# Patient Record
Sex: MALE | Race: WHITE | NOT HISPANIC OR LATINO | Employment: OTHER | URBAN - METROPOLITAN AREA
[De-identification: names, ages, dates, MRNs, and addresses within clinical notes are randomized per-mention and may not be internally consistent; named-entity substitution may affect disease eponyms.]

---

## 2017-10-19 ENCOUNTER — GENERIC CONVERSION - ENCOUNTER (OUTPATIENT)
Dept: OTHER | Facility: OTHER | Age: 62
End: 2017-10-19

## 2017-10-19 ENCOUNTER — APPOINTMENT (OUTPATIENT)
Dept: RADIOLOGY | Facility: CLINIC | Age: 62
End: 2017-10-19
Payer: COMMERCIAL

## 2017-10-19 DIAGNOSIS — M54.9 DORSALGIA: ICD-10-CM

## 2017-10-19 DIAGNOSIS — M89.9 DISORDER OF BONE: ICD-10-CM

## 2017-10-19 DIAGNOSIS — M16.12 PRIMARY OSTEOARTHRITIS OF LEFT HIP: ICD-10-CM

## 2017-10-19 DIAGNOSIS — G62.9 POLYNEUROPATHY: ICD-10-CM

## 2017-10-19 DIAGNOSIS — Z13.220 ENCOUNTER FOR SCREENING FOR LIPOID DISORDERS: ICD-10-CM

## 2017-10-19 DIAGNOSIS — M25.552 PAIN IN LEFT HIP: ICD-10-CM

## 2017-10-19 PROCEDURE — 73502 X-RAY EXAM HIP UNI 2-3 VIEWS: CPT

## 2017-10-20 ENCOUNTER — TRANSCRIBE ORDERS (OUTPATIENT)
Dept: ADMINISTRATIVE | Facility: HOSPITAL | Age: 62
End: 2017-10-20

## 2017-10-20 DIAGNOSIS — M25.552 LEFT HIP PAIN: ICD-10-CM

## 2017-10-20 DIAGNOSIS — M89.9 BONE DISEASE: Primary | ICD-10-CM

## 2017-11-01 ENCOUNTER — HOSPITAL ENCOUNTER (OUTPATIENT)
Dept: RADIOLOGY | Facility: HOSPITAL | Age: 62
Discharge: HOME | End: 2017-11-01
Attending: FAMILY MEDICINE
Payer: COMMERCIAL

## 2017-11-01 ENCOUNTER — HOSPITAL ENCOUNTER (OUTPATIENT)
Dept: RADIOLOGY | Facility: HOSPITAL | Age: 62
Discharge: HOME | End: 2017-11-01

## 2017-11-01 ENCOUNTER — HOSPITAL ENCOUNTER (OUTPATIENT)
Dept: MRI IMAGING | Facility: HOSPITAL | Age: 62
Discharge: HOME | End: 2017-11-01
Attending: FAMILY MEDICINE
Payer: COMMERCIAL

## 2017-11-01 DIAGNOSIS — M89.9 BONE DISEASE: ICD-10-CM

## 2017-11-01 DIAGNOSIS — M25.552 LEFT HIP PAIN: ICD-10-CM

## 2017-11-01 DIAGNOSIS — T15.90XA FOREIGN BODY, EYE, UNSPECIFIED LATERALITY, INITIAL ENCOUNTER: ICD-10-CM

## 2017-11-01 PROCEDURE — A9585 GADOBUTROL INJECTION: HCPCS | Performed by: FAMILY MEDICINE

## 2017-11-01 PROCEDURE — 73722 MRI JOINT OF LWR EXTR W/DYE: CPT

## 2017-11-01 PROCEDURE — 77002 NEEDLE LOCALIZATION BY XRAY: CPT

## 2017-11-01 PROCEDURE — 27095 INJECTION FOR HIP X-RAY: CPT

## 2017-11-01 RX ADMIN — GADOBUTROL 0.2 ML: 604.72 INJECTION INTRAVENOUS at 13:49

## 2017-11-01 RX ADMIN — IOHEXOL 50 ML: 300 INJECTION, SOLUTION INTRAVENOUS at 13:49

## 2017-11-03 ENCOUNTER — ALLSCRIPTS OFFICE VISIT (OUTPATIENT)
Dept: OTHER | Facility: OTHER | Age: 62
End: 2017-11-03

## 2017-11-04 NOTE — PROGRESS NOTES
Assessment  1  Osteoarthritis of left hip (715 95) (M16 12)   2  Screening for hyperlipidemia (V77 91) (Z13 220)   3  Neuropathy (355 9) (G62 9)    Plan  Depression screen    · *VB-Depression Screening; Status:Complete;   Done: [id number] 10:15AM  Neuropathy, Osteoarthritis of left hip, Screening for hyperlipidemia    · (1) CBC/PLT/DIFF; Status:Active; Requested XCZ:[id number];    · (1) COMPREHENSIVE METABOLIC PANEL; Status:Active; Requested RWR:[id number];    · (1) LIPID PANEL, FASTING; Status:Active; Requested for:03Nov2017;    · (1) TSH WITH FT4 REFLEX; Status:Active; Requested XAY:[id number];    · (1) VITAMIN B12; Status:Active; Requested NUA:[id number];   PMH: Encounter for screening for malignant neoplasm of colon    · COLONOSCOPY; Status:Active; Requested CQQ:[id number];     Discussion/Summary  Discussion Summary:   Pt is here to establish care  He was last seen by a PCP in 1970s  labs from work screenings - see scans  f/u with rheumatology for OA - will work on obtaining records  f/u with sports medicine for lumbar pain (workman's comp)  update labs - return for f/u in 1 month, sooner if needed  also states that he has concerns with a genetic disease - [id number]  States that he is in the process of getting test results back  Apparently his wife tested negative and his daughter is positive for [id number] and as a result she has fibromyalgia and MS  Pt is concerned with some s/sx he is now having may be related to HTJBE-733: Spasm to R forearm, R thigh and twitching of L hand and L foot  to f/u in 1 month  Counseling Documentation With Imm: The patient was counseled regarding instructions for management,-- risk factor reductions,-- prognosis,-- patient and family education,-- impressions  Chief Complaint  Chief Complaint Free Text Note Form: New PT here to establish care  is seeing Dr Nataly Benz Rheumatology, due to Osteo arthritis            History of Present Illness  HPI: Pt is here to establish care  Last time he saw PCP was in the 1970s  has a history of osteoarthritis that he follows Dr Arslan Mendoza (Rheumatology)  F/u appt is in December  Pt had labs completed this year for rheum (unsure of exact time)  Pt takes calcium and vitamin D    is also followed by sports medicine for lumbar spine (workman's comp) and L hip pain  Review of Systems  PHQ-9 Depression Scale: Over the past 2 weeks, how often have you been bothered by the following problems? 1 ) Little interest or pleasure in doing things? Not at all    2 ) Feeling down, depressed or hopeless? Nearly every day  3 ) Trouble falling asleep or sleeping too much? Not at all    4 ) Feeling tired or having little energy? Several days  5 ) Poor appetite or overeating? Not at all    6 ) Feeling bad about yourself, or that you are a failure, or have let yourself or your family down? Not at all    7 ) Trouble concentrating on things, such as reading a newspaper or watching television? Not at all    8 ) Moving or speaking so slowly that other people could have noticed, or the opposite, moving or speaking faster than usual? Nearly every day  severity of depression is mild   How difficult have these problems made it for you to do your work, take care of things at home, or get along with people? Extremely difficult  Score 7       Complete-Male:   Constitutional: no fever,-- not feeling poorly,-- no chills-- and-- not feeling tired  ENT: no earache,-- no sore throat-- and-- no nasal discharge  Cardiovascular: the heart rate was not slow,-- no chest pain,-- the heart rate was not fast-- and-- no palpitations  Respiratory: no shortness of breath,-- no cough,-- no wheezing-- and-- no shortness of breath during exertion  Gastrointestinal: no abdominal pain,-- no nausea,-- no vomiting-- and-- no blood in stools  Genitourinary: no dysuria  Musculoskeletal: States R forearm and R thigh spasm   L rasheed and L foot - twitching , but-- no myalgias--    The patient presents with complaints of arthralgias (back pain and back pain - followed by sports medicine)  Integumentary: no rashes  Neurological: no headache,-- no numbness,-- no tingling,-- no dizziness-- and-- no fainting  Psychiatric: no anxiety-- and-- no depression  Active Problems  1  Back pain (724 5) (M54 9)   2  Bone lesion (733 90) (M89 9)   3  Hip pain, left (719 45) (M25 552)   4  Osteoarthritis of left hip (715 95) (M16 12)    Past Medical History  1  History of depression (V11 8) (Z86 59)   2  History of Osteoarthritis (715 90) (M19 90)  Active Problems And Past Medical History Reviewed: The active problems and past medical history were reviewed and updated today  Surgical History  1  History of Epidural Needles  Surgical History Reviewed: The surgical history was reviewed and updated today  Family History  Mother    1  Family history of Rheumatism  Family History Reviewed: The family history was reviewed and updated today  Social History   · Current every day smoker (305 1) (S32 072)   · No illicit drug use   · Occasional alcohol consumption (V49 89) (Z78 9)  Social History Reviewed: The social history was reviewed and updated today  The social history was reviewed and is unchanged  Current Meds   1  Cyclobenzaprine HCl - 10 MG Oral Tablet; TAKE 1 TABLET 3 times daily PRN; Therapy: ([phone]) to Recorded   2  Hydrocodone-Acetaminophen  MG Oral Tablet; take 1 tablet every 4 to 6 hours if   needed; Therapy: ([phone]) to Recorded   3  Hydrocodone-Acetaminophen 5-325 MG Oral Tablet; take 1 tablet every 4 to 6 hours if   needed; Therapy: ([phone]) to Recorded   4  Naproxen 500 MG Oral Tablet; TAKE 1 TABLET EVERY 12 HOURS AS NEEDED; Therapy: [id number] to (Complete:08Nov2017)  Requested for: 19Oct2017; Last   Rx:19Oct2017 Ordered    Allergies  1  morphine   2  Tramadol  3   No Known Environmental Allergies 4  No Known Food Allergies    Vitals  Signs   Recorded: 59CHI0174 10:24AM   Temperature: 98 9 F, Tympanic  Heart Rate: 94  Systolic: 193, LUE, Sitting  Diastolic: 68, LUE, Sitting  Height: 6 ft 3 in  Weight: 183 lb 4 oz  BMI Calculated: 22 9  BSA Calculated: 2 11  O2 Saturation: 98, RA    Physical Exam    Constitutional   General appearance: No acute distress, well appearing and well nourished  Eyes   Conjunctiva and lids: No swelling, erythema, or discharge  Pupils and irises: Equal, round and reactive to light  Ears, Nose, Mouth, and Throat   External inspection of ears and nose: Normal     Otoscopic examination: Tympanic membrance translucent with normal light reflex  Canals patent without erythema  Nasal mucosa, septum, and turbinates: Normal without edema or erythema  Oropharynx: Normal with no erythema, edema, exudate or lesions  -- MMM  Pulmonary   Respiratory effort: No increased work of breathing or signs of respiratory distress  Auscultation of lungs: Clear to auscultation, equal breath sounds bilaterally, no wheezes, no rales, no rhonci  -- no rhonchi or wheezing  Cardiovascular   Auscultation of heart: Normal rate and rhythm, normal S1 and S2, without murmurs  -- no pedal edema  Abdomen   Abdomen: Non-tender, no masses  -- soft, ND, NT, + BS  Lymphatic   Palpation of lymph nodes in neck: No lymphadenopathy  Musculoskeletal   Gait and station: Normal     Inspection/palpation of joints, bones, and muscles: Normal     Skin   Skin and subcutaneous tissue: Normal without rashes or lesions  -- KELLEY = strong  5/5 UE and LE  no tremor or spasms noted  no twitching noted  Neurologic no focal deficits  Sensation: No sensory loss      Psychiatric   Orientation to person, place and time: Normal     Mood and affect: Normal          Results/Data  *VB-Depression Screening [id number] 10:15AM Karly Hurtado     Test Name Result Flag Reference   Depression Scale Result      Depression Screen - Positive Findings       Future Appointments    Date/Time Provider Specialty Site   12/27/2017 04:00 PM Josh Ferris MD Internal Medicine [address]   11/09/2017 01:30 PM Hafsa Hubbard Internal Medicine [address]   11/29/2017 10:40 AM Alejandra Essex, DO Sports Medicine [facility] [address]     Signatures   Electronically signed by :  Hafsa Yan; Nov  3 2017  3:31PM EST                       (Author)    Electronically signed by : Coco Garrido DO; Nov  3 2017  5:24PM EST

## 2017-11-09 ENCOUNTER — GENERIC CONVERSION - ENCOUNTER (OUTPATIENT)
Dept: OTHER | Facility: OTHER | Age: 62
End: 2017-11-09

## 2017-11-29 ENCOUNTER — GENERIC CONVERSION - ENCOUNTER (OUTPATIENT)
Dept: OTHER | Facility: OTHER | Age: 62
End: 2017-11-29

## 2017-12-22 ENCOUNTER — ALLSCRIPTS OFFICE VISIT (OUTPATIENT)
Dept: OTHER | Facility: OTHER | Age: 62
End: 2017-12-22

## 2017-12-23 NOTE — CONSULTS
Assessment   Assessed    1  Labral tear of left hip joint (843 8) (S73 192A)   2  Osteoarthritis of left hip (715 95) (M16 12)   3  Hip pain, left (719 45) (M25 552)    Plan   Hip pain, left    · Injection major joint or bursa (shoulder, knee, SI)-POC; Status:Active; Requested    for:73Ntg0785;    Perform: In Office; Order Comments:Left hip injection; Z:12GRS3772; Ordered;For:Hip pain, left; Ordered By:Milton Reyes; Discussion/Summary      1  We will schedule patient for a left hip joint injection  risks and benefits including bleeding, infection, tissue reaction, allergic reaction were discussed  Verbal consent obtained  Depending on his response to the injection we will determine further treatment options which may include medial branch nerve blocks of his lower lumbar facet joints  He is not currently experiencing any significant radiating leg pain  Chief Complaint   Chief Complaints    1  Back Pain    History of Present Illness   Mr Clay Montes is a 58-year-old gentleman sent from [place] [id number]  for consideration of left hip joint injection as well as management of his chronic back pain  He did have an injury in August of 2015 at work and had been following with a physician at [facility]  He underwent 2 separate right L4-5 and L5-S1 transforaminal epidural steroid injections  He states that this did resolve his radiating leg pain  He does have left hip pain which he states occurred after visiting with physical therapy  This was further evaluated and he was found to have a labral tear  is describing mild to moderate type pain rated as an 8 to 9/10  He is currently prior ties in the left hip pain as the worst  This is nearly constant with intermittent exacerbation  He denies any typical pattern  He characterizes the pain as sharp, dull, aching, shooting, and numbness  factors include standing, bending, walking, exercise, coughing, sneezing, and bowel movements   Alleviating factors include lying down and sitting  has tried local heat or ice application without relief  He states injections have helped with the radiating leg pain but he does continue to have some weakness of the right leg especially with activity throughout the day  He states he does developed footdrop later on in the afternoon  He has not experienced any significant improvement with physical therapy or exercise    have personally reviewed and/or updated the patient's past medical history, past surgical history, family history, social history, current medications, allergies, and vital signs today  Referring physician is  Dr Jonas Landry      Primary Care physician is  Dr Cameron Hdz presents with complaints of gradual onset of constant episodes of severe bilateral lower back pain, described as sharp, dull and aching, radiating to the left buttock and left thigh  On a scale of 1 to 10, the patient rates the pain as 9  Review of Systems        Constitutional: no fever,-- no recent weight gain-- and-- no recent weight loss  Eyes: no double vision-- and-- no blurry vision  Cardiovascular: no chest pain,-- no palpitations-- and-- no lower extremity edema  Respiratory: no complaints of shortness of breath-- and-- no wheezing  Musculoskeletal: no difficulty walking,-- no muscle weakness,-- no joint stiffness,-- no joint swelling,-- no limb swelling,-- no pain in extremity-- and-- no decreased range of motion  Neurological: no dizziness,-- no difficulty swallowing,-- no memory loss,-- no loss of consciousness-- and-- no seizures  Gastrointestinal: no nausea,-- no vomiting,-- no constipation-- and-- no diarrhea  Genitourinary: no difficulty initiating urine stream,-- no genital pain-- and-- no frequent urination  Integumentary: no complaints of skin rash  Psychiatric: no depression        Endocrine: no excessive thirst,-- no adrenal disease,-- no hypothyroidism-- and-- no hyperthyroidism  Hematologic/Lymphatic: no tendency for easy bruising-- and-- no tendency for easy bleeding  Active Problems   Problems    1  Back pain (724 5) (M54 9)   2  Bone lesion (733 90) (M89 9)   3  Depression screen (V79 0) (Z13 89)   4  Hip pain, left (719 45) (M25 552)   5  Labral tear of left hip joint (843 8) (S73 192A)   6  Neuropathy (355 9) (G62 9)   7  Osteoarthritis of left hip (715 95) (M16 12)   8  Screening for hyperlipidemia (V77 91) (Z13 220)    Past Medical History   Problems    1  History of depression (V11 8) (Z86 59)   2  History of Osteoarthritis (715 90) (M19 90)    Surgical History   Problems    1  History of Epidural Needles    Family History   Mother    1  Family history of Rheumatism    Social History   Problems    · Current every day smoker (305 1) (I33 529)   · No illicit drug use   · Occasional alcohol consumption (V49 89) (Z78 9)    Allergies   Medication    1  morphine   2  Tramadol  Non-Medication    3  No Known Environmental Allergies   4  No Known Food Allergies    Vitals   Vital Signs    Recorded: 56Egb4127 08:03AM   Heart Rate 80   Respiration 16   Systolic 458   Diastolic 64   Height 6 ft 3 in   Weight 188 lb    BMI Calculated 23 5   BSA Calculated 2 14   Pain Scale 8-9     Physical Exam              Lumbar/Sacral Spine examination demonstrates  LUMBAR Well-developed, well-nourished individual in no acute distress  Appropriate mood and affect  Grossly oriented with coherent speech and thought processing  score: Waddellâs score is 0/5    nerves: Cranial nerve function is grossly intact bilaterally  Bilateral upper and lower extremity strength is normal and symmetric EXCEPT FOR THE RIGHT FOOT DORSIFLEXORS, EVERSION AND INVERSION WHICH ARE GRADED AS 4/5  No atrophy or tone abnormalities noted  Bilateral lower extremity muscle stretch reflexes are physiologic and symmetric  No ankle clonus is noted  No loss of sensation is noted  Compression Maneuvers: Straight leg raising in the sitting and supine positions is negative for radicular pain  motor: Gait is normal  Station is normal  Toe walking, heel walking, squatting are normal    Inspection and palpation of the spine and extremities are unremarkable  ROM: Peripheral joint range of motion is full and pain free without obvious instability or laxity in all four extremities  No gross bony deformity noted  maneuvers: Provocative maneuvers are negative EXCEPT FOR INTERNAL AND EXTERNAL ROTATION OF THE LEFT HIP WHICH REPRODUCES THE PATIENT'S PAIN COMPLAINT  Normal pain-free range of motion  No gross axial skeletal deformities  Skin inspection grossly negative for erythema, breakdown, or concerning lesions in affected area  No lymphadenopathy is appreciated in the involved extremity  No lower extremity edema  Breathing is comfortable and regular  No dyspnea noted during examination  Visual field grossly intact to confrontation  No redness appreciated  No craniofacial deformities or asymmetry  No neck masses appreciated  Results/Data   Procedure Flowsheet [id number] 08:11AM       Test Name Result Flag Reference   Oswestry Score 44           Results    Outside records from Dr Tim Avelar obtained and reviewed  FL INJECTION LEFT HIP (ARTHROGRAM) [id number] 12:54PM Tristan Matthews      Test Name Result Flag Reference   FL INJECTION LEFT HIP (ARTHROGRAM) (Report)     LEFT HIP ARTHROGRAM            INDICATION:  Left hip pain  COMPARISON: None  FLUOROSCOPY TIME:  0 6 min           IMAGES: 1           FINDINGS:            After the risks and benefits of the procedure were thoroughly explained, informed consent was obtained  The patient verbalized expressed understanding of the above risks and wished to proceed with the procedure  Final standard time-out procedure performed  3 mL of 1% lidocaine solution was utilized for local anesthesia  Intermittent fluoroscopy was utilized for placement of a 20 gauge 3 5 inch spinal needle within the left hip joint  After positioning was confirmed with 3 mL of Omnipaque 300, 15 mL of       0 2 ml Gadavist/50ml Normal Saline was injected into the joint  The patient tolerated the procedure well  There were no complications  I asked the patient to call us with any questions, concerns, or acute problems  The patient expressed understanding of the above  The patient will report for MR imaging of the left hip  IMPRESSION:           Successful hip arthrogram with gadolinium injection into the left hip joint  MRI of the hip is currently pending  Workstation performed: [id number]           Signed by: Angel Jean MD      11/1/17      * MRI ARTHROGRAM LEFT HIP [id number] 12:54PM Papi Saliva      Test Name Result Flag Reference   MRI ARTHROGRAM LEFT HIP (Report)     MRI ARTHROGRAM LEFT HIP           INDICATION: M89 9: Disorder of bone, unspecified      M25 552: Pain in left hip  History taken directly from the electronic ordering system  Left hip pain  X-ray lesion  COMPARISON: Plain film dated 10/19/2017           TECHNIQUE: MR sequences were obtained of the left hip and pelvis including: Localizer, coronal pelvis T1, coronal pelvis T2 fat sat  Smaller field of view sequences of the affected hip were obtained with axial oblique T1 fat sat, axial oblique T2 fat       sat, coronal T1 fat sat, sagittal T2 fat sat, sagittal T1 fat sat  Images were acquired on a 1 5 Joyce unit  Images were acquired after intra-articular injection of gadolinium utilizing direct MR arthrography technique  FINDINGS:           LEFT HIP:           -JOINT EFFUSION: There is good distention of the left hip joint with injected contrast            -BONE MARROW SIGNAL AND ALIGNMENT: Mild left hip degenerative changes noted   No fracture dislocation or evidence of osseous destructive lesion such as AVN  Pistol- deformity appreciated on coronal series suggesting cam-type femoral acetabular impingement  There is a small lesion within the intertrochanteric region exhibiting serpiginous margins and increased central T2 signal likely reflecting a bone infarction and corresponding to recent plain film findings  This lesion measures up to 1 1 x 1 0 x 2 1 cm  -ACETABULAR LABRUM: Focal contrast imbibition noted at the anterior margin of the acetabular labrum best seen on series 8 image 19 concerning for a focal tear  Remainder of the labrum appears intact  -TROCHANTERIC BURSA: Normal            RIGHT HIP: (please note dedicated small field of view images were not made of the right hip joint limiting its evaluation )            -JOINT EFFUSION: None  -BONE MARROW SIGNAL AND ALIGNMENT: Normal marrow signal demonstrated without hip fracture or AVN  Pistol- deformity also noted involving the right hip on coronal series  -ACETABULAR LABRUM: No gross abnormalities although evaluation is very limited  -TROCHANTERIC BURSA: Normal            REST OF PELVIS:           -BONE MARROW SIGNAL: Normal            -SI JOINTS AND SYMPHYSIS PUBIS: Intact  -VISUALIZED LUMBAR SPINE: unremarkable  -MUSCULATURE: Intact with intact hamstring origins bilaterally  -PELVIC SOFT TISSUES: Normal            SUBCUTANEOUS TISSUES: Normal                IMPRESSION:      1  Focal contrast imbibition anterior acetabular labrum consistent with focal tear  The remainder of the labrum appears intact  2  Mild left hip degenerative change and pistol- deformity suggestive of cam-type femoral acetabular impingement  Similar findings are noted involving the contralateral right        3  Left intertrochanteric lesion 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 and corresponding to recent plain film abnormality  Workstation performed: [id number]           Signed by:      Lona Wills MD      11/2/17      * XR HIP/PELV 2-3 VWS LEFT W PELVIS IF PERFORMED 19Oct2017 10:29AM Ovidio Cooler Order Number: [id number]      Performing Comments: room 6      Test Name Result Flag Reference   * XR HIP/PELV 2-3 VWS LEFT (Report)     This is a summary report  The complete report is available in the patient's medical record  If you cannot access the medical record, please contact the sending organization for a detailed fax or copy  LEFT HIP           INDICATION: Left hip pain that radiates across lower back           COMPARISON: None           VIEWS: AP pelvis and 2 coned down views           IMAGES: 3           FINDINGS:           There is no fracture or dislocation  Minimal degenerative changes in the lumbar spine  2 1 x 1 4 cm sclerotic lesion in the left femoral neck with a central lucency and relatively narrow transition zone  This favors a benign lesion  However, a follow-up plain film exam in 2 months can be considered to confirm stability  Soft tissues are unremarkable  IMPRESSION:                1  Sclerotic lesion in the left femoral neck favoring a benign etiology  2  Degenerative changes as described             ##fuslh2##fuslh2                 Workstation performed: [id number]           Signed by:      King Tejeda MD      10/20/17      Future Appointments      Date/Time Provider Specialty Site   01/08/2018 02:30 PM Som Julio MD Internal Medicine [facility]   01/17/2018 08:45 AM Elenita Ramírez DO Pain Management [facility] OUTPATIENT     Signatures    Electronically signed by : Homer Sánchez DO; Dec 22 2017 12:23PM EST                       (Author)

## 2018-01-08 ENCOUNTER — GENERIC CONVERSION - ENCOUNTER (OUTPATIENT)
Dept: INTERNAL MEDICINE CLINIC | Facility: CLINIC | Age: 63
End: 2018-01-08

## 2018-01-08 ENCOUNTER — GENERIC CONVERSION - ENCOUNTER (OUTPATIENT)
Dept: OTHER | Facility: OTHER | Age: 63
End: 2018-01-08

## 2018-01-12 VITALS
BODY MASS INDEX: 22.78 KG/M2 | TEMPERATURE: 98.9 F | OXYGEN SATURATION: 98 % | WEIGHT: 183.25 LBS | SYSTOLIC BLOOD PRESSURE: 104 MMHG | HEIGHT: 75 IN | DIASTOLIC BLOOD PRESSURE: 68 MMHG | HEART RATE: 94 BPM

## 2018-01-17 ENCOUNTER — GENERIC CONVERSION - ENCOUNTER (OUTPATIENT)
Dept: OTHER | Facility: OTHER | Age: 63
End: 2018-01-17

## 2018-01-17 ENCOUNTER — HOSPITAL ENCOUNTER (OUTPATIENT)
Dept: RADIOLOGY | Facility: MEDICAL CENTER | Age: 63
Discharge: HOME | End: 2018-01-17

## 2018-01-22 VITALS
HEART RATE: 87 BPM | DIASTOLIC BLOOD PRESSURE: 73 MMHG | SYSTOLIC BLOOD PRESSURE: 103 MMHG | BODY MASS INDEX: 23.5 KG/M2 | HEIGHT: 75 IN | WEIGHT: 189 LBS

## 2018-01-22 VITALS
HEART RATE: 100 BPM | TEMPERATURE: 98 F | HEIGHT: 75 IN | OXYGEN SATURATION: 97 % | WEIGHT: 189 LBS | BODY MASS INDEX: 23.5 KG/M2 | SYSTOLIC BLOOD PRESSURE: 90 MMHG | DIASTOLIC BLOOD PRESSURE: 62 MMHG

## 2018-01-22 VITALS
HEIGHT: 75 IN | SYSTOLIC BLOOD PRESSURE: 110 MMHG | DIASTOLIC BLOOD PRESSURE: 75 MMHG | WEIGHT: 182 LBS | BODY MASS INDEX: 22.63 KG/M2 | HEART RATE: 101 BPM

## 2018-01-22 VITALS
HEIGHT: 75 IN | BODY MASS INDEX: 23.38 KG/M2 | HEART RATE: 80 BPM | SYSTOLIC BLOOD PRESSURE: 108 MMHG | WEIGHT: 188 LBS | RESPIRATION RATE: 16 BRPM | DIASTOLIC BLOOD PRESSURE: 64 MMHG

## 2018-01-23 NOTE — RESULT NOTES
Message   Recorded as Task   Date: 01/16/2018 01:26 PM, Created By: Brooklyn Sullivan   Task Name: Care Coordination   Assigned To: Deonna Villafana   Regarding Patient: Disha Rodríguez, Status: Active   Comment:    Deonna Villafana - 16 Jan 2018 1:26 PM     TASK CREATED  Previously left 2 messages with WC to call me back regarding his claim:   - 1/10/18, left message for Jess Carbajalr, per intake, this is his   - 1/12/18, called and spoke with Yennifer Ackerman, she is not his  and she forwarded my original voicemail to his appropriate ; I then left another message for this  (Don Gudino, [phone])  - attempted to call Don Gudino again and message to voicemail, hit option to speak with anyone in the claims dept  Per Yennifer Ackerman, claim is open for lower back only; hip is NOT included in the [address] claim  Left message for patient to call me back to inform him of this  I did call Abel and [facility]io [facility] on 1/10/18, this is NOT an active policy  Does patient has active medical coverage? Otherwise he cannot have hip injection  WC claim is only open for lower back  Await his call back  Deonna Villafana - 16 Jan 2018 4:38 PM     TASK EDITED  Had spoken with patient explained WC and no [employer] coverage  He was going to call Abel Mora'd message from patient  He called his HR and they got Hydro on the phone  He has an active policy  Asked me to call [employer] ([phone], last 4 SSN, [id number]) to speak with HR  I was unable to get through to HR as a password was needed  I called [address], [phone]  Spoke with Dank Amin and explained that patient was told that he did have active coverage  She checked 2 systems  One system said no active policy and the second system, she was not able to pull up the at all  I called patient and explained this to him  He states that he paid his COBRA coverage and the [employer] verified this    HR at [address] states that he has coverage and Camilla Crisostomo at [employer] said the system was updated  I said to the patient that what may have happened was that although he may have active coverage, when it's re-instated it may take a few days before the systems themselves update  We don't want to have him get a procedure when he does not have an active policy and one that I cannot get verification whether an Ali Rathke is or is not needed  He did become angry and frustrated at this but did understand our position and did admit that Jamal Dang did mention a third-party involved with the systems  He inquired about our next available appt which currently is 2/2; he is unhappy about this  He is going to call Abel and see about the system being updated faster and I asked him to then inquire about prior auth (code [id number], Sofie Bauer, outpt hospital setting) explaining that even if I get a call from [address] tomorrow morning at [phone] (appt at [address] ), I would still need to call Abel about a prior auth  He understood and said that he would inquire about this also  Deonna Villafana - 17 Jan 2018 9:18 AM     TASK EDITED  Rec'd voicemail (@1045) from Eleazar Woo  at [facility] [place]  He states that patient will show in their system as an active policy tomorrow  I called Jamal Dang back ([phone], ext [phone])  He states that patient told him that he was rescheduled now for 2/3 (this is a Saturday)  I explained to Jamal Dang that I was under the impression from the patient yesterday that he was still coming; I was going to have him sign a financial waiver as his policy was not showing active in the [address] system and that he would take responsibility for any fees due as he stated to me yesterday that he had a  involved with his WC case  I told Jamal Dang that I told the patient yesterday that I would need to know exactly what was going on for today and that I would be in the office by 0800  The patient was now a "no show" for his appt time    Jamal Dang fully understood that since policy would not show active until tomorrow that we would not be able to verify if any auth was needed also  Sharita Cervantes was going to call the patient and explain this further to him  Also to tell the patient, that 2/3 is a Saturday and that he would not have an appointment in our office on that day  Will await call from either Sharita Cervantes or patient himself  At present time, patient is a no show  Deonna Villafana - 17 Jan 2018 11:14 AM     TASK EDITED  Rec'd call from patient  Very understanding on our part for need of active policy verification and auth in place to be able to proceed  Scheduled left hip injection for 1/29

## 2018-01-24 VITALS
HEART RATE: 78 BPM | OXYGEN SATURATION: 99 % | HEIGHT: 75 IN | DIASTOLIC BLOOD PRESSURE: 80 MMHG | WEIGHT: 196.25 LBS | SYSTOLIC BLOOD PRESSURE: 118 MMHG | BODY MASS INDEX: 24.4 KG/M2 | TEMPERATURE: 98.1 F

## 2018-01-29 ENCOUNTER — HOSPITAL ENCOUNTER (OUTPATIENT)
Dept: RADIOLOGY | Facility: MEDICAL CENTER | Age: 63
Discharge: HOME | End: 2018-01-29
Attending: PHYSICAL MEDICINE & REHABILITATION
Payer: COMMERCIAL

## 2018-01-29 VITALS
SYSTOLIC BLOOD PRESSURE: 119 MMHG | DIASTOLIC BLOOD PRESSURE: 74 MMHG | TEMPERATURE: 97.6 F | OXYGEN SATURATION: 97 % | RESPIRATION RATE: 20 BRPM | HEART RATE: 71 BPM

## 2018-01-29 DIAGNOSIS — M25.552 LEFT HIP PAIN: ICD-10-CM

## 2018-01-29 PROCEDURE — 20610 DRAIN/INJ JOINT/BURSA W/O US: CPT | Performed by: PHYSICAL MEDICINE & REHABILITATION

## 2018-01-29 PROCEDURE — 77002 NEEDLE LOCALIZATION BY XRAY: CPT

## 2018-01-29 RX ORDER — LIDOCAINE HYDROCHLORIDE 5 MG/ML
10 INJECTION, SOLUTION INFILTRATION; PERINEURAL ONCE
Status: COMPLETED | OUTPATIENT
Start: 2018-01-29 | End: 2018-01-29

## 2018-01-29 RX ORDER — METHYLPREDNISOLONE ACETATE 40 MG/ML
40 INJECTION, SUSPENSION INTRA-ARTICULAR; INTRALESIONAL; INTRAMUSCULAR; SOFT TISSUE ONCE
Status: COMPLETED | OUTPATIENT
Start: 2018-01-29 | End: 2018-01-29

## 2018-01-29 RX ORDER — BUPIVACAINE HYDROCHLORIDE 2.5 MG/ML
10 INJECTION, SOLUTION EPIDURAL; INFILTRATION; INTRACAUDAL ONCE
Status: COMPLETED | OUTPATIENT
Start: 2018-01-29 | End: 2018-01-29

## 2018-01-29 RX ADMIN — LIDOCAINE HYDROCHLORIDE 3 ML: 5 INJECTION, SOLUTION INFILTRATION; PERINEURAL at 10:28

## 2018-01-29 RX ADMIN — METHYLPREDNISOLONE ACETATE 40 MG: 40 INJECTION, SUSPENSION INTRA-ARTICULAR; INTRALESIONAL; INTRAMUSCULAR; SOFT TISSUE at 10:28

## 2018-01-29 RX ADMIN — BUPIVACAINE HYDROCHLORIDE 3 ML: 2.5 INJECTION, SOLUTION EPIDURAL; INFILTRATION; INTRACAUDAL at 10:28

## 2018-01-29 RX ADMIN — IOHEXOL 2 ML: 300 INJECTION, SOLUTION INTRAVENOUS at 10:29

## 2018-01-29 NOTE — H&P
History of Present Illness: The patient is a 60 y o  male who presents with complaints of Left hip and groin pain    Assessed    1  Labral tear of left hip joint (843 8) (S73 192A)   2  Osteoarthritis of left hip (715 95) (M16 12)   3  Hip pain, left (719 45) (M25 552)     Plan   Hip pain, left    · Injection major joint or bursa (shoulder, knee, SI)-POC; Status:Active; Requested    for:[id number];    Perform: In Office; Order Comments:Left hip injection; VYC:[id number]; Ordered;For:Hip pain, left; Ordered By:Dionicio Reyes Ser;     Discussion/Summary      1  We will schedule patient for a left hip joint injection  risks and benefits including bleeding, infection, tissue reaction, allergic reaction were discussed  Verbal consent obtained         Depending on his response to the injection we will determine further treatment options which may include medial branch nerve blocks of his lower lumbar facet joints  He is not currently experiencing any significant radiating leg pain  Chief Complaint   Chief Complaints    1  Back Pain     History of Present Illness   Mr Keshia Lo is a 59-year-old gentleman sent from [place] [address]  for consideration of left hip joint injection as well as management of his chronic back pain  He did have an injury in August of 2015 at work and had been following with a physician at [facility]  He underwent 2 separate right L4-5 and L5-S1 transforaminal epidural steroid injections  He states that this did resolve his radiating leg pain  He does have left hip pain which he states occurred after visiting with physical therapy  This was further evaluated and he was found to have a labral tear  is describing mild to moderate type pain rated as an 8 to 9/10  He is currently prior ties in the left hip pain as the worst  This is nearly constant with intermittent exacerbation  He denies any typical pattern  He characterizes the pain as sharp, dull, aching, shooting, and numbness  factors include standing, bending, walking, exercise, coughing, sneezing, and bowel movements  Alleviating factors include lying down and sitting  has tried local heat or ice application without relief  He states injections have helped with the radiating leg pain but he does continue to have some weakness of the right leg especially with activity throughout the day  He states he does developed footdrop later on in the afternoon  He has not experienced any significant improvement with physical therapy or exercise    have personally reviewed and/or updated the patient's past medical history, past surgical history, family history, social history, current medications, allergies, and vital signs today  Referring physician is  Dr Emily Chaves      Primary Care physician is  Dr Eloina Stone presents with complaints of gradual onset of constant episodes of severe bilateral lower back pain, described as sharp, dull and aching, radiating to the left buttock and left thigh  On a scale of 1 to 10, the patient rates the pain as 9  Review of Systems        Constitutional: no fever,-- no recent weight gain-- and-- no recent weight loss  Eyes: no double vision-- and-- no blurry vision  Cardiovascular: no chest pain,-- no palpitations-- and-- no lower extremity edema  Respiratory: no complaints of shortness of breath-- and-- no wheezing  Musculoskeletal: no difficulty walking,-- no muscle weakness,-- no joint stiffness,-- no joint swelling,-- no limb swelling,-- no pain in extremity-- and-- no decreased range of motion  Neurological: no dizziness,-- no difficulty swallowing,-- no memory loss,-- no loss of consciousness-- and-- no seizures  Gastrointestinal: no nausea,-- no vomiting,-- no constipation-- and-- no diarrhea  Genitourinary: no difficulty initiating urine stream,-- no genital pain-- and-- no frequent urination  Integumentary: no complaints of skin rash  Psychiatric: no depression  Endocrine: no excessive thirst,-- no adrenal disease,-- no hypothyroidism-- and-- no hyperthyroidism  Hematologic/Lymphatic: no tendency for easy bruising-- and-- no tendency for easy bleeding  Active Problems   Problems    1  Back pain (724 5) (M54 9)   2  Bone lesion (733 90) (M89 9)   3  Depression screen (V79 0) (Z13 89)   4  Hip pain, left (719 45) (M25 552)   5  Labral tear of left hip joint (843 8) (S73 192A)   6  Neuropathy (355 9) (G62 9)   7  Osteoarthritis of left hip (715 95) (M16 12)   8  Screening for hyperlipidemia (V77 91) (Z13 220)     Past Medical History   Problems    1  History of depression (V11 8) (Z86 59)   2  History of Osteoarthritis (715 90) (M19 90)     Surgical History   Problems    1  History of Epidural Needles     Family History   Mother    1  Family history of Rheumatism     Social History   Problems    · Current every day smoker (305 1) (R58 302)   · No illicit drug use   · Occasional alcohol consumption (V49 89) (Z78 9)     Allergies   Medication    1  morphine   2  Tramadol  Non-Medication    3  No Known Environmental Allergies   4  No Known Food Allergies     Vitals   Vital Signs     Recorded: 59Igz3491 08:03AM   Heart Rate 80   Respiration 16   Systolic 733   Diastolic 64   Height 6 ft 3 in   Weight 188 lb    BMI Calculated 23 5   BSA Calculated 2 14   Pain Scale 8-9      Physical Exam              Lumbar/Sacral Spine examination demonstrates  LUMBAR : Well-developed, well-nourished individual in no acute distress  : Appropriate mood and affect  Grossly oriented with coherent speech and thought processing  âs score: Waddellâs score is 0/5    : nerves: Cranial nerve function is grossly intact bilaterally  : Bilateral upper and lower extremity strength is normal and symmetric EXCEPT FOR THE RIGHT FOOT DORSIFLEXORS, EVERSION AND INVERSION WHICH ARE GRADED AS 4/5   No atrophy or tone abnormalities noted  : Bilateral lower extremity muscle stretch reflexes are physiologic and symmetric  No ankle clonus is noted   : No loss of sensation is noted  /Foraminal Compression Maneuvers: Straight leg raising in the sitting and supine positions is negative for radicular pain    : /gross motor: Gait is normal  Station is normal  Toe walking, heel walking, squatting are normal    : : Inspection and palpation of the spine and extremities are unremarkable  ROM: Peripheral joint range of motion is full and pain free without obvious instability or laxity in all four extremities  No gross bony deformity noted  maneuvers: Provocative maneuvers are negative EXCEPT FOR INTERNAL AND EXTERNAL ROTATION OF THE LEFT HIP WHICH REPRODUCES THE PATIENT'S PAIN COMPLAINT    : Normal pain-free range of motion  No gross axial skeletal deformities    : Skin inspection grossly negative for erythema, breakdown, or concerning lesions in affected area    : No lymphadenopathy is appreciated in the involved extremity    : No lower extremity edema    : Breathing is comfortable and regular  No dyspnea noted during examination    : Visual field grossly intact to confrontation  No redness appreciated    : No craniofacial deformities or asymmetry  No neck masses appreciated  Allergies   Allergen Reactions    Tramadol Confusion    Morphine Other (See Comments)     constipation       Physical Exam:   Vitals:    01/29/18 1013   BP: 119/74   Pulse: 72   Resp: 20   Temp: 97 6 °F (36 4 °C)   SpO2: 96%     General: Awake, Alert, Oriented x 3, Mood and affect appropriate  Respiratory: Respirations even and unlabored  Cardiovascular: Peripheral pulses intact; no edema  Musculoskeletal Exam:  Left-sided hip pain with active range of motion    ASA Score:  2  Assessment:   1   Left hip pain        Plan: LT HIP INJ

## 2018-01-29 NOTE — DISCHARGE INSTRUCTIONS
Steroid Joint Injection   WHAT YOU NEED TO KNOW:   A steroid joint injection is a procedure to inject steroid medicine into a joint  Steroid medicine decreases pain and inflammation  The injection may also contain an anesthetic (numbing medicine) to decrease pain  It may be done to treat conditions such as arthritis, gout, or carpal tunnel syndrome  The injections may be given in your knee, ankle, shoulder, elbow, wrist, ankle or sacroiliac joint  1  Do not apply heat to any area that is numb  If you have discomfort or soreness at the injection site, you may apply ice today, 20 minutes on and 20 minutes off  Tomorrow you may use ice or warm, moist heat  Do not apply ice or heat directly to the skin  2  You may have an increase or change in the discomfort for 36-48 hours after your treatment  Apply ice and continue with any pain medicine you have been prescribed  3  Do not do anything strenuous today  You may shower, but no tub baths or hot tubs today  You may resume your normal activities tomorrow, but do not overdo it  Resume normal activities slowly when you are feeling better  4  If you experience redness, drainage or swelling at the injection site, or if you develop a fever above 100 degrees, please call [facility] at [phone] or go to the Emergency Room  5  Continue to take all routine medicines prescribed by your primary care physician unless otherwise instructed by our staff  Most blood thinners should be started again according to your regularly scheduled dosing  If you have any questions, please give our office a call  If you have a problem specifically related to your procedure, please call our office at [phone]  Problems not related to your procedure should be directed to your primary care physician

## 2018-02-01 ENCOUNTER — OFFICE VISIT (OUTPATIENT)
Dept: PAIN MEDICINE | Facility: MEDICAL CENTER | Age: 63
End: 2018-02-01
Payer: OTHER MISCELLANEOUS

## 2018-02-01 VITALS
WEIGHT: 199 LBS | HEART RATE: 76 BPM | HEIGHT: 75 IN | SYSTOLIC BLOOD PRESSURE: 100 MMHG | DIASTOLIC BLOOD PRESSURE: 66 MMHG | BODY MASS INDEX: 24.74 KG/M2 | RESPIRATION RATE: 16 BRPM

## 2018-02-01 DIAGNOSIS — M25.552 LEFT HIP PAIN: ICD-10-CM

## 2018-02-01 DIAGNOSIS — M54.16 LUMBAR RADICULOPATHY: Primary | ICD-10-CM

## 2018-02-01 DIAGNOSIS — S73.192D TEAR OF LEFT ACETABULAR LABRUM, SUBSEQUENT ENCOUNTER: ICD-10-CM

## 2018-02-01 PROBLEM — S73.192A LABRAL TEAR OF LEFT HIP JOINT: Status: ACTIVE | Noted: 2018-02-01

## 2018-02-01 PROCEDURE — 99214 OFFICE O/P EST MOD 30 MIN: CPT | Performed by: PHYSICAL MEDICINE & REHABILITATION

## 2018-02-01 RX ORDER — HYDROCODONE BITARTRATE AND ACETAMINOPHEN 5; 325 MG/1; MG/1
1 TABLET ORAL
COMMUNITY
Start: 2018-01-08 | End: 2018-02-20

## 2018-02-01 RX ORDER — PREGABALIN 75 MG/1
75 CAPSULE ORAL 2 TIMES DAILY
Qty: 60 CAPSULE | Refills: 0 | Status: SHIPPED | OUTPATIENT
Start: 2018-02-01 | End: 2018-02-27 | Stop reason: SDUPTHER

## 2018-02-01 RX ORDER — HYDROCODONE BITARTRATE AND ACETAMINOPHEN 10; 325 MG/1; MG/1
1 TABLET ORAL
COMMUNITY
Start: 2018-01-08 | End: 2018-03-13

## 2018-02-01 NOTE — PROGRESS NOTES
Assessment:  1  Lumbar radiculopathy - Right    2  Tear of left acetabular labrum, subsequent encounter    3  Left hip pain        Plan:  1  Patient will obtain new CD of his lumbar spine MRI as the current 1 is unable to be viewed  2  Initiate Lyrica 75 milligrams 1 tablet twice daily  3  Patient is requesting disability evaluation, we will send him to Dr Shirlene Watson  4  Once I have the MRI to review we will determine the next treatment step which may include repeating epidural steroid injections, surgical consultation, or consideration for spinal cord stimulator trial     My impressions and treatment recommendations were discussed in detail with the patient who verbalized understanding and had no further questions  Discharge instructions were provided  I personally saw and examined the patient and I agree with the above discussed plan of care  Orders Placed This Encounter   Procedures    Ambulatory referral to Physical Medicine Rehab     Standing Status:   Future     Standing Expiration Date:   8/1/2018     Referral Priority:   Routine     Referral Type:   Rehabilitation     Referral Reason:   Specialty Services Required     Referred to Provider:   Nick De Los Santos DO     Requested Specialty:   Physical Medicine and Rehabilitation     Number of Visits Requested:   1     Expiration Date:   2/1/2019     New Medications Ordered This Visit   Medications    HYDROcodone-acetaminophen (NORCO)  mg per tablet     Sig: Take 1 tablet by mouth    HYDROcodone-acetaminophen (NORCO) 5-325 mg per tablet     Sig: Take 1 tablet by mouth    pregabalin (LYRICA) 75 mg capsule     Sig: Take 1 capsule (75 mg total) by mouth 2 (two) times a day for 30 days     Dispense:  60 capsule     Refill:  0       History of Present Illness:    Bala Cunningham is a 60 y o  male   Returns in followup related to back and radiating leg pain  We did complete a left hip joint injection for him earlier this week    He continues to complain of significant back and radiating right leg pain rated as a 5/10 which is sharp, throbbing, shooting, numbness in nature  Not currently receiving any relief from medications  I have personally reviewed and/or updated the patient's past medical history, past surgical history, family history, social history, current medications, allergies, and vital signs today  Review of Systems:    Review of Systems   Musculoskeletal: Positive for gait problem  All other systems reviewed and are negative  Patient Active Problem List   Diagnosis    Lumbar radiculopathy - Right    Left hip pain    Labral tear of left hip joint       Past Medical History:   Diagnosis Date    Depression     Osteoarthritis        History reviewed  No pertinent surgical history  Family History   Problem Relation Age of Onset    Osteoarthritis Family        Social History     Occupational History    Not on file  Social History Main Topics    Smoking status: Former Smoker    Smokeless tobacco: Never Used    Alcohol use Yes      Comment: occasional    Drug use: No    Sexual activity: Not on file       No current outpatient prescriptions on file prior to visit  No current facility-administered medications on file prior to visit  Allergies   Allergen Reactions    Tramadol Confusion    Morphine Other (See Comments)     constipation       Physical Exam:    /66   Pulse 76   Resp 16   Ht 6' 3" (1 905 m)   Wt 90 3 kg (199 lb)   BMI 24 87 kg/m²     Constitutional: normal, well developed, well nourished, alert, in no distress and non-toxic and no overt pain behavior    Eyes: anicteric  HEENT: grossly intact  Neck: supple, symmetric, trachea midline and no masses   Pulmonary:even and unlabored  Cardiovascular:No edema or pitting edema present  Skin:Normal without rashes or lesions and well hydrated  Psychiatric:Mood and affect appropriate  Neurologic:Cranial Nerves II-XII grossly intact  Musculoskeletal:antalgic gait on right, walking without assistive device today    Imaging

## 2018-02-20 ENCOUNTER — OFFICE VISIT (OUTPATIENT)
Dept: PAIN MEDICINE | Facility: CLINIC | Age: 63
End: 2018-02-20
Payer: OTHER MISCELLANEOUS

## 2018-02-20 VITALS
HEIGHT: 75 IN | DIASTOLIC BLOOD PRESSURE: 88 MMHG | SYSTOLIC BLOOD PRESSURE: 120 MMHG | WEIGHT: 196 LBS | HEART RATE: 76 BPM | BODY MASS INDEX: 24.37 KG/M2

## 2018-02-20 DIAGNOSIS — Z02.71 DISABILITY EXAMINATION: Primary | ICD-10-CM

## 2018-02-20 DIAGNOSIS — M54.16 LUMBAR RADICULOPATHY: ICD-10-CM

## 2018-02-20 DIAGNOSIS — M25.552 LEFT HIP PAIN: ICD-10-CM

## 2018-02-20 DIAGNOSIS — S73.192S TEAR OF LEFT ACETABULAR LABRUM, SEQUELA: ICD-10-CM

## 2018-02-20 PROCEDURE — 99204 OFFICE O/P NEW MOD 45 MIN: CPT | Performed by: PHYSICAL MEDICINE & REHABILITATION

## 2018-02-20 NOTE — LETTER
February 20, 2018     Patient: Yoly Perdomo   YOB: 1955   Date of Visit: 2/20/2018       To Whom it May Concern:    Jeff Ford is under my professional care  He was seen in my office on 2/20/2018  He  Is unable to work in any capacity to further notice to work-related injury  If you have any questions or concerns, please don't hesitate to call           Sincerely,          Hollis Safer, DO        CC: Yoly Moybrenda

## 2018-02-20 NOTE — PATIENT INSTRUCTIONS
1  Continue treatment with Pain Medicine, Dr Adam Mejias  2  Will review MRI images when they are eventually loaded In our system

## 2018-02-20 NOTE — PROGRESS NOTES
Assessment/Plan:            Subjective:     Patient ID: Jorge Blunt is a 60 y o  male  HPI 61 yo male referred by PM for functional evaluation  He has had 1 left intra-articular injection without benefit for a known labral tear  Does report left groin pain, so she of low back pain axial in nature with occasional with sitting pains in the right foot with tingling and numbness in both feet  Reports right foot drop since August 20 of 2015  He had been treating with Dr Glenny Erickson at [facility] since his initial work injury dated August 18, 2015 into last fall of 2017  The patient reports injuring it disc well opening boxes at [employer] while being bent over  He had worked there for 9 years  Initial treatment was through [facility] at 1 point he was return to work in February of 2016 wearing a molded ankle-foot orthosis for right foot drop  He reports he was on restrictive duty he reports a "re-injury" in June of 2017 and stopped working and followed up with Dr Barakat  He has had lumbar epidural steroid injections as well as physical therapy  He reports the onset of left groin pain while doing an exercise from physical therapy where he "tore his left hip"  He is currently on worker's compensation, he has retained , he just had an MARII last week in the [place] area  Pain is generally axial in the lumbar with shooting pains aggravated by twisting his trunk and prolonged sitting in the car, relieved by sitting in a recliner no bowel or bladder incontinence  Reports are not available from [facility] he did give the discs of his MRIs to pain medicine last week images are not yet in her EMR  Review of Systems   Respiratory: Negative  Cardiovascular: Negative  Gastrointestinal:        Toro LBP with BM  Genitourinary: Negative for difficulty urinating  Objective:    DATA:     1    Focal contrast imbibition anterior acetabular labrum consistent with focal tear  The remainder of the labrum appears intact  2   Mild left hip degenerative change and pistol- deformity suggestive of cam-type femoral acetabular impingement  Similar findings are noted involving the contralateral right  3   Left intertrochanteric lesion 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 and corresponding to recent plain film abnormality  There are NO lumbar spine images on EMR    Patient has submitted the disc from [employer] to Dr Lluvia Mccray  office for important to her EMR  Received today was his MRI report from a study dated 10/26/2017 showing "interval decrease in size of the inferiorly migrating extruded fragment noted within the right subarticular recess on the prior MRI with decreased mass effect on the right traversing right L5 nerve root  At L4-5 broad-based disc protrusion extending into the bilateral paracentral regions  Disc approximates are minimally abuts the exiting right L4 nerve root  Physical Exam   Musculoskeletal:    Positive Mathew's maneuver on the left,Active straight leg raising reproduces some paresthesias in the feet but in the supine position the age she is greater than 90°  A detailed sensory examination was deferred  Neurological:   Reflex Scores:       Tricep reflexes are 2+ on the right side and 2+ on the left side  Bicep reflexes are 2+ on the right side and 2+ on the left side  Brachioradialis reflexes are 2+ on the right side and 2+ on the left side  Patellar reflexes are 2+ on the right side and 2+ on the left side  Achilles reflexes are 2+ on the right side and 2+ on the left side  Hamstrings jerks 2+ and =   With heel walking right dorsiflexor about 1/2 range, toe walking about equal

## 2018-02-23 ENCOUNTER — TELEPHONE (OUTPATIENT)
Dept: PAIN MEDICINE | Facility: MEDICAL CENTER | Age: 63
End: 2018-02-23

## 2018-02-27 ENCOUNTER — OFFICE VISIT (OUTPATIENT)
Dept: PAIN MEDICINE | Facility: MEDICAL CENTER | Age: 63
End: 2018-02-27
Payer: COMMERCIAL

## 2018-02-27 ENCOUNTER — TELEPHONE (OUTPATIENT)
Dept: PAIN MEDICINE | Facility: CLINIC | Age: 63
End: 2018-02-27

## 2018-02-27 VITALS — WEIGHT: 198 LBS | BODY MASS INDEX: 24.75 KG/M2 | SYSTOLIC BLOOD PRESSURE: 120 MMHG | DIASTOLIC BLOOD PRESSURE: 70 MMHG

## 2018-02-27 DIAGNOSIS — M25.552 LEFT HIP PAIN: ICD-10-CM

## 2018-02-27 DIAGNOSIS — M79.2 NEUROPATHIC PAIN: Primary | ICD-10-CM

## 2018-02-27 DIAGNOSIS — S73.192S TEAR OF LEFT ACETABULAR LABRUM, SEQUELA: Primary | ICD-10-CM

## 2018-02-27 DIAGNOSIS — M54.16 LUMBAR RADICULOPATHY: ICD-10-CM

## 2018-02-27 PROCEDURE — 99214 OFFICE O/P EST MOD 30 MIN: CPT | Performed by: PHYSICAL MEDICINE & REHABILITATION

## 2018-02-27 RX ORDER — TRAMADOL HYDROCHLORIDE 50 MG/1
50 TABLET ORAL EVERY 8 HOURS PRN
Qty: 15 TABLET | Refills: 0 | Status: SHIPPED | OUTPATIENT
Start: 2018-02-27 | End: 2018-03-27 | Stop reason: SDUPTHER

## 2018-02-27 RX ORDER — GABAPENTIN 300 MG/1
300 CAPSULE ORAL 3 TIMES DAILY
Qty: 90 CAPSULE | Refills: 0 | Status: SHIPPED | OUTPATIENT
Start: 2018-02-27 | End: 2018-03-08 | Stop reason: SINTOL

## 2018-02-27 RX ORDER — PREGABALIN 75 MG/1
75 CAPSULE ORAL 2 TIMES DAILY
Qty: 60 CAPSULE | Refills: 0 | Status: SHIPPED | OUTPATIENT
Start: 2018-02-27 | End: 2018-03-13

## 2018-02-27 NOTE — TELEPHONE ENCOUNTER
Pt called and stated the Lyrica he was prescribed, is not covered under his insurance  Pt states the Gabapentin will be accepted  Please call [phone]

## 2018-02-27 NOTE — PROGRESS NOTES
Pt is c/o left hip   Assessment:  1  Tear of left acetabular labrum, sequela    2  Lumbar radiculopathy - Right    3  Left hip pain        Plan:  1  At this time the patient is following up and not experiencing significant relief from his hip injection  He is interested in a surgical consultation related to his labral tear  We will send him for consult to Dr Teetee Naranjo  2    We will also continue the patient's Lyrica 75 mg 1 tablet twice daily  This is providing some relief of his symptoms and should be continued  3  The patient is requesting continuation of hydrocodone  At this time we will obtain a urine sample, have him complete our opioid agreement, and I will give him a short course of tramadol until we receive the above information  4   He will return in follow up in 4 weeks  There are risks associated with opioid medications, including dependence, addiction and tolerance  The patient understands and agrees to use these medications only as prescribed  Potential side effects of the medications include, but are not limited to, constipation, drowsiness, addiction, impaired judgment and risk of fatal overdose if not taken as prescribed  The patient was warned against driving while taking sedation medications  Sharing medications is a felony  At this point in time, the patient is showing no signs of addiction, abuse, diversion or suicidal ideation  A urine drug screen was collected at today's office visit as part of our medication management protocol  The point of care testing results were appropriate for what was being prescribed  The specimen will be sent for confirmatory testing  The drug screen is medically necessary because the patient is either dependent on opioid medication or is being considered for opioid medication therapy and the results could impact ongoing or future treatment   The drug screen is to evaluate for the presences or absence of prescribed, non-prescribed, and/or illicit drugs/substances  [place] Prescription Drug Monitoring Program report was reviewed and was appropriate       History of Present Illness: The patient is a 60 y o  male who presents for a follow up office visit in regards to Hip Pain  The patients current symptoms include   Left-sided hip and groin pain  The patient describes his pain as a 3 to 4/10  He states that he will have occasional exacerbations of pain which he uses hydrocodone for  he states that he has not had significant improvement since the hip joint injection and is interested in surgical evaluation  I did review the MRI of his lumbar spine  This does reveal some bilateral L5-S1 foraminal stenosis however he is not currently complaining of severe radicular type symptoms  I have personally reviewed and/or updated the patient's past medical history, past surgical history, family history, social history, current medications, allergies, and vital signs today  Review of Systems  Review of Systems   Respiratory: Negative for shortness of breath  Cardiovascular: Negative for chest pain  Gastrointestinal: Negative for constipation, diarrhea, nausea and vomiting  Musculoskeletal: Negative for arthralgias, gait problem, joint swelling and myalgias  Decreased rom   Skin: Negative for rash  Neurological: Negative for dizziness, seizures and weakness  All other systems reviewed and are negative  Past Medical History:   Diagnosis Date    Depression     Osteoarthritis        No past surgical history on file  Family History   Problem Relation Age of Onset    Osteoarthritis Family        Social History     Occupational History    Not on file       Social History Main Topics    Smoking status: Former Smoker    Smokeless tobacco: Never Used    Alcohol use Yes      Comment: occasional    Drug use: No    Sexual activity: Not on file         Current Outpatient Prescriptions:     HYDROcodone-acetaminophen (NORCO)  mg per tablet, Take 1 tablet by mouth, Disp: , Rfl:     pregabalin (LYRICA) 75 mg capsule, Take 1 capsule (75 mg total) by mouth 2 (two) times a day for 30 days, Disp: 60 capsule, Rfl: 0    traMADol (ULTRAM) 50 mg tablet, Take 1 tablet (50 mg total) by mouth every 8 (eight) hours as needed for moderate pain, Disp: 15 tablet, Rfl: 0    Allergies   Allergen Reactions    Tramadol Confusion    Morphine Other (See Comments)     constipation       Physical Exam:    /70   Wt 89 8 kg (198 lb)   BMI 24 75 kg/m²     Constitutional:normal, well developed, well nourished, alert, in no distress and non-toxic and no overt pain behavior  Eyes:anicteric  HEENT:grossly intact  Neck:supple, symmetric, trachea midline and no masses   Pulmonary:even and unlabored  Cardiovascular:No edema or pitting edema present  Skin:Normal without rashes or lesions and well hydrated  Psychiatric:Mood and affect appropriate  Neurologic:Cranial Nerves II-XII grossly intact  Musculoskeletal:normal    Imaging  No orders to display     MRI ARTHROGRAM LEFT HIP     INDICATION:  M89 9: Disorder of bone, unspecified  M25 552: Pain in left hip  History taken directly from the electronic ordering system  Left hip pain  X-ray lesion      COMPARISON: Plain film dated 10/19/2017     TECHNIQUE:  MR sequences were obtained of the left hip and pelvis including: Localizer, coronal pelvis T1, coronal pelvis T2 fat sat  Smaller field of view sequences of the affected hip were obtained with axial oblique T1 fat sat, axial oblique T2 fat   sat, coronal T1 fat sat, sagittal T2 fat sat, sagittal T1 fat sat  Images were acquired on a 1 5 Joyce unit     Images were acquired after intra-articular injection of gadolinium utilizing direct MR arthrography technique      FINDINGS:     LEFT HIP:     -JOINT EFFUSION: There is good distention of the left hip joint with injected contrast      -BONE MARROW SIGNAL AND ALIGNMENT: Mild left hip degenerative changes noted  No fracture dislocation or evidence of osseous destructive lesion such as AVN     Pistol- deformity appreciated on coronal series suggesting cam-type femoral acetabular impingement      There is a small lesion within the intertrochanteric region exhibiting serpiginous margins and increased central T2 signal likely reflecting a bone infarction and corresponding to recent plain film findings  This lesion measures up to 1 1 x 1 0 x 2 1 cm      -ACETABULAR LABRUM: Focal contrast imbibition noted at the anterior margin of the acetabular labrum best seen on series 8 image 19 concerning for a focal tear  Remainder of the labrum appears intact      -TROCHANTERIC BURSA: Normal      RIGHT HIP: (please note dedicated small field of view images were not made of the right hip joint limiting its evaluation )      -JOINT EFFUSION: None      -BONE MARROW SIGNAL AND ALIGNMENT: Normal marrow signal demonstrated without hip fracture or AVN  Pistol- deformity also noted involving the right hip on coronal series     -ACETABULAR LABRUM: No gross abnormalities although evaluation is very limited      -TROCHANTERIC BURSA: Normal      REST OF PELVIS:     -BONE MARROW SIGNAL: Normal      -SI JOINTS AND SYMPHYSIS PUBIS:  Intact      -VISUALIZED LUMBAR SPINE:  unremarkable      -MUSCULATURE: Intact with intact hamstring origins bilaterally      -PELVIC SOFT TISSUES: Normal      SUBCUTANEOUS TISSUES: Normal     IMPRESSION:  1  Focal contrast imbibition anterior acetabular labrum consistent with focal tear  The remainder of the labrum appears intact  2   Mild left hip degenerative change and pistol- deformity suggestive of cam-type femoral acetabular impingement  Similar findings are noted involving the contralateral right    3   Left intertrochanteric lesion 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 and corresponding to recent plain film abnormality           Workstation performed: [id number]      Imaging MRI arthrogram left hip (Order #[id number]) on 11/1/2017 - Imaging Information     Orders Placed This Encounter   Procedures    Ambulatory referral to Orthopedic Surgery

## 2018-02-28 NOTE — TELEPHONE ENCOUNTER
RN s/w pt regarding previous  Pt made aware of tapering schedule and aware of s/e  Pt stated that he would call the pharmacy to see if ready for  and call our office if any issues with same

## 2018-03-08 ENCOUNTER — TELEPHONE (OUTPATIENT)
Dept: PAIN MEDICINE | Facility: CLINIC | Age: 63
End: 2018-03-08

## 2018-03-08 ENCOUNTER — OFFICE VISIT (OUTPATIENT)
Dept: INTERNAL MEDICINE CLINIC | Facility: CLINIC | Age: 63
End: 2018-03-08
Payer: COMMERCIAL

## 2018-03-08 VITALS
OXYGEN SATURATION: 97 % | WEIGHT: 199 LBS | HEART RATE: 110 BPM | HEIGHT: 75 IN | TEMPERATURE: 97.7 F | DIASTOLIC BLOOD PRESSURE: 82 MMHG | BODY MASS INDEX: 24.74 KG/M2 | SYSTOLIC BLOOD PRESSURE: 120 MMHG

## 2018-03-08 DIAGNOSIS — Z13.228 SCREENING FOR METABOLIC DISORDER: ICD-10-CM

## 2018-03-08 DIAGNOSIS — Z12.12 ENCOUNTER FOR COLORECTAL CANCER SCREENING: ICD-10-CM

## 2018-03-08 DIAGNOSIS — Z23 IMMUNIZATION DUE: ICD-10-CM

## 2018-03-08 DIAGNOSIS — Z00.00 ANNUAL PHYSICAL EXAM: ICD-10-CM

## 2018-03-08 DIAGNOSIS — G89.21 CHRONIC PAIN DUE TO TRAUMA: ICD-10-CM

## 2018-03-08 DIAGNOSIS — R17 SERUM TOTAL BILIRUBIN ELEVATED: Primary | ICD-10-CM

## 2018-03-08 DIAGNOSIS — Z72.0 TOBACCO ABUSE: ICD-10-CM

## 2018-03-08 DIAGNOSIS — Z12.11 ENCOUNTER FOR COLORECTAL CANCER SCREENING: ICD-10-CM

## 2018-03-08 DIAGNOSIS — Z13.220 LIPID SCREENING: ICD-10-CM

## 2018-03-08 DIAGNOSIS — Z11.59 ENCOUNTER FOR HEPATITIS C SCREENING TEST FOR LOW RISK PATIENT: ICD-10-CM

## 2018-03-08 PROBLEM — G62.9 NEUROPATHY: Status: ACTIVE | Noted: 2017-11-03

## 2018-03-08 PROBLEM — M16.12 OSTEOARTHRITIS OF LEFT HIP: Status: ACTIVE | Noted: 2017-10-19

## 2018-03-08 PROBLEM — M89.9 BONE LESION: Status: ACTIVE | Noted: 2017-10-19

## 2018-03-08 PROCEDURE — 99396 PREV VISIT EST AGE 40-64: CPT | Performed by: INTERNAL MEDICINE

## 2018-03-08 NOTE — TELEPHONE ENCOUNTER
Pt called stating that he had a reaction to his gabapentin and tramadol  He would like to talk about this   [phone]

## 2018-03-08 NOTE — ASSESSMENT & PLAN NOTE
Laboratory studies reviewed today  He is up-to-date on metabolic screening  In regards to colorectal cancer screening, he will undergo colonoscopy evaluation for colorectal cancer screening toward the end of summer as it is offered free to him by his employer, [employer]  Fecal immuno testing in 2017 was negative  He would like to defer on low-dose CT scan of the lungs at this time due to financial reasons for lung cancer screening  Order for Zoster vaccine given to patient today

## 2018-03-08 NOTE — PROGRESS NOTES
Assessment/Plan:    Tobacco abuse  Currently smoking, only social/stress smoking (2 packs every 3-months)  Counseled patient on smoking cessation  Encounter for colorectal cancer screening    Patient refused colonoscopy at this time, however employment is offering a free colonoscopy toward the end of the summer at which he  Is willing to undergo colonoscopy for colorectal cancer screening  Colonoscopy order given today  Serum total bilirubin elevated  Will order CMP  Denies any symptoms of jaundice/scleral icterus  Annual physical exam    Laboratory studies reviewed today  He is up-to-date on metabolic screening  In regards to colorectal cancer screening, he will undergo colonoscopy evaluation for colorectal cancer screening toward the end of summer as it is offered free to him by his employer, [employer]  Fecal immuno testing in 2017 was negative  He would like to defer on low-dose CT scan of the lungs at this time due to financial reasons for lung cancer screening  Order for Zoster vaccine given to patient today  Lipid screening   Will order lipid panel to be done prior to next appointment  Diagnoses and all orders for this visit:    Serum total bilirubin elevated  -     Comprehensive metabolic panel; Future    Tobacco abuse    Encounter for colorectal cancer screening    Annual physical exam  -     Varicella-Zoster Vaccine SQ    Immunization due  -     Varicella-Zoster Vaccine SQ    Lipid screening  -     Lipid panel; Future    Chronic pain due to trauma  -     Vitamin D 25 hydroxy; Future    Screening for metabolic disorder  -     CBC; Future          Subjective:      Patient ID: Jarvis Aj is a 60 y o  male  78-year-old male is seen today for annual examination  He continues to have low back and left hip pain, neuropathy, and acute abdominal discomfort  He reports that his acute abdominal complaints started while on gabapentin    He was previously on gabapentin therapy in January 2016, however that was discontinued due to abdominal discomfort and confusion  He was then transitioned to Lyrica to which was controlling his pain and neuropathy however this was discontinued due to insurance coverage  He was then switched back onto gabapentin 1 week ago, and had recurrence of abdominal discomfort which is not relieved with antacids  Since recurrence of GI symptoms, he has discontinued taking gabapentin  He is currently on tramadol for his pain which is being managed [facility]  He has been having significant stress due to his workman's Comp/insurance issues  Back Pain   This is a chronic problem  The current episode started more than 1 year ago  The problem occurs constantly  The problem is unchanged  The pain is present in the lumbar spine  The quality of the pain is described as aching  The pain radiates to the right foot  The pain is at a severity of 9/10  The pain is moderate  The pain is the same all the time  The symptoms are aggravated by bending, twisting and standing  Stiffness is present all day  Associated symptoms include abdominal pain (Since restarting gabapentin), numbness and tingling (Right foot)  Pertinent negatives include no bladder incontinence, bowel incontinence, chest pain, dysuria, fever, headaches, leg pain, paresis, paresthesias, pelvic pain, perianal numbness, weakness or weight loss  He has tried analgesics for the symptoms  The treatment provided mild relief  The following portions of the patient's history were reviewed and updated as appropriate: allergies, current medications, past family history, past medical history, past social history, past surgical history and problem list     Review of Systems   Constitutional: Negative  Negative for activity change, appetite change, chills, fatigue, fever and weight loss  HENT: Negative for congestion, ear pain, postnasal drip, rhinorrhea and sore throat  Eyes: Negative  Negative for discharge, itching and visual disturbance  Respiratory: Negative for cough, chest tightness, shortness of breath and wheezing  Cardiovascular: Negative for chest pain and palpitations  Gastrointestinal: Positive for abdominal pain (Since restarting gabapentin)  Negative for abdominal distention, blood in stool, bowel incontinence, constipation, diarrhea and nausea  Endocrine: Negative  Genitourinary: Negative for bladder incontinence, difficulty urinating, dysuria, hematuria and pelvic pain  Musculoskeletal: Positive for arthralgias (Left hip pain) and back pain  Skin: Negative  Allergic/Immunologic: Negative for environmental allergies and food allergies  Neurological: Positive for tingling (Right foot) and numbness  Negative for weakness, headaches and paresthesias  Hematological: Negative for adenopathy  Psychiatric/Behavioral: Negative for agitation, behavioral problems, confusion and sleep disturbance           Past Medical History:   Diagnosis Date    Depression     Osteoarthritis          Current Outpatient Prescriptions:     HYDROcodone-acetaminophen (NORCO)  mg per tablet, Take 1 tablet by mouth, Disp: , Rfl:     pregabalin (LYRICA) 75 mg capsule, Take 1 capsule (75 mg total) by mouth 2 (two) times a day for 30 days, Disp: 60 capsule, Rfl: 0    traMADol (ULTRAM) 50 mg tablet, Take 1 tablet (50 mg total) by mouth every 8 (eight) hours as needed for moderate pain, Disp: 15 tablet, Rfl: 0    Allergies   Allergen Reactions    Gabapentin GI Intolerance    Tramadol Confusion    Morphine Other (See Comments)     constipation       Social History   Past Surgical History:   Procedure Laterality Date    EPIDURAL BLOCK INJECTION      needle     Family History   Problem Relation Age of Onset    Osteoarthritis Family     Other Mother      rheumatism       Objective:  /82 (BP Location: Left arm, Patient Position: Sitting, Cuff Size: Adult)   Pulse (!) 110 Temp 97 7 °F (36 5 °C) (Oral)   Ht 6' 3" (1 905 m)   Wt 90 3 kg (199 lb)   SpO2 97%   BMI 24 87 kg/m²     No results found for this or any previous visit (from the past 1344 hour(s))  Physical Exam   Constitutional: He is oriented to person, place, and time  He appears well-developed and well-nourished  No distress  HENT:   Head: Normocephalic and atraumatic  Eyes: Conjunctivae and EOM are normal  Pupils are equal, round, and reactive to light  Right eye exhibits no discharge  Left eye exhibits no discharge  No scleral icterus  Neck: Normal range of motion  Neck supple  No JVD present  No thyromegaly present  Cardiovascular: Normal rate, regular rhythm, normal heart sounds and intact distal pulses  Exam reveals no gallop and no friction rub  No murmur heard  Pulmonary/Chest: Effort normal and breath sounds normal  No respiratory distress  He has no wheezes  He has no rales  He exhibits no tenderness  Abdominal: Soft  Bowel sounds are normal  He exhibits no distension and no mass  There is no tenderness  There is no rebound and no guarding  Musculoskeletal: Normal range of motion  He exhibits no edema, tenderness or deformity  Lymphadenopathy:     He has no cervical adenopathy  Neurological: He is alert and oriented to person, place, and time  He has normal reflexes  No cranial nerve deficit  Coordination normal    Skin: Skin is warm and dry  No rash noted  He is not diaphoretic  No erythema  No pallor  Psychiatric: He has a normal mood and affect  His behavior is normal  Judgment and thought content normal    Nursing note and vitals reviewed

## 2018-03-08 NOTE — ASSESSMENT & PLAN NOTE
Patient refused colonoscopy at this time, however employment is offering a free colonoscopy toward the end of the summer at which he  Is willing to undergo colonoscopy for colorectal cancer screening  Colonoscopy order given today

## 2018-03-08 NOTE — ASSESSMENT & PLAN NOTE
Currently smoking, only social/stress smoking (2 packs every 3-months)  Counseled patient on smoking cessation

## 2018-03-09 ENCOUNTER — OFFICE VISIT (OUTPATIENT)
Dept: OBGYN CLINIC | Facility: MEDICAL CENTER | Age: 63
End: 2018-03-09
Payer: COMMERCIAL

## 2018-03-09 VITALS
DIASTOLIC BLOOD PRESSURE: 72 MMHG | SYSTOLIC BLOOD PRESSURE: 110 MMHG | HEIGHT: 75 IN | WEIGHT: 199 LBS | HEART RATE: 75 BPM | BODY MASS INDEX: 24.74 KG/M2

## 2018-03-09 DIAGNOSIS — S73.192A TEAR OF LEFT ACETABULAR LABRUM, INITIAL ENCOUNTER: Primary | ICD-10-CM

## 2018-03-09 PROCEDURE — 99203 OFFICE O/P NEW LOW 30 MIN: CPT | Performed by: ORTHOPAEDIC SURGERY

## 2018-03-09 NOTE — PROGRESS NOTES
60 y o male presents to the office for left hip pain due to an exercise in June 2017  He has a history of back pain and drop foot  He has also dislocated his left hip in 1985  Waling uphill causes the most pain and he tends to walk up the hill backwards  Review of Systems  Review of systems negative unless otherwise specified in HPI    Past Medical History  Past Medical History:   Diagnosis Date    Depression     Osteoarthritis        Past Surgical History  Past Surgical History:   Procedure Laterality Date    EPIDURAL BLOCK INJECTION      needle       Current Medications  Current Outpatient Prescriptions on File Prior to Visit   Medication Sig Dispense Refill    traMADol (ULTRAM) 50 mg tablet Take 1 tablet (50 mg total) by mouth every 8 (eight) hours as needed for moderate pain 15 tablet 0    HYDROcodone-acetaminophen (NORCO)  mg per tablet Take 1 tablet by mouth      pregabalin (LYRICA) 75 mg capsule Take 1 capsule (75 mg total) by mouth 2 (two) times a day for 30 days 60 capsule 0     No current facility-administered medications on file prior to visit  Recent Labs (HCT,HGB,PT,INR,ESR,CRP,GLU,HgA1C)  No results found for: HCT, HGB, WBC, PT, INR, ESR, CRP, GLUCOSE, HGBA1C      Physical exam  · General: Awake, Alert, Oriented  · Eyes: Pupils equal, round and reactive to light  · Heart: regular rate and rhythm  · Lungs: No audible wheezing  · Abdomen: soft  left Lower extremity  · Patient ambulates without assistance  · Grossly motor and sensory stable  · Right hip painful with internal rotation  · Left hip painful with flexion and internally rotated      Imaging  X-ray of left hip was reviewed  MRI of left hip was reviewed    Procedure  None    Assessment/Plan:   60 y o male will see Dr Pablo Huizar for a possible hip arthroscopy

## 2018-03-13 ENCOUNTER — OFFICE VISIT (OUTPATIENT)
Dept: PAIN MEDICINE | Facility: CLINIC | Age: 63
End: 2018-03-13
Payer: OTHER MISCELLANEOUS

## 2018-03-13 VITALS
BODY MASS INDEX: 24.49 KG/M2 | WEIGHT: 197 LBS | SYSTOLIC BLOOD PRESSURE: 120 MMHG | HEART RATE: 92 BPM | DIASTOLIC BLOOD PRESSURE: 80 MMHG | HEIGHT: 75 IN

## 2018-03-13 DIAGNOSIS — S73.192A TEAR OF LEFT ACETABULAR LABRUM, INITIAL ENCOUNTER: ICD-10-CM

## 2018-03-13 DIAGNOSIS — M54.16 LUMBAR RADICULOPATHY: Primary | ICD-10-CM

## 2018-03-13 DIAGNOSIS — Z02.71 DISABILITY EXAMINATION: ICD-10-CM

## 2018-03-13 DIAGNOSIS — R26.9 GAIT ABNORMALITY: ICD-10-CM

## 2018-03-13 PROCEDURE — 99213 OFFICE O/P EST LOW 20 MIN: CPT | Performed by: PHYSICAL MEDICINE & REHABILITATION

## 2018-03-13 NOTE — PROGRESS NOTES
Assessment/Plan:      Diagnoses and all orders for this visit:    Lumbar radiculopathy - Right    Tear of left acetabular labrum, initial encounter    Disability examination          Subjective:     Patient ID: Lorenzo Mcrae is a 60 y o  male  HPI F/U for functional evaluation  PM has started pregabalin:has been sent to hip specialist for possible arthroscopic surgery  Reports today poor tolerance for MAFO  "gets fire foot", resolves after 5 to 10 minutes without  Brace from [facility]    Review of Systems   Musculoskeletal: Positive for back pain and gait problem  Neurological: Positive for numbness  Objective:       Physical Exam   Neurological:   Reflex Scores:       Brachioradialis reflexes are 2+ on the right side and 1+ on the left side  Patellar reflexes are 2+ on the right side and 1+ on the left side  Achilles reflexes are 2+ on the right side and 1+ on the left side  Detailed muscle testing deferred

## 2018-03-13 NOTE — PATIENT INSTRUCTIONS
1  Continue treatment with Dr Marylu Ortiz  2  Continue restrictions due to work injury  3  Brace clinic evaluation at [facility]

## 2018-03-13 NOTE — LETTER
March 13, 2018     Patient: Nuupr Reyesl   YOB: 1955   Date of Visit: 3/13/2018       To Whom it May Concern:    Debora Lake is under my professional care  He was seen in my office on 3/13/2018  He is capable of work only in sedentary  capacity as defined by the [employer]  If you have any questions or concerns, please don't hesitate to call           Sincerely,          Ramon Bran DO        CC: Nupur Domingo

## 2018-03-27 ENCOUNTER — OFFICE VISIT (OUTPATIENT)
Dept: PAIN MEDICINE | Facility: MEDICAL CENTER | Age: 63
End: 2018-03-27
Payer: OTHER MISCELLANEOUS

## 2018-03-27 ENCOUNTER — TELEPHONE (OUTPATIENT)
Dept: PAIN MEDICINE | Facility: MEDICAL CENTER | Age: 63
End: 2018-03-27

## 2018-03-27 VITALS
DIASTOLIC BLOOD PRESSURE: 66 MMHG | HEIGHT: 75 IN | HEART RATE: 80 BPM | WEIGHT: 195 LBS | BODY MASS INDEX: 24.25 KG/M2 | SYSTOLIC BLOOD PRESSURE: 104 MMHG | RESPIRATION RATE: 14 BRPM

## 2018-03-27 DIAGNOSIS — S73.192S TEAR OF LEFT ACETABULAR LABRUM, SEQUELA: ICD-10-CM

## 2018-03-27 DIAGNOSIS — M25.552 LEFT HIP PAIN: ICD-10-CM

## 2018-03-27 DIAGNOSIS — M54.16 LUMBAR RADICULOPATHY: Primary | ICD-10-CM

## 2018-03-27 DIAGNOSIS — S73.192D TEAR OF LEFT ACETABULAR LABRUM, SUBSEQUENT ENCOUNTER: ICD-10-CM

## 2018-03-27 PROCEDURE — 99214 OFFICE O/P EST MOD 30 MIN: CPT | Performed by: PHYSICAL MEDICINE & REHABILITATION

## 2018-03-27 RX ORDER — TRAMADOL HYDROCHLORIDE 50 MG/1
50 TABLET ORAL EVERY 8 HOURS PRN
Qty: 45 TABLET | Refills: 1 | Status: SHIPPED | OUTPATIENT
Start: 2018-03-27 | End: 2018-04-12 | Stop reason: SDUPTHER

## 2018-03-27 RX ORDER — PREGABALIN 75 MG/1
75 CAPSULE ORAL 2 TIMES DAILY
Qty: 60 CAPSULE | Refills: 1 | Status: SHIPPED | OUTPATIENT
Start: 2018-03-27 | End: 2018-04-10

## 2018-03-27 NOTE — TELEPHONE ENCOUNTER
Pt can't get his new scripts filled for tramadol and Pregablin for his W/C  [facility] will cover the tramadol and the phone number is [phone]  RX card ID number is [id number]  You may call pt at [phone]

## 2018-03-27 NOTE — PROGRESS NOTES
Assessment:  1  Lumbar radiculopathy - Right    2  Tear of left acetabular labrum, subsequent encounter    3  Left hip pain    4  Tear of left acetabular labrum, sequela        Plan:  1  We will re-initiate Lyrica as the patient was unable to tolerate gabapentin secondary to side effects  2  We will continue tramadol for him 50 mg 1 tablet up to 3 times daily, 45 tablets prescribed with 1 refill  3  He will follow up with HOLLIS Marr in 8 weeks for continued medication management  4  The patient has been complaining of some GI upset and I asked him to follow up with his primary care physician and potentially a gastroenterologist to look into this further    Results of his urine drug screen and confirmatory testing were reviewed and were appropriate    My impressions and treatment recommendations were discussed in detail with the patient who verbalized understanding and had no further questions  Discharge instructions were provided  I personally saw and examined the patient and I agree with the above discussed plan of care  No orders of the defined types were placed in this encounter  New Medications Ordered This Visit   Medications    pregabalin (LYRICA) 75 mg capsule     Sig: Take 1 capsule (75 mg total) by mouth 2 (two) times a day     Dispense:  60 capsule     Refill:  1    traMADol (ULTRAM) 50 mg tablet     Sig: Take 1 tablet (50 mg total) by mouth every 8 (eight) hours as needed for moderate pain for up to 30 days     Dispense:  45 tablet     Refill:  1       History of Present Illness:    Tyron Orellana is a 60 y o  male Who follows up related to multiple pain complaints  He states he was unable to tolerate gabapentin secondary to side effects  He states that prior prescription of Lyrica did provide significant relief of his symptoms and is requesting return to that medication  I think that is warranted at this time        He is describing similar type pain rated as a 4 to 5/10 which is intermittent and characterized as dull, aching, sharp, throbbing, shooting, numbness, pins and needle sensation  He states that the tramadol did provide some relief but has not been taking this very frequently  He did see Dr Oh Richardson for his opinion on hip surgery and will see Dr Chadwick De La Torre regarding possible hip arthroscopic surgery  I have personally reviewed and/or updated the patient's past medical history, past surgical history, family history, social history, current medications, allergies, and vital signs today  Review of Systems:    Review of Systems   Respiratory: Negative for shortness of breath  Cardiovascular: Negative for chest pain  Gastrointestinal: Positive for nausea  Negative for constipation, diarrhea and vomiting  Musculoskeletal: Positive for back pain, gait problem and myalgias  Negative for arthralgias and joint swelling  Skin: Negative for rash  Neurological: Negative for dizziness, seizures and weakness  All other systems reviewed and are negative  Patient Active Problem List   Diagnosis    Lumbar radiculopathy - Right    Left hip pain    Labral tear of left hip joint    Disability examination    Bone lesion    Neuropathy    Osteoarthritis of left hip    Annual physical exam    Tobacco abuse    Encounter for colorectal cancer screening    Serum total bilirubin elevated    Lipid screening       Past Medical History:   Diagnosis Date    Depression     Osteoarthritis        Past Surgical History:   Procedure Laterality Date    EPIDURAL BLOCK INJECTION      needle       Family History   Problem Relation Age of Onset    Osteoarthritis Family     Other Mother      rheumatism       Social History     Occupational History    Not on file       Social History Main Topics    Smoking status: Current Every Day Smoker     Last attempt to quit: 12/21/2017    Smokeless tobacco: Never Used    Alcohol use Yes      Comment: occasional    Drug use: No    Sexual activity: Not on file       Current Outpatient Prescriptions on File Prior to Visit   Medication Sig    [DISCONTINUED] traMADol (ULTRAM) 50 mg tablet Take 1 tablet (50 mg total) by mouth every 8 (eight) hours as needed for moderate pain     No current facility-administered medications on file prior to visit  Allergies   Allergen Reactions    Gabapentin GI Intolerance    Tramadol Confusion    Morphine Other (See Comments)     constipation       Physical Exam:    /66   Pulse 80   Resp 14   Ht 6' 3" (1 905 m)   Wt 88 5 kg (195 lb)   BMI 24 37 kg/m²     Constitutional: normal, well developed, well nourished, alert, in no distress and non-toxic and no overt pain behavior  Eyes: anicteric  HEENT: grossly intact  Neck: supple, symmetric, trachea midline and no masses   Pulmonary:even and unlabored  Cardiovascular:No edema or pitting edema present  Skin:Normal without rashes or lesions and well hydrated  Psychiatric:Mood and affect appropriate  Neurologic:Cranial Nerves II-XII grossly intact  Musculoskeletal:normal       Imaging      Tramadol last taken 3/20

## 2018-03-29 NOTE — TELEPHONE ENCOUNTER
S/W patient [employer] is denying the claim for tramadol and lyrica and according to [employer] they will cover on Doctors say so  Spoke to [facility] and they are faxing requests for meds either for PA or explination unsure

## 2018-03-29 NOTE — TELEPHONE ENCOUNTER
[facility]- patient lm on anserve stating he is changing his pharmacy from [facility] to [facility] and to give him a c/b [phone]

## 2018-04-02 ENCOUNTER — TELEPHONE (OUTPATIENT)
Dept: PAIN MEDICINE | Facility: MEDICAL CENTER | Age: 63
End: 2018-04-02

## 2018-04-02 NOTE — TELEPHONE ENCOUNTER
----- Message from Duong Joyce DO sent at 4/2/2018  8:11 AM EDT -----  Regarding: FW: Prescription Question  Contact: [phone]  Can you please call to confirm meds with pharmacy as requested by the patient? Thanks    ----- Message -----  From: Feliz Salmeron RN  Sent: 3/29/2018   8:07 AM  To: Duong Joyce DO  Subject: FW: Prescription Question                            ----- Message -----  From: Jeremie Donnelly  Sent: 3/28/2018   4:36 PM  To: [facility]  Subject: Prescription Question                            Dr Clark Cannon,      Did you call [[phone]] ,  This is my prescription plan  They request you to contact them to confirm meds  Since  I'm having a hard time getting [facility] to pay for meds  Maybe you  need to contact  [facility]  This was suggested by  the rep  I spoke with,  my  prescription  plan  Last night I was on the computer for about 30 min  I was already on tramadol,  I hurt real bad the rest of evening  Sitting up straight, Like in the Dr  offices, only increases the pain  Lower back          Mitchell Loera

## 2018-04-03 ENCOUNTER — OFFICE VISIT (OUTPATIENT)
Dept: OBGYN CLINIC | Facility: CLINIC | Age: 63
End: 2018-04-03
Payer: COMMERCIAL

## 2018-04-03 VITALS
BODY MASS INDEX: 24.12 KG/M2 | WEIGHT: 194 LBS | HEIGHT: 75 IN | SYSTOLIC BLOOD PRESSURE: 118 MMHG | HEART RATE: 94 BPM | DIASTOLIC BLOOD PRESSURE: 79 MMHG

## 2018-04-03 DIAGNOSIS — M25.552 LEFT HIP PAIN: ICD-10-CM

## 2018-04-03 DIAGNOSIS — S73.192S TEAR OF LEFT ACETABULAR LABRUM, SEQUELA: Primary | ICD-10-CM

## 2018-04-03 PROCEDURE — 99214 OFFICE O/P EST MOD 30 MIN: CPT | Performed by: ORTHOPAEDIC SURGERY

## 2018-04-03 RX ORDER — CHLORHEXIDINE GLUCONATE 4 G/100ML
SOLUTION TOPICAL DAILY PRN
Status: CANCELLED | OUTPATIENT
Start: 2018-04-03

## 2018-04-03 NOTE — PROGRESS NOTES
Patient Name:  Sin Cárdenas  MRN:  [id number]    Assessment & Plan    Left hip pain, possible labral tear on MRI  1  Patient has failed conservative treatment  We discussed diagnostic arthroscopy of the left hip with possible labral debridement  Risks and benefits of surgery were explained, including potential for numbness or persistent symptoms  He understands that the surgery might reveal that his labrum is intact, in which case the surgery is unlikely to help, and he wishes to proceed  2  Hydrocodone-APAP 5-325 mg (1-2 tablets) for pain control after surgery  3  Follow-up 1-2 weeks after surgery  Subjective    77-year-old male presents with left hip pain that started suddenly in June 2017 while performing an exercise with his hip flexed  He states that he felt a pop and sudden onset of pain at that time  He continues to have anterior groin pain that is worse with walking up an incline  Past medical history is notable for a disc problem in his lumbar spine and foot drop in the right lower extremity  He reports a history of hip dislocation 30+ years ago as well  General ROS:  Negative for fever, lethargy/malaise, or night sweats  Objective    /79   Pulse 94   Ht 6' 3" (1 905 m)   Wt 88 kg (194 lb)   BMI 24 25 kg/m²     Left hip:  Tenderness to palpation anterior groin  Range of motion is full  FADDIR test is positive  LOBO test is positive  Passive supine rotation test is negative  Straight leg raise against resistance is positive  4/5 strength hip flexion  No lower extremity edema  Mood and affect are appropriate  Data Review    I have personally reviewed pertinent films in PACS, and my interpretation follows  MRI arthrogram left hip 11/1/17:  Small region of contrast enhancement in the anterosuperior acetabular labrum concerning for tear  This is visible on a single cut only  Mild degenerative changes  Pistol  deformity femoral head-neck junction    Small bone infarct proximal femur

## 2018-04-05 PROBLEM — S73.192A TEAR OF LEFT ACETABULAR LABRUM: Status: ACTIVE | Noted: 2018-04-05

## 2018-04-10 RX ORDER — MELATONIN
2000 2 TIMES DAILY
COMMUNITY

## 2018-04-10 RX ORDER — UBIDECARENONE 75 MG
100 CAPSULE ORAL DAILY
COMMUNITY
End: 2020-05-18

## 2018-04-10 RX ORDER — MULTIVIT WITH MINERALS/LUTEIN
1000 TABLET ORAL DAILY
COMMUNITY
End: 2018-07-25

## 2018-04-10 RX ORDER — DIPHENOXYLATE HYDROCHLORIDE AND ATROPINE SULFATE 2.5; .025 MG/1; MG/1
1 TABLET ORAL DAILY
COMMUNITY
End: 2020-05-18

## 2018-04-10 NOTE — PRE-PROCEDURE INSTRUCTIONS
Pre-Surgery Instructions:   Medication Instructions    Ascorbic Acid (VITAMIN C) 1000 MG tablet Patient was instructed by Physician and understands   cholecalciferol (VITAMIN D3) 1,000 units tablet Patient was instructed by Physician and understands   cyanocobalamin (VITAMIN B-12) 100 mcg tablet Patient was instructed by Physician and understands   Flaxseed, Linseed, (FLAX SEED OIL PO) Patient was instructed by Physician and understands   multivitamin [facility]) TABS Patient was instructed by Physician and understands   traMADol (ULTRAM) 50 mg tablet Instructed patient per Anesthesia Guidelines  Pre op and bathing instructions given  Pt will get hibiclens

## 2018-04-12 ENCOUNTER — CONSULT (OUTPATIENT)
Dept: INTERNAL MEDICINE CLINIC | Facility: CLINIC | Age: 63
End: 2018-04-12
Payer: COMMERCIAL

## 2018-04-12 ENCOUNTER — OFFICE VISIT (OUTPATIENT)
Dept: INTERNAL MEDICINE CLINIC | Facility: CLINIC | Age: 63
End: 2018-04-12
Payer: COMMERCIAL

## 2018-04-12 VITALS
HEART RATE: 87 BPM | TEMPERATURE: 98.1 F | OXYGEN SATURATION: 94 % | HEIGHT: 73 IN | WEIGHT: 196.6 LBS | BODY MASS INDEX: 26.06 KG/M2 | DIASTOLIC BLOOD PRESSURE: 70 MMHG | SYSTOLIC BLOOD PRESSURE: 114 MMHG

## 2018-04-12 VITALS
SYSTOLIC BLOOD PRESSURE: 114 MMHG | HEIGHT: 73 IN | HEART RATE: 87 BPM | DIASTOLIC BLOOD PRESSURE: 70 MMHG | OXYGEN SATURATION: 94 % | BODY MASS INDEX: 26.06 KG/M2 | TEMPERATURE: 98.1 F | WEIGHT: 196.6 LBS

## 2018-04-12 DIAGNOSIS — K29.50 CHRONIC GASTRITIS WITHOUT BLEEDING, UNSPECIFIED GASTRITIS TYPE: ICD-10-CM

## 2018-04-12 DIAGNOSIS — Z01.818 PRE-OP EXAMINATION: ICD-10-CM

## 2018-04-12 DIAGNOSIS — M54.16 LUMBAR RADICULOPATHY: ICD-10-CM

## 2018-04-12 DIAGNOSIS — Z01.818 PREOP EXAMINATION: ICD-10-CM

## 2018-04-12 DIAGNOSIS — S73.192D TEAR OF LEFT ACETABULAR LABRUM, SUBSEQUENT ENCOUNTER: Primary | ICD-10-CM

## 2018-04-12 DIAGNOSIS — K21.9 GASTROESOPHAGEAL REFLUX DISEASE WITHOUT ESOPHAGITIS: Primary | ICD-10-CM

## 2018-04-12 DIAGNOSIS — M25.552 LEFT HIP PAIN: ICD-10-CM

## 2018-04-12 DIAGNOSIS — S73.192S TEAR OF LEFT ACETABULAR LABRUM, SEQUELA: ICD-10-CM

## 2018-04-12 DIAGNOSIS — M16.12 PRIMARY OSTEOARTHRITIS OF LEFT HIP: ICD-10-CM

## 2018-04-12 LAB
ANION GAP SERPL CALCULATED.3IONS-SCNC: 6 MMOL/L (ref 4–13)
APTT PPP: 36 SECONDS (ref 23–35)
BASOPHILS # BLD AUTO: 0.05 THOUSANDS/ΜL (ref 0–0.1)
BASOPHILS NFR BLD AUTO: 0 % (ref 0–1)
BUN SERPL-MCNC: 14 MG/DL (ref 5–25)
CALCIUM SERPL-MCNC: 9.3 MG/DL
CHLORIDE SERPL-SCNC: 110 MMOL/L (ref 100–108)
CO2 SERPL-SCNC: 25 MMOL/L (ref 21–32)
CREAT SERPL-MCNC: 0.81 MG/DL (ref 0.6–1.3)
EOSINOPHIL # BLD AUTO: 0.16 THOUSAND/ΜL (ref 0–0.61)
EOSINOPHIL NFR BLD AUTO: 1 % (ref 0–6)
ERYTHROCYTE [DISTWIDTH] IN BLOOD BY AUTOMATED COUNT: 14.2 % (ref 11.6–15.1)
GFR SERPL CREATININE-BSD FRML MDRD: 95 ML/MIN/1.73SQ M
GLUCOSE SERPL-MCNC: 91 MG/DL (ref 65–140)
HCT VFR BLD AUTO: 45.6 % (ref 36.5–49.3)
HGB BLD-MCNC: 15.9 G/DL (ref 12–17)
INR PPP: 0.96 (ref 0.86–1.16)
LYMPHOCYTES # BLD AUTO: 2.43 THOUSANDS/ΜL (ref 0.6–4.47)
LYMPHOCYTES NFR BLD AUTO: 21 % (ref 14–44)
MCH RBC QN AUTO: 32.3 PG (ref 26.8–34.3)
MCHC RBC AUTO-ENTMCNC: 34.9 G/DL (ref 31.4–37.4)
MCV RBC AUTO: 93 FL (ref 82–98)
MONOCYTES # BLD AUTO: 0.95 THOUSAND/ΜL (ref 0.17–1.22)
MONOCYTES NFR BLD AUTO: 8 % (ref 4–12)
NEUTROPHILS # BLD AUTO: 7.89 THOUSANDS/ΜL (ref 1.85–7.62)
NEUTS SEG NFR BLD AUTO: 70 % (ref 43–75)
NRBC BLD AUTO-RTO: 0 /100 WBCS
PLATELET # BLD AUTO: 354 THOUSANDS/UL (ref 149–390)
PMV BLD AUTO: 10.4 FL (ref 8.9–12.7)
POTASSIUM SERPL-SCNC: 4.5 MMOL/L (ref 3.5–5.3)
PROTHROMBIN TIME: 12.8 SECONDS (ref 12.1–14.4)
RBC # BLD AUTO: 4.93 MILLION/UL (ref 3.88–5.62)
SODIUM SERPL-SCNC: 141 MMOL/L (ref 136–145)
WBC # BLD AUTO: 11.5 THOUSAND/UL (ref 4.31–10.16)

## 2018-04-12 PROCEDURE — 85610 PROTHROMBIN TIME: CPT | Performed by: ORTHOPAEDIC SURGERY

## 2018-04-12 PROCEDURE — 99214 OFFICE O/P EST MOD 30 MIN: CPT | Performed by: INTERNAL MEDICINE

## 2018-04-12 PROCEDURE — 36415 COLL VENOUS BLD VENIPUNCTURE: CPT | Performed by: INTERNAL MEDICINE

## 2018-04-12 PROCEDURE — 85730 THROMBOPLASTIN TIME PARTIAL: CPT | Performed by: ORTHOPAEDIC SURGERY

## 2018-04-12 PROCEDURE — 93000 ELECTROCARDIOGRAM COMPLETE: CPT | Performed by: INTERNAL MEDICINE

## 2018-04-12 PROCEDURE — 99242 OFF/OP CONSLTJ NEW/EST SF 20: CPT | Performed by: INTERNAL MEDICINE

## 2018-04-12 PROCEDURE — 85025 COMPLETE CBC W/AUTO DIFF WBC: CPT | Performed by: ORTHOPAEDIC SURGERY

## 2018-04-12 PROCEDURE — 80048 BASIC METABOLIC PNL TOTAL CA: CPT | Performed by: ORTHOPAEDIC SURGERY

## 2018-04-12 RX ORDER — OMEPRAZOLE 40 MG/1
40 CAPSULE, DELAYED RELEASE ORAL DAILY
Qty: 30 CAPSULE | Refills: 3 | Status: SHIPPED | OUTPATIENT
Start: 2018-04-12 | End: 2018-09-20 | Stop reason: DRUGHIGH

## 2018-04-12 RX ORDER — TRAMADOL HYDROCHLORIDE 50 MG/1
50 TABLET ORAL EVERY 8 HOURS PRN
Qty: 45 TABLET | Refills: 0 | Status: SHIPPED | OUTPATIENT
Start: 2018-04-12 | End: 2018-04-30 | Stop reason: HOSPADM

## 2018-04-12 RX ORDER — PYRIDOXINE HCL (VITAMIN B6) 100 MG
100 TABLET ORAL DAILY
COMMUNITY

## 2018-04-12 NOTE — ASSESSMENT & PLAN NOTE
Will initiate trial therapy with omeprazole 40 mg to be taken daily, 30 min before breakfast  Counseled patient on what food/beverages to avoid (spicy, and coffee)  Will also refer to Gastroenterology for further evaluation, possible endoscopy

## 2018-04-12 NOTE — PROGRESS NOTES
Assessment/Plan:    Gastroesophageal reflux disease without esophagitis   Will initiate trial therapy with omeprazole 40 mg to be taken daily, 30 min before breakfast  Counseled patient on what food/beverages to avoid (spicy, and coffee)  Will also refer to Gastroenterology for further evaluation, possible endoscopy  Lumbar radiculopathy - Right    Tramadol 50 mg to be taken every 8 hr as needed for moderate pain refill today  Diagnoses and all orders for this visit:    Gastroesophageal reflux disease without esophagitis  -     omeprazole (PriLOSEC) 40 MG capsule; Take 1 capsule (40 mg total) by mouth daily  -     Ambulatory referral to Gastroenterology; Future    Lumbar radiculopathy - Right  -     traMADol (ULTRAM) 50 mg tablet; Take 1 tablet (50 mg total) by mouth every 8 (eight) hours as needed for moderate pain for up to 30 days    Tear of left acetabular labrum, sequela  -     traMADol (ULTRAM) 50 mg tablet; Take 1 tablet (50 mg total) by mouth every 8 (eight) hours as needed for moderate pain for up to 30 days    Left hip pain  -     traMADol (ULTRAM) 50 mg tablet; Take 1 tablet (50 mg total) by mouth every 8 (eight) hours as needed for moderate pain for up to 30 days    Chronic gastritis without bleeding, unspecified gastritis type  -     omeprazole (PriLOSEC) 40 MG capsule; Take 1 capsule (40 mg total) by mouth daily  -     Ambulatory referral to Gastroenterology; Future    Other orders  -     pyridoxine (B-6) 100 MG tablet; Take 100 mg by mouth daily        Time spent during encounter: 25 minutes  Subjective:      Patient ID: Valarie Ibarra is a 60 y o  male  60year old male is seen today with chronic abdominal symptoms with consist of nausea, unintentional weight loss, and epigastric abdominal pain  He reports these symptoms have been present since 2015, after he had GI side effects while on gabapentin and Ibuprofen    Since 2015, he has been adhering to a modified diet to which he has been avoiding foods high in fat as well as spicy foods  If he were to ingest such mention foods, he develops burning sensation in his epigastric region  He has never had an endoscopy and uses over-the-counter antacids for relief with minimal improvement of symptoms  GI Problem   The primary symptoms include weight loss, abdominal pain, nausea and arthralgias  Primary symptoms do not include fever, fatigue, vomiting, diarrhea, melena, hematochezia, dysuria or myalgias  The illness began more than 7 days ago (Since 2015)  The illness is also significant for anorexia  The illness does not include chills or constipation  Significant associated medical issues include GERD  Abdominal Pain   This is a recurrent problem  The current episode started 1 to 4 weeks ago  The onset quality is sudden  The problem occurs constantly  The most recent episode lasted 12 hours  The problem has been gradually improving  The pain is located in the LUQ  The pain is at a severity of 7/10  The quality of the pain is burning  The abdominal pain radiates to the LUQ and chest  Associated symptoms include anorexia, arthralgias, flatus, nausea and weight loss  Pertinent negatives include no belching, constipation, diarrhea, dysuria, fever, frequency, headaches, hematochezia, hematuria, melena, myalgias or vomiting  The pain is aggravated by eating  The pain is relieved by eating and liquids  He has tried antacids for the symptoms  The treatment provided mild relief  His past medical history is significant for GERD  Heartburn   He complains of abdominal pain and nausea  He reports no belching, no chest pain, no coughing, no sore throat or no wheezing  This is a chronic problem  The current episode started more than 1 year ago  The problem occurs constantly  The problem has been unchanged  The symptoms are aggravated by certain foods  Associated symptoms include weight loss  Pertinent negatives include no fatigue or melena   Risk factors include smoking/tobacco exposure  He has tried an antacid for the symptoms  The treatment provided mild relief  The following portions of the patient's history were reviewed and updated as appropriate: allergies, current medications, past family history, past medical history, past social history, past surgical history and problem list     Review of Systems   Constitutional: Positive for weight loss  Negative for chills, fatigue and fever  HENT: Negative for congestion, ear pain, postnasal drip, rhinorrhea and sore throat  Eyes: Negative  Respiratory: Negative for cough, chest tightness, shortness of breath and wheezing  Cardiovascular: Negative for chest pain and palpitations  Gastrointestinal: Positive for abdominal pain, anorexia, flatus and nausea  Negative for abdominal distention, blood in stool, constipation, diarrhea, hematochezia, melena and vomiting  Endocrine: Negative  Genitourinary: Negative for difficulty urinating, dysuria, frequency and hematuria  Musculoskeletal: Positive for arthralgias  Negative for myalgias  Skin: Negative  Allergic/Immunologic: Negative for environmental allergies and food allergies  Neurological: Negative  Negative for headaches  Hematological: Negative for adenopathy  Psychiatric/Behavioral: Negative for agitation, behavioral problems, confusion and sleep disturbance           Past Medical History:   Diagnosis Date    Depression     GERD (gastroesophageal reflux disease)     Muscle weakness     Neuropathy     Osteoarthritis     Right foot drop          Current Outpatient Prescriptions:     Ascorbic Acid (VITAMIN C) 1000 MG tablet, Take 1,000 mg by mouth daily, Disp: , Rfl:     cholecalciferol (VITAMIN D3) 1,000 units tablet, Take 2,000 Units by mouth daily  , Disp: , Rfl:     cyanocobalamin (VITAMIN B-12) 100 mcg tablet, Take by mouth daily, Disp: , Rfl:     Flaxseed, Linseed, (FLAX SEED OIL PO), Take by mouth, Disp: , Rfl:   multivitamin (THERAGRAN) TABS, Take 1 tablet by mouth daily, Disp: , Rfl:     pyridoxine (B-6) 100 MG tablet, Take 100 mg by mouth daily, Disp: , Rfl:     traMADol (ULTRAM) 50 mg tablet, Take 1 tablet (50 mg total) by mouth every 8 (eight) hours as needed for moderate pain for up to 30 days, Disp: 45 tablet, Rfl: 0    omeprazole (PriLOSEC) 40 MG capsule, Take 1 capsule (40 mg total) by mouth daily, Disp: 30 capsule, Rfl: 3    Allergies   Allergen Reactions    Gabapentin GI Intolerance    Tramadol Confusion    Morphine Other (See Comments)     constipation       Social History   Past Surgical History:   Procedure Laterality Date    EPIDURAL BLOCK INJECTION      needle    TONSILLECTOMY      WISDOM TOOTH EXTRACTION       Family History   Problem Relation Age of Onset    Osteoarthritis Family     Other Mother      rheumatism       Objective:  /70 (BP Location: Left arm, Patient Position: Sitting, Cuff Size: Large)   Pulse 87   Temp 98 1 °F (36 7 °C) (Oral)   Ht 6' 1" (1 854 m)   Wt 89 2 kg (196 lb 9 6 oz)   SpO2 94%   BMI 25 94 kg/m²     No results found for this or any previous visit (from the past 1344 hour(s))  Physical Exam   Constitutional: He is oriented to person, place, and time  He appears well-developed and well-nourished  No distress  HENT:   Head: Normocephalic and atraumatic  Eyes: Conjunctivae and EOM are normal  Pupils are equal, round, and reactive to light  Right eye exhibits no discharge  Left eye exhibits no discharge  No scleral icterus  Neck: Normal range of motion  Neck supple  No JVD present  No thyromegaly present  Cardiovascular: Normal rate, regular rhythm, normal heart sounds and intact distal pulses  Exam reveals no gallop and no friction rub  No murmur heard  Pulmonary/Chest: Effort normal and breath sounds normal  No respiratory distress  He has no wheezes  He has no rales  He exhibits no tenderness  Abdominal: Soft   Bowel sounds are normal  He exhibits no distension and no mass  There is no tenderness  There is no rebound and no guarding  Musculoskeletal: Normal range of motion  He exhibits no edema, tenderness or deformity  Lymphadenopathy:     He has no cervical adenopathy  Neurological: He is alert and oriented to person, place, and time  He has normal reflexes  No cranial nerve deficit  Coordination normal    Skin: Skin is warm and dry  No rash noted  He is not diaphoretic  No erythema  No pallor  Psychiatric: He has a normal mood and affect  His behavior is normal  Judgment and thought content normal    Nursing note and vitals reviewed

## 2018-04-12 NOTE — PROGRESS NOTES
Subjective:    Jeremie Donnelly is a 60y o  year old male who presents to the office today for a preoperative consultation at the request of surgeon Dr Tres De Jesus who plans on performing  Left hip diagnostic arthroscopic with possible labial debridement on April 30  This consultation is requested for the specific conditions prompting preoperative evaluation (i e  because of potential affect on operative risk)  Planned anesthesia: general   Patient has never had anesthesia in the past and denies any known adverse reaction to anesthesia  Patients bleeding risk: no recent abnormal bleeding  Patient does not have objections to receiving blood products if needed  Patient is able to walk 4 blocks without symptoms  Patient is able to walk up 2 flights of stairs without symptoms  Significant past medical history includes   A tobacco abuse, elevated total bilirubin, lumbar radiculopathy and osteoarthritis of the multiple joints  Tobacco use:  positive  Alcohol use:  negative  Illicit drug use:  negative    Symptoms:   Easy bleeding: no  Easy bruising: no  Frequent nose bleeds: no  Chest pain: no  Cough: no  Dyspnea on exertion:no  Edema: no  Palpitations: no  Wheezing: no    Living situation: Patient lives with  himself in a private residential home with stairs  Daughter and a friend will be caring for him after surgery  hedoes not have post-op concerns  The following portions of the patient's history were reviewed and updated as appropriate: allergies, current medications, past family history, past medical history, past social history, past surgical history and problem list     Review of Systems  Review of Systems   Constitutional: Negative  Negative for chills, fatigue and fever  HENT: Negative for congestion, ear pain, postnasal drip, rhinorrhea and sore throat  Eyes: Negative  Respiratory: Negative for cough, chest tightness, shortness of breath and wheezing      Cardiovascular: Negative for chest pain and palpitations  Gastrointestinal: Negative for abdominal distention, abdominal pain, blood in stool, constipation, diarrhea and nausea  Endocrine: Negative  Genitourinary: Negative for difficulty urinating, dysuria and hematuria  Musculoskeletal: Positive for arthralgias, back pain and gait problem  Skin: Negative  Allergic/Immunologic: Negative for environmental allergies and food allergies  Hematological: Negative for adenopathy  Psychiatric/Behavioral: Negative for agitation, behavioral problems, confusion and sleep disturbance           Past Medical History:   Diagnosis Date    Depression     GERD (gastroesophageal reflux disease)     Muscle weakness     Neuropathy     Osteoarthritis     Right foot drop      Past Surgical History:   Procedure Laterality Date    EPIDURAL BLOCK INJECTION      needle    TONSILLECTOMY      WISDOM TOOTH EXTRACTION       Social History   Substance Use Topics    Smoking status: Current Every Day Smoker     Packs/day: 0 25     Last attempt to quit: 12/21/2017    Smokeless tobacco: Never Used    Alcohol use Yes      Comment: occasional     Family History   Problem Relation Age of Onset    Osteoarthritis Family     Other Mother      rheumatism     Gabapentin; Tramadol; and Morphine  Current Outpatient Prescriptions   Medication Sig Dispense Refill    Ascorbic Acid (VITAMIN C) 1000 MG tablet Take 1,000 mg by mouth daily      cholecalciferol (VITAMIN D3) 1,000 units tablet Take 2,000 Units by mouth daily        cyanocobalamin (VITAMIN B-12) 100 mcg tablet Take by mouth daily      Flaxseed, Linseed, (FLAX SEED OIL PO) Take by mouth      multivitamin (THERAGRAN) TABS Take 1 tablet by mouth daily      pyridoxine (B-6) 100 MG tablet Take 100 mg by mouth daily      traMADol (ULTRAM) 50 mg tablet Take 1 tablet (50 mg total) by mouth every 8 (eight) hours as needed for moderate pain for up to 30 days (Patient taking differently: Take 50 mg by mouth as needed for moderate pain  ) 45 tablet 1     No current facility-administered medications for this visit  Objective:       /70 (BP Location: Left arm, Patient Position: Sitting, Cuff Size: Large)   Pulse 87   Temp 98 1 °F (36 7 °C) (Oral)   Ht 6' 1" (1 854 m)   Wt 89 2 kg (196 lb 9 6 oz)   SpO2 94%   BMI 25 94 kg/m²   Physical Exam   Constitutional: He is oriented to person, place, and time  He appears well-developed and well-nourished  No distress  HENT:   Head: Normocephalic and atraumatic  Eyes: Conjunctivae and EOM are normal  Pupils are equal, round, and reactive to light  Right eye exhibits no discharge  Left eye exhibits no discharge  No scleral icterus  Neck: Normal range of motion  Neck supple  No JVD present  No thyromegaly present  Cardiovascular: Normal rate, regular rhythm, normal heart sounds and intact distal pulses  Exam reveals no gallop and no friction rub  No murmur heard  Pulmonary/Chest: Effort normal and breath sounds normal  No respiratory distress  He has no wheezes  He has no rales  He exhibits no tenderness  Abdominal: Soft  Bowel sounds are normal  He exhibits no distension and no mass  There is no tenderness  There is no rebound and no guarding  Musculoskeletal: Normal range of motion  He exhibits no edema, tenderness or deformity  Lymphadenopathy:     He has no cervical adenopathy  Neurological: He is alert and oriented to person, place, and time  He has normal reflexes  No cranial nerve deficit  Coordination normal    Skin: Skin is warm and dry  No rash noted  He is not diaphoretic  No erythema  No pallor  Psychiatric: He has a normal mood and affect  His behavior is normal  Judgment and thought content normal             Cardiographics  ECG: normal sinus rhythm, no blocks or conduction defects, no ischemic changes    Imaging  Chest x-ray: none     Lab Review    pending    Assessment:     60 y o  male with planned surgery as above         Cardiac Risk Estimation:  Low risk    Fred Brought iscleared from a cardiovascular standpoint and may proceed with surgery  He is at a  low risk from a cardiovascular standpoint at this time without any additional cardiac testing  Reevaluation needed if he should present with symptoms prior to surgery  Plan:  Preoperative workup as follows hemoglobin, hematocrit, electrolytes, creatinine, glucose, liver function studies, coagulation studies  Change in medication regimen before surgery: none, continue medication regimen including morning of surgery, with sip of water

## 2018-04-13 ENCOUNTER — TELEPHONE (OUTPATIENT)
Dept: INTERNAL MEDICINE CLINIC | Facility: CLINIC | Age: 63
End: 2018-04-13

## 2018-04-13 DIAGNOSIS — K29.50 CHRONIC GASTRITIS WITHOUT BLEEDING, UNSPECIFIED GASTRITIS TYPE: ICD-10-CM

## 2018-04-13 DIAGNOSIS — K21.9 GASTROESOPHAGEAL REFLUX DISEASE WITHOUT ESOPHAGITIS: ICD-10-CM

## 2018-04-23 NOTE — PROGRESS NOTES
Assessment/Plan:      Diagnoses and all orders for this visit:    Disability examination    Lumbar radiculopathy - Right    Neuropathy    Work related injury          Subjective:     Patient ID: Ramy Schofield is a 60 y o  male  HPI 67-year-old male referred by pain medicine for disability evaluation  He was last seen March 13th, at that time he was sent to the [facility], Dr Jose Antonio Ingram, at [facility] and he was a possibly having arthroscopic hip surgery  He has chronic foot drop on the right side  He remained in the care of Pain Medicine  He is on Lyrica and tramadol  He has been delivered his new MAFO on the right thru [place]  He will be having left hip surgery 4/20  Had flair up of LBP trying to knee to change out toilet seat  Has ongoing issue with med coverage with WC  Sitting tolerance is about 20 mins max  Review of Systems   Musculoskeletal: Positive for back pain  Objective:  Ortho notes:   hip pain, possible labral tear on MRI  1  Patient has failed conservative treatment  We discussed diagnostic arthroscopy of the left hip with possible labral debridement  Risks and benefits of surgery were explained, including potential for numbness or persistent symptoms  He understands that the surgery might reveal that his labrum is intact, in which case the surgery is unlikely to help, and he wishes to proceed  Physical Exam   Neurological:   Reflex Scores:       Tricep reflexes are 2+ on the right side and 2+ on the left side  Bicep reflexes are 2+ on the right side and 2+ on the left side  Brachioradialis reflexes are 2+ on the right side and 2+ on the left side  Patellar reflexes are 2+ on the right side and 2+ on the left side  Achilles reflexes are 1+ on the right side and 2+ on the left side  Has hamstring reflex present bilaterally

## 2018-04-24 ENCOUNTER — OFFICE VISIT (OUTPATIENT)
Dept: PAIN MEDICINE | Facility: CLINIC | Age: 63
End: 2018-04-24
Payer: OTHER MISCELLANEOUS

## 2018-04-24 VITALS
HEART RATE: 76 BPM | HEIGHT: 73 IN | DIASTOLIC BLOOD PRESSURE: 76 MMHG | WEIGHT: 198 LBS | BODY MASS INDEX: 26.24 KG/M2 | SYSTOLIC BLOOD PRESSURE: 124 MMHG

## 2018-04-24 DIAGNOSIS — M54.16 LUMBAR RADICULOPATHY: ICD-10-CM

## 2018-04-24 DIAGNOSIS — Z02.71 DISABILITY EXAMINATION: Primary | ICD-10-CM

## 2018-04-24 DIAGNOSIS — G62.9 NEUROPATHY: ICD-10-CM
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
## 2018-04-24 PROCEDURE — 99213 OFFICE O/P EST LOW 20 MIN: CPT | Performed by: PHYSICAL MEDICINE & REHABILITATION

## 2018-04-24 NOTE — LETTER
April 24, 2018     MD Emily [phone]  [id number] [address]    Patient: Karla Reardon   YOB: 1955   Date of Visit: 4/24/2018       Dear Dr Stanford Given:    Thank you for referring Oz Vegas to me for evaluation  Below are my notes for this consultation  If you have questions, please do not hesitate to call me  I look forward to following your patient along with you  Sincerely,        Rito Cast DO        CC: No Recipients  Rito Cast DO  4/24/2018 11:22 AM  Sign at close encounter  Assessment/Plan:      There are no diagnoses linked to this encounter  Subjective:     Patient ID: Karla Reardon is a 60 y o  male  HPI 59-year-old male referred by pain medicine for disability evaluation  He was last seen March 13th, at that time he was sent to the [facility], Dr Roseline Rodriguez, at [facility] and he was a possibly having arthroscopic hip surgery  He has chronic foot drop on the right side  He remained in the care of Pain Medicine  He is on Lyrica and tramadol  He has been delivered his new MAFO on the right thru [place]  He will be having left hip surgery 4/20  Had flair up of LBP trying to knee to change out toilet seat  Has ongoing issue with med coverage with   Review of Systems   Musculoskeletal: Positive for back pain  Objective:  Ortho notes:   hip pain, possible labral tear on MRI  1  Patient has failed conservative treatment  We discussed diagnostic arthroscopy of the left hip with possible labral debridement  Risks and benefits of surgery were explained, including potential for numbness or persistent symptoms  He understands that the surgery might reveal that his labrum is intact, in which case the surgery is unlikely to help, and he wishes to proceed  Physical Exam   Neurological:   Reflex Scores:       Tricep reflexes are 2+ on the right side and 2+ on the left side         Bicep reflexes are 2+ on the right side and 2+ on the left side  Brachioradialis reflexes are 2+ on the right side and 2+ on the left side  Patellar reflexes are 2+ on the right side and 2+ on the left side  Achilles reflexes are 1+ on the right side and 2+ on the left side  Has hamstring reflex present bilaterally

## 2018-04-24 NOTE — LETTER
April 24, 2018     Patient: Lamar Ramirez   YOB: 1955   Date of Visit: 4/24/2018       To Whom it May Concern:    Manolo Alamo is under my professional care  He was seen in my office on 4/24/2018  He   Is unable to work in any capacity at this time  If you have any questions or concerns, please don't hesitate to call           Sincerely,          Ninoska Ayala DO        CC: No Recipients

## 2018-04-30 ENCOUNTER — APPOINTMENT (OUTPATIENT)
Dept: RADIOLOGY | Facility: HOSPITAL | Age: 63
End: 2018-04-30
Payer: COMMERCIAL

## 2018-04-30 ENCOUNTER — ANESTHESIA (OUTPATIENT)
Dept: PERIOP | Facility: HOSPITAL | Age: 63
End: 2018-04-30
Payer: COMMERCIAL

## 2018-04-30 ENCOUNTER — ANESTHESIA EVENT (OUTPATIENT)
Dept: PERIOP | Facility: HOSPITAL | Age: 63
End: 2018-04-30
Payer: COMMERCIAL

## 2018-04-30 ENCOUNTER — HOSPITAL ENCOUNTER (OUTPATIENT)
Facility: HOSPITAL | Age: 63
Setting detail: OUTPATIENT SURGERY
Discharge: HOME | End: 2018-04-30
Attending: ORTHOPAEDIC SURGERY | Admitting: ORTHOPAEDIC SURGERY
Payer: COMMERCIAL

## 2018-04-30 VITALS
SYSTOLIC BLOOD PRESSURE: 107 MMHG | OXYGEN SATURATION: 96 % | HEART RATE: 54 BPM | DIASTOLIC BLOOD PRESSURE: 55 MMHG | RESPIRATION RATE: 17 BRPM | BODY MASS INDEX: 24.49 KG/M2 | TEMPERATURE: 97.3 F | HEIGHT: 75 IN | WEIGHT: 197 LBS

## 2018-04-30 DIAGNOSIS — M24.152 DEGENERATIVE TEAR OF ACETABULAR LABRUM OF LEFT HIP: Primary | ICD-10-CM

## 2018-04-30 PROBLEM — M16.12 PRIMARY OSTEOARTHRITIS OF LEFT HIP: Chronic | Status: ACTIVE | Noted: 2017-10-19

## 2018-04-30 PROCEDURE — 29862 HIP ARTHR0 W/DEBRIDEMENT: CPT | Performed by: ORTHOPAEDIC SURGERY

## 2018-04-30 PROCEDURE — 73501 X-RAY EXAM HIP UNI 1 VIEW: CPT

## 2018-04-30 RX ORDER — OXYCODONE HYDROCHLORIDE AND ACETAMINOPHEN 5; 325 MG/1; MG/1
TABLET ORAL
Qty: 40 TABLET | Refills: 0 | Status: SHIPPED | OUTPATIENT
Start: 2018-04-30 | End: 2018-08-01

## 2018-04-30 RX ORDER — OXYCODONE HYDROCHLORIDE 5 MG/1
10 TABLET ORAL ONCE
Status: COMPLETED | OUTPATIENT
Start: 2018-04-30 | End: 2018-04-30

## 2018-04-30 RX ORDER — PREGABALIN 100 MG/1
100 CAPSULE ORAL 3 TIMES DAILY
Status: DISCONTINUED | OUTPATIENT
Start: 2018-04-30 | End: 2018-04-30 | Stop reason: HOSPADM

## 2018-04-30 RX ORDER — LIDOCAINE HYDROCHLORIDE 10 MG/ML
INJECTION, SOLUTION INFILTRATION; PERINEURAL AS NEEDED
Status: DISCONTINUED | OUTPATIENT
Start: 2018-04-30 | End: 2018-04-30 | Stop reason: SURG

## 2018-04-30 RX ORDER — KETAMINE HYDROCHLORIDE 50 MG/ML
INJECTION, SOLUTION, CONCENTRATE INTRAMUSCULAR; INTRAVENOUS AS NEEDED
Status: DISCONTINUED | OUTPATIENT
Start: 2018-04-30 | End: 2018-04-30 | Stop reason: SURG

## 2018-04-30 RX ORDER — FENTANYL CITRATE/PF 50 MCG/ML
25 SYRINGE (ML) INJECTION
Status: DISCONTINUED | OUTPATIENT
Start: 2018-04-30 | End: 2018-04-30 | Stop reason: HOSPADM

## 2018-04-30 RX ORDER — EPHEDRINE SULFATE 50 MG/ML
INJECTION, SOLUTION INTRAVENOUS AS NEEDED
Status: DISCONTINUED | OUTPATIENT
Start: 2018-04-30 | End: 2018-04-30 | Stop reason: SURG

## 2018-04-30 RX ORDER — ONDANSETRON 2 MG/ML
INJECTION INTRAMUSCULAR; INTRAVENOUS AS NEEDED
Status: DISCONTINUED | OUTPATIENT
Start: 2018-04-30 | End: 2018-04-30 | Stop reason: SURG

## 2018-04-30 RX ORDER — OXYCODONE HYDROCHLORIDE AND ACETAMINOPHEN 5; 325 MG/1; MG/1
2 TABLET ORAL EVERY 4 HOURS PRN
Status: DISCONTINUED | OUTPATIENT
Start: 2018-04-30 | End: 2018-04-30 | Stop reason: HOSPADM

## 2018-04-30 RX ORDER — PROPOFOL 10 MG/ML
INJECTION, EMULSION INTRAVENOUS AS NEEDED
Status: DISCONTINUED | OUTPATIENT
Start: 2018-04-30 | End: 2018-04-30 | Stop reason: SURG

## 2018-04-30 RX ORDER — ONDANSETRON 2 MG/ML
4 INJECTION INTRAMUSCULAR; INTRAVENOUS EVERY 6 HOURS PRN
Status: DISCONTINUED | OUTPATIENT
Start: 2018-04-30 | End: 2018-04-30 | Stop reason: HOSPADM

## 2018-04-30 RX ORDER — ACETAMINOPHEN 325 MG/1
650 TABLET ORAL EVERY 4 HOURS PRN
Status: DISCONTINUED | OUTPATIENT
Start: 2018-04-30 | End: 2018-04-30 | Stop reason: HOSPADM

## 2018-04-30 RX ORDER — KETOROLAC TROMETHAMINE 30 MG/ML
INJECTION, SOLUTION INTRAMUSCULAR; INTRAVENOUS AS NEEDED
Status: DISCONTINUED | OUTPATIENT
Start: 2018-04-30 | End: 2018-04-30 | Stop reason: SURG

## 2018-04-30 RX ORDER — FENTANYL CITRATE 50 UG/ML
INJECTION, SOLUTION INTRAMUSCULAR; INTRAVENOUS AS NEEDED
Status: DISCONTINUED | OUTPATIENT
Start: 2018-04-30 | End: 2018-04-30 | Stop reason: SURG

## 2018-04-30 RX ORDER — CHLORHEXIDINE GLUCONATE 4 G/100ML
SOLUTION TOPICAL DAILY PRN
Status: DISCONTINUED | OUTPATIENT
Start: 2018-04-30 | End: 2018-04-30 | Stop reason: HOSPADM

## 2018-04-30 RX ORDER — MIDAZOLAM HYDROCHLORIDE 1 MG/ML
INJECTION INTRAMUSCULAR; INTRAVENOUS AS NEEDED
Status: DISCONTINUED | OUTPATIENT
Start: 2018-04-30 | End: 2018-04-30 | Stop reason: SURG

## 2018-04-30 RX ORDER — SODIUM CHLORIDE 9 MG/ML
INJECTION, SOLUTION INTRAVENOUS CONTINUOUS PRN
Status: DISCONTINUED | OUTPATIENT
Start: 2018-04-30 | End: 2018-04-30 | Stop reason: SURG

## 2018-04-30 RX ORDER — ACETAMINOPHEN 325 MG/1
975 TABLET ORAL ONCE
Status: COMPLETED | OUTPATIENT
Start: 2018-04-30 | End: 2018-04-30

## 2018-04-30 RX ORDER — ONDANSETRON 2 MG/ML
4 INJECTION INTRAMUSCULAR; INTRAVENOUS ONCE AS NEEDED
Status: DISCONTINUED | OUTPATIENT
Start: 2018-04-30 | End: 2018-04-30 | Stop reason: HOSPADM

## 2018-04-30 RX ADMIN — EPHEDRINE SULFATE 10 MG: 50 INJECTION, SOLUTION INTRAMUSCULAR; INTRAVENOUS; SUBCUTANEOUS at 13:18

## 2018-04-30 RX ADMIN — FENTANYL CITRATE 50 MCG: 50 INJECTION INTRAMUSCULAR; INTRAVENOUS at 13:55

## 2018-04-30 RX ADMIN — OXYCODONE HYDROCHLORIDE 10 MG: 5 TABLET ORAL at 12:11

## 2018-04-30 RX ADMIN — LIDOCAINE HYDROCHLORIDE 50 MG: 10 INJECTION, SOLUTION INFILTRATION; PERINEURAL at 13:04

## 2018-04-30 RX ADMIN — OXYCODONE HYDROCHLORIDE AND ACETAMINOPHEN 2 TABLET: 5; 325 TABLET ORAL at 15:20

## 2018-04-30 RX ADMIN — EPHEDRINE SULFATE 10 MG: 50 INJECTION, SOLUTION INTRAMUSCULAR; INTRAVENOUS; SUBCUTANEOUS at 13:12

## 2018-04-30 RX ADMIN — SODIUM CHLORIDE: 0.9 INJECTION, SOLUTION INTRAVENOUS at 11:58

## 2018-04-30 RX ADMIN — KETOROLAC TROMETHAMINE 30 MG: 30 INJECTION, SOLUTION INTRAMUSCULAR at 13:52

## 2018-04-30 RX ADMIN — PREGABALIN 100 MG: 100 CAPSULE ORAL at 12:11

## 2018-04-30 RX ADMIN — EPHEDRINE SULFATE 10 MG: 50 INJECTION, SOLUTION INTRAMUSCULAR; INTRAVENOUS; SUBCUTANEOUS at 13:25

## 2018-04-30 RX ADMIN — PROPOFOL 200 MG: 10 INJECTION, EMULSION INTRAVENOUS at 13:04

## 2018-04-30 RX ADMIN — CEFAZOLIN SODIUM 2000 MG: 2 SOLUTION INTRAVENOUS at 13:12

## 2018-04-30 RX ADMIN — EPHEDRINE SULFATE 5 MG: 50 INJECTION, SOLUTION INTRAMUSCULAR; INTRAVENOUS; SUBCUTANEOUS at 13:48

## 2018-04-30 RX ADMIN — ACETAMINOPHEN 975 MG: 325 TABLET, FILM COATED ORAL at 12:11

## 2018-04-30 RX ADMIN — KETAMINE HYDROCHLORIDE 45 MG: 50 INJECTION INTRAMUSCULAR; INTRAVENOUS at 13:13

## 2018-04-30 RX ADMIN — MIDAZOLAM HYDROCHLORIDE 2 MG: 1 INJECTION, SOLUTION INTRAMUSCULAR; INTRAVENOUS at 12:57

## 2018-04-30 RX ADMIN — SODIUM CHLORIDE: 0.9 INJECTION, SOLUTION INTRAVENOUS at 13:38

## 2018-04-30 RX ADMIN — KETAMINE HYDROCHLORIDE 45 MG: 50 INJECTION INTRAMUSCULAR; INTRAVENOUS at 13:06

## 2018-04-30 RX ADMIN — FENTANYL CITRATE 50 MCG: 50 INJECTION INTRAMUSCULAR; INTRAVENOUS at 13:04

## 2018-04-30 RX ADMIN — EPHEDRINE SULFATE 5 MG: 50 INJECTION, SOLUTION INTRAMUSCULAR; INTRAVENOUS; SUBCUTANEOUS at 13:31

## 2018-04-30 RX ADMIN — ONDANSETRON 4 MG: 2 INJECTION INTRAMUSCULAR; INTRAVENOUS at 13:40

## 2018-04-30 RX ADMIN — DEXAMETHASONE SODIUM PHOSPHATE 10 MG: 10 INJECTION INTRAMUSCULAR; INTRAVENOUS at 13:26

## 2018-04-30 RX ADMIN — EPHEDRINE SULFATE 5 MG: 50 INJECTION, SOLUTION INTRAMUSCULAR; INTRAVENOUS; SUBCUTANEOUS at 13:38

## 2018-04-30 NOTE — ANESTHESIA POSTPROCEDURE EVALUATION
Post-Op Assessment Note      CV Status:  Stable    Mental Status:  Alert    Hydration Status:  Stable    PONV Controlled:  None        Staff: CRNA           BP      Temp      Pulse     Resp      SpO2

## 2018-04-30 NOTE — OP NOTE
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 [M16 12]    Procedure(s) (LRB):  LEFT HIP ARTHROSCOPIC LABRAL DEBRIDEMENT    Specimen(s):  None    Estimated Blood Loss:   Minimal    Drains:  None     Anesthesia Type:   General    Complications:   None    Procedure and Technique:  The patient was identified in the pre-operative holding area, and the surgical site was marked by the surgeon  The patient was transported to the operating room and placed in the supine position on the fracture table  General anesthesia was administered  A well-padded perineal post was placed  The feet were padded and secured to the fracture table  The left hip was prepped and draped in the usual sterile fashion  Prophylactic antibiotics were given within 1 hour of incision  Following a surgical timeout, traction was applied to the operative extremity  An anterolateral arthroscopy portal was established under fluoroscopic guidance with the aid of a spinal needle  The 4 mm 70-degree arthroscopic camera was placed in the central compartment  A mid-anterior portal was established under direct visualization with the aid of the spinal needle  Small capsulotomies were performed at the portal sites  Examination of the central compartment revealed degenerative tearing of the anterosuperior acetabular labrum  The articular surfaces of the acetabulum and femoral head demonstrated mild degenerative changes  The anterosuperior acetabular labrum was debrided with the 4 2 mm shaver until a stable margin was obtained  Traction was released from the operative extremity  Examination of the peripheral compartment demonstrated a pistol  deformity  Femoroacetabular impingement was absent on dynamic arthroscopic examination  The hip was irrigated extensively with the arthroscopic irrigation fluid  The portal sites were closed with simple buried 4-0 Monocryl suture  Steri-Strips were applied to reinforce the closure  The wounds were dressed with 4x4 gauze, ABDs, and foam tape  Anesthesia was reversed, and the patient was extubated      Patient Disposition:  PACU  and extubated and stable    SIGNATURE: Winnie Trinh MD  DATE: April 30, 2018  TIME: 2:00 PM

## 2018-04-30 NOTE — ANESTHESIA PREPROCEDURE EVALUATION
Review of Systems/Medical History  Patient summary reviewed  Chart reviewed  No history of anesthetic complications     Cardiovascular  Negative cardio ROS    Pulmonary  Smoker cigarette smoker  , Tobacco cessation counseling given ,        GI/Hepatic    GERD ,        Negative  ROS        Endo/Other  Negative endo/other ROS      GYN       Hematology  Negative hematology ROS      Musculoskeletal  Back pain , lumbar pain, Sciatica,   Arthritis     Neurology    Motor deficit , right lower extremity weakness, Paresthesias,   Comment: Right foot drop Psychology   Depression ,   Chronic opioid dependence Chronic pain,   Comment: Stopped hydrocodone Dec 2017  Tramadol, tylenol daily         Physical Exam    Airway    Mallampati score: II  TM Distance: >3 FB  Neck ROM: full     Dental   No notable dental hx upper dentures and lower dentures,     Cardiovascular  Comment: Negative ROS, Rhythm: regular, Rate: normal, Cardiovascular exam normal    Pulmonary  Pulmonary exam normal Breath sounds clear to auscultation,     Other Findings        Anesthesia Plan  ASA Score- 3     Anesthesia Type- general with ASA Monitors  Additional Monitors:   Airway Plan: LMA  Plan Factors-    Induction- intravenous  Postoperative Plan- Plan for postoperative opioid use  Informed Consent- Anesthetic plan and risks discussed with patient  I personally reviewed this patient with the CRNA  Discussed and agreed on the Anesthesia Plan with the CRNA  Dmitriy Sheets           Recent labs personally reviewed:  Lab Results   Component Value Date    WBC 11 50 (H) 04/12/2018    HGB 15 9 04/12/2018     04/12/2018     Lab Results   Component Value Date     04/12/2018    K 4 5 04/12/2018    BUN 14 04/12/2018    CREATININE 0 81 04/12/2018    GLUCOSE 91 04/12/2018     Lab Results   Component Value Date    PTT 36 (H) 04/12/2018      Lab Results   Component Value Date    INR 0 96 04/12/2018     IChristophe MD, have personally seen and evaluated the patient prior to anesthetic care  I have reviewed the pre-anesthetic record, and other medical records if appropriate to the anesthetic care  If a CRNA is involved in the case, I have reviewed the CRNA assessment, if present, and agree  Risks/benefits and alternatives discussed with patient including possible PONV, sore throat, and possibility of rare anesthetic and surgical emergencies

## 2018-04-30 NOTE — H&P
Patient Name:  Ramy Schofield  MRN:  [id number]    Assessment & Plan    Left hip pain for left hip diagnostic arthroscopy and possible labral debridement      Chief Complaint    Left hip pain      History of the Present Illness    28-year-old male with left hip pain since June 2017 when he experienced a popping episode and sudden onset of pain  The pain has persisted and is worse with walking up an incline  No relief with conservative treatment  MRI arthrogram of the left hip showed a possible anterosuperior acetabular labral tear with mild degenerative changes  He presents for diagnostic arthroscopy and possible labral debridement      Review of Systems    General:  Negative for fever, lethargy/malaise, or night sweats  Eyes:  Negative for blurry vision or double vision  ENT:  Negative for hearing change, nasal discharge, or sore throat  Hematological:  Negative for bleeding problems or blood clots  Endocrine:  Negative for excessive thirst or temperature intolerance  Respiratory:  Negative for cough or wheezing  Cardiovascular:  Negative for chest pain, dyspnea on exertion, or palpitations  Gastrointestinal:  Negative for abdominal pain, diarrhea, or nausea/vomiting  Musculoskeletal:  As stated in the HPI and otherwise negative  Neurological:  Negative for confusion, headaches, or seizures  Psychological:  Negative for hallucinations or mood swings  Dermatological:  Negative for itching or rash  Physical Exam    /72   Pulse 82   Temp 97 7 °F (36 5 °C) (Temporal)   Resp 18   Ht 6' 3" (1 905 m)   Wt 89 4 kg (197 lb)   SpO2 95%   BMI 24 62 kg/m²     Left hip:  Tenderness to palpation anterior groin  Full range of motion  FADDIR test is positive  LOBO test is positive  Passive supine rotation test is negative  Straight leg raise against resistance is positive  Mild weakness with hip flexion  Constitutional:  Well-developed and well-nourished    Eyes:  Anicteric sclerae  Neck:  Supple  Lungs:  Clear to auscultation bilaterally  Heart:  Regular rate and rhythm with audible S1 and S2   Skin:  Intact without erythema  Neurologic:  Sensation intact to light touch  Psychiatric:  Mood and affect are appropriate  Data Review    I have personally reviewed pertinent films in PACS, and my interpretation follows  MRI arthrogram left hip 11/1/17:  Small region of contrast enhancement in the anterosuperior acetabular labrum concerning for tear  This is visible on a single cut only  Mild degenerative changes  Pistol  deformity femoral head-neck junction  Small bone infarct proximal femur  No results found for: NA, CL, CO2, BUN, CREATININE, GLUCOSE, WBC, HGB, PLT, PT, INR, PTT      Past Medical History:   Diagnosis Date    Depression     GERD (gastroesophageal reflux disease)     Muscle weakness     Neuropathy     Osteoarthritis     Right foot drop        Past Surgical History:   Procedure Laterality Date    EPIDURAL BLOCK INJECTION      needle    TONSILLECTOMY      WISDOM TOOTH EXTRACTION         Allergies   Allergen Reactions    Gabapentin GI Intolerance    Tramadol Confusion    Morphine Other (See Comments)     constipation       No current facility-administered medications on file prior to encounter  No current outpatient prescriptions on file prior to encounter         Social History   Substance Use Topics    Smoking status: Current Every Day Smoker     Packs/day: 0 25     Last attempt to quit: 12/21/2017    Smokeless tobacco: Never Used    Alcohol use No       Family History   Problem Relation Age of Onset    Osteoarthritis Family     Other Mother      rheumatism

## 2018-05-10 ENCOUNTER — EVALUATION (OUTPATIENT)
Dept: PHYSICAL THERAPY | Facility: REHABILITATION | Age: 63
End: 2018-05-10
Payer: COMMERCIAL

## 2018-05-10 DIAGNOSIS — M25.552 LEFT HIP PAIN: Primary | ICD-10-CM

## 2018-05-10 DIAGNOSIS — M24.152 DEGENERATIVE TEAR OF ACETABULAR LABRUM OF LEFT HIP: ICD-10-CM

## 2018-05-10 PROCEDURE — G8990 OTHER PT/OT CURRENT STATUS: HCPCS

## 2018-05-10 PROCEDURE — G8991 OTHER PT/OT GOAL STATUS: HCPCS

## 2018-05-10 PROCEDURE — 97162 PT EVAL MOD COMPLEX 30 MIN: CPT

## 2018-05-10 NOTE — PROGRESS NOTES
PT Evaluation     Today's date: 5/10/2018  Patient name: Melinda Hannah  : 1955  MRN: [id number]  Referring provider: Brina Elizabeth MD  Dx:   Encounter Diagnosis     ICD-10-CM    1  Left hip pain M25 552    2  Degenerative tear of acetabular labrum of left hip M16 12 Ambulatory referral to Physical Therapy       Start Time: 1530  Stop Time: 1605  Total time in clinic (min): 35 minutes    Assessment  Impairments: abnormal gait, abnormal or restricted ROM, activity intolerance, impaired balance, impaired physical strength, lacks appropriate home exercise program, pain with function, safety issue and weight-bearing intolerance    Assessment details: Patient is a 61 y/o male who presents s/p a L hip labral repair performed on 18  Patient demonstrates functional mobility deficits secondary to increased pain, decreased AROM/PROM and diminished strength/endurance levels  Patient is a good rehabilitation candidate and will benefit from skilled PT intervention to address the above issues and help return the patient to their prior and/or modified level of function within the post operative protocol  Understanding of Dx/Px/POC: good   Prognosis: good    Goals  Short Term Goal    1  Patient will report a 50% reduction in their subjective pain report (VAS) by 4 weeks  2   Patient will demonstrate a 50% improvement in AROM/PROM by 6 weeks  3   Patient will demonstrate (at least) a 1/2 grade strength improvement after 6 weeks  4   Ambulation/Balance/Stairclimbing will be improved by at least 50% after 6 weeks  5   Reduce radiculopathy and/or paresthesias by 50% after 6 weeks  6   Patient will improve FOTO score by at least 10 points by 4 weeks      Long Term Goal    1  Patient will demonstrate IADL at the prior/maximal level of function  2   Patient will demonstrate recreational performance at the prior and/or maximal level      3   Patient will return to work at the prior and/or maximal level of function  4   Patient will demonstrate independence with their HEP  Plan  Patient would benefit from: skilled PT  Planned modality interventions: unattended electrical stimulation and cryotherapy  Planned therapy interventions: balance/weight bearing training, massage, manual therapy, neuromuscular re-education, functional ROM exercises, gait training, flexibility, therapeutic exercise, therapeutic activities, therapeutic training and home exercise program  Frequency: 2x week  Duration in weeks: 8  Treatment plan discussed with: patient  Plan details: Progress as per post operative protocol        Subjective Evaluation    History of Present Illness  Date of surgery: 2018  Mechanism of injury: surgery and trauma  Mechanism of injury: Patient is a 61 y/o male who presents s/p a repair of the left hip acetabular labrum  He notes that his hip sxs initially began in 2017 while in a work hardening program for low back injury incurred during work  He reports that he was stretching out his L hip when he felt a sharp, stabbing pain in the L hip and noted that he was then unable to ambulate without intense pain  Imaging studies were positive for degenerative tearing and a surgical repair was completed on 18  Significant PMHx includes an 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 with R>L peripheral neuropathy, R foot drop syndrome and bilateral hip OA  Patient would like to return to his prior level of pain free occupational recreational activity      Not a recurrent problem   Quality of life: fair    Pain  Current pain ratin  At best pain rating: 3  At worst pain ratin  Location: L hip  Quality: sharp and knife-like  Relieving factors: change in position and medications  Aggravating factors: standing, walking and stair climbing  Progression: no change    Social Support  Steps to enter house: yes  Stairs in house: yes     Employment status: not working    Diagnostic Tests  X-ray: abnormal  MRI studies: abnormal  Treatments  Previous treatment: medication  Current treatment: medication and physical therapy  Patient Goals  Patient goals for therapy: decreased pain, increased motion, increased strength, independence with ADLs/IADLs, return to sport/leisure activities, return to work and improved balance          Objective     Observations   Left Hip  Positive for incision  Additional Observation Details  Incisional ports are clean and healing well  Patient removed all bandaging prior to today's appointment  Palpation   Left   Tenderness of the gluteus medius, iliopsoas, lumbar paraspinals and piriformis  Trigger point to iliopsoas  Tenderness     Left Hip   Tenderness in the ASIS and greater trochanter  Neurological Testing     Sensation     Hip   Left Hip   Intact: light touch    Active Range of Motion   Left Knee   Normal active range of motion  Left Ankle/Foot   Normal active range of motion    Passive Range of Motion   Left Hip   Flexion: 80 degrees with pain  Extension: 0 degrees   Abduction: 20 degrees with pain    Additional Passive Range of Motion Details  PROM performed within the post operative protocol    Strength/Myotome Testing     Additional Strength Details  MMT not assessed secondary to post operative protocol and patient pain levels    Ambulation   Weight-Bearing Status   Weight-Bearing Status (Left): partial weight-bearing   Assistive device used: single point cane    Additional Weight-Bearing Status Details  Patient presents with a SPC secondary to aggravation over his axillary crutch usage  He notes that he is placing 25-50% of his body weight through the operative limb  Ambulation: Level Surfaces   Ambulation with assistive device: independent    Ambulation: Stairs   Ascend stairs: unable  Descend stairs: unable    Observational Gait   Gait: antalgic, asymmetric and circumduction   Decreased walking speed and stride length       Quality of Movement During Gait Trunk  Forward lean  Knee    Knee (Left): Positive stiff knee         Flowsheet Rows      Most Recent Value   PT/OT G-Codes   Current Score  39   Projected Score  54   FOTO information reviewed  Yes   Assessment Type  Evaluation   G code set  Other PT/OT Primary   Other PT Primary Current Status ()  CK   Other PT Primary Goal Status ()  CK          Precautions: Labral repair protocol, B/L peripheral neuropathy, L4-5 HNP, OA, Depression    Daily Treatment Diary     Manual              PROM within protocol             Gentle STM                                                        Exercise Diary              Quad sets             glute sets             Isometric hip ABD/ADD             Ankle pumps             TA activation                                                                                                                                                                                                                    Modalities              estim + ice

## 2018-05-11 ENCOUNTER — OFFICE VISIT (OUTPATIENT)
Dept: OBGYN CLINIC | Facility: CLINIC | Age: 63
End: 2018-05-11

## 2018-05-11 VITALS
DIASTOLIC BLOOD PRESSURE: 78 MMHG | SYSTOLIC BLOOD PRESSURE: 113 MMHG | HEIGHT: 75 IN | HEART RATE: 77 BPM | WEIGHT: 192.8 LBS | BODY MASS INDEX: 23.97 KG/M2

## 2018-05-11 DIAGNOSIS — M24.152 DEGENERATIVE TEAR OF ACETABULAR LABRUM OF LEFT HIP: Primary | ICD-10-CM

## 2018-05-11 PROCEDURE — 99024 POSTOP FOLLOW-UP VISIT: CPT | Performed by: ORTHOPAEDIC SURGERY

## 2018-05-11 NOTE — PROGRESS NOTES
Patient Name:  Marisol Leon  MRN:  [id number]    Assessment & Plan    Left hip arthroscopic labral debridement 4/30/18  1  Weightbearing as tolerated left lower extremity  2  Crutch as needed to ambulate  Wean as tolerated  3  Continue physical therapy  4  Follow-up in four weeks  Subjective    67-year-old male returns to the office today status post left hip arthroscopic labral debridement 4/30/18  Today he does no pain about his left hip which is well controlled  He denies incisional erythema or drainage  He does note some tingling in his left lower extremity  He has initiated physical therapy  No fevers or chills  Objective    /78 (Patient Position: Sitting, Cuff Size: Standard)   Pulse 77   Ht 6' 3" (1 905 m)   Wt 87 5 kg (192 lb 12 8 oz)   BMI 24 10 kg/m²     Left hip:  No gross deformity  Skin intact  Incisions well healed without erythema or drainage  Hip range of motion intact without significant discomfort    Sensation intact left lower extremity      Scribe Attestation    I,:   Opal Douglas PA-C am acting as a scribe while in the presence of the attending physician :        I,:   Ilene Espana MD personally performed the services described in this documentation    as scribed in my presence :

## 2018-05-14 ENCOUNTER — OFFICE VISIT (OUTPATIENT)
Dept: PHYSICAL THERAPY | Facility: REHABILITATION | Age: 63
End: 2018-05-14
Payer: COMMERCIAL

## 2018-05-14 DIAGNOSIS — M24.152 DEGENERATIVE TEAR OF ACETABULAR LABRUM OF LEFT HIP: Primary | ICD-10-CM

## 2018-05-14 PROCEDURE — 97140 MANUAL THERAPY 1/> REGIONS: CPT | Performed by: PHYSICAL THERAPIST

## 2018-05-14 NOTE — PROGRESS NOTES
Daily Note     Today's date: 2018  Patient name: Christiana Lopez  : 1955  MRN: [id number]  Referring provider: Catraina Velázquez MD  Dx:   Encounter Diagnosis     ICD-10-CM    1  Degenerative tear of acetabular labrum of left hip M16 12                   Subjective: Patient reports that his hip is feeling okay  Objective: See treatment diary below    Precautions: Labral repair protocol, B/L peripheral neuropathy, L4-5 HNP, OA, Depression    Daily Treatment Diary     Manual              PROM within protocol             Gentle STM Low back/ glutes                                                       Exercise Diary              Quad sets 10x 10s            glute sets Prone 10x 5s            Isometric hip ABD/ADD 10x 10s each            Ankle pumps 20x            TA activation 10x 10s            Prone quad stretch 3x 30s            K -> C Right only 3x 30s                                                                                                                                                                                         Modalities              estim + ice Ice only 10'                                            Assessment: Patient presents c/ low back pain d/t hx of HNP   STM/MFR to left QL/paraspinals and glutes  Decreased ability to fire the left glute and to activate TA/requires verbal & tactile cues to enhance activation  No pain t/o exercises  Tolerated treatment well  Patient would benefit from continued PT  Plan: Continue per plan of care

## 2018-05-17 ENCOUNTER — OFFICE VISIT (OUTPATIENT)
Dept: PHYSICAL THERAPY | Facility: REHABILITATION | Age: 63
End: 2018-05-17
Payer: COMMERCIAL

## 2018-05-17 DIAGNOSIS — M24.152 DEGENERATIVE TEAR OF ACETABULAR LABRUM OF LEFT HIP: Primary | ICD-10-CM

## 2018-05-17 DIAGNOSIS — M25.552 LEFT HIP PAIN: ICD-10-CM

## 2018-05-17 PROCEDURE — 97112 NEUROMUSCULAR REEDUCATION: CPT

## 2018-05-17 PROCEDURE — 97140 MANUAL THERAPY 1/> REGIONS: CPT

## 2018-05-17 NOTE — PROGRESS NOTES
Daily Note     Today's date: 2018  Patient name: Moose Doherty  : 1955  MRN: [id number]  Referring provider: Usha Doran MD  Dx:   Encounter Diagnosis     ICD-10-CM    1  Degenerative tear of acetabular labrum of left hip M16 12    2  Left hip pain M25 552        Start Time: 1030  Stop Time: 1125  Total time in clinic (min): 55 minutes    Subjective: Patient reports hip pain 5/10, back /10  Has not used meds today  Objective: See treatment diary below    Precautions: Labral repair protocol, B/L peripheral neuropathy, L4-5 HNP, OA, Depression, TTWB    Daily Treatment Diary     Manual             PROM within protocol             Gentle STM Low back/ glutes x                                                      Exercise Diary              Quad sets 10x 10s x           glute sets Prone 10x 5s x           Isometric hip ABD/ADD 10x 10s each x           Ankle pumps 20x x           TA activation 10x 10s x           Prone quad stretch 3x 30s x           K -> C Right only 3x 30s                                                                                                                                                                                         Modalities              estim + ice Ice only 10'                                        Gait training:      Assessment: Patient presents c/ low back pain d/t hx of HNP   STM/MFR to left QL/paraspinals and glutes  Decreased ability to fire the left glute and to activate TA/requires verbal & tactile cues to enhance activation  No pain t/o exercises  Tolerated treatment well  Patient would benefit from continued PT  Plan: Continue per plan of care

## 2018-05-21 ENCOUNTER — APPOINTMENT (OUTPATIENT)
Dept: PHYSICAL THERAPY | Facility: REHABILITATION | Age: 63
End: 2018-05-21
Payer: COMMERCIAL

## 2018-05-22 ENCOUNTER — OFFICE VISIT (OUTPATIENT)
Dept: PHYSICAL THERAPY | Facility: REHABILITATION | Age: 63
End: 2018-05-22
Payer: COMMERCIAL

## 2018-05-22 DIAGNOSIS — M24.152 DEGENERATIVE TEAR OF ACETABULAR LABRUM OF LEFT HIP: Primary | ICD-10-CM

## 2018-05-22 DIAGNOSIS — M25.552 LEFT HIP PAIN: ICD-10-CM

## 2018-05-22 PROCEDURE — 97110 THERAPEUTIC EXERCISES: CPT | Performed by: PHYSICAL THERAPY ASSISTANT

## 2018-05-22 PROCEDURE — 97140 MANUAL THERAPY 1/> REGIONS: CPT | Performed by: PHYSICAL THERAPY ASSISTANT

## 2018-05-22 PROCEDURE — 97112 NEUROMUSCULAR REEDUCATION: CPT | Performed by: PHYSICAL THERAPY ASSISTANT

## 2018-05-22 NOTE — PROGRESS NOTES
Daily Note     Today's date: 2018  Patient name: Mariya Henriquez  : 1955  MRN: [id number]  Referring provider: Nikolai Dobbs MD  Dx:   Encounter Diagnosis     ICD-10-CM    1  Degenerative tear of acetabular labrum of left hip M16 12    2  Left hip pain M25 552                   Subjective: Patient reports back pain is worse than hip pain today  Objective: See treatment diary below    Precautions: Labral repair protocol, B/L peripheral neuropathy, L4-5 HNP, OA, Depression, TTWB    Daily Treatment Diary     Manual            PROM within protocol   10'          Gentle STM Low back/ glutes x                                                      Exercise Diary             Quad sets 10x 10s x 10"  2x10          glute sets Prone 10x 5s x 10"x10          Isometric hip ABD/ADD 10x 10s each x 10"x10          Ankle pumps 20x x HEP          TA activation 10x 10s x 10"x10          Prone quad stretch 3x 30s x 3x30"          K -> C Right only 3x 30s  30"x3          Heel slides   2x10          SAQ   5"  2x10          Quadruped Rocking(no pain)   10"x10          Low bridges   2x10                                                                                                                                   Modalities              estim + ice Ice only 10'                                        Gait training:      Assessment: Patient presents c/ low back pain d/t hx of HNP  Good tolerance to progression of TE per protocol  No pain t/o exercises  Tolerated treatment well  Patient would benefit from continued PT  Plan: Continue per plan of care

## 2018-05-24 ENCOUNTER — APPOINTMENT (OUTPATIENT)
Dept: PHYSICAL THERAPY | Facility: REHABILITATION | Age: 63
End: 2018-05-24
Payer: COMMERCIAL

## 2018-05-24 ENCOUNTER — OFFICE VISIT (OUTPATIENT)
Dept: PHYSICAL THERAPY | Facility: REHABILITATION | Age: 63
End: 2018-05-24
Payer: COMMERCIAL

## 2018-05-24 DIAGNOSIS — M24.152 DEGENERATIVE TEAR OF ACETABULAR LABRUM OF LEFT HIP: Primary | ICD-10-CM

## 2018-05-24 DIAGNOSIS — M25.552 LEFT HIP PAIN: ICD-10-CM

## 2018-05-24 PROCEDURE — 97110 THERAPEUTIC EXERCISES: CPT

## 2018-05-24 PROCEDURE — 97112 NEUROMUSCULAR REEDUCATION: CPT

## 2018-05-24 PROCEDURE — 97140 MANUAL THERAPY 1/> REGIONS: CPT

## 2018-05-29 ENCOUNTER — OFFICE VISIT (OUTPATIENT)
Dept: PHYSICAL THERAPY | Facility: REHABILITATION | Age: 63
End: 2018-05-29
Payer: COMMERCIAL

## 2018-05-29 ENCOUNTER — OFFICE VISIT (OUTPATIENT)
Dept: GASTROENTEROLOGY | Facility: MEDICAL CENTER | Age: 63
End: 2018-05-29
Payer: COMMERCIAL

## 2018-05-29 VITALS
WEIGHT: 193 LBS | DIASTOLIC BLOOD PRESSURE: 70 MMHG | HEART RATE: 95 BPM | BODY MASS INDEX: 24 KG/M2 | TEMPERATURE: 95.9 F | HEIGHT: 75 IN | SYSTOLIC BLOOD PRESSURE: 118 MMHG

## 2018-05-29 DIAGNOSIS — M25.552 LEFT HIP PAIN: ICD-10-CM

## 2018-05-29 DIAGNOSIS — K29.50 CHRONIC GASTRITIS WITHOUT BLEEDING, UNSPECIFIED GASTRITIS TYPE: ICD-10-CM

## 2018-05-29 DIAGNOSIS — K21.9 GASTROESOPHAGEAL REFLUX DISEASE WITHOUT ESOPHAGITIS: ICD-10-CM

## 2018-05-29 DIAGNOSIS — Z12.11 COLON CANCER SCREENING: Primary | ICD-10-CM

## 2018-05-29 DIAGNOSIS — M24.152 DEGENERATIVE TEAR OF ACETABULAR LABRUM OF LEFT HIP: Primary | ICD-10-CM

## 2018-05-29 PROCEDURE — 97140 MANUAL THERAPY 1/> REGIONS: CPT

## 2018-05-29 PROCEDURE — 97110 THERAPEUTIC EXERCISES: CPT

## 2018-05-29 PROCEDURE — 99242 OFF/OP CONSLTJ NEW/EST SF 20: CPT | Performed by: INTERNAL MEDICINE

## 2018-05-29 PROCEDURE — 97112 NEUROMUSCULAR REEDUCATION: CPT

## 2018-05-29 NOTE — PROGRESS NOTES
Daily Note     Today's date: 2018  Patient name: Lala Britton  : 1955  MRN: [id number]  Referring provider: Adonay Logan MD  Dx:   Encounter Diagnosis     ICD-10-CM    1  Degenerative tear of acetabular labrum of left hip M16 12    2  Left hip pain M25 552                   Subjective Reports he was trying to get out of bed on Saturday & he felt a pop & had throbbing & nerve pain into his groin  He took pain meds  Reports he felt better the next day  Objective: See treatment diary below    Precautions: Labral repair protocol, B/L peripheral neuropathy, L4-5 HNP, OA, Depression, TTWB    Daily Treatment Diary     Manual          PROM within protocol   10' Gentle flx & abd x        Gentle STM Low back/ glutes x  x X & hip flx & ADD                                                   Exercise Diary             Quad sets 10x 10s x 10"  2x10 x x        glute sets Prone 10x 5s x 10"x10  x        Isometric hip ABD/ADD 10x 10s each x 10"x10 X ADD with soccer ball ABD with red TB x        Ankle pumps 20x x HEP          TA activation 10x 10s x 10"x10 x x        Prone quad stretch 3x 30s x 3x30"  x        K -> C Right only 3x 30s  30"x3 x x        Heel slides   2x10 x X w hip flx release        SAQ   5"  2x10 X lg  bolster 14x 20x        Quadruped Rocking(no pain)   10"x10 x x        Low bridges   2x10 x x        LAQ     10                                                                                                                    Modalities              estim + ice Ice only 10'    x                                    Gait training:      Assessment:  Tolerated treatment well  Patient would benefit from continued PT  PT checked him & felt it was hip flexor that popped  He is tender & tight in that area  Looser after STM  He felt LAQ into LB  Laying prone & doing glut set today was painful in LB     He did feel better after ice on LB & hip    Plan: Continue per plan of care

## 2018-05-29 NOTE — PATIENT INSTRUCTIONS
Colonoscopy/ EGD scheduled on 6/29/2018 with Dr Sarah Pro at [facility] sent to pharmacy/ instructions giving to patient

## 2018-05-29 NOTE — PROGRESS NOTES
[facility] - Outpatient Consultation  Sydney Best 60 y o  male MRN: [id number]  Encounter: [id number]          ASSESSMENT AND PLAN:      1  Gastroesophageal reflux disease without esophagitis  - patient has hx of smoking for many years  His reflux symptoms has been going on for nearly 3 years  I would recommend to do an EGD to evaluate for Grimes's esophagus and rule out peptic ulcer disease   -continue PPI  2   Colon cancer screening:  - Patient was counseled on the benefits and risks of endoscopic intervention including but not limited to bleeding, infection, bowel perforation  Patient also understands colonoscopy is not 100% sensitive to detect polyps  Follow up after procedures  ______________________________________________________________________    HPI:      56-year-old male was referred by his primary care doctor for evaluation of acid reflux  Patient reported he had fracture of spine around 3 years ago and he was given ibuprofen for nearly 6 months  He had severe abdominal pain after the ibuprofen and was told to stop taking it  His symptoms of abdominal pain has improved after he stopped NSAIDs but he noticed to have significant acid reflux  He was given different pain medications including Lyrica and gabapentin but he reported gabapentin caused his severe symptoms of acid reflux  He reported feeling the whole esophagus is burning after he was given gabapentin  He also reported any kind of spice is including bridget and tumeric was triggering his symptoms  He has been a long-time smoker for more than 30 years  He denies family history of esophageal cancer  His weight has been stable  He never received an upper endoscopy  He has been compliant with PPI daily and reported his symptoms has been well controlled  However, if he eats any spices he still becomes symptomatic  Patient reported regular formed bowel movement daily    He never had a colonoscopy before  He has declined colonoscopy prior  REVIEW OF SYSTEMS:    CONSTITUTIONAL: Denies any fever, chills, rigors, and weight loss  HEENT: No earache or tinnitus  Denies hearing loss or visual disturbances  CARDIOVASCULAR: No chest pain or palpitations  RESPIRATORY: Denies any cough, hemoptysis, shortness of breath or dyspnea on exertion  GASTROINTESTINAL: As noted in the History of Present Illness  GENITOURINARY: No problems with urination  Denies any hematuria or dysuria  NEUROLOGIC: No dizziness or vertigo, denies headaches  MUSCULOSKELETAL: Denies any muscle or joint pain  SKIN: Denies skin rashes or itching  ENDOCRINE: Denies excessive thirst  Denies intolerance to heat or cold  PSYCHOSOCIAL: Denies depression or anxiety  Denies any recent memory loss         Historical Information   Past Medical History:   Diagnosis Date    Depression     GERD (gastroesophageal reflux disease)     Muscle weakness     Neuropathy     Osteoarthritis     Right foot drop      Past Surgical History:   Procedure Laterality Date    EPIDURAL BLOCK INJECTION      needle    NH HIP SCOPE/REMV BODY,PLASTY/RESECTN Left 4/30/2018    Procedure: LEFT HIP ARTHROSCOPIC LABRAL DEBRIDEMENT;  Surgeon: Charmaine Acosta MD;  Location: [facility];  Service: Orthopedics    TONSILLECTOMY      WISDOM TOOTH EXTRACTION       Social History   History   Alcohol Use No     History   Drug Use No     History   Smoking Status    Current Every Day Smoker    Packs/day: 0 25    Last attempt to quit: 12/21/2017   Smokeless Tobacco    Never Used     Family History   Problem Relation Age of Onset    Osteoarthritis Family     Other Mother      rheumatism       Meds/Allergies       Current Outpatient Prescriptions:     Ascorbic Acid (VITAMIN C) 1000 MG tablet    cholecalciferol (VITAMIN D3) 1,000 units tablet    cyanocobalamin (VITAMIN B-12) 100 mcg tablet    Flaxseed, Linseed, (FLAX SEED OIL PO)    multivitamin (Dami Grounds) TABS    omeprazole (PriLOSEC) 40 MG capsule    oxyCODONE-acetaminophen (PERCOCET) 5-325 mg per tablet    pyridoxine (B-6) 100 MG tablet    TRAMADOL HCL PO    Allergies   Allergen Reactions    Gabapentin GI Intolerance    Tramadol Confusion    Morphine Other (See Comments)     constipation           Objective     Blood pressure 118/70, pulse 95, temperature (!) 95 9 °F (35 5 °C), temperature source Tympanic Core, height 6' 3" (1 905 m), weight 87 5 kg (193 lb)  Body mass index is 24 12 kg/m²  PHYSICAL EXAM:      General Appearance:   Alert, cooperative, no distress   HEENT:   Normocephalic, atraumatic, anicteric      Neck:  Supple, symmetrical, trachea midline   Lungs:   Clear to auscultation bilaterally; no rales, rhonchi or wheezing; respirations unlabored    Heart[de-identified]   Regular rate and rhythm; no murmur, rub, or gallop  Abdomen:   Soft, non-tender, non-distended; normal bowel sounds; no masses, no organomegaly    Genitalia:   Deferred    Rectal:   Deferred    Extremities:  No cyanosis, clubbing or edema    Pulses:  2+ and symmetric    Skin:  No jaundice, rashes, or lesions    Lymph nodes:  No palpable cervical lymphadenopathy        Lab Results:   No visits with results within 1 Day(s) from this visit     Latest known visit with results is:   Consult on 04/12/2018   Component Date Value    Sodium 04/12/2018 141     Potassium 04/12/2018 4 5     Chloride 04/12/2018 110*    CO2 04/12/2018 25     Anion Gap 04/12/2018 6     BUN 04/12/2018 14     Creatinine 04/12/2018 0 81     Glucose 04/12/2018 91     Calcium 04/12/2018 9 3     eGFR 04/12/2018 95     WBC 04/12/2018 11 50*    RBC 04/12/2018 4 93     Hemoglobin 04/12/2018 15 9     Hematocrit 04/12/2018 45 6     MCV 04/12/2018 93     MCH 04/12/2018 32 3     MCHC 04/12/2018 34 9     RDW 04/12/2018 14 2     MPV 04/12/2018 10 4     Platelets 81/03/5810 354     nRBC 04/12/2018 0     Neutrophils Relative 04/12/2018 70     Lymphocytes Relative 04/12/2018 21     Monocytes Relative 04/12/2018 8     Eosinophils Relative 04/12/2018 1     Basophils Relative 04/12/2018 0     Neutrophils Absolute 04/12/2018 7 89*    Lymphocytes Absolute 04/12/2018 2 43     Monocytes Absolute 04/12/2018 0 95     Eosinophils Absolute 04/12/2018 0 16     Basophils Absolute 04/12/2018 0 05     PTT 04/12/2018 36*    Protime 04/12/2018 12 8     INR 04/12/2018 0 96          Radiology Results:   Xr Hip/pelv 1 Vw Left If Performed    Result Date: 5/4/2018  Narrative: C-ARM - LEFT HIP INDICATION: Left hip pain  Procedure guidance  COMPARISON:  10/19/2017 TECHNIQUE: FLUOROSCOPY TIME:   3 sec 2 FLUOROSCOPIC IMAGES FINDINGS: Fluoroscopic guidance provided for surgical procedure  Images depict the hip placed under traction with arthroscope positioned over the superior hip joint Osseous and soft tissue detail limited by technique  Impression: Fluoroscopic guidance provided for surgical procedure  Please refer to the separate procedure notes for additional details   Workstation performed: [id number]

## 2018-06-01 ENCOUNTER — OFFICE VISIT (OUTPATIENT)
Dept: PHYSICAL THERAPY | Facility: REHABILITATION | Age: 63
End: 2018-06-01
Payer: COMMERCIAL

## 2018-06-01 DIAGNOSIS — M24.152 DEGENERATIVE TEAR OF ACETABULAR LABRUM OF LEFT HIP: Primary | ICD-10-CM

## 2018-06-01 PROCEDURE — 97110 THERAPEUTIC EXERCISES: CPT | Performed by: PHYSICAL THERAPIST

## 2018-06-01 PROCEDURE — 97140 MANUAL THERAPY 1/> REGIONS: CPT | Performed by: PHYSICAL THERAPIST

## 2018-06-01 PROCEDURE — 97112 NEUROMUSCULAR REEDUCATION: CPT | Performed by: PHYSICAL THERAPIST

## 2018-06-01 NOTE — PROGRESS NOTES
Daily Note     Today's date: 2018  Patient name: Ramy Schofield  : 1955  MRN: [id number]  Referring provider: Liz Hazel MD  Dx:   Encounter Diagnosis     ICD-10-CM    1  Degenerative tear of acetabular labrum of left hip M16 12                   Subjective:  Patient reports that he is feeling okay  Objective: See treatment diary below    Precautions: Labral repair protocol, B/L peripheral neuropathy, L4-5 HNP, OA, Depression, TTWB    Daily Treatment Diary     Manual         PROM within protocol   10' Gentle flx & abd x x       Gentle STM Low back/ glutes x  x X & hip flx & ADD x       Manual adductor and hip flex stretch      contract/ relax                                     Exercise Diary             Bike      10'       Quad sets 10x 10s x 10"  2x10 x x x       glute sets Prone 10x 5s x 10"x10  x c/ hip ext  Isometric hip ABD/ADD 10x 10s each x 10"x10 X ADD with soccer ball ABD with red TB x 10x 10x c/ 4# ball and 30x abd c/ GR       Ankle pumps 20x x HEP          TA activation 10x 10s x 10"x10 x x 10x 10s       Prone quad stretch 3x 30s x 3x30"  x x       K -> C Right only 3x 30s  30"x3 x x        Heel slides   2x10 x X w hip flx release 20x       SAQ   5"  2x10 X lg  bolster 14x 20x D/c       Quadruped Rocking(no pain)   10"x10 x x 20x       Low bridges   2x10 x x 10x 10s       LAQ     10        SLR      Flex/ abd 2x10       Hip flexor stretch off table                                                                                               Modalities              estim + ice Ice only 10'    x                                    Gait training:      Assessment:  Patient demonstrates decreased posterior chain neuromuscular control c/ exercises  No pain with quadruped walking  Added SLR with flex and abd today, no pain  Tolerated treatment well  Patient would benefit from continued PT  Plan: Continue per plan of care

## 2018-06-05 ENCOUNTER — OFFICE VISIT (OUTPATIENT)
Dept: PHYSICAL THERAPY | Facility: REHABILITATION | Age: 63
End: 2018-06-05
Payer: COMMERCIAL

## 2018-06-05 ENCOUNTER — OFFICE VISIT (OUTPATIENT)
Dept: PAIN MEDICINE | Facility: CLINIC | Age: 63
End: 2018-06-05
Payer: OTHER MISCELLANEOUS

## 2018-06-05 VITALS
WEIGHT: 194 LBS | HEIGHT: 75 IN | HEART RATE: 96 BPM | BODY MASS INDEX: 24.12 KG/M2 | DIASTOLIC BLOOD PRESSURE: 80 MMHG | SYSTOLIC BLOOD PRESSURE: 110 MMHG

## 2018-06-05 DIAGNOSIS — M54.16 LUMBAR RADICULOPATHY: Primary | ICD-10-CM

## 2018-06-05 DIAGNOSIS — M24.152 DEGENERATIVE TEAR OF ACETABULAR LABRUM OF LEFT HIP: ICD-10-CM

## 2018-06-05 DIAGNOSIS — M24.152 DEGENERATIVE TEAR OF ACETABULAR LABRUM OF LEFT HIP: Primary | ICD-10-CM

## 2018-06-05 DIAGNOSIS — M25.552 LEFT HIP PAIN: ICD-10-CM
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
## 2018-06-05 DIAGNOSIS — M21.371 FOOT DROP, RIGHT FOOT: ICD-10-CM

## 2018-06-05 PROCEDURE — 97110 THERAPEUTIC EXERCISES: CPT

## 2018-06-05 PROCEDURE — 99214 OFFICE O/P EST MOD 30 MIN: CPT | Performed by: PHYSICAL MEDICINE & REHABILITATION

## 2018-06-05 NOTE — LETTER
June 5, 2018     Patient: Lamar Ramirez   YOB: 1955   Date of Visit: 6/5/2018       To Whom it May Concern:    Manolo Alamo is under my professional care  He was seen in my office on 6/5/2018  He is unable to work in any capacity at this time pending further treatment  If you have any questions or concerns, please don't hesitate to call           Sincerely,          Ninoska Ayala DO        CC: No Recipients

## 2018-06-05 NOTE — PROGRESS NOTES
Daily Note     Today's date: 2018  Patient name: Jose Ag  : 1955  MRN: [id number]  Referring provider: Ana Cleveland MD  Dx:   Encounter Diagnosis     ICD-10-CM    1  Degenerative tear of acetabular labrum of left hip M16 12    2  Left hip pain M25 552                   Subjective: Pt reports minimal pain in L hip  He states he has also been doing exercises at home for his lower back  Objective: See treatment diary below    Precautions: Labral repair protocol, B/L peripheral neuropathy, L4-5 HNP, OA, Depression, TTWB    Daily Treatment Diary     Manual        PROM within protocol   10' Gentle flx & abd x x Flex & Abd      Gentle STM Low back/ glutes x  x X & hip flx & ADD x x      Manual adductor and hip flex stretch      contract/ relax                                     Exercise Diary             Bike      10' 10'      Quad sets 10x 10s x 10"  2x10 x x x 10sec  10x      glute sets Prone 10x 5s x 10"x10  x c/ hip ext  10sec  10x      Isometric hip ABD/ADD 10x 10s each x 10"x10 X ADD with soccer ball ABD with red TB x 10x 10x c/ 4# ball and 30x abd c/ GR 12x  c/ 4# ball  And 30x c  Green  tband      Ankle pumps 20x x HEP          TA activation 10x 10s x 10"x10 x x 10x 10s 10x  10s      Prone quad stretch 3x 30s x 3x30"  x x       K -> C Right only 3x 30s  30"x3 x x        Heel slides   2x10 x X w hip flx release 20x 20x      SAQ   5"  2x10 X lg  bolster 14x 20x D/c       Quadruped Rocking(no pain)   10"x10 x x 20x       Low bridges   2x10 x x 10x 10s 10x  10s      LAQ     10        SLR      Flex/ abd 2x10 Flex/abd  2x10      Hip flexor stretch off table                                                                                               Modalities              estim + ice Ice only 10'    x                                          Assessment: Tolerated treatment well   Patient demonstrated fatigue post treatment, exhibited good technique with therapeutic exercises and would benefit from continued PT  During heel slides pt performed 20x, however at 15 he experienced slight discomfort in groin but was able to finish  Pt c/o low back pain with some exercises, however he is able to complete  Plan: Continue per plan of care  Progress treatment as tolerated

## 2018-06-05 NOTE — PROGRESS NOTES
Assessment/Plan:      Diagnoses and all orders for this visit:    Lumbar radiculopathy - Right    Work related injury    Degenerative tear of acetabular labrum of left hip        Discussion:  Podiatry consultation was suggested by Dr Valeria Leyva but not schedule patient wishes to pursue this  Referral sent today for Dr Neyda Nick  He is to continue working with psoas and Dr Valeria Leyva regarding his brace needs  We will keep him out of work at this time entirely, he is advised to follow with Pain Medicine for further pain management options  Subjective:     Patient ID: Ramy Schofield is a 60 y o  male  HPI fu for chronic lumbar radiculopathy aith dorsiflexor weakness  Had left hip arthroscopic surgery April 30th, (comp did not accept)  He is on 20 # restriction  C/O tightness of left groin and HS muscles  Has been in car less and RLE Sx are less  Boas still adjusting brace, and sock / shoe mods pending due to burning pain in plantar aspect of right foot  Had been seen mike Simpson for brace clinic  Has PM follow up  tomorrow  Asking what can be done for pain  Review of Systems   Gastrointestinal: Negative  Genitourinary: Negative  Neurological: Positive for numbness (Posterior left foot anterior medial right foot)  Objective:     Physical Exam   Musculoskeletal:   Ambulating with axillary crutches  Neurological:   Reflex Scores:       Brachioradialis reflexes are 2+ on the right side and 2+ on the left side  Patellar reflexes are 2+ on the right side and 2+ on the left side  Achilles reflexes are 2+ on the right side and 2+ on the left side

## 2018-06-06 ENCOUNTER — OFFICE VISIT (OUTPATIENT)
Dept: PAIN MEDICINE | Facility: MEDICAL CENTER | Age: 63
End: 2018-06-06
Payer: OTHER MISCELLANEOUS

## 2018-06-06 VITALS — DIASTOLIC BLOOD PRESSURE: 70 MMHG | WEIGHT: 196 LBS | BODY MASS INDEX: 24.5 KG/M2 | SYSTOLIC BLOOD PRESSURE: 108 MMHG

## 2018-06-06 DIAGNOSIS — M54.16 LUMBAR RADICULOPATHY: Primary | ICD-10-CM

## 2018-06-06 DIAGNOSIS — M25.552 LEFT HIP PAIN: ICD-10-CM

## 2018-06-06 DIAGNOSIS — S73.192S TEAR OF ACETABULAR LABRUM, LEFT, SEQUELA: ICD-10-CM

## 2018-06-06 PROCEDURE — 99214 OFFICE O/P EST MOD 30 MIN: CPT | Performed by: NURSE PRACTITIONER

## 2018-06-06 RX ORDER — TRAMADOL HYDROCHLORIDE 50 MG/1
50 TABLET ORAL EVERY 8 HOURS PRN
Qty: 45 TABLET | Refills: 1 | Status: SHIPPED | OUTPATIENT
Start: 2018-06-06 | End: 2018-10-08 | Stop reason: ALTCHOICE

## 2018-06-06 RX ORDER — PREGABALIN 75 MG/1
75 CAPSULE ORAL 3 TIMES DAILY
Qty: 90 CAPSULE | Refills: 1 | Status: SHIPPED | OUTPATIENT
Start: 2018-06-06 | End: 2018-06-20 | Stop reason: ALTCHOICE

## 2018-06-06 NOTE — PROGRESS NOTES
Pt is c/o lower back pain  Assessment:  1  Lumbar radiculopathy - Right    2  Tear of acetabular labrum, left, sequela    3  Left hip pain        Plan:  60 y o  male who presents for a follow up office visit in regards to Back Pain  Patient was last seen in the office by Dr Lillian Delacruz on March 27, 2018 The patients current symptoms include left hip pain status post left acetabulum labral repair with Dr Krunal Hanks on April 30, 2018 and right lower extremity neuropathy  In regards to his medication, we will increase his Lyrica 75 mg to 3 times daily to help alleviate his neuropathic pain and refill his tramadol 50 mg 3 times daily as needed for pain  Patient was provided literature on the spinal cord stimulator trial per Dr Estrella Huertas suggestion  We did have a conversation regarding the process  We will revisit this topic at next office visit  Patient will follow up in 8 weeks for reevaluation and medication refills or sooner if needed  There are risks associated with opioid medications, including dependence, addiction and tolerance  The patient understands and agrees to use these medications only as prescribed  Potential side effects of the medications include, but are not limited to, constipation, drowsiness, addiction, impaired judgment and risk of fatal overdose if not taken as prescribed  The patient was warned against driving while taking sedation medications  Sharing medications is a felony  At this point in time, the patient is showing no signs of addiction, abuse, diversion or suicidal ideation  [place] Prescription Drug Monitoring Program report was reviewed and was appropriate   History of Present Illness: The patient is a 60 y o  male who presents for a follow up office visit in regards to Back Pain   Patient was last seen in the office by Dr Lillian Delacruz on March 27, 2018 The patients current symptoms include left hip pain status post left acetabulum labral repair with Dr Krunal Hanks on April 30, 2018 and right lower extremity neuropathy  He states he has been taking Lyrica 75 mg b i d , however has been having trouble obtaining this medication stating the insurance has not been improving it  He also states that Dr Ghada Mejia had mentioned a spinal cord stimulator in his office visit for his peripheral neuropathy and had instructed the patient to inquire at this office visit  He currently rates his pain a 1-7/10 on the numeric pain rating scale  He states the pain is intermittent and describes this pain as dull aching, sharp, throbbing, shooting, numbness and pins and needles  He states the pain is made worse with walking long distances and standing and is alleviated by rest and ice  He has been partaking in physical therapy after his hip surgery  He has a referral to follow up with Dr Tia Rosado regarding his brace needs per Dr Ghada Mejia  Current pain medications includes:  Lyrica 75 mg b i d  and tramadol 50 mg t i d  p r n  Pain #45 tablets  The patient reports that this regimen is providing 40-60% pain relief  The patient is reporting no side effects from this pain medication regimen  I have personally reviewed and/or updated the patient's past medical history, past surgical history, family history, social history, current medications, allergies, and vital signs today  Review of Systems  Review of Systems   Respiratory: Negative for shortness of breath  Cardiovascular: Negative for chest pain  Gastrointestinal: Negative for constipation, diarrhea, nausea and vomiting  Musculoskeletal: Negative for arthralgias, gait problem, joint swelling and myalgias  Difficulty walking   Skin: Negative for rash  Neurological: Negative for dizziness, seizures and weakness  All other systems reviewed and are negative          Past Medical History:   Diagnosis Date    Depression     GERD (gastroesophageal reflux disease)     Muscle weakness     Neuropathy     Osteoarthritis     Right foot drop        Past Surgical History:   Procedure Laterality Date    EPIDURAL BLOCK INJECTION      needle    MN HIP SCOPE/REMV BODY,PLASTY/RESECTN Left 4/30/2018    Procedure: LEFT HIP ARTHROSCOPIC LABRAL DEBRIDEMENT;  Surgeon: Víctor Lewis MD;  Location: [facility];  Service: Orthopedics    TONSILLECTOMY      WISDOM TOOTH EXTRACTION         Family History   Problem Relation Age of Onset    Osteoarthritis Family     Other Mother      rheumatism       Social History     Occupational History    Not on file       Social History Main Topics    Smoking status: Current Every Day Smoker     Packs/day: 0 25     Last attempt to quit: 12/21/2017    Smokeless tobacco: Never Used    Alcohol use No    Drug use: No    Sexual activity: Not on file         Current Outpatient Prescriptions:     Ascorbic Acid (VITAMIN C) 1000 MG tablet, Take 1,000 mg by mouth daily, Disp: , Rfl:     cholecalciferol (VITAMIN D3) 1,000 units tablet, Take 2,000 Units by mouth daily  , Disp: , Rfl:     cyanocobalamin (VITAMIN B-12) 100 mcg tablet, Take by mouth daily, Disp: , Rfl:     Flaxseed, Linseed, (FLAX SEED OIL PO), Take by mouth, Disp: , Rfl:     multivitamin (THERAGRAN) TABS, Take 1 tablet by mouth daily, Disp: , Rfl:     omeprazole (PriLOSEC) 40 MG capsule, Take 1 capsule (40 mg total) by mouth daily, Disp: 30 capsule, Rfl: 3    oxyCODONE-acetaminophen (PERCOCET) 5-325 mg per tablet, Take 1-2 tablets by mouth every 4-6 hours as needed for pain , Disp: 40 tablet, Rfl: 0    pyridoxine (B-6) 100 MG tablet, Take 100 mg by mouth daily, Disp: , Rfl:     TRAMADOL HCL PO, Take by mouth, Disp: , Rfl:     Na Sulfate-K Sulfate-Mg Sulf (SUPREP BOWEL PREP KIT) 17 5-3 13-1 6 GM/180ML SOLN, Take 1 Bottle by mouth once for 1 dose, Disp: 1 Bottle, Rfl: 0    Allergies   Allergen Reactions    Gabapentin GI Intolerance    Tramadol Confusion    Morphine Other (See Comments)     constipation       Physical Exam:    /70   Wt 88 9 kg (196 lb)   BMI 24 50 kg/m²     Constitutional:normal, well developed, well nourished, alert, in no distress and non-toxic and no overt pain behavior  Eyes:anicteric  HEENT:grossly intact  Neck:supple, symmetric, trachea midline and no masses   Pulmonary:even and unlabored  Cardiovascular:No edema or pitting edema present  Skin:Normal without rashes or lesions and well hydrated  Psychiatric:Mood and affect appropriate  Neurologic:Cranial Nerves II-XII grossly intact  Musculoskeletal:antalgic with the use of crutches  Imaging  No orders to display       No orders of the defined types were placed in this encounter

## 2018-06-07 ENCOUNTER — OFFICE VISIT (OUTPATIENT)
Dept: PHYSICAL THERAPY | Facility: REHABILITATION | Age: 63
End: 2018-06-07
Payer: COMMERCIAL

## 2018-06-07 DIAGNOSIS — M24.152 DEGENERATIVE TEAR OF ACETABULAR LABRUM OF LEFT HIP: Primary | ICD-10-CM

## 2018-06-07 DIAGNOSIS — M25.552 LEFT HIP PAIN: ICD-10-CM

## 2018-06-07 PROCEDURE — 97110 THERAPEUTIC EXERCISES: CPT

## 2018-06-07 NOTE — PROGRESS NOTES
Daily Note     Today's date: 2018  Patient name: Fred Weaver  : 1955  MRN: [id number]  Referring provider: Marylee Spence, MD  Dx:   Encounter Diagnosis     ICD-10-CM    1  Degenerative tear of acetabular labrum of left hip M16 12    2  Left hip pain M25 552                   Subjective: Pt reports his LB is still painful but hip is feeling pretty good      Objective: See treatment diary below    Precautions: Labral repair protocol, B/L peripheral neuropathy, L4-5 HNP, OA, Depression, TTWB    Daily Treatment Diary     Manual   6-7     PROM within protocol   10' Gentle flx & abd x x Flex & Abd x     Gentle STM Low back/ glutes x  x X & hip flx & ADD x x      Manual adductor and hip flex stretch      contract/ relax  x                                   Exercise Diary             Bike      10' 10' x     Quad sets 10x 10s x 10"  2x10 x x x 10sec  10x x     glute sets Prone 10x 5s x 10"x10  x c/ hip ext  10sec  10x x     Isometric hip ABD/ADD 10x 10s each x 10"x10 X ADD with soccer ball ABD with red TB x 10x 10x c/ 4# ball and 30x abd c/ GR 12x  c/ 4# ball  And 30x c  Green  tband 10x10 & 30 TB       Ankle pumps 20x x HEP          TA activation 10x 10s x 10"x10 x x 10x 10s 10x  10s x     Prone quad stretch 3x 30s x 3x30"  x x  x     K -> C Right only 3x 30s  30"x3 x x   L 30"x3     Heel slides   2x10 x X w hip flx release 20x 20x x     SAQ   5"  2x10 X lg  bolster 14x 20x D/c       Quadruped Rocking(no pain)   10"x10 x x 20x  20     Low bridges   2x10 x x 10x 10s 10x  10s x     LAQ     10   20x     SLR      Flex/ abd 2x10 Flex/abd  2x10 x     Hip flexor stretch off table             Supine fig 4 str B        30"x3                                                                          Modalities              estim + ice Ice only 10'    x                                          Assessment:  Progressed per protocol     He remains tight in hip flexors & is very tight in paraspinals & L glutes, piriformis   He will see MD tomorrow      Plan: Continue per plan of care  Progress treatment as tolerated

## 2018-06-08 ENCOUNTER — OFFICE VISIT (OUTPATIENT)
Dept: OBGYN CLINIC | Facility: CLINIC | Age: 63
End: 2018-06-08

## 2018-06-08 VITALS
HEIGHT: 75 IN | SYSTOLIC BLOOD PRESSURE: 103 MMHG | HEART RATE: 97 BPM | DIASTOLIC BLOOD PRESSURE: 71 MMHG | WEIGHT: 196 LBS | BODY MASS INDEX: 24.37 KG/M2

## 2018-06-08 DIAGNOSIS — M24.152 DEGENERATIVE TEAR OF ACETABULAR LABRUM OF LEFT HIP: Primary | ICD-10-CM

## 2018-06-08 DIAGNOSIS — M16.12 PRIMARY OSTEOARTHRITIS OF LEFT HIP: Chronic | ICD-10-CM

## 2018-06-08 PROCEDURE — 99024 POSTOP FOLLOW-UP VISIT: CPT | Performed by: ORTHOPAEDIC SURGERY

## 2018-06-08 NOTE — PROGRESS NOTES
Patient Name:  Migel Nugent  MRN:  [id number]    Assessment & Plan    Left hip arthroscopic labral debridement 4/30/18  1  Weightbearing as tolerated left lower extremity  2  Wean from crutches as tolerated  3  Continue physical therapy  4  Gradually return to normal activities as tolerated  5  Follow-up in six weeks      Subjective    69-year-old male returns to the office today status post Left hip arthroscopic labral debridement 4/30/18  Today he notes overall improvement with regards to his hip  He still notes anterior hip pain  He ambulates with crutches  He denies numbness and tingling  He is participating in physical therapy  No fevers or chills  Objective    /71   Pulse 97   Ht 6' 3" (1 905 m)   Wt 88 9 kg (196 lb)   BMI 24 50 kg/m²     Left hip:  No gross deformity  No tenderness to palpation greater trochanter  Minimal tenderness to palpation anterior hip  Hip range of motion is intact and nearly full in all planes with minimal discomfort with internal rotation  Mildly positive FADDIR test   Negative LOBO test   Negative straight leg raise  Mildly positive resisted straight leg raise test   Negative logroll test   Sensation intact left lower extremity        Scribe Attestation    I,:   Lavonne Freeman PA-C am acting as a scribe while in the presence of the attending physician :        I,:   Jamaal Sigala MD personally performed the services described in this documentation    as scribed in my presence :

## 2018-06-11 ENCOUNTER — OFFICE VISIT (OUTPATIENT)
Dept: PHYSICAL THERAPY | Facility: REHABILITATION | Age: 63
End: 2018-06-11
Payer: COMMERCIAL

## 2018-06-11 ENCOUNTER — TELEPHONE (OUTPATIENT)
Dept: RADIOLOGY | Facility: MEDICAL CENTER | Age: 63
End: 2018-06-11

## 2018-06-11 ENCOUNTER — TELEPHONE (OUTPATIENT)
Dept: PAIN MEDICINE | Facility: MEDICAL CENTER | Age: 63
End: 2018-06-11

## 2018-06-11 DIAGNOSIS — M25.552 LEFT HIP PAIN: ICD-10-CM

## 2018-06-11 DIAGNOSIS — M24.152 DEGENERATIVE TEAR OF ACETABULAR LABRUM OF LEFT HIP: Primary | ICD-10-CM

## 2018-06-11 PROCEDURE — 97110 THERAPEUTIC EXERCISES: CPT

## 2018-06-11 NOTE — TELEPHONE ENCOUNTER
----- Message from [address] sent at 6/11/2018  8:31 AM EDT -----  Regarding: lyrica  Patient left a message stating that his lyrica was not at the pharmacy and I saw him last Wednesday   Please check to see if a PA has been started

## 2018-06-11 NOTE — PROGRESS NOTES
Daily Note     Today's date: 2018  Patient name: Valarie Ibarra  : 1955  MRN: [id number]  Referring provider: Mg Browne MD  Dx:   Encounter Diagnosis     ICD-10-CM    1  Degenerative tear of acetabular labrum of left hip M16 12    2  Left hip pain M25 552                   Subjective: Pt reports TENS is effective at reducing pain  He saw MD & is WBAT now      Objective: See treatment diary below    Precautions: Labral repair protocol, B/L peripheral neuropathy, L4-5 HNP, OA, Depression, TTWB    Daily Treatment Diary     Manual   6-7 6-    PROM within protocol   10' Gentle flx & abd x x Flex & Abd x & IR ER    Gentle STM Low back/ glutes x  x X & hip flx & ADD x x  x    Manual adductor and hip flex stretch      contract/ relax  x x                                  Exercise Diary             Bike      10' 10' x x    Quad sets 10x 10s x 10"  2x10 x x x 10sec  10x x D/C    glute sets Prone 10x 5s x 10"x10  x c/ hip ext   10sec  10x x X stand hip ext 10x    Isometric hip ABD/ADD 10x 10s each x 10"x10 X ADD with soccer ball ABD with red TB x 10x 10x c/ 4# ball and 30x abd c/ GR 12x  c/ 4# ball  And 30x c  Green  tband 10x10 & 30 TB   x    Ankle pumps 20x x HEP          TA activation 10x 10s x 10"x10 x x 10x 10s 10x  10s x x    Prone quad stretch 3x 30s x 3x30"  x x  x x    K -> C Right only 3x 30s  30"x3 x x   L 30"x3 B x    Heel slides   2x10 x X w hip flx release 20x 20x x x    SAQ   5"  2x10 X lg  bolster 14x 20x D/c       Quadruped Rocking(no pain)   10"x10 x x 20x  20 x    Low bridges   2x10 x x 10x 10s 10x  10s x x    LAQ     10   20x x    SLR      Flex/ abd 2x10 Flex/abd  2x10 x x    Hip flexor stretch off table             Supine fig 4 str B        30"x3 x    HR         20    Stand     minisquat         20                                  Modalities              estim + ice Ice only 10'    x Assessment:  Had some N/T R LE after squats, this resolved after about 10'  Not quite as tight in paraspinals today or hip flexors  Tried stand hip abd but this was painful      Plan: Continue per plan of care  Progress treatment as tolerated

## 2018-06-11 NOTE — TELEPHONE ENCOUNTER
Edith Yañez from [facility] called stating that pt's Lyrica needs a pre auth  Pt takes 75mg 3x a day   You may call the pharmacy at [phone]

## 2018-06-12 ENCOUNTER — TELEPHONE (OUTPATIENT)
Dept: PAIN MEDICINE | Facility: CLINIC | Age: 63
End: 2018-06-12

## 2018-06-12 ENCOUNTER — TELEPHONE (OUTPATIENT)
Dept: RADIOLOGY | Facility: MEDICAL CENTER | Age: 63
End: 2018-06-12

## 2018-06-12 ENCOUNTER — TELEPHONE (OUTPATIENT)
Dept: PAIN MEDICINE | Facility: MEDICAL CENTER | Age: 63
End: 2018-06-12

## 2018-06-12 NOTE — TELEPHONE ENCOUNTER
----- Message from Tianna Russell sent at 6/11/2018  9:04 PM EDT -----  Regarding: Prescription Question  Contact: [phone]  Greetings,     [facility] called me today  They tried to contact you concerning lyrica approval       There # [phone]    Please check with the physical therapist!  My lower back muscles are very tight  Spent 45 min  on the computer, including the message to you, I paid the price  Lower back hurt real bad, requiring use of 2 oxy       Elsy Olegario

## 2018-06-12 NOTE — TELEPHONE ENCOUNTER
Spoke with Seth Gonzales from [employer] stated that Lorena Pulliam was denied went to appeal this decision and yifan stated that pt needed a letter of medical necessity  Pt has gabapentin listed as one of his allergies  Fax number for letter is [phone]

## 2018-06-12 NOTE — TELEPHONE ENCOUNTER
RN s/w pt and informed him that his Lyrica PA was denied per previous task  Pt aware that AO is not in the office until 6/13 and that she was sent the information and will address same with a letter of necessity as requested  Pt then stated that there ws another issue that he wanted addressed regarding his excessively tight low back muscles  Per pt at his last PT visit that the massage was very painful due to his tight low back muscles  Per pt he spoke with Dr Gilles Huizar and Dr Barbara Minaya  RN asked what his PT suggested  +++  Per pt he would like AO to speak with his physical therapist and all be on the same team to come up with a plan of care for his pain  +++  RN advised that she would send this to AO to review and we would get back to pt with her suggestions for same      --please advise on above thank you--

## 2018-06-13 NOTE — TELEPHONE ENCOUNTER
He would have to pay out of pocket then for the lyrica or try to apply for [employer] pathways because the authorization was denied if he does not want to try the nortriptyline

## 2018-06-13 NOTE — TELEPHONE ENCOUNTER
RN spoke with pt  He stated he is having surgery on 6/29 to address abdominal pain that he is experiencing from the side effects of Gabapentin  He states Lyrica has worked for him and he does not want to try Nortriptyline due to potential side effects  He also stated that if he is forced to try another drug and experiences side effects that worker's comp is going to pay for everything including the surgery he is having on 6/29      Please advise-

## 2018-06-13 NOTE — TELEPHONE ENCOUNTER
Please check to see if patient has tried gabapentin  If so and it did not work or he can not tolerate   Please offer Nortriptyline 25mg HS

## 2018-06-13 NOTE — TELEPHONE ENCOUNTER
Pt called and left a message on ortho phone line asking to speak with [facility]  Pt stated he had a conversation with Jason Pringle and he has been approved  He will be in tomorrow with the paperwork and would like to know if he needs a self addressed stamped envelope  Please return call to [phone]   Thanks

## 2018-06-13 NOTE — TELEPHONE ENCOUNTER
RN spoke with pt  He stated Dr Eileen Rojas ordered his PT, informed pt that the physician that ordered the PT with need to speak to the therapist  Pt verbalized understanding and will contact Dr Eileen Rojas

## 2018-06-13 NOTE — TELEPHONE ENCOUNTER
RN s/w pt and informed him that he needs to have the paperwork filled out completely when he drops it off tomorrow and that it will be viewed on Monday when AO returns  He was also informed that the approval process could take up to 6 weeks  Pt was very appreciative of the call and help provided

## 2018-06-13 NOTE — TELEPHONE ENCOUNTER
RN spoke with pt and assisted him with finding information on contacting [facility] medication access   [employer]  Pt plans to follow up today  Pt verbalized understanding of need to contact Jason Pringle or will need to pay out of pocket for Lyrica

## 2018-06-14 ENCOUNTER — OFFICE VISIT (OUTPATIENT)
Dept: PHYSICAL THERAPY | Facility: REHABILITATION | Age: 63
End: 2018-06-14
Payer: COMMERCIAL

## 2018-06-14 DIAGNOSIS — M24.152 DEGENERATIVE TEAR OF ACETABULAR LABRUM OF LEFT HIP: Primary | ICD-10-CM

## 2018-06-14 DIAGNOSIS — M25.552 LEFT HIP PAIN: ICD-10-CM

## 2018-06-14 PROCEDURE — 97112 NEUROMUSCULAR REEDUCATION: CPT | Performed by: PHYSICAL THERAPIST

## 2018-06-14 PROCEDURE — 97110 THERAPEUTIC EXERCISES: CPT | Performed by: PHYSICAL THERAPIST

## 2018-06-14 PROCEDURE — 97140 MANUAL THERAPY 1/> REGIONS: CPT | Performed by: PHYSICAL THERAPIST

## 2018-06-14 NOTE — PROGRESS NOTES
Daily Note     Today's date: 2018  Patient name: Marylee Pearson  : 1955  MRN: [id number]  Referring provider: Jeanne Howard MD  Dx:   Encounter Diagnosis     ICD-10-CM    1  Degenerative tear of acetabular labrum of left hip M16 12    2  Left hip pain M25 552                   Subjective: Tries to stretch up and out in the morning and it is making his back sore  Objective: See treatment diary below    Precautions: Labral repair protocol, B/L peripheral neuropathy, L4-5 HNP, OA, Depression, TTWB    Daily Treatment Diary     Manual   5 5- 6-7 6- 6-14   PROM within protocol   10' Gentle flx & abd x x Flex & Abd x & IR ER x   Gentle STM Low back/ glutes x  x X & hip flx & ADD x x  x x   Manual adductor and hip flex stretch      contract/ relax  x x x                                 Exercise Diary             Bike      10' 10' x x x   Quad sets 10x 10s x 10"  2x10 x x x 10sec  10x x D/C    glute sets Prone 10x 5s x 10"x10  x c/ hip ext   10sec  10x x X stand hip ext 10x x   Isometric hip ABD/ADD 10x 10s each x 10"x10 X ADD with soccer ball ABD with red TB x 10x 10x c/ 4# ball and 30x abd c/ GR 12x  c/ 4# ball  And 30x c  Green  tband 10x10 & 30 TB   x x   Ankle pumps 20x x HEP          TA activation 10x 10s x 10"x10 x x 10x 10s 10x  10s x x x   Prone quad stretch 3x 30s x 3x30"  x x  x x x   K -> C Right only 3x 30s  30"x3 x x   L 30"x3 B x x   Heel slides   2x10 x X w hip flx release 20x 20x x x x   SAQ   5"  2x10 X lg  bolster 14x 20x D/c       Quadruped Rocking(no pain)   10"x10 x x 20x  20 x x   Low bridges   2x10 x x 10x 10s 10x  10s x x x   LAQ     10   20x x 30   SLR      Flex/ abd 2x10 Flex/abd  2x10 x x x   Hip flexor stretch off table             Supine fig 4 str B        30"x3 x x   HR         20 x   Stand march         20 x   minisquat         20 x   Step up           2x6                    Modalities              estim + ice Ice only 10'    x Assessment:  Reported twinges in his hip flexors during step ups and britton  Felt his muscles in his low back after treatment  Will use TENS at home  Plan: Continue per plan of care  Progress treatment as tolerated

## 2018-06-18 ENCOUNTER — TELEPHONE (OUTPATIENT)
Dept: RADIOLOGY | Facility: MEDICAL CENTER | Age: 63
End: 2018-06-18

## 2018-06-18 ENCOUNTER — OFFICE VISIT (OUTPATIENT)
Dept: PHYSICAL THERAPY | Facility: REHABILITATION | Age: 63
End: 2018-06-18
Payer: COMMERCIAL

## 2018-06-18 DIAGNOSIS — M24.152 DEGENERATIVE TEAR OF ACETABULAR LABRUM OF LEFT HIP: Primary | ICD-10-CM

## 2018-06-18 DIAGNOSIS — M25.552 LEFT HIP PAIN: ICD-10-CM

## 2018-06-18 PROCEDURE — 97110 THERAPEUTIC EXERCISES: CPT

## 2018-06-18 NOTE — PROGRESS NOTES
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 M16 12    2  Left hip pain M25 552                   Subjective:   Not too bad as long as he paces activity  Objective: See treatment diary below    Precautions: Labral repair protocol, B/L peripheral neuropathy, L4-5 HNP, OA, Depression, TTWB    Daily Treatment Diary     Manual   6-7 6- 6-   PROM within protocol   10' Gentle flx & abd x x Flex & Abd x & IR ER x   Gentle STM Low back/ glutes x  x X & hip flx & ADD x x  x x   Manual adductor and hip flex stretch      contract/ relax  x x x                                 Exercise Diary             Bike      10' 10' x x x   Quad sets 10x 10s x 10"  2x10 x x x 10sec  10x x D/C    glute sets Prone 10x 5s x 10"x10  x c/ hip ext   10sec  10x x X stand hip ext 10x x   Isometric hip ABD/ADD 10x 10s each x 10"x10 X ADD with soccer ball ABD with red TB x 10x 10x c/ 4# ball and 30x abd c/ GR 12x  c/ 4# ball  And 30x c  Green  tband 10x10 & 30 TB   x x   Ankle pumps 20x x HEP          TA activation 10x 10s x 10"x10 x x 10x 10s 10x  10s x x x   Prone quad stretch 3x 30s x 3x30"  x x  x x x   K -> C Right only 3x 30s  30"x3 x x   L 30"x3 B x x   Heel slides   2x10 x X w hip flx release 20x 20x x x x   SAQ   5"  2x10 X lg  bolster 14x 20x D/c       Quadruped Rocking(no pain)   10"x10 x x 20x  20 x x   Low bridges   2x10 x x 10x 10s 10x  10s x x x   LAQ     10   20x x 30   SLR      Flex/ abd 2x10 Flex/abd  2x10 x x x   Hip flexor stretch off table             Supine fig 4 str B        30"x3 x x   HR         20 x   Stand march         20 x   minisquat         20 x   Step up           2x6                    Modalities              estim + ice Ice only 10'    x Daily Treatment Diary     Manual  6-18            STM  Hip flx,add & LB x            PROM w man str x            Hip flx release x                                          Exercise Diary              bike 10'            HR 20            squat 20            Step up 6" 10R x1 & 7 L x2            Lat step up 6"            Stand hip abd 10            march 20            Stand hip ext 20            SLS                          Quad LE 10                                                                                                                                     Modalities                                                     X=same as last time          Assessment:  6/10 after ex but 1-2/10 after STM  He will put TENS on when he gets home   Not as tight  D/C some ex to HEP      Plan: Continue per plan of care  Progress treatment as tolerated

## 2018-06-18 NOTE — TELEPHONE ENCOUNTER
Rn s/w pharmacist from [facility] who stated that all information was submitted under workers comp, now they are just waiting to hear  RN appreciative of information and will wait to hear from pt if an issue

## 2018-06-18 NOTE — TELEPHONE ENCOUNTER
----- Message from [address] sent at 6/18/2018  2:17 PM EDT -----  Regarding: lyrica  Please have the lyrica re-run under the worker's comp and take it from there

## 2018-06-21 ENCOUNTER — OFFICE VISIT (OUTPATIENT)
Dept: PHYSICAL THERAPY | Facility: REHABILITATION | Age: 63
End: 2018-06-21
Payer: COMMERCIAL

## 2018-06-21 DIAGNOSIS — M24.152 DEGENERATIVE TEAR OF ACETABULAR LABRUM OF LEFT HIP: Primary | ICD-10-CM

## 2018-06-21 PROCEDURE — 97112 NEUROMUSCULAR REEDUCATION: CPT | Performed by: PHYSICAL THERAPIST

## 2018-06-21 NOTE — PROGRESS NOTES
Daily Note     Today's date: 2018  Patient name: Pari Love  : 1955  MRN: [id number]  Referring provider: Beatrice Smith MD  Dx:   No diagnosis found  Subjective:   Patient reports that he hurt his back yesterday as he was getting out of bed and may have to take it slow today  Objective: See treatment diary below    Precautions: Labral repair protocol, B/L peripheral neuropathy, L4-5 HNP, OA, Depression, TTWB    Daily Treatment Diary     Manual   6- 6-   PROM within protocol   10' Gentle flx & abd x x Flex & Abd x & IR ER x   Gentle STM Low back/ glutes x  x X & hip flx & ADD x x  x x   Manual adductor and hip flex stretch      contract/ relax  x x x                                 Exercise Diary             Bike      10' 10' x x x   Quad sets 10x 10s x 10"  2x10 x x x 10sec  10x x D/C    glute sets Prone 10x 5s x 10"x10  x c/ hip ext   10sec  10x x X stand hip ext 10x x   Isometric hip ABD/ADD 10x 10s each x 10"x10 X ADD with soccer ball ABD with red TB x 10x 10x c/ 4# ball and 30x abd c/ GR 12x  c/ 4# ball  And 30x c  Green  tband 10x10 & 30 TB   x x   Ankle pumps 20x x HEP          TA activation 10x 10s x 10"x10 x x 10x 10s 10x  10s x x x   Prone quad stretch 3x 30s x 3x30"  x x  x x x   K -> C Right only 3x 30s  30"x3 x x   L 30"x3 B x x   Heel slides   2x10 x X w hip flx release 20x 20x x x x   SAQ   5"  2x10 X lg  bolster 14x 20x D/c       Quadruped Rocking(no pain)   10"x10 x x 20x  20 x x   Low bridges   2x10 x x 10x 10s 10x  10s x x x   LAQ     10   20x x 30   SLR      Flex/ abd 2x10 Flex/abd  2x10 x x x   Hip flexor stretch off table             Supine fig 4 str B        30"x3 x x   HR         20 x   Stand march         20 x   minisquat         20 x   Step up           2x6                    Modalities              estim + ice Ice only 10'    x                                        Daily Treatment Diary     Manual  6-18 6/21           STM  Hip flx,add & LB x x           PROM w man str x X hip flex str           Hip flx release x np                                         Exercise Diary              bike 10' x           HR 20 30x           squat 20 2x15           Step up 6" 10R x1 & 7 L x2 8" 10x b/l 10x up & over           Lat step up 6" np           Stand hip abd 10 30x           march 20 2x15           Stand hip ext 20 2x15           SLS  10x 10s                        Quad LE 10 np                                                                                                                                    Modalities              IFC   20'           [address]  20'                        X=same as last time          Assessment:  Patient able to perform all exercises without pain  Demonstrates ability to negotiate stairs without pain  Progress and exercise tolerance is limited by low back pain that is aggravated randomly  Plan: Continue per plan of care  Progress treatment as tolerated

## 2018-06-25 ENCOUNTER — OFFICE VISIT (OUTPATIENT)
Dept: PHYSICAL THERAPY | Facility: REHABILITATION | Age: 63
End: 2018-06-25
Payer: COMMERCIAL

## 2018-06-25 DIAGNOSIS — M25.552 LEFT HIP PAIN: ICD-10-CM

## 2018-06-25 DIAGNOSIS — M24.152 DEGENERATIVE TEAR OF ACETABULAR LABRUM OF LEFT HIP: Primary | ICD-10-CM

## 2018-06-25 PROCEDURE — 97112 NEUROMUSCULAR REEDUCATION: CPT

## 2018-06-25 NOTE — PROGRESS NOTES
Daily Note     Today's date: 2018  Patient name: Bebeto Moreno  : 1955  MRN: [id number]  Referring provider: Cirilo Reddy MD  Dx:   Encounter Diagnosis     ICD-10-CM    1  Degenerative tear of acetabular labrum of left hip M16 12    2  Left hip pain M25 552                   Subjective:   Patient reports having minimal pain in his Left low back 2 10  Notes that over the weekend he noticed increased pain on the side of his leg  Objective: See treatment diary below    Precautions: Labral repair protocol, B/L peripheral neuropathy, L4-5 HNP, OA, Depression, TTWB    Daily Treatment Diary     Manual   6- 6- 6   PROM within protocol   10' Gentle flx & abd x x Flex & Abd x & IR ER x   Gentle STM Low back/ glutes x  x X & hip flx & ADD x x  x x   Manual adductor and hip flex stretch      contract/ relax  x x x                                 Exercise Diary             Bike      10' 10' x x x   Quad sets 10x 10s x 10"  2x10 x x x 10sec  10x x D/C    glute sets Prone 10x 5s x 10"x10  x c/ hip ext   10sec  10x x X stand hip ext 10x x   Isometric hip ABD/ADD 10x 10s each x 10"x10 X ADD with soccer ball ABD with red TB x 10x 10x c/ 4# ball and 30x abd c/ GR 12x  c/ 4# ball  And 30x c  Green  tband 10x10 & 30 TB   x x   Ankle pumps 20x x HEP          TA activation 10x 10s x 10"x10 x x 10x 10s 10x  10s x x x   Prone quad stretch 3x 30s x 3x30"  x x  x x x   K -> C Right only 3x 30s  30"x3 x x   L 30"x3 B x x   Heel slides   2x10 x X w hip flx release 20x 20x x x x   SAQ   5"  2x10 X lg  bolster 14x 20x D/c       Quadruped Rocking(no pain)   10"x10 x x 20x  20 x x   Low bridges   2x10 x x 10x 10s 10x  10s x x x   LAQ     10   20x x 30   SLR      Flex/ abd 2x10 Flex/abd  2x10 x x x   Hip flexor stretch off table             Supine fig 4 str B        30"x3 x x   HR         20 x   Stand march         20 x   minisquat         20 x   Step up           2x6 Modalities              estim + ice Ice only 10'    x                                        Daily Treatment Diary     Manual  6-18 6/21 6/25          STM  Hip flx,add & LB x x x          PROM w man str x X hip flex str X hip flex str          Hip flx release x np np                                        Exercise Diary              bike 10' x x          HR 20 30x x          squat 20 2x15 x          Step up 6" 10R x1 & 7 L x2 8" 10x b/l 10x up & over 8" 10x ea  Up and over          Lat step up 6" np np          Stand hip abd 10 30x 20x          march 20 2x15 x          Stand hip ext 20 2x15 x          SLS  10x 10s x                       Quad LE 10 np np                                                                                                                                   Modalities              IFC   20'           [address]  20'                        X=same as last time          Assessment:  Patient able to perform all exercises without pain, but did have increased soreness and fatigue with hip abduction L  Demonstrates increased tenderness in his entire ITB as well as glute med/ low back b/l  Pt felt looser post manuals and was able to complete all sets and reps as charted  Plan: Continue per plan of care  Progress treatment as tolerated

## 2018-06-27 DIAGNOSIS — M54.16 LUMBAR RADICULOPATHY: Primary | ICD-10-CM

## 2018-06-27 RX ORDER — PREGABALIN 75 MG/1
75 CAPSULE ORAL 3 TIMES DAILY
Qty: 90 CAPSULE | Refills: 1 | Status: SHIPPED | OUTPATIENT
Start: 2018-06-27 | End: 2018-09-19 | Stop reason: SDUPTHER

## 2018-06-27 NOTE — TELEPHONE ENCOUNTER
S/W pt  Pt stated Yefri Ayers has the paperwork for [employer]  He stated Yefri Ayers can put the paper work through now    Advised pt will get the message to Yefri Ayers, [place]

## 2018-06-27 NOTE — TELEPHONE ENCOUNTER
Patient called and LM on anserve 06/25/18 @ 242pm requesting a c/b [phone] in regards to Jason amaral

## 2018-06-28 ENCOUNTER — ANESTHESIA EVENT (OUTPATIENT)
Dept: GASTROENTEROLOGY | Facility: MEDICAL CENTER | Age: 63
End: 2018-06-28
Payer: COMMERCIAL

## 2018-06-28 ENCOUNTER — OFFICE VISIT (OUTPATIENT)
Dept: PHYSICAL THERAPY | Facility: REHABILITATION | Age: 63
End: 2018-06-28
Payer: COMMERCIAL

## 2018-06-28 DIAGNOSIS — M24.152 DEGENERATIVE TEAR OF ACETABULAR LABRUM OF LEFT HIP: Primary | ICD-10-CM

## 2018-06-28 DIAGNOSIS — M25.552 LEFT HIP PAIN: ICD-10-CM

## 2018-06-28 PROCEDURE — 97110 THERAPEUTIC EXERCISES: CPT

## 2018-06-28 PROCEDURE — 97112 NEUROMUSCULAR REEDUCATION: CPT

## 2018-06-28 PROCEDURE — 97140 MANUAL THERAPY 1/> REGIONS: CPT

## 2018-06-28 NOTE — PROGRESS NOTES
Daily Note     Today's date: 2018  Patient name: Karla Reardon  : 1955  MRN: [id number]  Referring provider: Leopold Mura, MD  Dx:   Encounter Diagnosis     ICD-10-CM    1  Degenerative tear of acetabular labrum of left hip M16 12    2  Left hip pain M25 552                   Subjective:   Patient reports up inclines & down & up & down steps cause pain in hip hip flexors  LBP after standing for 10' using a sheron yesterday    Objective: See treatment diary below    Precautions: Labral repair protocol, B/L peripheral neuropathy, L4-5 HNP, OA, Depression, TTWB    Daily Treatment Diary     Manual   6-7 6- 6-   PROM within protocol   10' Gentle flx & abd x x Flex & Abd x & IR ER x   Gentle STM Low back/ glutes x  x X & hip flx & ADD x x  x x   Manual adductor and hip flex stretch      contract/ relax  x x x                                 Exercise Diary             Bike      10' 10' x x x   Quad sets 10x 10s x 10"  2x10 x x x 10sec  10x x D/C    glute sets Prone 10x 5s x 10"x10  x c/ hip ext   10sec  10x x X stand hip ext 10x x   Isometric hip ABD/ADD 10x 10s each x 10"x10 X ADD with soccer ball ABD with red TB x 10x 10x c/ 4# ball and 30x abd c/ GR 12x  c/ 4# ball  And 30x c  Green  tband 10x10 & 30 TB   x x   Ankle pumps 20x x HEP          TA activation 10x 10s x 10"x10 x x 10x 10s 10x  10s x x x   Prone quad stretch 3x 30s x 3x30"  x x  x x x   K -> C Right only 3x 30s  30"x3 x x   L 30"x3 B x x   Heel slides   2x10 x X w hip flx release 20x 20x x x x   SAQ   5"  2x10 X lg  bolster 14x 20x D/c       Quadruped Rocking(no pain)   10"x10 x x 20x  20 x x   Low bridges   2x10 x x 10x 10s 10x  10s x x x   LAQ     10   20x x 30   SLR      Flex/ abd 2x10 Flex/abd  2x10 x x x   Hip flexor stretch off table             Supine fig 4 str B        30"x3 x x   HR         20 x   Stand march         20 x   minisquat         20 x   Step up           2x6 Modalities              estim + ice Ice only 10'    x                                        Daily Treatment Diary     Manual  6-18 6/21 6/25 6-28         STM  Hip flx,add & LB x x x x         PROM w man str x X hip flex str X hip flex str x         Hip flx release x np np                                        Exercise Diary              bike 10' x x x         HR 20 30x x x         squat 20 2x15 x x         Step up 6" 10R x1 & 7 L x2 8" 10x b/l 10x up & over 8" 10x ea  Up and over x         Lat step up 6" np np          Stand hip abd 10 30x 20x 30         march 20 2x15 x x         Stand hip ext 20 2x15 x x         SLS  10x 10s x x                      Quad LE 10 np np                                                                                                                                   Modalities              IFC   20'           [address]  20'                        X=same as last time          Assessment:  Not as tight all areas with STM  Ex is challenging    Plan: Continue per plan of care  Progress treatment as tolerated

## 2018-06-29 ENCOUNTER — HOSPITAL ENCOUNTER (OUTPATIENT)
Facility: MEDICAL CENTER | Age: 63
Setting detail: OUTPATIENT SURGERY
Discharge: HOME | End: 2018-06-29
Attending: INTERNAL MEDICINE | Admitting: INTERNAL MEDICINE
Payer: COMMERCIAL

## 2018-06-29 ENCOUNTER — ANESTHESIA (OUTPATIENT)
Dept: GASTROENTEROLOGY | Facility: MEDICAL CENTER | Age: 63
End: 2018-06-29
Payer: COMMERCIAL

## 2018-06-29 VITALS
SYSTOLIC BLOOD PRESSURE: 96 MMHG | RESPIRATION RATE: 16 BRPM | TEMPERATURE: 98.8 F | OXYGEN SATURATION: 96 % | DIASTOLIC BLOOD PRESSURE: 66 MMHG | HEART RATE: 66 BPM

## 2018-06-29 DIAGNOSIS — Z12.11 COLON CANCER SCREENING: ICD-10-CM

## 2018-06-29 DIAGNOSIS — K21.9 GASTROESOPHAGEAL REFLUX DISEASE WITHOUT ESOPHAGITIS: ICD-10-CM

## 2018-06-29 PROCEDURE — 88305 TISSUE EXAM BY PATHOLOGIST: CPT | Performed by: PATHOLOGY

## 2018-06-29 PROCEDURE — 88342 IMHCHEM/IMCYTCHM 1ST ANTB: CPT | Performed by: PATHOLOGY

## 2018-06-29 PROCEDURE — 43239 EGD BIOPSY SINGLE/MULTIPLE: CPT | Performed by: INTERNAL MEDICINE

## 2018-06-29 PROCEDURE — G0121 COLON CA SCRN NOT HI RSK IND: HCPCS | Performed by: INTERNAL MEDICINE

## 2018-06-29 RX ORDER — SODIUM CHLORIDE 9 MG/ML
125 INJECTION, SOLUTION INTRAVENOUS CONTINUOUS
Status: DISCONTINUED | OUTPATIENT
Start: 2018-06-29 | End: 2018-06-29 | Stop reason: HOSPADM

## 2018-06-29 RX ORDER — PROPOFOL 10 MG/ML
INJECTION, EMULSION INTRAVENOUS AS NEEDED
Status: DISCONTINUED | OUTPATIENT
Start: 2018-06-29 | End: 2018-06-29 | Stop reason: SURG

## 2018-06-29 RX ADMIN — PROPOFOL 50 MG: 10 INJECTION, EMULSION INTRAVENOUS at 13:22

## 2018-06-29 RX ADMIN — SODIUM CHLORIDE 125 ML/HR: 0.9 INJECTION, SOLUTION INTRAVENOUS at 12:37

## 2018-06-29 RX ADMIN — PROPOFOL 50 MG: 10 INJECTION, EMULSION INTRAVENOUS at 13:28

## 2018-06-29 RX ADMIN — PROPOFOL 50 MG: 10 INJECTION, EMULSION INTRAVENOUS at 13:39

## 2018-06-29 RX ADMIN — PROPOFOL 50 MG: 10 INJECTION, EMULSION INTRAVENOUS at 13:33

## 2018-06-29 RX ADMIN — PROPOFOL 50 MG: 10 INJECTION, EMULSION INTRAVENOUS at 13:25

## 2018-06-29 RX ADMIN — PROPOFOL 50 MG: 10 INJECTION, EMULSION INTRAVENOUS at 13:30

## 2018-06-29 RX ADMIN — PROPOFOL 150 MG: 10 INJECTION, EMULSION INTRAVENOUS at 13:18

## 2018-06-29 RX ADMIN — PROPOFOL 50 MG: 10 INJECTION, EMULSION INTRAVENOUS at 13:19

## 2018-06-29 NOTE — DISCHARGE INSTRUCTIONS
Upper Endoscopy   WHAT YOU NEED TO KNOW:   An upper endoscopy is also called an upper gastrointestinal (GI) endoscopy, or an esophagogastroduodenoscopy (EGD)  You may feel bloated, gassy, or have some abdominal discomfort after your procedure  Your throat may be sore for 24 to 36 hours  You may burp or pass gas from air that is still inside your body  DISCHARGE INSTRUCTIONS:   Call 911 if:   · You have sudden chest pain or trouble breathing  Seek care immediately if:   · You feel dizzy or faint  · You have trouble swallowing  · You have severe throat pain  · Your bowel movements are very dark or black  · Your abdomen is hard and firm and you have severe pain  · You vomit blood  Contact your healthcare provider if:   · You feel full or bloated and cannot burp or pass gas  · You have not had a bowel movement for 3 days after your procedure  · You have neck pain  · You have a fever or chills  · You have nausea or are vomiting  · You have a rash or hives  · You have questions or concerns about your endoscopy  Relieve a sore throat:  Suck on throat lozenges or crushed ice  Gargle with a small amount of warm salt water  Mix 1 teaspoon of salt and 1 cup of warm water to make salt water  Relieve gas and discomfort from bloating:  Lie on your right side with a heating pad on your abdomen  Take short walks to help pass gas  Eat small meals until bloating is relieved  Rest after your procedure:  Do not drive or make important decisions until the day after your procedure  Return to your normal activity as directed  You can usually return to work the day after your procedure  Follow up with your healthcare provider as directed:  Write down your questions so you remember to ask them during your visits  © 2017 [employer] Information is for End User's use only and may not be sold, redistributed or otherwise used for commercial purposes   All illustrations and images included in CareNotes® are the copyrighted property of [employer] M [employer]  or Emil Garcia  The above information is an  only  It is not intended as medical advice for individual conditions or treatments  Talk to your doctor, nurse or pharmacist before following any medical regimen to see if it is safe and effective for you  Colonoscopy   WHAT YOU NEED TO KNOW:   A colonoscopy is a procedure to examine the inside of your colon (intestine) with a scope  Polyps or tissue growths may have been removed during your colonoscopy  It is normal to feel bloated and to have some abdominal discomfort  You should be passing gas  If you have hemorrhoids or you had polyps removed, you may have a small amount of bleeding  DISCHARGE INSTRUCTIONS:   Seek care immediately if:   · You have a large amount of bright red blood in your bowel movements  · Your abdomen is hard and firm and you have severe pain  · You have sudden trouble breathing  Contact your healthcare provider if:   · You develop a rash or hives  · You have a fever within 24 hours of your procedure  · You have not had a bowel movement for 3 days after your procedure  · You have questions or concerns about your condition or care  Activity:   · Do not lift, strain, or run  for 3 days after your procedure  · Rest after your procedure  You have been given medicine to relax you  Do not  drive or make important decisions until the day after your procedure  Return to your normal activity as directed  · Relieve gas and discomfort from bloating  by lying on your right side with a heating pad on your abdomen  You may need to take short walks to help the gas move out  Eat small meals until bloating is relieved  If you had polyps removed: For 7 days after your procedure:  · Do not  take aspirin  · Do not  go on long car rides  Help prevent constipation:   · Eat a variety of healthy foods    Healthy foods include fruit, vegetables, whole-grain breads, low-fat dairy products, beans, lean meat, and fish  Ask if you need to be on a special diet  Your healthcare provider may recommend that you eat high-fiber foods such as cooked beans  Fiber helps you have regular bowel movements  · Drink liquids as directed  Adults should drink between 9 and 13 eight-ounce cups of liquid every day  Ask what amount is best for you  For most people, good liquids to drink are water, juice, and milk  · Exercise as directed  Talk to your healthcare provider about the best exercise plan for you  Exercise can help prevent constipation, decrease your blood pressure and improve your health  Follow up with your healthcare provider as directed:  Write down your questions so you remember to ask them during your visits  © 2017 [employer] Information is for End User's use only and may not be sold, redistributed or otherwise used for commercial purposes  All illustrations and images included in CareNotes® are the copyrighted property of A [employer] M [employer] Inc  or Emil Garcia  The above information is an  only  It is not intended as medical advice for individual conditions or treatments  Talk to your doctor, nurse or pharmacist before following any medical regimen to see if it is safe and effective for you  Diverticulosis   WHAT YOU NEED TO KNOW:   Diverticulosis is a condition that causes small pockets called diverticula to form in your intestine  These pockets make it difficult for bowel movements to pass through your digestive system  DISCHARGE INSTRUCTIONS:   Seek care immediately if:   · You have severe pain on the left side of your lower abdomen  · Your bowel movements are bright or dark red  Contact your healthcare provider if:   · You have a fever and chills  · You feel dizzy or lightheaded  · You have nausea, or you are vomiting      · You have a change in your bowel movements  · You have questions or concerns about your condition or care  Medicines:   · Medicines  to soften your bowel movements may be given  You may also need medicines to treat symptoms such as bloating and pain  · Take your medicine as directed  Contact your healthcare provider if you think your medicine is not helping or if you have side effects  Tell him or her if you are allergic to any medicine  Keep a list of the medicines, vitamins, and herbs you take  Include the amounts, and when and why you take them  Bring the list or the pill bottles to follow-up visits  Carry your medicine list with you in case of an emergency  Self-care: The goal of treatment is to manage any symptoms you have and prevent other problems such as diverticulitis  Diverticulitis is swelling or infection of the diverticula  Your healthcare provider may recommend any of the following:  · Eat a variety of high-fiber foods  High-fiber foods help you have regular bowel movements  High-fiber foods include cooked beans, fruits, vegetables, and some cereals  Most adults need 25 to 35 grams of fiber each day  Your healthcare provider may recommend that you have more  Ask your healthcare provider how much fiber you need  Increase fiber slowly  You may have abdominal discomfort, bloating, and gas if you add fiber to your diet too quickly  You may need to take a fiber supplement if you are not getting enough fiber from food  · Drink liquids as directed  You may need to drink 2 to 3 liters (8 to 12 cups) of liquids every day  Ask your healthcare provider how much liquid to drink each day and which liquids are best for you  · Apply heat  on your abdomen for 20 to 30 minutes every 2 hours for as many days as directed  Heat helps decrease pain and muscle spasms  Help prevent diverticulitis or other symptoms: The following may help decrease your risk for diverticulitis or symptoms, such as bleeding   Talk to your provider about these or other things you can do to prevent problems that may occur with diverticulosis  · Exercise regularly  Ask your healthcare provider about the best exercise plan for you  Exercise can help you have regular bowel movements  Get 30 minutes of exercise on most days of the week  · Maintain a healthy weight  Ask your healthcare provider how much you should weigh  Ask him or her to help you create a weight loss plan if you are overweight  · Do not smoke  Nicotine and other chemicals in cigarettes increase your risk for diverticulitis  Ask your healthcare provider for information if you currently smoke and need help to quit  E-cigarettes or smokeless tobacco still contain nicotine  Talk to your healthcare provider before you use these products  · Ask your healthcare provider if it is safe to take NSAIDs  NSAIDs may increase your risk of diverticulitis  Follow up with your healthcare provider as directed:  Write down your questions so you remember to ask them during your visits  © 2017 [employer] Information is for End User's use only and may not be sold, redistributed or otherwise used for commercial purposes  All illustrations and images included in CareNotes® are the copyrighted property of [employer]  or Emil Garcia  The above information is an  only  It is not intended as medical advice for individual conditions or treatments  Talk to your doctor, nurse or pharmacist before following any medical regimen to see if it is safe and effective for you  Diverticulosis Diet   AMBULATORY CARE:   A diverticulosis diet  includes high-fiber foods  High-fiber foods help you have regular bowel movements  Extra fiber may decrease your risk of forming new diverticula (small pockets) in your intestine  A high-fiber diet may also help prevent diverticulitis  Diverticulitis is a painful condition that occurs when diverticula become inflamed or infected  You do not need to avoid nuts, seeds, corn, or popcorn while you are on a diverticulosis diet  Contact your healthcare provider if:   · You have questions about a high-fiber diet  · You have a change in your bowel movements  · You have an upset stomach  · You have a fever  · You have pain in your lower abdomen on the left side  · You have questions about your condition or care  Amount of fiber you need: You may need 25 to 35 grams of fiber each day  Ask your dietitian or healthcare provider how much fiber you should have  Increase your intake of fiber slowly  When you eat more fiber, you may have gas and feel bloated  You may need to take a fiber supplement if you do not get enough fiber from food  Drink plenty of liquids as you increase the fiber in your diet  Your dietitian or healthcare provider may recommend 8 eight-ounce cups or more each day  Ask which liquids are best for you  Foods that are high in fiber:   · Foods with at least 4 grams of fiber per serving:      ¨ ? to ½ cup of high-fiber cereal (check the nutrition label on the box)    ¨ ½ cup of blackberries or raspberries    ¨ 4 dried prunes    ¨ 1 cooked artichoke    ¨ ½ cup of cooked legumes, such as lentils, or red, kidney, and qureshi beans    · Foods with 1 to 3 grams of fiber per serving:      ¨ 1 slice of whole-wheat, pumpernickel, or rye bread    ¨ 4 whole-wheat crackers    ¨ ½ cup of cereal with 1 to 3 grams of fiber per serving (check the nutrition label on the box)    ¨ 1 piece of fruit, such as an apple, banana, pear, kiwi, or orange    ¨ 3 dates    ¨ ½ cup of canned apricots, fruit cocktail, peaches, or pears    ¨ ½ cup of raw or cooked vegetables, such as carrots, cauliflower, cabbage, spinach, squash, or corn  © 2017 [employer] Information is for End User's use only and may not be sold, redistributed or otherwise used for commercial purposes   All illustrations and images included in CareNotes® are the copyrighted property of [employer]  or Emil Garcia  The above information is an  only  It is not intended as medical advice for individual conditions or treatments  Talk to your doctor, nurse or pharmacist before following any medical regimen to see if it is safe and effective for you  Hemorrhoids   WHAT YOU NEED TO KNOW:   What are hemorrhoids? Hemorrhoids are swollen blood vessels inside your rectum (internal hemorrhoids) or on your anus (external hemorrhoids)  Sometimes a hemorrhoid may prolapse  This means it extends out of your anus  What increases my risk for hemorrhoids? · Pregnancy or obesity    · Straining or sitting for a long time during bowel movements    · Liver disease    · Weak muscles around the anus caused by older age, rectal surgery, or anal intercourse    · A lack of physical activity    · Chronic diarrhea or constipation    · A low-fiber diet  What are the signs and symptoms of hemorrhoids? · Pain or itching around your anus or inside your rectum    · Swelling or bumps around your anus    · Bright red blood in your bowel movement, on the toilet paper, or in the toilet bowl    · Tissue bulging out of your anus (prolapsed hemorrhoids)    · Incontinence (poor control over urine or bowel movements)  How are hemorrhoids diagnosed? Your healthcare provider will ask about your symptoms, the foods you eat, and your bowel movements  He will examine your anus for external hemorrhoids  You may need the following:  · A digital rectal exam  is a test to check for hemorrhoids  Your healthcare provider will put a gloved finger inside your anus to feel for the hemorrhoids  · An anoscopy  is a test that uses a scope (small tube with a light and camera on the end) to look at your hemorrhoids  How are hemorrhoids treated? Treatment will depend on your symptoms   You may need any of the following:  · Medicines  can help decrease pain and swelling, and soften your bowel movement  The medicine may be a pill, pad, cream, or ointment  · Procedures  may be used to shrink or remove your hemorrhoid  Examples include rubber-band ligation, sclerotherapy, and photocoagulation  These procedures may be done in your healthcare provider's office  Ask your healthcare provider for more information about these procedures  · Surgery  may be needed to shrink or remove your hemorrhoids  How can I manage my symptoms? · Apply ice on your anus for 15 to 20 minutes every hour or as directed  Use an ice pack, or put crushed ice in a plastic bag  Cover it with a towel before you apply it to your anus  Ice helps prevent tissue damage and decreases swelling and pain  · Take a sitz bath  Fill a bathtub with 4 to 6 inches of warm water  You may also use a sitz bath pan that fits inside a toilet bowl  Sit in the sitz bath for 15 minutes  Do this 3 times a day, and after each bowel movement  The warm water can help decrease pain and swelling  · Keep your anal area clean  Gently wash the area with warm water daily  Soap may irritate the area  After a bowel movement, wipe with moist towelettes or wet toilet paper  Dry toilet paper can irritate the area  How can I help prevent hemorrhoids? · Do not strain to have a bowel movement  Do not sit on the toilet too long  These actions can increase pressure on the tissues in your rectum and anus  · Drink plenty of liquids  Liquids can help prevent constipation  Ask how much liquid to drink each day and which liquids are best for you  · Eat a variety of high-fiber foods  Examples include fruits, vegetables, and whole grains  Ask your healthcare provider how much fiber you need each day  You may need to take a fiber supplement  · Exercise as directed  Exercise, such as walking, may make it easier to have a bowel movement  Ask your healthcare provider to help you create an exercise plan  · Do not have anal sex  Anal sex can weaken the skin around your rectum and anus  · Avoid heavy lifting  This can cause straining and increase your risk for another hemorrhoid  When should I seek immediate care? · You have severe pain in your rectum or around your anus  · You have severe pain in your abdomen and you are vomiting  · You have bleeding from your anus that soaks through your underwear  When should I contact my healthcare provider? · You have frequent and painful bowel movements  · Your hemorrhoid looks or feels more swollen than usual      · You do not have a bowel movement for 2 days or more  · You see or feel tissue coming through your anus  · You have questions or concerns about your condition or care  CARE AGREEMENT:   You have the right to help plan your care  Learn about your health condition and how it may be treated  Discuss treatment options with your caregivers to decide what care you want to receive  You always have the right to refuse treatment  The above information is an  only  It is not intended as medical advice for individual conditions or treatments  Talk to your doctor, nurse or pharmacist before following any medical regimen to see if it is safe and effective for you  © 2017 [employer]  Information is for End User's use only and may not be sold, redistributed or otherwise used for commercial purposes  All illustrations and images included in CareNotes® are the copyrighted property of [employer]  or Emil Garcia

## 2018-06-29 NOTE — OP NOTE
**** GI/ENDOSCOPY REPORT ****     PATIENT NAME: Deedee Mcelroy [facility] - VISIT ID:  Patient ID: [id number]   YOB: 1955     INTRODUCTION: Esophagogastroduodenoscopy - A 60 male patient presents for   an outpatient Esophagogastroduodenoscopy at [address] [place] [address]  INDICATIONS: GERD  CONSENT: The benefits, risks, and alternatives to the procedure were   discussed and informed consent was obtained from the patient  PREPARATION:  EKG, pulse, pulse oximetry and blood pressure were monitored   throughout the procedure  MEDICATIONS: MAC anesthesia  PROCEDURE:  The endoscope was passed without difficulty through the mouth   under direct visualization and advanced to the 2nd portion of the   duodenum  The scope was withdrawn and the mucosa was carefully examined  FINDINGS:   Esophagus: The Z line was visualized at 44 cm from the entry   site  The esophagus appeared to be normal   Stomach: The stomach appeared   to be normal  A cold forceps biopsy was taken  Duodenum: The duodenum   appeared to be normal      COMPLICATIONS: There were no complications  IMPRESSIONS: Z line visualized  Normal esophagus  Normal stomach  Biopsy   taken  Normal duodenum  RECOMMENDATIONS: Follow-up on the results of the biopsy specimens  ESTIMATED BLOOD LOSS: Insignificant  PATHOLOGY SPECIMENS: Cold forceps random biopsy taken  PROCEDURE CODES:     ICD-9 Codes: 530 81 Esophageal reflux     ICD-10 Codes: K21 Gastro-esophageal reflux disease     PERFORMED BY: MATILDE Helms  on 06/29/2018  Version 1, electronically signed by MATILDE Phan  on 06/29/2018 at   13:26

## 2018-06-29 NOTE — H&P
History and Physical - [facility]  Moose Doherty 60 y o  male MRN: [id number]                  HPI: Moose Doherty is a 60y o  year old male who presents for colon cancer screening and acid reflux      REVIEW OF SYSTEMS: Per the HPI, and otherwise unremarkable      Historical Information   Past Medical History:   Diagnosis Date    Chronic back pain     Depression     GERD (gastroesophageal reflux disease)     Muscle weakness     Neuropathy     Osteoarthritis     Right foot drop      Past Surgical History:   Procedure Laterality Date    EPIDURAL BLOCK INJECTION      needle    NM HIP SCOPE/REMV BODY,PLASTY/RESECTN Left 4/30/2018    Procedure: LEFT HIP ARTHROSCOPIC LABRAL DEBRIDEMENT;  Surgeon: Dmitriy Wright MD;  Location: [facility];  Service: Orthopedics    TONSILLECTOMY      WISDOM TOOTH EXTRACTION       Social History   History   Alcohol Use No     History   Drug Use No     History   Smoking Status    Current Every Day Smoker    Packs/day: 0 25    Last attempt to quit: 12/21/2017   Smokeless Tobacco    Never Used     Comment: pt states quit 2 days ago     Family History   Problem Relation Age of Onset    Osteoarthritis Family     Other Mother         rheumatism       Meds/Allergies     Prescriptions Prior to Admission   Medication    Ascorbic Acid (VITAMIN C) 1000 MG tablet    cholecalciferol (VITAMIN D3) 1,000 units tablet    cyanocobalamin (VITAMIN B-12) 100 mcg tablet    Flaxseed, Linseed, (FLAX SEED OIL PO)    multivitamin (THERAGRAN) TABS    omeprazole (PriLOSEC) 40 MG capsule    oxyCODONE-acetaminophen (PERCOCET) 5-325 mg per tablet    pyridoxine (B-6) 100 MG tablet    pregabalin (LYRICA) 75 mg capsule    traMADol (ULTRAM) 50 mg tablet       Allergies   Allergen Reactions    Gabapentin GI Intolerance    Tramadol Confusion    Morphine Other (See Comments)     constipation       Objective     Blood pressure 109/72, pulse 65, temperature 98 8 °F (37 1 °C), temperature source Temporal, resp  rate 16, SpO2 95 %  PHYSICAL EXAM    Gen: NAD  CV: RRR  CHEST: Clear  ABD: soft, NT/ND  EXT: no edema      ASSESSMENT/PLAN:  This is a 60y o  year old male here for acid reflux and colon cancer screening    PLAN:   Procedure: egd and colonoscopy

## 2018-06-29 NOTE — OP NOTE
**** GI/ENDOSCOPY REPORT ****     PATIENT NAME: Sole Bourne ------ VISIT ID:  Patient ID:   [id number] YOB: 1955     INTRODUCTION: Colonoscopy - A 60 male patient presents for an outpatient   Colonoscopy at [address] [place] [address]  PREVIOUS COLONOSCOPY:     INDICATIONS: Screening-ADR  CONSENT:  The benefits, risks, and alternatives to the procedure were   discussed and informed consent was obtained from the patient  PREPARATION: EKG, pulse, pulse oximetry and blood pressure were monitored   throughout the procedure  The patient was identified by myself both   verbally and by visual inspection of ID band  Airway Assessment   Classification: Airway class 2 - Visualization of the soft palate, fauces   and uvula  ASA Classification: See anesthesia record  MEDICATIONS: Anesthesia-check records MAC anesthesia  PROCEDURE:  The endoscope was passed with difficulty through the anus   under direct visualization and advanced to the cecum, confirmed by   appendiceal orifice and ileocecal valve  The scope was withdrawn and the   mucosa was carefully examined  The quality of the preparation was good  Cecal Intubation Time:9 Minute(s) Scope Withdrawal Time: 7 Minute(s)     RECTAL EXAM: Normal rectal exam      FINDINGS:  There was evidence of mild diverticulosis in the sigmoid colon  On retroflexed view, small internal hemorrhoids were found  Otherwise, the   colon appeared to be normal      COMPLICATIONS: There were no complications  IMPRESSIONS: Mild diverticulosis found in the sigmoid colon  Internal   hemorrhoids  RECOMMENDATIONS: Colonoscopy recommended in 10 years  ESTIMATED BLOOD LOSS: None       PATHOLOGY SPECIMENS:     PROCEDURE CODES:     ICD-9 Codes: 562 10 Diverticulosis of colon (without mention of hemorrhage)     ICD-10 Codes: K57 Diverticular disease of intestine K64 9 Unspecified   hemorrhoids     PERFORMED BY: MATILDE Alberts  on 06/29/2018  Version 1, electronically signed by MATILDE Matthews  on 06/29/2018 at   13:50

## 2018-06-29 NOTE — ANESTHESIA PREPROCEDURE EVALUATION
Review of Systems/Medical History  Patient summary reviewed  Chart reviewed  No history of anesthetic complications     Cardiovascular  Negative cardio ROS    Pulmonary  Smoker cigarette smoker  Cumulative Pack Years: 27,        GI/Hepatic    GERD ,        Negative  ROS        Endo/Other  Negative endo/other ROS      GYN       Hematology  Negative hematology ROS      Musculoskeletal    Comment: Chronic pain Arthritis     Neurology    Neuromuscular disease ,   Comment: Foot drop   Psychology   Depression ,              Physical Exam    Airway    Mallampati score: I  TM Distance: >3 FB  Neck ROM: full     Dental   Comment: edentulous,     Cardiovascular  Comment: Negative ROS, Rhythm: regular, Rate: normal,     Pulmonary  Breath sounds clear to auscultation,     Other Findings        Anesthesia Plan  ASA Score- 2     Anesthesia Type- IV sedation with anesthesia with ASA Monitors  Additional Monitors:   Airway Plan:         Plan Factors-  Patient did not smoke on day of surgery  Induction- intravenous  Postoperative Plan-     Informed Consent- Anesthetic plan and risks discussed with patient

## 2018-07-02 ENCOUNTER — OFFICE VISIT (OUTPATIENT)
Dept: PHYSICAL THERAPY | Facility: REHABILITATION | Age: 63
End: 2018-07-02
Payer: COMMERCIAL

## 2018-07-02 DIAGNOSIS — M25.552 LEFT HIP PAIN: ICD-10-CM

## 2018-07-02 DIAGNOSIS — M24.152 DEGENERATIVE TEAR OF ACETABULAR LABRUM OF LEFT HIP: Primary | ICD-10-CM

## 2018-07-02 PROCEDURE — 97140 MANUAL THERAPY 1/> REGIONS: CPT | Performed by: PHYSICAL THERAPIST

## 2018-07-02 PROCEDURE — 97110 THERAPEUTIC EXERCISES: CPT | Performed by: PHYSICAL THERAPIST

## 2018-07-02 PROCEDURE — 97112 NEUROMUSCULAR REEDUCATION: CPT | Performed by: PHYSICAL THERAPIST

## 2018-07-02 NOTE — PROGRESS NOTES
Daily Note     Today's date: 2018  Patient name: Lamar Ramirez  : 1955  MRN: [id number]  Referring provider: Vikash Reaves MD  Dx:   Encounter Diagnosis     ICD-10-CM    1  Degenerative tear of acetabular labrum of left hip M16 12    2  Left hip pain M25 552                   Subjective:   Pt  Reporting that he is not taking pain medication unless his pain level gets up to 6/10  Last week it went up to that level  He is no longer doing clam shell exercise because it hurts his hip to lay on his side  Primary c/o back pain and then lateral left leg pain  Going to see Dr Que Omer for his back tomorrow  Assessment:  Trial of ITB stretch standing which hurt his anterior hip after  10 reps  Pt  Instructed to perform this max of 5 reps and to stop all together if it bothers his anterior hip too much  Plan: Continue per plan of care  Progress treatment as tolerated  Objective: See treatment diary below    Precautions: Labral repair protocol, B/L peripheral neuropathy, L4-5 HNP, OA, Depression, TTWB    Daily Treatment Diary     Manual   5 6 6-7 6-11 6-14   PROM within protocol   10' Gentle flx & abd x x Flex & Abd x & IR ER x   Gentle STM Low back/ glutes x  x X & hip flx & ADD x x  x x   Manual adductor and hip flex stretch      contract/ relax  x x x                                 Exercise Diary             Bike      10' 10' x x x   Quad sets 10x 10s x 10"  2x10 x x x 10sec  10x x D/C    glute sets Prone 10x 5s x 10"x10  x c/ hip ext   10sec  10x x X stand hip ext 10x x   Isometric hip ABD/ADD 10x 10s each x 10"x10 X ADD with soccer ball ABD with red TB x 10x 10x c/ 4# ball and 30x abd c/ GR 12x  c/ 4# ball  And 30x c  Green  tband 10x10 & 30 TB   x x   Ankle pumps 20x x HEP          TA activation 10x 10s x 10"x10 x x 10x 10s 10x  10s x x x   Prone quad stretch 3x 30s x 3x30"  x x  x x x   K -> C Right only 3x 30s  30"x3 x x   L 30"x3 B x x Heel slides   2x10 x X w hip flx release 20x 20x x x x   SAQ   5"  2x10 X lg  bolster 14x 20x D/c       Quadruped Rocking(no pain)   10"x10 x x 20x  20 x x   Low bridges   2x10 x x 10x 10s 10x  10s x x x   LAQ     10   20x x 30   SLR      Flex/ abd 2x10 Flex/abd  2x10 x x x   Hip flexor stretch off table             Supine fig 4 str B        30"x3 x x   HR         20 x   Stand march         20 x   minisquat         20 x   Step up           2x6                    Modalities              estim + ice Ice only 10'    x                                        Daily Treatment Diary     Manual  6-18 6/21 6/25 6-28 7/2        STM  Hip flx,add & LB x x x x x        PROM w man str x X hip flex str X hip flex str x x        Hip flx release x np np                                        Exercise Diary              bike 10' x x x x        HR 20 30x x x x        squat 20 2x15 x x x        Step up 6" 10R x1 & 7 L x2 8" 10x b/l 10x up & over 8" 10x ea   Up and over x x        Lat step up 6" np np          Stand hip abd 10 30x 20x 30 2x15        march 20 2x15 x x 2x15        Stand hip ext 20 2x15 x x 2x15        SLS  10x 10s x x x                     Quad LE 10 np np                                                                                                                                   Modalities              IFC   20'   His TENS        MH  20'  x x                     X=same as last time

## 2018-07-03 ENCOUNTER — OFFICE VISIT (OUTPATIENT)
Dept: PAIN MEDICINE | Facility: CLINIC | Age: 63
End: 2018-07-03
Payer: OTHER MISCELLANEOUS

## 2018-07-03 VITALS
DIASTOLIC BLOOD PRESSURE: 80 MMHG | SYSTOLIC BLOOD PRESSURE: 120 MMHG | HEIGHT: 75 IN | HEART RATE: 72 BPM | BODY MASS INDEX: 24.12 KG/M2 | WEIGHT: 194 LBS

## 2018-07-03 DIAGNOSIS — M21.371 RIGHT FOOT DROP: ICD-10-CM

## 2018-07-03 DIAGNOSIS — M24.152 DEGENERATIVE TEAR OF ACETABULAR LABRUM OF LEFT HIP: ICD-10-CM

## 2018-07-03 DIAGNOSIS — Z02.71 DISABILITY EXAMINATION: ICD-10-CM
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
## 2018-07-03 PROCEDURE — 99213 OFFICE O/P EST LOW 20 MIN: CPT | Performed by: PHYSICAL MEDICINE & REHABILITATION

## 2018-07-03 NOTE — PATIENT INSTRUCTIONS
1 Continue care with Pain Medicine  2  Spine surgical opinion  3  Awaiting brace  modfications by [facility]

## 2018-07-03 NOTE — PROGRESS NOTES
Assessment/Plan:      Diagnoses and all orders for this visit:    Work related injury    Disability examination    Right foot drop    Degenerative tear of acetabular labrum of left hip          Subjective:     Patient ID: Jose Ag is a 60 y o  male  HPI   76-year-old male with chronic footdrop on the right side due to lumbar radiculopathy from workplace injury  Previously followed by Dr Erin Pelletier  He recently underwent repair of a torn left acetabular labrum  At the last visit I referred him   To podiatry for foot symptoms  He continues to follow with pain medicine and is on pre CHAPARRO Marilou 75 milligrams 3 times a day  Had massage at Pt and found it very painful  Just started pregabalin last Friday  Feels "tightness" in left groin and lateral thigh  DPM visit end of July  Has new MAFO, is awaiting mods by [facility]  Has ongoing issues with workman's comp and has representation  Review of Systems   Musculoskeletal: Positive for back pain and gait problem  Objective:  PTx notes:  Subjective:   Pt  Reporting that he is not taking pain medication unless his pain level gets up to 6/10  Last week it went up to that level  He is no longer doing clam shell exercise because it hurts his hip to lay on his side  Primary c/o back pain and then lateral left leg pain  Going to see Dr Fady Elkins for his back tomorrow        Assessment:  Trial of ITB stretch standing which hurt his anterior hip after  10 reps  Pt  Instructed to perform this max of 5 reps and to stop all together if it bothers his anterior hip too much        Plan: Continue per plan of care  Progress treatment as tolerated      Pain Med notes:  Patient was provided literature on the spinal cord stimulator trial per Dr King Mullins suggestion  We did have a conversation regarding the process  We will revisit this topic at next office visit     Physical Exam   Constitutional: He appears well-developed and well-nourished  No distress  Neurological: He displays abnormal reflex  Reflex Scores:       Patellar reflexes are 1+ on the right side and 1+ on the left side  Achilles reflexes are 0 on the right side and 1+ on the left side  HJs equal and present  Ambulates with bilat axillary crutches

## 2018-07-03 NOTE — LETTER
July 3, 2018     MD Emily Dockery [id number]  [id number] [id number]    Patient: Anni Haque   YOB: 1955   Date of Visit: 7/3/2018       Dear Dr Malhotra Precise:    Thank you for referring Lanie Marsh to me for evaluation  Below are the relevant portions of my assessment and plan of care  Diagnoses and all orders for this visit:    Work related injury    Disability examination    Right foot drop    Degenerative tear of acetabular labrum of left hip        If you have questions, please do not hesitate to call me  I look forward to following Oliver Erazo along with you           Sincerely,        Augusto Marte,         CC: No Recipients

## 2018-07-05 ENCOUNTER — APPOINTMENT (OUTPATIENT)
Dept: PHYSICAL THERAPY | Facility: REHABILITATION | Age: 63
End: 2018-07-05
Payer: COMMERCIAL

## 2018-07-09 ENCOUNTER — OFFICE VISIT (OUTPATIENT)
Dept: PHYSICAL THERAPY | Facility: REHABILITATION | Age: 63
End: 2018-07-09
Payer: COMMERCIAL

## 2018-07-09 DIAGNOSIS — M25.552 LEFT HIP PAIN: ICD-10-CM

## 2018-07-09 DIAGNOSIS — M24.152 DEGENERATIVE TEAR OF ACETABULAR LABRUM OF LEFT HIP: Primary | ICD-10-CM

## 2018-07-09 PROCEDURE — 97112 NEUROMUSCULAR REEDUCATION: CPT

## 2018-07-09 PROCEDURE — 97140 MANUAL THERAPY 1/> REGIONS: CPT

## 2018-07-09 PROCEDURE — 97110 THERAPEUTIC EXERCISES: CPT

## 2018-07-09 NOTE — PROGRESS NOTES
Daily Note     Today's date: 2018  Patient name: Migel Nugent  : 1955  MRN: [id number]  Referring provider: yKler Rob MD  Dx:   Encounter Diagnosis     ICD-10-CM    1  Degenerative tear of acetabular labrum of left hip M16 12    2  Left hip pain M25 552                   Subjective:   He will be seeing neurosurgeon  for his back  Hip has not been feeling too bad, back has been painful  He reports he stopped doing LB HEP    Assessment:  Spasm L lumbar, decreased after STM  Mild hip & L lateral leg discomfort with ex    Plan: Continue per plan of care  Progress treatment as tolerated  Objective: See treatment diary below    Precautions: Labral repair protocol, B/L peripheral neuropathy, L4-5 HNP, OA, Depression, TTWB    Daily Treatment Diary     Manual   6-7 6- 6   PROM within protocol   10' Gentle flx & abd x x Flex & Abd x & IR ER x   Gentle STM Low back/ glutes x  x X & hip flx & ADD x x  x x   Manual adductor and hip flex stretch      contract/ relax  x x x                                 Exercise Diary             Bike      10' 10' x x x   Quad sets 10x 10s x 10"  2x10 x x x 10sec  10x x D/C    glute sets Prone 10x 5s x 10"x10  x c/ hip ext   10sec  10x x X stand hip ext 10x x   Isometric hip ABD/ADD 10x 10s each x 10"x10 X ADD with soccer ball ABD with red TB x 10x 10x c/ 4# ball and 30x abd c/ GR 12x  c/ 4# ball  And 30x c  Green  tband 10x10 & 30 TB   x x   Ankle pumps 20x x HEP          TA activation 10x 10s x 10"x10 x x 10x 10s 10x  10s x x x   Prone quad stretch 3x 30s x 3x30"  x x  x x x   K -> C Right only 3x 30s  30"x3 x x   L 30"x3 B x x   Heel slides   2x10 x X w hip flx release 20x 20x x x x   SAQ   5"  2x10 X lg  bolster 14x 20x D/c       Quadruped Rocking(no pain)   10"x10 x x 20x  20 x x   Low bridges   2x10 x x 10x 10s 10x  10s x x x   LAQ     10   20x x 30   SLR      Flex/ abd 2x10 Flex/abd  2x10 x x x   Hip flexor stretch off table             Supine fig 4 str B        30"x3 x x   HR         20 x   Stand march         20 x   minisquat         20 x   Step up           2x6                    Modalities              estim + ice Ice only 10'    x                                        Daily Treatment Diary     Manual  6-18 6/21 6/25 6-28 7/2 7-9       STM  Hip flx,add & LB x x x x x x       PROM w man str x X hip flex str X hip flex str x x x       Hip flx release x np np                                        Exercise Diary              bike 10' x x x x x       HR 20 30x x x x x       squat 20 2x15 x x x x       Step up 6" 10R x1 & 7 L x2 8" 10x b/l 10x up & over 8" 10x ea   Up and over x x X 15x       Lat step up 6" np np   8" 10x       Stand hip abd 10 30x 20x 30 2x15 x       march 20 2x15 x x 2x15 x       Stand hip ext 20 2x15 x x 2x15 x       SLS  10x 10s x x x 15"x3                    Quad LE 10 np np          Stand ITB stretch     5x x                                                                                                                   Modalities              IFC   20'   His TENS        MH  20'  x x                     X=same as last time

## 2018-07-12 ENCOUNTER — OFFICE VISIT (OUTPATIENT)
Dept: PHYSICAL THERAPY | Facility: REHABILITATION | Age: 63
End: 2018-07-12
Payer: COMMERCIAL

## 2018-07-12 ENCOUNTER — TELEPHONE (OUTPATIENT)
Dept: PAIN MEDICINE | Facility: MEDICAL CENTER | Age: 63
End: 2018-07-12

## 2018-07-12 DIAGNOSIS — M24.152 DEGENERATIVE TEAR OF ACETABULAR LABRUM OF LEFT HIP: Primary | ICD-10-CM

## 2018-07-12 DIAGNOSIS — M25.552 LEFT HIP PAIN: ICD-10-CM

## 2018-07-12 PROCEDURE — 97110 THERAPEUTIC EXERCISES: CPT

## 2018-07-12 NOTE — PROGRESS NOTES
Daily Note     Today's date: 2018  Patient name: Tk Nunez  : 1955  MRN: [id number]  Referring provider: Jessica Jiménez MD  Dx:   Encounter Diagnosis     ICD-10-CM    1  Degenerative tear of acetabular labrum of left hip M16 12    2  Left hip pain M25 552                   Subjective:   Walked about 150' & started to have L groin & ham pain    Assessment:  Tried HR off step but gave him cramp R calf  He is also emotionally stressed today & having more pain  with stress  He felt unable to cont with ex   Worked into L ham wich was tight & painful  LB also tighter & more tender today  Leg felt better after STM, some decrease in LBP  He will go home & use MH & TENS                                         Objective: See treatment diary below    Precautions: Labral repair protocol, B/L peripheral neuropathy, L4-5 HNP, OA, Depression, TTWB    Daily Treatment Diary     Manual   5- 5- 6 6-7 6- 6-14   PROM within protocol   10' Gentle flx & abd x x Flex & Abd x & IR ER x   Gentle STM Low back/ glutes x  x X & hip flx & ADD x x  x x   Manual adductor and hip flex stretch      contract/ relax  x x x                                 Exercise Diary             Bike      10' 10' x x x   Quad sets 10x 10s x 10"  2x10 x x x 10sec  10x x D/C    glute sets Prone 10x 5s x 10"x10  x c/ hip ext   10sec  10x x X stand hip ext 10x x   Isometric hip ABD/ADD 10x 10s each x 10"x10 X ADD with soccer ball ABD with red TB x 10x 10x c/ 4# ball and 30x abd c/ GR 12x  c/ 4# ball  And 30x c  Green  tband 10x10 & 30 TB   x x   Ankle pumps 20x x HEP          TA activation 10x 10s x 10"x10 x x 10x 10s 10x  10s x x x   Prone quad stretch 3x 30s x 3x30"  x x  x x x   K -> C Right only 3x 30s  30"x3 x x   L 30"x3 B x x   Heel slides   2x10 x X w hip flx release 20x 20x x x x   SAQ   5"  2x10 X lg  bolster 14x 20x D/c       Quadruped Rocking(no pain)   10"x10 x x 20x  20 x x   Low bridges 2x10 x x 10x 10s 10x  10s x x x   LAQ     10   20x x 30   SLR      Flex/ abd 2x10 Flex/abd  2x10 x x x   Hip flexor stretch off table             Supine fig 4 str B        30"x3 x x   HR         20 x   Stand march         20 x   minisquat         20 x   Step up           2x6                    Modalities              estim + ice Ice only 10'    x                                        Daily Treatment Diary     Manual  6-18 6/21 6/25 6-28 7/2 7-9 7-12      STM  Hip flx,add & LB x x x x x x X & L ham      PROM w man str x X hip flex str X hip flex str x x x       Hip flx release x np np                                        Exercise Diary              bike 10' x x x x x x      HR 20 30x x x x x 8x off step No step     squat 20 2x15 x x x x x      Step up 6" 10R x1 & 7 L x2 8" 10x b/l 10x up & over 8" 10x ea   Up and over x x X 15x NT      Lat step up 6" np np   8" 10x NT      Stand hip abd 10 30x 20x 30 2x15 x X 15      march 20 2x15 x x 2x15 x x      Stand hip ext 20 2x15 x x 2x15 x NT      SLS  10x 10s x x x 15"x3 NT                   Quad LE 10 np np          Stand ITB stretch     5x x NT                                                                                                                  Modalities              IFC   20'   His TENS        MH  20'  x x                     X=same as last time      PLAN: cont as aga, LBP making progress difficult

## 2018-07-12 NOTE — TELEPHONE ENCOUNTER
Pt's said his current Medications might be changing do to his Marquez Iha test results and the pt would like for the nurses and Jayleen Molina to review the results and follow up with the pt regarding any pain meds he might be taking    Please advise  Pt has a schd appt for Jayleen Molina on 8/8/18

## 2018-07-13 ENCOUNTER — TELEPHONE (OUTPATIENT)
Dept: GASTROENTEROLOGY | Facility: AMBULARY SURGERY CENTER | Age: 63
End: 2018-07-13

## 2018-07-13 DIAGNOSIS — A04.8 H. PYLORI INFECTION: Primary | ICD-10-CM

## 2018-07-17 ENCOUNTER — OFFICE VISIT (OUTPATIENT)
Dept: PHYSICAL THERAPY | Facility: REHABILITATION | Age: 63
End: 2018-07-17
Payer: COMMERCIAL

## 2018-07-17 DIAGNOSIS — M25.552 LEFT HIP PAIN: ICD-10-CM

## 2018-07-17 DIAGNOSIS — M24.152 DEGENERATIVE TEAR OF ACETABULAR LABRUM OF LEFT HIP: Primary | ICD-10-CM

## 2018-07-17 PROCEDURE — 97140 MANUAL THERAPY 1/> REGIONS: CPT | Performed by: PHYSICAL THERAPIST

## 2018-07-17 NOTE — PROGRESS NOTES
Daily Note     Today's date: 2018  Patient name: Tk Nunez  : 1955  MRN: [id number]  Referring provider: Jessica Jiménez MD  Dx:   Encounter Diagnosis     ICD-10-CM    1  Degenerative tear of acetabular labrum of left hip M16 12    2  Left hip pain M25 552                   Subjective:   Pt reports that his left IT band tightness  He reports that he has stopped doing his LB exercises  He reports that his pain is about a 2/10 in the low back  He reports that his home TENs unit has been helping him but he would like some help to make sure his set up is right  He states he is still getting his nerve pain despite his compliance with lyrica  Assessment: Pt seemed in fair spirits today with being emotionally stressed at the beginning of the session  Pt tolerated exercises regimen with taking frequent rest breaks  Pt had a verbal report of IT band tightness  Pt was shown IT band stretches and reported he had some relief with it  Pt initiated double knees to chest and felt relief in low back and extremity symptoms  Pt was instructed if he does this exercise at home to stop the exercise if the pain gets worse and goes down the leg, and that if the pain centralizes he can continue  Pt verbalized understanding of the centralization/peripherilzation process and when to continue exercise and when to stop exercise  Pt appeared to be pleased with the relief he had with double knees to chest  He additionally he had a verbal report of relief with STM  He requested to go over TENS unit, did not have any trouble with set up  Pt instructed on appropriate set up of the leads and pads, pt verbalized understanding  Pt would benefit from skilled outpatient PT to address his pain, strength, and flexibility in order to maximize his level of function                                   Objective: See treatment diary below    Precautions: Labral repair protocol, B/L peripheral neuropathy, L4-5 HNP, OA, Depression, TTWB  PLAN: cont as aga, LBP making progress difficult    Daily Treatment Diary     Manual  5/14 5/17 5/22 5-24 5-29 6/1 6/4 6-7 6-11 6-14   PROM within protocol   10' Gentle flx & abd x x Flex & Abd x & IR ER x   Gentle STM Low back/ glutes x  x X & hip flx & ADD x x  x x   Manual adductor and hip flex stretch      contract/ relax  x x x                                 Exercise Diary   5/17 5/22          Bike      10' 10' x x x   Quad sets 10x 10s x 10"  2x10 x x x 10sec  10x x D/C    glute sets Prone 10x 5s x 10"x10  x c/ hip ext   10sec  10x x X stand hip ext 10x x   Isometric hip ABD/ADD 10x 10s each x 10"x10 X ADD with soccer ball ABD with red TB x 10x 10x c/ 4# ball and 30x abd c/ GR 12x  c/ 4# ball  And 30x c  Green  tband 10x10 & 30 TB   x x   Ankle pumps 20x x HEP          TA activation 10x 10s x 10"x10 x x 10x 10s 10x  10s x x x   Prone quad stretch 3x 30s x 3x30"  x x  x x x   K -> C Right only 3x 30s  30"x3 x x   L 30"x3 B x x   Heel slides   2x10 x X w hip flx release 20x 20x x x x   SAQ   5"  2x10 X lg  bolster 14x 20x D/c       Quadruped Rocking(no pain)   10"x10 x x 20x  20 x x   Low bridges   2x10 x x 10x 10s 10x  10s x x x   LAQ     10   20x x 30   SLR      Flex/ abd 2x10 Flex/abd  2x10 x x x   Hip flexor stretch off table             Supine fig 4 str B        30"x3 x x   HR         20 x   Stand march         20 x   minisquat         20 x   Step up           2x6                    Modalities              estim + ice Ice only 10'    x                                        Daily Treatment Diary     Manual  6-18 6/21 6/25 6-28 7/2 7-9 7-12 7/17     STM  Hip flx,add & LB x x x x x x X & L ham x     PROM w man str x X hip flex str X hip flex str x x x  x     Hip flx release x np np                                        Exercise Diary              bike 10' x x x x x x x     HR 20 30x x x x x 8x off step xNo step     squat 20 2x15 x x x x x x     Step up 6" 10R x1 & 7 L x2 8" 10x b/l 10x up & over 8" 10x ea  Up and over x x X 15x NT x     Lat step up 6" np np   8" 10x NT x     Stand hip abd 10 30x 20x 30 2x15 x X 15 x     march 20 2x15 x x 2x15 x x x     Stand hip ext 20 2x15 x x 2x15 x NT x     SLS  10x 10s x x x 15"x3 NT                   Quad LE 10 np np          Stand ITB stretch     5x x NT x                                                                                                                 Modalities              IFC   20'   His TENS        MH  20'  x x                     X=same as last time

## 2018-07-20 ENCOUNTER — OFFICE VISIT (OUTPATIENT)
Dept: PHYSICAL THERAPY | Facility: REHABILITATION | Age: 63
End: 2018-07-20
Payer: COMMERCIAL

## 2018-07-20 DIAGNOSIS — M24.152 DEGENERATIVE TEAR OF ACETABULAR LABRUM OF LEFT HIP: Primary | ICD-10-CM

## 2018-07-20 DIAGNOSIS — M25.552 LEFT HIP PAIN: ICD-10-CM

## 2018-07-20 PROCEDURE — 97140 MANUAL THERAPY 1/> REGIONS: CPT | Performed by: PHYSICAL THERAPIST

## 2018-07-20 NOTE — PROGRESS NOTES
Daily Note     Today's date: 2018  Patient name: Sharita Linder  : 1955  MRN: [id number]  Referring provider: Jorge Alberto Haro MD  Dx:   Encounter Diagnosis     ICD-10-CM    1  Degenerative tear of acetabular labrum of left hip M16 12    2  Left hip pain M25 552               Subjective:   Pt reporting ongoing distal ITB pain  Level 4/10  He likes the [facility] FR[facility] stretch  Pt  Going to neurosurgeon next week () for consult of low back  Assessment: Instructed patient in supine ITB stretch - crossing extended leg over midline with appeared to be beneficial for stretching the ITB area  Issued written/pictorial hep for table exercises  Objective: See treatment diary below    Precautions: Labral repair protocol, B/L peripheral neuropathy, L4-5 HNP, OA, Depression    PLAN: cont as aga, LBP making progress difficult    Daily Treatment Diary     Manual   5- 5- 6 6-7 6- 6-   PROM within protocol   10' Gentle flx & abd x x Flex & Abd x & IR ER x   Gentle STM Low back/ glutes x  x X & hip flx & ADD x x  x x   Manual adductor and hip flex stretch      contract/ relax  x x x                                 Exercise Diary             Bike      10' 10' x x x   Quad sets 10x 10s x 10"  2x10 x x x 10sec  10x x D/C    glute sets Prone 10x 5s x 10"x10  x c/ hip ext   10sec  10x x X stand hip ext 10x x   Isometric hip ABD/ADD 10x 10s each x 10"x10 X ADD with soccer ball ABD with red TB x 10x 10x c/ 4# ball and 30x abd c/ GR 12x  c/ 4# ball  And 30x c  Green  tband 10x10 & 30 TB   x x   Ankle pumps 20x x HEP          TA activation 10x 10s x 10"x10 x x 10x 10s 10x  10s x x x   Prone quad stretch 3x 30s x 3x30"  x x  x x x   K -> C Right only 3x 30s  30"x3 x x   L 30"x3 B x x   Heel slides   2x10 x X w hip flx release 20x 20x x x x   SAQ   5"  2x10 X lg  bolster 14x 20x D/c       Quadruped Rocking(no pain)   10"x10 x x 20x  20 x x   Low bridges   2x10 x x 10x 10s 10x  10s x x x   LAQ     10   20x x 30   SLR      Flex/ abd 2x10 Flex/abd  2x10 x x x   Hip flexor stretch off table             Supine fig 4 str B        30"x3 x x   HR         20 x   Stand march         20 x   minisquat         20 x   Step up           2x6                    Modalities              estim + ice Ice only 10'    x                                        Daily Treatment Diary     Manual  6-18 6/21 6/25 6-28 7/2 7-9 7-12 7/17 7-20    STM  Hip flx,add & LB x x x x x x X & L ham x x    PROM w man str x X hip flex str X hip flex str x x x  x x    Hip flx release x np np                                        Exercise Diary              bike 10' x x x x x x x x    HR 20 30x x x x x 8x off step xNo step x    squat 20 2x15 x x x x x x x    Step up 6" 10R x1 & 7 L x2 8" 10x b/l 10x up & over 8" 10x ea   Up and over x x X 15x NT x x    Lat step up 6" np np   8" 10x NT x x    Stand hip abd 10 30x 20x 30 2x15 x X 15 x x    march 20 2x15 x x 2x15 x x x x    Stand hip ext 20 2x15 x x 2x15 x NT x x    SLS  10x 10s x x x 15"x3 NT  x                 Quad LE 10 np np          Stand ITB stretch     5x x NT x hold    ITB stretch - supine leg cross over midline         15"x3                                                                                                   Modalities              IFC   20'   His TENS        MH  20'  x x                     X=same as last time

## 2018-07-24 ENCOUNTER — OFFICE VISIT (OUTPATIENT)
Dept: PHYSICAL THERAPY | Facility: REHABILITATION | Age: 63
End: 2018-07-24
Payer: COMMERCIAL

## 2018-07-24 DIAGNOSIS — M24.152 DEGENERATIVE TEAR OF ACETABULAR LABRUM OF LEFT HIP: Primary | ICD-10-CM

## 2018-07-24 DIAGNOSIS — M25.552 LEFT HIP PAIN: ICD-10-CM

## 2018-07-24 PROCEDURE — 97110 THERAPEUTIC EXERCISES: CPT

## 2018-07-24 NOTE — PROGRESS NOTES
Daily Note     Today's date: 2018  Patient name: Rhonda Serrnao  : 1955  MRN: [id number]  Referring provider: Elise Brown MD  Dx:   Encounter Diagnosis     ICD-10-CM    1  Degenerative tear of acetabular labrum of left hip M16 12    2  Left hip pain M25 552               Subjective:   Pt reporting ITB pain persists   Reports he is starting to feel stronger    Assessment:  fatigue & soreness with ex  Adductor & distal ITB tightness, decreased with STM  Decreased LBP after STM                               Objective: See treatment diary below    Precautions: Labral repair protocol, B/L peripheral neuropathy, L4-5 HNP, OA, Depression    PLAN: cont as aga, LBP making progress difficult    Daily Treatment Diary     Manual   6- 6-   PROM within protocol   10' Gentle flx & abd x x Flex & Abd x & IR ER x   Gentle STM Low back/ glutes x  x X & hip flx & ADD x x  x x   Manual adductor and hip flex stretch      contract/ relax  x x x                                 Exercise Diary             Bike      10' 10' x x x   Quad sets 10x 10s x 10"  2x10 x x x 10sec  10x x D/C    glute sets Prone 10x 5s x 10"x10  x c/ hip ext   10sec  10x x X stand hip ext 10x x   Isometric hip ABD/ADD 10x 10s each x 10"x10 X ADD with soccer ball ABD with red TB x 10x 10x c/ 4# ball and 30x abd c/ GR 12x  c/ 4# ball  And 30x c  Green  tband 10x10 & 30 TB   x x   Ankle pumps 20x x HEP          TA activation 10x 10s x 10"x10 x x 10x 10s 10x  10s x x x   Prone quad stretch 3x 30s x 3x30"  x x  x x x   K -> C Right only 3x 30s  30"x3 x x   L 30"x3 B x x   Heel slides   2x10 x X w hip flx release 20x 20x x x x   SAQ   5"  2x10 X lg  bolster 14x 20x D/c       Quadruped Rocking(no pain)   10"x10 x x 20x  20 x x   Low bridges   2x10 x x 10x 10s 10x  10s x x x   LAQ     10   20x x 30   SLR      Flex/ abd 2x10 Flex/abd  2x10 x x x   Hip flexor stretch off table             Supine fig 4 str B 30"x3 x x   HR         20 x   Stand march         20 x   minisquat         20 x   Step up           2x6                    Modalities              estim + ice Ice only 10'    x                                        Daily Treatment Diary     Manual  6-18 6/21 6/25 6-28 7/2 7-9 7-12 7/17 7-20 7-24   STM  Hip flx,add & LB x x x x x x X & L ham x x x   PROM w man str x X hip flex str X hip flex str x x x  x x x   Hip flx release x np np       x   Man pirif str                     x  x       Exercise Diary              bike 10' x x x x x x x x x   HR 20 30x x x x x 8x off step xNo step x x   squat 20 2x15 x x x x x x x x   Step up 6" 10R x1 & 7 L x2 8" 10x b/l 10x up & over 8" 10x ea   Up and over x x X 15x NT x x x   Lat step up 6" np np   8" 10x NT x x x   Stand hip abd 10 30x 20x 30 2x15 x X 15 x x X 2x15   march 20 2x15 x x 2x15 x x x x x   Stand hip ext 20 2x15 x x 2x15 x NT x x 2x15   SLS  10x 10s x x x 15"x3 NT  x NT                Quad LE 10 np np          Stand ITB stretch     5x x NT x hold    ITB stretch - supine leg cross over midline         15"x3 x   D K-C          30"x 4                                                                                     Modalities              IFC   20'   His TENS        MH  20'  x x                     X=same as last time

## 2018-07-25 ENCOUNTER — OFFICE VISIT (OUTPATIENT)
Dept: OBGYN CLINIC | Facility: CLINIC | Age: 63
End: 2018-07-25
Payer: OTHER MISCELLANEOUS

## 2018-07-25 VITALS
DIASTOLIC BLOOD PRESSURE: 87 MMHG | WEIGHT: 190.8 LBS | HEART RATE: 89 BPM | SYSTOLIC BLOOD PRESSURE: 120 MMHG | BODY MASS INDEX: 23.72 KG/M2 | HEIGHT: 75 IN

## 2018-07-25 DIAGNOSIS — M51.36 DDD (DEGENERATIVE DISC DISEASE), LUMBAR: ICD-10-CM

## 2018-07-25 DIAGNOSIS — M21.371 RIGHT FOOT DROP: ICD-10-CM
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
## 2018-07-25 DIAGNOSIS — M54.16 LUMBAR RADICULOPATHY: ICD-10-CM

## 2018-07-25 DIAGNOSIS — M54.16 LUMBAR RADICULOPATHY: Primary | ICD-10-CM

## 2018-07-25 PROCEDURE — 99214 OFFICE O/P EST MOD 30 MIN: CPT | Performed by: ORTHOPAEDIC SURGERY

## 2018-07-25 NOTE — ASSESSMENT & PLAN NOTE
Patient presents for evaluation of lower back as well as right leg pain  He carries a diagnosis of lumbar DDD as well as right-sided disc herniation at L4-5  He has been diagnosis spinal stenosis and radiculopathy  He did try conservative management at another treating facility which included epidural steroid injections without lasting relief  Reports residual weakness with foot dorsiflexion on the right but his chief complaint today is axial back pain worse with activity  Does not report incontinence of bowel or bladder  On physical exam he appears stated age  He has foot dorsiflexion weakness on the right 3+ out of 5 compared to the contralateral   Negative modified straight leg raise bilaterally  Otherwise good strength L2-S1 to the contralateral lower extremity  Reflexes are hypoactive at L4 and S1 symmetrically  Lumbar spine MRI demonstrates DDD globally with stenosis on the right at L4-5    Assessment and plan  Axial back pain  Right lumbar radiculopathy with history of  Footdrop    I recommend conservative management including lumbar rhizotomy is 4 axial back pain    He is a candidate ultimately for lumbar decompression and fusion surgery L4-S1 should all conservative management options fail

## 2018-07-25 NOTE — PROGRESS NOTES
Assessment/Plan:    Lumbar radiculopathy - Right  Patient presents for evaluation of lower back as well as right leg pain  He carries a diagnosis of lumbar DDD as well as right-sided disc herniation at L4-5  He has been diagnosis spinal stenosis and radiculopathy  He did try conservative management at another treating facility which included epidural steroid injections without lasting relief  Reports residual weakness with foot dorsiflexion on the right but his chief complaint today is axial back pain worse with activity  Does not report incontinence of bowel or bladder  On physical exam he appears stated age  He has foot dorsiflexion weakness on the right 3+ out of 5 compared to the contralateral   Negative modified straight leg raise bilaterally  Otherwise good strength L2-S1 to the contralateral lower extremity  Reflexes are hypoactive at L4 and S1 symmetrically  Lumbar spine MRI demonstrates DDD globally with stenosis on the right at L4-5    Assessment and plan  Axial back pain  Right lumbar radiculopathy with history of  Footdrop    I recommend conservative management including lumbar rhizotomy is 4 axial back pain  He is a candidate ultimately for lumbar decompression and fusion surgery L4-S1 should all conservative management options fail         Problem List Items Addressed This Visit     Lumbar radiculopathy - Right - Primary     Patient presents for evaluation of lower back as well as right leg pain  He carries a diagnosis of lumbar DDD as well as right-sided disc herniation at L4-5  He has been diagnosis spinal stenosis and radiculopathy  He did try conservative management at another treating facility which included epidural steroid injections without lasting relief  Reports residual weakness with foot dorsiflexion on the right but his chief complaint today is axial back pain worse with activity  Does not report incontinence of bowel or bladder       On physical exam he appears stated age   He has foot dorsiflexion weakness on the right 3+ out of 5 compared to the contralateral   Negative modified straight leg raise bilaterally  Otherwise good strength L2-S1 to the contralateral lower extremity  Reflexes are hypoactive at L4 and S1 symmetrically  Lumbar spine MRI demonstrates DDD globally with stenosis on the right at L4-5    Assessment and plan  Axial back pain  Right lumbar radiculopathy with history of  Footdrop    I recommend conservative management including lumbar rhizotomy is 4 axial back pain  He is a candidate ultimately for lumbar decompression and fusion surgery L4-S1 should all conservative management options fail         Work related injury    Right foot drop      Other Visit Diagnoses     DDD (degenerative disc disease), lumbar                Subjective:      Patient ID: Lala Britton is a 60 y o  male  HPI   The patient presents to the office today for an initial visit of lower back pain  The patient states that this is a result of a work injury in 2015 from opening a box  His pain is in his low back and goes down his right leg to his foot  He has associated numbness and tingling  He notes a foot drop on the right  He initially started physical therapy at that time  Physical therapy worsened his pain and caused left hip pain in addition to his right leg symptoms  He notes that litigation is now involved in this situation  He recently had a left hip arthroplasty with labral debridement on 04/30/2018  He controls his pain with tramadol and acetaminophen  The following portions of the patient's history were reviewed and updated as appropriate: allergies, current medications, past family history, past medical history, past social history, past surgical history and problem list     Review of Systems   Constitutional: Negative for chills and fever  HENT: Negative for sneezing and sore throat  Eyes: Negative for discharge and redness     Respiratory: Negative for cough and choking  Gastrointestinal: Negative for nausea and vomiting  Musculoskeletal: Positive for back pain  Skin: Negative for rash and wound  Neurological: Positive for numbness  Negative for dizziness  Psychiatric/Behavioral: Negative for confusion  The patient is not nervous/anxious  Objective:      /87   Pulse 89   Ht 6' 3" (1 905 m)   Wt 86 5 kg (190 lb 12 8 oz)   BMI 23 85 kg/m²          Physical Exam   Constitutional: He is oriented to person, place, and time  He appears well-developed and well-nourished  Eyes: Pupils are equal, round, and reactive to light  Pulmonary/Chest: Effort normal and breath sounds normal    Neurological: He is alert and oriented to person, place, and time  Skin: Skin is warm and dry  Psychiatric: He has a normal mood and affect   His behavior is normal        Ambulates without an assistive device   Neigitive straight leg raise bilaterally   Strength right foot dorsiflextion 3+/5, left L2-S1 5/5  Reflexes 2+ at L4 bilaterally, hypoactive at S1 bilaterally

## 2018-07-27 ENCOUNTER — OFFICE VISIT (OUTPATIENT)
Dept: PHYSICAL THERAPY | Facility: REHABILITATION | Age: 63
End: 2018-07-27
Payer: COMMERCIAL

## 2018-07-27 DIAGNOSIS — M25.552 LEFT HIP PAIN: ICD-10-CM

## 2018-07-27 DIAGNOSIS — M24.152 DEGENERATIVE TEAR OF ACETABULAR LABRUM OF LEFT HIP: Primary | ICD-10-CM

## 2018-07-27 PROCEDURE — 97110 THERAPEUTIC EXERCISES: CPT | Performed by: PHYSICAL THERAPIST

## 2018-07-27 NOTE — PROGRESS NOTES
Daily Note     Today's date: 2018  Patient name: Nir Childs  : 1955  MRN: [id number]  Referring provider: Toro Sullivan MD  Dx:   Encounter Diagnosis     ICD-10-CM    1  Degenerative tear of acetabular labrum of left hip M16 12    2  Left hip pain M25 552               Subjective:   Pt reports that he has a bad couple of days  He states he is heavily relying on pain meds to control the pain  Pt reports that he felt good after his soft tissue last time  Assessment:  fatigue & soreness with ex  Adductor & distal ITB tightness, decreased with STM  Decreased LBP after STM                               Objective: See treatment diary below    Precautions: Labral repair protocol, B/L peripheral neuropathy, L4-5 HNP, OA, Depression    PLAN: Pt was very disgruntled throughout visit about his pain  He had a moderate tightness in L  Piriformis muscle and he had favorable response to soft tissue work as demonstrated by a verbal report of improvement  He was challenged and fatigued with correct form with squats as demonstrated by the need for frequent rest breaks  Daily Treatment Diary     Manual   5 6 6-7 6- 6   PROM within protocol   10' Gentle flx & abd x x Flex & Abd x & IR ER x   Gentle STM Low back/ glutes x  x X & hip flx & ADD x x  x x   Manual adductor and hip flex stretch      contract/ relax  x x x                                 Exercise Diary             Bike      10' 10' x x x   Quad sets 10x 10s x 10"  2x10 x x x 10sec  10x x D/C    glute sets Prone 10x 5s x 10"x10  x c/ hip ext   10sec  10x x X stand hip ext 10x x   Isometric hip ABD/ADD 10x 10s each x 10"x10 X ADD with soccer ball ABD with red TB x 10x 10x c/ 4# ball and 30x abd c/ GR 12x  c/ 4# ball  And 30x c  Green  tband 10x10 & 30 TB   x x   Ankle pumps 20x x HEP          TA activation 10x 10s x 10"x10 x x 10x 10s 10x  10s x x x   Prone quad stretch 3x 30s x 3x30"  x x  x x x   K -> C Right only 3x 30s  30"x3 x x   L 30"x3 B x x   Heel slides   2x10 x X w hip flx release 20x 20x x x x   SAQ   5"  2x10 X lg  bolster 14x 20x D/c       Quadruped Rocking(no pain)   10"x10 x x 20x  20 x x   Low bridges   2x10 x x 10x 10s 10x  10s x x x   LAQ     10   20x x 30   SLR      Flex/ abd 2x10 Flex/abd  2x10 x x x   Hip flexor stretch off table             Supine fig 4 str B        30"x3 x x   HR         20 x   Stand march         20 x   minisquat         20 x   Step up           2x6                    Modalities              estim + ice Ice only 10'    x                                        Daily Treatment Diary     Manual  7/27 6/21 6/25 6-28 7/2 7-9 7-12 7/17 7-20 7-24   STM  Hip flx,add & LB x x x x x x X & L ham x x x   PROM w man str  X hip flex str X hip flex str x x x  x x x   Hip flx release  np np       x   Man pirif str                     x  x       Exercise Diary              bike 10' x x x x x x x x x   HR x30 30x x x x x 8x off step xNo step x x   squat 2x15 2x15 x x x x x x x x   Step up 8" 10x ea  Up and over 8" 10x b/l 10x up & over 8" 10x ea   Up and over x x X 15x NT x x x   Lat step up 8" 10x np np   8" 10x NT x x x   Stand hip abd 2x15 30x 20x 30 2x15 x X 15 x x X 2x15   march 2x15 2x15 x x 2x15 x x x x x   Stand hip ext 2x15 2x15 x x 2x15 x NT x x 2x15   SLS  10x 10s x x x 15"x3 NT  x NT                Quad LE  np np          Stand ITB stretch     5x x NT x hold    ITB stretch - supine leg cross over midline         15"x3 x   D K-C 30"x 4         30"x 4                                                                                     Modalities              IFC   20'   His TENS        MH  20'  x x                     X=same as last time

## 2018-08-01 ENCOUNTER — OFFICE VISIT (OUTPATIENT)
Dept: OBGYN CLINIC | Facility: CLINIC | Age: 63
End: 2018-08-01
Payer: COMMERCIAL

## 2018-08-01 VITALS
BODY MASS INDEX: 23.62 KG/M2 | WEIGHT: 190 LBS | HEIGHT: 75 IN | SYSTOLIC BLOOD PRESSURE: 105 MMHG | HEART RATE: 89 BPM | DIASTOLIC BLOOD PRESSURE: 72 MMHG

## 2018-08-01 DIAGNOSIS — M24.152 DEGENERATIVE TEAR OF ACETABULAR LABRUM OF LEFT HIP: Primary | ICD-10-CM

## 2018-08-01 PROCEDURE — 99213 OFFICE O/P EST LOW 20 MIN: CPT | Performed by: ORTHOPAEDIC SURGERY

## 2018-08-01 NOTE — PROGRESS NOTES
Patient Name:  Kenny Gan  MRN:  [id number]    Assessment & Plan    Left hip arthroscopic labral debridement 4/30/18  1  Continue physical therapy, HEP as appropriate  2  Activities as tolerated with modification to avoid pain  3  Continue close follow-up with pain management with regards to his lumbar spine  4  Follow-up as needed      Subjective    68-year-old male returns to the office today status post Left hip arthroscopic labral debridement 4/30/18  Today he notes persistent discomfort in the left hip primarily in the groin  He also notes discomfort in the left buttock region  He denies any weakness or instability  He is participating in physical therapy  No fevers or chills  He has also seen Dr Angle Braxton for his lumbar spine issues and pain management as well and is scheduled for epidural steroid injections in the near future    General ROS:  Negative for fever, lethargy/malaise, or night sweats  Neurological ROS:  Negative for confusion or numbness/tingling  Objective    /72   Pulse 89   Ht 6' 3" (1 905 m)   Wt 86 2 kg (190 lb)   BMI 23 75 kg/m²     Left hip:  No gross deformity  No tenderness to palpation greater trochanter and anterior hip  Hip range of motion is intact and nearly full in all planes with discomfort noted with internal rotation    Negative FADDIR and LOBO test   Negative logroll test   Negative straight leg raise and resisted straight leg raise test    Scribe Attestation    I,:   Hollis Mi PA-C am acting as a scribe while in the presence of the attending physician :        I,:   Manju Trevino MD personally performed the services described in this documentation    as scribed in my presence :

## 2018-08-03 ENCOUNTER — OFFICE VISIT (OUTPATIENT)
Dept: PHYSICAL THERAPY | Facility: REHABILITATION | Age: 63
End: 2018-08-03
Payer: COMMERCIAL

## 2018-08-03 DIAGNOSIS — M25.552 LEFT HIP PAIN: ICD-10-CM

## 2018-08-03 DIAGNOSIS — M24.152 DEGENERATIVE TEAR OF ACETABULAR LABRUM OF LEFT HIP: Primary | ICD-10-CM

## 2018-08-03 PROCEDURE — 97110 THERAPEUTIC EXERCISES: CPT

## 2018-08-03 NOTE — PROGRESS NOTES
Daily Note     Today's date: 8/3/2018  Patient name: Lamar Ramirez  : 1955  MRN: [id number]  Referring provider: Vikash Reaves MD  Dx:   Encounter Diagnosis     ICD-10-CM    1  Degenerative tear of acetabular labrum of left hip M16 12    2  Left hip pain M25 552      Time in: 1045 am  Time out: 1135 am  Total time: 50 minutes    Subjective: Patient reports mod discomfort today noting that his short drive over here really created pain to the low back  Pt reports that he should be doing the hip flexor stretch before he walks  Patient is very talkitive and descibes everything that is going on in great detail  Objective: See treatment diary below  Educated patient on improved form for mini squats  Patient tends to squat too deep  Assessment: Patient has continued pain with increased activity that is decreased by seated rest breaks  Patient demonstrates fair TE form  PLAN: continue PT POC    Precautions: Labral repair protocol, B/L peripheral neuropathy, L4-5 HNP, OA, Depression    Daily Treatment Diary     Manual  8/3  5/22 5 5- 6-7 6-11 6-14   PROM within protocol np  10' Gentle flx & abd x x Flex & Abd x & IR ER x   Gentle STM np   x X & hip flx & ADD x x  x x   Manual adductor and hip flex stretch x     contract/ relax  x x x                                 Exercise Diary  8/3  5/22          Bike x     10' 10' x x x   Quad sets d/c  10"  2x10 x x x 10sec  10x x D/C    glute sets x  10"x10  x c/ hip ext   10sec  10x x X stand hip ext 10x x   Isometric hip ABD/ADD x  10"x10 X ADD with soccer ball ABD with red TB x 10x 10x c/ 4# ball and 30x abd c/ GR 12x  c/ 4# ball  And 30x c  Green  tband 10x10 & 30 TB   x x   Ankle pumps HEP  HEP          TA activation x  10"x10 x x 10x 10s 10x  10s x x x   Prone quad stretch x  3x30"  x x  x x x   K -> C Right only x  30"x3 x x   L 30"x3 B x x   Heel slides x  2x10 x X w hip flx release 20x 20x x x x   SAQ x  5"  2x10 X lg  bolster 14x 20x D/c Quadruped Rocking(no pain) x  10"x10 x x 20x  20 x x   Low bridges x  2x10 x x 10x 10s 10x  10s x x x   LAQ     10   20x x 30   SLR x     Flex/ abd 2x10 Flex/abd  2x10 x x x   Hip flexor stretch off table x            Supine fig 4 str B x       30"x3 x x   HR x        20 x   Stand march x        20 x   minisquat x        20 x   Step up  x         2x6                    Modalities  8/3            estim + ice denied    x

## 2018-08-07 ENCOUNTER — OFFICE VISIT (OUTPATIENT)
Dept: PHYSICAL THERAPY | Facility: REHABILITATION | Age: 63
End: 2018-08-07
Payer: COMMERCIAL

## 2018-08-07 DIAGNOSIS — M24.152 DEGENERATIVE TEAR OF ACETABULAR LABRUM OF LEFT HIP: Primary | ICD-10-CM

## 2018-08-07 DIAGNOSIS — M25.552 LEFT HIP PAIN: ICD-10-CM

## 2018-08-07 DIAGNOSIS — K21.9 GASTROESOPHAGEAL REFLUX DISEASE WITHOUT ESOPHAGITIS: ICD-10-CM

## 2018-08-07 DIAGNOSIS — K29.50 CHRONIC GASTRITIS WITHOUT BLEEDING, UNSPECIFIED GASTRITIS TYPE: ICD-10-CM

## 2018-08-07 PROCEDURE — 97112 NEUROMUSCULAR REEDUCATION: CPT

## 2018-08-07 PROCEDURE — 97140 MANUAL THERAPY 1/> REGIONS: CPT

## 2018-08-07 PROCEDURE — 97110 THERAPEUTIC EXERCISES: CPT

## 2018-08-07 NOTE — PROGRESS NOTES
Daily Note     Today's date: 2018  Patient name: Moose Doherty  : 1955  MRN: [id number]  Referring provider: Usha Doran MD  Dx:   Encounter Diagnosis     ICD-10-CM    1  Degenerative tear of acetabular labrum of left hip M16 12    2  Left hip pain M25 552      Time in: 1045 am  Time out: 1135 am  Total time: 50 minutes    Subjective: Patient reports ITB pain has resolved since doing stand quad str  w some rotation  He is more sore in hip itself & into adductors    Objective: See treatment diary below      Assessment:   LBP cont to be an issue with progressing ex  Worked into adductors while manually stretching him  Instr in add str for hep  PLAN: continue PT POC    Precautions: Labral repair protocol, B/L peripheral neuropathy, L4-5 HNP, OA, Depression    Daily Treatment Diary     Manual  8/3 8- 5- 5- 6-7 6-11 6-14   PROM within protocol np  10' Gentle flx & abd x x Flex & Abd x & IR ER x   Gentle STM np X ADD & LB  x X & hip flx & ADD x x  x x   Manual adductor and hip flex stretch x ADD    contract/ relax  x x x                                 Exercise Diary  8/3  5/22          Bike x x    10' 10' x x x   Quad sets d/c  10"  2x10 x x x 10sec  10x x D/C    glute sets x  10"x10  x c/ hip ext   10sec  10x x X stand hip ext 10x x   Isometric hip ABD/ADD x  10"x10 X ADD with soccer ball ABD with red TB x 10x 10x c/ 4# ball and 30x abd c/ GR 12x  c/ 4# ball  And 30x c  Green  tband 10x10 & 30 TB   x x   Ankle pumps HEP  HEP          TA activation x  10"x10 x x 10x 10s 10x  10s x x x   Prone quad stretch x  3x30"  x x  x x x   K -> C Right only x  30"x3 x x   L 30"x3 B x x   Heel slides x  2x10 x X w hip flx release 20x 20x x x x   SAQ x  5"  2x10 X lg  bolster 14x 20x D/c       Quadruped Rocking(no pain) x  10"x10 x x 20x  20 x x   Low bridges x  2x10 x x 10x 10s 10x  10s x x x   LAQ     10   20x x 30   SLR x     Flex/ abd 2x10 Flex/abd  2x10 x x x   Hip flexor stretch off table x & supine ITB           Supine fig 4 str B x  & supine add str      30"x3 x x   HR x 2x15       20 x   Stand march x 2x15       20 x   minisquat x 2x15       20 x   Step up  x         2x6   Stand hip abd, ext  2x15               Modalities  8/3            estim + ice denied    x

## 2018-08-08 ENCOUNTER — OFFICE VISIT (OUTPATIENT)
Dept: PAIN MEDICINE | Facility: MEDICAL CENTER | Age: 63
End: 2018-08-08
Payer: OTHER MISCELLANEOUS

## 2018-08-08 VITALS
SYSTOLIC BLOOD PRESSURE: 100 MMHG | BODY MASS INDEX: 23.62 KG/M2 | HEART RATE: 74 BPM | HEIGHT: 75 IN | DIASTOLIC BLOOD PRESSURE: 68 MMHG | WEIGHT: 190 LBS

## 2018-08-08 DIAGNOSIS — M47.816 LUMBAR SPONDYLOSIS: ICD-10-CM

## 2018-08-08 DIAGNOSIS — M54.16 LUMBAR RADICULOPATHY: Primary | ICD-10-CM

## 2018-08-08 DIAGNOSIS — M79.18 MYOFASCIAL PAIN SYNDROME: ICD-10-CM

## 2018-08-08 PROCEDURE — 99214 OFFICE O/P EST MOD 30 MIN: CPT | Performed by: NURSE PRACTITIONER

## 2018-08-08 RX ORDER — OMEPRAZOLE 40 MG/1
CAPSULE, DELAYED RELEASE ORAL
Qty: 30 CAPSULE | Refills: 3 | OUTPATIENT
Start: 2018-08-08

## 2018-08-08 RX ORDER — MELOXICAM 15 MG/1
15 TABLET ORAL DAILY
Qty: 30 TABLET | Refills: 2 | Status: SHIPPED | OUTPATIENT
Start: 2018-08-08 | End: 2018-09-13

## 2018-08-08 RX ORDER — METHOCARBAMOL 750 MG/1
750 TABLET, FILM COATED ORAL
Qty: 30 TABLET | Refills: 2 | Status: SHIPPED | OUTPATIENT
Start: 2018-08-08 | End: 2018-09-13

## 2018-08-08 NOTE — PROGRESS NOTES
Assessment:  1  Lumbar radiculopathy - Right    2  Lumbar spondylosis - Bilateral    3  Myofascial pain syndrome        Plan: We will schedule the patient for a bilateral L3-5 facet medial branch blocks with intention of moving forward towards radiofrequency ablation if there is an appropriate diagnostic response  The initial blocks will be performed with 2% lidocaine and if an appropriate response is obtained upon review of the patient's pain diary, a confirmatory block will be scheduled with 0 5% bupivacaine  Complete risks and benefits including bleeding, infection, tissue reaction, nerve injury and allergic reaction were discussed  The approach was demonstrated using models and literature was provided  Verbal and written consent was obtained  Continue with Lyrica he does not require a refill today  Patient is interested in a different medication and tramadol as he states it forces him just to sit down all day and he does not like that  He feels that a lot of his pain is due to inflammation and he feels like he needs his muscles to relax  With that being said I feel it is reasonable to initiate meloxicam 15 mg 1 tablet daily to reduce any inflammatory component of his pain symptoms  I will also initiate methocarbamol 750 mg 1 tablet at bedtime  He was educated on the most common side effects which are drowsiness and dizziness  We will not refill the tramadol at this time he does have some left in the bottle that he brought to the office today  Patient should schedule a 12 week follow-up for medication refills      [facility] Prescription Drug Monitoring Program report was reviewed and was appropriate       History of Present Illness: The patient is a 60 y o  male who presents for a follow up office visit in regards to  low back and right leg pain The patients current symptoms include pain rated 5/10 this is intermittent in nature and most bothersome when he tries to do things    He describes his pain as dull, aching, sharp, shooting, numbness, and pins and needles  Patient recently did see Dr Stephanie Zelaya and he has suggested that we try medial branch blocks with intention towards radiofrequency ablation for his axial low back pain which was the patient's biggest complaint at his visit with Dr Stephanie Zelaya  Current pain medications includes:  Lyrica 75 mg 1 tablet 3 times daily, and tramadol 50 mg on an as-needed basis  The patient reports that this regimen is providing minimal pain relief  The patient is reporting no side effects from this pain medication regimen  I have personally reviewed and/or updated the patient's past medical history, past surgical history, family history, social history, current medications, allergies, and vital signs today  Review of Systems  Review of Systems      Past Medical History:   Diagnosis Date    Chronic back pain     Depression     GERD (gastroesophageal reflux disease)     L4-L5 disc bulge 06/16/2017    Muscle weakness     Neuropathy     Osteoarthritis     Right foot drop        Past Surgical History:   Procedure Laterality Date    EGD AND COLONOSCOPY N/A 6/29/2018    Procedure: EGD AND COLONOSCOPY;  Surgeon: Vasquez Barker MD;  Location: [facility]; Service: Gastroenterology    EPIDURAL BLOCK INJECTION      needle    [place] HIP [address] BODY,PLASTY/RESECTN Left 4/30/2018    Procedure: LEFT HIP ARTHROSCOPIC LABRAL DEBRIDEMENT;  Surgeon: Davon Wagner MD;  [facility];  Service: Orthopedics    TONSILLECTOMY      WISDOM TOOTH EXTRACTION         Family History   Problem Relation Age of Onset    Osteoarthritis Family     Other Mother         rheumatism       Social History     Occupational History    Not on file       Social History Main Topics    Smoking status: Current Every Day Smoker     Packs/day: 0 25     Last attempt to quit: 12/21/2017    Smokeless tobacco: Never Used      Comment: pt states quit 2 days ago   Philippe Camilo Alcohol use No    Drug use: No    Sexual activity: Not on file         Current Outpatient Prescriptions:     cholecalciferol (VITAMIN D3) 1,000 units tablet, Take 2,000 Units by mouth daily  , Disp: , Rfl:     cyanocobalamin (VITAMIN B-12) 100 mcg tablet, Take by mouth daily, Disp: , Rfl:     Flaxseed, Linseed, (FLAX SEED OIL PO), Take by mouth, Disp: , Rfl:     meloxicam (MOBIC) 15 mg tablet, Take 1 tablet (15 mg total) by mouth daily, Disp: 30 tablet, Rfl: 2    methocarbamol (ROBAXIN) 750 mg tablet, Take 1 tablet (750 mg total) by mouth daily at bedtime as needed for muscle spasms, Disp: 30 tablet, Rfl: 2    multivitamin (THERAGRAN) TABS, Take 1 tablet by mouth daily, Disp: , Rfl:     omeprazole (PriLOSEC) 40 MG capsule, Take 1 capsule (40 mg total) by mouth daily, Disp: 30 capsule, Rfl: 3    pregabalin (LYRICA) 75 mg capsule, Take 1 capsule (75 mg total) by mouth 3 (three) times a day, Disp: 90 capsule, Rfl: 1    pyridoxine (B-6) 100 MG tablet, Take 100 mg by mouth daily, Disp: , Rfl:     traMADol (ULTRAM) 50 mg tablet, Take 1 tablet (50 mg total) by mouth every 8 (eight) hours as needed for moderate pain or severe pain, Disp: 45 tablet, Rfl: 1    Allergies   Allergen Reactions    Gabapentin GI Intolerance    Tramadol Confusion    Morphine Other (See Comments)     constipation       Physical Exam:    /68   Pulse 74   Ht 6' 3" (1 905 m)   Wt 86 2 kg (190 lb)   BMI 23 75 kg/m²     Constitutional:normal, well developed, well nourished, alert, in no distress and non-toxic and no overt pain behavior    Eyes:anicteric  HEENT:grossly intact  Neck:supple, symmetric, trachea midline and no masses   Pulmonary:even and unlabored  Cardiovascular:No edema or pitting edema present  Skin:Normal without rashes or lesions and well hydrated  Psychiatric:Mood and affect appropriate  Neurologic:Cranial Nerves II-XII grossly intact  Musculoskeletal:normal   Facet loading maneuvers reproduce patient's pain complaints bilaterally    Imaging  FL spine and pain procedure    (Results Pending)       Orders Placed This Encounter   Procedures    FL spine and pain procedure

## 2018-08-10 ENCOUNTER — OFFICE VISIT (OUTPATIENT)
Dept: PHYSICAL THERAPY | Facility: REHABILITATION | Age: 63
End: 2018-08-10
Payer: COMMERCIAL

## 2018-08-10 DIAGNOSIS — M24.152 DEGENERATIVE TEAR OF ACETABULAR LABRUM OF LEFT HIP: Primary | ICD-10-CM

## 2018-08-10 DIAGNOSIS — M25.552 LEFT HIP PAIN: ICD-10-CM

## 2018-08-10 PROCEDURE — 97110 THERAPEUTIC EXERCISES: CPT

## 2018-08-10 PROCEDURE — 97140 MANUAL THERAPY 1/> REGIONS: CPT

## 2018-08-10 NOTE — PROGRESS NOTES
Daily Note     Today's date: 8/10/2018  Patient name: Johanne Traore  : 1955  MRN: [id number]  Referring provider: Jennifer Richardson MD  Dx:   Encounter Diagnosis     ICD-10-CM    1  Degenerative tear of acetabular labrum of left hip M16 12    2  Left hip pain M25 552      Time in: 1045 am  Time out: 1135 am  Total time: 50 minutes    Subjective: Patient reports he low back has been more painful and gradually getting worse  He has been very cautious and very concerned with the discomfort in his LB and the neuropathy etc   His abdominal muscles have gotten a bit stronger, but not enough at this point  He states very strongly he would like a report sent to each one of his Dr s pertaining to his status  "I want everyone on the same page with what is going on with me " Back pain is noted to be 5/10 overall  Objective: See treatment diary below      Assessment:   LBP continues to be sore , no increased symptoms with session  Slow moving with all exercises and more rests required  PLAN: continue PT POC    Precautions: Labral repair protocol, B/L peripheral neuropathy, L4-5 HNP, OA, Depression    Daily Treatment Diary     Manual  8/3 8-7 8/10 5-24 5-29  6 6-7 6-11 6-14   PROM within protocol np  10' Gentle flx & abd x x Flex & Abd x & IR ER x   Gentle STM np X ADD & LB x x X & hip flx & ADD x x  x x   Manual adductor and hip flex stretch x ADD x   contract/ relax  x x x                                 Exercise Diary  8/3 8/10 5/22          Bike x x    10' 10' x x x   Quad sets d/c  10"  2x10 x x x 10sec  10x x D/C    glute sets x  10"x10  x c/ hip ext   10sec  10x x X stand hip ext 10x x   Isometric hip ABD/ADD x  10"x10 X ADD with soccer ball ABD with red TB x 10x 10x c/ 4# ball and 30x abd c/ GR 12x  c/ 4# ball  And 30x c  Green  tband 10x10 & 30 TB   x x   Ankle pumps HEP  HEP          TA activation x  10"x10 x x 10x 10s 10x  10s x x x   Prone quad stretch x  3x30"  x x  x x x   K -> C Right only x 30"x3 x x   L 30"x3 B x x   Heel slides x  2x10 x X w hip flx release 20x 20x x x x   SAQ x  5"  2x10 X lg  bolster 14x 20x D/c       Quadruped Rocking(no pain) x  10"x10 x x 20x  20 x x   Low bridges x  2x10 x x 10x 10s 10x  10s x x x   LAQ     10   20x x 30   SLR x     Flex/ abd 2x10 Flex/abd  2x10 x x x   Hip flexor stretch off table x & supine ITB           Supine fig 4 str B x  & supine add str      30"x3 x x   HR x 2x15       20 x   Stand march x 2x15       20 x   minisquat x 2x15       20 x   Step up  x         2x6   Stand hip abd, ext  2x15               Modalities  8/3            estim + ice denied    x

## 2018-08-14 ENCOUNTER — OFFICE VISIT (OUTPATIENT)
Dept: PHYSICAL THERAPY | Facility: REHABILITATION | Age: 63
End: 2018-08-14
Payer: COMMERCIAL

## 2018-08-14 DIAGNOSIS — M24.152 DEGENERATIVE TEAR OF ACETABULAR LABRUM OF LEFT HIP: Primary | ICD-10-CM

## 2018-08-14 DIAGNOSIS — M25.552 LEFT HIP PAIN: ICD-10-CM

## 2018-08-14 PROCEDURE — G8991 OTHER PT/OT GOAL STATUS: HCPCS | Performed by: PHYSICAL THERAPIST

## 2018-08-14 PROCEDURE — G8990 OTHER PT/OT CURRENT STATUS: HCPCS | Performed by: PHYSICAL THERAPIST

## 2018-08-14 PROCEDURE — 97164 PT RE-EVAL EST PLAN CARE: CPT | Performed by: PHYSICAL THERAPIST

## 2018-08-14 NOTE — PROGRESS NOTES
PT Re-Evaluation  and PT Discharge    Today's date: 2018  Patient name: Bebeto Moreno  : 1955  MRN: [id number]  Referring provider: Cirilo Reddy MD  Dx:   Encounter Diagnosis     ICD-10-CM    1  Degenerative tear of acetabular labrum of left hip M16 12    2  Left hip pain M25 552                   Assessment/Plan Pt has demonstrated overall improvement since starting PT  His ROM and strength have improved  He is functionally able to complete more tasks  Most of his functional limitations are due to increased back pain which he is seeing a MD for  Due his improvement in strength, ROM, functional tasks, FOTO score, and attainment of goals he will be d/c to home program  Pt was educated to keep working on his program at home and if he has any questions he is to contact the office  Pt verbalized understanding  Subjective Pt reports that in his hip he is is 90% better but his limitations come from his back pain  Objective    Goals  Short Term Goal    1  Patient will report a 50% reduction in their subjective pain report (VAS) by 4 weeks  MET         2  Patient will demonstrate a 50% improvement in AROM/PROM by 6 weeks  MET    3  Patient will demonstrate (at least) a 1/2 grade strength improvement after 6 weeks  MET    4  Ambulation/Balance/Stairclimbing will be improved by at least 50% after 6 weeks  MET    5   Reduce radiculopathy and/or paresthesias by 50% after 6 weeks  MET    6  Patient will improve FOTO score by at least 10 points by 4 weeks MET    Long Term Goal    1  Patient will demonstrate IADL at the prior/maximal level of function  MET    2  Patient will demonstrate recreational performance at the prior and/or maximal level  PARTIALLY MET     3  Patient will return to work at the prior and/or maximal level of function  NOT MET    4  Patient will demonstrate independence with their HEP    MET     Pain  Current pain ratin  At best pain ratin  At worst pain rating: 4  Location: L hip    Progression: improved     Social Support  Steps to enter house: yes  Stairs in house: yes     Employment status: not working    Patient Goals  Patient goals for therapy: decreased pain, increased motion, increased strength, independence with ADLs/IADLs, return to sport/leisure activities, return to work and improved balance      Objective     Observations   Left Hip  Incision  Additional Observation Details  Incisional ports are healed     Tenderness     Left Hip   No Tenderness in the ASIS and greater trochanter  Neurological Testing     Sensation     Hip   Left Hip   Intact: light touch    Active Range of Motion   Left Knee   Normal active range of motion  Left Ankle/Foot   Normal active range of motion    Passive Range of Motion   Left Hip   Flexion: 120 degrees limited by pain in the back   Extension: 12 degrees   Abduction: 35 degrees limited by pain in the back     Active ROM  L  Hip flexion: 120 and painful in low back, painfree in hip   L  Hip abd: 35 deg  L   Hip ext: 12 deg     Additional Passive Range of Motion Details  PROM performed within the post operative protocol    Strength/Myotome Testing     Additional Strength Details    Hip flexion 5/5   Hip abd: 4+/5  Hip ext: 4/5  Hip add: 4/5      Ambulation   Weight-Bearing Status   Weight-Bearing Status: full weight-bearing     Ambulation: Level Surfaces   Ambulation: independent no AD     Ambulation: Stairs   Ascend stairs: now able to do reciprocally   Descend stairs: now able to do reciprocally    Observational Gait   Gait: decreased trunk rotation         Precautions: Labral repair protocol, B/L peripheral neuropathy, L4-5 HNP, OA, Depression    Manual  8/3 8-7 8/10 8/14 5-29 6/1 6/4 6-7 6-11 6-14   PROM within protocol np  10'  x x Flex & Abd x & IR ER x   Gentle STM np X ADD & LB x  X & hip flx & ADD x x  x x   Manual adductor and hip flex stretch x ADD x   contract/ relax  x x x                                 Exercise Diary  8/3 8/10 8/14          Bike x x x   10' 10' x x x   Quad sets d/c   x x x 10sec  10x x D/C    glute sets x    x c/ hip ext   10sec  10x x X stand hip ext 10x x   Isometric hip ABD/ADD x   X ADD with soccer ball ABD with red TB x 10x 10x c/ 4# ball and 30x abd c/ GR 12x  c/ 4# ball  And 30x c  Green  tband 10x10 & 30 TB   x x   Ankle pumps HEP            TA activation x   x x 10x 10s 10x  10s x x x   Prone quad stretch x    x x  x x x   K -> C  x  x x x   L 30"x3 B x x   Heel slides x   x X w hip flx release 20x 20x x x x   SAQ x   X lg  bolster 14x 20x D/c       Quadruped Rocking(no pain) x   x x 20x  20 x x   Low bridges x   x x 10x 10s 10x  10s x x x   LAQ     10   20x x 30   SLR x     Flex/ abd 2x10 Flex/abd  2x10 x x x   Hip flexor stretch off table x & supine ITB           Supine fig 4 str B x  & supine add str      30"x3 x x   HR x 2x15 x      20 x   Stand march x 2x15 x      20 x   minisquat x 2x15 x      20 x   Step up  x  x       2x6   Stand hip abd, ext  2x15 x              Modalities  8/3            estim + ice denied    x

## 2018-08-16 ENCOUNTER — OFFICE VISIT (OUTPATIENT)
Dept: PAIN MEDICINE | Facility: CLINIC | Age: 63
End: 2018-08-16
Payer: OTHER MISCELLANEOUS

## 2018-08-16 ENCOUNTER — TELEPHONE (OUTPATIENT)
Dept: PAIN MEDICINE | Facility: MEDICAL CENTER | Age: 63
End: 2018-08-16

## 2018-08-16 VITALS
BODY MASS INDEX: 23.75 KG/M2 | DIASTOLIC BLOOD PRESSURE: 70 MMHG | HEART RATE: 76 BPM | WEIGHT: 191 LBS | SYSTOLIC BLOOD PRESSURE: 120 MMHG | HEIGHT: 75 IN

## 2018-08-16 DIAGNOSIS — Z02.71 DISABILITY EXAMINATION: ICD-10-CM
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
## 2018-08-16 DIAGNOSIS — M54.16 LUMBAR RADICULOPATHY: Primary | ICD-10-CM

## 2018-08-16 DIAGNOSIS — M21.371 RIGHT FOOT DROP: ICD-10-CM

## 2018-08-16 PROCEDURE — 99213 OFFICE O/P EST LOW 20 MIN: CPT | Performed by: PHYSICAL MEDICINE & REHABILITATION

## 2018-08-16 NOTE — PATIENT INSTRUCTIONS
1  Awaiting response to nerve block  2  Continue care with Dr Indio Grider  3  Follow up as scheduled

## 2018-08-16 NOTE — LETTER
August 16, 2018     Rose Marie Chung, [address] [facility]    Patient: Sharita Linder   YOB: 1955   Date of Visit: 8/16/2018       Dear Dr Aimee Kirk:    Thank you for referring Karlee Dow to me for evaluation  Below are the relevant portions of my assessment and plan of care  Diagnoses and all orders for this visit:    Lumbar radiculopathy - Right    Disability examination    Right foot drop    Work related injury        If you have questions, please do not hesitate to call me  I look forward to following Jed Mccloud along with you           Sincerely,        Matt Ayala DO        CC: No Recipients

## 2018-08-16 NOTE — TELEPHONE ENCOUNTER
[id number] Michelle Taveras for Bhupinder Medina, needs to review vaccine concerns with whoever is giving vaccine

## 2018-08-16 NOTE — TELEPHONE ENCOUNTER
Pt is calling stating that pt needs to get the shingles vaccine before Sept 1 (insurance is requiring it )  Pt also wants to know if he can get it before his procedure on 8/29 or after his procedure  Also the medication that he is on, will it be okay to get the vaccine while taking the medication that is prescribed to him   Pt can be reach at [phone] or send to his personal email address

## 2018-08-16 NOTE — PROGRESS NOTES
Assessment/Plan:      Diagnoses and all orders for this visit:    Lumbar radiculopathy - Right    Disability examination    Right foot drop    Work related injury          Subjective:     Patient ID: Christiana Lopez is a 60 y o  male  HPI Follow up, in PTx for post op rehab of left hip surgery, has gotten MAFO (my Rx) from [employer] [employer], has seen DPM for vague foot SX  Had recent spin esurgery eval and ongoing PM treatment advised  Has taken to using a self fabricated "D" roll to support lumbar spine  I am following for RTW / WC issues only  Seen by Sravani Stone) and insert mods and MAFO mods made  Is for MBBs next week  Complaining of minor numbness in left foot  , has not had EDX  Has court appearance next week  Feels less pain recumbant, increases with up time  C/O foot slap and foot drag  Wears back brace for driving  Review of Systems   Musculoskeletal: Positive for back pain and gait problem  Neurological: Positive for weakness and numbness  Objective:  Records: NO DPM notes in EMR  Physical Exam   Constitutional: He appears well-developed and well-nourished  No distress  HENT:   Head: Normocephalic  Musculoskeletal:   No root tension signs  Neurological:   Reflex Scores:       Tricep reflexes are 2+ on the right side and 2+ on the left side  Bicep reflexes are 2+ on the right side and 2+ on the left side  Brachioradialis reflexes are 2+ on the right side and 2+ on the left side  Patellar reflexes are 2+ on the right side and 2+ on the left side  Achilles reflexes are 2+ on the right side and 2+ on the left side  Right ankle dosriflexor is 4/5 to static MMT

## 2018-08-17 ENCOUNTER — APPOINTMENT (OUTPATIENT)
Dept: PHYSICAL THERAPY | Facility: REHABILITATION | Age: 63
End: 2018-08-17
Payer: COMMERCIAL

## 2018-08-21 ENCOUNTER — APPOINTMENT (OUTPATIENT)
Dept: PHYSICAL THERAPY | Facility: REHABILITATION | Age: 63
End: 2018-08-21
Payer: COMMERCIAL

## 2018-08-23 ENCOUNTER — APPOINTMENT (OUTPATIENT)
Dept: PHYSICAL THERAPY | Facility: REHABILITATION | Age: 63
End: 2018-08-23
Payer: COMMERCIAL

## 2018-08-24 ENCOUNTER — TELEPHONE (OUTPATIENT)
Dept: PAIN MEDICINE | Facility: MEDICAL CENTER | Age: 63
End: 2018-08-24

## 2018-08-24 ENCOUNTER — APPOINTMENT (OUTPATIENT)
Dept: PHYSICAL THERAPY | Facility: REHABILITATION | Age: 63
End: 2018-08-24
Payer: COMMERCIAL

## 2018-08-24 NOTE — TELEPHONE ENCOUNTER
Pt called stating that he faxed paperwork to the office today to be completed by Dr Vanessa Alegria and sent back to [employer]  Pt also states that he should retain a copy for his records, because his  will most likely be requesting a copy  Pt can be reached at [phone] with any questions

## 2018-08-28 ENCOUNTER — APPOINTMENT (OUTPATIENT)
Dept: PHYSICAL THERAPY | Facility: REHABILITATION | Age: 63
End: 2018-08-28
Payer: COMMERCIAL

## 2018-08-29 ENCOUNTER — HOSPITAL ENCOUNTER (OUTPATIENT)
Dept: RADIOLOGY | Facility: MEDICAL CENTER | Age: 63
Discharge: HOME | End: 2018-08-29
Payer: OTHER MISCELLANEOUS

## 2018-08-29 VITALS
SYSTOLIC BLOOD PRESSURE: 100 MMHG | RESPIRATION RATE: 20 BRPM | HEART RATE: 72 BPM | DIASTOLIC BLOOD PRESSURE: 62 MMHG | TEMPERATURE: 97.8 F | OXYGEN SATURATION: 95 %

## 2018-08-29 DIAGNOSIS — M47.816 LUMBAR SPONDYLOSIS: ICD-10-CM

## 2018-08-29 PROCEDURE — 64493 INJ PARAVERT F JNT L/S 1 LEV: CPT | Performed by: PHYSICAL MEDICINE & REHABILITATION

## 2018-08-29 PROCEDURE — 64494 INJ PARAVERT F JNT L/S 2 LEV: CPT | Performed by: PHYSICAL MEDICINE & REHABILITATION

## 2018-08-29 RX ADMIN — Medication 3 ML: at 10:42

## 2018-08-29 RX ADMIN — IOHEXOL 1.5 ML: 300 INJECTION, SOLUTION INTRAVENOUS at 10:42

## 2018-08-29 NOTE — DISCHARGE INSTRUCTIONS

## 2018-08-29 NOTE — H&P
History of Present Illness: The patient is a 60 y o  male who presents with complaints of Bilateral lower back pain    Patient Active Problem List   Diagnosis    Lumbar radiculopathy - Right    Disability examination    Bone lesion    Neuropathy    Primary osteoarthritis of left hip    Annual physical exam    Tobacco abuse    Encounter for colorectal cancer screening    Serum total bilirubin elevated    Lipid screening    Degenerative tear of acetabular labrum of left hip    Preop examination    Gastroesophageal reflux disease without esophagitis    Work related injury    Colon cancer screening    Right foot drop    Lumbar spondylosis - Bilateral       Past Medical History:   Diagnosis Date    Chronic back pain     Depression     GERD (gastroesophageal reflux disease)     L4-L5 disc bulge 06/16/2017    Muscle weakness     Neuropathy     Osteoarthritis     Right foot drop        Past Surgical History:   Procedure Laterality Date    EGD AND COLONOSCOPY N/A 6/29/2018    Procedure: EGD AND COLONOSCOPY;  Surgeon: Polina Madrigal MD;  [facility];   Service: Gastroenterology    EPIDURAL BLOCK INJECTION      needle    [place] HIP [address] BODY,PLASTY/RESECTN Left 4/30/2018    Procedure: LEFT HIP ARTHROSCOPIC LABRAL DEBRIDEMENT;  Surgeon: Charmaine Acosta MD;  [facility];  Service: Orthopedics    TONSILLECTOMY      WISDOM TOOTH EXTRACTION           Current Outpatient Prescriptions:     cholecalciferol (VITAMIN D3) 1,000 units tablet, Take 2,000 Units by mouth daily  , Disp: , Rfl:     cyanocobalamin (VITAMIN B-12) 100 mcg tablet, Take by mouth daily, Disp: , Rfl:     Flaxseed, Linseed, (FLAX SEED OIL PO), Take by mouth, Disp: , Rfl:     meloxicam (MOBIC) 15 mg tablet, Take 1 tablet (15 mg total) by mouth daily, Disp: 30 tablet, Rfl: 2    methocarbamol (ROBAXIN) 750 mg tablet, Take 1 tablet (750 mg total) by mouth daily at bedtime as needed for muscle spasms, Disp: 30 tablet, Rfl: 2    multivitamin (THERAGRAN) TABS, Take 1 tablet by mouth daily, Disp: , Rfl:     omeprazole (PriLOSEC) 40 MG capsule, Take 1 capsule (40 mg total) by mouth daily, Disp: 30 capsule, Rfl: 3    pregabalin (LYRICA) 75 mg capsule, Take 1 capsule (75 mg total) by mouth 3 (three) times a day, Disp: 90 capsule, Rfl: 1    pyridoxine (B-6) 100 MG tablet, Take 100 mg by mouth daily, Disp: , Rfl:     traMADol (ULTRAM) 50 mg tablet, Take 1 tablet (50 mg total) by mouth every 8 (eight) hours as needed for moderate pain or severe pain, Disp: 45 tablet, Rfl: 1    Current Facility-Administered Medications:     iohexol (OMNIPAQUE) 300 mg/mL injection 50 mL, 50 mL, Perineural, Once, Delayne Ao, DO    lidocaine (PF) (XYLOCAINE-MPF) 2 % injection 4 mL, 4 mL, Perineural, Once, Delayne Ao, DO    Allergies   Allergen Reactions    Gabapentin GI Intolerance    Tramadol Confusion    Morphine Other (See Comments)     constipation       Physical Exam:   Vitals:    08/29/18 1025   BP: 110/78   Pulse: 77   Resp: 20   Temp: 97 8 °F (36 6 °C)   SpO2: 95%     General: Awake, Alert, Oriented x 3, Mood and affect appropriate  Respiratory: Respirations even and unlabored  Cardiovascular: Peripheral pulses intact; no edema  Musculoskeletal Exam:  Bilateral lower back pain    ASA Score:  2  Patient/Chart Verification  Patient ID Verified: Verbal  ID Band Applied: No  Consents Confirmed: Procedural, To be obtained in the Pre-Procedure area  H&P( within 30 days) Verified: To be obtained in the Pre-Procedure area  Interval H&P(within 24 hr) Complete (required for Outpatients and Surgery Admit only): To be obtained in the Pre-Procedure area  Allergies Reviewed: Yes  Anticoag/NSAID held?: NA  Currently on antibiotics?: No    Assessment:   1   Lumbar spondylosis - Bilateral        Plan: B/L L3-5 MBB #1

## 2018-08-31 ENCOUNTER — APPOINTMENT (OUTPATIENT)
Dept: PHYSICAL THERAPY | Facility: REHABILITATION | Age: 63
End: 2018-08-31
Payer: COMMERCIAL

## 2018-09-06 ENCOUNTER — TRANSCRIBE ORDERS (OUTPATIENT)
Dept: LAB | Facility: OTHER | Age: 63
End: 2018-09-06

## 2018-09-10 ENCOUNTER — APPOINTMENT (OUTPATIENT)
Dept: LAB | Facility: OTHER | Age: 63
End: 2018-09-10
Payer: COMMERCIAL

## 2018-09-10 ENCOUNTER — TRANSCRIBE ORDERS (OUTPATIENT)
Dept: LAB | Facility: OTHER | Age: 63
End: 2018-09-10

## 2018-09-10 DIAGNOSIS — Z13.220 LIPID SCREENING: ICD-10-CM

## 2018-09-10 DIAGNOSIS — R17 SERUM TOTAL BILIRUBIN ELEVATED: ICD-10-CM

## 2018-09-10 DIAGNOSIS — Z13.228 SCREENING FOR METABOLIC DISORDER: ICD-10-CM

## 2018-09-10 DIAGNOSIS — K83.1 CHRONIC CHOLESTATIC JAUNDICE SYNDROME: ICD-10-CM

## 2018-09-10 DIAGNOSIS — Z11.59 ENCOUNTER FOR HEPATITIS C SCREENING TEST FOR LOW RISK PATIENT: ICD-10-CM

## 2018-09-10 DIAGNOSIS — K83.1 CHRONIC CHOLESTATIC JAUNDICE SYNDROME: Primary | ICD-10-CM

## 2018-09-10 DIAGNOSIS — G89.21 CHRONIC PAIN DUE TO TRAUMA: ICD-10-CM

## 2018-09-10 DIAGNOSIS — A04.8 H. PYLORI INFECTION: ICD-10-CM

## 2018-09-10 LAB
ALBUMIN SERPL BCP-MCNC: 3.7 G/DL (ref 3.5–5)
ALP SERPL-CCNC: 73 U/L (ref 46–116)
ALT SERPL W P-5'-P-CCNC: 37 U/L (ref 12–78)
ANION GAP SERPL CALCULATED.3IONS-SCNC: 5 MMOL/L (ref 4–13)
AST SERPL W P-5'-P-CCNC: 25 U/L (ref 5–45)
BILIRUB SERPL-MCNC: 0.75 MG/DL (ref 0.2–1)
BUN SERPL-MCNC: 21 MG/DL (ref 5–25)
CALCIUM SERPL-MCNC: 8.9 MG/DL (ref 8.3–10.1)
CHLORIDE SERPL-SCNC: 108 MMOL/L (ref 100–108)
CHOLEST SERPL-MCNC: 198 MG/DL (ref 50–200)
CO2 SERPL-SCNC: 26 MMOL/L (ref 21–32)
CREAT SERPL-MCNC: 0.93 MG/DL (ref 0.6–1.3)
ERYTHROCYTE [DISTWIDTH] IN BLOOD BY AUTOMATED COUNT: 14.5 % (ref 11.6–15.1)
GFR SERPL CREATININE-BSD FRML MDRD: 87 ML/MIN/1.73SQ M
GLUCOSE P FAST SERPL-MCNC: 97 MG/DL (ref 65–99)
HCT VFR BLD AUTO: 47.7 % (ref 36.5–49.3)
HDLC SERPL-MCNC: 57 MG/DL (ref 40–60)
HGB BLD-MCNC: 16.2 G/DL (ref 12–17)
LDLC SERPL CALC-MCNC: 125 MG/DL (ref 0–100)
MCH RBC QN AUTO: 32.5 PG (ref 26.8–34.3)
MCHC RBC AUTO-ENTMCNC: 34 G/DL (ref 31.4–37.4)
MCV RBC AUTO: 96 FL (ref 82–98)
NONHDLC SERPL-MCNC: 141 MG/DL
PLATELET # BLD AUTO: 377 THOUSANDS/UL (ref 149–390)
PMV BLD AUTO: 10.6 FL (ref 8.9–12.7)
POTASSIUM SERPL-SCNC: 3.9 MMOL/L (ref 3.5–5.3)
PROT SERPL-MCNC: 7.1 G/DL (ref 6.4–8.2)
RBC # BLD AUTO: 4.99 MILLION/UL (ref 3.88–5.62)
SODIUM SERPL-SCNC: 139 MMOL/L (ref 136–145)
TRIGL SERPL-MCNC: 82 MG/DL
WBC # BLD AUTO: 13.16 THOUSAND/UL (ref 4.31–10.16)

## 2018-09-10 PROCEDURE — 82306 VITAMIN D 25 HYDROXY: CPT

## 2018-09-10 PROCEDURE — 80061 LIPID PANEL: CPT

## 2018-09-10 PROCEDURE — 86803 HEPATITIS C AB TEST: CPT

## 2018-09-10 PROCEDURE — 85027 COMPLETE CBC AUTOMATED: CPT

## 2018-09-10 PROCEDURE — 87338 HPYLORI STOOL AG IA: CPT

## 2018-09-10 PROCEDURE — 36415 COLL VENOUS BLD VENIPUNCTURE: CPT

## 2018-09-10 PROCEDURE — 80053 COMPREHEN METABOLIC PANEL: CPT

## 2018-09-10 NOTE — PROGRESS NOTES
Assessment/Plan:      Diagnoses and all orders for this visit:    Work related injury    Disability examination    Right foot drop        M*Modal software was used to dictate this note  It may contain errors with dictating incorrect words/spelling  Please contact provider directly with any questions  Subjective:     Patient ID: Kenny Gan is a 60 y o  male  HPI Last seen 8/16/18  Following for RTW / WC issues only  Since last visit has completed first lumbar medial branch block with pain medicine on 8 29 18 and is scheduled for second block later this month  Seen by You Degroot) and insert mods and MAFO mods made  Prior blocks were helpful until he leaned trunk to right after he had vacumed floor  Using [facility] about 4 hours a day , for longer than 1/2 block ambulating  (Had been wearing for work) Current pattern is lower right lumbar , stab on left side while shaving this  Constant numbness and tingling in right foot  Still sponsored by [facility]  Has an  and a mediation is pending  Back brace is on order  He is reporting new Sx of "spine pain" from shoulder radiating inferior  He is reporting less benefit from medications  He has an e-mail in to Dr Tomy Gutiérrez regarding this  Since our last visit had an opportunity to review the independent medical examination from [place] in [facility] which was performed   by [place] [facility] January 10th of this year  De physician opined that the patient did not need to use his MAFO  I discussed this with him today he apparently was using the MA AFO full time when he was working, he uses it now he is walking further than half a block  He describes a foot slap after walking that distance  He is not wearing his brace today  Also since I last saw him I reviewed Dr Anant Perry notes from [facility]  He has had him on a 20 pound lifting restriction all along but documents that his employer will let him come back and LEs it is "full duty"    There was no discrete injury reported for June of last year except for "increased back pain" over a month due to "increased work load "   He was last seen there October 16, 2017 an MRI was ordered but the patient abruptly changed from [place] [address]  to [address] [place] [address]  The patient does reported discrete event of bending forward twisting to the right approximately June 16th when he had increased pain and he last worked approximately June 16th  PAST MEDICAL HISTORY  Past Medical History:   Diagnosis Date    Chronic back pain     Depression     GERD (gastroesophageal reflux disease)     L4-L5 disc bulge 06/16/2017    Muscle weakness     Neuropathy     Osteoarthritis     Right foot drop      PAST SURGICAL HISTORY  Past Surgical History:   Procedure Laterality Date    EGD AND COLONOSCOPY N/A 6/29/2018    Procedure: EGD AND COLONOSCOPY;  Surgeon: Vasquez Barker MD;  Location: [facility] GI LAB;   Service: Gastroenterology    EPIDURAL BLOCK INJECTION      needle    [place] HIP [address] BODY,PLASTY/RESECTN Left 4/30/2018    Procedure: LEFT HIP ARTHROSCOPIC LABRAL DEBRIDEMENT;  Surgeon: Davon Wagner MD;  [facility];  Service: Orthopedics    TONSILLECTOMY      WISDOM TOOTH EXTRACTION         FAMILY HISTORY  Family History   Problem Relation Age of Onset    Osteoarthritis Family     Other Mother         rheumatism     HOME MEDICATIONS  Current Outpatient Prescriptions   Medication Sig Dispense Refill    cholecalciferol (VITAMIN D3) 1,000 units tablet Take 2,000 Units by mouth daily        cyanocobalamin (VITAMIN B-12) 100 mcg tablet Take by mouth daily      Flaxseed, Linseed, (FLAX SEED OIL PO) Take by mouth      meloxicam (MOBIC) 15 mg tablet Take 1 tablet (15 mg total) by mouth daily 30 tablet 2    methocarbamol (ROBAXIN) 750 mg tablet Take 1 tablet (750 mg total) by mouth daily at bedtime as needed for muscle spasms 30 tablet 2    multivitamin (THERAGRAN) TABS Take 1 tablet by mouth daily      omeprazole (PriLOSEC) 40 MG capsule Take 1 capsule (40 mg total) by mouth daily 30 capsule 3    pregabalin (LYRICA) 75 mg capsule Take 1 capsule (75 mg total) by mouth 3 (three) times a day 90 capsule 1    pyridoxine (B-6) 100 MG tablet Take 100 mg by mouth daily      traMADol (ULTRAM) 50 mg tablet Take 1 tablet (50 mg total) by mouth every 8 (eight) hours as needed for moderate pain or severe pain 45 tablet 1     No current facility-administered medications for this visit  ALLERGIES  Gabapentin; Tramadol; and Morphine      SOCIAL HISTORY  History   Alcohol Use No     History   Drug Use No     History   Smoking Status    Current Every Day Smoker    Packs/day: 0 25    Last attempt to quit: 12/21/2017   Smokeless Tobacco    Never Used     Comment: pt states quit 2 days ago     Review of Systems   Gastrointestinal: Negative  Genitourinary: Negative  Musculoskeletal: Positive for back pain and gait problem  Neurological: Positive for weakness and numbness  Objective: There were no vitals filed for this visit  Physical Exam   Constitutional: He appears well-developed and well-nourished  Neurological:   Reflex Scores:       Tricep reflexes are 1+ on the right side and 1+ on the left side  Bicep reflexes are 1+ on the right side and 1+ on the left side  Brachioradialis reflexes are 1+ on the right side and 1+ on the left side  Patellar reflexes are 1+ on the right side and 1+ on the left side  Achilles reflexes are 1+ on the right side and 1+ on the left side  HS jerks 1+ symmetric  Motor weakness seen of right ankle dorsiflexor in heel walking  In seated MMT is 5/5

## 2018-09-11 ENCOUNTER — TELEPHONE (OUTPATIENT)
Dept: INTERNAL MEDICINE CLINIC | Age: 63
End: 2018-09-11

## 2018-09-11 LAB
25(OH)D3 SERPL-MCNC: 24.1 NG/ML (ref 30–100)
H PYLORI AG STL QL IA: NEGATIVE
HCV AB SER QL: NORMAL

## 2018-09-11 NOTE — TELEPHONE ENCOUNTER
Called patient today to get clarification on his e-mail that was sent to you  Patient's last e-mail was to just keep you informed of everything that he has going on as you are his primary PCP  He wants you to forward the information that he sent in the e-mail to the two doctors listed in the e-mail: Dr Harvey Mock and Dr Kathe Owens  He does not need anything from us at the moment he just wanted to keep us informed  However he did ask you to reach out to Dr Harvey Mock in pain management and let him know that the patient said the pain meds aren't working  He said that he feels things would happen if it was one professional telling another  I informed patient that I will tell you this however you may reach back out to him that he will have to contact the doctor himself  Let me know if you have any more questions   Patient just called because he is in a ton of pain and constantly in discomfort and wanted to keep you informed as you are his PCP     Michelle Billings - pain medication not cutting it

## 2018-09-12 ENCOUNTER — TELEPHONE (OUTPATIENT)
Dept: RADIOLOGY | Facility: MEDICAL CENTER | Age: 63
End: 2018-09-12

## 2018-09-12 NOTE — TELEPHONE ENCOUNTER
Pain diary reviewed by Dr Cindy Vergara; proceed with MBB #2  Called patient and scheduled:     Bilateral L3-5 MBB #2 on 9/24    Reviewed instructions: , NPO 1 hour prior, loose-fitting/comfortable clothes, if ill/fever/infx/abx to call and reschedule  Also pain level at leat 5/10 and refrain from PRN, as-needed pain meds 6h prior  Patient stated verbal understanding      +++    Please call patient about his pain and meds

## 2018-09-13 ENCOUNTER — TELEPHONE (OUTPATIENT)
Dept: INTERNAL MEDICINE CLINIC | Facility: CLINIC | Age: 63
End: 2018-09-13

## 2018-09-13 ENCOUNTER — OFFICE VISIT (OUTPATIENT)
Dept: PAIN MEDICINE | Facility: CLINIC | Age: 63
End: 2018-09-13
Payer: OTHER MISCELLANEOUS

## 2018-09-13 ENCOUNTER — TELEPHONE (OUTPATIENT)
Dept: PAIN MEDICINE | Facility: MEDICAL CENTER | Age: 63
End: 2018-09-13

## 2018-09-13 VITALS
WEIGHT: 191 LBS | DIASTOLIC BLOOD PRESSURE: 86 MMHG | HEIGHT: 75 IN | SYSTOLIC BLOOD PRESSURE: 120 MMHG | BODY MASS INDEX: 23.75 KG/M2 | HEART RATE: 88 BPM

## 2018-09-13 DIAGNOSIS — M21.371 RIGHT FOOT DROP: ICD-10-CM
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
## 2018-09-13 DIAGNOSIS — M47.816 LUMBAR SPONDYLOSIS: ICD-10-CM

## 2018-09-13 DIAGNOSIS — Z02.71 DISABILITY EXAMINATION: ICD-10-CM

## 2018-09-13 DIAGNOSIS — M54.16 LUMBAR RADICULOPATHY: Primary | ICD-10-CM

## 2018-09-13 DIAGNOSIS — G62.9 NEUROPATHY: ICD-10-CM

## 2018-09-13 PROCEDURE — 99214 OFFICE O/P EST MOD 30 MIN: CPT | Performed by: PHYSICAL MEDICINE & REHABILITATION

## 2018-09-13 NOTE — PROGRESS NOTES
Referral to Dr Steven Cervantes (spine and pain management) submitted, as per request from Mr Russell

## 2018-09-13 NOTE — TELEPHONE ENCOUNTER
RN s/w pt and he stated that he was just at an appt with Dr Callie Genao PCP who has ordered xrays of L4 and above  Pt informed that if he needs anything from our office to give us a call

## 2018-09-13 NOTE — TELEPHONE ENCOUNTER
Referral for spine and pain ordered  Please contact patient to inform him that referral has been submitted  Thank you

## 2018-09-13 NOTE — LETTER
September 13, 2018     Angie Bond 18    Patient: Fred Weaver   YOB: 1955   Date of Visit: 9/13/2018       Dear Dr Joel Montyoa:    Thank you for referring Sunita Vázquez to me for evaluation  Below are the relevant portions of my assessment and plan of care  Diagnoses and all orders for this visit:    Work related injury    Disability examination    Right foot drop        If you have questions, please do not hesitate to call me  I look forward to following Adolph Otero along with you           Sincerely,        Eduardo Dobson DO        CC: No Recipients

## 2018-09-13 NOTE — TELEPHONE ENCOUNTER
Patient came to the [facility] office looking for a needed referral for Dr Elio Alvarez with Pain and spine as recommended by Dr Nemo Woo who sees patient for the worker's comp claim on lower L-4 to L-5 claim--He needs this referral for the upper portion of the back     Please advise so the patient can be called when referral is provided       Patient is being denied a higher script for his current pain due to not being seen for the upper back

## 2018-09-13 NOTE — TELEPHONE ENCOUNTER
S/w patient states he would like to have Dr Keny Traore send an order for Xrays for his shoulders down to pelvis for pain this request per pt will go through his personal health insurance which is BC/BS  Pt states that the mediation is not working meloxicam (MOBIC) 15 mg tablet and methocarbamol (ROBAXIN) 750 mg tablet      Pt requests a call back from nurse ASAP to further discuss

## 2018-09-13 NOTE — TELEPHONE ENCOUNTER
Recommend follow up visit with NP to determine if imaging is warranted  Without documentation of an exam his insurance will not cover a bunch of xrays

## 2018-09-13 NOTE — PATIENT INSTRUCTIONS
1  Continue care with Pain Medicine, have upper back Sx evaluated by Dr Tomy Gutiérrez  2   [facility] will continue to follow you for disability issues related to work-related injury of 2015  Continue no work in any capacity at this time

## 2018-09-17 DIAGNOSIS — M54.16 LUMBAR RADICULOPATHY: ICD-10-CM

## 2018-09-19 DIAGNOSIS — M54.16 LUMBAR RADICULOPATHY: ICD-10-CM

## 2018-09-19 RX ORDER — PREGABALIN 75 MG/1
75 CAPSULE ORAL 3 TIMES DAILY
Qty: 90 CAPSULE | Refills: 0 | Status: SHIPPED | OUTPATIENT
Start: 2018-09-19 | End: 2018-10-08 | Stop reason: SDUPTHER

## 2018-09-19 NOTE — TELEPHONE ENCOUNTER
S/W pt  Advised pt of the same and that the pt needs to call for medication refills  Pt requesting refill as listed, denies side effects and is helping him  He has 6 pills left  Please advise

## 2018-09-19 NOTE — TELEPHONE ENCOUNTER
HOLLIS Courtney   You; [facility] 7 minutes ago (1:28 PM)     [facility] file TY (Routing comment)

## 2018-09-20 ENCOUNTER — OFFICE VISIT (OUTPATIENT)
Dept: INTERNAL MEDICINE CLINIC | Facility: CLINIC | Age: 63
End: 2018-09-20
Payer: COMMERCIAL

## 2018-09-20 VITALS
BODY MASS INDEX: 23.77 KG/M2 | HEART RATE: 74 BPM | DIASTOLIC BLOOD PRESSURE: 74 MMHG | SYSTOLIC BLOOD PRESSURE: 118 MMHG | TEMPERATURE: 97.3 F | OXYGEN SATURATION: 97 % | HEIGHT: 75 IN | WEIGHT: 191.2 LBS

## 2018-09-20 DIAGNOSIS — K29.50 CHRONIC GASTRITIS WITHOUT BLEEDING, UNSPECIFIED GASTRITIS TYPE: ICD-10-CM

## 2018-09-20 DIAGNOSIS — E55.9 VITAMIN D DEFICIENCY: ICD-10-CM

## 2018-09-20 DIAGNOSIS — M54.6 THORACIC SPINE PAIN: ICD-10-CM

## 2018-09-20 DIAGNOSIS — Z12.11 COLON CANCER SCREENING: ICD-10-CM

## 2018-09-20 DIAGNOSIS — K21.9 GASTROESOPHAGEAL REFLUX DISEASE WITHOUT ESOPHAGITIS: Primary | ICD-10-CM

## 2018-09-20 PROBLEM — R17 SERUM TOTAL BILIRUBIN ELEVATED: Status: RESOLVED | Noted: 2018-03-08 | Resolved: 2018-09-20

## 2018-09-20 PROCEDURE — 99214 OFFICE O/P EST MOD 30 MIN: CPT | Performed by: INTERNAL MEDICINE

## 2018-09-20 RX ORDER — OMEPRAZOLE 20 MG/1
20 CAPSULE, DELAYED RELEASE ORAL 2 TIMES DAILY
Qty: 60 CAPSULE | Refills: 3 | Status: SHIPPED | OUTPATIENT
Start: 2018-09-20 | End: 2019-01-16 | Stop reason: SDUPTHER

## 2018-09-20 NOTE — PROGRESS NOTES
Assessment/Plan:    Vitamin D deficiency   Currently on 3000 units of vitamin-D daily, will recommend he increase to 4000 units daily  Gastroesophageal reflux disease without esophagitis    Currently stable  Continue with omeprazole 20 mg b i d  Tiffanie Weber Thoracic spine pain   Will order an x-ray of the thoracic spine for further evaluation  Recommend he follow up with Dr Trinidad Soliman  Continue current pain regimen:   Tramadol 50 mg every 8 hr as needed for moderate pain, acetaminophen  1000 mg q 6 hours as needed (no greater than 3000 mg in a 24 hr period), and Lyrica 75 mg 3 times a day  He does have a narcotic agreement sign with Dr Trinidad Soliman  He has leftover hydrocodone both 5 mg and 10 mg from a previous physician to which he has not needed yet however does state that he is willing to take if pain becomes significantly severe and disabling  Colon cancer screening    Next colonoscopy due in 10 years, 2028  Diagnoses and all orders for this visit:    Gastroesophageal reflux disease without esophagitis  -     omeprazole (PriLOSEC) 20 mg delayed release capsule; Take 1 capsule (20 mg total) by mouth 2 (two) times a day (Patient taking differently: Take 20 mg by mouth 2 (two) times a day as needed  )    Vitamin D deficiency    Chronic gastritis without bleeding, unspecified gastritis type    Thoracic spine pain  -     XR spine thoracic 3 vw; Future    Colon cancer screening          Subjective:      Patient ID: Marisol Leon is a 60 y o  male  60year old male showing up for a 6 month follow up  Patient comes today with complaints of severe pain, 8-9/10 in the upper back paraspinally between the scapula bilaterally, radiates down paraspinally toward L3-L4  Associated with weather, cold and damp makes it worse  The pattern and onset is unpredictable, but can come on with activity and lasts for hours-days  Took 10 tablets of tramadol in 4 days last week   Complaints of unable to sit up right, becomes extremely painful  Used to take up to 15 mg hydrocodone in the morning, pain relief lasts 7-8 hours  Starts with 5 mg hydrocodone, then takes 5mg if doesn't work  Tried other therapies such as compression devices and decompression techniques, nothing works  He currently follows up with Dr Fatuma Sofia and Dr Mau Reddy for back pain  He complains of associated symptom of neuropathy of left foot and left foot drop with associated tingling sometimes  He received Medial Branch Nerve/Dorsal Ramus Blocks using 2% lidocaine his lumbar spine 2-3 weeks ago by Dr Fatuma Sofia  He is scheduled to receive injections on 09/24/2018 with Dr Fatuma Sofia  Also complains of hip pain with movement up and down the stairs on the right side  Has to focus when walking or the pain becomes worse, 3-4/10, sharp, relieved with Lyrica  Went to Dr Gatito Duckworth on 6/29/2018 get colonoscopy and endoscopy done for evalauation of GERD  Mild diverticulosis in sigmoid colon and internal hemorrhoids  Now GERD happens only occasionally depending on fatty meals  Last week had one episode that lasted for 6 hours and went away after omeprazole 40 mg  Laboratory studies reviewed today, lipid panel, CBC, CMP within normal range  He was mildly deficient in vitamin-D  He does take 3000 units vitamin-D daily  Back Pain   This is a chronic problem  The current episode started more than 1 year ago  The problem occurs constantly  The problem is unchanged  The pain is present in the lumbar spine and thoracic spine  The quality of the pain is described as shooting  The pain does not radiate  The pain is at a severity of 9/10  The pain is severe  The pain is the same all the time  The symptoms are aggravated by twisting, bending and position  Stiffness is present all day  Associated symptoms include tingling and weakness   Pertinent negatives include no abdominal pain, bladder incontinence, bowel incontinence, chest pain, dysuria, fever, headaches, leg pain, numbness, paresis, paresthesias, pelvic pain, perianal numbness or weight loss  Treatments tried: Tramadol, methocarbamol, Mobic  The treatment provided moderate relief  Heartburn   He complains of heartburn  He reports no abdominal pain, no belching, no chest pain, no choking, no coughing, no early satiety, no globus sensation, no hoarse voice, no nausea, no sore throat, no stridor, no tooth decay, no water brash or no wheezing  This is a chronic problem  The current episode started more than 1 year ago  The problem occurs occasionally  The heartburn is of mild intensity  The heartburn does not wake him from sleep  The heartburn does not limit his activity  The heartburn doesn't change with position  The symptoms are aggravated by certain foods  Pertinent negatives include no fatigue or weight loss  He has tried a PPI for the symptoms  The treatment provided moderate relief  Review of Systems   Constitutional: Negative for activity change, appetite change, chills, diaphoresis, fatigue, fever and weight loss  HENT: Negative for congestion, ear pain, hoarse voice, postnasal drip, rhinorrhea, sinus pain, sinus pressure, sneezing and sore throat  Eyes: Negative  Negative for visual disturbance  Respiratory: Negative for apnea, cough, choking, chest tightness, shortness of breath and wheezing  Cardiovascular: Negative for chest pain, palpitations and leg swelling  Gastrointestinal: Positive for heartburn  Negative for abdominal distention, abdominal pain, anal bleeding, blood in stool, bowel incontinence, constipation, diarrhea, nausea and vomiting  Endocrine: Negative  Negative for cold intolerance and heat intolerance  Genitourinary: Negative for bladder incontinence, difficulty urinating, dysuria, frequency, hematuria and pelvic pain  Musculoskeletal: Positive for arthralgias, back pain and neck pain  Negative for myalgias  Skin: Negative  Negative for color change  Allergic/Immunologic: Negative for environmental allergies and food allergies  Neurological: Positive for tingling and weakness  Negative for dizziness, light-headedness, numbness, headaches and paresthesias  Hematological: Negative for adenopathy  Psychiatric/Behavioral: Negative for agitation, behavioral problems, confusion, sleep disturbance and suicidal ideas  All other systems reviewed and are negative  Objective:      /74 (BP Location: Left arm, Patient Position: Sitting, Cuff Size: Adult)   Pulse 74   Temp (!) 97 3 °F (36 3 °C) (Oral)   Ht 6' 3" (1 905 m)   Wt 86 7 kg (191 lb 3 2 oz)   SpO2 97%   BMI 23 90 kg/m²        Physical Exam   Constitutional: He is oriented to person, place, and time  He appears well-developed and well-nourished  No distress  HENT:   Head: Normocephalic and atraumatic  Eyes: Conjunctivae and EOM are normal  Pupils are equal, round, and reactive to light  Right eye exhibits no discharge  Left eye exhibits no discharge  No scleral icterus  Neck: Normal range of motion  Neck supple  No JVD present  No thyromegaly present  Cardiovascular: Normal rate, regular rhythm, normal heart sounds and intact distal pulses  Exam reveals no gallop and no friction rub  No murmur heard  Pulmonary/Chest: Effort normal and breath sounds normal  No respiratory distress  He has no wheezes  He has no rales  He exhibits no tenderness  Abdominal: Soft  Bowel sounds are normal  He exhibits no distension and no mass  There is no tenderness  There is no rebound and no guarding  Musculoskeletal: He exhibits no edema or deformity  Lumbar back: He exhibits decreased range of motion, tenderness, bony tenderness, pain and spasm  He exhibits no swelling, no edema, no deformity and no laceration  Back:    Lymphadenopathy:     He has no cervical adenopathy  Neurological: He is alert and oriented to person, place, and time  He has normal reflexes   No cranial nerve deficit  Coordination normal    Skin: Skin is warm and dry  No rash noted  He is not diaphoretic  No erythema  No pallor  Psychiatric: He has a normal mood and affect  His behavior is normal  Judgment and thought content normal    Nursing note and vitals reviewed            Current Outpatient Prescriptions:     cholecalciferol (VITAMIN D3) 1,000 units tablet, Take 2,000 Units by mouth 2 (two) times a day  , Disp: , Rfl:     cyanocobalamin (VITAMIN B-12) 100 mcg tablet, Take 100 mcg by mouth daily  , Disp: , Rfl:     Flaxseed, Linseed, (FLAX SEED OIL PO), Take 1 capsule by mouth daily  , Disp: , Rfl:     multivitamin (THERAGRAN) TABS, Take 1 tablet by mouth daily, Disp: , Rfl:     pregabalin (LYRICA) 75 mg capsule, Take 1 capsule (75 mg total) by mouth 3 (three) times a day, Disp: 90 capsule, Rfl: 0    pyridoxine (B-6) 100 MG tablet, Take 100 mg by mouth daily, Disp: , Rfl:     traMADol (ULTRAM) 50 mg tablet, Take 1 tablet (50 mg total) by mouth every 8 (eight) hours as needed for moderate pain or severe pain, Disp: 45 tablet, Rfl: 1    omeprazole (PriLOSEC) 20 mg delayed release capsule, Take 1 capsule (20 mg total) by mouth 2 (two) times a day (Patient taking differently: Take 20 mg by mouth 2 (two) times a day as needed  ), Disp: 60 capsule, Rfl: 3     Results Reviewed     None

## 2018-09-20 NOTE — ASSESSMENT & PLAN NOTE
Will order an x-ray of the thoracic spine for further evaluation  Recommend he follow up with Dr Paula Mills  Continue current pain regimen:   Tramadol 50 mg every 8 hr as needed for moderate pain, acetaminophen  1000 mg q 6 hours as needed (no greater than 3000 mg in a 24 hr period), and Lyrica 75 mg 3 times a day  He does have a narcotic agreement sign with Dr Paula Mills  He has leftover hydrocodone both 5 mg and 10 mg from a previous physician to which he has not needed yet however does state that he is willing to take if pain becomes significantly severe and disabling

## 2018-09-21 ENCOUNTER — TRANSCRIBE ORDERS (OUTPATIENT)
Dept: ADMINISTRATIVE | Facility: HOSPITAL | Age: 63
End: 2018-09-21

## 2018-09-21 ENCOUNTER — HOSPITAL ENCOUNTER (OUTPATIENT)
Dept: RADIOLOGY | Facility: IMAGING CENTER | Age: 63
Discharge: HOME | End: 2018-09-21
Payer: COMMERCIAL

## 2018-09-21 DIAGNOSIS — M54.6 THORACIC SPINE PAIN: ICD-10-CM

## 2018-09-21 PROCEDURE — 72072 X-RAY EXAM THORAC SPINE 3VWS: CPT

## 2018-09-24 ENCOUNTER — HOSPITAL ENCOUNTER (OUTPATIENT)
Dept: RADIOLOGY | Facility: MEDICAL CENTER | Age: 63
Discharge: HOME | End: 2018-09-24
Payer: OTHER MISCELLANEOUS

## 2018-09-24 VITALS
TEMPERATURE: 98.4 F | RESPIRATION RATE: 18 BRPM | HEART RATE: 78 BPM | OXYGEN SATURATION: 96 % | SYSTOLIC BLOOD PRESSURE: 111 MMHG | DIASTOLIC BLOOD PRESSURE: 74 MMHG

## 2018-09-24 DIAGNOSIS — M47.816 LUMBAR SPONDYLOSIS: ICD-10-CM

## 2018-09-24 PROCEDURE — 64494 INJ PARAVERT F JNT L/S 2 LEV: CPT | Performed by: PHYSICAL MEDICINE & REHABILITATION

## 2018-09-24 PROCEDURE — 64493 INJ PARAVERT F JNT L/S 1 LEV: CPT | Performed by: PHYSICAL MEDICINE & REHABILITATION

## 2018-09-24 RX ORDER — BUPIVACAINE HYDROCHLORIDE 5 MG/ML
10 INJECTION, SOLUTION EPIDURAL; INTRACAUDAL ONCE
Status: COMPLETED | OUTPATIENT
Start: 2018-09-24 | End: 2018-09-24

## 2018-09-24 RX ADMIN — IOHEXOL 1.5 ML: 300 INJECTION, SOLUTION INTRAVENOUS at 10:32

## 2018-09-24 RX ADMIN — BUPIVACAINE HYDROCHLORIDE 3 ML: 5 INJECTION, SOLUTION EPIDURAL; INTRACAUDAL at 10:32

## 2018-09-24 NOTE — DISCHARGE INSTRUCTIONS

## 2018-09-24 NOTE — H&P
History of Present Illness: The patient is a 60 y o  male who presents with complaints of Bilateral lower back pain    Patient Active Problem List   Diagnosis    Lumbar radiculopathy - Right    Disability examination    Bone lesion    Neuropathy    Primary osteoarthritis of left hip    Annual physical exam    Tobacco abuse    Encounter for colorectal cancer screening    Lipid screening    Degenerative tear of acetabular labrum of left hip    Preop examination    Gastroesophageal reflux disease without esophagitis    Work related injury    Colon cancer screening    Right foot drop    Lumbar spondylosis - Bilateral    Vitamin D deficiency    Thoracic spine pain       Past Medical History:   Diagnosis Date    Chronic back pain     Depression     GERD (gastroesophageal reflux disease)     L4-L5 disc bulge 06/16/2017    Muscle weakness     Neuropathy     Osteoarthritis     Right foot drop        Past Surgical History:   Procedure Laterality Date    EGD AND COLONOSCOPY N/A 6/29/2018    Procedure: EGD AND COLONOSCOPY;  Surgeon: Britany Mcgovern MD;  Location[facility];   Service: Gastroenterology    EPIDURAL BLOCK INJECTION      needle    [place] HIP [address] BODY,PLASTY/RESECTN Left 4/30/2018    Procedure: LEFT HIP ARTHROSCOPIC LABRAL DEBRIDEMENT;  Surgeon: Lindsey Beauchamp MD;  [facility];  Service: Orthopedics    TONSILLECTOMY      WISDOM TOOTH EXTRACTION           Current Outpatient Prescriptions:     cholecalciferol (VITAMIN D3) 1,000 units tablet, Take 2,000 Units by mouth 2 (two) times a day  , Disp: , Rfl:     cyanocobalamin (VITAMIN B-12) 100 mcg tablet, Take 100 mcg by mouth daily  , Disp: , Rfl:     Flaxseed, Linseed, (FLAX SEED OIL PO), Take 1 capsule by mouth daily  , Disp: , Rfl:     multivitamin (THERAGRAN) TABS, Take 1 tablet by mouth daily, Disp: , Rfl:     omeprazole (PriLOSEC) 20 mg delayed release capsule, Take 1 capsule (20 mg total) by mouth 2 (two) times a day (Patient taking differently: Take 20 mg by mouth 2 (two) times a day as needed  ), Disp: 60 capsule, Rfl: 3    pregabalin (LYRICA) 75 mg capsule, Take 1 capsule (75 mg total) by mouth 3 (three) times a day, Disp: 90 capsule, Rfl: 0    pyridoxine (B-6) 100 MG tablet, Take 100 mg by mouth daily, Disp: , Rfl:     traMADol (ULTRAM) 50 mg tablet, Take 1 tablet (50 mg total) by mouth every 8 (eight) hours as needed for moderate pain or severe pain, Disp: 45 tablet, Rfl: 1    Current Facility-Administered Medications:     bupivacaine (PF) (MARCAINE) 0 5 % injection 10 mL, 10 mL, Injection, Once, Theotis Covert, DO    iohexol (OMNIPAQUE) 300 mg/mL injection 50 mL, 50 mL, Perineural, Once, Theotis Covert, DO    Allergies   Allergen Reactions    Gabapentin GI Intolerance    Tramadol Confusion    Morphine Other (See Comments)     constipation       Physical Exam:   Vitals:    09/24/18 1012   BP: 112/73   Pulse: 79   Resp: 20   Temp: 98 4 °F (36 9 °C)   SpO2: 97%     General: Awake, Alert, Oriented x 3, Mood and affect appropriate  Respiratory: Respirations even and unlabored  Cardiovascular: Peripheral pulses intact; no edema  Musculoskeletal Exam:  Bilateral lower back pain    ASA Score:  2  Patient/Chart Verification  Patient ID Verified: Verbal  Consents Confirmed: Procedural, To be obtained in the Pre-Procedure area  H&P( within 30 days) Verified: To be obtained in the Pre-Procedure area  Interval H&P(within 24 hr) Complete (required for Outpatients and Surgery Admit only): To be obtained in the Pre-Procedure area  Allergies Reviewed: Yes  Anticoag/NSAID held?: NA  Currently on antibiotics?: No  Pregnancy denied?: NA    Assessment:   1   Lumbar spondylosis        Plan: LYUBOV L3-5 MBB #2

## 2018-09-25 NOTE — PROGRESS NOTES
Contacted   Febillla regarding thoracic spine pain  He is seeing Dr Kelle Harada for management of low back through workmen's compensation  I advised that he schedule a separate appointment for thoracic back pain

## 2018-10-03 ENCOUNTER — TELEPHONE (OUTPATIENT)
Dept: RADIOLOGY | Facility: MEDICAL CENTER | Age: 63
End: 2018-10-03

## 2018-10-03 NOTE — TELEPHONE ENCOUNTER
Pain diary reviewed by Dr Marylu Ortiz; proceed with RFA and f/u; I will call and coordinate       bilateral L3-5

## 2018-10-05 DIAGNOSIS — M54.16 LUMBAR RADICULOPATHY: ICD-10-CM

## 2018-10-05 RX ORDER — TRAMADOL HYDROCHLORIDE 50 MG/1
50 TABLET ORAL EVERY 8 HOURS PRN
Qty: 45 TABLET | Refills: 0 | Status: CANCELLED | OUTPATIENT
Start: 2018-10-05

## 2018-10-05 NOTE — TELEPHONE ENCOUNTER
Message     Edi Russell would like a refill of the following medications:          traMADol (ULTRAM) 50 mg tablet HOLLIS Schultz]      Preferred pharmacy: [facility]Y #[id number]         Pending       Disp Refills Start End   traMADol (ULTRAM) 50 mg tablet 45 tablet 0 10/5/2018    Sig - Route:   Take 1 tablet (50 mg total) by mouth every 8 (eight) hours as needed for moderate pain or severe pain - Oral   AYAD:  No

## 2018-10-05 NOTE — TELEPHONE ENCOUNTER
S/w pt, he said he needs a refill of his Tramadol, at last OV on 8/8 he did not require a refill by Christine Barakat  Pt said he has about 9 left and said the pain has been a little worse lately so he believes he will run out in the next week or two  Pt had appt scheduled for November but advised him that we will need to move up his OV for narcotic refills  Rescheduled pt for 10/8 to arrive at 7:45 am  Sergey'd appt in November

## 2018-10-08 ENCOUNTER — OFFICE VISIT (OUTPATIENT)
Dept: PAIN MEDICINE | Facility: MEDICAL CENTER | Age: 63
End: 2018-10-08
Payer: COMMERCIAL

## 2018-10-08 VITALS
HEART RATE: 85 BPM | SYSTOLIC BLOOD PRESSURE: 104 MMHG | BODY MASS INDEX: 24.1 KG/M2 | WEIGHT: 192.8 LBS | DIASTOLIC BLOOD PRESSURE: 68 MMHG

## 2018-10-08 DIAGNOSIS — G89.29 CHRONIC BILATERAL LOW BACK PAIN WITHOUT SCIATICA: Primary | ICD-10-CM

## 2018-10-08 DIAGNOSIS — F11.20 UNCOMPLICATED OPIOID DEPENDENCE (HCC): ICD-10-CM

## 2018-10-08 DIAGNOSIS — M54.16 LUMBAR RADICULOPATHY: ICD-10-CM

## 2018-10-08 DIAGNOSIS — M54.50 CHRONIC BILATERAL LOW BACK PAIN WITHOUT SCIATICA: Primary | ICD-10-CM

## 2018-10-08 DIAGNOSIS — Z79.891 LONG-TERM CURRENT USE OF OPIATE ANALGESIC: ICD-10-CM

## 2018-10-08 DIAGNOSIS — G89.4 CHRONIC PAIN SYNDROME: ICD-10-CM

## 2018-10-08 PROCEDURE — 99214 OFFICE O/P EST MOD 30 MIN: CPT | Performed by: NURSE PRACTITIONER

## 2018-10-08 RX ORDER — PREGABALIN 75 MG/1
75 CAPSULE ORAL 3 TIMES DAILY
Qty: 90 CAPSULE | Refills: 1 | Status: SHIPPED | OUTPATIENT
Start: 2018-10-08 | End: 2018-11-16 | Stop reason: SDUPTHER

## 2018-10-08 RX ORDER — BUPRENORPHINE 5 UG/H
1 PATCH TRANSDERMAL WEEKLY
Qty: 4 PATCH | Refills: 0 | Status: SHIPPED | OUTPATIENT
Start: 2018-10-08 | End: 2018-11-16 | Stop reason: SDUPTHER

## 2018-10-08 NOTE — TELEPHONE ENCOUNTER
Scheduled:     Right L3-5 RFA on 10/31  Left L3-5 RFA on 11/14  6w f/u with Theo Singh on 12/12    Reviewed instructions: , NPO 1 hour prior, loose-fitting/comfortable clothing, if ill/fever/infx/abx to call and reschedule  No need to hold any meds prior  Patient stated verbal understanding  Provided with written instructions with dates/times

## 2018-10-08 NOTE — PROGRESS NOTES
Assessment:  1  Chronic bilateral low back pain without sciatica    2  Lumbar radiculopathy    3  Chronic pain syndrome    4  Long-term current use of opiate analgesic    5  Uncomplicated opioid dependence ([address] )        Plan: At this time we will continue with the Lyrica as it does provide relief of his neuropathic pain symptoms  He was given a printed prescription that he can sent to [facility]  Patient does not wish to continue with the tramadol he uses this occasionally as needed currently however he states that it makes him sit around and do nothing and he does not like that  At his last office visit I did provide him meloxicam and methocarbamol however he states that he is not taking them because they did not work at all  I suggested that we trial the Butrans patch at 5 mcg for the patient he is agreeable and will discontinue the use of the tramadol  Patient did have positive results from both of his bilateral L3-5 medial branch blocks I will have him schedule his radiofrequency ablation before leaving today  Follow-up in 4 weeks for re-evaluation of Butrans patch      There are risks associated with opioid medications, including dependence, addiction and tolerance  The patient understands and agrees to use these medications only as prescribed  Potential side effects of the medications include, but are not limited to, constipation, drowsiness, addiction, impaired judgment and risk of fatal overdose if not taken as prescribed  The patient was warned against driving while taking sedation medications  Sharing medications is a felony  At this point in time, the patient is showing no signs of addiction, abuse, diversion or suicidal ideation  A urine drug screen was collected at today's office visit as part of our medication management protocol  The point of care testing results were appropriate for what was being prescribed  The specimen will be sent for confirmatory testing   The drug screen is medically necessary because the patient is either dependent on opioid medication or is being considered for opioid medication therapy and the results could impact ongoing or future treatment  The drug screen is to evaluate for the presences or absence of prescribed, non-prescribed, and/or illicit drugs/substances  [place] Prescription Drug Monitoring Program report was reviewed and was appropriate     Complete risks and benefits including bleeding, infection, tissue reaction, nerve injury and allergic reaction were discussed  The approach was demonstrated using models and literature was provided  Verbal and written consent was obtained  My impressions and treatment recommendations were discussed in detail with the patient who verbalized understanding and had no further questions  Discharge instructions were provided  I personally saw and examined the patient and I agree with the above discussed plan of care  Orders Placed This Encounter   Procedures    MM ALL_Prescribed Meds and Special Instructions     New Medications Ordered This Visit   Medications    pregabalin (LYRICA) 75 mg capsule     Sig: Take 1 capsule (75 mg total) by mouth 3 (three) times a day     Dispense:  90 capsule     Refill:  1    Buprenorphine 5 MCG/HR PTWK     Sig: Place 1 patch on the skin once a week     Dispense:  4 patch     Refill:  0       History of Present Illness:    Bala Cunningham is a 60 y o  male who presents for a follow-up related to his back and bilateral foot pain  Today rates his pain 7/10 this is intermittent in nature and described as dull, aching, sharp, throbbing, numbness, and pins and needles  Patient has been taking Lyrica 75 mg 3 times a day which she states helps with this sharp shooting pain  He does have Tramadol that he only uses occasionally as needed      I have personally reviewed and/or updated the patient's past medical history, past surgical history, family history, social history, current medications, allergies, and vital signs today  Review of Systems:    Review of Systems   Constitutional: Negative for fever and unexpected weight change  HENT: Negative for trouble swallowing  Eyes: Negative for visual disturbance  Respiratory: Negative for shortness of breath and wheezing  Cardiovascular: Negative for chest pain and palpitations  Gastrointestinal: Negative for constipation, diarrhea, nausea and vomiting  Endocrine: Negative for cold intolerance, heat intolerance and polydipsia  Genitourinary: Negative for difficulty urinating and frequency  Musculoskeletal: Positive for gait problem and joint swelling  Negative for arthralgias and myalgias  Decreased ROM   Skin: Negative for rash  Neurological: Negative for dizziness, seizures, syncope, weakness and headaches  Hematological: Does not bruise/bleed easily  Psychiatric/Behavioral: Negative for dysphoric mood  All other systems reviewed and are negative        Patient Active Problem List   Diagnosis    Lumbar radiculopathy - Right    Disability examination    Bone lesion    Neuropathy    Primary osteoarthritis of left hip    Annual physical exam    Tobacco abuse    Encounter for colorectal cancer screening    Lipid screening    Degenerative tear of acetabular labrum of left hip    Preop examination    Gastroesophageal reflux disease without esophagitis    Work related injury    Colon cancer screening    Right foot drop    Lumbar spondylosis - Bilateral    Vitamin D deficiency    Thoracic spine pain       Past Medical History:   Diagnosis Date    Chronic back pain     Depression     GERD (gastroesophageal reflux disease)     L4-L5 disc bulge 06/16/2017    Muscle weakness     Neuropathy     Osteoarthritis     Right foot drop        Past Surgical History:   Procedure Laterality Date    EGD AND COLONOSCOPY N/A 6/29/2018    Procedure: EGD AND COLONOSCOPY;  Surgeon: Nohelia Ventura MD; Location: [facility]; Service: Gastroenterology    EPIDURAL BLOCK INJECTION      needle    [place] HIP [address] BODY,PLASTY/RESECTN Left 4/30/2018    Procedure: LEFT HIP ARTHROSCOPIC LABRAL DEBRIDEMENT;  Surgeon: Tommi Boas, MD;  [facility];  Service: Orthopedics    TONSILLECTOMY      WISDOM TOOTH EXTRACTION         Family History   Problem Relation Age of Onset    Osteoarthritis Family     No Known Problems Mother     No Known Problems Father        Social History     Occupational History    Not on file  Social History Main Topics    Smoking status: Current Every Day Smoker     Packs/day: 0 25     Last attempt to quit: 12/21/2017    Smokeless tobacco: Never Used      Comment: pt states quit 2 days ago    Alcohol use No    Drug use: No    Sexual activity: Not on file       Current Outpatient Prescriptions on File Prior to Visit   Medication Sig    cholecalciferol (VITAMIN D3) 1,000 units tablet Take 2,000 Units by mouth 2 (two) times a day      cyanocobalamin (VITAMIN B-12) 100 mcg tablet Take 100 mcg by mouth daily      Flaxseed, Linseed, (FLAX SEED OIL PO) Take 1 capsule by mouth daily      multivitamin (THERAGRAN) TABS Take 1 tablet by mouth daily    omeprazole (PriLOSEC) 20 mg delayed release capsule Take 1 capsule (20 mg total) by mouth 2 (two) times a day (Patient taking differently: Take 20 mg by mouth 2 (two) times a day as needed  )    pyridoxine (B-6) 100 MG tablet Take 100 mg by mouth daily    [DISCONTINUED] pregabalin (LYRICA) 75 mg capsule Take 1 capsule (75 mg total) by mouth 3 (three) times a day    [DISCONTINUED] traMADol (ULTRAM) 50 mg tablet Take 1 tablet (50 mg total) by mouth every 8 (eight) hours as needed for moderate pain or severe pain     No current facility-administered medications on file prior to visit          Allergies   Allergen Reactions    Gabapentin GI Intolerance    Tramadol Confusion    Morphine Other (See Comments)     constipation Last Tramadol taken 10/06  New UDS 10/08        Physical Exam:    /68   Pulse 85   Wt 87 5 kg (192 lb 12 8 oz)   BMI 24 10 kg/m²     Constitutional: normal, well developed, well nourished, alert, in no distress and non-toxic and no overt pain behavior  Eyes: anicteric  HEENT: grossly intact  Neck: supple, symmetric, trachea midline and no masses   Pulmonary:even and unlabored  Cardiovascular:No edema or pitting edema present  Skin:Normal without rashes or lesions and well hydrated  Psychiatric:Patient's behavior is inappropriate, cursing during office visit   patient was kindly told his behavior was inappropriate and he replied" he does not care about appropriate"  Neurologic:Cranial Nerves II-XII grossly intact  Musculoskeletal:normal    Imaging

## 2018-10-09 NOTE — PROGRESS NOTES
Assessment/Plan:      Diagnoses and all orders for this visit:    Work related injury    Disability examination    Right foot drop    Thoracic spine pain        M*Modal software was used to dictate this note  It may contain errors with dictating incorrect words/spelling  Please contact provider directly with any questions  Subjective:     Patient ID: Delores Lawrence is a 60 y o  male  HPI Last seen 9/13/18 for follow-up of work related injury  Since last visit has been seen by pain management and is scheduled for lumbar ablations on 10/31/18  Has been placed on Butrans patches in place of tramadol for pain relief  Completed XR of thoracic spine ordered by PCP  Today reports "nerve pain" onset left lumbar at work summer 2017  Reports twisting his left ankle last week  Reports weakness of dorsiflexion / plantar flexion for some time and heard cracking  These Sx have resolved  Continue with back brace  Has been put on Butrans by PM      PAST MEDICAL HISTORY  Past Medical History:   Diagnosis Date    Chronic back pain     Depression     GERD (gastroesophageal reflux disease)     L4-L5 disc bulge 06/16/2017    Muscle weakness     Neuropathy     Osteoarthritis     Right foot drop      PAST SURGICAL HISTORY  Past Surgical History:   Procedure Laterality Date    EGD AND COLONOSCOPY N/A 6/29/2018    Procedure: EGD AND COLONOSCOPY;  Surgeon: Boo Moore MD;  Location: [facility];   Service: Gastroenterology    EPIDURAL BLOCK INJECTION      needle    [place] HIP [address] BODY,PLASTY/RESECTN Left 4/30/2018    Procedure: LEFT HIP ARTHROSCOPIC LABRAL DEBRIDEMENT;  Surgeon: Lorena Rodriguez MD;  [facility];  Service: Orthopedics    TONSILLECTOMY      WISDOM TOOTH EXTRACTION         FAMILY HISTORY  Family History   Problem Relation Age of Onset    Osteoarthritis Family     No Known Problems Mother     No Known Problems Father      HOME MEDICATIONS  Current Outpatient Prescriptions   Medication Sig Dispense Refill    Buprenorphine 5 MCG/HR PTWK Place 1 patch on the skin once a week 4 patch 0    cholecalciferol (VITAMIN D3) 1,000 units tablet Take 2,000 Units by mouth 2 (two) times a day        cyanocobalamin (VITAMIN B-12) 100 mcg tablet Take 100 mcg by mouth daily        Flaxseed, Linseed, (FLAX SEED OIL PO) Take 1 capsule by mouth daily        multivitamin (THERAGRAN) TABS Take 1 tablet by mouth daily      omeprazole (PriLOSEC) 20 mg delayed release capsule Take 1 capsule (20 mg total) by mouth 2 (two) times a day (Patient taking differently: Take 20 mg by mouth 2 (two) times a day as needed  ) 60 capsule 3    pregabalin (LYRICA) 75 mg capsule Take 1 capsule (75 mg total) by mouth 3 (three) times a day 90 capsule 1    pyridoxine (B-6) 100 MG tablet Take 100 mg by mouth daily       No current facility-administered medications for this visit  ALLERGIES  Gabapentin; Tramadol; and Morphine      SOCIAL HISTORY  History   Alcohol Use No     History   Drug Use No     History   Smoking Status    Current Every Day Smoker    Packs/day: 0 25    Last attempt to quit: 12/21/2017   Smokeless Tobacco    Never Used     Comment: pt states quit 2 days ago     Review of Systems   Respiratory: Negative  Cardiovascular: Negative  Gastrointestinal: Negative  Genitourinary: Negative  Musculoskeletal: Positive for back pain and gait problem  Neurological: Positive for weakness  Psychiatric/Behavioral: Positive for sleep disturbance           Objective:  Vitals:    10/11/18 1035   BP: 100/60   Pulse: 92       Imaging:  XR SPINE THORACIC 3 VW 9/21/18     FINDINGS:     There is no fracture or pathologic bone lesion      Thoracic vertebral alignment is within normal limits      Mild degenerative changes throughout the thoracic spine       There is no displacement of the paraspinal line       The pedicles appear intact      IMPRESSION:     No acute osseous abnormality        Degenerative changes as described  Physical Exam   Musculoskeletal:   Does not have right MAFO on     manual muscle testing is not reveal weakness in the right dorsiflexors however it is apparent upon heel standing  Neurological:   Reflex Scores:       Tricep reflexes are 1+ on the right side and 1+ on the left side  Bicep reflexes are 1+ on the right side and 1+ on the left side  Brachioradialis reflexes are 1+ on the right side and 1+ on the left side  Patellar reflexes are 1+ on the right side and 1+ on the left side  Achilles reflexes are 1+ on the right side and 1+ on the left side     Right hamstring jerk diminished

## 2018-10-11 ENCOUNTER — TELEPHONE (OUTPATIENT)
Dept: PAIN MEDICINE | Facility: MEDICAL CENTER | Age: 63
End: 2018-10-11

## 2018-10-11 ENCOUNTER — OFFICE VISIT (OUTPATIENT)
Dept: PAIN MEDICINE | Facility: CLINIC | Age: 63
End: 2018-10-11
Payer: OTHER MISCELLANEOUS

## 2018-10-11 VITALS
WEIGHT: 191 LBS | BODY MASS INDEX: 23.75 KG/M2 | DIASTOLIC BLOOD PRESSURE: 60 MMHG | HEART RATE: 92 BPM | HEIGHT: 75 IN | SYSTOLIC BLOOD PRESSURE: 100 MMHG
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
## 2018-10-11 DIAGNOSIS — M54.6 THORACIC SPINE PAIN: ICD-10-CM

## 2018-10-11 DIAGNOSIS — Z02.71 DISABILITY EXAMINATION: ICD-10-CM

## 2018-10-11 DIAGNOSIS — M21.371 RIGHT FOOT DROP: ICD-10-CM

## 2018-10-11 PROCEDURE — 99213 OFFICE O/P EST LOW 20 MIN: CPT | Performed by: PHYSICAL MEDICINE & REHABILITATION

## 2018-10-11 NOTE — LETTER
October 15, 2018     Shena Orellana, [address] [facility]    Patient: Jeyson Artis   YOB: 1955   Date of Visit: 10/11/2018       Dear Dr Riaz Garcia:    Thank you for referring Bashir Walton to me for evaluation  Below are the relevant portions of my assessment and plan of care  If you have questions, please do not hesitate to call me  I look forward to following Isadora Mercado along with you           Sincerely,        Melissa Rios,         CC: No Recipients

## 2018-10-11 NOTE — TELEPHONE ENCOUNTER
Patient is calling to let the office know that he is planning on using his own prescription plan for the pain patch  He is tired of waiting for work comp  He states that the pharmacy is faxing something over now to have the doctor sign

## 2018-10-18 DIAGNOSIS — M54.6 THORACIC SPINE PAIN: ICD-10-CM
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
## 2018-10-18 DIAGNOSIS — M47.816 LUMBAR SPONDYLOSIS: ICD-10-CM

## 2018-10-19 ENCOUNTER — TELEPHONE (OUTPATIENT)
Dept: PAIN MEDICINE | Facility: MEDICAL CENTER | Age: 63
End: 2018-10-19

## 2018-10-19 NOTE — TELEPHONE ENCOUNTER
Pt called and left a voicemail today @ 12:46p stating that Chad Bowman needs to call today [phone] which is the approval phone number for his Rx plan  He stated he has been waiting for 8 days and stated "no more excuses,get it done today " pt disconnected voicemail at that time  No additional info was given   Pt can be reached at [phone]

## 2018-10-29 DIAGNOSIS — G89.4 CHRONIC PAIN SYNDROME: Primary | ICD-10-CM

## 2018-10-29 NOTE — TELEPHONE ENCOUNTER
Because he has one of the below conditions they will not approve it:    He either has already been taking an extended-release opioid drug for 30 days or he has severe continuous pain and has tried immediate-release opioids for one week

## 2018-10-29 NOTE — TELEPHONE ENCOUNTER
S/W pt  Attempted to advise the pt the same  He stated he got workman comp to approve the [employer] patch  He got it last week and the patch is on  Pt stated "I send her a nasty email" referring to AO and he apologizes for that  It was before he "found out what is really going on "  [facility]  He stated there is a great need for electric prescription to have in big, bold print for the doctor's name and the office fax # so the "middle approval people have no excuses" and the "middle approval people state they can not find the information to contact who escribed it  Advised him that the escribed scripts are typed and easy to read  Pt stated "It is the people in the middle causing the problem  It is not you guys  Asher Chase tries really hard    I am not the only victim "

## 2018-10-29 NOTE — TELEPHONE ENCOUNTER
Will try and send Belbuca 75mcg 1 film strip to the inside of the cheek up to 2 times daily as needed,( a different form of buprenorphine) to see if his insurance will cover this   If not will try nucynta

## 2018-10-29 NOTE — TELEPHONE ENCOUNTER
Aware TY  Patient can not continue to speak to myself or any of the staff here in a nasty or threatening way it will not be tolerated and I discussed this with him at his last OV  Please make Dr Alba Coyle aware as well

## 2018-10-30 ENCOUNTER — TELEPHONE (OUTPATIENT)
Dept: PAIN MEDICINE | Facility: MEDICAL CENTER | Age: 63
End: 2018-10-30

## 2018-10-30 NOTE — TELEPHONE ENCOUNTER
Pt left a message in voicemail at 3:15 yesterday afternoon with a message for Ever Rey, stating that there are "understatements and omissions from Dr Arevalo Yuma ? report on him can be legally proven " Also has some questions about the patch he is on  Patient left a call back number of [phone]

## 2018-10-31 ENCOUNTER — HOSPITAL ENCOUNTER (OUTPATIENT)
Dept: RADIOLOGY | Facility: MEDICAL CENTER | Age: 63
Discharge: HOME | End: 2018-10-31
Payer: OTHER MISCELLANEOUS

## 2018-10-31 ENCOUNTER — TELEPHONE (OUTPATIENT)
Dept: PAIN MEDICINE | Facility: MEDICAL CENTER | Age: 63
End: 2018-10-31

## 2018-10-31 VITALS
TEMPERATURE: 98.1 F | OXYGEN SATURATION: 96 % | RESPIRATION RATE: 18 BRPM | SYSTOLIC BLOOD PRESSURE: 128 MMHG | DIASTOLIC BLOOD PRESSURE: 85 MMHG | HEART RATE: 85 BPM

## 2018-10-31 DIAGNOSIS — M47.816 LUMBAR SPONDYLOSIS: ICD-10-CM

## 2018-10-31 PROCEDURE — 64636 DESTROY L/S FACET JNT ADDL: CPT | Performed by: PHYSICAL MEDICINE & REHABILITATION

## 2018-10-31 PROCEDURE — 64635 DESTROY LUMB/SAC FACET JNT: CPT | Performed by: PHYSICAL MEDICINE & REHABILITATION

## 2018-10-31 RX ORDER — LIDOCAINE HYDROCHLORIDE 10 MG/ML
5 INJECTION, SOLUTION EPIDURAL; INFILTRATION; INTRACAUDAL; PERINEURAL ONCE
Status: COMPLETED | OUTPATIENT
Start: 2018-10-31 | End: 2018-10-31

## 2018-10-31 RX ORDER — BUPIVACAINE HYDROCHLORIDE 5 MG/ML
10 INJECTION, SOLUTION EPIDURAL; INTRACAUDAL ONCE
Status: COMPLETED | OUTPATIENT
Start: 2018-10-31 | End: 2018-10-31

## 2018-10-31 RX ADMIN — LIDOCAINE HYDROCHLORIDE 2 ML: 10 INJECTION, SOLUTION EPIDURAL; INFILTRATION; INTRACAUDAL; PERINEURAL at 09:15

## 2018-10-31 RX ADMIN — Medication 4 ML: at 09:19

## 2018-10-31 RX ADMIN — BUPIVACAINE HYDROCHLORIDE 5 ML: 5 INJECTION, SOLUTION EPIDURAL; INTRACAUDAL at 09:25

## 2018-10-31 NOTE — DISCHARGE INSTRUCTIONS

## 2018-10-31 NOTE — TELEPHONE ENCOUNTER
--JAYLEN--    RN s/w pt regarding previous  RN advised to not take more than the recommended dose of 3200 mg in a 24 hour period  Per pt he hasn't taken any ibuprofen since 2016 and at that time was taking 600 mg -800 mg at a time several hours a day  RN stated that he could take up to 800 mg QID if needed  Per pt he took 200 mg and is reclining with his ice pack  RN advised that if he can relieve his pain with less medication that is what he should do  RN then reiterated that he should not exceed the recommended dosing on package and that he should eat with the doses so that he decreases possible stomach upset  Pt appreciative of same  Pt aware that we will contact him 11/1 to see how he did overnight

## 2018-10-31 NOTE — TELEPHONE ENCOUNTER
--JAYLEN--    RN s/w pt regarding previous while pt in for procedure this AM   Pt appreciative of information and is going to wear a "water proof" patch over his butrans patch while in the shower  Pt also was advised that he may take tylenol or ibuprofen for breakthrough pain or to supplement the pain relief  Pt appreciative of information

## 2018-10-31 NOTE — H&P
History of Present Illness: The patient is a 60 y o  male who presents with complaints of  Right-sided low back pain    Patient Active Problem List   Diagnosis    Lumbar radiculopathy - Right    Disability examination    Bone lesion    Neuropathy    Primary osteoarthritis of left hip    Annual physical exam    Tobacco abuse    Encounter for colorectal cancer screening    Lipid screening    Degenerative tear of acetabular labrum of left hip    Preop examination    Gastroesophageal reflux disease without esophagitis    Work related injury    Colon cancer screening    Right foot drop    Lumbar spondylosis - Bilateral    Vitamin D deficiency    Thoracic spine pain       Past Medical History:   Diagnosis Date    Chronic back pain     Depression     GERD (gastroesophageal reflux disease)     L4-L5 disc bulge 06/16/2017    Muscle weakness     Neuropathy     Osteoarthritis     Right foot drop        Past Surgical History:   Procedure Laterality Date    EGD AND COLONOSCOPY N/A 6/29/2018    Procedure: EGD AND COLONOSCOPY;  Surgeon: Jeronimo Barahona MD;  Location: [facility];   Service: Gastroenterology    EPIDURAL BLOCK INJECTION      needle    [place] HIP [address] BODY,PLASTY/RESECTN Left 4/30/2018    Procedure: LEFT HIP ARTHROSCOPIC LABRAL DEBRIDEMENT;  Surgeon: Santiago Pulliam MD;  [facility];  Service: Orthopedics    TONSILLECTOMY      WISDOM TOOTH EXTRACTION           Current Outpatient Prescriptions:     Buprenorphine 5 MCG/HR PTWK, Place 1 patch on the skin once a week, Disp: 4 patch, Rfl: 0    cholecalciferol (VITAMIN D3) 1,000 units tablet, Take 3,000 Units by mouth 2 (two) times a day  , Disp: , Rfl:     cyanocobalamin (VITAMIN B-12) 100 mcg tablet, Take 100 mcg by mouth daily  , Disp: , Rfl:     Flaxseed, Linseed, (FLAX SEED OIL PO), Take 1 capsule by mouth daily  , Disp: , Rfl:     multivitamin (THERAGRAN) TABS, Take 1 tablet by mouth daily, Disp: , Rfl:     omeprazole (PriLOSEC) 20 mg delayed release capsule, Take 1 capsule (20 mg total) by mouth 2 (two) times a day (Patient taking differently: Take 20 mg by mouth 2 (two) times a day as needed  ), Disp: 60 capsule, Rfl: 3    pregabalin (LYRICA) 75 mg capsule, Take 1 capsule (75 mg total) by mouth 3 (three) times a day, Disp: 90 capsule, Rfl: 1    pyridoxine (B-6) 100 MG tablet, Take 100 mg by mouth daily, Disp: , Rfl:     Allergies   Allergen Reactions    Gabapentin GI Intolerance    Tramadol Confusion    Morphine Other (See Comments)     constipation       Physical Exam:   Vitals:    10/31/18 0859   BP: 112/72   Pulse: 83   Resp: 18   Temp: 98 1 °F (36 7 °C)   SpO2: 94%     General: Awake, Alert, Oriented x 3, Mood and affect appropriate  Respiratory: Respirations even and unlabored  Cardiovascular: Peripheral pulses intact; no edema  Musculoskeletal Exam:  Right-sided low back pain    ASA Score:  2  Patient/Chart Verification  Patient ID Verified: Verbal  Consents Confirmed: Procedural, To be obtained in the Pre-Procedure area  H&P( within 30 days) Verified: To be obtained in the Pre-Procedure area  Interval H&P(within 24 hr) Complete (required for Outpatients and Surgery Admit only): To be obtained in the Pre-Procedure area  Allergies Reviewed: Yes  Anticoag/NSAID held?: NA  Currently on antibiotics?: No  Pregnancy denied?: NA    Assessment:   1   Lumbar spondylosis        Plan: RT L3-5 RFA

## 2018-10-31 NOTE — TELEPHONE ENCOUNTER
Pt is calling stating he had a procedure done today but was not told the amount of ibuprofen he is able to take at a time   Pt can be reached at [phone]

## 2018-11-01 ENCOUNTER — TELEPHONE (OUTPATIENT)
Dept: RADIOLOGY | Facility: MEDICAL CENTER | Age: 63
End: 2018-11-01

## 2018-11-01 NOTE — TELEPHONE ENCOUNTER
S/W pt  Pt stated needle sites look good, denies S&S, denies fevers and denies sun burn like sensation  Pt stated he is sore at the procedure sites  Pt stated yesterday he took ibuprofen and he slept for 3 hrs  Advised pt if he gets pain to take his prescribed or OTC pain medications and/or use ice/heat and that it takes 4 to 6 weeks to see the full effect  Confirmed pt's next OPRO and SOVS appts w/ pt  Pt verbalized understanding

## 2018-11-14 ENCOUNTER — HOSPITAL ENCOUNTER (OUTPATIENT)
Dept: RADIOLOGY | Facility: MEDICAL CENTER | Age: 63
Discharge: HOME | End: 2018-11-14
Admitting: PHYSICAL MEDICINE & REHABILITATION
Payer: OTHER MISCELLANEOUS

## 2018-11-14 ENCOUNTER — TELEPHONE (OUTPATIENT)
Dept: RADIOLOGY | Facility: MEDICAL CENTER | Age: 63
End: 2018-11-14

## 2018-11-14 VITALS
DIASTOLIC BLOOD PRESSURE: 72 MMHG | OXYGEN SATURATION: 96 % | RESPIRATION RATE: 20 BRPM | SYSTOLIC BLOOD PRESSURE: 113 MMHG | TEMPERATURE: 98.7 F | HEART RATE: 76 BPM

## 2018-11-14 DIAGNOSIS — M47.816 LUMBAR SPONDYLOSIS: ICD-10-CM

## 2018-11-14 PROCEDURE — 64635 DESTROY LUMB/SAC FACET JNT: CPT | Performed by: PHYSICAL MEDICINE & REHABILITATION

## 2018-11-14 PROCEDURE — 64636 DESTROY L/S FACET JNT ADDL: CPT | Performed by: PHYSICAL MEDICINE & REHABILITATION

## 2018-11-14 RX ORDER — BUPIVACAINE HCL/PF 2.5 MG/ML
10 VIAL (ML) INJECTION ONCE
Status: COMPLETED | OUTPATIENT
Start: 2018-11-14 | End: 2018-11-14

## 2018-11-14 RX ORDER — ACETAMINOPHEN 500 MG
1000 TABLET ORAL EVERY 12 HOURS PRN
COMMUNITY

## 2018-11-14 RX ORDER — LIDOCAINE HYDROCHLORIDE 10 MG/ML
5 INJECTION, SOLUTION EPIDURAL; INFILTRATION; INTRACAUDAL; PERINEURAL ONCE
Status: COMPLETED | OUTPATIENT
Start: 2018-11-14 | End: 2018-11-14

## 2018-11-14 RX ORDER — IBUPROFEN 600 MG/1
600 TABLET ORAL EVERY 6 HOURS PRN
COMMUNITY
End: 2021-07-29

## 2018-11-14 RX ADMIN — LIDOCAINE HYDROCHLORIDE 2.5 ML: 10 INJECTION, SOLUTION EPIDURAL; INFILTRATION; INTRACAUDAL; PERINEURAL at 10:05

## 2018-11-14 RX ADMIN — Medication 4 ML: at 10:10

## 2018-11-14 RX ADMIN — Medication 5 ML: at 10:13

## 2018-11-14 NOTE — DISCHARGE INSTRUCTIONS

## 2018-11-14 NOTE — H&P
History of Present Illness: The patient is a 58 y o  male who presents with complaints of   Left-sided low back pain    Patient Active Problem List   Diagnosis    Lumbar radiculopathy - Right    Disability examination    Bone lesion    Neuropathy    Primary osteoarthritis of left hip    Annual physical exam    Tobacco abuse    Encounter for colorectal cancer screening    Lipid screening    Degenerative tear of acetabular labrum of left hip    Preop examination    Gastroesophageal reflux disease without esophagitis    Work related injury    Colon cancer screening    Right foot drop    Lumbar spondylosis - Bilateral    Vitamin D deficiency    Thoracic spine pain       Past Medical History:   Diagnosis Date    Chronic back pain     Depression     GERD (gastroesophageal reflux disease)     L4-L5 disc bulge 06/16/2017    Muscle weakness     Neuropathy     Osteoarthritis     Right foot drop        Past Surgical History:   Procedure Laterality Date    EGD AND COLONOSCOPY N/A 6/29/2018    Procedure: EGD AND COLONOSCOPY;  Surgeon: Keely Wesley MD;  Location: [facility];   Service: Gastroenterology    EPIDURAL BLOCK INJECTION      needle    [place] HIP [address] BODY,PLASTY/RESECTN Left 4/30/2018    Procedure: LEFT HIP ARTHROSCOPIC LABRAL DEBRIDEMENT;  Surgeon: Saul Bustos MD;  [facility];  Service: Orthopedics    TONSILLECTOMY      WISDOM TOOTH EXTRACTION           Current Outpatient Prescriptions:     acetaminophen (TYLENOL) 500 mg tablet, Take 1,000 mg by mouth every 12 (twelve) hours as needed for mild pain, Disp: , Rfl:     Buprenorphine 5 MCG/HR PTWK, Place 1 patch on the skin once a week, Disp: 4 patch, Rfl: 0    cholecalciferol (VITAMIN D3) 1,000 units tablet, Take 3,000 Units by mouth 2 (two) times a day  , Disp: , Rfl:     cyanocobalamin (VITAMIN B-12) 100 mcg tablet, Take 100 mcg by mouth daily  , Disp: , Rfl:     Flaxseed, Linseed, (FLAX SEED OIL PO), Take 1 capsule by mouth daily  , Disp: , Rfl:     ibuprofen (MOTRIN) 200 mg tablet, Take 200 mg by mouth as needed for mild pain, Disp: , Rfl:     multivitamin (THERAGRAN) TABS, Take 1 tablet by mouth daily, Disp: , Rfl:     omeprazole (PriLOSEC) 20 mg delayed release capsule, Take 1 capsule (20 mg total) by mouth 2 (two) times a day (Patient taking differently: Take 20 mg by mouth 2 (two) times a day as needed  ), Disp: 60 capsule, Rfl: 3    pregabalin (LYRICA) 75 mg capsule, Take 1 capsule (75 mg total) by mouth 3 (three) times a day, Disp: 90 capsule, Rfl: 1    pyridoxine (B-6) 100 MG tablet, Take 100 mg by mouth daily, Disp: , Rfl:     Current Facility-Administered Medications:     bupivacaine (PF) (MARCAINE) 0 25 % injection 10 mL, 10 mL, Perineural, Once, Rosezena Herb, DO    lidocaine (PF) (XYLOCAINE-MPF) 1 % injection 5 mL, 5 mL, Infiltration, Once, Rosezena Herb, DO    lidocaine (PF) (XYLOCAINE-MPF) 2 % injection 4 mL, 4 mL, Perineural, Once, Rosezena Herb, DO    Allergies   Allergen Reactions    Gabapentin GI Intolerance    Tramadol Confusion    Morphine Other (See Comments)     constipation       Physical Exam:   Vitals:    11/14/18 0952   BP: 119/76   Pulse: 89   Resp: 20   Temp: 98 7 °F (37 1 °C)   SpO2: 95%     General: Awake, Alert, Oriented x 3, Mood and affect appropriate  Respiratory: Respirations even and unlabored  Cardiovascular: Peripheral pulses intact; no edema  Musculoskeletal Exam:  Left-sided low back pain    ASA Score:  2  Patient/Chart Verification  Patient ID Verified: Verbal  Consents Confirmed: Procedural, To be obtained in the Pre-Procedure area  H&P( within 30 days) Verified: To be obtained in the Pre-Procedure area  Interval H&P(within 24 hr) Complete (required for Outpatients and Surgery Admit only): To be obtained in the Pre-Procedure area  Allergies Reviewed: Yes  Anticoag/NSAID held?: NA  Currently on antibiotics?: No  Pregnancy denied?: NA    Assessment:   1   Lumbar spondylosis Plan:  LT L3-5 RFA

## 2018-11-15 NOTE — TELEPHONE ENCOUNTER
FYI-  RN s/w pt  States his injection sites look good, no fever, redness or drainage or signs of infection  Denies sunburn like sensation  States when asked how he was feeling "not bad"  Pt said he took ibuprofen about an hour after he arrived home from the procedure and 1000 mg of Aceteminophen and has not needed to take anything since then  Next appt date and time confirmed 11/15 at 0800 with 0745 arrival time

## 2018-11-16 ENCOUNTER — OFFICE VISIT (OUTPATIENT)
Dept: PAIN MEDICINE | Facility: MEDICAL CENTER | Age: 63
End: 2018-11-16
Payer: OTHER MISCELLANEOUS

## 2018-11-16 VITALS
SYSTOLIC BLOOD PRESSURE: 98 MMHG | HEIGHT: 75 IN | WEIGHT: 190 LBS | BODY MASS INDEX: 23.62 KG/M2 | DIASTOLIC BLOOD PRESSURE: 60 MMHG

## 2018-11-16 DIAGNOSIS — G62.9 NEUROPATHY: ICD-10-CM

## 2018-11-16 DIAGNOSIS — G89.29 CHRONIC BILATERAL LOW BACK PAIN WITHOUT SCIATICA: ICD-10-CM

## 2018-11-16 DIAGNOSIS — F11.20 UNCOMPLICATED OPIOID DEPENDENCE (HCC): ICD-10-CM

## 2018-11-16 DIAGNOSIS — G89.4 CHRONIC PAIN SYNDROME: Primary | ICD-10-CM

## 2018-11-16 DIAGNOSIS — M47.816 LUMBAR SPONDYLOSIS: ICD-10-CM

## 2018-11-16 DIAGNOSIS — M54.6 THORACIC SPINE PAIN: ICD-10-CM

## 2018-11-16 DIAGNOSIS — M54.50 CHRONIC BILATERAL LOW BACK PAIN WITHOUT SCIATICA: ICD-10-CM

## 2018-11-16 DIAGNOSIS — M54.16 LUMBAR RADICULOPATHY: ICD-10-CM

## 2018-11-16 PROCEDURE — 99024 POSTOP FOLLOW-UP VISIT: CPT | Performed by: NURSE PRACTITIONER

## 2018-11-16 RX ORDER — PREGABALIN 75 MG/1
75 CAPSULE ORAL 3 TIMES DAILY
Qty: 90 CAPSULE | Refills: 1 | Status: SHIPPED | OUTPATIENT
Start: 2018-11-16 | End: 2018-12-24 | Stop reason: SDUPTHER

## 2018-11-16 RX ORDER — BUPRENORPHINE 5 UG/H
1 PATCH TRANSDERMAL WEEKLY
Qty: 4 PATCH | Refills: 1 | Status: SHIPPED | OUTPATIENT
Start: 2018-11-22 | End: 2019-01-11 | Stop reason: SDUPTHER

## 2018-11-16 NOTE — PROGRESS NOTES
Assessment:  1  Chronic pain syndrome    2  Lumbar radiculopathy - Right    3  Neuropathy    4  Lumbar spondylosis - Bilateral    5  Thoracic spine pain    6  Chronic bilateral low back pain without sciatica    7  Lumbar radiculopathy    8  Uncomplicated opioid dependence ([address] )        Plan:  1  Patient continues to do well on Butrans 5mcg/hr patch, stating that for the first time in over a year, his pain is well managed  I will refill this medication at today's office visit with a Do Not Fill date of November 22, 2018 with 1 refill  In regards to his mild skin irritation,  I advised that prior to the next patch application and at the next application site, he is to apply topical over-the-counter Benadryl cream 20 minutes prior to the application, allowing sufficient time for to dry and prior to applying the patch, making sure that any excess cream that was not absorbed is wiped off, and then apply the patch  The patient was agreeable and verbalized an understanding  [place] Prescription Drug Monitoring Program report was reviewed and was appropriate     There are risks associated with opioid medications, including dependence, addiction and tolerance  The patient understands and agrees to use these medications only as prescribed  Potential side effects of the medications include, but are not limited to, constipation, drowsiness, addiction, impaired judgment and risk of fatal overdose if not taken as prescribed  The patient was warned against driving while taking sedation medications  Sharing medications is a felony  At this point in time, the patient is showing no signs of addiction, abuse, diversion or suicidal ideation  2   Patient is experiencing great relief of right-sided back pain s/p right L3-5 RFA on October 31, 2018  The patient is still experiencing some left-sided low back pain status post right L3-5 RFA, however this procedure was performed on November 14, 2018   I explained that he just needs to give the procedure more time to work  Overall, patient is happy with his current pain relief  We will repeat this procedure every 6 9 months p r n   3   Patient will continue with Lyrica 75 t i d  this medication was refilled at today's office visit  4   Patient will continue with ibuprofen 200 mg p r n , max of 2400 mg daily  5   Patient will continue with his home exercise program  6  The patient will follow-up in 2 months for medication prescription refill and reevaluation  The patient was advised to contact the office should their symptoms worsen in the interim  The patient was agreeable and verbalized an understanding  History of Present Illness: The patient is a 58 y o  male last seen on 10/8/18 who presents for a follow up office visit in regards to chronic lumbosacral back pain secondary to lumbar spondylosis and stenosis  Patient has had previous lumbar epidural steroid injections from a pain specialist in [facility] in the past without any relief  He is currently status post bilateral L3-5 RFA, the right side on October 31, 2018 in the left side on November 14, 2018  Overall, the patient feels like the right low back feels great, however he is still experiencing pain on the left side  He also complains of occasional radicular symptoms in the left lower extremity in an L5 dermatomal distribution, however states it is mild and occasional    He denies any bowel or bladder incontinence or saddle anesthesia  Patient has been evaluated by Dr Polly Khanna of Orthopedic surgery who felt that he should try conservative measures prior to pursuing possible surgical intervention  We have discussed the option for spinal cord stimulator, however his pain is well controlled at this time  Patient was also recently started on Butrans 5mcg/hr patch last office visit and feels for the 1st time in over a year, his pain is diminishing and well managed   He is experiencing some skin irritation from the patch at the site of application  He denies any other side effects to the medication  He does express some concern of building tolerance since it is an opioid medication  He wishes, to cycle off the medication for a few weeks if he feels his body is "getting used to it" rather than increase the dose, and then restart a few weeks later  We can discuss this further if this situation were to arise in the future  Patient currently rates his pain a 4/10 on the numeric pain rating scale  He states his pain is intermittent in nature and bothersome throughout the entirety of the day  He characterizes the pain as burning and throbbing  Current pain medications includes:   Butrans 5 mcg/hr 1 patch TD weekly, Lyrica 75 mg t i d , and ibuprofen 200 mg p r n     The patient reports that this regimen is providing 50% pain relief  The patient is reporting no side effects from this pain medication regimen  Pain Contract Signed: 2/27/18  Last Urine Drug Screen: 10/8/18    Buprenorphine 11/15/18      I have personally reviewed and/or updated the patient's past medical history, past surgical history, family history, social history, current medications, allergies, and vital signs today  Review of Systems:    Review of Systems   Respiratory: Negative for shortness of breath  Cardiovascular: Negative for chest pain  Gastrointestinal: Negative for constipation, diarrhea, nausea and vomiting  Musculoskeletal: Positive for gait problem  Negative for arthralgias, joint swelling and myalgias  Skin: Negative for rash  Neurological: Negative for dizziness, seizures and weakness  All other systems reviewed and are negative          Past Medical History:   Diagnosis Date    Chronic back pain     Depression     GERD (gastroesophageal reflux disease)     L4-L5 disc bulge 06/16/2017    Muscle weakness     Neuropathy     Osteoarthritis     Right foot drop        Past Surgical History: Procedure Laterality Date    EGD AND COLONOSCOPY N/A 6/29/2018    Procedure: EGD AND COLONOSCOPY;  Surgeon: Sudha Gavin MD;  Location: [facility]; Service: Gastroenterology    EPIDURAL BLOCK INJECTION      needle    [place] HIP [address] BODY,PLASTY/RESECTN Left 4/30/2018    Procedure: LEFT HIP ARTHROSCOPIC LABRAL DEBRIDEMENT;  Surgeon: Wojciech Cobb MD;  [facility];  Service: Orthopedics    TONSILLECTOMY      WISDOM TOOTH EXTRACTION         Family History   Problem Relation Age of Onset    Osteoarthritis Family     No Known Problems Mother     No Known Problems Father        Social History     Occupational History    Not on file       Social History Main Topics    Smoking status: Current Every Day Smoker     Packs/day: 0 25     Last attempt to quit: 12/21/2017    Smokeless tobacco: Never Used      Comment: pt states quit 2 days ago    Alcohol use No    Drug use: No    Sexual activity: Not on file         Current Outpatient Prescriptions:     acetaminophen (TYLENOL) 500 mg tablet, Take 1,000 mg by mouth every 12 (twelve) hours as needed for mild pain, Disp: , Rfl:     [START ON 11/22/2018] Buprenorphine 5 MCG/HR PTWK, Place 1 patch on the skin once a week, Disp: 4 patch, Rfl: 1    cholecalciferol (VITAMIN D3) 1,000 units tablet, Take 3,000 Units by mouth 2 (two) times a day  , Disp: , Rfl:     cyanocobalamin (VITAMIN B-12) 100 mcg tablet, Take 100 mcg by mouth daily  , Disp: , Rfl:     Flaxseed, Linseed, (FLAX SEED OIL PO), Take 1 capsule by mouth daily  , Disp: , Rfl:     ibuprofen (MOTRIN) 200 mg tablet, Take 200 mg by mouth as needed for mild pain, Disp: , Rfl:     multivitamin (THERAGRAN) TABS, Take 1 tablet by mouth daily, Disp: , Rfl:     omeprazole (PriLOSEC) 20 mg delayed release capsule, Take 1 capsule (20 mg total) by mouth 2 (two) times a day (Patient taking differently: Take 20 mg by mouth 2 (two) times a day as needed  ), Disp: 60 capsule, Rfl: 3    pregabalin (LYRICA) 75 mg capsule, Take 1 capsule (75 mg total) by mouth 3 (three) times a day, Disp: 90 capsule, Rfl: 1    pyridoxine (B-6) 100 MG tablet, Take 100 mg by mouth daily, Disp: , Rfl:     Allergies   Allergen Reactions    Gabapentin GI Intolerance    Tramadol Confusion    Morphine Other (See Comments)     constipation       Physical Exam:    BP 98/60   Ht 6' 3" (1 905 m)   Wt 86 2 kg (190 lb)   BMI 23 75 kg/m²     Constitutional:normal, well developed, well nourished, alert, in no distress and non-toxic and no overt pain behavior  Eyes:anicteric  HEENT:grossly intact  Neck:supple, symmetric, trachea midline and no masses   Pulmonary:even and unlabored  Cardiovascular:No edema or pitting edema present  Skin:Normal without rashes or lesions and well hydrated  Psychiatric:Mood and affect appropriate  Neurologic:Cranial Nerves II-XII grossly intact  Musculoskeletal:normal gait  Bilateral lower extremity strength 5/5 in all muscle groups with the exception of right foot dorsiflexion which is 3/5  Negative seated straight leg raise bilaterally  Imaging  No orders to display         No orders of the defined types were placed in this encounter

## 2018-11-20 ENCOUNTER — OFFICE VISIT (OUTPATIENT)
Dept: PAIN MEDICINE | Facility: CLINIC | Age: 63
End: 2018-11-20
Payer: OTHER MISCELLANEOUS

## 2018-11-20 ENCOUNTER — TELEPHONE (OUTPATIENT)
Dept: PAIN MEDICINE | Facility: MEDICAL CENTER | Age: 63
End: 2018-11-20

## 2018-11-20 VITALS
HEART RATE: 68 BPM | WEIGHT: 191 LBS | SYSTOLIC BLOOD PRESSURE: 102 MMHG | HEIGHT: 75 IN | DIASTOLIC BLOOD PRESSURE: 70 MMHG | BODY MASS INDEX: 23.75 KG/M2

## 2018-11-20 DIAGNOSIS — G89.4 CHRONIC PAIN SYNDROME: Primary | ICD-10-CM

## 2018-11-20 DIAGNOSIS — Z02.71 DISABILITY EXAMINATION: ICD-10-CM

## 2018-11-20 DIAGNOSIS — M47.816 LUMBAR SPONDYLOSIS: ICD-10-CM

## 2018-11-20 DIAGNOSIS — F11.20 UNCOMPLICATED OPIOID DEPENDENCE (HCC): ICD-10-CM

## 2018-11-20 PROCEDURE — 99024 POSTOP FOLLOW-UP VISIT: CPT | Performed by: PHYSICIAN ASSISTANT

## 2018-11-20 RX ORDER — CLOTRIMAZOLE 1 %
1 CREAM (GRAM) TOPICAL AS NEEDED
Refills: 2 | COMMUNITY
Start: 2018-09-14 | End: 2019-10-28 | Stop reason: SDUPTHER

## 2018-11-20 NOTE — TELEPHONE ENCOUNTER
S/w patient ins will not pay for Buprenorphine 5 MCG/HR PTWK unless they receive a statement from the doctor   Pt is requesting a 12 wk supply    Contact to [facility] ins fax # [phone]

## 2018-11-20 NOTE — TELEPHONE ENCOUNTER
Pt is calling to let Adeola Wylie know that [facility] will not cover the patches and that he is trying to do a WC buyout so that his insurance company can take over his services/medications etc  Pt also stated that the ride all the way to [place] to see Leandro Carrillo caused severe pain in the right side; 7/10 and the left side is 0/10 now with very rare pain coming back at all  Please call to advise  Pt can be reached at [phone]

## 2018-11-20 NOTE — PROGRESS NOTES
Assessment/Plan:      Diagnoses and all orders for this visit:    Chronic pain syndrome    Uncomplicated opioid dependence ([address] )    Disability examination    Lumbar spondylosis - Bilateral    Other orders  -     clotrimazole (LOTRIMIN) 1 % cream; Apply 1 application topically 2 (two) times a day To affected area      Discussion: 59 yo male presenting for follow up of workmen's compensation case  States settlement hearing scheduled for 11/26/18  He is to follow up with pain management as scheduled for further evaluation of lumbar pain  At this time there is nothing additional we can offer Mr [facility], and he is to follow up on an as needed basis  Subjective:     Patient ID: Conrad Gao is a 58 y o  male  HPI Completed left sided ablation last week with pain management, and no longer experiencing pain on left side  Notes right side is causing 7/10 pain, previous right sided ablation earlier this month  Wearing lumbar brace while riding in car with benefit, and states does not slip like previous brace  Not providing support while driving, and continues to complain of lumbar pain after driving long distances  N/T in right foot ongoing today and slight numbness in left toe  Continues to utilize left foot brace to help prevent foot slap  Denies bowel or bladder changes or saddle anesthesia  Pain management replaced tramadol use with Butrans patches and notices improvement of pain relief especially with cooler weather changes  Scheduled for court hearing November 26 in regards to [employer]  Case, has representation           PAST MEDICAL HISTORY  Past Medical History:   Diagnosis Date    Chronic back pain     Depression     GERD (gastroesophageal reflux disease)     L4-L5 disc bulge 06/16/2017    Muscle weakness     Neuropathy     Osteoarthritis     Right foot drop      PAST SURGICAL HISTORY  Past Surgical History:   Procedure Laterality Date    EGD AND COLONOSCOPY N/A 6/29/2018 Procedure: EGD AND COLONOSCOPY;  Surgeon: Lani Rm MD;  Location: [facility]; Service: Gastroenterology    EPIDURAL BLOCK INJECTION      needle    [place] [address]IP [address] BODY,PLASTY/RESECTN Left 4/30/2018    Procedure: LEFT HIP ARTHROSCOPIC LABRAL DEBRIDEMENT;  Surgeon: Sugar Trinh MD;  [facility];  Service: Orthopedics    TONSILLECTOMY      WISDOM TOOTH EXTRACTION         FAMILY HISTORY  Family History   Problem Relation Age of Onset    Osteoarthritis Family     No Known Problems Mother     No Known Problems Father      HOME MEDICATIONS  Current Outpatient Prescriptions   Medication Sig Dispense Refill    acetaminophen (TYLENOL) 500 mg tablet Take 1,000 mg by mouth every 12 (twelve) hours as needed for mild pain      [START ON 11/22/2018] Buprenorphine 5 MCG/HR PTWK Place 1 patch on the skin once a week 4 patch 1    cholecalciferol (VITAMIN D3) 1,000 units tablet Take 3,000 Units by mouth 2 (two) times a day        clotrimazole (LOTRIMIN) 1 % cream Apply 1 application topically 2 (two) times a day To affected area  2    cyanocobalamin (VITAMIN B-12) 100 mcg tablet Take 100 mcg by mouth daily        Flaxseed, Linseed, (FLAX SEED OIL PO) Take 1 capsule by mouth daily        ibuprofen (MOTRIN) 200 mg tablet Take 200 mg by mouth as needed for mild pain      multivitamin (THERAGRAN) TABS Take 1 tablet by mouth daily      omeprazole (PriLOSEC) 20 mg delayed release capsule Take 1 capsule (20 mg total) by mouth 2 (two) times a day (Patient taking differently: Take 20 mg by mouth 2 (two) times a day as needed  ) 60 capsule 3    pregabalin (LYRICA) 75 mg capsule Take 1 capsule (75 mg total) by mouth 3 (three) times a day 90 capsule 1    pyridoxine (B-6) 100 MG tablet Take 100 mg by mouth daily       No current facility-administered medications for this visit           ALLERGIES  Gabapentin; Tramadol; and Morphine      SOCIAL HISTORY  History   Alcohol Use No     History   Drug Use No History   Smoking Status    Current Every Day Smoker    Packs/day: 0 25    Last attempt to quit: 12/21/2017   Smokeless Tobacco    Never Used     Comment: pt states quit 2 days ago     Review of Systems   Constitutional: Negative for chills, fatigue, fever and unexpected weight change  Gastrointestinal: Positive for constipation  Genitourinary: Negative  Musculoskeletal: Positive for back pain  Skin: Negative  Negative for rash  Neurological: Positive for weakness and numbness  Objective:  Vitals:    11/20/18 1018   BP: 102/70   Pulse: 68       Imaging:  No images are attached to the encounter  Labs:  Appointment on 09/10/2018   Component Date Value Ref Range Status    Sodium 09/10/2018 139  136 - 145 mmol/L Final    Potassium 09/10/2018 3 9  3 5 - 5 3 mmol/L Final    Chloride 09/10/2018 108  100 - 108 mmol/L Final    CO2 09/10/2018 26  21 - 32 mmol/L Final    ANION GAP 09/10/2018 5  4 - 13 mmol/L Final    BUN 09/10/2018 21  5 - 25 mg/dL Final    Creatinine 09/10/2018 0 93  0 60 - 1 30 mg/dL Final    Standardized to IDMS reference method    Glucose, Fasting 09/10/2018 97  65 - 99 mg/dL Final      Specimen collection should occur prior to Sulfasalazine administration due to the potential for falsely depressed results  Specimen collection should occur prior to Sulfapyridine administration due to the potential for falsely elevated results   Calcium 09/10/2018 8 9  8 3 - 10 1 mg/dL Final    AST 09/10/2018 25  5 - 45 U/L Final      Specimen collection should occur prior to Sulfasalazine administration due to the potential for falsely depressed results   ALT 09/10/2018 37  12 - 78 U/L Final      Specimen collection should occur prior to Sulfasalazine and/or Sulfapyridine administration due to the potential for falsely depressed results       Alkaline Phosphatase 09/10/2018 73  46 - 116 U/L Final    Total Protein 09/10/2018 7 1  6 4 - 8 2 g/dL Final    Albumin 09/10/2018 3 7 3 5 - 5 0 g/dL Final    Total Bilirubin 09/10/2018 0 75  0 20 - 1 00 mg/dL Final    eGFR 09/10/2018 87  ml/min/1 73sq m Final    WBC 09/10/2018 13 16* 4 31 - 10 16 Thousand/uL Final    RBC 09/10/2018 4 99  3 88 - 5 62 Million/uL Final    Hemoglobin 09/10/2018 16 2  12 0 - 17 0 g/dL Final    Hematocrit 09/10/2018 47 7  36 5 - 49 3 % Final    MCV 09/10/2018 96  82 - 98 fL Final    MCH 09/10/2018 32 5  26 8 - 34 3 pg Final    MCHC 09/10/2018 34 0  31 4 - 37 4 g/dL Final    RDW 09/10/2018 14 5  11 6 - 15 1 % Final    Platelets 95/91/5511 377  149 - 390 Thousands/uL Final    MPV 09/10/2018 10 6  8 9 - 12 7 fL Final    Cholesterol 09/10/2018 198  50 - 200 mg/dL Final      Cholesterol:       Desirable         <200 mg/dl       Borderline         200-239 mg/dl       High              >239           Triglycerides 09/10/2018 82  <=150 mg/dL Final      Triglyceride:     Normal          <150 mg/dl     Borderline High 150-199 mg/dl     High            200-499 mg/dl        Very High       >499 mg/dl    Specimen collection should occur prior to N-Acetylcysteine or Metamizole administration due to the potential for falsely depressed results   HDL, Direct 09/10/2018 57  40 - 60 mg/dL Final      HDL Cholesterol:       High    >60 mg/dL       Low     <41 mg/dL  Specimen collection should occur prior to Metamizole administration due to the potential for falsley depressed results   LDL Calculated 09/10/2018 125* 0 - 100 mg/dL Final      LDL Cholesterol:     Optimal           <100 mg/dl     Near Optimal      100-129 mg/dl     Above Optimal       Borderline High 130-159 mg/dl       High            160-189 mg/dl       Very High       >189 mg/dl         This screening LDL is a calculated result  It does not have the accuracy of the Direct Measured LDL in the monitoring of patients with hyperlipidemia and/or statin therapy  Direct Measure LDL (GSG475) must be ordered separately in these patients      Non-HDL-Chol (CHOL-HDL) 09/10/2018 141  mg/dl Final    Vit D, 25-Hydroxy 09/10/2018 24 1* 30 0 - 100 0 ng/mL Final    Hepatitis C Ab 09/10/2018 Non-reactive  Non-reactive Final      Physical Exam   Constitutional: He is oriented to person, place, and time  He appears well-developed and well-nourished  No distress  HENT:   Head: Normocephalic and atraumatic  Musculoskeletal:        Lumbar back: He exhibits decreased range of motion and tenderness  Neurological: He is alert and oriented to person, place, and time  He has normal strength  He exhibits normal muscle tone  Reflex Scores:       Tricep reflexes are 1+ on the right side and 1+ on the left side  Bicep reflexes are 1+ on the right side and 1+ on the left side  Brachioradialis reflexes are 1+ on the right side and 1+ on the left side  Patellar reflexes are 1+ on the right side and 1+ on the left side  Achilles reflexes are 1+ on the right side and 1+ on the left side  Able to toe walk, knee bend, heel stand with difficulty on left  Skin: Skin is warm and dry  No rash noted  He is not diaphoretic  Psychiatric: He has a normal mood and affect   His behavior is normal  Judgment and thought content normal

## 2018-11-23 NOTE — TELEPHONE ENCOUNTER
Patient is calling back asking to speak to BM  Please call patient back at [phone]  He is calling because he tried to go through prescription plan but they need a written statement from the doctor

## 2018-11-23 NOTE — TELEPHONE ENCOUNTER
RN s/w pt regarding previous  Per pt he needs a letter from Ulisses Gallardo for [employer] stating that the [employer] patches are helping to decrease and control his pain  Per pt they are at present helping him  Pt needs this letter sent to [facility] at [facility] # [phone]    Per pt he will try to have his private insurance fill the butrans script for now due to needing the medication, but would still like that documentation sent to [facility]  Pt will c/b if anything further needed  RN did advise that 12 weeks of patches are something that gets ordered, that typically 4 weeks get ordered at a time and if there is a refill the script is held at the pharmacy with a DNF date      --please advise thank you-  Pt requesting documentation from Ulisses Gallardo regarding Butrans patch for WC

## 2018-11-23 NOTE — TELEPHONE ENCOUNTER
--BRETTI--    RN s/w pt and offered to send last sovs per [facility] from 11/16 that supported the continued use of butrans patch  Pt appreciative of same and stated that he would call back Monday if he would need the documentation sent to [facility]   Per pt that ph# is [phone]  Per pt he will c/b Monday if needed  11/16 sovs sent to [facility] daniele Ulloa

## 2018-11-26 NOTE — TELEPHONE ENCOUNTER
---FYI--(this was the patch that was ordered on 11/16 by [address])    Rn s/w Gigi Van who then transferred RN to Cassondra Severin from  the [facility] [address] [place] [address] team   RN answered questions and provided information to qualify pt for authorization  Per Cassondra Severin the pt was authorized for 12 months for his [employer] patch one patch q week  Per Cassondra Severin the script should be resubmitted by the [facility] pharmacy and they should not have a problem with this  RN then s/w Jenni Cushing the pharmacist from [facility] and informed of same  Per Jenni Cushing he will resubmit the script and if has any issues will contact [address] help desk  Per Jenni Cushing they will call pt when script ready for   RN then called pt and informed of same  Pt very happy and appreciative of same

## 2018-11-26 NOTE — TELEPHONE ENCOUNTER
Pt left a voicemail today @ 12:12p stating that [facility] needs script verified and that he buried his w/c   Pt can be reached at [phone]

## 2018-12-12 ENCOUNTER — OFFICE VISIT (OUTPATIENT)
Dept: PAIN MEDICINE | Facility: MEDICAL CENTER | Age: 63
End: 2018-12-12
Payer: COMMERCIAL

## 2018-12-12 VITALS
DIASTOLIC BLOOD PRESSURE: 60 MMHG | HEART RATE: 70 BPM | WEIGHT: 183 LBS | BODY MASS INDEX: 22.87 KG/M2 | SYSTOLIC BLOOD PRESSURE: 112 MMHG

## 2018-12-12 DIAGNOSIS — M47.816 LUMBAR SPONDYLOSIS: ICD-10-CM

## 2018-12-12 DIAGNOSIS — M54.16 LUMBAR RADICULOPATHY: Primary | ICD-10-CM

## 2018-12-12 DIAGNOSIS — G62.9 NEUROPATHY: ICD-10-CM

## 2018-12-12 DIAGNOSIS — G89.4 CHRONIC PAIN SYNDROME: ICD-10-CM

## 2018-12-12 PROCEDURE — 99213 OFFICE O/P EST LOW 20 MIN: CPT | Performed by: NURSE PRACTITIONER

## 2018-12-12 NOTE — PROGRESS NOTES
Assessment:  1  Lumbar radiculopathy - Right    2  Neuropathy    3  Lumbar spondylosis - Bilateral    4  Chronic pain syndrome        Plan: At this time the patient has received some relief from his low back pain following his bilateral L3-5 radiofrequency ablations  He does complain of feeling some pressure at his L4 disc  Would like the patient to continue with the Butrans patch as well as Lyrica as these are providing him relief of his pain symptoms  He does not require refills today  Follow-up visit in 4 weeks if he does not already have a medication visit scheduled  There are risks associated with opioid medications, including dependence, addiction and tolerance  The patient understands and agrees to use these medications only as prescribed  Potential side effects of the medications include, but are not limited to, constipation, drowsiness, addiction, impaired judgment and risk of fatal overdose if not taken as prescribed  The patient was warned against driving while taking sedation medications  Sharing medications is a felony  At this point in time, the patient is showing no signs of addiction, abuse, diversion or suicidal ideation  [facility] Prescription Drug Monitoring Program report was reviewed and was appropriate       History of Present Illness: The patient is a 58 y o  male who presents for a follow up office visit in regards to Back Pain  The patients current symptoms include low back pain rated 5 to 6/10 this is intermittent in nature most bothersome during the day  He describes his pain as dull, aching, throbbing, pressure-like, numbness, and pins and needles  Patient is status post right L3-5 radiofrequency ablation on October 31, 2018 and a left L3-5 radiofrequency ablation on November 14, 2018  Both procedures were performed by Dr Dalia Noe    The patient did receive some relief of his low back pain however he still feels pressure over his low back he tells me at the L4 disc  Current pain medications includes:  Butrans patch 5 mcg and Lyrica 75 mg 3 times a day    The patient reports that this regimen is providing 20-30% pain relief  The patient is reporting minor constipation that he manages with a stool softener from this pain medication regimen  I have personally reviewed and/or updated the patient's past medical history, past surgical history, family history, social history, current medications, allergies, and vital signs today  Review of Systems  Review of Systems   Musculoskeletal: Positive for back pain and gait problem  Past Medical History:   Diagnosis Date    Chronic back pain     Depression     GERD (gastroesophageal reflux disease)     L4-L5 disc bulge 06/16/2017    Muscle weakness     Neuropathy     Osteoarthritis     Right foot drop        Past Surgical History:   Procedure Laterality Date    EGD AND COLONOSCOPY N/A 6/29/2018    Procedure: EGD AND COLONOSCOPY;  Surgeon: Luisito Harris MD;  Location: [facility] GI LAB; Service: Gastroenterology    EPIDURAL BLOCK INJECTION      needle    [place] HIP [address] BODY,PLASTY/RESECTN Left 4/30/2018    Procedure: LEFT HIP ARTHROSCOPIC LABRAL DEBRIDEMENT;  Surgeon: Georges Bejarano MD;  [facility];  Service: Orthopedics    TONSILLECTOMY      WISDOM TOOTH EXTRACTION         Family History   Problem Relation Age of Onset    Osteoarthritis Family     No Known Problems Mother     No Known Problems Father        Social History     Occupational History    Not on file       Social History Main Topics    Smoking status: Current Every Day Smoker     Packs/day: 0 25     Last attempt to quit: 12/21/2017    Smokeless tobacco: Never Used      Comment: pt states quit 2 days ago    Alcohol use No    Drug use: No    Sexual activity: Not on file         Current Outpatient Prescriptions:     acetaminophen (TYLENOL) 500 mg tablet, Take 1,000 mg by mouth every 12 (twelve) hours as needed for mild pain, Disp: , Rfl:     Buprenorphine 5 MCG/HR PTWK, Place 1 patch on the skin once a week, Disp: 4 patch, Rfl: 1    cholecalciferol (VITAMIN D3) 1,000 units tablet, Take 3,000 Units by mouth 2 (two) times a day  , Disp: , Rfl:     clotrimazole (LOTRIMIN) 1 % cream, Apply 1 application topically 2 (two) times a day To affected area, Disp: , Rfl: 2    cyanocobalamin (VITAMIN B-12) 100 mcg tablet, Take 100 mcg by mouth daily  , Disp: , Rfl:     Flaxseed, Linseed, (FLAX SEED OIL PO), Take 1 capsule by mouth daily  , Disp: , Rfl:     ibuprofen (MOTRIN) 200 mg tablet, Take 200 mg by mouth as needed for mild pain, Disp: , Rfl:     multivitamin (THERAGRAN) TABS, Take 1 tablet by mouth daily, Disp: , Rfl:     omeprazole (PriLOSEC) 20 mg delayed release capsule, Take 1 capsule (20 mg total) by mouth 2 (two) times a day (Patient taking differently: Take 20 mg by mouth 2 (two) times a day as needed  ), Disp: 60 capsule, Rfl: 3    pregabalin (LYRICA) 75 mg capsule, Take 1 capsule (75 mg total) by mouth 3 (three) times a day, Disp: 90 capsule, Rfl: 1    pyridoxine (B-6) 100 MG tablet, Take 100 mg by mouth daily, Disp: , Rfl:     Allergies   Allergen Reactions    Gabapentin GI Intolerance    Tramadol Confusion    Morphine Other (See Comments)     constipation       Physical Exam:    /60   Pulse 70   Wt 83 kg (183 lb)   BMI 22 87 kg/m²     Constitutional:normal, well developed, well nourished, alert, in no distress and non-toxic and no overt pain behavior  Eyes:anicteric  HEENT:grossly intact  Neck:supple, symmetric, trachea midline and no masses   Pulmonary:even and unlabored  Cardiovascular:No edema or pitting edema present  Skin:Normal without rashes or lesions and well hydrated  Psychiatric:Mood and affect appropriate  Neurologic:Cranial Nerves II-XII grossly intact  Musculoskeletal:normal    Imaging  No orders to display       No orders of the defined types were placed in this encounter

## 2018-12-21 ENCOUNTER — TELEPHONE (OUTPATIENT)
Dept: PAIN MEDICINE | Facility: MEDICAL CENTER | Age: 63
End: 2018-12-21

## 2018-12-21 NOTE — TELEPHONE ENCOUNTER
Caller:  patient  Call back number: [phone]    Patient requests refill for:  pregabalin (LYRICA) 75 mg capsule/ 1,000 mg by mouth every 12 (twelve) hours as needed for mild pain    Per patient is requesting tel # and forms to apply to [place] for free medication; per patient WC case is closed   Per patient requests script for 12 months

## 2018-12-24 DIAGNOSIS — M54.16 LUMBAR RADICULOPATHY: ICD-10-CM

## 2018-12-24 RX ORDER — PREGABALIN 75 MG/1
75 CAPSULE ORAL 3 TIMES DAILY
Qty: 90 CAPSULE | Refills: 0 | Status: SHIPPED | OUTPATIENT
Start: 2018-12-24 | End: 2019-01-11 | Stop reason: SDUPTHER

## 2018-12-24 NOTE — TELEPHONE ENCOUNTER
S/W Hitesh Butcher at [address] [place] [address]   She stated the pt filled the lyrica on 11/20/18 and has 1 refill left on it  S/W pt  Advised him of the above  He stated it will be covered under his regular health insurance now and they told him he needs to do Gabapentin first and that is needs approval   He stated when he was on gabapentin before it "made me look drunk and had an acute reaction stomach reaction "   He stated it will need prior auth  [phone]  Pt stated he is going to look on [employer] on the Internet today  S/W David  He stated for the pt's regular insurance, he needs to try Gabapentin, Duloxetine or Savella or the Shailesh Machado needs a prior [place]  Please advise

## 2018-12-24 NOTE — TELEPHONE ENCOUNTER
LMOM for pt to C/B, C/B # provided      --when pt calls back need to clarify dose and frequency, pharmacy, how many pills have left, any side effects and if the medication is helping--

## 2018-12-24 NOTE — TELEPHONE ENCOUNTER
Patient can obtain the application to [employer] pathways on line by searching [employer] pathways  I will not be providing him with a 12 month prescription as he needs to get the refills at his office visits   [facility]

## 2018-12-24 NOTE — TELEPHONE ENCOUNTER
75mg 3x's a day  [facility] pharmacy that's on file  He doesn't know the exact amount of medication left  Has enough for another week or so  He said that the medication is helping to reduce the nerve tingle  Per patient is requesting telephone # and forms to apply to [place] for free medication; per patient WC case is closed   Per patient requests script for 12 months

## 2019-01-03 NOTE — TELEPHONE ENCOUNTER
Concetta Drake from [facility]  Patients Lyrica denied  I tried doing 2 authorizations to get this approved     It is not FDA approved for his diagnosis  I did speak with the patient yesterday he is aware that I am still working on this      We can send in a letter of medical necessity and this can be marked as urgent to get the approval/denial within 72 hrs   Fax [phone]

## 2019-01-07 NOTE — TELEPHONE ENCOUNTER
Patient has already tried and failed gabapentin, that should help   Patient can access his own forms for [employer] pathways online which will be his option if lyrica was denied by his insurance

## 2019-01-07 NOTE — TELEPHONE ENCOUNTER
--BRETTI--    S/W pt  Advised pt of the same  Pt verbalized understanding  [facility] 1[facility]2 hrs ago and left a message stating the lyrica was filled  Advised pt to go it and to see what they say about it being approved  Pt will C/B with any issues

## 2019-01-11 ENCOUNTER — TELEPHONE (OUTPATIENT)
Dept: INTERNAL MEDICINE CLINIC | Facility: CLINIC | Age: 64
End: 2019-01-11

## 2019-01-11 ENCOUNTER — OFFICE VISIT (OUTPATIENT)
Dept: PAIN MEDICINE | Facility: MEDICAL CENTER | Age: 64
End: 2019-01-11
Payer: COMMERCIAL

## 2019-01-11 ENCOUNTER — TELEPHONE (OUTPATIENT)
Dept: PAIN MEDICINE | Facility: MEDICAL CENTER | Age: 64
End: 2019-01-11

## 2019-01-11 VITALS
WEIGHT: 190 LBS | DIASTOLIC BLOOD PRESSURE: 80 MMHG | SYSTOLIC BLOOD PRESSURE: 138 MMHG | BODY MASS INDEX: 23.62 KG/M2 | HEIGHT: 75 IN

## 2019-01-11 DIAGNOSIS — M47.816 LUMBAR SPONDYLOSIS: ICD-10-CM

## 2019-01-11 DIAGNOSIS — G89.29 CHRONIC BILATERAL LOW BACK PAIN WITHOUT SCIATICA: ICD-10-CM

## 2019-01-11 DIAGNOSIS — M54.16 LUMBAR RADICULOPATHY: ICD-10-CM

## 2019-01-11 DIAGNOSIS — G89.4 CHRONIC PAIN SYNDROME: ICD-10-CM

## 2019-01-11 DIAGNOSIS — M54.16 LUMBAR RADICULOPATHY: Primary | ICD-10-CM

## 2019-01-11 DIAGNOSIS — G62.9 NEUROPATHY: ICD-10-CM

## 2019-01-11 DIAGNOSIS — M54.50 CHRONIC BILATERAL LOW BACK PAIN WITHOUT SCIATICA: ICD-10-CM

## 2019-01-11 DIAGNOSIS — F11.20 UNCOMPLICATED OPIOID DEPENDENCE (HCC): ICD-10-CM

## 2019-01-11 PROCEDURE — 99214 OFFICE O/P EST MOD 30 MIN: CPT | Performed by: NURSE PRACTITIONER

## 2019-01-11 PROCEDURE — 80305 DRUG TEST PRSMV DIR OPT OBS: CPT | Performed by: NURSE PRACTITIONER

## 2019-01-11 RX ORDER — BUPRENORPHINE 5 UG/H
1 PATCH TRANSDERMAL WEEKLY
Qty: 8 PATCH | Refills: 0 | Status: SHIPPED | OUTPATIENT
Start: 2019-01-11 | End: 2019-04-15 | Stop reason: SDUPTHER

## 2019-01-11 RX ORDER — PREGABALIN 75 MG/1
75 CAPSULE ORAL 3 TIMES DAILY
Qty: 90 CAPSULE | Refills: 6 | Status: SHIPPED | OUTPATIENT
Start: 2019-01-11 | End: 2019-02-04 | Stop reason: SDUPTHER

## 2019-01-11 NOTE — PROGRESS NOTES
Assessment:  1  Lumbar radiculopathy - Right    2  Neuropathy    3  Lumbar spondylosis - Bilateral    4  Chronic pain syndrome    5  Uncomplicated opioid dependence (HCC)    6  Lumbar radiculopathy    7  Chronic bilateral low back pain without sciatica        Plan:  1  Patient will continue Lyrica 75mg TID  He currently receives this prescription through the [employer] program  A RX for Lyrica was faxed to the program as patient requested  2   Patient may continue Butrans patch 5 mcg/hr  This patch was refilled at today's office visit with a do not fill date of January 22, 2019   3    The patient may continue with ibuprofen p r n  and should not exceed more than 2400 mg daily  4  Patient may continue with his home exercise program  5  The patient will follow-up in 8 weeks for medication prescription refill and reevaluation  The patient was advised to contact the office should their symptoms worsen in the interim  The patient was agreeable and verbalized an understanding  [place] Prescription Drug Monitoring Program report was reviewed and was appropriate     A urine drug screen was collected at today's office visit as part of our medication management protocol  The point of care testing results were appropriate for what was being prescribed  The specimen will be sent for confirmatory testing  The drug screen is medically necessary because the patient is either dependent on opioid medication or is being considered for opioid medication therapy and the results could impact ongoing or future treatment  The drug screen is to evaluate for the presences or absence of prescribed, non-prescribed, and/or illicit drugs/substances  There are risks associated with opioid medications, including dependence, addiction and tolerance  The patient understands and agrees to use these medications only as prescribed   Potential side effects of the medications include, but are not limited to, constipation, drowsiness, addiction, impaired judgment and risk of fatal overdose if not taken as prescribed  The patient was warned against driving while taking sedation medications  Sharing medications is a felony  At this point in time, the patient is showing no signs of addiction, abuse, diversion or suicidal ideation  While the patient was in the office today an opioid contract was thoroughly reviewed and signed by the patient  The patient was given adequate time to ask questions in regards to the contract and a signed copy was sent home for their records  History of Present Illness: The patient is a 58 y o  male last seen on 12/12/18 who presents for a follow up office visit in regards to chronic lumbosacral back pain secondary to lumbar spondylosis and stenosis  The patient continues to do well status post bilateral L3-5 RFA  He states he continues to feel pressure over his low back which he states as the L4 disc  He denies any bowel or bladder incontinence or saddle anesthesia  The patient currently rates his pain as 3/10 on the numeric pain rating scale  States pain is constant in nature and follows no particular pattern throughout the day  He characterizes the pain as dull aching, pressure like, numbness and pins and needles  Current pain medications includes:  Butrans patch 5 mcg/hr and Lyrica 75 mg t i d      The patient reports that this regimen is providing 20-30% pain relief  The patient is reporting no side effects from this pain medication regimen  Pain Contract Signed: 1/11/19  Last Urine Drug Screen: 1/11/19  Butrans 1/4/19      I have personally reviewed and/or updated the patient's past medical history, past surgical history, family history, social history, current medications, allergies, and vital signs today  Review of Systems:    Review of Systems   Respiratory: Negative for shortness of breath  Cardiovascular: Negative for chest pain     Gastrointestinal: Negative for constipation, diarrhea, nausea and vomiting  Musculoskeletal: Positive for gait problem (Difficulty walking, decreased range of motion)  Negative for arthralgias, joint swelling and myalgias  Joint Stiffness   Skin: Negative for rash  Neurological: Negative for dizziness, seizures and weakness  All other systems reviewed and are negative  Past Medical History:   Diagnosis Date    Chronic back pain     Depression     GERD (gastroesophageal reflux disease)     L4-L5 disc bulge 06/16/2017    Muscle weakness     Neuropathy     Osteoarthritis     Right foot drop        Past Surgical History:   Procedure Laterality Date    EGD AND COLONOSCOPY N/A 6/29/2018    Procedure: EGD AND COLONOSCOPY;  Surgeon: Scott Calvillo MD;  Location: [facility] GI LAB; Service: Gastroenterology    EPIDURAL BLOCK INJECTION      needle    [place] HIP [address] BODY,PLASTY/RESECTN Left 4/30/2018    Procedure: LEFT HIP ARTHROSCOPIC LABRAL DEBRIDEMENT;  Surgeon: William Lo MD;  Location: [facility] OR;  Service: Orthopedics    TONSILLECTOMY      WISDOM TOOTH EXTRACTION         Family History   Problem Relation Age of Onset    Osteoarthritis Family     No Known Problems Mother     No Known Problems Father        Social History     Occupational History    Not on file       Social History Main Topics    Smoking status: Current Every Day Smoker     Packs/day: 0 25     Last attempt to quit: 12/21/2017    Smokeless tobacco: Never Used      Comment: pt states quit 2 days ago    Alcohol use No    Drug use: No    Sexual activity: Not on file         Current Outpatient Prescriptions:     acetaminophen (TYLENOL) 500 mg tablet, Take 1,000 mg by mouth every 12 (twelve) hours as needed for mild pain, Disp: , Rfl:     Buprenorphine 5 MCG/HR PTWK, Place 1 patch on the skin once a week, Disp: 4 patch, Rfl: 1    cholecalciferol (VITAMIN D3) 1,000 units tablet, Take 3,000 Units by mouth 2 (two) times a day  , Disp: , Rfl:    clotrimazole (LOTRIMIN) 1 % cream, Apply 1 application topically 2 (two) times a day To affected area, Disp: , Rfl: 2    cyanocobalamin (VITAMIN B-12) 100 mcg tablet, Take 100 mcg by mouth daily  , Disp: , Rfl:     Flaxseed, Linseed, (FLAX SEED OIL PO), Take 1 capsule by mouth daily  , Disp: , Rfl:     ibuprofen (MOTRIN) 200 mg tablet, Take 200 mg by mouth as needed for mild pain, Disp: , Rfl:     multivitamin (THERAGRAN) TABS, Take 1 tablet by mouth daily, Disp: , Rfl:     omeprazole (PriLOSEC) 20 mg delayed release capsule, Take 1 capsule (20 mg total) by mouth 2 (two) times a day (Patient taking differently: Take 20 mg by mouth 2 (two) times a day as needed  ), Disp: 60 capsule, Rfl: 3    pyridoxine (B-6) 100 MG tablet, Take 100 mg by mouth daily, Disp: , Rfl:     pregabalin (LYRICA) 75 mg capsule, Take 1 capsule (75 mg total) by mouth 3 (three) times a day, Disp: 90 capsule, Rfl: 6    Allergies   Allergen Reactions    Gabapentin GI Intolerance    Tramadol Confusion    Morphine Other (See Comments)     constipation       Physical Exam:    /80   Ht 6' 3" (1 905 m)   Wt 86 2 kg (190 lb)   BMI 23 75 kg/m²     Constitutional:normal, well developed, well nourished, alert, in no distress and non-toxic and no overt pain behavior  Eyes:anicteric  HEENT:grossly intact  Neck:supple, symmetric, trachea midline and no masses   Pulmonary:even and unlabored  Cardiovascular:No edema or pitting edema present  Skin:Normal without rashes or lesions and well hydrated  Psychiatric:Mood and affect appropriate  Neurologic:Cranial Nerves II-XII grossly intact  Musculoskeletal:normal gait  Imaging  No orders to display         No orders of the defined types were placed in this encounter

## 2019-01-11 NOTE — TELEPHONE ENCOUNTER
Spoke with Dr Jana Gao regarding Butrans patch refill  It was agreed upon to send 8 patches for 1 Rx ( so a 2 month supply at once)  I confirmed with the pharmacist at [facility] that this was allowed by prescribing laws, and it was confirmed, however there is no guarantee the patient's insurance will pay for the 2 months at one time  We will have to see  If the patient does end up losing his insurance and does not have new insurance before next office visit, please have him call us in advance so we can provide him weaning parameters for both Butrans and Lyrica, unless the patient discusses with his PCP and they provide prescriptions for these medications  Thank you

## 2019-01-11 NOTE — TELEPHONE ENCOUNTER
He will be coming in to see you on wed 1/16/19 just and mando A very serious medication discussion about arthritis     More bad days than good with his arthritis pain

## 2019-01-11 NOTE — TELEPHONE ENCOUNTER
--JAYLEN--    RN s/w pt regarding previous  Pt appreciative of information and will get back to us if he is unable to obtain new insurance  Pt went on to say that he did notice a difference when he was off of the lyrica and the butrans and that his stabbing pains are much better now that he is back on these medications  Per pt he is going to see his PCP and is going to ask the PCP if there is anything he can take PRN instead of the ibuprofen and tylenol that he takes in a day when he gets increased pain  RN asked if he wanted her to send this to [facility] to recommend, and pt stated,"no I will ask my PCP and he can confer with [facility] "    Pt appreciative of call back

## 2019-01-16 ENCOUNTER — OFFICE VISIT (OUTPATIENT)
Dept: INTERNAL MEDICINE CLINIC | Facility: CLINIC | Age: 64
End: 2019-01-16
Payer: COMMERCIAL

## 2019-01-16 VITALS
BODY MASS INDEX: 22.9 KG/M2 | WEIGHT: 184.2 LBS | HEIGHT: 75 IN | OXYGEN SATURATION: 97 % | HEART RATE: 80 BPM | DIASTOLIC BLOOD PRESSURE: 82 MMHG | TEMPERATURE: 98.4 F | SYSTOLIC BLOOD PRESSURE: 118 MMHG

## 2019-01-16 DIAGNOSIS — K21.9 GASTROESOPHAGEAL REFLUX DISEASE WITHOUT ESOPHAGITIS: ICD-10-CM

## 2019-01-16 DIAGNOSIS — Z13.220 LIPID SCREENING: ICD-10-CM

## 2019-01-16 DIAGNOSIS — M13.0 POLYARTHROPATHY: ICD-10-CM

## 2019-01-16 DIAGNOSIS — E55.9 VITAMIN D DEFICIENCY: ICD-10-CM

## 2019-01-16 DIAGNOSIS — M24.152 DEGENERATIVE TEAR OF ACETABULAR LABRUM OF LEFT HIP: ICD-10-CM

## 2019-01-16 DIAGNOSIS — Z72.0 TOBACCO ABUSE: Primary | ICD-10-CM

## 2019-01-16 PROBLEM — Z01.818 PREOP EXAMINATION: Status: RESOLVED | Noted: 2018-04-12 | Resolved: 2019-01-16

## 2019-01-16 PROCEDURE — 99214 OFFICE O/P EST MOD 30 MIN: CPT | Performed by: INTERNAL MEDICINE

## 2019-01-16 RX ORDER — OMEPRAZOLE 20 MG/1
20 CAPSULE, DELAYED RELEASE ORAL 2 TIMES DAILY
Qty: 180 CAPSULE | Refills: 1 | Status: SHIPPED | OUTPATIENT
Start: 2019-01-16 | End: 2019-10-28 | Stop reason: SDUPTHER

## 2019-01-16 NOTE — PROGRESS NOTES
Assessment/Plan:    Tobacco abuse  Counseled patient on tobacco cessation  Gastroesophageal reflux disease without esophagitis  Well controlled  Continue with omeprazole 20 mg BID  Polyarthropathy  Continue with daily therapy exercises  Continue with current pain regimen  Will order x-rays of bilateral knees for further evaluation  Vitamin D deficiency  Advise he decrease vitamin D supplementation to 5,000 IU daily  Lipid screening  Continue with lifestyle modifications  Recheck lipid panel in 6-months  Degenerative tear of acetabular labrum of left hip  Continue with home therapy exercises  He will be losing his insurance in March 2019  Will order XR of bilateral shoulders  Diagnoses and all orders for this visit:    Tobacco abuse  -     CBC; Future  -     Comprehensive metabolic panel; Future    Gastroesophageal reflux disease without esophagitis  -     omeprazole (PriLOSEC) 20 mg delayed release capsule; Take 1 capsule (20 mg total) by mouth 2 (two) times a day  -     CBC; Future  -     Comprehensive metabolic panel; Future    Vitamin D deficiency  -     Vitamin D 25 hydroxy; Future    Polyarthropathy  -     XR knee 3 vw left non injury; Future  -     XR knee 3 vw right non injury; Future  -     XR shoulder 2+ vw left; Future  -     XR shoulder 2+ vw right; Future    Degenerative tear of acetabular labrum of left hip  -     XR shoulder 2+ vw left; Future    Lipid screening          Subjective:      Patient ID: Jasmina Hernandez is a 58 y o  male  58year old male is seen today for follow up of chronic conditions  He has stopped taking Lyrica and Buprenorphine patch during the holiday season as he planning to drink alcohol  It then he reports that he realized the extent of his pain  He continues to have low back pain with sciatic (bilaterally)  He is being evaluated by [facility], Dr Ammy Keys  Last appointment was 1 week ago, next appointment is 03/08/2019    He is currently taking vitamin-D supplementation 5000 units twice a day  No other complaints at this time  No laboratory studies to review today  He has polyarthropathy and does not see an orthopedic surgeon  He does perform PT exercises daily  Heartburn   He complains of heartburn  He reports no abdominal pain, no belching, no chest pain, no choking, no coughing, no dysphagia, no early satiety, no globus sensation, no hoarse voice, no nausea, no sore throat, no stridor, no tooth decay, no water brash or no wheezing  This is a chronic problem  The current episode started more than 1 year ago  The symptoms are aggravated by certain foods  Pertinent negatives include no fatigue  He has tried a PPI for the symptoms  The treatment provided moderate relief  Arthritis   Presents for follow-up visit  Affected locations include the right knee, left hip, left knee, right shoulder and left shoulder  Pertinent negatives include no diarrhea, dysuria, fatigue or fever  The following portions of the patient's history were reviewed and updated as appropriate: allergies, current medications, past family history, past medical history, past social history, past surgical history and problem list     Review of Systems   Constitutional: Negative for activity change, appetite change, chills, diaphoresis, fatigue and fever  HENT: Negative for congestion, hoarse voice, postnasal drip, rhinorrhea, sinus pain, sinus pressure, sneezing and sore throat  Eyes: Negative for visual disturbance  Respiratory: Negative for apnea, cough, choking, chest tightness, shortness of breath and wheezing  Cardiovascular: Negative for chest pain, palpitations and leg swelling  Gastrointestinal: Positive for heartburn  Negative for abdominal distention, abdominal pain, anal bleeding, blood in stool, constipation, diarrhea, dysphagia, nausea and vomiting  Endocrine: Negative for cold intolerance and heat intolerance     Genitourinary: Negative for difficulty urinating, dysuria and hematuria  Musculoskeletal: Positive for arthritis  Skin: Negative  Neurological: Negative for dizziness, weakness, light-headedness, numbness and headaches  Hematological: Negative for adenopathy  Psychiatric/Behavioral: Negative for agitation, sleep disturbance and suicidal ideas  All other systems reviewed and are negative          Past Medical History:   Diagnosis Date    Chronic back pain     Depression     GERD (gastroesophageal reflux disease)     L4-L5 disc bulge 06/16/2017    Muscle weakness     Neuropathy     Osteoarthritis     Right foot drop          Current Outpatient Prescriptions:     acetaminophen (TYLENOL) 500 mg tablet, Take 1,000 mg by mouth every 12 (twelve) hours as needed for mild pain, Disp: , Rfl:     Buprenorphine 5 MCG/HR PTWK, Place 1 patch on the skin once a week, Disp: 8 patch, Rfl: 0    cholecalciferol (VITAMIN D3) 1,000 units tablet, Take 5,000 Units by mouth daily , Disp: , Rfl:     cyanocobalamin (VITAMIN B-12) 100 mcg tablet, Take 100 mcg by mouth daily  , Disp: , Rfl:     Flaxseed, Linseed, (FLAX SEED OIL PO), Take 1 capsule by mouth daily  , Disp: , Rfl:     ibuprofen (MOTRIN) 200 mg tablet, Take 200 mg by mouth as needed for mild pain, Disp: , Rfl:     multivitamin (THERAGRAN) TABS, Take 1 tablet by mouth daily, Disp: , Rfl:     omeprazole (PriLOSEC) 20 mg delayed release capsule, Take 1 capsule (20 mg total) by mouth 2 (two) times a day, Disp: 180 capsule, Rfl: 1    pregabalin (LYRICA) 75 mg capsule, Take 1 capsule (75 mg total) by mouth 3 (three) times a day, Disp: 90 capsule, Rfl: 6    pyridoxine (B-6) 100 MG tablet, Take 100 mg by mouth daily, Disp: , Rfl:     clotrimazole (LOTRIMIN) 1 % cream, Apply 1 application topically 2 (two) times a day To affected area, Disp: , Rfl: 2    Allergies   Allergen Reactions    Gabapentin GI Intolerance    Tramadol Confusion    Morphine Other (See Comments) constipation       Social History   Past Surgical History:   Procedure Laterality Date    EGD AND COLONOSCOPY N/A 6/29/2018    Procedure: EGD AND COLONOSCOPY;  Surgeon: Boo Moore MD;  Location: [facility]; Service: Gastroenterology    EPIDURAL BLOCK INJECTION      needle    [place] HIP [address] BODY,PLASTY/RESECTN Left 4/30/2018    Procedure: LEFT HIP ARTHROSCOPIC LABRAL DEBRIDEMENT;  Surgeon: Lorena Rodriguez MD;  [facility];  Service: Orthopedics    TONSILLECTOMY      WISDOM TOOTH EXTRACTION       Family History   Problem Relation Age of Onset    Osteoarthritis Family     No Known Problems Mother     No Known Problems Father        Objective:  /82 (BP Location: Right arm, Patient Position: Sitting, Cuff Size: Adult)   Pulse 80   Temp 98 4 °F (36 9 °C) (Oral)   Ht 6' 3" (1 905 m) Comment: with shoes on  Wt 83 6 kg (184 lb 3 2 oz) Comment: with shoes on  SpO2 97% Comment: room air  BMI 23 02 kg/m²     No results found for this or any previous visit (from the past 1344 hour(s))  Physical Exam   Constitutional: He is oriented to person, place, and time  He appears well-developed and well-nourished  No distress  HENT:   Head: Normocephalic and atraumatic  Eyes: Pupils are equal, round, and reactive to light  Conjunctivae and EOM are normal  Right eye exhibits no discharge  Left eye exhibits no discharge  No scleral icterus  Neck: Normal range of motion  Neck supple  No JVD present  No thyromegaly present  Cardiovascular: Normal rate, regular rhythm, normal heart sounds and intact distal pulses  Exam reveals no gallop and no friction rub  No murmur heard  Pulmonary/Chest: Effort normal and breath sounds normal  No respiratory distress  He has no wheezes  He has no rales  He exhibits no tenderness  Abdominal: Soft  Bowel sounds are normal  He exhibits no distension and no mass  There is no tenderness  There is no rebound and no guarding     Musculoskeletal: Normal range of motion  He exhibits no edema or deformity  Right shoulder: He exhibits tenderness  He exhibits normal range of motion, no bony tenderness, no swelling, no effusion, no crepitus, no deformity, no laceration, no pain, no spasm, normal pulse and normal strength  Left shoulder: He exhibits tenderness  He exhibits normal range of motion, no bony tenderness, no swelling, no effusion, no crepitus, no deformity, no laceration, no pain, no spasm, normal pulse and normal strength  Right knee: He exhibits normal range of motion, no swelling, no effusion, no ecchymosis, no deformity, no laceration, no erythema, normal alignment and no LCL laxity  Left knee: He exhibits normal range of motion, no swelling, no effusion, no ecchymosis, no deformity, no laceration, no erythema, normal alignment and no LCL laxity  Legs:  Lymphadenopathy:     He has no cervical adenopathy  Neurological: He is alert and oriented to person, place, and time  He has normal reflexes  No cranial nerve deficit  Coordination normal    Skin: Skin is warm and dry  No rash noted  He is not diaphoretic  No erythema  No pallor  Psychiatric: He has a normal mood and affect  His behavior is normal  Judgment and thought content normal    Nursing note and vitals reviewed

## 2019-01-16 NOTE — ASSESSMENT & PLAN NOTE
Continue with daily therapy exercises  Continue with current pain regimen  Will order x-rays of bilateral knees for further evaluation

## 2019-01-16 NOTE — ASSESSMENT & PLAN NOTE
Continue with home therapy exercises  He will be losing his insurance in March 2019  Will order XR of bilateral shoulders

## 2019-01-18 ENCOUNTER — TRANSCRIBE ORDERS (OUTPATIENT)
Dept: ADMINISTRATIVE | Facility: HOSPITAL | Age: 64
End: 2019-01-18

## 2019-01-18 ENCOUNTER — TELEPHONE (OUTPATIENT)
Dept: INTERNAL MEDICINE CLINIC | Facility: CLINIC | Age: 64
End: 2019-01-18

## 2019-01-18 ENCOUNTER — HOSPITAL ENCOUNTER (OUTPATIENT)
Dept: RADIOLOGY | Facility: IMAGING CENTER | Age: 64
Discharge: HOME | End: 2019-01-18
Payer: COMMERCIAL

## 2019-01-18 DIAGNOSIS — M24.152 DEGENERATIVE TEAR OF ACETABULAR LABRUM OF LEFT HIP: ICD-10-CM

## 2019-01-18 DIAGNOSIS — M13.0 POLYARTHROPATHY: ICD-10-CM

## 2019-01-18 PROCEDURE — 73030 X-RAY EXAM OF SHOULDER: CPT

## 2019-01-18 PROCEDURE — 73562 X-RAY EXAM OF KNEE 3: CPT

## 2019-01-22 ENCOUNTER — TELEPHONE (OUTPATIENT)
Dept: INTERNAL MEDICINE CLINIC | Facility: CLINIC | Age: 64
End: 2019-01-22

## 2019-01-22 ENCOUNTER — OFFICE VISIT (OUTPATIENT)
Dept: OBGYN CLINIC | Facility: CLINIC | Age: 64
End: 2019-01-22
Payer: COMMERCIAL

## 2019-01-22 VITALS
HEART RATE: 86 BPM | BODY MASS INDEX: 22.88 KG/M2 | SYSTOLIC BLOOD PRESSURE: 109 MMHG | DIASTOLIC BLOOD PRESSURE: 74 MMHG | HEIGHT: 75 IN | WEIGHT: 184 LBS

## 2019-01-22 DIAGNOSIS — G89.29 CHRONIC PAIN OF BOTH KNEES: Primary | ICD-10-CM

## 2019-01-22 DIAGNOSIS — M25.562 CHRONIC PAIN OF BOTH KNEES: Primary | ICD-10-CM

## 2019-01-22 DIAGNOSIS — M25.511 CHRONIC PAIN OF BOTH SHOULDERS: ICD-10-CM

## 2019-01-22 DIAGNOSIS — M25.561 CHRONIC PAIN OF BOTH KNEES: Primary | ICD-10-CM

## 2019-01-22 DIAGNOSIS — M25.512 CHRONIC PAIN OF BOTH SHOULDERS: ICD-10-CM

## 2019-01-22 DIAGNOSIS — G89.29 CHRONIC PAIN OF BOTH SHOULDERS: ICD-10-CM

## 2019-01-22 PROBLEM — M24.152 DEGENERATIVE TEAR OF ACETABULAR LABRUM OF LEFT HIP: Status: RESOLVED | Noted: 2018-04-05 | Resolved: 2019-01-22

## 2019-01-22 PROCEDURE — 99214 OFFICE O/P EST MOD 30 MIN: CPT | Performed by: ORTHOPAEDIC SURGERY

## 2019-01-22 NOTE — PROGRESS NOTES
Patient Name:  Jessi Clark  MRN:  [id number]    Assessment     1  Chronic pain of both knees     2  Chronic pain of both shoulders         Plan     1  Recommended consultation with rheumatologist   Patient has appointment scheduled at [place] [address] with a rheumatologist he has seen in the past   2  Discussed possible referral to physical therapy for his shoulder pain  3  Briefly discussed steroid injection for his knee pain if not improving with Rheumatology treatment  Return if symptoms worsen or fail to improve  Chief Complaint     Bilateral knee pain in bilateral shoulder pain      History of the Present Illness     Jessi Clark is a 58 y o  male who presents for evaluation of chronic bilateral knee and shoulder pain today  He also reports bilateral hand, back, and left hip pain  Currently his left knee pain is more severe than his right knee pain and localizes anteriorly with associated swelling  He reports a history of locking in his knees in the past that has diminished with avoiding heavy lifting  His shoulder pain localizes to the lateral aspect and is worse with reaching out  He reports occasional sharp pain in his left hip but does have extended periods with no pain after undergoing hip arthroscopy in the past   He wears a patch for pain  He reports having x-rays of his shoulders and knees recently that showed no significant findings  Physical Exam     /74   Pulse 86   Ht 6' 3" (1 905 m)   Wt 83 5 kg (184 lb)   BMI 23 00 kg/m²     Bilateral knees:  No palpable effusion  No focal joint line tenderness  Tenderness to palpation distal biceps femoris tendon on the left  Full range of motion  Stable with varus and valgus stress  Patellar compression test is negative  5/5 strength knee extension and knee flexion  Bilateral shoulders:  Mild tenderness to palpation acromioclavicular joint on the right  Full range of motion    Neer impingement sign is positive on the left and negative on the right  Young impingement sign is negative on the left and positive on the right  Empty can test is mildly positive  Speed's test is mildly positive  Cross-body adduction test is positive on the right and negative on the left  5/5 strength forward flexion  Eyes:  Anicteric sclerae  Neck:  Supple  Lungs:  Unlabored breathing  Cardiovascular:  2+ radial pulse bilaterally  Skin:  Intact without erythema  Neurologic:  Sensation intact to light touch  Psychiatric:  Mood and affect are appropriate  Data Review     I have personally reviewed pertinent films in PACS, and my interpretation follows:    X-rays bilateral shoulders 1/18/19:  No acute bony abnormalities  No significant degenerative changes  X-rays bilateral knees 1/18/19:  No acute bony abnormalities  Minimal degenerative changes patellofemoral compartment  Past Medical History:   Diagnosis Date    Chronic back pain     Depression     GERD (gastroesophageal reflux disease)     L4-L5 disc bulge 06/16/2017    Muscle weakness     Neuropathy     Osteoarthritis     Right foot drop        Past Surgical History:   Procedure Laterality Date    EGD AND COLONOSCOPY N/A 6/29/2018    Procedure: EGD AND COLONOSCOPY;  Surgeon: Sourav Matias MD;  Location: [facility];   Service: Gastroenterology    EPIDURAL BLOCK INJECTION      needle    [place] HIP [address] BODY,PLASTY/RESECTN Left 4/30/2018    Procedure: LEFT HIP ARTHROSCOPIC LABRAL DEBRIDEMENT;  Surgeon: Dashawn Seals MD;  [facility];  Service: Orthopedics    TONSILLECTOMY      WISDOM TOOTH EXTRACTION         Allergies   Allergen Reactions    Gabapentin GI Intolerance    Tramadol Confusion    Morphine Other (See Comments)     constipation       Current Outpatient Prescriptions on File Prior to Visit   Medication Sig Dispense Refill    acetaminophen (TYLENOL) 500 mg tablet Take 1,000 mg by mouth every 12 (twelve) hours as needed for mild pain      Buprenorphine 5 MCG/HR PTWK Place 1 patch on the skin once a week 8 patch 0    cholecalciferol (VITAMIN D3) 1,000 units tablet Take 5,000 Units by mouth daily       clotrimazole (LOTRIMIN) 1 % cream Apply 1 application topically 2 (two) times a day To affected area  2    cyanocobalamin (VITAMIN B-12) 100 mcg tablet Take 100 mcg by mouth daily        Flaxseed, Linseed, (FLAX SEED OIL PO) Take 1 capsule by mouth daily        ibuprofen (MOTRIN) 200 mg tablet Take 200 mg by mouth as needed for mild pain      multivitamin (THERAGRAN) TABS Take 1 tablet by mouth daily      omeprazole (PriLOSEC) 20 mg delayed release capsule Take 1 capsule (20 mg total) by mouth 2 (two) times a day 180 capsule 1    pregabalin (LYRICA) 75 mg capsule Take 1 capsule (75 mg total) by mouth 3 (three) times a day 90 capsule 6    pyridoxine (B-6) 100 MG tablet Take 100 mg by mouth daily       No current facility-administered medications on file prior to visit  Social History   Substance Use Topics    Smoking status: Current Every Day Smoker     Packs/day: 0 50    Smokeless tobacco: Never Used      Comment: 1/2 a pack a day    Alcohol use No       Family History   Problem Relation Age of Onset    Osteoarthritis Family     No Known Problems Mother     No Known Problems Father          Review of Systems     As stated in the HPI  All other systems were reviewed and are negative

## 2019-01-22 NOTE — TELEPHONE ENCOUNTER
Dr Corina gilmore patient wanted to let you know he is seeing rheumatology through [facility]

## 2019-01-22 NOTE — TELEPHONE ENCOUNTER
Pt also called because he needs a note from Dr Goodson Shall stating that he is disable with limited mobility, so he can be able to get a disable parking space

## 2019-01-22 NOTE — TELEPHONE ENCOUNTER
He would need to bring in the disability form, which I am willing to complete once I am back in the office

## 2019-01-23 NOTE — TELEPHONE ENCOUNTER
Pt states he needs a letter for his handicap parking  He doesnt need a form  He needs a letter on our letterhead for him to get the sign out front of his house  He spoke to  and [facility] manager  that's what they told him

## 2019-01-24 NOTE — TELEPHONE ENCOUNTER
I completed a letter requesting disability parking  Please print out and contact patient so that he may take it up  If any additional formation needs to be included in the paper, please contact me  thank you

## 2019-02-01 ENCOUNTER — TELEPHONE (OUTPATIENT)
Dept: OBGYN CLINIC | Facility: HOSPITAL | Age: 64
End: 2019-02-01

## 2019-02-01 NOTE — TELEPHONE ENCOUNTER
Did he reapply for 2019 to the [facility] program? The whole packet needs to be filled out again at the start of every new year  I believe we discussed this at our last office visit

## 2019-02-01 NOTE — TELEPHONE ENCOUNTER
Caller: Mary Barraza from [facility]  Ph: [phone]  Fax: [phone]  Caller reports the patient needs a new Lyrica prescription faxed to them  His last rx has 0 refills from 7/18/18   Thank you

## 2019-02-01 NOTE — TELEPHONE ENCOUNTER
Pt contacted Call Center requested refill of their medication  Medication Name:  Lyrica     Dosage of Med:  75 mg     Frequency of Med:  3 times a day     Remaining Medication:  None left     [facility]  Fax# [phone]        Patient receives refills for 6 months    Thank you

## 2019-02-01 NOTE — TELEPHONE ENCOUNTER
RN s/w pt regarding previous  Per pt he is part of [employer] program until July, he just needs the script faxed to Fax# [phone]  RN advised that AO back in office on 2/4 and will have that printed and faxed at that time  Pt appreciative of same      --please advise thank you-

## 2019-02-04 DIAGNOSIS — M54.16 LUMBAR RADICULOPATHY: ICD-10-CM

## 2019-02-04 RX ORDER — PREGABALIN 75 MG/1
75 CAPSULE ORAL 3 TIMES DAILY
Qty: 90 CAPSULE | Refills: 6 | Status: SHIPPED | OUTPATIENT
Start: 2019-02-04 | End: 2019-07-15 | Stop reason: SDUPTHER

## 2019-02-07 NOTE — TELEPHONE ENCOUNTER
Patient called back stated he will receive his Lyrica tomorrow 02/08 it is being shipped overnight  Also he wanted to  apologizes to [place] for being nuisance  Ben Singhist

## 2019-04-08 ENCOUNTER — TELEPHONE (OUTPATIENT)
Dept: PAIN MEDICINE | Facility: MEDICAL CENTER | Age: 64
End: 2019-04-08

## 2019-04-10 ENCOUNTER — TELEPHONE (OUTPATIENT)
Dept: INTERNAL MEDICINE CLINIC | Facility: CLINIC | Age: 64
End: 2019-04-10

## 2019-04-12 ENCOUNTER — TELEPHONE (OUTPATIENT)
Dept: PAIN MEDICINE | Facility: MEDICAL CENTER | Age: 64
End: 2019-04-12

## 2019-04-15 ENCOUNTER — OFFICE VISIT (OUTPATIENT)
Dept: PAIN MEDICINE | Facility: MEDICAL CENTER | Age: 64
End: 2019-04-15
Payer: COMMERCIAL

## 2019-04-15 VITALS
WEIGHT: 186.4 LBS | HEART RATE: 96 BPM | HEIGHT: 75 IN | BODY MASS INDEX: 23.18 KG/M2 | SYSTOLIC BLOOD PRESSURE: 124 MMHG | DIASTOLIC BLOOD PRESSURE: 72 MMHG | RESPIRATION RATE: 18 BRPM

## 2019-04-15 DIAGNOSIS — M54.16 LUMBAR RADICULOPATHY: Primary | ICD-10-CM

## 2019-04-15 DIAGNOSIS — G89.4 CHRONIC PAIN SYNDROME: ICD-10-CM

## 2019-04-15 DIAGNOSIS — M54.50 CHRONIC BILATERAL LOW BACK PAIN WITHOUT SCIATICA: ICD-10-CM

## 2019-04-15 DIAGNOSIS — G89.29 CHRONIC PAIN OF BOTH KNEES: ICD-10-CM

## 2019-04-15 DIAGNOSIS — M25.561 CHRONIC PAIN OF BOTH KNEES: ICD-10-CM

## 2019-04-15 DIAGNOSIS — M25.562 CHRONIC PAIN OF BOTH KNEES: ICD-10-CM

## 2019-04-15 DIAGNOSIS — M47.816 LUMBAR SPONDYLOSIS: ICD-10-CM

## 2019-04-15 DIAGNOSIS — G89.29 CHRONIC BILATERAL LOW BACK PAIN WITHOUT SCIATICA: ICD-10-CM

## 2019-04-15 PROCEDURE — 99214 OFFICE O/P EST MOD 30 MIN: CPT | Performed by: NURSE PRACTITIONER

## 2019-04-15 RX ORDER — BUPRENORPHINE 5 UG/H
1 PATCH TRANSDERMAL WEEKLY
Qty: 4 PATCH | Refills: 2 | Status: SHIPPED | OUTPATIENT
Start: 2019-04-15 | End: 2019-06-10 | Stop reason: SDUPTHER

## 2019-04-15 RX ORDER — FLUTICASONE PROPIONATE 50 MCG
1 SPRAY, SUSPENSION (ML) NASAL AS NEEDED
COMMUNITY
End: 2019-11-21

## 2019-04-17 ENCOUNTER — TELEPHONE (OUTPATIENT)
Dept: PAIN MEDICINE | Facility: MEDICAL CENTER | Age: 64
End: 2019-04-17

## 2019-05-10 ENCOUNTER — TELEPHONE (OUTPATIENT)
Dept: PAIN MEDICINE | Facility: MEDICAL CENTER | Age: 64
End: 2019-05-10

## 2019-06-10 ENCOUNTER — OFFICE VISIT (OUTPATIENT)
Dept: PAIN MEDICINE | Facility: MEDICAL CENTER | Age: 64
End: 2019-06-10
Payer: COMMERCIAL

## 2019-06-10 VITALS
WEIGHT: 183 LBS | SYSTOLIC BLOOD PRESSURE: 98 MMHG | HEART RATE: 88 BPM | DIASTOLIC BLOOD PRESSURE: 56 MMHG | RESPIRATION RATE: 16 BRPM | HEIGHT: 75 IN | BODY MASS INDEX: 22.75 KG/M2

## 2019-06-10 DIAGNOSIS — G89.4 CHRONIC PAIN SYNDROME: ICD-10-CM

## 2019-06-10 DIAGNOSIS — G89.29 CHRONIC PAIN OF BOTH KNEES: ICD-10-CM

## 2019-06-10 DIAGNOSIS — M25.561 CHRONIC PAIN OF BOTH KNEES: ICD-10-CM

## 2019-06-10 DIAGNOSIS — M47.816 LUMBAR SPONDYLOSIS: ICD-10-CM

## 2019-06-10 DIAGNOSIS — M54.50 CHRONIC BILATERAL LOW BACK PAIN WITHOUT SCIATICA: ICD-10-CM

## 2019-06-10 DIAGNOSIS — M25.562 CHRONIC PAIN OF BOTH KNEES: ICD-10-CM

## 2019-06-10 DIAGNOSIS — M54.16 LUMBAR RADICULOPATHY: Primary | ICD-10-CM

## 2019-06-10 DIAGNOSIS — G89.29 CHRONIC BILATERAL LOW BACK PAIN WITHOUT SCIATICA: ICD-10-CM

## 2019-06-10 PROCEDURE — 99214 OFFICE O/P EST MOD 30 MIN: CPT | Performed by: NURSE PRACTITIONER

## 2019-06-10 RX ORDER — BUPRENORPHINE 5 UG/H
1 PATCH TRANSDERMAL WEEKLY
Qty: 4 PATCH | Refills: 1 | Status: SHIPPED | OUTPATIENT
Start: 2019-06-10 | End: 2019-09-04 | Stop reason: SDUPTHER

## 2019-06-18 ENCOUNTER — TELEPHONE (OUTPATIENT)
Dept: PAIN MEDICINE | Facility: MEDICAL CENTER | Age: 64
End: 2019-06-18

## 2019-07-11 ENCOUNTER — TELEPHONE (OUTPATIENT)
Dept: PAIN MEDICINE | Facility: MEDICAL CENTER | Age: 64
End: 2019-07-11

## 2019-07-11 NOTE — TELEPHONE ENCOUNTER
S/w patient, states he will need a refill of pregabalin (LYRICA) 75 mg capsule, [address] # [id number]  Refill will require a prior auth, already approved by [employer], has enough until 8/11/19"       # [phone]

## 2019-07-11 NOTE — TELEPHONE ENCOUNTER
S/W pt  Pt asking for refill of Lyrica 75 mg 3x/day  Pt stated his [facility] plan ends on July 20th so he needs a new prior Josh Kinds with [facility] [employer] and a new script for [facility]  He stated [facility] will not fill the rest of the refills on the 2/4 script b/c his plan runs out  Please advise

## 2019-07-15 DIAGNOSIS — M54.16 LUMBAR RADICULOPATHY: ICD-10-CM

## 2019-07-15 RX ORDER — PREGABALIN 75 MG/1
75 CAPSULE ORAL 3 TIMES DAILY
Qty: 90 CAPSULE | Refills: 6 | Status: SHIPPED | OUTPATIENT
Start: 2019-07-15 | End: 2019-07-15 | Stop reason: SDUPTHER

## 2019-07-15 RX ORDER — PREGABALIN 75 MG/1
75 CAPSULE ORAL 3 TIMES DAILY
Qty: 90 CAPSULE | Refills: 6 | Status: SHIPPED | OUTPATIENT
Start: 2019-07-15 | End: 2019-07-24 | Stop reason: SDUPTHER

## 2019-07-15 NOTE — TELEPHONE ENCOUNTER
S/W pt  Advised pt of the same  Pt stated send to pharmacy on file  Pt stated [facility] will notify him and no need for [facility] to call him back after the script is sent  Please advise

## 2019-07-20 LAB
25(OH)D3 SERPL-MCNC: 52 NG/ML (ref 30–100)
ALBUMIN SERPL-MCNC: 4.2 G/DL (ref 3.6–5.1)
ALBUMIN/GLOB SERPL: 1.9 (CALC) (ref 1–2.5)
ALP SERPL-CCNC: 56 U/L (ref 40–115)
ALT SERPL-CCNC: 13 U/L (ref 9–46)
AST SERPL-CCNC: 15 U/L (ref 10–35)
BASOPHILS # BLD AUTO: 79 CELLS/UL (ref 0–200)
BASOPHILS NFR BLD AUTO: 0.8 %
BILIRUB SERPL-MCNC: 0.9 MG/DL (ref 0.2–1.2)
BUN SERPL-MCNC: 13 MG/DL (ref 7–25)
BUN/CREAT SERPL: NORMAL (CALC) (ref 6–22)
CALCIUM SERPL-MCNC: 9.4 MG/DL (ref 8.6–10.3)
CHLORIDE SERPL-SCNC: 107 MMOL/L (ref 98–110)
CO2 SERPL-SCNC: 24 MMOL/L (ref 20–32)
CREAT SERPL-MCNC: 0.83 MG/DL (ref 0.7–1.25)
EOSINOPHIL # BLD AUTO: 149 CELLS/UL (ref 15–500)
EOSINOPHIL NFR BLD AUTO: 1.5 %
ERYTHROCYTE [DISTWIDTH] IN BLOOD BY AUTOMATED COUNT: 13.5 % (ref 11–15)
GLOBULIN SER CALC-MCNC: 2.2 G/DL (CALC) (ref 1.9–3.7)
GLUCOSE SERPL-MCNC: 96 MG/DL (ref 65–99)
HCT VFR BLD AUTO: 45 % (ref 38.5–50)
HGB BLD-MCNC: 15.1 G/DL (ref 13.2–17.1)
LYMPHOCYTES # BLD AUTO: 2089 CELLS/UL (ref 850–3900)
LYMPHOCYTES NFR BLD AUTO: 21.1 %
MCH RBC QN AUTO: 31.3 PG (ref 27–33)
MCHC RBC AUTO-ENTMCNC: 33.6 G/DL (ref 32–36)
MCV RBC AUTO: 93.4 FL (ref 80–100)
MONOCYTES # BLD AUTO: 762 CELLS/UL (ref 200–950)
MONOCYTES NFR BLD AUTO: 7.7 %
NEUTROPHILS # BLD AUTO: 6821 CELLS/UL (ref 1500–7800)
NEUTROPHILS NFR BLD AUTO: 68.9 %
PLATELET # BLD AUTO: 339 THOUSAND/UL (ref 140–400)
PMV BLD REES-ECKER: 9.9 FL (ref 7.5–12.5)
POTASSIUM SERPL-SCNC: 4.4 MMOL/L (ref 3.5–5.3)
PROT SERPL-MCNC: 6.4 G/DL (ref 6.1–8.1)
RBC # BLD AUTO: 4.82 MILLION/UL (ref 4.2–5.8)
SL AMB EGFR AFRICAN AMERICAN: 108 ML/MIN/1.73M2
SL AMB EGFR NON AFRICAN AMERICAN: 93 ML/MIN/1.73M2
SODIUM SERPL-SCNC: 142 MMOL/L (ref 135–146)
WBC # BLD AUTO: 9.9 THOUSAND/UL (ref 3.8–10.8)

## 2019-07-22 ENCOUNTER — OFFICE VISIT (OUTPATIENT)
Dept: INTERNAL MEDICINE CLINIC | Facility: CLINIC | Age: 64
End: 2019-07-22
Payer: COMMERCIAL

## 2019-07-22 ENCOUNTER — HOSPITAL ENCOUNTER (OUTPATIENT)
Dept: RADIOLOGY | Facility: IMAGING CENTER | Age: 64
Discharge: HOME | End: 2019-07-22
Payer: COMMERCIAL

## 2019-07-22 ENCOUNTER — TRANSCRIBE ORDERS (OUTPATIENT)
Dept: ADMINISTRATIVE | Facility: HOSPITAL | Age: 64
End: 2019-07-22

## 2019-07-22 VITALS
HEIGHT: 75 IN | SYSTOLIC BLOOD PRESSURE: 96 MMHG | WEIGHT: 174.2 LBS | OXYGEN SATURATION: 96 % | DIASTOLIC BLOOD PRESSURE: 62 MMHG | TEMPERATURE: 98.4 F | BODY MASS INDEX: 21.66 KG/M2 | HEART RATE: 98 BPM

## 2019-07-22 DIAGNOSIS — Z13.220 LIPID SCREENING: ICD-10-CM

## 2019-07-22 DIAGNOSIS — Z72.0 TOBACCO ABUSE: ICD-10-CM

## 2019-07-22 DIAGNOSIS — Z12.5 PROSTATE CANCER SCREENING: ICD-10-CM

## 2019-07-22 DIAGNOSIS — K21.9 GASTROESOPHAGEAL REFLUX DISEASE WITHOUT ESOPHAGITIS: Primary | ICD-10-CM

## 2019-07-22 DIAGNOSIS — M13.0 POLYARTHROPATHY: ICD-10-CM

## 2019-07-22 DIAGNOSIS — M79.642 LEFT HAND PAIN: ICD-10-CM

## 2019-07-22 DIAGNOSIS — G62.9 NEUROPATHY: ICD-10-CM

## 2019-07-22 DIAGNOSIS — E55.9 VITAMIN D DEFICIENCY: ICD-10-CM

## 2019-07-22 PROCEDURE — 99214 OFFICE O/P EST MOD 30 MIN: CPT | Performed by: INTERNAL MEDICINE

## 2019-07-22 PROCEDURE — 4004F PT TOBACCO SCREEN RCVD TLK: CPT | Performed by: INTERNAL MEDICINE

## 2019-07-22 PROCEDURE — 3725F SCREEN DEPRESSION PERFORMED: CPT | Performed by: INTERNAL MEDICINE

## 2019-07-22 PROCEDURE — 73130 X-RAY EXAM OF HAND: CPT

## 2019-07-22 PROCEDURE — 3008F BODY MASS INDEX DOCD: CPT | Performed by: INTERNAL MEDICINE

## 2019-07-22 NOTE — PROGRESS NOTES
Assessment/Plan:    Gastroesophageal reflux disease without esophagitis  Continue Prilosec 20 mg BID  Lipid screening  Lipid panel ordered  Vitamin D deficiency  Continue vitamin-D supplementation  Left hand pain  Will obtain XR of the left hand  Neuropathy  Managed by spine and pain  Tobacco abuse  Counseled patient on smoking cessation  Currently not ready to quit  Diagnoses and all orders for this visit:    Gastroesophageal reflux disease without esophagitis    Polyarthropathy    Lipid screening  -     Lipid panel; Future    Vitamin D deficiency    Left hand pain  -     XR hand 3+ vw left; Future    Neuropathy    Prostate cancer screening  -     PSA, Total Screen; Future    Tobacco abuse        Tobacco Cessation Counseling: Tobacco cessation counseling and education was provided  The patient is sincerely urged to quit consumption of tobacco  He is not ready to quit tobacco  The numerous health risks of tobacco consumption were discussed  If he decides to quit, there are  anumber of helpful adjunctive aids, and he can see me to discuss nicotine replacement therapy, chantix, or bupropion anytime in the future  Subjective:      Patient ID: Gina Richmond is a 58 y o  male  70-year-old male is seen today for follow-up of chronic conditions  Laboratory studies reviewed today, CMP, CBC, and vitamin-D are all within acceptable range  He had an episode 7 weeks in which while using a hand drill, the drilling bit was caught and caused the drill to torque and twisted his left hand  He developed swelling in the hand  He did not get any imaging studies and has been applying ice with mild improvement in swelling however continues to have persistent swelling of the 3rd digit carpal bones   remains intact although decreased         The following portions of the patient's history were reviewed and updated as appropriate: allergies, current medications, past family history, past medical history, past social history, past surgical history and problem list     Review of Systems   Constitutional: Negative for activity change, appetite change, chills, diaphoresis, fatigue and fever  HENT: Negative for congestion, postnasal drip, rhinorrhea, sinus pressure, sinus pain, sneezing and sore throat  Eyes: Negative for visual disturbance  Respiratory: Negative for apnea, cough, choking, chest tightness, shortness of breath and wheezing  Cardiovascular: Negative for chest pain, palpitations and leg swelling  Gastrointestinal: Negative for abdominal distention, abdominal pain, anal bleeding, blood in stool, constipation, diarrhea, nausea and vomiting  Endocrine: Negative for cold intolerance and heat intolerance  Genitourinary: Negative for difficulty urinating, dysuria and hematuria  Musculoskeletal: Positive for back pain  Skin: Negative  Neurological: Negative for dizziness, weakness, light-headedness, numbness and headaches  Hematological: Negative for adenopathy  Psychiatric/Behavioral: Negative for agitation, sleep disturbance and suicidal ideas  All other systems reviewed and are negative          Past Medical History:   Diagnosis Date    Chronic back pain     Depression     GERD (gastroesophageal reflux disease)     L4-L5 disc bulge 06/16/2017    Muscle weakness     Neuropathy     Osteoarthritis     Right foot drop          Current Outpatient Medications:     acetaminophen (TYLENOL) 500 mg tablet, Take 1,000 mg by mouth every 12 (twelve) hours as needed for mild pain, Disp: , Rfl:     Buprenorphine 5 MCG/HR PTWK, Place 1 patch on the skin once a week, Disp: 4 patch, Rfl: 1    cholecalciferol (VITAMIN D3) 1,000 units tablet, Take 5,000 Units by mouth daily , Disp: , Rfl:     clotrimazole (LOTRIMIN) 1 % cream, Apply 1 application topically as needed To affected area, Disp: , Rfl: 2    cyanocobalamin (VITAMIN B-12) 100 mcg tablet, Take 100 mcg by mouth daily  , Disp: , Rfl:     Elastic Bandages & Supports (KNEE BRACE/HINGED/REGULAR) MISC, by Does not apply route daily as needed (B/L knee pain) B/L knee braces, Disp: 2 each, Rfl: 0    Flaxseed, Linseed, (FLAX SEED OIL PO), Take 1 capsule by mouth daily  , Disp: , Rfl:     fluticasone (FLONASE) 50 mcg/act nasal spray, 1 spray into each nostril as needed , Disp: , Rfl:     ibuprofen (MOTRIN) 600 mg tablet, Take 1,200 mg by mouth every 6 (six) hours as needed for mild pain , Disp: , Rfl:     multivitamin (THERAGRAN) TABS, Take 1 tablet by mouth daily, Disp: , Rfl:     omeprazole (PriLOSEC) 20 mg delayed release capsule, Take 1 capsule (20 mg total) by mouth 2 (two) times a day, Disp: 180 capsule, Rfl: 1    pregabalin (LYRICA) 75 mg capsule, Take 1 capsule (75 mg total) by mouth 3 (three) times a day, Disp: 90 capsule, Rfl: 6    pyridoxine (B-6) 100 MG tablet, Take 100 mg by mouth daily, Disp: , Rfl:     Allergies   Allergen Reactions    Gabapentin GI Intolerance    Tramadol Confusion    Morphine Other (See Comments)     constipation       Social History   Past Surgical History:   Procedure Laterality Date    EGD AND COLONOSCOPY N/A 6/29/2018    Procedure: EGD AND COLONOSCOPY;  Surgeon: Orestes Desai MD;  Location: [facility];   Service: Gastroenterology    EPIDURAL BLOCK INJECTION      needle    [place] HIP [address] BODY,PLASTY/RESECTN Left 4/30/2018    Procedure: LEFT HIP ARTHROSCOPIC LABRAL DEBRIDEMENT;  Surgeon: Judah Sutton MD;  [facility];  Service: Orthopedics    TONSILLECTOMY      WISDOM TOOTH EXTRACTION       Family History   Problem Relation Age of Onset    Osteoarthritis Family     Glaucoma Mother     No Known Problems Father        Objective:  BP 96/62 (BP Location: Left arm, Patient Position: Sitting, Cuff Size: Adult)   Pulse 98   Temp 98 4 °F (36 9 °C) (Oral)   Ht 6' 3" (1 905 m) Comment: with shoes on  Wt 79 kg (174 lb 3 2 oz) Comment: with shoes on  SpO2 96% Comment: room air BMI 21 77 kg/m²     Recent Results (from the past 1344 hour(s))   Comprehensive metabolic panel    Collection Time: 07/19/19 12:12 PM   Result Value Ref Range    Glucose, Random 96 65 - 99 mg/dL    BUN 13 7 - 25 mg/dL    Creatinine 0 83 0 70 - 1 25 mg/dL    eGFR Non  93 > OR = 60 mL/min/1 73m2    eGFR  108 > OR = 60 mL/min/1 73m2    SL AMB BUN/CREATININE RATIO NOT APPLICABLE 6 - 22 (calc)    Sodium 142 135 - 146 mmol/L    Potassium 4 4 3 5 - 5 3 mmol/L    Chloride 107 98 - 110 mmol/L    CO2 24 20 - 32 mmol/L    SL AMB CALCIUM 9 4 8 6 - 10 3 mg/dL    Protein, Total 6 4 6 1 - 8 1 g/dL    Albumin 4 2 3 6 - 5 1 g/dL    Globulin 2 2 1 9 - 3 7 g/dL (calc)    Albumin/Globulin Ratio 1 9 1 0 - 2 5 (calc)    TOTAL BILIRUBIN 0 9 0 2 - 1 2 mg/dL    Alkaline Phosphatase 56 40 - 115 U/L    AST 15 10 - 35 U/L    ALT 13 9 - 46 U/L   CBC and differential    Collection Time: 07/19/19 12:12 PM   Result Value Ref Range    White Blood Cell Count 9 9 3 8 - 10 8 Thousand/uL    Red Blood Cell Count 4 82 4 20 - 5 80 Million/uL    Hemoglobin 15 1 13 2 - 17 1 g/dL    HCT 45 0 38 5 - 50 0 %    MCV 93 4 80 0 - 100 0 fL    MCH 31 3 27 0 - 33 0 pg    MCHC 33 6 32 0 - 36 0 g/dL    RDW 13 5 11 0 - 15 0 %    Platelet Count 381 586 - 400 Thousand/uL    SL AMB MPV 9 9 7 5 - 12 5 fL    Neutrophils (Absolute) 6,821 1,500 - 7,800 cells/uL    Lymphocytes (Absolute) 2,089 850 - 3,900 cells/uL    Monocytes (Absolute) 762 200 - 950 cells/uL    Eosinophils (Absolute) 149 15 - 500 cells/uL    Basophils ABS 79 0 - 200 cells/uL    Neutrophils 68 9 %    Lymphocytes 21 1 %    Monocytes 7 7 %    Eosinophils 1 5 %    Basophils PCT 0 8 %   Vitamin D 25 hydroxy    Collection Time: 07/19/19 12:12 PM   Result Value Ref Range    Vitamin D, 25-Hydroxy, Serum 52 30 - 100 ng/mL            Physical Exam   Constitutional: He is oriented to person, place, and time  He appears well-developed and well-nourished  No distress     HENT:   Head: Normocephalic and atraumatic  Eyes: Pupils are equal, round, and reactive to light  Conjunctivae and EOM are normal  Right eye exhibits no discharge  Left eye exhibits no discharge  No scleral icterus  Neck: Normal range of motion  Neck supple  No JVD present  No thyromegaly present  Cardiovascular: Normal rate, regular rhythm, normal heart sounds and intact distal pulses  Exam reveals no gallop and no friction rub  No murmur heard  Pulmonary/Chest: Effort normal and breath sounds normal  No respiratory distress  He has no wheezes  He has no rales  He exhibits no tenderness  Abdominal: Soft  Bowel sounds are normal  He exhibits no distension and no mass  There is no tenderness  There is no rebound and no guarding  Musculoskeletal: Normal range of motion  He exhibits no edema, tenderness or deformity  Hands:  Lymphadenopathy:     He has no cervical adenopathy  Neurological: He is alert and oriented to person, place, and time  He has normal reflexes  No cranial nerve deficit  Coordination normal    Skin: Skin is warm and dry  No rash noted  He is not diaphoretic  No erythema  No pallor  Psychiatric: He has a normal mood and affect  His behavior is normal  Judgment and thought content normal    Nursing note and vitals reviewed

## 2019-07-24 DIAGNOSIS — M54.16 LUMBAR RADICULOPATHY: ICD-10-CM

## 2019-07-24 RX ORDER — PREGABALIN 75 MG/1
75 CAPSULE ORAL 3 TIMES DAILY
Qty: 90 CAPSULE | Refills: 6 | Status: SHIPPED | OUTPATIENT
Start: 2019-07-24 | End: 2019-09-04 | Stop reason: SDUPTHER

## 2019-08-20 ENCOUNTER — TELEPHONE (OUTPATIENT)
Dept: PAIN MEDICINE | Facility: MEDICAL CENTER | Age: 64
End: 2019-08-20

## 2019-08-20 NOTE — TELEPHONE ENCOUNTER
RN received the fax from [facility] and gave to Cristopher Almanza to do the PA, Shine aware of same

## 2019-08-20 NOTE — TELEPHONE ENCOUNTER
RN s/w pt regarding previous  Per pt he used to get his lyrica from [employer] and now that has "gone away "    Per pt he needs a PA for his Lyrica now  RN advised that she would call [facility] and get them to re fax the info that states the lyrica needs a PA  RN s/w pharmacist at [facility] and the pharmacist stated now that Nathan Erlin is generic they need another PA  RN provided fax # of [phone] and pharmacist re faxed same

## 2019-08-20 NOTE — TELEPHONE ENCOUNTER
S/w patient, states he will need a refill of pregabalin (LYRICA) 75 mg capsule,   [address] # [id number]  Refill will require a prior auth, already approved by [employer], should have sent this 2 weeks ago    Please call patient before doing anyhing    Please contact [facility] # [phone]      # [phone]

## 2019-08-23 NOTE — TELEPHONE ENCOUNTER
--pt to C/B w/ update--    S/W pt  Advised pt of the previous tasks  Pt verbalized understanding  Pt stated he has 2 knee braces and when he was on gabapentin before "I was picked up by the police because I was walking like I was drunk" so his doctor took him off of it and put him on lyrica  Pt stated he is calling his insurance "to give them a piece of my mind" and he will check back with Aurea Reinoso  Pt will call [facility] back with update

## 2019-08-23 NOTE — TELEPHONE ENCOUNTER
Please please a call back out to patient, he would like to know how much longer until he is able to get his medication  He also said that he is having some stabbing pains in the Lt leg  The pain is traveling down  He is also having some tingling both feet  Also he said that when he is having a nerve stab he does not drive

## 2019-08-23 NOTE — TELEPHONE ENCOUNTER
Pt is calling stating he called [place] and expressed some powerful wording and it turns out that [facility] dropped the ball, everything was resolved and he will be picking up the medication in 20 mins

## 2019-09-04 ENCOUNTER — OFFICE VISIT (OUTPATIENT)
Dept: PAIN MEDICINE | Facility: MEDICAL CENTER | Age: 64
End: 2019-09-04
Payer: COMMERCIAL

## 2019-09-04 VITALS
HEIGHT: 75 IN | DIASTOLIC BLOOD PRESSURE: 66 MMHG | BODY MASS INDEX: 22.28 KG/M2 | SYSTOLIC BLOOD PRESSURE: 98 MMHG | RESPIRATION RATE: 16 BRPM | HEART RATE: 80 BPM | WEIGHT: 179.2 LBS

## 2019-09-04 DIAGNOSIS — M54.16 LUMBAR RADICULOPATHY: ICD-10-CM

## 2019-09-04 DIAGNOSIS — Z79.891 LONG-TERM CURRENT USE OF OPIATE ANALGESIC: ICD-10-CM

## 2019-09-04 DIAGNOSIS — M47.816 LUMBAR SPONDYLOSIS: ICD-10-CM

## 2019-09-04 DIAGNOSIS — M54.50 CHRONIC BILATERAL LOW BACK PAIN WITHOUT SCIATICA: ICD-10-CM

## 2019-09-04 DIAGNOSIS — G62.9 NEUROPATHY: ICD-10-CM

## 2019-09-04 DIAGNOSIS — G89.4 CHRONIC PAIN SYNDROME: Primary | ICD-10-CM

## 2019-09-04 DIAGNOSIS — F11.20 UNCOMPLICATED OPIOID DEPENDENCE (HCC): ICD-10-CM

## 2019-09-04 DIAGNOSIS — G89.29 CHRONIC BILATERAL LOW BACK PAIN WITHOUT SCIATICA: ICD-10-CM

## 2019-09-04 PROCEDURE — 99214 OFFICE O/P EST MOD 30 MIN: CPT | Performed by: NURSE PRACTITIONER

## 2019-09-04 PROCEDURE — 80305 DRUG TEST PRSMV DIR OPT OBS: CPT | Performed by: NURSE PRACTITIONER

## 2019-09-04 RX ORDER — BUPRENORPHINE 5 UG/H
1 PATCH TRANSDERMAL WEEKLY
Qty: 4 PATCH | Refills: 1 | Status: SHIPPED | OUTPATIENT
Start: 2019-09-04 | End: 2019-11-12 | Stop reason: SDUPTHER

## 2019-09-04 RX ORDER — PREGABALIN 75 MG/1
75 CAPSULE ORAL 3 TIMES DAILY
Qty: 90 CAPSULE | Refills: 6 | Status: SHIPPED | OUTPATIENT
Start: 2019-09-04 | End: 2020-03-11 | Stop reason: SDUPTHER

## 2019-09-04 NOTE — PROGRESS NOTES
Assessment  1  Chronic pain syndrome    2  Long-term current use of opiate analgesic    3  Uncomplicated opioid dependence ([address] )    4  Neuropathy    5  Lumbar spondylosis - Bilateral    6  Lumbar radiculopathy - Left    7  Chronic bilateral low back pain without sciatica    8  Lumbar radiculopathy       Pt is is on Day 5 of buprenorphine patch   New UDS today     Plan  Continue with the Butrans patch the patient is not interested in increasing the dose  He was given a 2 month supply the medication  Continue with Lyrica a refill was sent to his pharmacy today  Follow up in 8 weeks for medication refills  There are risks associated with opioid medications, including dependence, addiction and tolerance  The patient understands and agrees to use these medications only as prescribed  Potential side effects of the medications include, but are not limited to, constipation, drowsiness, addiction, impaired judgment and risk of fatal overdose if not taken as prescribed  The patient was warned against driving while taking sedation medications  Sharing medications is a felony  At this point in time, the patient is showing no signs of addiction, abuse, diversion or suicidal ideation  A urine drug screen was collected at today's office visit as part of our medication management protocol  The point of care testing results were appropriate for what was being prescribed  The specimen will be sent for confirmatory testing  The drug screen is medically necessary because the patient is either dependent on opioid medication or is being considered for opioid medication therapy and the results could impact ongoing or future treatment  The drug screen is to evaluate for the presence or absence of prescribed, non-prescribed, and/or illicit drugs/substances      [place] Prescription Drug Monitoring Program report was reviewed and was appropriate         My impressions and treatment recommendations were discussed in detail with the patient who verbalized understanding and had no further questions  Discharge instructions were provided  I personally saw and examined the patient and I agree with the above discussed plan of care  Orders Placed This Encounter   Procedures    MM ALL_Prescribed Meds and Special Instructions     New Medications Ordered This Visit   Medications    Buprenorphine 5 MCG/HR PTWK     Sig: Place 1 patch on the skin once a week     Dispense:  4 patch     Refill:  1     Do not fill before January 20, 2019    pregabalin (LYRICA) 75 mg capsule     Sig: Take 1 capsule (75 mg total) by mouth 3 (three) times a day     Dispense:  90 capsule     Refill:  6       History of Present Illness    Rhonda Serrano is a 58 y o  male presents for follow-up related to his low back and leg pain  Today he rates his pain 7/10 this is intermittent in nature most bothersome in the morning in the evening  He describes the pain as shooting, and pins and needles  He has been experiencing a new type of nerve pain down his left leg and into his left hip however he tells me that the Lyrica does take it away  Patient has been using Butrans patch 5 micrograms weekly and Lyrica 75 milligrams 3 times daily with 20 percent relief and no side effects or issues  I have personally reviewed and/or updated the patient's past medical history, past surgical history, family history, social history, current medications, allergies, and vital signs today  Review of Systems   Respiratory: Negative for shortness of breath  Cardiovascular: Negative for chest pain  Gastrointestinal: Positive for constipation  Negative for diarrhea, nausea and vomiting  Musculoskeletal: Positive for gait problem, joint swelling and myalgias  Negative for arthralgias  Skin: Negative for rash  Neurological: Positive for weakness  Negative for dizziness and seizures  All other systems reviewed and are negative        Patient Active Problem List Diagnosis    Lumbar radiculopathy - Right    Disability examination    Bone lesion    Neuropathy    Polyarthropathy    Annual physical exam    Tobacco abuse    Encounter for colorectal cancer screening    Lipid screening    Gastroesophageal reflux disease without esophagitis    Work related injury    Colon cancer screening    Right foot drop    Lumbar spondylosis - Bilateral    Vitamin D deficiency    Thoracic spine pain    Chronic pain syndrome    Uncomplicated opioid dependence (HCC)    Chronic pain of both knees    Chronic pain of both shoulders    Left hand pain       Past Medical History:   Diagnosis Date    Chronic back pain     Depression     GERD (gastroesophageal reflux disease)     L4-L5 disc bulge 06/16/2017    Muscle weakness     Neuropathy     Osteoarthritis     Right foot drop        Past Surgical History:   Procedure Laterality Date    EGD AND COLONOSCOPY N/A 6/29/2018    Procedure: EGD AND COLONOSCOPY;  Surgeon: Paris Verdin MD;  Location: [facility];   Service: Gastroenterology    EPIDURAL BLOCK INJECTION      needle    [place] HIP [address] BODY,PLASTY/RESECTN Left 4/30/2018    Procedure: LEFT HIP ARTHROSCOPIC LABRAL DEBRIDEMENT;  Surgeon: Anna Pimentel MD;  [facility];  Service: Orthopedics    TONSILLECTOMY      WISDOM TOOTH EXTRACTION         Family History   Problem Relation Age of Onset    Osteoarthritis Family     Glaucoma Mother     No Known Problems Father        Social History     Occupational History    Not on file   Tobacco Use    Smoking status: Current Every Day Smoker     Packs/day: 0 50    Smokeless tobacco: Never Used    Tobacco comment: 1/2 a pack a day   Substance and Sexual Activity    Alcohol use: No    Drug use: No    Sexual activity: Not on file       Current Outpatient Medications on File Prior to Visit   Medication Sig    cholecalciferol (VITAMIN D3) 1,000 units tablet Take 5,000 Units by mouth daily     clotrimazole (LOTRIMIN) 1 % cream Apply 1 application topically as needed To affected area    cyanocobalamin (VITAMIN B-12) 100 mcg tablet Take 100 mcg by mouth daily      Flaxseed, Linseed, (FLAX SEED OIL PO) Take 1 capsule by mouth daily      ibuprofen (MOTRIN) 600 mg tablet Take 1,200 mg by mouth every 6 (six) hours as needed for mild pain     multivitamin (THERAGRAN) TABS Take 1 tablet by mouth daily    omeprazole (PriLOSEC) 20 mg delayed release capsule Take 1 capsule (20 mg total) by mouth 2 (two) times a day    pyridoxine (B-6) 100 MG tablet Take 100 mg by mouth daily    [DISCONTINUED] Buprenorphine 5 MCG/HR PTWK Place 1 patch on the skin once a week    [DISCONTINUED] pregabalin (LYRICA) 75 mg capsule Take 1 capsule (75 mg total) by mouth 3 (three) times a day    acetaminophen (TYLENOL) 500 mg tablet Take 1,000 mg by mouth every 12 (twelve) hours as needed for mild pain    Elastic Bandages & Supports (KNEE BRACE/HINGED/REGULAR) MISC by Does not apply route daily as needed (B/L knee pain) B/L knee braces (Patient not taking: Reported on 9/4/2019)    fluticasone (FLONASE) 50 mcg/act nasal spray 1 spray into each nostril as needed      No current facility-administered medications on file prior to visit  Allergies   Allergen Reactions    Gabapentin GI Intolerance    Tramadol Confusion    Morphine Other (See Comments)     constipation       Physical Exam    BP 98/66   Pulse 80   Resp 16   Ht 6' 3" (1 905 m)   Wt 81 3 kg (179 lb 3 2 oz)   BMI 22 40 kg/m²     Constitutional: normal, well developed, well nourished, alert, in no distress and non-toxic and no overt pain behavior    Eyes: anicteric  HEENT: grossly intact  Neck: supple, symmetric, trachea midline and no masses   Pulmonary:even and unlabored  Cardiovascular:No edema or pitting edema present  Skin:Normal without rashes or lesions and well hydrated  Psychiatric:Mood and affect appropriate  Neurologic:Cranial Nerves II-XII grossly intact  Musculoskeletal:normal wearing lumbar support    Imaging

## 2019-09-05 ENCOUNTER — TELEPHONE (OUTPATIENT)
Dept: PAIN MEDICINE | Facility: CLINIC | Age: 64
End: 2019-09-05

## 2019-09-05 NOTE — TELEPHONE ENCOUNTER
I spoke with pt   He is aware Lyrica is denied by his insurance   He is going through [facility] and is going to bring the paperwork in tomorrow

## 2019-09-06 ENCOUNTER — TELEPHONE (OUTPATIENT)
Dept: PAIN MEDICINE | Facility: MEDICAL CENTER | Age: 64
End: 2019-09-06

## 2019-09-06 NOTE — TELEPHONE ENCOUNTER
S/W pt  Pt stated he picked up the lyrica today  Pt stated his lower back is painful from the weather and his right hand is stiff  Pt stated he took 600 mg of Ibuprofen x2  Advised pt to follow the directions on the bottle  Pt stated he is also using the patch  Advised pt he can alternate with tylenol and to follow the directions on the bottle and can take up to 3,000 mg  Pt stated AO will be back in the office on Monday  Pt verbalized understanding

## 2019-09-06 NOTE — TELEPHONE ENCOUNTER
Pt states his insurance covered the medications and he picked them up today  Just JAYLEN    Pt also says today is also a lazy non functional day b/c of his pain  But he is using a heat pad   Call back# [phone]

## 2019-09-26 DIAGNOSIS — K21.9 GASTROESOPHAGEAL REFLUX DISEASE WITHOUT ESOPHAGITIS: ICD-10-CM

## 2019-09-26 RX ORDER — OMEPRAZOLE 20 MG/1
CAPSULE, DELAYED RELEASE ORAL
Qty: 60 CAPSULE | Refills: 3 | OUTPATIENT
Start: 2019-09-26

## 2019-10-04 DIAGNOSIS — K21.9 GASTROESOPHAGEAL REFLUX DISEASE WITHOUT ESOPHAGITIS: ICD-10-CM

## 2019-10-04 RX ORDER — OMEPRAZOLE 20 MG/1
CAPSULE, DELAYED RELEASE ORAL
Qty: 60 CAPSULE | Refills: 3 | OUTPATIENT
Start: 2019-10-04

## 2019-10-28 ENCOUNTER — TELEPHONE (OUTPATIENT)
Dept: INTERNAL MEDICINE CLINIC | Facility: CLINIC | Age: 64
End: 2019-10-28

## 2019-10-28 ENCOUNTER — TELEPHONE (OUTPATIENT)
Dept: PAIN MEDICINE | Facility: MEDICAL CENTER | Age: 64
End: 2019-10-28

## 2019-10-28 DIAGNOSIS — B35.3 TINEA PEDIS, UNSPECIFIED LATERALITY: Primary | ICD-10-CM

## 2019-10-28 DIAGNOSIS — K21.9 GASTROESOPHAGEAL REFLUX DISEASE WITHOUT ESOPHAGITIS: ICD-10-CM

## 2019-10-28 NOTE — TELEPHONE ENCOUNTER
Dr Lev Ibarra, this patient follows with you, but I'm unsure if this question should be referred to who manages his pain

## 2019-10-28 NOTE — TELEPHONE ENCOUNTER
--JAYLEN--    S/W pt  Pt stated the current patch that is on, was put on 10/26  Pt stated he has 2 patches left and the last one would be due to be changed on 11/16  Pt already has appt for 11/18  Found sooner appt w/ FREDDY on 11/12 at 2:00 and scheduled pt  Cancelled appt for 11/18

## 2019-10-28 NOTE — TELEPHONE ENCOUNTER
Patient had called asking if he can take boswellia extract 250 mg  Also has tumeric extract in it  He is wondering if this is something he can take  Is this healthy   Please call him back

## 2019-10-28 NOTE — TELEPHONE ENCOUNTER
Pt contacted [facility] requested refill of their medication  Patient states that some of his patches keep falling off  Patient also states that his medical insurance will be changing on 11/1/19 for prescription to:    [facility] ID# [id number] [facility] PDP)      Medication Name: Buprenorphine      Dosage of Med: 5 MCG/HR PTWK      Frequency of Med: Place 1 patch on the skin once a week      Remaining Medication: 2 left       Pharmacy and Location: [facility] pharmacy on file        Pt  Preferred Callback Phone Number: [phone]      Thank you

## 2019-10-29 RX ORDER — OMEPRAZOLE 20 MG/1
20 CAPSULE, DELAYED RELEASE ORAL 2 TIMES DAILY
Qty: 180 CAPSULE | Refills: 1 | Status: SHIPPED | OUTPATIENT
Start: 2019-10-29 | End: 2020-05-18

## 2019-10-29 RX ORDER — CLOTRIMAZOLE 1 %
1 CREAM (GRAM) TOPICAL DAILY
Qty: 60 G | Refills: 2 | Status: SHIPPED | OUTPATIENT
Start: 2019-10-29

## 2019-11-06 ENCOUNTER — TELEPHONE (OUTPATIENT)
Dept: PAIN MEDICINE | Facility: MEDICAL CENTER | Age: 64
End: 2019-11-06

## 2019-11-06 NOTE — TELEPHONE ENCOUNTER
Receieved RTE that patients insurance termed   lmom for patient to call back with either updated insurance information or to seek self pay approval  Please let [facility] staff know if patient calls back

## 2019-11-06 NOTE — TELEPHONE ENCOUNTER
Patient called stating he now has Medicare A & B insurance  I verified his insurance & is recorded in his demographics  Patient states you must call Elizabeth Lora for SL to choose a plan  Patient also has [facility] DP for prescriptions, ID# [id number]   Please [address]  # [phone]    ID# [id number]    Call back# [phone]

## 2019-11-12 ENCOUNTER — OFFICE VISIT (OUTPATIENT)
Dept: PAIN MEDICINE | Facility: MEDICAL CENTER | Age: 64
End: 2019-11-12
Payer: MEDICARE

## 2019-11-12 VITALS
DIASTOLIC BLOOD PRESSURE: 64 MMHG | HEIGHT: 75 IN | BODY MASS INDEX: 22.38 KG/M2 | HEART RATE: 72 BPM | SYSTOLIC BLOOD PRESSURE: 108 MMHG | RESPIRATION RATE: 14 BRPM | WEIGHT: 180 LBS

## 2019-11-12 DIAGNOSIS — M47.816 LUMBAR SPONDYLOSIS: ICD-10-CM

## 2019-11-12 DIAGNOSIS — M54.16 LUMBAR RADICULOPATHY: Primary | ICD-10-CM

## 2019-11-12 DIAGNOSIS — M25.561 CHRONIC PAIN OF BOTH KNEES: ICD-10-CM

## 2019-11-12 DIAGNOSIS — G89.29 CHRONIC BILATERAL LOW BACK PAIN WITHOUT SCIATICA: ICD-10-CM

## 2019-11-12 DIAGNOSIS — M25.562 CHRONIC PAIN OF BOTH KNEES: ICD-10-CM

## 2019-11-12 DIAGNOSIS — G89.29 CHRONIC PAIN OF BOTH KNEES: ICD-10-CM

## 2019-11-12 DIAGNOSIS — G89.29 CHRONIC PAIN OF BOTH SHOULDERS: ICD-10-CM

## 2019-11-12 DIAGNOSIS — M25.512 CHRONIC PAIN OF BOTH SHOULDERS: ICD-10-CM

## 2019-11-12 DIAGNOSIS — M25.511 CHRONIC PAIN OF BOTH SHOULDERS: ICD-10-CM

## 2019-11-12 DIAGNOSIS — G89.4 CHRONIC PAIN SYNDROME: ICD-10-CM

## 2019-11-12 DIAGNOSIS — M21.371 RIGHT FOOT DROP: ICD-10-CM

## 2019-11-12 DIAGNOSIS — M54.50 CHRONIC BILATERAL LOW BACK PAIN WITHOUT SCIATICA: ICD-10-CM

## 2019-11-12 DIAGNOSIS — G62.9 NEUROPATHY: ICD-10-CM

## 2019-11-12 PROCEDURE — 99214 OFFICE O/P EST MOD 30 MIN: CPT | Performed by: PHYSICAL MEDICINE & REHABILITATION

## 2019-11-12 RX ORDER — UREA 10 %
100 LOTION (ML) TOPICAL DAILY
COMMUNITY
End: 2020-05-18

## 2019-11-12 RX ORDER — BUPRENORPHINE 5 UG/H
1 PATCH TRANSDERMAL WEEKLY
Qty: 4 PATCH | Refills: 2 | Status: SHIPPED | OUTPATIENT
Start: 2019-11-12 | End: 2020-01-08 | Stop reason: SDUPTHER

## 2019-11-12 NOTE — PROGRESS NOTES
Assessment  1  Lumbar radiculopathy - Right    2  Neuropathy    3  Lumbar spondylosis - Bilateral    4  Right foot drop    5  Chronic pain of both knees    6  Chronic pain of both shoulders    7  Chronic pain syndrome        Plan  1  Refill Butrans  2  Weight to refill Lyrica when he runs out  3  Follow-up in 3 months with HOLLIS Kong      My impressions and treatment recommendations were discussed in detail with the patient who verbalized understanding and had no further questions  Discharge instructions were provided  I personally saw and examined the patient and I agree with the above discussed plan of care  No orders of the defined types were placed in this encounter  New Medications Ordered This Visit   Medications    vitamin B-12 (CYANOCOBALAMIN) 100 MCG tablet     Sig: Take 100 mcg by mouth daily         Butrans patch is on day 3         History of Present Illness    Eliazar Rodriguez is a 71 y o  male returns in follow-up regarding chronic pain complaints  Overall he is doing very well with the current Butrans and Lyrica regimen  He does continue to have similar type pain rated as a 7/10 but is maintaining some significant functional activities at home  He describes constant pain which is occasionally worse characterized as dull, aching, sharp and throbbing, shooting, numbness, pins and needle sensation  He is describing pain in his shoulders, left hip, back and neuropathy type pain  Overall medication does provide about 20% relief of his symptoms which is allowing him to remain functional at this point  He is not seeking further increases in doses  I have personally reviewed and/or updated the patient's past medical history, past surgical history, family history, social history, current medications, allergies, and vital signs today  Review of Systems   Respiratory: Negative for shortness of breath  Cardiovascular: Negative for chest pain     Gastrointestinal: Positive for constipation  Negative for diarrhea, nausea and vomiting  Musculoskeletal: Positive for arthralgias (both feet), back pain (midline lumbar), gait problem, joint swelling and myalgias  Skin: Negative for rash  Neurological: Negative for dizziness, seizures and weakness  All other systems reviewed and are negative  Patient Active Problem List   Diagnosis    Lumbar radiculopathy - Right    Disability examination    Bone lesion    Neuropathy    Polyarthropathy    Annual physical exam    Tobacco abuse    Encounter for colorectal cancer screening    Lipid screening    Gastroesophageal reflux disease without esophagitis    Work related injury    Colon cancer screening    Right foot drop    Lumbar spondylosis - Bilateral    Vitamin D deficiency    Thoracic spine pain    Chronic pain syndrome    Uncomplicated opioid dependence ([address] )    Chronic pain of both knees    Chronic pain of both shoulders    Left hand pain       Past Medical History:   Diagnosis Date    Chronic back pain     Depression     GERD (gastroesophageal reflux disease)     L4-L5 disc bulge 06/16/2017    Muscle weakness     Neuropathy     Osteoarthritis     Right foot drop        Past Surgical History:   Procedure Laterality Date    EGD AND COLONOSCOPY N/A 6/29/2018    Procedure: EGD AND COLONOSCOPY;  Surgeon: Candy Torres MD;  Location: [facility];   Service: Gastroenterology    EPIDURAL BLOCK INJECTION      needle    ID HIP [address] BODY,PLASTY/RESECTN Left 4/30/2018    Procedure: LEFT HIP ARTHROSCOPIC LABRAL DEBRIDEMENT;  Surgeon: Rudy Mcnulty MD;  [facility];  Service: Orthopedics    TONSILLECTOMY      WISDOM TOOTH EXTRACTION         Family History   Problem Relation Age of Onset    Osteoarthritis Family     Glaucoma Mother     No Known Problems Father        Social History     Occupational History    Not on file   Tobacco Use    Smoking status: Current Every Day Smoker     Packs/day: 0 50  Smokeless tobacco: Never Used    Tobacco comment: 1/2 a pack a day   Substance and Sexual Activity    Alcohol use: No    Drug use: No    Sexual activity: Not on file       Current Outpatient Medications on File Prior to Visit   Medication Sig    acetaminophen (TYLENOL) 500 mg tablet Take 1,000 mg by mouth every 12 (twelve) hours as needed for mild pain    Buprenorphine 5 MCG/HR PTWK Place 1 patch on the skin once a week    cholecalciferol (VITAMIN D3) 1,000 units tablet Take 5,000 Units by mouth daily     clotrimazole (LOTRIMIN) 1 % cream Apply 1 g (1 application total) topically daily To affected area    Flaxseed, Linseed, (FLAX SEED OIL PO) Take 1 capsule by mouth daily      ibuprofen (MOTRIN) 600 mg tablet Take 1,200 mg by mouth every 6 (six) hours as needed for mild pain     multivitamin (THERAGRAN) TABS Take 1 tablet by mouth daily    omeprazole (PriLOSEC) 20 mg delayed release capsule Take 1 capsule (20 mg total) by mouth 2 (two) times a day    pregabalin (LYRICA) 75 mg capsule Take 1 capsule (75 mg total) by mouth 3 (three) times a day    pyridoxine (B-6) 100 MG tablet Take 100 mg by mouth daily    vitamin B-12 (CYANOCOBALAMIN) 100 MCG tablet Take 100 mcg by mouth daily    cyanocobalamin (VITAMIN B-12) 100 mcg tablet Take 100 mcg by mouth daily      Elastic Bandages & Supports (KNEE BRACE/HINGED/REGULAR) MISC by Does not apply route daily as needed (B/L knee pain) B/L knee braces (Patient not taking: Reported on 9/4/2019)    fluticasone (FLONASE) 50 mcg/act nasal spray 1 spray into each nostril as needed      No current facility-administered medications on file prior to visit          Allergies   Allergen Reactions    Gabapentin GI Intolerance    Tramadol Confusion    Morphine Other (See Comments)     constipation       Physical Exam    /64   Pulse 72   Resp 14   Ht 6' 3" (1 905 m)   Wt 81 6 kg (180 lb)   BMI 22 50 kg/m²     Constitutional: normal, well developed, well nourished, alert, in no distress and non-toxic and no overt pain behavior    Eyes: anicteric  HEENT: grossly intact  Neck: supple, symmetric, trachea midline and no masses   Pulmonary:even and unlabored  Cardiovascular:No edema or pitting edema present  Skin:Normal without rashes or lesions and well hydrated  Psychiatric:Mood and affect appropriate  Neurologic:Cranial Nerves II-XII grossly intact  Musculoskeletal:normal    Imaging

## 2019-11-21 ENCOUNTER — TELEPHONE (OUTPATIENT)
Dept: OBGYN CLINIC | Facility: HOSPITAL | Age: 64
End: 2019-11-21

## 2019-11-21 ENCOUNTER — TELEPHONE (OUTPATIENT)
Dept: PAIN MEDICINE | Facility: MEDICAL CENTER | Age: 64
End: 2019-11-21

## 2019-11-21 ENCOUNTER — OFFICE VISIT (OUTPATIENT)
Dept: INTERNAL MEDICINE CLINIC | Facility: CLINIC | Age: 64
End: 2019-11-21
Payer: MEDICARE

## 2019-11-21 VITALS
HEIGHT: 75 IN | HEART RATE: 85 BPM | BODY MASS INDEX: 22.11 KG/M2 | TEMPERATURE: 97.9 F | SYSTOLIC BLOOD PRESSURE: 98 MMHG | OXYGEN SATURATION: 96 % | DIASTOLIC BLOOD PRESSURE: 70 MMHG | WEIGHT: 177.8 LBS

## 2019-11-21 DIAGNOSIS — M54.16 LUMBAR RADICULOPATHY: ICD-10-CM

## 2019-11-21 DIAGNOSIS — Z00.00 WELCOME TO MEDICARE PREVENTIVE VISIT: ICD-10-CM

## 2019-11-21 DIAGNOSIS — R97.20 ELEVATED PROSTATE SPECIFIC ANTIGEN (PSA): ICD-10-CM

## 2019-11-21 DIAGNOSIS — M16.12 PRIMARY OSTEOARTHRITIS OF LEFT HIP: ICD-10-CM

## 2019-11-21 DIAGNOSIS — R97.20 ELEVATED PSA: ICD-10-CM

## 2019-11-21 DIAGNOSIS — Z12.2 ENCOUNTER FOR SCREENING FOR LUNG CANCER: ICD-10-CM

## 2019-11-21 DIAGNOSIS — Z12.11 COLON CANCER SCREENING: ICD-10-CM

## 2019-11-21 DIAGNOSIS — G62.9 NEUROPATHY: ICD-10-CM

## 2019-11-21 DIAGNOSIS — Z13.6 SCREENING FOR AAA (ABDOMINAL AORTIC ANEURYSM): ICD-10-CM

## 2019-11-21 DIAGNOSIS — E55.9 VITAMIN D DEFICIENCY: ICD-10-CM

## 2019-11-21 DIAGNOSIS — M47.816 LUMBAR SPONDYLOSIS: ICD-10-CM

## 2019-11-21 DIAGNOSIS — K21.9 GASTROESOPHAGEAL REFLUX DISEASE WITHOUT ESOPHAGITIS: Primary | ICD-10-CM

## 2019-11-21 DIAGNOSIS — Z13.1 SCREENING FOR DIABETES MELLITUS: ICD-10-CM

## 2019-11-21 DIAGNOSIS — Z72.0 TOBACCO ABUSE: ICD-10-CM

## 2019-11-21 DIAGNOSIS — M13.0 POLYARTHROPATHY: ICD-10-CM

## 2019-11-21 DIAGNOSIS — Z12.5 SCREENING FOR PROSTATE CANCER: ICD-10-CM

## 2019-11-21 DIAGNOSIS — Z13.6 SCREENING FOR CARDIOVASCULAR CONDITION: ICD-10-CM

## 2019-11-21 DIAGNOSIS — F17.200 TOBACCO DEPENDENCE: ICD-10-CM

## 2019-11-21 DIAGNOSIS — Z23 NEED FOR PNEUMOCOCCAL VACCINATION: ICD-10-CM

## 2019-11-21 DIAGNOSIS — G89.4 CHRONIC PAIN SYNDROME: ICD-10-CM

## 2019-11-21 PROBLEM — Z13.220 LIPID SCREENING: Status: RESOLVED | Noted: 2018-03-08 | Resolved: 2019-11-21

## 2019-11-21 PROBLEM — M79.642 LEFT HAND PAIN: Status: RESOLVED | Noted: 2019-07-22 | Resolved: 2019-11-21

## 2019-11-21 PROCEDURE — 99214 OFFICE O/P EST MOD 30 MIN: CPT | Performed by: INTERNAL MEDICINE

## 2019-11-21 PROCEDURE — G0402 INITIAL PREVENTIVE EXAM: HCPCS | Performed by: INTERNAL MEDICINE

## 2019-11-21 PROCEDURE — G0009 ADMIN PNEUMOCOCCAL VACCINE: HCPCS

## 2019-11-21 PROCEDURE — 90670 PCV13 VACCINE IM: CPT

## 2019-11-21 RX ORDER — BUPROPION HYDROCHLORIDE 150 MG/1
TABLET, EXTENDED RELEASE ORAL
Qty: 60 TABLET | Refills: 2 | Status: SHIPPED | OUTPATIENT
Start: 2019-11-21 | End: 2020-05-18

## 2019-11-21 NOTE — PROGRESS NOTES
Assessment/Plan:    Gastroesophageal reflux disease without esophagitis  Well controlled, continue with omeprazole  Lumbar radiculopathy - Right  Continue to follow up with pain management  Colon cancer screening  Up to date on colorectal cancer screening  Elevated PSA  Will refer to Urology for further evaluation  Asymptomatic  Likely representing BPH  Tobacco abuse  Counseled patient on smoking cessation  Will start Wellbutrin therapy  Vitamin D deficiency  Continue vitamin-D supplementation  Neuropathy  Continue with Lyrica 75 mg up to 3 times a day as needed  Managed by his pain and spine doctor  Diagnoses and all orders for this visit:    Gastroesophageal reflux disease without esophagitis    Lumbar radiculopathy  -     Durable Medical Equipment    Lumbar radiculopathy - Right  -     Durable Medical Equipment    Colon cancer screening    Tobacco abuse    Welcome to Medicare preventive visit    Screening for diabetes mellitus    Screening for cardiovascular condition    Encounter for screening for lung cancer  -     CT lung screening program; Future    Screening for prostate cancer    Screening for AAA (abdominal aortic aneurysm)  -      abdominal aorta screening aaa; Future    Tobacco dependence  -     buPROPion (ZYBAN) 150 MG 12 hr tablet; Take 1 tablet daily for 3 days, then take 1 tablet twice a day thereafter  Elevated prostate specific antigen (PSA)  -     Ambulatory referral to Urology; Future    Primary osteoarthritis of left hip  -     XR hip/pelv 2-3 vws left if performed;  Future    Neuropathy  -     Durable Medical Equipment    Polyarthropathy  -     Durable Medical Equipment    Lumbar spondylosis - Bilateral  -     Durable Medical Equipment    Chronic pain syndrome  -     Durable Medical Equipment    Need for pneumococcal vaccination  -     PNEUMOCOCCAL CONJUGATE VACCINE 13-VALENT GREATER THAN 6 MONTHS    Elevated PSA    Vitamin D deficiency    Other orders  - METHYLCOBALAMIN SL; Place 1,000 mcg under the tongue                Subjective:      Patient ID: Ximena Dobbs is a 71 y o  male  71year old male is seen today for follow-up of chronic conditions  Laboratory studies reviewed today, lipid panel is within acceptable range  PSA slightly elevated 1 65  He is up-to-date on metabolic screening  He reports having worsening pain over the past 2 months of his left hip  He has a history of left hip arthroscopy in 2017  He denies any recent trauma or fall  He follows up with pain management regularly and is compliant with his current pain regimen  Heartburn   He reports no abdominal pain, no chest pain, no choking, no coughing, no nausea, no sore throat or no wheezing  This is a chronic problem  The current episode started more than 1 year ago  The problem occurs rarely  Pertinent negatives include no fatigue  He has tried a PPI for the symptoms  The treatment provided moderate relief  The following portions of the patient's history were reviewed and updated as appropriate: allergies, current medications, past family history, past medical history, past social history, past surgical history and problem list     Review of Systems   Constitutional: Negative for activity change, appetite change, chills, diaphoresis, fatigue and fever  HENT: Negative for congestion, postnasal drip, rhinorrhea, sinus pressure, sinus pain, sneezing and sore throat  Eyes: Negative for visual disturbance  Respiratory: Negative for apnea, cough, choking, chest tightness, shortness of breath and wheezing  Cardiovascular: Negative for chest pain, palpitations and leg swelling  Gastrointestinal: Negative for abdominal distention, abdominal pain, anal bleeding, blood in stool, constipation, diarrhea, nausea and vomiting  Endocrine: Negative for cold intolerance and heat intolerance  Genitourinary: Negative for difficulty urinating, dysuria and hematuria  Musculoskeletal: Negative  Skin: Negative  Neurological: Negative for dizziness, weakness, light-headedness, numbness and headaches  Hematological: Negative for adenopathy  Psychiatric/Behavioral: Negative for agitation, sleep disturbance and suicidal ideas  All other systems reviewed and are negative          Past Medical History:   Diagnosis Date    Chronic back pain     Depression     GERD (gastroesophageal reflux disease)     L4-L5 disc bulge 06/16/2017    Muscle weakness     Neuropathy     Osteoarthritis     Right foot drop          Current Outpatient Medications:     acetaminophen (TYLENOL) 500 mg tablet, Take 1,000 mg by mouth every 12 (twelve) hours as needed for mild pain, Disp: , Rfl:     Buprenorphine 5 MCG/HR PTWK, Place 1 patch on the skin once a week, Disp: 4 patch, Rfl: 2    cholecalciferol (VITAMIN D3) 1,000 units tablet, Take 3,000 Units by mouth daily , Disp: , Rfl:     clotrimazole (LOTRIMIN) 1 % cream, Apply 1 g (1 application total) topically daily To affected area, Disp: 60 g, Rfl: 2    Elastic Bandages & Supports (KNEE BRACE/HINGED/REGULAR) MISC, by Does not apply route daily as needed (B/L knee pain) B/L knee braces, Disp: 2 each, Rfl: 0    Flaxseed, Linseed, (FLAX SEED OIL PO), Take 1 capsule by mouth daily  , Disp: , Rfl:     ibuprofen (MOTRIN) 600 mg tablet, Take 600 mg by mouth every 6 (six) hours as needed for mild pain, Disp: , Rfl:     METHYLCOBALAMIN SL, Place 1,000 mcg under the tongue, Disp: , Rfl:     multivitamin (THERAGRAN) TABS, Take 1 tablet by mouth daily, Disp: , Rfl:     omeprazole (PriLOSEC) 20 mg delayed release capsule, Take 1 capsule (20 mg total) by mouth 2 (two) times a day, Disp: 180 capsule, Rfl: 1    pregabalin (LYRICA) 75 mg capsule, Take 1 capsule (75 mg total) by mouth 3 (three) times a day, Disp: 90 capsule, Rfl: 6    pyridoxine (B-6) 100 MG tablet, Take 100 mg by mouth daily, Disp: , Rfl:     buPROPion (ZYBAN) 150 MG 12 hr tablet, Take 1 tablet daily for 3 days, then take 1 tablet twice a day thereafter , Disp: 60 tablet, Rfl: 2    cyanocobalamin (VITAMIN B-12) 100 mcg tablet, Take 100 mcg by mouth daily  , Disp: , Rfl:     vitamin B-12 (CYANOCOBALAMIN) 100 MCG tablet, Take 100 mcg by mouth daily, Disp: , Rfl:     Allergies   Allergen Reactions    Gabapentin GI Intolerance    Tramadol Confusion    Morphine Other (See Comments)     constipation       Social History   Past Surgical History:   Procedure Laterality Date    EGD AND COLONOSCOPY N/A 6/29/2018    Procedure: EGD AND COLONOSCOPY;  Surgeon: Ariel Wills MD;  Location: [facility]; Service: Gastroenterology    EPIDURAL BLOCK INJECTION      needle    [place] HIP [address] BODY,PLASTY/RESECTN Left 4/30/2018    Procedure: LEFT HIP ARTHROSCOPIC LABRAL DEBRIDEMENT;  Surgeon: Bebe Casanova MD;  [facility];  Service: Orthopedics    TONSILLECTOMY      WISDOM TOOTH EXTRACTION       Family History   Problem Relation Age of Onset    Osteoarthritis Family     Glaucoma Mother     No Known Problems Father        Objective:  BP 98/70 (BP Location: Left arm, Patient Position: Sitting, Cuff Size: Standard)   Pulse 85   Temp 97 9 °F (36 6 °C) (Oral)   Ht 6' 3" (1 905 m)   Wt 80 6 kg (177 lb 12 8 oz)   SpO2 96%   BMI 22 22 kg/m²     No results found for this or any previous visit (from the past 1344 hour(s))  Physical Exam   Constitutional: He is oriented to person, place, and time  He appears well-developed and well-nourished  No distress  HENT:   Head: Normocephalic and atraumatic  Eyes: Pupils are equal, round, and reactive to light  Conjunctivae and EOM are normal  Right eye exhibits no discharge  Left eye exhibits no discharge  No scleral icterus  Neck: Normal range of motion  Neck supple  No JVD present  No thyromegaly present  Cardiovascular: Normal rate, regular rhythm, normal heart sounds and intact distal pulses   Exam reveals no gallop and no friction rub  No murmur heard  Pulmonary/Chest: Effort normal and breath sounds normal  No respiratory distress  He has no wheezes  He has no rales  He exhibits no tenderness  Abdominal: Soft  Bowel sounds are normal  He exhibits no distension and no mass  There is no tenderness  There is no rebound and no guarding  Musculoskeletal: He exhibits no edema or deformity  Left hip: He exhibits decreased range of motion and tenderness  He exhibits normal strength, no bony tenderness, no swelling, no crepitus, no deformity and no laceration  Lumbar back: He exhibits tenderness, bony tenderness, pain and spasm  He exhibits normal range of motion, no swelling, no edema, no deformity and no laceration  Lymphadenopathy:     He has no cervical adenopathy  Neurological: He is alert and oriented to person, place, and time  He has normal reflexes  No cranial nerve deficit  Coordination normal    Skin: Skin is warm and dry  No rash noted  He is not diaphoretic  No erythema  No pallor  Psychiatric: He has a normal mood and affect  His behavior is normal  Judgment and thought content normal    Nursing note and vitals reviewed

## 2019-11-21 NOTE — PROGRESS NOTES
Assessment and Plan:     Problem List Items Addressed This Visit        Digestive    Gastroesophageal reflux disease without esophagitis - Primary     Well controlled, continue with omeprazole  Nervous and Auditory    Lumbar radiculopathy - Right     Continue to follow up with pain management  Other    Tobacco abuse    Colon cancer screening     Up to date on colorectal cancer screening  Tobacco Cessation Counseling: Tobacco cessation counseling was provided  The patient is sincerely urged to quit consumption of tobacco  He is ready to quit tobacco  Medication options and side effects of medication discussed  Patient agreed to medication  Preventive health issues were discussed with patient, and age appropriate screening tests were ordered as noted in patient's After Visit Summary  Personalized health advice and appropriate referrals for health education or preventive services given if needed, as noted in patient's After Visit Summary  History of Present Illness:     Patient presents for Welcome to Medicare visit  Patient Care Team:  Dayana Espinosa MD as PCP - DO Malik Romero III, DO (Pain Medicine)  Lauro Carrasco MD as Endoscopist  HOLLIS Andrews (Pain Medicine)     Review of Systems:     Review of Systems   Constitutional: Negative for activity change, appetite change, chills, diaphoresis, fatigue and fever  HENT: Negative for congestion, postnasal drip, rhinorrhea, sinus pressure, sinus pain, sneezing and sore throat  Eyes: Negative for visual disturbance  Respiratory: Negative for apnea, cough, choking, chest tightness, shortness of breath and wheezing  Cardiovascular: Negative for chest pain, palpitations and leg swelling  Gastrointestinal: Negative for abdominal distention, abdominal pain, anal bleeding, blood in stool, constipation, diarrhea, nausea and vomiting     Endocrine: Negative for cold intolerance and heat intolerance  Genitourinary: Negative for difficulty urinating, dysuria and hematuria  Musculoskeletal: Positive for arthralgias and back pain  Skin: Negative  Neurological: Negative for dizziness, weakness, light-headedness, numbness and headaches  Hematological: Negative for adenopathy  Psychiatric/Behavioral: Negative for agitation, sleep disturbance and suicidal ideas  All other systems reviewed and are negative  Problem List:     Patient Active Problem List   Diagnosis    Lumbar radiculopathy - Right    Disability examination    Bone lesion    Neuropathy    Polyarthropathy    Annual physical exam    Tobacco abuse    Encounter for colorectal cancer screening    Lipid screening    Gastroesophageal reflux disease without esophagitis    Work related injury    Colon cancer screening    Right foot drop    Lumbar spondylosis - Bilateral    Vitamin D deficiency    Thoracic spine pain    Chronic pain syndrome    Uncomplicated opioid dependence ([address] )    Chronic pain of both knees    Chronic pain of both shoulders    Left hand pain    Elevated PSA      Past Medical and Surgical History:     Past Medical History:   Diagnosis Date    Chronic back pain     Depression     GERD (gastroesophageal reflux disease)     L4-L5 disc bulge 06/16/2017    Muscle weakness     Neuropathy     Osteoarthritis     Right foot drop      Past Surgical History:   Procedure Laterality Date    EGD AND COLONOSCOPY N/A 6/29/2018    Procedure: EGD AND COLONOSCOPY;  Surgeon: Claire Fairbanks MD;  Location: [facility];   Service: Gastroenterology    EPIDURAL BLOCK INJECTION      needle    CT HIP [address] BODY,PLASTY/RESECTN Left 4/30/2018    Procedure: LEFT HIP ARTHROSCOPIC LABRAL DEBRIDEMENT;  Surgeon: Cindy Dave MD;  Location: [facility];  Service: Orthopedics    TONSILLECTOMY      WISDOM TOOTH EXTRACTION        Family History:     Family History   Problem Relation Age of Onset    Osteoarthritis Family     Glaucoma Mother     No Known Problems Father       Social History:     Social History     Socioeconomic History    Marital status:      Spouse name: None    Number of children: None    Years of education: None    Highest education level: None   Occupational History    None   Social Needs    Financial resource strain: None    Food insecurity:     Worry: None     Inability: None    Transportation needs:     Medical: None     Non-medical: None   Tobacco Use    Smoking status: Current Every Day Smoker     Packs/day: 0 50    Smokeless tobacco: Never Used    Tobacco comment: 1/2 a pack a day   Substance and Sexual Activity    Alcohol use: No    Drug use: No    Sexual activity: Not Currently   Lifestyle    Physical activity:     Days per week: None     Minutes per session: None    Stress: None   Relationships    Social connections:     Talks on phone: None     Gets together: None     Attends Baptism service: None     Active member of club or organization: None     Attends meetings of clubs or organizations: None     Relationship status: None    Intimate partner violence:     Fear of current or ex partner: None     Emotionally abused: None     Physically abused: None     Forced sexual activity: None   Other Topics Concern    None   Social History Narrative    None      Medications and Allergies:     Current Outpatient Medications   Medication Sig Dispense Refill    acetaminophen (TYLENOL) 500 mg tablet Take 1,000 mg by mouth every 12 (twelve) hours as needed for mild pain      Buprenorphine 5 MCG/HR PTWK Place 1 patch on the skin once a week 4 patch 2    cholecalciferol (VITAMIN D3) 1,000 units tablet Take 3,000 Units by mouth daily       clotrimazole (LOTRIMIN) 1 % cream Apply 1 g (1 application total) topically daily To affected area 60 g 2    Elastic Bandages & Supports (KNEE BRACE/HINGED/REGULAR) MISC by Does not apply route daily as needed (B/L knee pain) B/L knee braces 2 each 0    Flaxseed, Linseed, (FLAX SEED OIL PO) Take 1 capsule by mouth daily        ibuprofen (MOTRIN) 600 mg tablet Take 600 mg by mouth every 6 (six) hours as needed for mild pain      METHYLCOBALAMIN SL Place 1,000 mcg under the tongue      multivitamin (THERAGRAN) TABS Take 1 tablet by mouth daily      omeprazole (PriLOSEC) 20 mg delayed release capsule Take 1 capsule (20 mg total) by mouth 2 (two) times a day 180 capsule 1    pregabalin (LYRICA) 75 mg capsule Take 1 capsule (75 mg total) by mouth 3 (three) times a day 90 capsule 6    pyridoxine (B-6) 100 MG tablet Take 100 mg by mouth daily      cyanocobalamin (VITAMIN B-12) 100 mcg tablet Take 100 mcg by mouth daily        vitamin B-12 (CYANOCOBALAMIN) 100 MCG tablet Take 100 mcg by mouth daily       No current facility-administered medications for this visit  Allergies   Allergen Reactions    Gabapentin GI Intolerance    Tramadol Confusion    Morphine Other (See Comments)     constipation      Immunizations: There is no immunization history for the selected administration types on file for this patient  Health Maintenance:         Topic Date Due    CRC Screening: Colonoscopy  06/29/2028    Hepatitis C Screening  Completed         Topic Date Due    DTaP,Tdap,and Td Vaccines (1 - Tdap) 11/07/1966    Influenza Vaccine  07/01/2019    Pneumococcal Vaccine: 65+ Years (1 of 2 - PCV13) 11/07/2019      Medicare Screening Tests and Risk Assessments:     Jaskaran Mendez is here for his Welcome to Medicare visit  Last Medicare Wellness visit information reviewed, patient interviewed and updates made to the record today  Health Risk Assessment:   Patient rates overall health as good  Patient feels that their physical health rating is same  Eyesight was rated as same  Hearing was rated as same  Patient feels that their emotional and mental health rating is same  Pain experienced in the last 7 days has been a lot   Patient's pain rating has been 7/10  Depression Screening:   PHQ-2 Score: 0      Fall Risk Screening: In the past year, patient has experienced: no history of falling in past year      Home Safety:  Patient has trouble with stairs inside or outside of their home  Patient has working smoke alarms and has no working carbon monoxide detector  Home safety hazards include: none  Nutrition:   Current diet is Regular and Limited junk food  Medications:   Patient is currently taking over-the-counter supplements  OTC medications include: see medication list  Patient is able to manage medications  Activities of Daily Living (ADLs)/Instrumental Activities of Daily Living (IADLs):   Walk and transfer into and out of bed and chair?: Yes  Dress and groom yourself?: Yes    Bathe or shower yourself?: Yes    Feed yourself? Yes  Do your laundry/housekeeping?: Yes  Manage your money, pay your bills and track your expenses?: Yes  Make your own meals?: Yes    Do your own shopping?: Yes    Previous Hospitalizations:   Any hospitalizations or ED visits within the last 12 months?: No      Advance Care Planning:   Living will: No    Durable POA for healthcare: No    Advanced directive: No    Advanced directive counseling given: Yes    End of Life Decisions reviewed with patient: Yes    Provider agrees with end of life decisions: Yes      Comments: Discussed with patient, medical decision making up to his verbal medical decision maker/proxy: Emilio Polanco     FULL CODE    PREVENTIVE SCREENINGS      Cardiovascular Screening:    General: Screening Current and Risks and Benefits Discussed      Diabetes Screening:     General: Screening Current and Risks and Benefits Discussed      Colorectal Cancer Screening:     General: Screening Current      Prostate Cancer Screening:    General: Risks and Benefits Discussed and Screening Current      Osteoporosis Screening:    General: Screening Not Indicated      Abdominal Aortic Aneurysm (AAA) Screening:    Risk factors include: age between 73-69 yo and tobacco use        General: Risks and Benefits Discussed    Due for: Screening AAA Ultrasound      Lung Cancer Screening:     General: Risks and Benefits Discussed    Due for: Low Dose CT (LDCT)      Hepatitis C Screening:    General: Screening Current    Other Counseling Topics:   Alcohol use counseling, car/seat belt/driving safety, skin self-exam, sunscreen and calcium and vitamin D intake and regular weightbearing exercise  No exam data present     Physical Exam:     BP 98/70 (BP Location: Left arm, Patient Position: Sitting, Cuff Size: Standard)   Pulse 85   Temp 97 9 °F (36 6 °C) (Oral)   Ht 6' 3" (1 905 m)   Wt 80 6 kg (177 lb 12 8 oz)   SpO2 96%   BMI 22 22 kg/m²     Physical Exam   Constitutional: He is oriented to person, place, and time  He appears well-developed and well-nourished  No distress  HENT:   Head: Normocephalic and atraumatic  Eyes: Pupils are equal, round, and reactive to light  Conjunctivae and EOM are normal  Right eye exhibits no discharge  Left eye exhibits no discharge  No scleral icterus  Neck: Normal range of motion  Neck supple  No JVD present  No thyromegaly present  Cardiovascular: Normal rate, regular rhythm, normal heart sounds and intact distal pulses  Exam reveals no gallop and no friction rub  No murmur heard  Pulmonary/Chest: Effort normal and breath sounds normal  No respiratory distress  He has no wheezes  He has no rales  He exhibits no tenderness  Abdominal: Soft  Bowel sounds are normal  He exhibits no distension and no mass  There is no tenderness  There is no rebound and no guarding  Musculoskeletal: Normal range of motion  He exhibits no edema, tenderness or deformity  Lymphadenopathy:     He has no cervical adenopathy  Neurological: He is alert and oriented to person, place, and time  He has normal reflexes  No cranial nerve deficit   Coordination normal    Skin: Skin is warm and dry  No rash noted  He is not diaphoretic  No erythema  No pallor  Psychiatric: He has a normal mood and affect  His behavior is normal  Judgment and thought content normal    Nursing note and vitals reviewed        Baron John MD

## 2019-11-21 NOTE — TELEPHONE ENCOUNTER
Patient is calling to say he is having level 8 nerve pain in his left hip pain  He is asking if he can take another pill of the Lyrica 75mg? He only took 1 pill today so far about 3 hours ago   Ph# [phone]

## 2019-11-21 NOTE — PATIENT INSTRUCTIONS
Medicare Preventive Visit Patient Instructions  Thank you for completing your Welcome to Medicare Visit or Medicare Annual Wellness Visit today  Your next wellness visit will be due in one year (11/21/2020)  The screening/preventive services that you may require over the next 5-10 years are detailed below  Some tests may not apply to you based off risk factors and/or age  Screening tests ordered at today's visit but not completed yet may show as past due  Also, please note that scanned in results may not display below  Preventive Screenings:  Service Recommendations Previous Testing/Comments   Colorectal Cancer Screening  · Colonoscopy    · Fecal Occult Blood Test (FOBT)/Fecal Immunochemical Test (FIT)  · Fecal DNA/Cologuard Test  · Flexible Sigmoidoscopy Age: 54-65 years old   Colonoscopy: every 10 years (May be performed more frequently if at higher risk)  OR  FOBT/FIT: every 1 year  OR  Cologuard: every 3 years  OR  Sigmoidoscopy: every 5 years  Screening may be recommended earlier than age 48 if at higher risk for colorectal cancer  Also, an individualized decision between you and your healthcare provider will decide whether screening between the ages of 74-80 would be appropriate   Colonoscopy: 06/29/2018  FOBT/FIT: Not on file  Cologuard: Not on file  Sigmoidoscopy: Not on file    Screening Current     Prostate Cancer Screening Individualized decision between patient and health care provider in men between ages of 53-78   Medicare will cover every 12 months beginning on the day after your 50th birthday PSA: No results in last 5 years          Hepatitis C Screening Once for adults born between 1945 and 1965  More frequently in patients at high risk for Hepatitis C Hep C Antibody: 09/10/2018    Screening Current   Diabetes Screening 1-2 times per year if you're at risk for diabetes or have pre-diabetes Fasting glucose: 97 mg/dL   A1C: No results in last 5 years    Screening Current   Cholesterol Screening Once every 5 years if you don't have a lipid disorder  May order more often based on risk factors  Lipid panel: 09/10/2018    Screening Current      Other Preventive Screenings Covered by Medicare:  1  Abdominal Aortic Aneurysm (AAA) Screening: covered once if your at risk  You're considered to be at risk if you have a family history of AAA or a male between the age of 73-68 who smoking at least 100 cigarettes in your lifetime  2  Lung Cancer Screening: covers low dose CT scan once per year if you meet all of the following conditions: (1) Age 50-69; (2) No signs or symptoms of lung cancer; (3) Current smoker or have quit smoking within the last 15 years; (4) You have a tobacco smoking history of at least 30 pack years (packs per day x number of years you smoked); (5) You get a written order from a healthcare provider  3  Glaucoma Screening: covered annually if you're considered high risk: (1) You have diabetes OR (2) Family history of glaucoma OR (3)  aged 48 and older OR (3)  American aged 72 and older  3  Osteoporosis Screening: covered every 2 years if you meet one of the following conditions: (1) Have a vertebral abnormality; (2) On glucocorticoid therapy for more than 3 months; (3) Have primary hyperparathyroidism; (4) On osteoporosis medications and need to assess response to drug therapy  5  HIV Screening: covered annually if you're between the age of 12-76  Also covered annually if you are younger than 13 and older than 72 with risk factors for HIV infection  For pregnant patients, it is covered up to 3 times per pregnancy      Immunizations:  Immunization Recommendations   Influenza Vaccine Annual influenza vaccination during flu season is recommended for all persons aged >= 6 months who do not have contraindications   Pneumococcal Vaccine (Prevnar and Pneumovax)  * Prevnar = PCV13  * Pneumovax = PPSV23 Adults 25-60 years old: 1-3 doses may be recommended based on certain risk factors  Adults 72 years old: Prevnar (PCV13) vaccine recommended followed by Pneumovax (PPSV23) vaccine  If already received PPSV23 since turning 65, then PCV13 recommended at least one year after PPSV23 dose  Hepatitis B Vaccine 3 dose series if at intermediate or high risk (ex: diabetes, end stage renal disease, liver disease)   Tetanus (Td) Vaccine - COST NOT COVERED BY MEDICARE PART B Following completion of primary series, a booster dose should be given every 10 years to maintain immunity against tetanus  Td may also be given as tetanus wound prophylaxis  Tdap Vaccine - COST NOT COVERED BY MEDICARE PART B Recommended at least once for all adults  For pregnant patients, recommended with each pregnancy  Shingles Vaccine (Shingrix) - COST NOT COVERED BY MEDICARE PART B  2 shot series recommended in those aged 48 and above     Health Maintenance Due:      Topic Date Due    CRC Screening: Colonoscopy  06/29/2028    Hepatitis C Screening  Completed     Immunizations Due:      Topic Date Due    DTaP,Tdap,and Td Vaccines (1 - Tdap) 11[birth year]07/1965    Influenza Vaccine  07/01/2019    Pneumococcal Vaccine: 65+ Years (1 of 2 - PCV13) 11/07/2019     Advance Directives   What are advance directives? Advance directives are legal documents that state your wishes and plans for medical care  These plans are made ahead of time in case you lose your ability to make decisions for yourself  Advance directives can apply to any medical decision, such as the treatments you want, and if you want to donate organs  What are the types of advance directives? There are many types of advance directives, and each state has rules about how to use them  You may choose a combination of any of the following:  · Living will: This is a written record of the treatment you want  You can also choose which treatments you do not want, which to limit, and which to stop at a certain time   This includes surgery, medicine, IV fluid, and tube feedings  · Durable power of  for healthcare [facility]): This is a written record that states who you want to make healthcare choices for you when you are unable to make them for yourself  This person, called a proxy, is usually a family member or a friend  You may choose more than 1 proxy  · Do not resuscitate (DNR) order:  A DNR order is used in case your heart stops beating or you stop breathing  It is a request not to have certain forms of treatment, such as CPR  A DNR order may be included in other types of advance directives  · Medical directive: This covers the care that you want if you are in a coma, near death, or unable to make decisions for yourself  You can list the treatments you want for each condition  Treatment may include pain medicine, surgery, blood transfusions, dialysis, IV or tube feedings, and a ventilator (breathing machine)  · Values history: This document has questions about your views, beliefs, and how you feel and think about life  This information can help others choose the care that you would choose  Why are advance directives important? An advance directive helps you control your care  Although spoken wishes may be used, it is better to have your wishes written down  Spoken wishes can be misunderstood, or not followed  Treatments may be given even if you do not want them  An advance directive may make it easier for your family to make difficult choices about your care  Cigarette Smoking and Your Health   Risks to your health if you smoke:  Nicotine and other chemicals found in tobacco damage every cell in your body  Even if you are a light smoker, you have an increased risk for cancer, heart disease, and lung disease  If you are pregnant or have diabetes, smoking increases your risk for complications  Benefits to your health if you stop smoking:   · You decrease respiratory symptoms such as coughing, wheezing, and shortness of breath     · You reduce your risk for cancers of the lung, mouth, throat, kidney, bladder, pancreas, stomach, and cervix  If you already have cancer, you increase the benefits of chemotherapy  You also reduce your risk for cancer returning or a second cancer from developing  · You reduce your risk for heart disease, blood clots, heart attack, and stroke  · You reduce your risk for lung infections, and diseases such as pneumonia, asthma, chronic bronchitis, and emphysema  · Your circulation improves  More oxygen can be delivered to your body  If you have diabetes, you lower your risk for complications, such as kidney, artery, and eye diseases  You also lower your risk for nerve damage  Nerve damage can lead to amputations, poor vision, and blindness  · You improve your body's ability to heal and to fight infections  For more information and support to stop smoking:   · [employer]  Phone: 1- 965 - 884-7139  Web Address: [employer]   [address] 2018 Information is for End User's use only and may not be sold, redistributed or otherwise used for commercial purposes  All illustrations and images included in CareNotes® are the copyrighted property of [employer]  or [facility] Preventive Visit Patient Instructions  Thank you for completing your Welcome to Medicare Visit or Medicare Annual Wellness Visit today  Your next wellness visit will be due in one year (11/21/2020)  The screening/preventive services that you may require over the next 5-10 years are detailed below  Some tests may not apply to you based off risk factors and/or age  Screening tests ordered at today's visit but not completed yet may show as past due  Also, please note that scanned in results may not display below    Preventive Screenings:  Service Recommendations Previous Testing/Comments   Colorectal Cancer Screening  · Colonoscopy    · Fecal Occult Blood Test (FOBT)/Fecal Immunochemical Test (FIT)  · Fecal DNA/Cologuard Test  · Flexible Sigmoidoscopy Age: 54-65 years old   Colonoscopy: every 10 years (May be performed more frequently if at higher risk)  OR  FOBT/FIT: every 1 year  OR  Cologuard: every 3 years  OR  Sigmoidoscopy: every 5 years  Screening may be recommended earlier than age 48 if at higher risk for colorectal cancer  Also, an individualized decision between you and your healthcare provider will decide whether screening between the ages of 74-80 would be appropriate  Colonoscopy: 06/29/2018  FOBT/FIT: Not on file  Cologuard: Not on file  Sigmoidoscopy: Not on file    Screening Current     Prostate Cancer Screening Individualized decision between patient and health care provider in men between ages of 53-78   Medicare will cover every 12 months beginning on the day after your 50th birthday PSA: No results in last 5 years     Risks and Benefits Discussed  Screening Current     Hepatitis C Screening Once for adults born between 1945 and 1965  More frequently in patients at high risk for Hepatitis C Hep C Antibody: 09/10/2018    Screening Current   Diabetes Screening 1-2 times per year if you're at risk for diabetes or have pre-diabetes Fasting glucose: 97 mg/dL   A1C: No results in last 5 years    Screening Current  Risks and Benefits Discussed   Cholesterol Screening Once every 5 years if you don't have a lipid disorder  May order more often based on risk factors  Lipid panel: 09/10/2018    Screening Current  Risks and Benefits Discussed      Other Preventive Screenings Covered by Medicare:  6  Abdominal Aortic Aneurysm (AAA) Screening: covered once if your at risk  You're considered to be at risk if you have a family history of AAA or a male between the age of 73-68 who smoking at least 100 cigarettes in your lifetime    7  Lung Cancer Screening: covers low dose CT scan once per year if you meet all of the following conditions: (1) Age 50-69; (2) No signs or symptoms of lung cancer; (3) Current smoker or have quit smoking within the last 15 years; (4) You have a tobacco smoking history of at least 30 pack years (packs per day x number of years you smoked); (5) You get a written order from a healthcare provider  8  Glaucoma Screening: covered annually if you're considered high risk: (1) You have diabetes OR (2) Family history of glaucoma OR (3)  aged 48 and older OR (3)  American aged 72 and older  5  Osteoporosis Screening: covered every 2 years if you meet one of the following conditions: (1) Have a vertebral abnormality; (2) On glucocorticoid therapy for more than 3 months; (3) Have primary hyperparathyroidism; (4) On osteoporosis medications and need to assess response to drug therapy  10  HIV Screening: covered annually if you're between the age of 12-76  Also covered annually if you are younger than 13 and older than 72 with risk factors for HIV infection  For pregnant patients, it is covered up to 3 times per pregnancy  Immunizations:  Immunization Recommendations   Influenza Vaccine Annual influenza vaccination during flu season is recommended for all persons aged >= 6 months who do not have contraindications   Pneumococcal Vaccine (Prevnar and Pneumovax)  * Prevnar = PCV13  * Pneumovax = PPSV23 Adults 25-60 years old: 1-3 doses may be recommended based on certain risk factors  Adults 72 years old: Prevnar (PCV13) vaccine recommended followed by Pneumovax (PPSV23) vaccine  If already received PPSV23 since turning 65, then PCV13 recommended at least one year after PPSV23 dose  Hepatitis B Vaccine 3 dose series if at intermediate or high risk (ex: diabetes, end stage renal disease, liver disease)   Tetanus (Td) Vaccine - COST NOT COVERED BY MEDICARE PART B Following completion of primary series, a booster dose should be given every 10 years to maintain immunity against tetanus  Td may also be given as tetanus wound prophylaxis     Tdap Vaccine - COST NOT COVERED BY MEDICARE PART B Recommended at least once for all adults  For pregnant patients, recommended with each pregnancy  Shingles Vaccine (Shingrix) - COST NOT COVERED BY MEDICARE PART B  2 shot series recommended in those aged 48 and above     Health Maintenance Due:      Topic Date Due    CRC Screening: Colonoscopy  06/29/2028    Hepatitis C Screening  Completed     Immunizations Due:      Topic Date Due    DTaP,Tdap,and Td Vaccines (1 - Tdap) 11/07/1965    Influenza Vaccine  07/01/2019    Pneumococcal Vaccine: 65+ Years (1 of 2 - PCV13) 11/07/2019     Advance Directives   What are advance directives? Advance directives are legal documents that state your wishes and plans for medical care  These plans are made ahead of time in case you lose your ability to make decisions for yourself  Advance directives can apply to any medical decision, such as the treatments you want, and if you want to donate organs  What are the types of advance directives? There are many types of advance directives, and each state has rules about how to use them  You may choose a combination of any of the following:  · Living will: This is a written record of the treatment you want  You can also choose which treatments you do not want, which to limit, and which to stop at a certain time  This includes surgery, medicine, IV fluid, and tube feedings  · Durable power of  for healthcare [place] [facility]): This is a written record that states who you want to make healthcare choices for you when you are unable to make them for yourself  This person, called a proxy, is usually a family member or a friend  You may choose more than 1 proxy  · Do not resuscitate (DNR) order:  A DNR order is used in case your heart stops beating or you stop breathing  It is a request not to have certain forms of treatment, such as CPR  A DNR order may be included in other types of advance directives  · Medical directive:   This covers the care that you want if you are in a coma, near death, or unable to make decisions for yourself  You can list the treatments you want for each condition  Treatment may include pain medicine, surgery, blood transfusions, dialysis, IV or tube feedings, and a ventilator (breathing machine)  · Values history: This document has questions about your views, beliefs, and how you feel and think about life  This information can help others choose the care that you would choose  Why are advance directives important? An advance directive helps you control your care  Although spoken wishes may be used, it is better to have your wishes written down  Spoken wishes can be misunderstood, or not followed  Treatments may be given even if you do not want them  An advance directive may make it easier for your family to make difficult choices about your care  Cigarette Smoking and Your Health   Risks to your health if you smoke:  Nicotine and other chemicals found in tobacco damage every cell in your body  Even if you are a light smoker, you have an increased risk for cancer, heart disease, and lung disease  If you are pregnant or have diabetes, smoking increases your risk for complications  Benefits to your health if you stop smoking:   · You decrease respiratory symptoms such as coughing, wheezing, and shortness of breath  · You reduce your risk for cancers of the lung, mouth, throat, kidney, bladder, pancreas, stomach, and cervix  If you already have cancer, you increase the benefits of chemotherapy  You also reduce your risk for cancer returning or a second cancer from developing  · You reduce your risk for heart disease, blood clots, heart attack, and stroke  · You reduce your risk for lung infections, and diseases such as pneumonia, asthma, chronic bronchitis, and emphysema  · Your circulation improves  More oxygen can be delivered to your body  If you have diabetes, you lower your risk for complications, such as kidney, artery, and eye diseases   You also lower your risk for nerve damage  Nerve damage can lead to amputations, poor vision, and blindness  · You improve your body's ability to heal and to fight infections  For more information and support to stop smoking:   · Smoke[employer]  gov  Phone: 3- 509 - 098-6461  Web Address: [employer]   [address] 2018 Information is for End User's use only and may not be sold, redistributed or otherwise used for commercial purposes   All illustrations and images included in CareNotes® are the copyrighted property of A [employer] A M [employer] Inc  or [address] [place] [address]

## 2019-11-21 NOTE — TELEPHONE ENCOUNTER
Patient is calling requesting to speak with the doctor  He says that he has been having 8/10 nerve pain in the left hip

## 2019-11-25 ENCOUNTER — TELEPHONE (OUTPATIENT)
Dept: PAIN MEDICINE | Facility: MEDICAL CENTER | Age: 64
End: 2019-11-25

## 2019-11-25 NOTE — TELEPHONE ENCOUNTER
RN was connected with [facility] from Kristen Reinoso  Per [facility] the pt called and requested that a formulary exception be filed for his Butrans patch  Pt would have needed to have tried and failed other medications  RN advised after looking through his chart that he has side effects of confusion from Tramadol and constipation from morphine  Per [facility] he will put through for the pt to be allowed to receive the Butrans patch

## 2019-11-25 NOTE — TELEPHONE ENCOUNTER
Patient is calling regarding the followin  Buprenorphine 5CMG/HR patch is not being covered by his insurance  The patient said Danitza Gilmore will contact Dr Teressa Brunson office about verification need for his patch  2  On Friday the patient experienced level 8 nerve pain in his left hip  He would like to discuss this with the nurse   # [phone]

## 2019-11-25 NOTE — TELEPHONE ENCOUNTER
RN s/w pt regarding previous  Per pt he was seen in office by Sariah Rivas on 11/12 and agreed with sovs note from that day  Per pt he just wanted JE to have an update that when he left the office that day while driving he had a nerve pain of an 8/10 and also had the same pain at Dr León Cruise office on 5/22  Per pt it definitely effects his pain when he sits up straight, driving or just sitting  Per pt it used to just effect his back but is also effecting his left hip  Pt also wanted [facility] to know that his insurance [employer] has filled a one month supply of his [place] patch but will not continue to fill this because it is non formulary  Per [employer] they will not continue to provide this unless we obtain a "formulary exception "      Pt wanted to reiterate what a "God send" the Farrah Orozco has been for his pain relief as well as allowing him to sleep for more than 4 hours at a time because his pain is controlled  RN advised that she would send this to the Sabiha Rivera team as well as Sariah Rivas and we will get back to him regarding same  Pt has one patch on now and two left  --please advise thank you--  Delmer needs an auth or a formulary exception

## 2019-11-27 NOTE — TELEPHONE ENCOUNTER
RN attempted to reach pt regarding previous  Detailed VMMLOM with c/b number if any questions or concerns letting him know that his mediation was approved by Harvinder Braun

## 2019-12-03 ENCOUNTER — HOSPITAL ENCOUNTER (OUTPATIENT)
Dept: RADIOLOGY | Facility: IMAGING CENTER | Age: 64
Discharge: HOME | End: 2019-12-03
Payer: MEDICARE

## 2019-12-03 ENCOUNTER — TELEPHONE (OUTPATIENT)
Dept: PAIN MEDICINE | Facility: MEDICAL CENTER | Age: 64
End: 2019-12-03

## 2019-12-03 DIAGNOSIS — Z13.6 SCREENING FOR AAA (ABDOMINAL AORTIC ANEURYSM): ICD-10-CM

## 2019-12-03 DIAGNOSIS — Z12.2 ENCOUNTER FOR SCREENING FOR LUNG CANCER: ICD-10-CM

## 2019-12-03 PROCEDURE — 76706 US ABDL AORTA SCREEN AAA: CPT

## 2019-12-03 PROCEDURE — G0297 LDCT FOR LUNG CA SCREEN: HCPCS

## 2019-12-03 NOTE — TELEPHONE ENCOUNTER
S/w patient would like to discuss and provide new ins, Mara Robbie, information for 2020 medications, [facility]    Provided back fax line [phone] will be Mara Robbie sending pt information    # [phone] for pt

## 2019-12-05 ENCOUNTER — TELEPHONE (OUTPATIENT)
Dept: INTERNAL MEDICINE CLINIC | Facility: CLINIC | Age: 64
End: 2019-12-05

## 2019-12-05 NOTE — TELEPHONE ENCOUNTER
Received a call from the radiologist that read the screening CT scan  He is reporting suspicious nodules in the lungs and recommends a Pet scan

## 2019-12-06 ENCOUNTER — TELEPHONE (OUTPATIENT)
Dept: OTHER | Facility: HOSPITAL | Age: 64
End: 2019-12-06

## 2019-12-06 DIAGNOSIS — R91.8 MULTIPLE LUNG NODULES ON CT: Primary | ICD-10-CM

## 2019-12-06 NOTE — TELEPHONE ENCOUNTER
Contacted patient regarding CT lung cancer screening results which showed a 10 mm right upper lobe spiculated nodule/density and 2 additional irregularly marginated right upper lobe nodules measuring 7 mm and 5 mm  Recommendations were for PET/CT for further evaluation  PET/CT was ordered  Will refer to Oncology for further evaluation

## 2019-12-06 NOTE — TELEPHONE ENCOUNTER
Spoke with patient, discuss results of CT scan along  Ordered a PET/CT and refer to Oncology for further evaluation

## 2019-12-09 ENCOUNTER — TELEPHONE (OUTPATIENT)
Dept: PAIN MEDICINE | Facility: MEDICAL CENTER | Age: 64
End: 2019-12-09

## 2019-12-09 NOTE — TELEPHONE ENCOUNTER
S/w Jesus Lomas, from [facility] , would like to discuss with a nurse if pt has tried any other medication outside of morphine      # [phone]

## 2019-12-10 NOTE — TELEPHONE ENCOUNTER
Pharmacy called stating she has to know what kind of medications he has used in the past before she gives him the patches      ginna ([facility] pharmacy appeal department--[phone]

## 2019-12-10 NOTE — TELEPHONE ENCOUNTER
--please advise thank you--    AO, pt has a new insurance and they want the pt to have tried other things and failed so he can keep getting the butrans patch  Other meds he tried and failed?

## 2019-12-10 NOTE — TELEPHONE ENCOUNTER
Emil Neal from insurance for more clinical information on what kind of scrip patient has tried before the fentanyl pathces     C/b [phone]

## 2019-12-10 NOTE — TELEPHONE ENCOUNTER
--please advise thank you--  In pt's last note with FREDDY it lists morphine and tramadol under allergies or intolerances, his pharmacy and insurance want to know what else he has been trialed on prior to Violet

## 2019-12-11 NOTE — TELEPHONE ENCOUNTER
Patient has trialed hydrocodone 10/325mg and 5/325mg     Tramadol 50mg TID  lyrica  Meloxicam 15mg qd   Methocarbamol 750mg HS

## 2019-12-11 NOTE — TELEPHONE ENCOUNTER
RN attempted to reach [address] and also Carlitos Streeter from insurance for more clinical information

## 2019-12-12 ENCOUNTER — TELEPHONE (OUTPATIENT)
Dept: PAIN MEDICINE | Facility: MEDICAL CENTER | Age: 64
End: 2019-12-12

## 2019-12-12 NOTE — TELEPHONE ENCOUNTER
MARY s/w Sharon from [address] [place] [address]  Call with Bharathi Dowell ref # [id number]    Per Sharon there has been an approval in for this medication from an appeal that was done      Approved case # [id number]

## 2019-12-12 NOTE — TELEPHONE ENCOUNTER
Pt called stating that he did try to communicate with [facility] but when the person answered she did not speak much english and did not know her job  Pt will keep on trying      Pt can be reached at [phone]

## 2019-12-12 NOTE — TELEPHONE ENCOUNTER
Pt called his buprenophine ptch is not in formulary  Medications that are :  Fentinal patches  Oxycodone  Oxycodone -APAP  Nucenta Er  Methadone  Hysingla-ER  Phone # [phone]  Fax #[phone]    Pt has 1 month left of his patches    Pt can be reached at [phone]

## 2019-12-13 ENCOUNTER — CONSULT (OUTPATIENT)
Dept: UROLOGY | Facility: CLINIC | Age: 64
End: 2019-12-13
Payer: MEDICARE

## 2019-12-13 VITALS
SYSTOLIC BLOOD PRESSURE: 112 MMHG | BODY MASS INDEX: 21.76 KG/M2 | WEIGHT: 175 LBS | HEIGHT: 75 IN | HEART RATE: 84 BPM | DIASTOLIC BLOOD PRESSURE: 62 MMHG

## 2019-12-13 DIAGNOSIS — R39.12 BENIGN PROSTATIC HYPERPLASIA WITH WEAK URINARY STREAM: Primary | ICD-10-CM

## 2019-12-13 DIAGNOSIS — N40.1 BENIGN PROSTATIC HYPERPLASIA WITH WEAK URINARY STREAM: Primary | ICD-10-CM

## 2019-12-13 DIAGNOSIS — R97.20 ELEVATED PROSTATE SPECIFIC ANTIGEN (PSA): ICD-10-CM

## 2019-12-13 PROCEDURE — 99202 OFFICE O/P NEW SF 15 MIN: CPT | Performed by: UROLOGY

## 2019-12-13 NOTE — TELEPHONE ENCOUNTER
RN s/w pt regarding previous  Pt provided with previous information and also that CARMENZA, pt care rep, did say effective January 1,2020  Pt appreciative of information

## 2019-12-16 NOTE — PROGRESS NOTES
Progress Note - Urology  Shayy Jewell 71 y o  male MRN: [id number]  Encounter: [id number]      Chief Complaint:   Chief Complaint   Patient presents with    Elevated PSA     PSA = 1 65 (11/11/2019)       HPI:   69-year-old male referred for evaluation of a prostate checkup  He denies any significant past history  He denies any family history of prostate disease  PSA is 1 65  He does have chronic hip problems he has also had some chronic back problems  He is unaware of any other specific problems regarding his PSA   Current AUA symptom score is 3      MEDS:    Current Outpatient Medications:     acetaminophen (TYLENOL) 500 mg tablet, Take 1,000 mg by mouth every 12 (twelve) hours as needed for mild pain, Disp: , Rfl:     Buprenorphine 5 MCG/HR PTWK, Place 1 patch on the skin once a week, Disp: 4 patch, Rfl: 2    buPROPion (ZYBAN) 150 MG 12 hr tablet, Take 1 tablet daily for 3 days, then take 1 tablet twice a day thereafter , Disp: 60 tablet, Rfl: 2    cholecalciferol (VITAMIN D3) 1,000 units tablet, Take 3,000 Units by mouth daily , Disp: , Rfl:     clotrimazole (LOTRIMIN) 1 % cream, Apply 1 g (1 application total) topically daily To affected area, Disp: 60 g, Rfl: 2    cyanocobalamin (VITAMIN B-12) 100 mcg tablet, Take 100 mcg by mouth daily  , Disp: , Rfl:     Elastic Bandages & Supports (KNEE BRACE/HINGED/REGULAR) MISC, by Does not apply route daily as needed (B/L knee pain) B/L knee braces, Disp: 2 each, Rfl: 0    Flaxseed, Linseed, (FLAX SEED OIL PO), Take 1 capsule by mouth daily  , Disp: , Rfl:     ibuprofen (MOTRIN) 600 mg tablet, Take 600 mg by mouth every 6 (six) hours as needed for mild pain, Disp: , Rfl:     METHYLCOBALAMIN SL, Place 1,000 mcg under the tongue, Disp: , Rfl:     multivitamin (THERAGRAN) TABS, Take 1 tablet by mouth daily, Disp: , Rfl:     omeprazole (PriLOSEC) 20 mg delayed release capsule, Take 1 capsule (20 mg total) by mouth 2 (two) times a day, Disp: 180 capsule, Rfl: 1    pregabalin (LYRICA) 75 mg capsule, Take 1 capsule (75 mg total) by mouth 3 (three) times a day, Disp: 90 capsule, Rfl: 6    pyridoxine (B-6) 100 MG tablet, Take 100 mg by mouth daily, Disp: , Rfl:     vitamin B-12 (CYANOCOBALAMIN) 100 MCG tablet, Take 100 mcg by mouth daily, Disp: , Rfl:       PMH:  Past Medical History:   Diagnosis Date    Chronic back pain     Depression     GERD (gastroesophageal reflux disease)     L4-L5 disc bulge 06/16/2017    Muscle weakness     Neuropathy     Osteoarthritis     Right foot drop          PSH  Past Surgical History:   Procedure Laterality Date    EGD AND COLONOSCOPY N/A 6/29/2018    Procedure: EGD AND COLONOSCOPY;  Surgeon: Marvin Stallworth MD;  Location: [facility] GI LAB;   Service: Gastroenterology    EPIDURAL BLOCK INJECTION      needle    CT HIP [address] BODY,PLASTY/RESECTN Left 4/30/2018    Procedure: LEFT HIP ARTHROSCOPIC LABRAL DEBRIDEMENT;  Surgeon: Debbie Zapata MD;  [facility];  Service: Orthopedics    TONSILLECTOMY      WISDOM TOOTH EXTRACTION           FH  Family History   Problem Relation Age of Onset    Osteoarthritis Family     Glaucoma Mother     No Known Problems Father         SH  Social History     Socioeconomic History    Marital status:      Spouse name: None    Number of children: None    Years of education: None    Highest education level: None   Occupational History    None   Social Needs    Financial resource strain: None    Food insecurity:     Worry: None     Inability: None    Transportation needs:     Medical: None     Non-medical: None   Tobacco Use    Smoking status: Current Every Day Smoker     Packs/day: 0 50    Smokeless tobacco: Never Used    Tobacco comment: 1/2 a pack a day   Substance and Sexual Activity    Alcohol use: No    Drug use: No    Sexual activity: Not Currently   Lifestyle    Physical activity:     Days per week: None     Minutes per session: None    Stress: None Relationships    Social connections:     Talks on phone: None     Gets together: None     Attends Worship service: None     Active member of club or organization: None     Attends meetings of clubs or organizations: None     Relationship status: None    Intimate partner violence:     Fear of current or ex partner: None     Emotionally abused: None     Physically abused: None     Forced sexual activity: None   Other Topics Concern    None   Social History Narrative    None          ROS:  Review of Systems      Vitals:  Blood pressure 112/62, pulse 84, height 6' 3" (1 905 m), weight 79 4 kg (175 lb)  Physical Exam:       General Appearance    Well-developed male in no acute distress appears stated age  Cardiac   Heart rate is regular no murmur gallop appreciated  Pulmonary   Clear to percussion auscultation  Abdomen   Soft, no masses appreciated, nontender, no liver spleen enlargement  Back    No CVA tenderness  Genitalia   Normal male penis unremarkable both testes are palpated in the scrotum and are unremarkable no masses noted  There is no inguinal adenopathy  No hernia  Rectal      Sphincter tone is normal   The prostate is palpated  It is minimally enlarged, symmetrical grossly benign without any evidence of nodular abnormality  Extremities    No evidence of edema  There is no cyanosis  There is no calf tenderness  Neurologic    Grossly physiologic  No facial asymmetry  I movement is intact  Hearing is intact  Cognitive function appears to be unremarkable  Moving all extremities  Gait is normal    Lab, Imaging and other studies:  No results found for this or any previous visit (from the past 672 hour(s))  IMPRESSION:   BPH with minimal lower tract symptoms   normal PSA  PLAN:      no urologic intervention at this time    I will see him in 1 year for follow-up prostate check with RYAN and PSA at that time      Please note :  Voice dictation software has been used to create this document  There may be inadvertent transcription errors

## 2020-01-08 ENCOUNTER — TELEPHONE (OUTPATIENT)
Dept: PAIN MEDICINE | Facility: MEDICAL CENTER | Age: 65
End: 2020-01-08

## 2020-01-08 DIAGNOSIS — G89.29 CHRONIC PAIN OF BOTH SHOULDERS: ICD-10-CM

## 2020-01-08 DIAGNOSIS — G89.29 CHRONIC BILATERAL LOW BACK PAIN WITHOUT SCIATICA: ICD-10-CM

## 2020-01-08 DIAGNOSIS — G89.4 CHRONIC PAIN SYNDROME: ICD-10-CM

## 2020-01-08 DIAGNOSIS — M54.16 LUMBAR RADICULOPATHY: ICD-10-CM

## 2020-01-08 DIAGNOSIS — M25.512 CHRONIC PAIN OF BOTH SHOULDERS: ICD-10-CM

## 2020-01-08 DIAGNOSIS — M25.561 CHRONIC PAIN OF BOTH KNEES: ICD-10-CM

## 2020-01-08 DIAGNOSIS — G89.29 CHRONIC PAIN OF BOTH KNEES: ICD-10-CM

## 2020-01-08 DIAGNOSIS — M25.511 CHRONIC PAIN OF BOTH SHOULDERS: ICD-10-CM

## 2020-01-08 DIAGNOSIS — M54.50 CHRONIC BILATERAL LOW BACK PAIN WITHOUT SCIATICA: ICD-10-CM

## 2020-01-08 DIAGNOSIS — G62.9 NEUROPATHY: ICD-10-CM

## 2020-01-08 DIAGNOSIS — M25.562 CHRONIC PAIN OF BOTH KNEES: ICD-10-CM

## 2020-01-08 RX ORDER — BUPRENORPHINE 5 UG/H
1 PATCH TRANSDERMAL WEEKLY
Qty: 4 PATCH | Refills: 2 | Status: SHIPPED | OUTPATIENT
Start: 2020-01-08 | End: 2020-03-11 | Stop reason: SDUPTHER

## 2020-01-08 NOTE — TELEPHONE ENCOUNTER
Patient   [phone]  Chicho Ridley    Patient is calling about Buprenorphine 5 MCG/HR PTWK  Patient said that he usually gets 3 boxes of this medication  He said that the pharmacy will not refill his script due to a wrong fill date  He said that the Dr needs to call the pharmacy or send a new script to the pharmacy  He is now on his last patch and will be taking it off on Sunday  It will also take 2 days for his pharmacy to get it      [facility]/PHARMACY #[id number]- [facility] [place] - [address]

## 2020-01-15 NOTE — TELEPHONE ENCOUNTER
Took phone call transferred from phone room, S/W Vish Santos from [employer] [phone]  Fax # [phone]  Vish Santos stated the pt called him yesterday about his patch  The patch is non formulary  Vish Santos stated they filled it on 1/11 for 4 patches as a transition benefit  Vish Santos stated they need a diagnosis and switch to a formulary product or the reason why he needs to remain on the patch  Vish Santos stated the pt is covered through [employer]  He stated for next month refill they need a over ride or a different product  Vish Santos asking for the information to be faxed as well  Please advise

## 2020-01-15 NOTE — TELEPHONE ENCOUNTER
RN s/w Bronwyn Villafuerte as per previous  Per Bronwyn Villafuerte the [facility] americalo is also non formulary  Per Bronwyn Villafuerte the pt is new to them at [facility] and it would be appropriate for AO to write a note stating that you would like the pt to stay on current therapy due to him trying and failing (list all meds tried and failed) and then state that the pt is receiving (whatever pain relief on current medication )    Per Bronwyn Villafuerte the list of meds that are formulary would be Oxy ER, morphine ER,fentanyl patch, tramadol ER, nucynta ER Oxymorhone ER and methodone ER      Per Bronwyn iVllafuerte this note can be faxed to [phone] and he will attach it to the pt's call and use that toward his approval     --please advise thank you--

## 2020-01-16 NOTE — TELEPHONE ENCOUNTER
Pt called this morning and left a VM at 11:54 am requesting a call back   He says that [employer] sent us forms yesterday for the Buprenorphine approval  Please f/u ph# [phone]

## 2020-01-17 NOTE — TELEPHONE ENCOUNTER
--Halina Ferrer from [employer] reached out to [facility] due to not hearing anything about pt and his medications  RN advised that pt AO gave pt a choice and pt will try the nucynta ER, AO back in office on 1/20  Per Gigi Aldridge the Red bud ER, although formulary, will still need a PA  Per Gigi Aldridge it will have to be ordered for a diagnosis of chronic severe pain that requires around the clock opioid analgesia and has previously tried short acting opioids

## 2020-01-20 DIAGNOSIS — G89.4 CHRONIC PAIN SYNDROME: Primary | ICD-10-CM

## 2020-01-23 NOTE — TELEPHONE ENCOUNTER
Pt is calling, he would like to discuss the medication side effects and also how to take the medication

## 2020-01-23 NOTE — TELEPHONE ENCOUNTER
RN s/w pt  Pt asked how he should take the Nucynta since he currently still has the Butrans patch on  PT informed per Yadira Spearing that he can remove the patch today and start Nucynta or he can wait to start Nucynta when the Lucita Queen is due to come off on Sunday  Pt was informed of potential side effects per his request and informed that he should not drive or operate machinery until he knows how the medication will effect him  Pt verbalized understanding and appreciative of the call

## 2020-01-29 ENCOUNTER — OFFICE VISIT (OUTPATIENT)
Dept: PAIN MEDICINE | Facility: MEDICAL CENTER | Age: 65
End: 2020-01-29
Payer: MEDICARE

## 2020-01-29 VITALS
SYSTOLIC BLOOD PRESSURE: 101 MMHG | WEIGHT: 174 LBS | HEIGHT: 73 IN | HEART RATE: 105 BPM | RESPIRATION RATE: 18 BRPM | BODY MASS INDEX: 23.06 KG/M2 | DIASTOLIC BLOOD PRESSURE: 72 MMHG

## 2020-01-29 DIAGNOSIS — M54.16 LUMBAR RADICULOPATHY: ICD-10-CM

## 2020-01-29 DIAGNOSIS — M47.816 LUMBAR SPONDYLOSIS: ICD-10-CM

## 2020-01-29 DIAGNOSIS — G89.4 CHRONIC PAIN SYNDROME: Primary | ICD-10-CM

## 2020-01-29 DIAGNOSIS — Z79.891 LONG-TERM CURRENT USE OF OPIATE ANALGESIC: ICD-10-CM

## 2020-01-29 DIAGNOSIS — F11.20 UNCOMPLICATED OPIOID DEPENDENCE (HCC): ICD-10-CM

## 2020-01-29 DIAGNOSIS — G62.9 NEUROPATHY: ICD-10-CM

## 2020-01-29 PROCEDURE — 99214 OFFICE O/P EST MOD 30 MIN: CPT | Performed by: NURSE PRACTITIONER

## 2020-01-29 PROCEDURE — 80305 DRUG TEST PRSMV DIR OPT OBS: CPT | Performed by: NURSE PRACTITIONER

## 2020-01-29 NOTE — PROGRESS NOTES
Assessment:  1  Chronic pain syndrome    2  Uncomplicated opioid dependence ([facility] [address] )    3  Long-term current use of opiate analgesic    4  Lumbar radiculopathy - Right    5  Neuropathy    6  Lumbar spondylosis - Bilateral        Plan: At this time we will discontinue the Nucynta extended release 50 mg that we trialed based off of the patient telling us that insurance was denying his Butrans patch  Insurance did give us a list of medications to trial in the only reasonable option was the Nucynta extended release  This medication is keeping him up at night and not giving him the same relief that he was experiencing with the Butrans patch  He is now trialed and failed tramadol, morphine, and Nucynta extended release and I feel it is necessary to continue him on the Butrans patch with the 5 micro g dosing  He does have 6 patches left at home so he does not require a refill today  Patient is using vitamin B12 any tells me that this is helping with his nerve pain he is also using vitamin D  He has not use Lyrica in over 3-4 weeks  While the patient was in the office today an opioid contract was thoroughly reviewed and signed by the patient  The patient was given adequate time to ask questions in regards to the contract and a signed copy was sent home for his/her records  Patient will return in 6 weeks for follow-up and medication refills  [place] Prescription Drug Monitoring Program report was reviewed and was appropriate     A urine drug screen was collected at today's office visit as part of our medication management protocol  The point of care testing results were appropriate for what was being prescribed  The specimen will be sent for confirmatory testing  The drug screen is medically necessary because the patient is either dependent on opioid medication or is being considered for opioid medication therapy and the results could impact ongoing or future treatment   The drug screen is to evaluate for the presences or absence of prescribed, non-prescribed, and/or illicit drugs/substances  There are risks associated with opioid medications, including dependence, addiction and tolerance  The patient understands and agrees to use these medications only as prescribed  Potential side effects of the medications include, but are not limited to, constipation, drowsiness, addiction, impaired judgment and risk of fatal overdose if not taken as prescribed  The patient was warned against driving while taking sedation medications  Sharing medications is a felony  At this point in time, the patient is showing no signs of addiction, abuse, diversion or suicidal ideation  Rafiq Seymour last taken 1/29/2020 AM  New UDS and Opioid Agreement today  History of Present Illness: The patient is a 71 y o  male last seen on November 12, 2019   who presents for a follow up office visit in regards to chronic pain syndrome related to lumbar radiculopathy, neuropathy, lumbar spondylosis chronic pain of multiple joints  The patient currently reports he is doing better with his pain rated 7/10 this is intermittent and described as dull, aching, shooting, and numbness  Patient tells me that he recently has been increasing his vitamin-D to twice a day and he has been taking vitamin B12 in this is been helping out his nerve pain  He is no longer using Lyrica for the past 3-4 weeks  Patient was using Butrans patch at 5 micro g in this was helping his pain symptoms he did call the office to tell us recently that his insurance was denying the Butrans patch  We contacted the insurance and they did give us a list of formulary medications  We decided to trial Nucynta extended release 50 mg twice a day for the patient and he has been using that for about 6 days and he tells me that it it is not covering his pain as well as Butrans patch and he is not able to sleep at night from the medication        Pain Contract Signed: 1/29/20  Last Urine Drug Screen:01/29/20    I have personally reviewed and/or updated the patient's past medical history, past surgical history, family history, social history, current medications, allergies, and vital signs today  Review of Systems:    Review of Systems   Respiratory: Negative for shortness of breath  Cardiovascular: Negative for chest pain  Gastrointestinal: Positive for constipation  Negative for diarrhea, nausea and vomiting  Musculoskeletal: Positive for gait problem, joint swelling and myalgias  Negative for arthralgias  Skin: Negative for rash  Neurological: Negative for dizziness, seizures and weakness  All other systems reviewed and are negative  Past Medical History:   Diagnosis Date    Chronic back pain     Depression     GERD (gastroesophageal reflux disease)     L4-L5 disc bulge 06/16/2017    Muscle weakness     Neuropathy     Osteoarthritis     Right foot drop        Past Surgical History:   Procedure Laterality Date    EGD AND COLONOSCOPY N/A 6/29/2018    Procedure: EGD AND COLONOSCOPY;  Surgeon: Lilibeth Jaimes MD;  Location: [facility] GI LAB;   Service: Gastroenterology    EPIDURAL BLOCK INJECTION      needle    [facility] [address] BODY,PLASTY/RESECTN Left 4/30/2018    Procedure: LEFT HIP ARTHROSCOPIC LABRAL DEBRIDEMENT;  Surgeon: Toshia Faria MD;  [facility];  Service: Orthopedics    TONSILLECTOMY      WISDOM TOOTH EXTRACTION         Family History   Problem Relation Age of Onset    Osteoarthritis Family     Glaucoma Mother     No Known Problems Father        Social History     Occupational History    Not on file   Tobacco Use    Smoking status: Current Every Day Smoker     Packs/day: 0 50    Smokeless tobacco: Never Used    Tobacco comment: 1/2 a pack a day   Substance and Sexual Activity    Alcohol use: No    Drug use: No    Sexual activity: Not Currently         Current Outpatient Medications:     acetaminophen (TYLENOL) 500 mg tablet, Take 1,000 mg by mouth every 12 (twelve) hours as needed for mild pain, Disp: , Rfl:     cholecalciferol (VITAMIN D3) 1,000 units tablet, Take 3,000 Units by mouth daily , Disp: , Rfl:     clotrimazole (LOTRIMIN) 1 % cream, Apply 1 g (1 application total) topically daily To affected area, Disp: 60 g, Rfl: 2    Flaxseed, Linseed, (FLAX SEED OIL PO), Take 1 capsule by mouth daily  , Disp: , Rfl:     ibuprofen (MOTRIN) 600 mg tablet, Take 600 mg by mouth every 6 (six) hours as needed for mild pain, Disp: , Rfl:     METHYLCOBALAMIN SL, Place 1,000 mcg under the tongue, Disp: , Rfl:     multivitamin (THERAGRAN) TABS, Take 1 tablet by mouth daily, Disp: , Rfl:     omeprazole (PriLOSEC) 20 mg delayed release capsule, Take 1 capsule (20 mg total) by mouth 2 (two) times a day, Disp: 180 capsule, Rfl: 1    pyridoxine (B-6) 100 MG tablet, Take 100 mg by mouth daily, Disp: , Rfl:     transdermal buprenorphine 5 MCG/HR PTWK, Place 1 patch on the skin once a week, Disp: 4 patch, Rfl: 2    buPROPion (ZYBAN) 150 MG 12 hr tablet, Take 1 tablet daily for 3 days, then take 1 tablet twice a day thereafter   (Patient not taking: Reported on 1/29/2020), Disp: 60 tablet, Rfl: 2    cyanocobalamin (VITAMIN B-12) 100 mcg tablet, Take 100 mcg by mouth daily  , Disp: , Rfl:     Elastic Bandages & Supports (KNEE BRACE/HINGED/REGULAR) MISC, by Does not apply route daily as needed (B/L knee pain) B/L knee braces (Patient not taking: Reported on 1/29/2020), Disp: 2 each, Rfl: 0    pregabalin (LYRICA) 75 mg capsule, Take 1 capsule (75 mg total) by mouth 3 (three) times a day (Patient not taking: Reported on 1/29/2020), Disp: 90 capsule, Rfl: 6    vitamin B-12 (CYANOCOBALAMIN) 100 MCG tablet, Take 100 mcg by mouth daily, Disp: , Rfl:     Allergies   Allergen Reactions    Gabapentin GI Intolerance    Tramadol Confusion    Morphine Other (See Comments)     constipation       Physical Exam:    /72   Pulse 105 Resp 18   Ht 6' 1" (1 854 m)   Wt 78 9 kg (174 lb)   BMI 22 96 kg/m²     Constitutional:normal, well developed, well nourished, alert, in no distress and non-toxic and no overt pain behavior    Eyes:anicteric  HEENT:grossly intact  Neck:supple, symmetric, trachea midline and no masses   Pulmonary:even and unlabored  Cardiovascular:No edema or pitting edema present  Skin:Normal without rashes or lesions and well hydrated  Psychiatric:Mood and affect appropriate  Neurologic:Cranial Nerves II-XII grossly intact  Musculoskeletal:normal      Imaging  No orders to display         Orders Placed This Encounter   Procedures    MM ALL_Prescribed Meds and Special Instructions    MM DT_Alprazolam Definitive Test    MM DT_Amphetamine Definitive Test    MM DT_Aripiprazole Definitive Test    MM DT_Bath Salts Definitive Test    MM DT_Buprenorphine Definitive Test    MM DT_Butalbital Definitive Test    MM DT_Clonazepam Definitive Test    MM DT_Clozapine Definitive Test    MM DT_Cocaine Definitive Test    MM DT_Codeine Definitive Test    MM DT_Desipramine Definitive Test    MM DT_Dextromethorphan Definitive Test    MM Diazepam Definitive Test    MM DT_Ethyl Glucuronide/Ethyl Sulfate Definitive Test    MM DT_Fentanyl Definitive Test    MM DT_Heroin Definitive Test    MM DT_Hydrocodone Definitive Test    MM DT_Hydromorphone Definitive Test    MM DT_Kratom Definitive Test    MM DT_Levorphanol Definitive Test    MM Lorazepam Definitive Test    MM DT_MDMA Definitive Test    MM DT_Meperidine Definitive Test    MM DT_Methadone Definitive Test    MM DT_Methamphetamine Definitive Test    MM DT_Methylphenidate Definitive Test    MM DT_Morphine Definitive Test    MM DT_Olanzapine Definitive Test    MM DT_Oxazepam Definitive Test    MM DT_Oxycodone Definitive Test    MM DT_Oxymorphone Definitive Test    MM DT_Phenobarbital Definitive Test    MM DT_Phentermine Definitive Test    MM DT_Secobarbital Definitive Test    MM DT_Spice Definitive Test    MM DT_Tapentadol Definitive Test    MM DT_Temazapam Definitive Test    MM DT_THC Definitive Test    MM DT_Tramadol Definitive Test    MM DT_Perform Validity Testing

## 2020-01-31 ENCOUNTER — TELEPHONE (OUTPATIENT)
Dept: PAIN MEDICINE | Facility: MEDICAL CENTER | Age: 65
End: 2020-01-31

## 2020-01-31 LAB
6MAM UR QL CFM: NEGATIVE NG/ML
7AMINOCLONAZEPAM UR QL CFM: NEGATIVE NG/ML
A-OH ALPRAZ UR QL CFM: NEGATIVE NG/ML
ACCEPTABLE CREAT UR QL: NORMAL MG/DL
ACCEPTIBLE SP GR UR QL: NORMAL
AMPHET UR QL CFM: NEGATIVE NG/ML
AMPHET UR QL CFM: NEGATIVE NG/ML
BUPRENORPHINE UR QL CFM: ABNORMAL NG/ML
BUTALBITAL UR QL CFM: NEGATIVE NG/ML
BZE UR QL CFM: NEGATIVE NG/ML
CODEINE UR QL CFM: NEGATIVE NG/ML
DESIPRAMINE UR QL CFM: NEGATIVE NG/ML
EDDP UR QL CFM: NEGATIVE NG/ML
ETHYL GLUCURONIDE UR QL CFM: NEGATIVE NG/ML
ETHYL SULFATE UR QL SCN: NEGATIVE NG/ML
FENTANYL UR QL CFM: NEGATIVE NG/ML
GLIADIN IGG SER IA-ACNC: NEGATIVE NG/ML
GLUCOSE 30M P 50 G LAC PO SERPL-MCNC: NEGATIVE NG/ML
HYDROCODONE UR QL CFM: NEGATIVE NG/ML
HYDROCODONE UR QL CFM: NEGATIVE NG/ML
HYDROMORPHONE UR QL CFM: NEGATIVE NG/ML
LORAZEPAM UR QL CFM: NEGATIVE NG/ML
MDMA UR QL CFM: NEGATIVE NG/ML
ME-PHENIDATE UR QL CFM: NEGATIVE NG/ML
MEPERIDINE UR QL CFM: NEGATIVE NG/ML
MEPHEDRONE UR QL CFM: NEGATIVE NG/ML
METHADONE UR QL CFM: NEGATIVE NG/ML
METHAMPHET UR QL CFM: NEGATIVE NG/ML
MORPHINE UR QL CFM: NEGATIVE NG/ML
MORPHINE UR QL CFM: NEGATIVE NG/ML
NITRITE UR QL: NORMAL UG/ML
NORBUPRENORPHINE UR QL CFM: ABNORMAL NG/ML
NORDIAZEPAM UR QL CFM: NEGATIVE NG/ML
NORFENTANYL UR QL CFM: NEGATIVE NG/ML
NORHYDROCODONE UR QL CFM: NEGATIVE NG/ML
NORHYDROCODONE UR QL CFM: NEGATIVE NG/ML
NORMEPERIDINE UR QL CFM: NEGATIVE NG/ML
NOROXYCODONE UR QL CFM: NEGATIVE NG/ML
OLANZAPINE QUANTIFICATION: NEGATIVE NG/ML
OPC-3373 QUANTIFICATION: NEGATIVE
OXAZEPAM UR QL CFM: NEGATIVE NG/ML
OXYCODONE UR QL CFM: NEGATIVE NG/ML
OXYMORPHONE UR QL CFM: NEGATIVE NG/ML
OXYMORPHONE UR QL CFM: NEGATIVE NG/ML
PHENOBARB UR QL CFM: NEGATIVE NG/ML
RESULT ALL_PRESCRIBED MEDS AND SPECIAL INSTRUCTIONS: NORMAL
SECOBARBITAL UR QL CFM: NEGATIVE NG/ML
SL AMB 3-METHYL-FENTANYL QUANTIFICATION: NORMAL NG/ML
SL AMB 4-ANPP QUANTIFICATION: NORMAL NG/ML
SL AMB 4-FIBF QUANTIFICATION: NORMAL NG/ML
SL AMB 5F-ADB-M7 METABOLITE QUANTIFICATION: NEGATIVE NG/ML
SL AMB 7-OH-MITRAGYNINE (KRATOM ALKALOID) QUANTIFICATION: NEGATIVE NG/ML
SL AMB AB-FUBINACA-M3 METABOLITE QUANTIFICATION: NEGATIVE NG/ML
SL AMB ACETYL FENTANYL QUANTIFICATION: NORMAL NG/ML
SL AMB ACETYL NORFENTANYL QUANTIFICATION: NORMAL NG/ML
SL AMB ACRYL FENTANYL QUANTIFICATION: NORMAL NG/ML
SL AMB BATH SALTS: NEGATIVE NG/ML
SL AMB BUTRYL FENTANYL QUANTIFICATION: NORMAL NG/ML
SL AMB CARFENTANIL QUANTIFICATION: NORMAL NG/ML
SL AMB CLOZAPINE QUANTIFICATION: NEGATIVE NG/ML
SL AMB CTHC (MARIJUANA METABOLITE) QUANTIFICATION: NEGATIVE NG/ML
SL AMB CYCLOPROPYL FENTANYL QUANTIFICATION: NORMAL NG/ML
SL AMB DEXTROMETHORPHAN QUANTIFICATION: NEGATIVE NG/ML
SL AMB DEXTRORPHAN (DEXTROMETHORPHAN METABOLITE) QUANT: NEGATIVE NG/ML
SL AMB DEXTRORPHAN (DEXTROMETHORPHAN METABOLITE) QUANT: NEGATIVE NG/ML
SL AMB FURANYL FENTANYL QUANTIFICATION: NORMAL NG/ML
SL AMB JWH018 METABOLITE QUANTIFICATION: NEGATIVE NG/ML
SL AMB JWH073 METABOLITE QUANTIFICATION: NEGATIVE NG/ML
SL AMB MDMB-FUBINACA-M1 METABOLITE QUANTIFICATION: NEGATIVE NG/ML
SL AMB METHOXYACETYL FENTANYL QUANTIFICATION: NORMAL NG/ML
SL AMB METHYLONE QUANTIFICATION: NEGATIVE NG/ML
SL AMB N-DESMETHYL U-47700 QUANTIFICATION: NORMAL NG/ML
SL AMB N-DESMETHYL-TRAMADOL QUANTIFICATION: NEGATIVE NG/ML
SL AMB N-DESMETHYLCLOZAPINE QUANTIFICATION: NEGATIVE NG/ML
SL AMB PHENTERMINE QUANTIFICATION: NEGATIVE NG/ML
SL AMB RCS4 METABOLITE QUANTIFICATION: NEGATIVE NG/ML
SL AMB RITALINIC ACID QUANTIFICATION: NEGATIVE NG/ML
SL AMB U-47700 QUANTIFICATION: NORMAL NG/ML
SPECIMEN DRAWN SERPL: NEGATIVE NG/ML
SPECIMEN PH ACCEPTABLE UR: NORMAL
TAPENTADOL UR CFM-MCNC: >6400 NG/ML
TEMAZEPAM UR QL CFM: NEGATIVE NG/ML
TEMAZEPAM UR QL CFM: NEGATIVE NG/ML
TRAMADOL UR QL CFM: NEGATIVE NG/ML
URATE/CREAT 24H UR: NEGATIVE NG/ML

## 2020-03-11 ENCOUNTER — OFFICE VISIT (OUTPATIENT)
Dept: PAIN MEDICINE | Facility: MEDICAL CENTER | Age: 65
End: 2020-03-11
Payer: MEDICARE

## 2020-03-11 VITALS
HEART RATE: 91 BPM | BODY MASS INDEX: 23.43 KG/M2 | SYSTOLIC BLOOD PRESSURE: 102 MMHG | DIASTOLIC BLOOD PRESSURE: 73 MMHG | HEIGHT: 73 IN | WEIGHT: 176.8 LBS

## 2020-03-11 DIAGNOSIS — M54.50 CHRONIC BILATERAL LOW BACK PAIN WITHOUT SCIATICA: ICD-10-CM

## 2020-03-11 DIAGNOSIS — M25.512 CHRONIC PAIN OF BOTH SHOULDERS: ICD-10-CM

## 2020-03-11 DIAGNOSIS — G62.9 NEUROPATHY: ICD-10-CM

## 2020-03-11 DIAGNOSIS — M25.511 CHRONIC PAIN OF BOTH SHOULDERS: ICD-10-CM

## 2020-03-11 DIAGNOSIS — G89.4 CHRONIC PAIN SYNDROME: ICD-10-CM

## 2020-03-11 DIAGNOSIS — M25.561 CHRONIC PAIN OF BOTH KNEES: ICD-10-CM

## 2020-03-11 DIAGNOSIS — M54.16 LUMBAR RADICULOPATHY: ICD-10-CM

## 2020-03-11 DIAGNOSIS — M25.562 CHRONIC PAIN OF BOTH KNEES: ICD-10-CM

## 2020-03-11 DIAGNOSIS — G89.29 CHRONIC PAIN OF BOTH SHOULDERS: ICD-10-CM

## 2020-03-11 DIAGNOSIS — M13.0 POLYARTHROPATHY: ICD-10-CM

## 2020-03-11 DIAGNOSIS — G89.29 CHRONIC PAIN OF BOTH KNEES: ICD-10-CM

## 2020-03-11 DIAGNOSIS — G89.29 CHRONIC BILATERAL LOW BACK PAIN WITHOUT SCIATICA: ICD-10-CM

## 2020-03-11 DIAGNOSIS — M47.816 LUMBAR SPONDYLOSIS: Primary | ICD-10-CM

## 2020-03-11 PROCEDURE — 4040F PNEUMOC VAC/ADMIN/RCVD: CPT | Performed by: NURSE PRACTITIONER

## 2020-03-11 PROCEDURE — 99214 OFFICE O/P EST MOD 30 MIN: CPT | Performed by: NURSE PRACTITIONER

## 2020-03-11 PROCEDURE — 3008F BODY MASS INDEX DOCD: CPT | Performed by: NURSE PRACTITIONER

## 2020-03-11 PROCEDURE — 4004F PT TOBACCO SCREEN RCVD TLK: CPT | Performed by: NURSE PRACTITIONER

## 2020-03-11 RX ORDER — PREGABALIN 100 MG/1
100 CAPSULE ORAL 3 TIMES DAILY
Qty: 90 CAPSULE | Refills: 2 | Status: SHIPPED | OUTPATIENT
Start: 2020-03-11 | End: 2021-08-04 | Stop reason: SDUPTHER

## 2020-03-11 RX ORDER — BUPRENORPHINE 5 UG/H
1 PATCH TRANSDERMAL WEEKLY
Qty: 4 PATCH | Refills: 2 | Status: SHIPPED | OUTPATIENT
Start: 2020-03-11 | End: 2020-06-02 | Stop reason: SDUPTHER

## 2020-03-11 NOTE — PROGRESS NOTES
Assessment  1  Lumbar spondylosis - Bilateral    2  Lumbar radiculopathy    3  Chronic bilateral low back pain without sciatica    4  Lumbar radiculopathy - Right    5  Neuropathy    6  Chronic pain of both knees    7  Chronic pain of both shoulders    8  Chronic pain syndrome    9  Polyarthropathy        Plan  Continue with the Butrans patch as well as Lyrica however I will increase his Lyrica to 100 mg as he does not feel the 75 is providing enough relief  Patient to return in 12 weeks for medication refills      There are risks associated with opioid medications, including dependence, addiction and tolerance  The patient understands and agrees to use these medications only as prescribed  Potential side effects of the medications include, but are not limited to, constipation, drowsiness, addiction, impaired judgment and risk of fatal overdose if not taken as prescribed  The patient was warned against driving while taking sedation medications  Sharing medications is a felony  At this point in time, the patient is showing no signs of addiction, abuse, diversion or suicidal ideation  [place] Prescription Drug Monitoring Program report was reviewed and was appropriate         My impressions and treatment recommendations were discussed in detail with the patient who verbalized understanding and had no further questions  Discharge instructions were provided  I personally saw and examined the patient and I agree with the above discussed plan of care  No orders of the defined types were placed in this encounter  New Medications Ordered This Visit   Medications    pregabalin (LYRICA) 100 mg capsule     Sig: Take 1 capsule (100 mg total) by mouth 3 (three) times a day     Dispense:  90 capsule     Refill:  2    transdermal buprenorphine 5 MCG/HR PTWK     Sig: Place 1 patch on the skin once a week     Dispense:  4 patch     Refill:  2       History of Present Illness    Zeynep Maker is a 71 y o  male presents for follow-up related to his chronic low back pain, lumbar radiculopathy, and poly arthralgias  Today the patient tells me he is having intermittent pain that is described as dull, aching, throbbing, shooting, numbness, pins and needles and tingling in his right foot  He does feel overall that he is doing a little bit better  Patient is using the Butrans patch 5 micro g today he has the patch on his right arm  He is also using Lyrica 75 mg however instead of taking this as prescribed he is only using this as needed  He tells me he has not needed as much since he has added B12 to his regimen  Overall his medications provide 20% relief without any real side effects or issues  I have personally reviewed and/or updated the patient's past medical history, past surgical history, family history, social history, current medications, allergies, and vital signs today  Review of Systems   Respiratory: Negative for shortness of breath  Cardiovascular: Negative for chest pain  Gastrointestinal: Positive for constipation, nausea and vomiting  Negative for diarrhea  Musculoskeletal: Positive for back pain, gait problem, joint swelling and myalgias  Negative for arthralgias  Skin: Negative for rash  Neurological: Negative for dizziness, seizures and weakness  All other systems reviewed and are negative        Patient Active Problem List   Diagnosis    Lumbar radiculopathy - Right    Disability examination    Bone lesion    Neuropathy    Polyarthropathy    Annual physical exam    Tobacco abuse    Encounter for colorectal cancer screening    Gastroesophageal reflux disease without esophagitis    Work related injury    Colon cancer screening    Right foot drop    Lumbar spondylosis - Bilateral    Vitamin D deficiency    Thoracic spine pain    Chronic pain syndrome    Uncomplicated opioid dependence ([address] )    Chronic pain of both knees    Chronic pain of both shoulders    Elevated PSA       Past Medical History:   Diagnosis Date    Chronic back pain     Depression     GERD (gastroesophageal reflux disease)     L4-L5 disc bulge 06/16/2017    Muscle weakness     Neuropathy     Osteoarthritis     Right foot drop        Past Surgical History:   Procedure Laterality Date    EGD AND COLONOSCOPY N/A 6/29/2018    Procedure: EGD AND COLONOSCOPY;  Surgeon: Lauro Carrasco MD;  Location: [facility]; Service: Gastroenterology    EPIDURAL BLOCK INJECTION      needle    [place] [address] BODY,PLASTY/RESECTN Left 4/30/2018    Procedure: LEFT HIP ARTHROSCOPIC LABRAL DEBRIDEMENT;  Surgeon: Sam Harmon MD;  [facility];  Service: Orthopedics    TONSILLECTOMY      WISDOM TOOTH EXTRACTION         Family History   Problem Relation Age of Onset    Osteoarthritis Family     Glaucoma Mother     No Known Problems Father        Social History     Occupational History    Not on file   Tobacco Use    Smoking status: Current Every Day Smoker     Packs/day: 0 50    Smokeless tobacco: Never Used    Tobacco comment: 1/2 a pack a day   Substance and Sexual Activity    Alcohol use: No    Drug use: No    Sexual activity: Not Currently       Current Outpatient Medications on File Prior to Visit   Medication Sig    acetaminophen (TYLENOL) 500 mg tablet Take 1,000 mg by mouth every 12 (twelve) hours as needed for mild pain    buPROPion (ZYBAN) 150 MG 12 hr tablet Take 1 tablet daily for 3 days, then take 1 tablet twice a day thereafter      cholecalciferol (VITAMIN D3) 1,000 units tablet Take 3,000 Units by mouth daily     clotrimazole (LOTRIMIN) 1 % cream Apply 1 g (1 application total) topically daily To affected area    Elastic Bandages & Supports (KNEE BRACE/HINGED/REGULAR) MISC by Does not apply route daily as needed (B/L knee pain) B/L knee braces    Flaxseed, Linseed, (FLAX SEED OIL PO) Take 1 capsule by mouth daily      ibuprofen (MOTRIN) 600 mg tablet Take 600 mg by mouth every 6 (six) hours as needed for mild pain    METHYLCOBALAMIN SL Place 1,000 mcg under the tongue    multivitamin (THERAGRAN) TABS Take 1 tablet by mouth daily    omeprazole (PriLOSEC) 20 mg delayed release capsule Take 1 capsule (20 mg total) by mouth 2 (two) times a day    pyridoxine (B-6) 100 MG tablet Take 100 mg by mouth daily    vitamin B-12 (CYANOCOBALAMIN) 100 MCG tablet Take 100 mcg by mouth daily    [DISCONTINUED] pregabalin (LYRICA) 75 mg capsule Take 1 capsule (75 mg total) by mouth 3 (three) times a day    [DISCONTINUED] transdermal buprenorphine 5 MCG/HR PTWK Place 1 patch on the skin once a week    cyanocobalamin (VITAMIN B-12) 100 mcg tablet Take 100 mcg by mouth daily       No current facility-administered medications on file prior to visit  Allergies   Allergen Reactions    Gabapentin GI Intolerance    Tramadol Confusion    Morphine Other (See Comments)     constipation       Physical Exam    /73   Pulse 91   Ht 6' 1" (1 854 m)   Wt 80 2 kg (176 lb 12 8 oz)   BMI 23 33 kg/m²     Constitutional: normal, well developed, well nourished, alert, in no distress and non-toxic and no overt pain behavior    Eyes: anicteric  HEENT: grossly intact  Neck: supple, symmetric, trachea midline and no masses   Pulmonary:even and unlabored  Cardiovascular:No edema or pitting edema present  Skin:Normal without rashes or lesions and well hydrated  Psychiatric:Mood and affect appropriate  Neurologic:Cranial Nerves II-XII grossly intact  Musculoskeletal:normal    Imaging

## 2020-03-11 NOTE — PATIENT INSTRUCTIONS
Opioid Pain Management   AMBULATORY CARE:   An opioid  is a type of medicine used to treat pain  Examples of opioids are oxycodone, morphine, fentanyl, or codeine  Take opioid medicines as directed, for the condition it is prescribed:  Common problems that may occur when you do not take opioid medicines as directed include the following:  · Health problems  may occur  You may have trouble breathing  You may also develop liver or kidney damage, or stomach bleeding  Any of these health problems can become life-threatening  · Opioid dependence  means your body needs the opioid medicine to keep it from going through withdrawal      · Opioid tolerance  means the opioid does not control pain as well as it used to  You need higher doses of the opioid to get pain relief  · Opioid addiction  means you are not able to control the use of the opioid medicine  You use it when you do not have pain  You crave the opioid medicine  Call 911 or have someone call 911 for any of the following:   · You are breathing slower than normal, or you have trouble breathing  · You cannot be awakened  · You have a seizure  Seek care immediately if:   · Your heart is beating slower than usual     · Your heart feels like it is jumping or fluttering  · You are so sleepy that you cannot stay awake  · You have severe muscle pain or weakness  · You see or hear things that are not real   Contact your healthcare provider if:   · You are too dizzy to stand up  · Your pain gets worse or you have new pain  · You cannot do your usual activities because of side effects from the opioid  · You are constipated or have abdominal pain  · You have questions or concerns about your condition or care  Opioid safety measures:   · Take your medicine as directed  Ask if you need more information on how to take your medicine correctly  Follow up with your healthcare provider regularly  You may need to have your dose adjusted   Do not use opioid medicine if you are pregnant or breastfeeding  · Give your healthcare provider a list of all your medicines  Include any over-the-counter medicines, vitamins, and herbs  It can be dangerous to take opioids with certain other medicines, such as antihistamines  · Keep opioid medicine in a safe place  Store your opioid medicine in a locked cabinet to keep it away from children and others  · Do not drink alcohol while you use opioids  Alcohol use with an opioid medicine can make you sleepy and slow your breathing rate  You may stop breathing completely  · Do not drive or operate heavy machinery after you take opioid medicine  Opioid medicine can make you drowsy and make it hard to concentrate  You may injure yourself or others if you drive or operate heavy machinery while taking your medicine  · Drink fluids and eat high-fiber foods  Fluids and fiber will help prevent constipation  Ask your healthcare provider what fluids are right for you and how much you should drink  Also ask for a list of foods that contain fiber  Follow up with your healthcare provider as directed: You may need to have your dose adjusted  You may be referred to a pain specialist  Write down your questions so you remember to ask them during your visits  © 2017 [address] [employer] Information is for End User's use only and may not be sold, redistributed or otherwise used for commercial purposes  All illustrations and images included in CareNotes® are the copyrighted property of [facility]  or Emil Garcia  The above information is an  only  It is not intended as medical advice for individual conditions or treatments  Talk to your doctor, nurse or pharmacist before following any medical regimen to see if it is safe and effective for you

## 2020-03-12 ENCOUNTER — TELEPHONE (OUTPATIENT)
Dept: PAIN MEDICINE | Facility: MEDICAL CENTER | Age: 65
End: 2020-03-12

## 2020-03-12 NOTE — TELEPHONE ENCOUNTER
Pt states Paulo Bearden will be contacting us via phone and fax # [phone] regarding transdermal buprenorphine 5 MCG  Pt states they need a reason for the medication being needed   Call back [phone]

## 2020-03-13 NOTE — TELEPHONE ENCOUNTER
Negrita with [employer] called to say that the Buprenorphine has been  approved through his insurance       Phone # [phone]

## 2020-03-13 NOTE — TELEPHONE ENCOUNTER
I spoke with Hola with [employer]  Patient started the appeal process to get the Buprenorphine approved through his insurance  They just need his chart notes or any documentation   Fax# [phone]     Information was sent

## 2020-04-01 NOTE — TELEPHONE ENCOUNTER
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

## 2020-04-01 NOTE — TELEPHONE ENCOUNTER
Pt states he was bad this morning   he sat upright and got pain    he took 600 mg of Advil, 1000 mg of Buprenorphine took it at 1:00 and after 45 minutes he used the lidcone pain relieve roll on 4% pain releave  Just need ti inquire any safety issues  If there is any safety issues call pt back      Pt can be reached at [phone]

## 2020-05-18 ENCOUNTER — OFFICE VISIT (OUTPATIENT)
Dept: INTERNAL MEDICINE CLINIC | Facility: CLINIC | Age: 65
End: 2020-05-18

## 2020-05-18 VITALS
HEART RATE: 74 BPM | SYSTOLIC BLOOD PRESSURE: 104 MMHG | WEIGHT: 180 LBS | DIASTOLIC BLOOD PRESSURE: 72 MMHG | BODY MASS INDEX: 23.86 KG/M2 | OXYGEN SATURATION: 95 % | HEIGHT: 73 IN | TEMPERATURE: 97.7 F

## 2020-05-18 DIAGNOSIS — Z72.0 TOBACCO ABUSE: ICD-10-CM

## 2020-05-18 DIAGNOSIS — Z13.220 LIPID SCREENING: ICD-10-CM

## 2020-05-18 DIAGNOSIS — Z13.228 SCREENING FOR METABOLIC DISORDER: ICD-10-CM

## 2020-05-18 DIAGNOSIS — G89.29 CHRONIC PAIN OF LEFT ANKLE: ICD-10-CM

## 2020-05-18 DIAGNOSIS — R97.20 ELEVATED PSA: ICD-10-CM

## 2020-05-18 DIAGNOSIS — K21.9 GASTROESOPHAGEAL REFLUX DISEASE WITHOUT ESOPHAGITIS: Primary | ICD-10-CM

## 2020-05-18 DIAGNOSIS — G62.9 NEUROPATHY: ICD-10-CM

## 2020-05-18 DIAGNOSIS — E55.9 VITAMIN D DEFICIENCY: ICD-10-CM

## 2020-05-18 DIAGNOSIS — M25.572 CHRONIC PAIN OF LEFT ANKLE: ICD-10-CM

## 2020-05-18 DIAGNOSIS — Z12.5 PROSTATE CANCER SCREENING: ICD-10-CM

## 2020-05-18 PROCEDURE — 4004F PT TOBACCO SCREEN RCVD TLK: CPT | Performed by: INTERNAL MEDICINE

## 2020-05-18 PROCEDURE — 4040F PNEUMOC VAC/ADMIN/RCVD: CPT | Performed by: INTERNAL MEDICINE

## 2020-05-18 PROCEDURE — 3008F BODY MASS INDEX DOCD: CPT | Performed by: INTERNAL MEDICINE

## 2020-05-18 PROCEDURE — 99214 OFFICE O/P EST MOD 30 MIN: CPT | Performed by: INTERNAL MEDICINE

## 2020-06-01 ENCOUNTER — TELEPHONE (OUTPATIENT)
Dept: PAIN MEDICINE | Facility: MEDICAL CENTER | Age: 65
End: 2020-06-01

## 2020-06-02 ENCOUNTER — OFFICE VISIT (OUTPATIENT)
Dept: PAIN MEDICINE | Facility: MEDICAL CENTER | Age: 65
End: 2020-06-02
Payer: MEDICARE

## 2020-06-02 VITALS
TEMPERATURE: 98.6 F | HEART RATE: 96 BPM | WEIGHT: 179 LBS | SYSTOLIC BLOOD PRESSURE: 108 MMHG | RESPIRATION RATE: 16 BRPM | DIASTOLIC BLOOD PRESSURE: 77 MMHG | BODY MASS INDEX: 23.72 KG/M2 | HEIGHT: 73 IN

## 2020-06-02 DIAGNOSIS — M54.16 LUMBAR RADICULOPATHY: ICD-10-CM

## 2020-06-02 DIAGNOSIS — G62.9 NEUROPATHY: ICD-10-CM

## 2020-06-02 DIAGNOSIS — M25.512 CHRONIC PAIN OF BOTH SHOULDERS: ICD-10-CM

## 2020-06-02 DIAGNOSIS — G89.29 CHRONIC PAIN OF BOTH SHOULDERS: ICD-10-CM

## 2020-06-02 DIAGNOSIS — M25.511 CHRONIC PAIN OF BOTH SHOULDERS: ICD-10-CM

## 2020-06-02 DIAGNOSIS — M25.562 CHRONIC PAIN OF BOTH KNEES: ICD-10-CM

## 2020-06-02 DIAGNOSIS — M25.561 CHRONIC PAIN OF BOTH KNEES: ICD-10-CM

## 2020-06-02 DIAGNOSIS — G89.29 CHRONIC PAIN OF BOTH KNEES: ICD-10-CM

## 2020-06-02 DIAGNOSIS — G89.29 CHRONIC BILATERAL LOW BACK PAIN WITHOUT SCIATICA: ICD-10-CM

## 2020-06-02 DIAGNOSIS — Z79.891 LONG-TERM CURRENT USE OF OPIATE ANALGESIC: ICD-10-CM

## 2020-06-02 DIAGNOSIS — F11.20 UNCOMPLICATED OPIOID DEPENDENCE (HCC): Primary | ICD-10-CM

## 2020-06-02 DIAGNOSIS — M54.50 CHRONIC BILATERAL LOW BACK PAIN WITHOUT SCIATICA: ICD-10-CM

## 2020-06-02 DIAGNOSIS — G89.4 CHRONIC PAIN SYNDROME: ICD-10-CM

## 2020-06-02 PROCEDURE — 4004F PT TOBACCO SCREEN RCVD TLK: CPT | Performed by: PHYSICAL MEDICINE & REHABILITATION

## 2020-06-02 PROCEDURE — 99214 OFFICE O/P EST MOD 30 MIN: CPT | Performed by: PHYSICAL MEDICINE & REHABILITATION

## 2020-06-02 PROCEDURE — 80305 DRUG TEST PRSMV DIR OPT OBS: CPT | Performed by: PHYSICAL MEDICINE & REHABILITATION

## 2020-06-02 PROCEDURE — 4040F PNEUMOC VAC/ADMIN/RCVD: CPT | Performed by: PHYSICAL MEDICINE & REHABILITATION

## 2020-06-02 PROCEDURE — 3008F BODY MASS INDEX DOCD: CPT | Performed by: PHYSICAL MEDICINE & REHABILITATION

## 2020-06-02 RX ORDER — BUPRENORPHINE 5 UG/H
1 PATCH TRANSDERMAL WEEKLY
Qty: 4 PATCH | Refills: 2 | Status: SHIPPED | OUTPATIENT
Start: 2020-06-02 | End: 2020-08-18 | Stop reason: SDUPTHER

## 2020-06-10 ENCOUNTER — TELEPHONE (OUTPATIENT)
Dept: PAIN MEDICINE | Facility: MEDICAL CENTER | Age: 65
End: 2020-06-10

## 2020-06-10 LAB
6MAM UR QL CFM: NEGATIVE NG/ML
7AMINOCLONAZEPAM UR QL CFM: NEGATIVE NG/ML
A-OH ALPRAZ UR QL CFM: NEGATIVE NG/ML
ACCEPTABLE CREAT UR QL: NORMAL MG/DL
ACCEPTIBLE SP GR UR QL: NORMAL
AMPHET UR QL CFM: NEGATIVE NG/ML
AMPHET UR QL CFM: NEGATIVE NG/ML
BUPRENORPHINE UR CFM-MCNC: 7.47 NG/ML
BUTALBITAL UR QL CFM: NEGATIVE NG/ML
BZE UR QL CFM: NEGATIVE NG/ML
CODEINE UR QL CFM: NEGATIVE NG/ML
DESIPRAMINE UR QL CFM: NEGATIVE NG/ML
EDDP UR QL CFM: NEGATIVE NG/ML
ETHYL GLUCURONIDE UR CFM-MCNC: ABNORMAL NG/ML
ETHYL SULFATE UR QL SCN: NEGATIVE NG/ML
FENTANYL UR QL CFM: NEGATIVE NG/ML
GLIADIN IGG SER IA-ACNC: NEGATIVE NG/ML
GLUCOSE 30M P 50 G LAC PO SERPL-MCNC: NEGATIVE NG/ML
HYDROCODONE UR QL CFM: NEGATIVE NG/ML
HYDROCODONE UR QL CFM: NEGATIVE NG/ML
HYDROMORPHONE UR QL CFM: NEGATIVE NG/ML
LORAZEPAM UR QL CFM: NEGATIVE NG/ML
MDMA UR QL CFM: NEGATIVE NG/ML
ME-PHENIDATE UR QL CFM: NEGATIVE NG/ML
MEPERIDINE UR QL CFM: NEGATIVE NG/ML
MEPHEDRONE UR QL CFM: NEGATIVE NG/ML
METHADONE UR QL CFM: NEGATIVE NG/ML
METHAMPHET UR QL CFM: NEGATIVE NG/ML
MORPHINE UR QL CFM: NEGATIVE NG/ML
MORPHINE UR QL CFM: NEGATIVE NG/ML
NITRITE UR QL: NORMAL UG/ML
NORBUPRENORPHINE UR QL CFM: NEGATIVE NG/ML
NORDIAZEPAM UR QL CFM: NEGATIVE NG/ML
NORFENTANYL UR QL CFM: NEGATIVE NG/ML
NORHYDROCODONE UR QL CFM: NEGATIVE NG/ML
NORHYDROCODONE UR QL CFM: NEGATIVE NG/ML
NORMEPERIDINE UR QL CFM: NEGATIVE NG/ML
NOROXYCODONE UR QL CFM: NEGATIVE NG/ML
OLANZAPINE QUANTIFICATION: NEGATIVE NG/ML
OPC-3373 QUANTIFICATION: NEGATIVE
OXAZEPAM UR QL CFM: NEGATIVE NG/ML
OXYCODONE UR QL CFM: NEGATIVE NG/ML
OXYMORPHONE UR QL CFM: NEGATIVE NG/ML
OXYMORPHONE UR QL CFM: NEGATIVE NG/ML
PHENOBARB UR QL CFM: NEGATIVE NG/ML
RESULT ALL_PRESCRIBED MEDS AND SPECIAL INSTRUCTIONS: NORMAL
SECOBARBITAL UR QL CFM: NEGATIVE NG/ML
SL AMB 3-METHYL-FENTANYL QUANTIFICATION: NORMAL NG/ML
SL AMB 4-ANPP QUANTIFICATION: NORMAL NG/ML
SL AMB 4-FIBF QUANTIFICATION: NORMAL NG/ML
SL AMB 5F-ADB-M7 METABOLITE QUANTIFICATION: NEGATIVE NG/ML
SL AMB 7-OH-MITRAGYNINE (KRATOM ALKALOID) QUANTIFICATION: NEGATIVE NG/ML
SL AMB AB-FUBINACA-M3 METABOLITE QUANTIFICATION: NEGATIVE NG/ML
SL AMB ACETYL FENTANYL QUANTIFICATION: NORMAL NG/ML
SL AMB ACETYL NORFENTANYL QUANTIFICATION: NORMAL NG/ML
SL AMB ACRYL FENTANYL QUANTIFICATION: NORMAL NG/ML
SL AMB BATH SALTS: NEGATIVE NG/ML
SL AMB BUTRYL FENTANYL QUANTIFICATION: NORMAL NG/ML
SL AMB CARFENTANIL QUANTIFICATION: NORMAL NG/ML
SL AMB CLOZAPINE QUANTIFICATION: NEGATIVE NG/ML
SL AMB CTHC (MARIJUANA METABOLITE) QUANTIFICATION: NEGATIVE NG/ML
SL AMB CYCLOPROPYL FENTANYL QUANTIFICATION: NORMAL NG/ML
SL AMB DEXTROMETHORPHAN QUANTIFICATION: NEGATIVE NG/ML
SL AMB DEXTRORPHAN (DEXTROMETHORPHAN METABOLITE) QUANT: NEGATIVE NG/ML
SL AMB DEXTRORPHAN (DEXTROMETHORPHAN METABOLITE) QUANT: NEGATIVE NG/ML
SL AMB FURANYL FENTANYL QUANTIFICATION: NORMAL NG/ML
SL AMB JWH018 METABOLITE QUANTIFICATION: NEGATIVE NG/ML
SL AMB JWH073 METABOLITE QUANTIFICATION: NEGATIVE NG/ML
SL AMB MDMB-FUBINACA-M1 METABOLITE QUANTIFICATION: NEGATIVE NG/ML
SL AMB METHOXYACETYL FENTANYL QUANTIFICATION: NORMAL NG/ML
SL AMB METHYLONE QUANTIFICATION: NEGATIVE NG/ML
SL AMB N-DESMETHYL U-47700 QUANTIFICATION: NORMAL NG/ML
SL AMB N-DESMETHYL-TRAMADOL QUANTIFICATION: NEGATIVE NG/ML
SL AMB N-DESMETHYLCLOZAPINE QUANTIFICATION: NEGATIVE NG/ML
SL AMB PHENTERMINE QUANTIFICATION: NEGATIVE NG/ML
SL AMB RCS4 METABOLITE QUANTIFICATION: NEGATIVE NG/ML
SL AMB RITALINIC ACID QUANTIFICATION: NEGATIVE NG/ML
SL AMB U-47700 QUANTIFICATION: NORMAL NG/ML
SPECIMEN DRAWN SERPL: NEGATIVE NG/ML
SPECIMEN PH ACCEPTABLE UR: NORMAL
TAPENTADOL UR QL CFM: NEGATIVE NG/ML
TEMAZEPAM UR QL CFM: NEGATIVE NG/ML
TEMAZEPAM UR QL CFM: NEGATIVE NG/ML
TRAMADOL UR QL CFM: NEGATIVE NG/ML
URATE/CREAT 24H UR: NEGATIVE NG/ML

## 2020-06-15 ENCOUNTER — TELEPHONE (OUTPATIENT)
Dept: PAIN MEDICINE | Facility: MEDICAL CENTER | Age: 65
End: 2020-06-15

## 2020-06-15 ENCOUNTER — OFFICE VISIT (OUTPATIENT)
Dept: FAMILY MEDICINE CLINIC | Facility: CLINIC | Age: 65
End: 2020-06-15

## 2020-06-15 VITALS
OXYGEN SATURATION: 98 % | TEMPERATURE: 97.1 F | HEART RATE: 112 BPM | RESPIRATION RATE: 16 BRPM | SYSTOLIC BLOOD PRESSURE: 110 MMHG | BODY MASS INDEX: 23.75 KG/M2 | WEIGHT: 180 LBS | DIASTOLIC BLOOD PRESSURE: 80 MMHG

## 2020-06-15 DIAGNOSIS — M25.572 CHRONIC PAIN OF LEFT ANKLE: Primary | ICD-10-CM

## 2020-06-15 DIAGNOSIS — G89.29 CHRONIC PAIN OF LEFT ANKLE: Primary | ICD-10-CM

## 2020-06-15 DIAGNOSIS — M24.571 EQUINUS CONTRACTURE OF RIGHT ANKLE: ICD-10-CM

## 2020-06-15 PROCEDURE — 4040F PNEUMOC VAC/ADMIN/RCVD: CPT

## 2020-06-15 PROCEDURE — 4004F PT TOBACCO SCREEN RCVD TLK: CPT

## 2020-06-15 PROCEDURE — 99203 OFFICE O/P NEW LOW 30 MIN: CPT

## 2020-06-16 ENCOUNTER — TELEPHONE (OUTPATIENT)
Dept: FAMILY MEDICINE CLINIC | Facility: CLINIC | Age: 65
End: 2020-06-16

## 2020-08-18 ENCOUNTER — TELEPHONE (OUTPATIENT)
Dept: PAIN MEDICINE | Facility: MEDICAL CENTER | Age: 65
End: 2020-08-18

## 2020-08-18 DIAGNOSIS — G62.9 NEUROPATHY: ICD-10-CM

## 2020-08-18 DIAGNOSIS — M25.561 CHRONIC PAIN OF BOTH KNEES: ICD-10-CM

## 2020-08-18 DIAGNOSIS — G89.4 CHRONIC PAIN SYNDROME: ICD-10-CM

## 2020-08-18 DIAGNOSIS — G89.29 CHRONIC PAIN OF BOTH SHOULDERS: ICD-10-CM

## 2020-08-18 DIAGNOSIS — M54.50 CHRONIC BILATERAL LOW BACK PAIN WITHOUT SCIATICA: ICD-10-CM

## 2020-08-18 DIAGNOSIS — G89.29 CHRONIC BILATERAL LOW BACK PAIN WITHOUT SCIATICA: ICD-10-CM

## 2020-08-18 DIAGNOSIS — M25.562 CHRONIC PAIN OF BOTH KNEES: ICD-10-CM

## 2020-08-18 DIAGNOSIS — M25.512 CHRONIC PAIN OF BOTH SHOULDERS: ICD-10-CM

## 2020-08-18 DIAGNOSIS — G89.29 CHRONIC PAIN OF BOTH KNEES: ICD-10-CM

## 2020-08-18 DIAGNOSIS — M25.511 CHRONIC PAIN OF BOTH SHOULDERS: ICD-10-CM

## 2020-08-18 DIAGNOSIS — M54.16 LUMBAR RADICULOPATHY: ICD-10-CM

## 2020-08-18 RX ORDER — BUPRENORPHINE 5 UG/H
1 PATCH TRANSDERMAL WEEKLY
Qty: 4 PATCH | Refills: 0 | Status: SHIPPED | OUTPATIENT
Start: 2020-08-18 | End: 2020-09-02 | Stop reason: SDUPTHER

## 2020-08-18 NOTE — TELEPHONE ENCOUNTER
RN s/w pt regarding previous  Per pt he missed his appointment for today due to increased pain and not being able to drive himself today  Pt rescheduled for 9/2 but will need a refill prior to appt  Would BK send refill for Butrans patch today pt will still come for appt on 9/2  Ok with FREDDY

## 2020-08-18 NOTE — TELEPHONE ENCOUNTER
Patient   [phone]  Dr Reyes    Patient was scheduled for an appt today 8/18/20  He had to reschedule for 9/2/20 w/ Jody Vallejo, but he is on his last patch and will be out by Monday 8/24/20  He also stated that the patch isn't working to well today either   He is not sure as to how he will feel come 9/2/20    [id number]- [place], [place] - [address]

## 2020-08-18 NOTE — TELEPHONE ENCOUNTER
If it is okay with FREDDY, that it is okay with me  I sent the prescription to his pharmacy  This will be his last prescription if he is not seen

## 2020-08-24 DIAGNOSIS — M25.511 CHRONIC PAIN OF BOTH SHOULDERS: ICD-10-CM

## 2020-08-24 DIAGNOSIS — G89.29 CHRONIC PAIN OF BOTH KNEES: ICD-10-CM

## 2020-08-24 DIAGNOSIS — M25.562 CHRONIC PAIN OF BOTH KNEES: ICD-10-CM

## 2020-08-24 DIAGNOSIS — G89.29 CHRONIC BILATERAL LOW BACK PAIN WITHOUT SCIATICA: ICD-10-CM

## 2020-08-24 DIAGNOSIS — G89.29 CHRONIC PAIN OF BOTH SHOULDERS: ICD-10-CM

## 2020-08-24 DIAGNOSIS — M54.16 LUMBAR RADICULOPATHY: ICD-10-CM

## 2020-08-24 DIAGNOSIS — G62.9 NEUROPATHY: ICD-10-CM

## 2020-08-24 DIAGNOSIS — M25.561 CHRONIC PAIN OF BOTH KNEES: ICD-10-CM

## 2020-08-24 DIAGNOSIS — M25.512 CHRONIC PAIN OF BOTH SHOULDERS: ICD-10-CM

## 2020-08-24 DIAGNOSIS — M54.50 CHRONIC BILATERAL LOW BACK PAIN WITHOUT SCIATICA: ICD-10-CM

## 2020-08-24 DIAGNOSIS — G89.4 CHRONIC PAIN SYNDROME: ICD-10-CM

## 2020-08-24 RX ORDER — BUPRENORPHINE 5 UG/H
PATCH TRANSDERMAL
Qty: 4 PATCH | Refills: 2 | OUTPATIENT
Start: 2020-08-24

## 2020-08-26 DIAGNOSIS — G62.9 NEUROPATHY: ICD-10-CM

## 2020-08-26 DIAGNOSIS — M25.562 CHRONIC PAIN OF BOTH KNEES: ICD-10-CM

## 2020-08-26 DIAGNOSIS — G89.29 CHRONIC PAIN OF BOTH KNEES: ICD-10-CM

## 2020-08-26 DIAGNOSIS — G89.4 CHRONIC PAIN SYNDROME: ICD-10-CM

## 2020-08-26 DIAGNOSIS — M25.512 CHRONIC PAIN OF BOTH SHOULDERS: ICD-10-CM

## 2020-08-26 DIAGNOSIS — G89.29 CHRONIC BILATERAL LOW BACK PAIN WITHOUT SCIATICA: ICD-10-CM

## 2020-08-26 DIAGNOSIS — G89.29 CHRONIC PAIN OF BOTH SHOULDERS: ICD-10-CM

## 2020-08-26 DIAGNOSIS — M25.511 CHRONIC PAIN OF BOTH SHOULDERS: ICD-10-CM

## 2020-08-26 DIAGNOSIS — M54.16 LUMBAR RADICULOPATHY: ICD-10-CM

## 2020-08-26 DIAGNOSIS — M54.50 CHRONIC BILATERAL LOW BACK PAIN WITHOUT SCIATICA: ICD-10-CM

## 2020-08-26 DIAGNOSIS — M25.561 CHRONIC PAIN OF BOTH KNEES: ICD-10-CM

## 2020-08-26 RX ORDER — BUPRENORPHINE 5 UG/H
PATCH TRANSDERMAL
Qty: 4 PATCH | Refills: 2 | OUTPATIENT
Start: 2020-08-26

## 2020-09-02 ENCOUNTER — OFFICE VISIT (OUTPATIENT)
Dept: PAIN MEDICINE | Facility: MEDICAL CENTER | Age: 65
End: 2020-09-02
Payer: MEDICARE

## 2020-09-02 VITALS
WEIGHT: 188 LBS | TEMPERATURE: 97.8 F | RESPIRATION RATE: 16 BRPM | HEIGHT: 73 IN | BODY MASS INDEX: 24.92 KG/M2 | SYSTOLIC BLOOD PRESSURE: 102 MMHG | DIASTOLIC BLOOD PRESSURE: 68 MMHG | HEART RATE: 72 BPM

## 2020-09-02 DIAGNOSIS — M47.816 LUMBAR SPONDYLOSIS: ICD-10-CM

## 2020-09-02 DIAGNOSIS — M25.512 CHRONIC PAIN OF BOTH SHOULDERS: ICD-10-CM

## 2020-09-02 DIAGNOSIS — M25.561 CHRONIC PAIN OF BOTH KNEES: ICD-10-CM

## 2020-09-02 DIAGNOSIS — M54.16 LUMBAR RADICULOPATHY: ICD-10-CM

## 2020-09-02 DIAGNOSIS — F11.20 UNCOMPLICATED OPIOID DEPENDENCE (HCC): ICD-10-CM

## 2020-09-02 DIAGNOSIS — G89.29 CHRONIC BILATERAL LOW BACK PAIN WITHOUT SCIATICA: ICD-10-CM

## 2020-09-02 DIAGNOSIS — G62.9 NEUROPATHY: ICD-10-CM

## 2020-09-02 DIAGNOSIS — G89.29 CHRONIC PAIN OF BOTH SHOULDERS: ICD-10-CM

## 2020-09-02 DIAGNOSIS — M25.511 CHRONIC PAIN OF BOTH SHOULDERS: ICD-10-CM

## 2020-09-02 DIAGNOSIS — M54.50 CHRONIC BILATERAL LOW BACK PAIN WITHOUT SCIATICA: ICD-10-CM

## 2020-09-02 DIAGNOSIS — M25.562 CHRONIC PAIN OF BOTH KNEES: ICD-10-CM

## 2020-09-02 DIAGNOSIS — G89.29 CHRONIC PAIN OF BOTH KNEES: ICD-10-CM

## 2020-09-02 DIAGNOSIS — G89.4 CHRONIC PAIN SYNDROME: ICD-10-CM

## 2020-09-02 DIAGNOSIS — Z79.891 LONG-TERM CURRENT USE OF OPIATE ANALGESIC: Primary | ICD-10-CM

## 2020-09-02 PROCEDURE — 99214 OFFICE O/P EST MOD 30 MIN: CPT | Performed by: NURSE PRACTITIONER

## 2020-09-02 PROCEDURE — 80305 DRUG TEST PRSMV DIR OPT OBS: CPT | Performed by: NURSE PRACTITIONER

## 2020-09-02 RX ORDER — BUPRENORPHINE 5 UG/H
1 PATCH TRANSDERMAL WEEKLY
Qty: 4 PATCH | Refills: 2 | Status: SHIPPED | OUTPATIENT
Start: 2020-09-02 | End: 2020-11-25 | Stop reason: SDUPTHER

## 2020-09-02 NOTE — PROGRESS NOTES
Assessment:  1  Long-term current use of opiate analgesic    2  Chronic pain syndrome    3  Uncomplicated opioid dependence ([address] )    4  Lumbar radiculopathy - Right    5  Lumbar spondylosis - Bilateral    6  Chronic bilateral low back pain without sciatica    7  Neuropathy    8  Chronic pain of both knees    9  Chronic pain of both shoulders        Plan:  Continue with Butrans patch as prescribed he was given a 3 month supply the medication today  He can continue with Lyrica however he does not need a refill because he only uses this as needed  Patient to follow-up in 12 weeks for medication refills  [place] Prescription Drug Monitoring Program report was reviewed and was appropriate     A urine drug screen was collected at today's office visit as part of our medication management protocol  The point of care testing results were appropriate for what was being prescribed  The specimen will be sent for confirmatory testing  The drug screen is medically necessary because the patient is either dependent on opioid medication or is being considered for opioid medication therapy and the results could impact ongoing or future treatment  The drug screen is to evaluate for the presences or absence of prescribed, non-prescribed, and/or illicit drugs/substances  There are risks associated with opioid medications, including dependence, addiction and tolerance  The patient understands and agrees to use these medications only as prescribed  Potential side effects of the medications include, but are not limited to, constipation, drowsiness, addiction, impaired judgment and risk of fatal overdose if not taken as prescribed  The patient was warned against driving while taking sedation medications  Sharing medications is a felony  At this point in time, the patient is showing no signs of addiction, abuse, diversion or suicidal ideation          Pt Is on day 6 of butrans patch, changes on 9/03        History of Present Illness: The patient is a 71 y o  male last seen on June 2, 2020 who presents for a follow up office visit in regards to chronic pain secondary to lumbar radiculopathy and lumbar spondylosis  The patient currently reports pain rated 5/10 this is intermittent and described as dull, aching, throbbing, pressure-like, and numbness  Current pain medications includes:  Butrans patch 5 micro g along with Lyrica on an as-needed basis  The patient tells me he also uses vitamin B12 and D 2 times daily   The patient reports that this regimen is providing 20 % pain relief  The patient is reporting no side effects from this pain medication regimen  Pain Contract Signed: 1/30/20  Last Urine Drug Screen: 9/02/20  I have personally reviewed and/or updated the patient's past medical history, past surgical history, family history, social history, current medications, allergies, and vital signs today  Review of Systems:    Review of Systems   Respiratory: Negative for shortness of breath  Cardiovascular: Negative for chest pain  Gastrointestinal: Positive for constipation  Negative for diarrhea, nausea and vomiting  Musculoskeletal: Positive for back pain, gait problem, joint swelling and myalgias  Negative for arthralgias  Skin: Negative for rash  Neurological: Negative for dizziness, seizures and weakness  All other systems reviewed and are negative  Past Medical History:   Diagnosis Date    Chronic back pain     Chronic pain disorder     Depression     GERD (gastroesophageal reflux disease)     L4-L5 disc bulge 06/16/2017    Low back pain     Muscle weakness     Neuropathy     Osteoarthritis     Right foot drop        Past Surgical History:   Procedure Laterality Date    EGD AND COLONOSCOPY N/A 6/29/2018    Procedure: EGD AND COLONOSCOPY;  Surgeon: Reji Atkins MD;  Location: [facility];   Service: Gastroenterology    EPIDURAL BLOCK INJECTION      needle    ORTHOPEDIC SURGERY  [place] HIP [place]OPE/[place] BODY,PLASTY/RESECTN Left 4/30/2018    Procedure: LEFT HIP ARTHROSCOPIC LABRAL DEBRIDEMENT;  Surgeon: Saintclair Erie, MD;  [facility];  Service: Orthopedics    TONSILLECTOMY      WISDOM TOOTH EXTRACTION         Family History   Problem Relation Age of Onset    Osteoarthritis Family     Glaucoma Mother     No Known Problems Father        Social History     Occupational History    Not on file   Tobacco Use    Smoking status: Current Every Day Smoker     Packs/day: 0 50    Smokeless tobacco: Never Used    Tobacco comment: 1/2 a pack a day   Substance and Sexual Activity    Alcohol use: No    Drug use: No    Sexual activity: Not Currently         Current Outpatient Medications:     acetaminophen (TYLENOL) 500 mg tablet, Take 1,000 mg by mouth every 12 (twelve) hours as needed for mild pain, Disp: , Rfl:     ascorbic Acid (VITAMIN C) 500 MG CPCR, Take 500 mg by mouth daily, Disp: , Rfl:     cholecalciferol (VITAMIN D3) 1,000 units tablet, Take 3,000 Units by mouth daily , Disp: , Rfl:     clotrimazole (LOTRIMIN) 1 % cream, Apply 1 g (1 application total) topically daily To affected area, Disp: 60 g, Rfl: 2    Flaxseed, Linseed, (FLAX SEED OIL PO), Take 1 capsule by mouth daily  , Disp: , Rfl:     ibuprofen (MOTRIN) 600 mg tablet, Take 600 mg by mouth every 6 (six) hours as needed for mild pain, Disp: , Rfl:     METHYLCOBALAMIN SL, Place 1,000 mcg under the tongue, Disp: , Rfl:     pregabalin (LYRICA) 100 mg capsule, Take 1 capsule (100 mg total) by mouth 3 (three) times a day, Disp: 90 capsule, Rfl: 2    pyridoxine (B-6) 100 MG tablet, Take 100 mg by mouth daily, Disp: , Rfl:     transdermal buprenorphine (BUTRANS) 5 mcg/hr TD patch, Place 1 patch on the skin once a week Do not fill until 08/27/2020, Disp: 4 patch, Rfl: 2    Elastic Bandages & Supports (KNEE BRACE/HINGED/REGULAR) MISC, by Does not apply route daily as needed (B/L knee pain) B/L knee braces, Disp: 2 each, Rfl: 0    Allergies   Allergen Reactions    Gabapentin GI Intolerance    Tramadol Confusion    Morphine Other (See Comments)     constipation       Physical Exam:    /68   Pulse 72   Temp 97 8 °F (36 6 °C)   Resp 16   Ht 6' 1" (1 854 m)   Wt 85 3 kg (188 lb)   BMI 24 80 kg/m²     Constitutional:normal, well developed, well nourished, alert, in no distress and non-toxic and no overt pain behavior    Eyes:anicteric  HEENT:grossly intact  Neck:supple, symmetric, trachea midline and no masses   Pulmonary:even and unlabored  Cardiovascular:No edema or pitting edema present  Skin:Normal without rashes or lesions and well hydrated  Psychiatric:Mood and affect appropriate  Neurologic:Cranial Nerves II-XII grossly intact  Musculoskeletal:ambulates with cane      Imaging  No orders to display         Orders Placed This Encounter   Procedures    MM ALL_Prescribed Meds and Special Instructions    MM DT_Alprazolam Definitive Test    MM DT_Amphetamine Definitive Test    MM DT_Aripiprazole Definitive Test    MM DT_Bath Salts Definitive Test    MM DT_Buprenorphine Definitive Test    MM DT_Butalbital Definitive Test    MM DT_Clonazepam Definitive Test    MM DT_Clozapine Definitive Test    MM DT_Cocaine Definitive Test    MM DT_Codeine Definitive Test    MM DT_Desipramine Definitive Test    MM DT_Dextromethorphan Definitive Test    MM Diazepam Definitive Test    MM DT_Ethyl Glucuronide/Ethyl Sulfate Definitive Test    MM DT_Fentanyl Definitive Test    MM DT_Heroin Definitive Test    MM DT_Hydrocodone Definitive Test    MM DT_Hydromorphone Definitive Test    MM DT_Kratom Definitive Test    MM DT_Levorphanol Definitive Test    MM Lorazepam Definitive Test    MM DT_MDMA Definitive Test    MM DT_Meperidine Definitive Test    MM DT_Methadone Definitive Test    MM DT_Methamphetamine Definitive Test    MM DT_Methylphenidate Definitive Test    MM DT_Morphine Definitive Test    MM DT_Olanzapine Definitive Test    MM DT_Oxazepam Definitive Test    MM DT_Oxycodone Definitive Test    MM DT_Oxymorphone Definitive Test    MM DT_Phenobarbital Definitive Test    MM DT_Phentermine Definitive Test    MM DT_Secobarbital Definitive Test    MM DT_Spice Definitive Test    MM DT_Tapentadol Definitive Test    MM DT_Temazapam Definitive Test    MM DT_THC Definitive Test    MM DT_Tramadol Definitive Test    MM DT_Validity Creatinine    MM DT_Validity Oxidant    MM DT_Validity pH    MM DT_Validity Specific

## 2020-09-02 NOTE — PATIENT INSTRUCTIONS
Opioid Pain Management   AMBULATORY CARE:   An opioid  is a type of medicine used to treat pain  Examples of opioids are oxycodone, morphine, fentanyl, or codeine  Take opioid medicines as directed, for the condition it is prescribed:  Common problems that may occur when you do not take opioid medicines as directed include the following:  · Health problems  may occur  You may have trouble breathing  You may also develop liver or kidney damage, or stomach bleeding  Any of these health problems can become life-threatening  · Opioid dependence  means your body needs the opioid medicine to keep it from going through withdrawal      · Opioid tolerance  means the opioid does not control pain as well as it used to  You need higher doses of the opioid to get pain relief  · Opioid addiction  means you are not able to control the use of the opioid medicine  You use it when you do not have pain  You crave the opioid medicine  Call 911 or have someone call 911 for any of the following:   · You are breathing slower than normal, or you have trouble breathing  · You cannot be awakened  · You have a seizure  Seek care immediately if:   · Your heart is beating slower than usual     · Your heart feels like it is jumping or fluttering  · You are so sleepy that you cannot stay awake  · You have severe muscle pain or weakness  · You see or hear things that are not real   Contact your healthcare provider if:   · You are too dizzy to stand up  · Your pain gets worse or you have new pain  · You cannot do your usual activities because of side effects from the opioid  · You are constipated or have abdominal pain  · You have questions or concerns about your condition or care  Opioid safety measures:   · Take your medicine as directed  Ask if you need more information on how to take your medicine correctly  Follow up with your healthcare provider regularly  You may need to have your dose adjusted   Do not use opioid medicine if you are pregnant or breastfeeding  · Give your healthcare provider a list of all your medicines  Include any over-the-counter medicines, vitamins, and herbs  It can be dangerous to take opioids with certain other medicines, such as antihistamines  · Keep opioid medicine in a safe place  Store your opioid medicine in a locked cabinet to keep it away from children and others  · Do not drink alcohol while you use opioids  Alcohol use with an opioid medicine can make you sleepy and slow your breathing rate  You may stop breathing completely  · Do not drive or operate heavy machinery after you take opioid medicine  Opioid medicine can make you drowsy and make it hard to concentrate  You may injure yourself or others if you drive or operate heavy machinery while taking your medicine  · Drink fluids and eat high-fiber foods  Fluids and fiber will help prevent constipation  Ask your healthcare provider what fluids are right for you and how much you should drink  Also ask for a list of foods that contain fiber  Follow up with your healthcare provider as directed: You may need to have your dose adjusted  You may be referred to a pain specialist  Write down your questions so you remember to ask them during your visits  © 2017 [address] Information is for End User's use only and may not be sold, redistributed or otherwise used for commercial purposes  All illustrations and images included in CareNotes® are the copyrighted property of [employer]  or Emil Garcia  The above information is an  only  It is not intended as medical advice for individual conditions or treatments  Talk to your doctor, nurse or pharmacist before following any medical regimen to see if it is safe and effective for you

## 2020-09-04 LAB
6MAM UR QL CFM: NEGATIVE NG/ML
7AMINOCLONAZEPAM UR QL CFM: NEGATIVE NG/ML
A-OH ALPRAZ UR QL CFM: NEGATIVE NG/ML
ACCEPTABLE CREAT UR QL: NORMAL MG/DL
ACCEPTIBLE SP GR UR QL: NORMAL
AMPHET UR QL CFM: NEGATIVE NG/ML
AMPHET UR QL CFM: NEGATIVE NG/ML
BUPRENORPHINE UR QL CFM: ABNORMAL NG/ML
BUTALBITAL UR QL CFM: NEGATIVE NG/ML
BZE UR QL CFM: NEGATIVE NG/ML
CODEINE UR QL CFM: NEGATIVE NG/ML
DESIPRAMINE UR QL CFM: NEGATIVE NG/ML
EDDP UR QL CFM: NEGATIVE NG/ML
ETHYL GLUCURONIDE UR QL CFM: NEGATIVE NG/ML
ETHYL SULFATE UR QL SCN: NEGATIVE NG/ML
FENTANYL UR QL CFM: NEGATIVE NG/ML
GLIADIN IGG SER IA-ACNC: NEGATIVE NG/ML
GLUCOSE 30M P 50 G LAC PO SERPL-MCNC: NEGATIVE NG/ML
HYDROCODONE UR QL CFM: NEGATIVE NG/ML
HYDROCODONE UR QL CFM: NEGATIVE NG/ML
HYDROMORPHONE UR QL CFM: NEGATIVE NG/ML
LORAZEPAM UR QL CFM: NEGATIVE NG/ML
MDMA UR QL CFM: NEGATIVE NG/ML
ME-PHENIDATE UR QL CFM: NEGATIVE NG/ML
MEPERIDINE UR QL CFM: NEGATIVE NG/ML
MEPHEDRONE UR QL CFM: NEGATIVE NG/ML
METHADONE UR QL CFM: NEGATIVE NG/ML
METHAMPHET UR QL CFM: NEGATIVE NG/ML
MORPHINE UR QL CFM: NEGATIVE NG/ML
MORPHINE UR QL CFM: NEGATIVE NG/ML
NITRITE UR QL: NORMAL UG/ML
NORBUPRENORPHINE UR QL CFM: NEGATIVE NG/ML
NORDIAZEPAM UR QL CFM: NEGATIVE NG/ML
NORFENTANYL UR QL CFM: NEGATIVE NG/ML
NORHYDROCODONE UR QL CFM: NEGATIVE NG/ML
NORHYDROCODONE UR QL CFM: NEGATIVE NG/ML
NORMEPERIDINE UR QL CFM: NEGATIVE NG/ML
NOROXYCODONE UR QL CFM: NEGATIVE NG/ML
OLANZAPINE QUANTIFICATION: NEGATIVE NG/ML
OPC-3373 QUANTIFICATION: NEGATIVE
OXAZEPAM UR QL CFM: NEGATIVE NG/ML
OXYCODONE UR QL CFM: NEGATIVE NG/ML
OXYMORPHONE UR QL CFM: NEGATIVE NG/ML
OXYMORPHONE UR QL CFM: NEGATIVE NG/ML
PHENOBARB UR QL CFM: NEGATIVE NG/ML
RESULT ALL_PRESCRIBED MEDS AND SPECIAL INSTRUCTIONS: NORMAL
SECOBARBITAL UR QL CFM: NEGATIVE NG/ML
SL AMB 3-METHYL-FENTANYL QUANTIFICATION: NORMAL NG/ML
SL AMB 4-ANPP QUANTIFICATION: NORMAL NG/ML
SL AMB 4-FIBF QUANTIFICATION: NORMAL NG/ML
SL AMB 5F-ADB-M7 METABOLITE QUANTIFICATION: NEGATIVE NG/ML
SL AMB 7-OH-MITRAGYNINE (KRATOM ALKALOID) QUANTIFICATION: NEGATIVE NG/ML
SL AMB AB-FUBINACA-M3 METABOLITE QUANTIFICATION: NEGATIVE NG/ML
SL AMB ACETYL FENTANYL QUANTIFICATION: NORMAL NG/ML
SL AMB ACETYL NORFENTANYL QUANTIFICATION: NORMAL NG/ML
SL AMB ACRYL FENTANYL QUANTIFICATION: NORMAL NG/ML
SL AMB BATH SALTS: NEGATIVE NG/ML
SL AMB BUTRYL FENTANYL QUANTIFICATION: NORMAL NG/ML
SL AMB CARFENTANIL QUANTIFICATION: NORMAL NG/ML
SL AMB CLOZAPINE QUANTIFICATION: NEGATIVE NG/ML
SL AMB CTHC (MARIJUANA METABOLITE) QUANTIFICATION: NEGATIVE NG/ML
SL AMB CYCLOPROPYL FENTANYL QUANTIFICATION: NORMAL NG/ML
SL AMB DEXTROMETHORPHAN QUANTIFICATION: NEGATIVE NG/ML
SL AMB DEXTRORPHAN (DEXTROMETHORPHAN METABOLITE) QUANT: NEGATIVE NG/ML
SL AMB DEXTRORPHAN (DEXTROMETHORPHAN METABOLITE) QUANT: NEGATIVE NG/ML
SL AMB FURANYL FENTANYL QUANTIFICATION: NORMAL NG/ML
SL AMB JWH018 METABOLITE QUANTIFICATION: NEGATIVE NG/ML
SL AMB JWH073 METABOLITE QUANTIFICATION: NEGATIVE NG/ML
SL AMB MDMB-FUBINACA-M1 METABOLITE QUANTIFICATION: NEGATIVE NG/ML
SL AMB METHOXYACETYL FENTANYL QUANTIFICATION: NORMAL NG/ML
SL AMB METHYLONE QUANTIFICATION: NEGATIVE NG/ML
SL AMB N-DESMETHYL U-47700 QUANTIFICATION: NORMAL NG/ML
SL AMB N-DESMETHYL-TRAMADOL QUANTIFICATION: NEGATIVE NG/ML
SL AMB N-DESMETHYLCLOZAPINE QUANTIFICATION: NEGATIVE NG/ML
SL AMB PHENTERMINE QUANTIFICATION: NEGATIVE NG/ML
SL AMB RCS4 METABOLITE QUANTIFICATION: NEGATIVE NG/ML
SL AMB RITALINIC ACID QUANTIFICATION: NEGATIVE NG/ML
SL AMB U-47700 QUANTIFICATION: NORMAL NG/ML
SPECIMEN DRAWN SERPL: NEGATIVE NG/ML
SPECIMEN PH ACCEPTABLE UR: NORMAL
TAPENTADOL UR QL CFM: NEGATIVE NG/ML
TEMAZEPAM UR QL CFM: NEGATIVE NG/ML
TEMAZEPAM UR QL CFM: NEGATIVE NG/ML
TRAMADOL UR QL CFM: NEGATIVE NG/ML
URATE/CREAT 24H UR: NEGATIVE NG/ML

## 2020-11-06 NOTE — TELEPHONE ENCOUNTER
--please advise thank you- Patient called back again asking if she can be tested for covid due to exposure.

## 2020-11-18 LAB
ALBUMIN SERPL-MCNC: 4 G/DL (ref 3.6–5.1)
ALBUMIN/GLOB SERPL: 1.7 (CALC) (ref 1–2.5)
ALP SERPL-CCNC: 58 U/L (ref 35–144)
ALT SERPL-CCNC: 19 U/L (ref 9–46)
AST SERPL-CCNC: 21 U/L (ref 10–35)
BILIRUB SERPL-MCNC: 0.5 MG/DL (ref 0.2–1.2)
BUN SERPL-MCNC: 18 MG/DL (ref 7–25)
BUN/CREAT SERPL: ABNORMAL (CALC) (ref 6–22)
CALCIUM SERPL-MCNC: 9.6 MG/DL (ref 8.6–10.3)
CHLORIDE SERPL-SCNC: 108 MMOL/L (ref 98–110)
CHOLEST SERPL-MCNC: 205 MG/DL
CHOLEST/HDLC SERPL: 3.6 (CALC)
CO2 SERPL-SCNC: 26 MMOL/L (ref 20–32)
CREAT SERPL-MCNC: 0.93 MG/DL (ref 0.7–1.25)
ERYTHROCYTE [DISTWIDTH] IN BLOOD BY AUTOMATED COUNT: 13.2 % (ref 11–15)
GLOBULIN SER CALC-MCNC: 2.4 G/DL (CALC) (ref 1.9–3.7)
GLUCOSE SERPL-MCNC: 108 MG/DL (ref 65–99)
HCT VFR BLD AUTO: 47.7 % (ref 38.5–50)
HDLC SERPL-MCNC: 57 MG/DL
HGB BLD-MCNC: 16.1 G/DL (ref 13.2–17.1)
LDLC SERPL CALC-MCNC: 128 MG/DL (CALC)
MCH RBC QN AUTO: 31.4 PG (ref 27–33)
MCHC RBC AUTO-ENTMCNC: 33.8 G/DL (ref 32–36)
MCV RBC AUTO: 93.2 FL (ref 80–100)
NONHDLC SERPL-MCNC: 148 MG/DL (CALC)
PLATELET # BLD AUTO: 411 THOUSAND/UL (ref 140–400)
PMV BLD REES-ECKER: 10.1 FL (ref 7.5–12.5)
POTASSIUM SERPL-SCNC: 4.5 MMOL/L (ref 3.5–5.3)
PROT SERPL-MCNC: 6.4 G/DL (ref 6.1–8.1)
PSA SERPL-MCNC: 1.8 NG/ML
RBC # BLD AUTO: 5.12 MILLION/UL (ref 4.2–5.8)
SL AMB EGFR AFRICAN AMERICAN: 99 ML/MIN/1.73M2
SL AMB EGFR NON AFRICAN AMERICAN: 85 ML/MIN/1.73M2
SODIUM SERPL-SCNC: 141 MMOL/L (ref 135–146)
TRIGL SERPL-MCNC: 97 MG/DL
WBC # BLD AUTO: 11.6 THOUSAND/UL (ref 3.8–10.8)

## 2020-11-25 ENCOUNTER — OFFICE VISIT (OUTPATIENT)
Dept: PAIN MEDICINE | Facility: MEDICAL CENTER | Age: 65
End: 2020-11-25
Payer: MEDICARE

## 2020-11-25 ENCOUNTER — OFFICE VISIT (OUTPATIENT)
Dept: INTERNAL MEDICINE CLINIC | Facility: CLINIC | Age: 65
End: 2020-11-25
Payer: MEDICARE

## 2020-11-25 VITALS
OXYGEN SATURATION: 100 % | BODY MASS INDEX: 23.5 KG/M2 | DIASTOLIC BLOOD PRESSURE: 60 MMHG | HEART RATE: 87 BPM | HEIGHT: 75 IN | WEIGHT: 189 LBS | SYSTOLIC BLOOD PRESSURE: 100 MMHG | TEMPERATURE: 98.7 F

## 2020-11-25 VITALS
HEIGHT: 73 IN | DIASTOLIC BLOOD PRESSURE: 65 MMHG | SYSTOLIC BLOOD PRESSURE: 103 MMHG | WEIGHT: 187 LBS | RESPIRATION RATE: 18 BRPM | HEART RATE: 92 BPM | TEMPERATURE: 98.2 F | BODY MASS INDEX: 24.78 KG/M2

## 2020-11-25 DIAGNOSIS — M54.50 CHRONIC BILATERAL LOW BACK PAIN WITHOUT SCIATICA: ICD-10-CM

## 2020-11-25 DIAGNOSIS — K21.9 GASTROESOPHAGEAL REFLUX DISEASE WITHOUT ESOPHAGITIS: ICD-10-CM

## 2020-11-25 DIAGNOSIS — M25.512 CHRONIC PAIN OF BOTH SHOULDERS: ICD-10-CM

## 2020-11-25 DIAGNOSIS — G89.29 CHRONIC BILATERAL LOW BACK PAIN WITHOUT SCIATICA: ICD-10-CM

## 2020-11-25 DIAGNOSIS — G89.29 CHRONIC PAIN OF BOTH SHOULDERS: ICD-10-CM

## 2020-11-25 DIAGNOSIS — Z23 NEEDS FLU SHOT: ICD-10-CM

## 2020-11-25 DIAGNOSIS — E55.9 VITAMIN D DEFICIENCY: ICD-10-CM

## 2020-11-25 DIAGNOSIS — G89.29 CHRONIC PAIN OF BOTH KNEES: ICD-10-CM

## 2020-11-25 DIAGNOSIS — M54.6 THORACIC SPINE PAIN: ICD-10-CM

## 2020-11-25 DIAGNOSIS — M25.511 CHRONIC PAIN OF BOTH SHOULDERS: ICD-10-CM

## 2020-11-25 DIAGNOSIS — Z13.1 SCREENING FOR DIABETES MELLITUS: ICD-10-CM

## 2020-11-25 DIAGNOSIS — Z12.12 SCREENING FOR COLORECTAL CANCER: ICD-10-CM

## 2020-11-25 DIAGNOSIS — Z23 NEED FOR PNEUMOCOCCAL VACCINATION: ICD-10-CM

## 2020-11-25 DIAGNOSIS — M25.562 CHRONIC PAIN OF BOTH KNEES: ICD-10-CM

## 2020-11-25 DIAGNOSIS — Z12.11 SCREENING FOR COLORECTAL CANCER: ICD-10-CM

## 2020-11-25 DIAGNOSIS — G62.9 NEUROPATHY: ICD-10-CM

## 2020-11-25 DIAGNOSIS — Z12.2 ENCOUNTER FOR SCREENING FOR LUNG CANCER: ICD-10-CM

## 2020-11-25 DIAGNOSIS — M25.561 CHRONIC PAIN OF BOTH KNEES: ICD-10-CM

## 2020-11-25 DIAGNOSIS — G89.4 CHRONIC PAIN SYNDROME: ICD-10-CM

## 2020-11-25 DIAGNOSIS — Z13.6 SCREENING FOR CARDIOVASCULAR CONDITION: ICD-10-CM

## 2020-11-25 DIAGNOSIS — Z12.5 SCREENING FOR PROSTATE CANCER: ICD-10-CM

## 2020-11-25 DIAGNOSIS — M47.816 LUMBAR SPONDYLOSIS: ICD-10-CM

## 2020-11-25 DIAGNOSIS — Z72.0 TOBACCO ABUSE: ICD-10-CM

## 2020-11-25 DIAGNOSIS — Z00.00 MEDICARE ANNUAL WELLNESS VISIT, SUBSEQUENT: ICD-10-CM

## 2020-11-25 DIAGNOSIS — M47.816 LUMBAR SPONDYLOSIS: Primary | ICD-10-CM

## 2020-11-25 DIAGNOSIS — M54.16 LUMBAR RADICULOPATHY: ICD-10-CM

## 2020-11-25 DIAGNOSIS — M13.0 POLYARTHROPATHY: ICD-10-CM

## 2020-11-25 DIAGNOSIS — E78.2 MIXED HYPERLIPIDEMIA: Primary | ICD-10-CM

## 2020-11-25 DIAGNOSIS — R91.8 MULTIPLE SUBSOLID LUNG NODULES GREATER THAN 6 MM IN DIAMETER: ICD-10-CM

## 2020-11-25 PROBLEM — Z02.71 DISABILITY EXAMINATION: Status: RESOLVED | Noted: 2018-02-20 | Resolved: 2020-11-25

## 2020-11-25 PROBLEM — R97.20 ELEVATED PSA: Status: RESOLVED | Noted: 2019-11-21 | Resolved: 2020-11-25

## 2020-11-25 PROCEDURE — 99214 OFFICE O/P EST MOD 30 MIN: CPT | Performed by: INTERNAL MEDICINE

## 2020-11-25 PROCEDURE — 90662 IIV NO PRSV INCREASED AG IM: CPT | Performed by: INTERNAL MEDICINE

## 2020-11-25 PROCEDURE — G0009 ADMIN PNEUMOCOCCAL VACCINE: HCPCS | Performed by: INTERNAL MEDICINE

## 2020-11-25 PROCEDURE — G0008 ADMIN INFLUENZA VIRUS VAC: HCPCS | Performed by: INTERNAL MEDICINE

## 2020-11-25 PROCEDURE — G0439 PPPS, SUBSEQ VISIT: HCPCS | Performed by: INTERNAL MEDICINE

## 2020-11-25 PROCEDURE — 99214 OFFICE O/P EST MOD 30 MIN: CPT | Performed by: NURSE PRACTITIONER

## 2020-11-25 PROCEDURE — 90732 PPSV23 VACC 2 YRS+ SUBQ/IM: CPT | Performed by: INTERNAL MEDICINE

## 2020-11-25 RX ORDER — BUPRENORPHINE 5 UG/H
1 PATCH TRANSDERMAL WEEKLY
Qty: 4 PATCH | Refills: 2 | Status: SHIPPED | OUTPATIENT
Start: 2020-11-25 | End: 2020-12-16 | Stop reason: SDUPTHER

## 2020-11-25 RX ORDER — ATORVASTATIN CALCIUM 10 MG/1
5 TABLET, FILM COATED ORAL
Qty: 45 TABLET | Refills: 1 | Status: SHIPPED | OUTPATIENT
Start: 2020-11-25 | End: 2021-05-24 | Stop reason: SDUPTHER

## 2020-12-16 ENCOUNTER — TELEPHONE (OUTPATIENT)
Dept: PAIN MEDICINE | Facility: MEDICAL CENTER | Age: 65
End: 2020-12-16

## 2020-12-16 DIAGNOSIS — G89.29 CHRONIC BILATERAL LOW BACK PAIN WITHOUT SCIATICA: ICD-10-CM

## 2020-12-16 DIAGNOSIS — G89.4 CHRONIC PAIN SYNDROME: ICD-10-CM

## 2020-12-16 DIAGNOSIS — M54.16 LUMBAR RADICULOPATHY: ICD-10-CM

## 2020-12-16 DIAGNOSIS — G89.29 CHRONIC PAIN OF BOTH KNEES: ICD-10-CM

## 2020-12-16 DIAGNOSIS — M25.561 CHRONIC PAIN OF BOTH KNEES: ICD-10-CM

## 2020-12-16 DIAGNOSIS — G89.29 CHRONIC PAIN OF BOTH SHOULDERS: ICD-10-CM

## 2020-12-16 DIAGNOSIS — M25.511 CHRONIC PAIN OF BOTH SHOULDERS: ICD-10-CM

## 2020-12-16 DIAGNOSIS — M25.512 CHRONIC PAIN OF BOTH SHOULDERS: ICD-10-CM

## 2020-12-16 DIAGNOSIS — M54.50 CHRONIC BILATERAL LOW BACK PAIN WITHOUT SCIATICA: ICD-10-CM

## 2020-12-16 DIAGNOSIS — M25.562 CHRONIC PAIN OF BOTH KNEES: ICD-10-CM

## 2020-12-16 DIAGNOSIS — G62.9 NEUROPATHY: ICD-10-CM

## 2020-12-16 RX ORDER — BUPRENORPHINE 5 UG/H
1 PATCH TRANSDERMAL WEEKLY
Qty: 4 PATCH | Refills: 2 | Status: SHIPPED | OUTPATIENT
Start: 2020-12-16 | End: 2021-02-17 | Stop reason: SDUPTHER

## 2021-02-17 ENCOUNTER — OFFICE VISIT (OUTPATIENT)
Dept: PAIN MEDICINE | Facility: MEDICAL CENTER | Age: 66
End: 2021-02-17
Payer: COMMERCIAL

## 2021-02-17 VITALS
SYSTOLIC BLOOD PRESSURE: 125 MMHG | RESPIRATION RATE: 16 BRPM | WEIGHT: 190 LBS | TEMPERATURE: 98.5 F | DIASTOLIC BLOOD PRESSURE: 88 MMHG | BODY MASS INDEX: 23.62 KG/M2 | HEIGHT: 75 IN | HEART RATE: 76 BPM

## 2021-02-17 DIAGNOSIS — M25.511 CHRONIC PAIN OF BOTH SHOULDERS: ICD-10-CM

## 2021-02-17 DIAGNOSIS — G62.9 NEUROPATHY: ICD-10-CM

## 2021-02-17 DIAGNOSIS — Z79.891 LONG-TERM CURRENT USE OF OPIATE ANALGESIC: Primary | ICD-10-CM

## 2021-02-17 DIAGNOSIS — M25.562 CHRONIC PAIN OF BOTH KNEES: ICD-10-CM

## 2021-02-17 DIAGNOSIS — M47.816 LUMBAR SPONDYLOSIS: ICD-10-CM

## 2021-02-17 DIAGNOSIS — G89.29 CHRONIC PAIN OF BOTH KNEES: ICD-10-CM

## 2021-02-17 DIAGNOSIS — F11.20 UNCOMPLICATED OPIOID DEPENDENCE (HCC): ICD-10-CM

## 2021-02-17 DIAGNOSIS — M25.561 CHRONIC PAIN OF BOTH KNEES: ICD-10-CM

## 2021-02-17 DIAGNOSIS — G89.29 CHRONIC PAIN OF BOTH SHOULDERS: ICD-10-CM

## 2021-02-17 DIAGNOSIS — G89.4 CHRONIC PAIN SYNDROME: ICD-10-CM

## 2021-02-17 DIAGNOSIS — G89.29 CHRONIC BILATERAL LOW BACK PAIN WITHOUT SCIATICA: ICD-10-CM

## 2021-02-17 DIAGNOSIS — M54.16 LUMBAR RADICULOPATHY: ICD-10-CM

## 2021-02-17 DIAGNOSIS — M54.50 CHRONIC BILATERAL LOW BACK PAIN WITHOUT SCIATICA: ICD-10-CM

## 2021-02-17 DIAGNOSIS — M25.512 CHRONIC PAIN OF BOTH SHOULDERS: ICD-10-CM

## 2021-02-17 PROCEDURE — 99214 OFFICE O/P EST MOD 30 MIN: CPT | Performed by: NURSE PRACTITIONER

## 2021-02-17 PROCEDURE — 3008F BODY MASS INDEX DOCD: CPT | Performed by: NURSE PRACTITIONER

## 2021-02-17 PROCEDURE — 1160F RVW MEDS BY RX/DR IN RCRD: CPT | Performed by: NURSE PRACTITIONER

## 2021-02-17 PROCEDURE — 80305 DRUG TEST PRSMV DIR OPT OBS: CPT | Performed by: NURSE PRACTITIONER

## 2021-02-17 PROCEDURE — 4004F PT TOBACCO SCREEN RCVD TLK: CPT | Performed by: NURSE PRACTITIONER

## 2021-02-17 RX ORDER — BUPRENORPHINE 5 UG/H
1 PATCH TRANSDERMAL WEEKLY
Qty: 4 PATCH | Refills: 2 | Status: SHIPPED | OUTPATIENT
Start: 2021-02-17 | End: 2021-05-12 | Stop reason: SDUPTHER

## 2021-02-17 NOTE — PROGRESS NOTES
Assessment:  1  Long-term current use of opiate analgesic    2  Chronic pain syndrome    3  Uncomplicated opioid dependence ([address] )    4  Lumbar radiculopathy - Right    5  Neuropathy    6  Lumbar spondylosis - Bilateral    7  Chronic bilateral low back pain without sciatica    8  Chronic pain of both knees    9  Chronic pain of both shoulders        Plan:   continue Butrans patch as prescribed this was refilled for the next 3 months  Continue with Lyrica he does not require refills today  While the patient was in the office today an opioid contract was thoroughly reviewed and signed by the patient  The patient was given adequate time to ask questions in regards to the contract and a signed copy was sent home for his/her records  Follow-up visit in 3 months for medication refills  [place] Prescription Drug Monitoring Program report was reviewed and was appropriate     A urine drug screen was collected at today's office visit as part of our medication management protocol  The point of care testing results were appropriate for what was being prescribed  The specimen will be sent for confirmatory testing  The drug screen is medically necessary because the patient is either dependent on opioid medication or is being considered for opioid medication therapy and the results could impact ongoing or future treatment  The drug screen is to evaluate for the presences or absence of prescribed, non-prescribed, and/or illicit drugs/substances  There are risks associated with opioid medications, including dependence, addiction and tolerance  The patient understands and agrees to use these medications only as prescribed  Potential side effects of the medications include, but are not limited to, constipation, drowsiness, addiction, impaired judgment and risk of fatal overdose if not taken as prescribed  The patient was warned against driving while taking sedation medications  Sharing medications is a felony  At this point in time, the patient is showing no signs of addiction, abuse, diversion or suicidal ideation  History of Present Illness: The patient is a 76 y o  male last seen on  November 25, 2020 who presents for a follow up office visit in regards to chronic pain secondary to  Lumbar spondylosis, right lumbar radiculopathy, thoracic spine pain, chronic pain both shoulders and both knees  The patient currently reports, and neuropathy pain rated 6 to 7/10 this is intermittent and very dependent on the weather  He describes his pain as sharp, throbbing, and pins and needles  Current pain medications includes: Butrans patch 5 micro g, and Lyrica 100 mg as needed    The patient reports that this regimen is providing  20-30% pain relief  The patient is reporting no side effects from this pain medication regimen  Pt is on day 6 of Butrans Patch    Pain Contract Signed: 2/17/21  Last Urine Drug Screen: 02/17/21    I have personally reviewed and/or updated the patient's past medical history, past surgical history, family history, social history, current medications, allergies, and vital signs today  Review of Systems:    Review of Systems   Respiratory: Negative for shortness of breath  Cardiovascular: Negative for chest pain  Gastrointestinal: Positive for constipation  Negative for diarrhea, nausea and vomiting  Musculoskeletal: Positive for back pain, gait problem, joint swelling (knees) and myalgias  Negative for arthralgias  Skin: Negative for rash  Neurological: Negative for dizziness, seizures and weakness  All other systems reviewed and are negative          Past Medical History:   Diagnosis Date    Chronic back pain     Chronic pain disorder     Depression     GERD (gastroesophageal reflux disease)     L4-L5 disc bulge 06/16/2017    Low back pain     Muscle weakness     Neuropathy     Osteoarthritis     Right foot drop        Past Surgical History:   Procedure Laterality Date    EGD AND COLONOSCOPY N/A 6/29/2018    Procedure: EGD AND COLONOSCOPY;  Surgeon: Serena Gómez MD;  Location: [facility];   Service: Gastroenterology    EPIDURAL BLOCK INJECTION      needle    ORTHOPEDIC SURGERY      [place] HIP [address] BODY,PLASTY/RESECTN Left 4/30/2018    Procedure: LEFT HIP ARTHROSCOPIC LABRAL DEBRIDEMENT;  Surgeon: Anand Rodarte MD;  [facility];  Service: Orthopedics    TONSILLECTOMY      WISDOM TOOTH EXTRACTION         Family History   Problem Relation Age of Onset    Osteoarthritis Family     Glaucoma Mother     No Known Problems Father        Social History     Occupational History    Not on file   Tobacco Use    Smoking status: Current Every Day Smoker     Packs/day: 0 50    Smokeless tobacco: Never Used    Tobacco comment: 1/2 a pack a day   Substance and Sexual Activity    Alcohol use: No    Drug use: No    Sexual activity: Not Currently         Current Outpatient Medications:     acetaminophen (TYLENOL) 500 mg tablet, Take 1,000 mg by mouth every 12 (twelve) hours as needed for mild pain, Disp: , Rfl:     ascorbic Acid (VITAMIN C) 500 MG CPCR, Take 500 mg by mouth daily, Disp: , Rfl:     atorvastatin (LIPITOR) 10 mg tablet, Take 0 5 tablets (5 mg total) by mouth daily at bedtime, Disp: 45 tablet, Rfl: 1    cholecalciferol (VITAMIN D3) 1,000 units tablet, Take 3,000 Units by mouth daily , Disp: , Rfl:     clotrimazole (LOTRIMIN) 1 % cream, Apply 1 g (1 application total) topically daily To affected area, Disp: 60 g, Rfl: 2    Flaxseed, Linseed, (FLAX SEED OIL PO), Take 1 capsule by mouth daily  , Disp: , Rfl:     ibuprofen (MOTRIN) 600 mg tablet, Take 600 mg by mouth every 6 (six) hours as needed for mild pain, Disp: , Rfl:     METHYLCOBALAMIN SL, Place 1,000 mcg under the tongue, Disp: , Rfl:     pregabalin (LYRICA) 100 mg capsule, Take 1 capsule (100 mg total) by mouth 3 (three) times a day, Disp: 90 capsule, Rfl: 2    pyridoxine (B-6) 100 MG tablet, Take 100 mg by mouth daily, Disp: , Rfl:     transdermal buprenorphine (BUTRANS) 5 mcg/hr TD patch, Place 1 patch on the skin once a week Do not fill until 08/27/2020, Disp: 4 patch, Rfl: 2    Elastic Bandages & Supports (KNEE BRACE/HINGED/REGULAR) MISC, by Does not apply route daily as needed (B/L knee pain) B/L knee braces, Disp: 2 each, Rfl: 0    Allergies   Allergen Reactions    Gabapentin GI Intolerance    Tramadol Confusion    Morphine Other (See Comments)     constipation       Physical Exam:    /88   Pulse 76   Temp 98 5 °F (36 9 °C)   Resp 16   Ht 6' 3" (1 905 m)   Wt 86 2 kg (190 lb)   BMI 23 75 kg/m²     Constitutional:normal, well developed, well nourished, alert, in no distress and non-toxic and no overt pain behavior    Eyes:anicteric  HEENT:grossly intact  Neck:supple, symmetric, trachea midline and no masses   Pulmonary:even and unlabored  Cardiovascular:No edema or pitting edema present  Skin:Normal without rashes or lesions and well hydrated  Psychiatric:Mood and affect appropriate  Neurologic:Cranial Nerves II-XII grossly intact  Musculoskeletal:ambulates with cane      Imaging  No orders to display         Orders Placed This Encounter   Procedures    MM ALL_Prescribed Meds and Special Instructions    MM DT_Alprazolam Definitive Test    MM DT_Amphetamine Definitive Test    MM DT_Aripiprazole Definitive Test    MM DT_Bath Salts Definitive Test    MM DT_Buprenorphine Definitive Test    MM DT_Butalbital Definitive Test    MM DT_Clonazepam Definitive Test    MM DT_Clozapine Definitive Test    MM DT_Cocaine Definitive Test    MM DT_Codeine Definitive Test    MM DT_Desipramine Definitive Test    MM DT_Dextromethorphan Definitive Test    MM Diazepam Definitive Test    MM DT_Ethyl Glucuronide/Ethyl Sulfate Definitive Test    MM DT_Fentanyl Definitive Test    MM DT_Heroin Definitive Test    MM DT_Hydrocodone Definitive Test    MM DT_Hydromorphone Definitive Test    MM DT_Kratom Definitive Test    MM DT_Levorphanol Definitive Test    MM Lorazepam Definitive Test    MM DT_MDMA Definitive Test    MM DT_Meperidine Definitive Test    MM DT_Methadone Definitive Test    MM DT_Methamphetamine Definitive Test    MM DT_Methylphenidate Definitive Test    MM DT_Morphine Definitive Test    MM DT_Olanzapine Definitive Test    MM DT_Oxazepam Definitive Test    MM DT_Oxycodone Definitive Test    MM DT_Oxymorphone Definitive Test    MM DT_Phenobarbital Definitive Test    MM DT_Phentermine Definitive Test    MM DT_Secobarbital Definitive Test    MM DT_Spice Definitive Test    MM DT_Tapentadol Definitive Test    MM DT_Temazapam Definitive Test    MM DT_THC Definitive Test    MM DT_Tramadol Definitive Test    MM DT_Validity Specific    MM DT_Validity pH    MM DT_Validity Oxidant    MM DT_Validity Creatinine

## 2021-02-17 NOTE — PATIENT INSTRUCTIONS
Opioid Safety   AMBULATORY CARE:   Opioid safety  includes the correct use, storage, and disposal of opioids  Examples of opioid medicines to treat pain include oxycodone, morphine, fentanyl, and codeine  Call your local emergency number (911 in the [address]), or have someone else call if:   · You have a seizure  · You cannot be woken  · You have trouble staying awake and your breathing is slow or shallow  · Your speech is slurred, or you are confused  · You are dizzy or stumble when you walk  Call your doctor, or have someone close to you call if:   · You are extremely drowsy, or you have trouble staying awake or speaking  · You have pale or clammy skin  · You have blue fingernails or lips  · Your heartbeat is slower than normal     · You cannot stop vomiting  · You have questions or concerns about your condition or care  Use opioids safely:   · Take prescribed opioids exactly as directed  Opioids come with directions based on the kind of opioid and how it is given  Do not take more than the recommended amount, or for longer than needed  · Do not give opioids to others or take opioids that belong to someone else  Misuse of opioids can lead to an addiction or overdose  · Do not mix opioids with other medicines or alcohol  The combination can cause an overdose, or lead to a coma  · Do not drive or operate heavy machinery after you take the opioid  Your provider or pharmacist can tell you how long to wait after a dose before you do these activities  · Talk to your healthcare provider if you have any side effects  He or she can help you prevent or relieve side effects  Side effects include nausea, sleepiness, itching, and trouble thinking clearly  Manage constipation:  Constipation is the most common side effect of opioid medicine  Constipation is when you have hard, dry bowel movements, or you go longer than usual between bowel movements   Tell your healthcare provider about all changes in your bowel movements while you are taking opioids  He or she may recommend laxative medicine to help you have a bowel movement  He or she may also change the kind of opioid you are taking, or change when you take it  The following are more ways you can prevent or relieve constipation:  · Drink liquids as directed  You may need to drink extra liquids to help soften and move your bowels  Ask how much liquid to drink each day and which liquids are best for you  · Eat high-fiber foods  This may help decrease constipation by adding bulk to your bowel movements  High-fiber foods include fruits, vegetables, whole-grain breads and cereals, and beans  Your healthcare provider or dietitian can help you create a high-fiber meal plan  Your provider may also recommend a fiber supplement if you cannot get enough fiber from food  · Exercise regularly  Regular physical activity can help stimulate your intestines  Walking is a good exercise to prevent or relieve constipation  Ask which exercises are best for you  · Schedule a time each day to have a bowel movement  This may help train your body to have regular bowel movements  Bend forward while you are on the toilet to help move the bowel movement out  Sit on the toilet for at least 10 minutes, even if you do not have a bowel movement  Store opioids safely:   · Store opioids where others cannot easily get them  Keep them in a locked cabinet or secure area  Do not  keep them in a purse or other bag you carry with you  A person may be looking for something else and find the opioids  · Make sure opioids are stored out of the reach of children  A child can easily overdose on opioids  Opioids may look like candy to a small child  The best way to dispose of opioids: The laws vary by country and area   In the [place], the best way is to return the opioids through a take-back program  This program is offered by the [place] [employer] ([address] [facility])  The following are options for using the program:  · Take the opioids to a [facility] collection site  The site is often a law enforcement center  Call your local law enforcement center for scheduled take-back days in your area  You will be given information on where to go if the collection site is in a different location  · Take the opioids to an approved pharmacy or hospital   A pharmacy or hospital may be set up as a collection site  You will need to ask if it is a [facility] collection site if you were not directed there  A pharmacy or doctor's office may not be able to take back opioids unless it is a [employer]LY site  · Use a mail-back system  This means you are given containers to put the opioids into  You will then mail them in the containers  · Use a take-back drop box  This is a place to leave the opioids at any time  People and animals will not be able to get into the box  Your local law enforcement agency can tell you where to find a drop box in your area  Other safe ways to dispose of opioids: The medicine may come with disposal instructions  The instructions may vary depending on the brand of medicine you are using  Instructions may come in a Medication Guide, but not every medicine has one  You may instead get instructions from your pharmacy or doctor  Follow instructions carefully  The following are general guidelines to follow:  · Find out if you can flush the opioid  Some opioids can be flushed down the toilet or poured into the sink  You will need to contact authorities in your area to see if this is an option for you  The FDA also offers a list of medicines that are safe to flush down the toilet  You can check the list if you cannot get the information for your local area  · Ask your waste management company about rules for putting opioids in the trash  The company will be able to give you specific directions   Scratch out personal information on the original medicine label so it cannot be read  Then put it in the trash  Do not label the trash or put any information on it about the opioids  It should look like regular household trash so no one is tempted to look for the opioids  Keep the trash out of the reach of children and animals  Always make sure trash is secure  · Talk to officials if you live in a facility  If you live in a nursing home or assisted living center, talk to an official  The person will know the rules for your area  Other ways to manage pain:   · Ask your healthcare provider about non-opioid medicines to control pain  Nonprescription medicines include NSAIDs (such as ibuprofen) and acetaminophen  Prescription medicines include muscle relaxers, antidepressants, and steroids  · Pain may be managed without any medicines  Some ways to relieve pain include massage, aromatherapy, or meditation  Physical or occupational therapy may also help  For more information:   · Drug Enforcement Administration  [facility] [facility] [address]  Phone: 1- 938 - [phone]  Web Address: [facility]/drug_disposal/    · [address]  and Drug Administration  Harman Gina  [place] , [address] [facility]  Phone: 8- 287 - [phone]  Web Address: http://[employer]/  Follow up with your doctor or pain specialist as directed: You may need to have your dose adjusted  Your doctor or pain specialist can also help you find ways to manage pain without opioids  Write down your questions so you remember to ask them during your visits  © Copyright [address] [place] [address] Drive Information is for End User's use only and may not be sold, redistributed or otherwise used for commercial purposes  All illustrations and images included in CareNotes® are the copyrighted property of [employer]  or [facility] Lucy Tavares   The above information is an  only  It is not intended as medical advice for individual conditions or treatments   Talk to your doctor, nurse or pharmacist before following any medical regimen to see if it is safe and effective for you

## 2021-02-20 LAB
6MAM UR QL CFM: NEGATIVE NG/ML
7AMINOCLONAZEPAM UR QL CFM: NEGATIVE NG/ML
A-OH ALPRAZ UR QL CFM: NEGATIVE NG/ML
ACCEPTABLE CREAT UR QL: NORMAL MG/DL
ACCEPTIBLE SP GR UR QL: NORMAL
AMPHET UR QL CFM: NEGATIVE NG/ML
AMPHET UR QL CFM: NEGATIVE NG/ML
BUPRENORPHINE UR QL CFM: ABNORMAL NG/ML
BUTALBITAL UR QL CFM: NEGATIVE NG/ML
BZE UR QL CFM: NEGATIVE NG/ML
CODEINE UR QL CFM: NEGATIVE NG/ML
DESIPRAMINE UR QL CFM: NEGATIVE NG/ML
EDDP UR QL CFM: NEGATIVE NG/ML
ETHYL GLUCURONIDE UR QL CFM: NEGATIVE NG/ML
ETHYL SULFATE UR QL SCN: NEGATIVE NG/ML
FENTANYL UR QL CFM: NEGATIVE NG/ML
GLIADIN IGG SER IA-ACNC: NEGATIVE NG/ML
GLUCOSE 30M P 50 G LAC PO SERPL-MCNC: NEGATIVE NG/ML
HYDROCODONE UR QL CFM: NEGATIVE NG/ML
HYDROCODONE UR QL CFM: NEGATIVE NG/ML
HYDROMORPHONE UR QL CFM: NEGATIVE NG/ML
LORAZEPAM UR QL CFM: NEGATIVE NG/ML
MDMA UR QL CFM: NEGATIVE NG/ML
ME-PHENIDATE UR QL CFM: NEGATIVE NG/ML
MEPERIDINE UR QL CFM: NEGATIVE NG/ML
MEPHEDRONE UR QL CFM: NEGATIVE NG/ML
METHADONE UR QL CFM: NEGATIVE NG/ML
METHAMPHET UR QL CFM: NEGATIVE NG/ML
MORPHINE UR QL CFM: NEGATIVE NG/ML
MORPHINE UR QL CFM: NEGATIVE NG/ML
NITRITE UR QL: NORMAL UG/ML
NORBUPRENORPHINE UR QL CFM: ABNORMAL NG/ML
NORDIAZEPAM UR QL CFM: NEGATIVE NG/ML
NORFENTANYL UR QL CFM: NEGATIVE NG/ML
NORHYDROCODONE UR QL CFM: NEGATIVE NG/ML
NORHYDROCODONE UR QL CFM: NEGATIVE NG/ML
NORMEPERIDINE UR QL CFM: NEGATIVE NG/ML
NOROXYCODONE UR QL CFM: NEGATIVE NG/ML
OLANZAPINE QUANTIFICATION: NEGATIVE NG/ML
OPC-3373 QUANTIFICATION: NEGATIVE
OXAZEPAM UR QL CFM: NEGATIVE NG/ML
OXYCODONE UR QL CFM: NEGATIVE NG/ML
OXYMORPHONE UR QL CFM: NEGATIVE NG/ML
OXYMORPHONE UR QL CFM: NEGATIVE NG/ML
PHENOBARB UR QL CFM: NEGATIVE NG/ML
RESULT ALL_PRESCRIBED MEDS AND SPECIAL INSTRUCTIONS: NORMAL
SECOBARBITAL UR QL CFM: NEGATIVE NG/ML
SL AMB 3-METHYL-FENTANYL QUANTIFICATION: NORMAL NG/ML
SL AMB 4-ANPP QUANTIFICATION: NORMAL NG/ML
SL AMB 4-FIBF QUANTIFICATION: NORMAL NG/ML
SL AMB 5F-ADB-M7 METABOLITE QUANTIFICATION: NEGATIVE NG/ML
SL AMB 7-OH-MITRAGYNINE (KRATOM ALKALOID) QUANTIFICATION: NEGATIVE NG/ML
SL AMB AB-FUBINACA-M3 METABOLITE QUANTIFICATION: NEGATIVE NG/ML
SL AMB ACETYL FENTANYL QUANTIFICATION: NORMAL NG/ML
SL AMB ACETYL NORFENTANYL QUANTIFICATION: NORMAL NG/ML
SL AMB ACRYL FENTANYL QUANTIFICATION: NORMAL NG/ML
SL AMB BATH SALTS: NEGATIVE NG/ML
SL AMB BUTRYL FENTANYL QUANTIFICATION: NORMAL NG/ML
SL AMB CARFENTANIL QUANTIFICATION: NORMAL NG/ML
SL AMB CLOZAPINE QUANTIFICATION: NEGATIVE NG/ML
SL AMB CTHC (MARIJUANA METABOLITE) QUANTIFICATION: NEGATIVE NG/ML
SL AMB CYCLOPROPYL FENTANYL QUANTIFICATION: NORMAL NG/ML
SL AMB DEXTROMETHORPHAN QUANTIFICATION: NEGATIVE NG/ML
SL AMB DEXTRORPHAN (DEXTROMETHORPHAN METABOLITE) QUANT: NEGATIVE NG/ML
SL AMB DEXTRORPHAN (DEXTROMETHORPHAN METABOLITE) QUANT: NEGATIVE NG/ML
SL AMB FURANYL FENTANYL QUANTIFICATION: NORMAL NG/ML
SL AMB JWH018 METABOLITE QUANTIFICATION: NEGATIVE NG/ML
SL AMB JWH073 METABOLITE QUANTIFICATION: NEGATIVE NG/ML
SL AMB MDMB-FUBINACA-M1 METABOLITE QUANTIFICATION: NEGATIVE NG/ML
SL AMB METHOXYACETYL FENTANYL QUANTIFICATION: NORMAL NG/ML
SL AMB METHYLONE QUANTIFICATION: NEGATIVE NG/ML
SL AMB N-DESMETHYL U-47700 QUANTIFICATION: NORMAL NG/ML
SL AMB N-DESMETHYL-TRAMADOL QUANTIFICATION: NEGATIVE NG/ML
SL AMB N-DESMETHYLCLOZAPINE QUANTIFICATION: NEGATIVE NG/ML
SL AMB PHENTERMINE QUANTIFICATION: NEGATIVE NG/ML
SL AMB RCS4 METABOLITE QUANTIFICATION: NEGATIVE NG/ML
SL AMB RITALINIC ACID QUANTIFICATION: NEGATIVE NG/ML
SL AMB U-47700 QUANTIFICATION: NORMAL NG/ML
SPECIMEN DRAWN SERPL: NEGATIVE NG/ML
SPECIMEN PH ACCEPTABLE UR: NORMAL
TAPENTADOL UR QL CFM: NEGATIVE NG/ML
TEMAZEPAM UR QL CFM: NEGATIVE NG/ML
TEMAZEPAM UR QL CFM: NEGATIVE NG/ML
TRAMADOL UR QL CFM: NEGATIVE NG/ML
URATE/CREAT 24H UR: NEGATIVE NG/ML

## 2021-04-13 ENCOUNTER — TELEPHONE (OUTPATIENT)
Dept: INTERNAL MEDICINE CLINIC | Facility: CLINIC | Age: 66
End: 2021-04-13

## 2021-04-26 ENCOUNTER — TELEPHONE (OUTPATIENT)
Dept: INTERNAL MEDICINE CLINIC | Facility: CLINIC | Age: 66
End: 2021-04-26

## 2021-04-26 NOTE — TELEPHONE ENCOUNTER
Patient called to inform us that he received his second shot of Birdia Hacking on Friday 4/23 at [address] [place] [address] Way

## 2021-05-12 ENCOUNTER — OFFICE VISIT (OUTPATIENT)
Dept: PAIN MEDICINE | Facility: MEDICAL CENTER | Age: 66
End: 2021-05-12
Payer: COMMERCIAL

## 2021-05-12 VITALS
DIASTOLIC BLOOD PRESSURE: 67 MMHG | TEMPERATURE: 99.3 F | WEIGHT: 194 LBS | BODY MASS INDEX: 24.12 KG/M2 | HEART RATE: 76 BPM | HEIGHT: 75 IN | SYSTOLIC BLOOD PRESSURE: 110 MMHG

## 2021-05-12 DIAGNOSIS — G89.29 CHRONIC PAIN OF BOTH SHOULDERS: ICD-10-CM

## 2021-05-12 DIAGNOSIS — G89.29 CHRONIC BILATERAL LOW BACK PAIN WITHOUT SCIATICA: ICD-10-CM

## 2021-05-12 DIAGNOSIS — G62.9 NEUROPATHY: ICD-10-CM

## 2021-05-12 DIAGNOSIS — M25.562 CHRONIC PAIN OF BOTH KNEES: ICD-10-CM

## 2021-05-12 DIAGNOSIS — M47.816 LUMBAR SPONDYLOSIS: ICD-10-CM

## 2021-05-12 DIAGNOSIS — M25.512 CHRONIC PAIN OF BOTH SHOULDERS: ICD-10-CM

## 2021-05-12 DIAGNOSIS — F11.20 UNCOMPLICATED OPIOID DEPENDENCE (HCC): ICD-10-CM

## 2021-05-12 DIAGNOSIS — G89.29 CHRONIC PAIN OF BOTH KNEES: ICD-10-CM

## 2021-05-12 DIAGNOSIS — Z79.891 LONG-TERM CURRENT USE OF OPIATE ANALGESIC: Primary | ICD-10-CM

## 2021-05-12 DIAGNOSIS — M25.511 CHRONIC PAIN OF BOTH SHOULDERS: ICD-10-CM

## 2021-05-12 DIAGNOSIS — M25.561 CHRONIC PAIN OF BOTH KNEES: ICD-10-CM

## 2021-05-12 DIAGNOSIS — M54.16 LUMBAR RADICULOPATHY: ICD-10-CM

## 2021-05-12 DIAGNOSIS — G89.4 CHRONIC PAIN SYNDROME: ICD-10-CM

## 2021-05-12 DIAGNOSIS — M54.50 CHRONIC BILATERAL LOW BACK PAIN WITHOUT SCIATICA: ICD-10-CM

## 2021-05-12 PROCEDURE — 99214 OFFICE O/P EST MOD 30 MIN: CPT | Performed by: NURSE PRACTITIONER

## 2021-05-12 PROCEDURE — 80305 DRUG TEST PRSMV DIR OPT OBS: CPT | Performed by: NURSE PRACTITIONER

## 2021-05-12 RX ORDER — BUPRENORPHINE 5 UG/H
1 PATCH TRANSDERMAL WEEKLY
Qty: 4 PATCH | Refills: 2 | Status: SHIPPED | OUTPATIENT
Start: 2021-05-12 | End: 2021-08-04 | Stop reason: SDUPTHER

## 2021-05-12 NOTE — PROGRESS NOTES
Assessment:  1  Long-term current use of opiate analgesic    2  Chronic pain syndrome    3  Uncomplicated opioid dependence ([address] )    4  Lumbar radiculopathy - Right    5  Neuropathy    6  Lumbar spondylosis - Bilateral    7  Chronic bilateral low back pain without sciatica    8  Chronic pain of both knees    9  Chronic pain of both shoulders        Plan:    Continue with Butrans patch as prescribed this was refilled for the next 3 months  Patient can continue with Lyrica as needed he does not require refill  Follow-up visit in 12 weeks for medication refills  [place] Prescription Drug Monitoring Program report was reviewed and was appropriate     A urine drug screen was collected at today's office visit as part of our medication management protocol  The point of care testing results were appropriate for what was being prescribed  The specimen will be sent for confirmatory testing  The drug screen is medically necessary because the patient is either dependent on opioid medication or is being considered for opioid medication therapy and the results could impact ongoing or future treatment  The drug screen is to evaluate for the presences or absence of prescribed, non-prescribed, and/or illicit drugs/substances  There are risks associated with opioid medications, including dependence, addiction and tolerance  The patient understands and agrees to use these medications only as prescribed  Potential side effects of the medications include, but are not limited to, constipation, drowsiness, addiction, impaired judgment and risk of fatal overdose if not taken as prescribed  The patient was warned against driving while taking sedation medications  Sharing medications is a felony  At this point in time, the patient is showing no signs of addiction, abuse, diversion or suicidal ideation  History of Present Illness:     The patient is a 76 y o  male last seen on  February 17, 2021 who presents for a follow up office visit in regards to chronic pain secondary to  Right-sided lumbar radiculopathy, neuropathy, bilateral lumbar spondylosis, and chronic bilateral low back pain     The patient currently reports   Pain rated 3 to 4/10 which is intermittent and described as dull, aching, sharp, shooting, numbness, and stiff joints  Current pain medications includes:   Butrans patch 5 micro g, Lyrica 100 mg only on an as-needed basis  He tells me that he is only use this fiber 6 time since we saw him 3 months ago  He tells me it is weather related when he needs to take it he is affected by the cold and damp weather     The patient reports that this regimen is providing   20-40% pain relief  The patient is reporting no side effects from this pain medication regimen  Pt is on day 6 of Butrans patch    Pain Contract Signed: 02/21/21  Last Urine Drug Screen: 05/12/21    I have personally reviewed and/or updated the patient's past medical history, past surgical history, family history, social history, current medications, allergies, and vital signs today  Review of Systems:    Review of Systems   Respiratory: Negative for shortness of breath  Cardiovascular: Negative for chest pain  Gastrointestinal: Positive for constipation  Negative for diarrhea, nausea and vomiting  Musculoskeletal: Positive for back pain, gait problem, joint swelling and myalgias  Negative for arthralgias  Skin: Negative for rash  Neurological: Negative for dizziness, seizures and weakness  All other systems reviewed and are negative          Past Medical History:   Diagnosis Date    Chronic back pain     Chronic pain disorder     Depression     GERD (gastroesophageal reflux disease)     L4-L5 disc bulge 06/16/2017    Low back pain     Muscle weakness     Neuropathy     Osteoarthritis     Right foot drop        Past Surgical History:   Procedure Laterality Date    EGD AND COLONOSCOPY N/A 6/29/2018    Procedure: EGD AND COLONOSCOPY;  Surgeon: Sourav Matias MD;  Location: [facility];   Service: Gastroenterology    EPIDURAL BLOCK INJECTION      needle    ORTHOPEDIC SURGERY      [place] HIP [address] BODY,PLASTY/RESECTN Left 4/30/2018    Procedure: LEFT HIP ARTHROSCOPIC LABRAL DEBRIDEMENT;  Surgeon: Dashawn Seals MD;  [facility];  Service: Orthopedics    TONSILLECTOMY      WISDOM TOOTH EXTRACTION         Family History   Problem Relation Age of Onset    Osteoarthritis Family     Glaucoma Mother     No Known Problems Father        Social History     Occupational History    Not on file   Tobacco Use    Smoking status: Current Every Day Smoker     Packs/day: 0 50    Smokeless tobacco: Never Used    Tobacco comment: 1/2 a pack a day   Substance and Sexual Activity    Alcohol use: No    Drug use: No    Sexual activity: Not Currently         Current Outpatient Medications:     acetaminophen (TYLENOL) 500 mg tablet, Take 1,000 mg by mouth every 12 (twelve) hours as needed for mild pain, Disp: , Rfl:     ascorbic Acid (VITAMIN C) 500 MG CPCR, Take 500 mg by mouth daily, Disp: , Rfl:     atorvastatin (LIPITOR) 10 mg tablet, Take 0 5 tablets (5 mg total) by mouth daily at bedtime, Disp: 45 tablet, Rfl: 1    cholecalciferol (VITAMIN D3) 1,000 units tablet, Take 3,000 Units by mouth daily , Disp: , Rfl:     clotrimazole (LOTRIMIN) 1 % cream, Apply 1 g (1 application total) topically daily To affected area, Disp: 60 g, Rfl: 2    Flaxseed, Linseed, (FLAX SEED OIL PO), Take 1 capsule by mouth daily  , Disp: , Rfl:     ibuprofen (MOTRIN) 600 mg tablet, Take 600 mg by mouth every 6 (six) hours as needed for mild pain, Disp: , Rfl:     METHYLCOBALAMIN SL, Place 1,000 mcg under the tongue, Disp: , Rfl:     pregabalin (LYRICA) 100 mg capsule, Take 1 capsule (100 mg total) by mouth 3 (three) times a day, Disp: 90 capsule, Rfl: 2    pyridoxine (B-6) 100 MG tablet, Take 100 mg by mouth daily, Disp: , Rfl:     transdermal buprenorphine (BUTRANS) 5 mcg/hr TD patch, Place 1 patch on the skin once a week Do not fill until 08/27/2020, Disp: 4 patch, Rfl: 2    Elastic Bandages & Supports (KNEE BRACE/HINGED/REGULAR) MISC, by Does not apply route daily as needed (B/L knee pain) B/L knee braces, Disp: 2 each, Rfl: 0    Allergies   Allergen Reactions    Gabapentin GI Intolerance    Tramadol Confusion    Morphine Other (See Comments)     constipation       Physical Exam:    /67   Pulse 76   Temp 99 3 °F (37 4 °C)   Ht 6' 3" (1 905 m)   Wt 88 kg (194 lb)   BMI 24 25 kg/m²     Constitutional:normal, well developed, well nourished, alert, in no distress and non-toxic and no overt pain behavior    Eyes:anicteric  HEENT:grossly intact  Neck:supple, symmetric, trachea midline and no masses   Pulmonary:even and unlabored  Cardiovascular:No edema or pitting edema present  Skin:Normal without rashes or lesions and well hydrated  Psychiatric:Mood and affect appropriate  Neurologic:Cranial Nerves II-XII grossly intact  Musculoskeletal:ambulates with cane      Imaging  No orders to display         Orders Placed This Encounter   Procedures    MM ALL_Prescribed Meds and Special Instructions    MM DT_Alprazolam Definitive Test    MM DT_Amphetamine Definitive Test    MM DT_Aripiprazole Definitive Test    MM DT_Bath Salts Definitive Test    MM DT_Buprenorphine Definitive Test    MM DT_Butalbital Definitive Test    MM DT_Clonazepam Definitive Test    MM DT_Clozapine Definitive Test    MM DT_Cocaine Definitive Test    MM DT_Codeine Definitive Test    MM DT_Desipramine Definitive Test    MM DT_Dextromethorphan Definitive Test    MM Diazepam Definitive Test    MM DT_Ethyl Glucuronide/Ethyl Sulfate Definitive Test    MM DT_Fentanyl Definitive Test    MM DT_Heroin Definitive Test    MM DT_Hydrocodone Definitive Test    MM DT_Hydromorphone Definitive Test    MM DT_Kratom Definitive Test    MM DT_Levorphanol Definitive Test    MM Lorazepam Definitive Test    MM DT_MDMA Definitive Test    MM DT_Meperidine Definitive Test    MM DT_Methadone Definitive Test    MM DT_Methamphetamine Definitive Test    MM DT_Methylphenidate Definitive Test    MM DT_Morphine Definitive Test    MM DT_Olanzapine Definitive Test    MM DT_Oxazepam Definitive Test    MM DT_Oxycodone Definitive Test    MM DT_Oxymorphone Definitive Test    MM DT_Phenobarbital Definitive Test    MM DT_Phentermine Definitive Test    MM DT_Secobarbital Definitive Test    MM DT_Spice Definitive Test    MM DT_Tapentadol Definitive Test    MM DT_Temazapam Definitive Test    MM DT_THC Definitive Test    MM DT_Tramadol Definitive Test    MM DT_Validity Specific    MM DT_Validity pH    MM DT_Validity Creatinine    MM DT_Validity Oxidant

## 2021-05-12 NOTE — PATIENT INSTRUCTIONS
Opioid Safety   AMBULATORY CARE:   Opioid safety  includes the correct use, storage, and disposal of opioids  Examples of opioid medicines to treat pain include oxycodone, morphine, fentanyl, and codeine  Call your local emergency number (911 in the [address]), or have someone else call if:   · You have a seizure  · You cannot be woken  · You have trouble staying awake and your breathing is slow or shallow  · Your speech is slurred, or you are confused  · You are dizzy or stumble when you walk  Call your doctor, or have someone close to you call if:   · You are extremely drowsy, or you have trouble staying awake or speaking  · You have pale or clammy skin  · You have blue fingernails or lips  · Your heartbeat is slower than normal     · You cannot stop vomiting  · You have questions or concerns about your condition or care  Use opioids safely:   · Take prescribed opioids exactly as directed  Opioids come with directions based on the kind of opioid and how it is given  Do not take more than the recommended amount, or for longer than needed  · Do not give opioids to others or take opioids that belong to someone else  Misuse of opioids can lead to an addiction or overdose  · Do not mix opioids with other medicines or alcohol  The combination can cause an overdose, or lead to a coma  · Do not drive or operate heavy machinery after you take the opioid  Your provider or pharmacist can tell you how long to wait after a dose before you do these activities  · Talk to your healthcare provider if you have any side effects  He or she can help you prevent or relieve side effects  Side effects include nausea, sleepiness, itching, and trouble thinking clearly  Manage constipation:  Constipation is the most common side effect of opioid medicine  Constipation is when you have hard, dry bowel movements, or you go longer than usual between bowel movements   Tell your healthcare provider about all changes in your bowel movements while you are taking opioids  He or she may recommend laxative medicine to help you have a bowel movement  He or she may also change the kind of opioid you are taking, or change when you take it  The following are more ways you can prevent or relieve constipation:  · Drink liquids as directed  You may need to drink extra liquids to help soften and move your bowels  Ask how much liquid to drink each day and which liquids are best for you  · Eat high-fiber foods  This may help decrease constipation by adding bulk to your bowel movements  High-fiber foods include fruits, vegetables, whole-grain breads and cereals, and beans  Your healthcare provider or dietitian can help you create a high-fiber meal plan  Your provider may also recommend a fiber supplement if you cannot get enough fiber from food  · Exercise regularly  Regular physical activity can help stimulate your intestines  Walking is a good exercise to prevent or relieve constipation  Ask which exercises are best for you  · Schedule a time each day to have a bowel movement  This may help train your body to have regular bowel movements  Bend forward while you are on the toilet to help move the bowel movement out  Sit on the toilet for at least 10 minutes, even if you do not have a bowel movement  Store opioids safely:   · Store opioids where others cannot easily get them  Keep them in a locked cabinet or secure area  Do not  keep them in a purse or other bag you carry with you  A person may be looking for something else and find the opioids  · Make sure opioids are stored out of the reach of children  A child can easily overdose on opioids  Opioids may look like candy to a small child  The best way to dispose of opioids: The laws vary by country and area   In the [place], the best way is to return the opioids through a take-back program  This program is offered by the [employer] ([address])  The following are options for using the program:  · Take the opioids to a [facility] collection site  The site is often a law enforcement center  Call your local law enforcement center for scheduled take-back days in your area  You will be given information on where to go if the collection site is in a different location  · Take the opioids to an approved pharmacy or hospital   A pharmacy or hospital may be set up as a collection site  You will need to ask if it is a [facility]ER[employer] collection site if you were not directed there  A pharmacy or doctor's office may not be able to take back opioids unless it is a SH[employer]LY site  · Use a mail-back system  This means you are given containers to put the opioids into  You will then mail them in the containers  · Use a take-back drop box  This is a place to leave the opioids at any time  People and animals will not be able to get into the box  Your local law enforcement agency can tell you where to find a drop box in your area  Other safe ways to dispose of opioids: The medicine may come with disposal instructions  The instructions may vary depending on the brand of medicine you are using  Instructions may come in a Medication Guide, but not every medicine has one  You may instead get instructions from your pharmacy or doctor  Follow instructions carefully  The following are general guidelines to follow:  · Find out if you can flush the opioid  Some opioids can be flushed down the toilet or poured into the sink  You will need to contact authorities in your area to see if this is an option for you  The FDA also offers a list of medicines that are safe to flush down the toilet  You can check the list if you cannot get the information for your local area  · Ask your waste management company about rules for putting opioids in the trash  The company will be able to give you specific directions   Scratch out personal information on the original medicine label so it cannot be read  Then put it in the trash  Do not label the trash or put any information on it about the opioids  It should look like regular household trash so no one is tempted to look for the opioids  Keep the trash out of the reach of children and animals  Always make sure trash is secure  · Talk to officials if you live in a facility  If you live in a nursing home or assisted living center, talk to an official  The person will know the rules for your area  Other ways to manage pain:   · Ask your healthcare provider about non-opioid medicines to control pain  Nonprescription medicines include NSAIDs (such as ibuprofen) and acetaminophen  Prescription medicines include muscle relaxers, antidepressants, and steroids  · Pain may be managed without any medicines  Some ways to relieve pain include massage, aromatherapy, or meditation  Physical or occupational therapy may also help  For more information:   · Drug Enforcement Administration  [facility] [address]  Phone: 1- 962 - 053-3208  Web Address: [facility]/drug_disposal/    · [address]  and Drug Administration  [facility]  [place] [address] [facility]  Phone: 8- 483 - [phone]  Web Address: http://[employer]/  Follow up with your doctor or pain specialist as directed: You may need to have your dose adjusted  Your doctor or pain specialist can also help you find ways to manage pain without opioids  Write down your questions so you remember to ask them during your visits  © Copyright [address] Information is for End User's use only and may not be sold, redistributed or otherwise used for commercial purposes  All illustrations and images included in CareNotes® are the copyrighted property of [employer]  or [facility] Lucy Tavares   The above information is an  only  It is not intended as medical advice for individual conditions or treatments   Talk to your doctor, nurse or pharmacist before following any medical regimen to see if it is safe and effective for you

## 2021-05-15 LAB
6MAM UR QL CFM: NEGATIVE NG/ML
7AMINOCLONAZEPAM UR QL CFM: NEGATIVE NG/ML
A-OH ALPRAZ UR QL CFM: NEGATIVE NG/ML
ACCEPTABLE CREAT UR QL: NORMAL MG/DL
ACCEPTIBLE SP GR UR QL: NORMAL
AMPHET UR QL CFM: NEGATIVE NG/ML
AMPHET UR QL CFM: NEGATIVE NG/ML
BUPRENORPHINE UR QL CFM: NORMAL NG/ML
BUTALBITAL UR QL CFM: NEGATIVE NG/ML
BZE UR QL CFM: NEGATIVE NG/ML
CODEINE UR QL CFM: NEGATIVE NG/ML
DESIPRAMINE UR QL CFM: NEGATIVE NG/ML
EDDP UR QL CFM: NEGATIVE NG/ML
ETHYL GLUCURONIDE UR QL CFM: NEGATIVE NG/ML
ETHYL SULFATE UR QL SCN: NEGATIVE NG/ML
FENTANYL UR QL CFM: NEGATIVE NG/ML
GLIADIN IGG SER IA-ACNC: NEGATIVE NG/ML
GLUCOSE 30M P 50 G LAC PO SERPL-MCNC: NEGATIVE NG/ML
HYDROCODONE UR QL CFM: NEGATIVE NG/ML
HYDROCODONE UR QL CFM: NEGATIVE NG/ML
HYDROMORPHONE UR QL CFM: NEGATIVE NG/ML
LORAZEPAM UR QL CFM: NEGATIVE NG/ML
MDMA UR QL CFM: NEGATIVE NG/ML
ME-PHENIDATE UR QL CFM: NEGATIVE NG/ML
MEPERIDINE UR QL CFM: NEGATIVE NG/ML
MEPHEDRONE UR QL CFM: NEGATIVE NG/ML
METHADONE UR QL CFM: NEGATIVE NG/ML
METHAMPHET UR QL CFM: NEGATIVE NG/ML
MORPHINE UR QL CFM: NEGATIVE NG/ML
MORPHINE UR QL CFM: NEGATIVE NG/ML
NITRITE UR QL: NORMAL UG/ML
NORBUPRENORPHINE UR QL CFM: NEGATIVE NG/ML
NORDIAZEPAM UR QL CFM: NEGATIVE NG/ML
NORFENTANYL UR QL CFM: NEGATIVE NG/ML
NORHYDROCODONE UR QL CFM: NEGATIVE NG/ML
NORHYDROCODONE UR QL CFM: NEGATIVE NG/ML
NORMEPERIDINE UR QL CFM: NEGATIVE NG/ML
NOROXYCODONE UR QL CFM: NEGATIVE NG/ML
OLANZAPINE QUANTIFICATION: NEGATIVE NG/ML
OPC-3373 QUANTIFICATION: NEGATIVE
OXAZEPAM UR QL CFM: NEGATIVE NG/ML
OXYCODONE UR QL CFM: NEGATIVE NG/ML
OXYMORPHONE UR QL CFM: NEGATIVE NG/ML
OXYMORPHONE UR QL CFM: NEGATIVE NG/ML
PHENOBARB UR QL CFM: NEGATIVE NG/ML
RESULT ALL_PRESCRIBED MEDS AND SPECIAL INSTRUCTIONS: NORMAL
SECOBARBITAL UR QL CFM: NEGATIVE NG/ML
SL AMB 3-METHYL-FENTANYL QUANTIFICATION: NORMAL NG/ML
SL AMB 4-ANPP QUANTIFICATION: NORMAL NG/ML
SL AMB 4-FIBF QUANTIFICATION: NORMAL NG/ML
SL AMB 5F-ADB-M7 METABOLITE QUANTIFICATION: NEGATIVE NG/ML
SL AMB 7-OH-MITRAGYNINE (KRATOM ALKALOID) QUANTIFICATION: NEGATIVE NG/ML
SL AMB AB-FUBINACA-M3 METABOLITE QUANTIFICATION: NEGATIVE NG/ML
SL AMB ACETYL FENTANYL QUANTIFICATION: NORMAL NG/ML
SL AMB ACETYL NORFENTANYL QUANTIFICATION: NORMAL NG/ML
SL AMB ACRYL FENTANYL QUANTIFICATION: NORMAL NG/ML
SL AMB BATH SALTS: NEGATIVE NG/ML
SL AMB BUTRYL FENTANYL QUANTIFICATION: NORMAL NG/ML
SL AMB CARFENTANIL QUANTIFICATION: NORMAL NG/ML
SL AMB CLOZAPINE QUANTIFICATION: NEGATIVE NG/ML
SL AMB CTHC (MARIJUANA METABOLITE) QUANTIFICATION: NEGATIVE NG/ML
SL AMB CYCLOPROPYL FENTANYL QUANTIFICATION: NORMAL NG/ML
SL AMB DEXTROMETHORPHAN QUANTIFICATION: NEGATIVE NG/ML
SL AMB DEXTRORPHAN (DEXTROMETHORPHAN METABOLITE) QUANT: NEGATIVE NG/ML
SL AMB DEXTRORPHAN (DEXTROMETHORPHAN METABOLITE) QUANT: NEGATIVE NG/ML
SL AMB FURANYL FENTANYL QUANTIFICATION: NORMAL NG/ML
SL AMB JWH018 METABOLITE QUANTIFICATION: NEGATIVE NG/ML
SL AMB JWH073 METABOLITE QUANTIFICATION: NEGATIVE NG/ML
SL AMB MDMB-FUBINACA-M1 METABOLITE QUANTIFICATION: NEGATIVE NG/ML
SL AMB METHOXYACETYL FENTANYL QUANTIFICATION: NORMAL NG/ML
SL AMB METHYLONE QUANTIFICATION: NEGATIVE NG/ML
SL AMB N-DESMETHYL U-47700 QUANTIFICATION: NORMAL NG/ML
SL AMB N-DESMETHYL-TRAMADOL QUANTIFICATION: NEGATIVE NG/ML
SL AMB N-DESMETHYLCLOZAPINE QUANTIFICATION: NEGATIVE NG/ML
SL AMB PHENTERMINE QUANTIFICATION: NEGATIVE NG/ML
SL AMB RCS4 METABOLITE QUANTIFICATION: NEGATIVE NG/ML
SL AMB RITALINIC ACID QUANTIFICATION: NEGATIVE NG/ML
SL AMB U-47700 QUANTIFICATION: NORMAL NG/ML
SPECIMEN DRAWN SERPL: NEGATIVE NG/ML
SPECIMEN PH ACCEPTABLE UR: NORMAL
TAPENTADOL UR QL CFM: NEGATIVE NG/ML
TEMAZEPAM UR QL CFM: NEGATIVE NG/ML
TEMAZEPAM UR QL CFM: NEGATIVE NG/ML
TRAMADOL UR QL CFM: NEGATIVE NG/ML
URATE/CREAT 24H UR: NEGATIVE NG/ML

## 2021-05-18 ENCOUNTER — RA CDI HCC (OUTPATIENT)
Dept: OTHER | Facility: HOSPITAL | Age: 66
End: 2021-05-18

## 2021-05-18 NOTE — PROGRESS NOTES
[facility]  coding opportunities          Chart reviewed, no opportunity found: CHART REVIEWED, NO OPPORTUNITY FOUND              Patients insurance company: [employer] only)

## 2021-05-24 ENCOUNTER — OFFICE VISIT (OUTPATIENT)
Dept: INTERNAL MEDICINE CLINIC | Facility: CLINIC | Age: 66
End: 2021-05-24
Payer: COMMERCIAL

## 2021-05-24 VITALS
HEIGHT: 75 IN | TEMPERATURE: 98.3 F | DIASTOLIC BLOOD PRESSURE: 68 MMHG | RESPIRATION RATE: 18 BRPM | BODY MASS INDEX: 23.75 KG/M2 | OXYGEN SATURATION: 98 % | HEART RATE: 83 BPM | SYSTOLIC BLOOD PRESSURE: 110 MMHG | WEIGHT: 191 LBS

## 2021-05-24 DIAGNOSIS — M54.16 LUMBAR RADICULOPATHY: ICD-10-CM

## 2021-05-24 DIAGNOSIS — R91.8 MULTIPLE SUBSOLID LUNG NODULES GREATER THAN 6 MM IN DIAMETER: ICD-10-CM

## 2021-05-24 DIAGNOSIS — E55.9 VITAMIN D DEFICIENCY: ICD-10-CM

## 2021-05-24 DIAGNOSIS — Z72.0 TOBACCO ABUSE: Primary | ICD-10-CM

## 2021-05-24 DIAGNOSIS — E78.2 MIXED HYPERLIPIDEMIA: ICD-10-CM

## 2021-05-24 DIAGNOSIS — G62.9 NEUROPATHY: ICD-10-CM

## 2021-05-24 DIAGNOSIS — G89.4 CHRONIC PAIN SYNDROME: ICD-10-CM

## 2021-05-24 PROCEDURE — 3008F BODY MASS INDEX DOCD: CPT | Performed by: INTERNAL MEDICINE

## 2021-05-24 PROCEDURE — 4004F PT TOBACCO SCREEN RCVD TLK: CPT | Performed by: INTERNAL MEDICINE

## 2021-05-24 PROCEDURE — 1160F RVW MEDS BY RX/DR IN RCRD: CPT | Performed by: INTERNAL MEDICINE

## 2021-05-24 PROCEDURE — 99214 OFFICE O/P EST MOD 30 MIN: CPT | Performed by: INTERNAL MEDICINE

## 2021-05-24 PROCEDURE — 3288F FALL RISK ASSESSMENT DOCD: CPT | Performed by: INTERNAL MEDICINE

## 2021-05-24 PROCEDURE — 1101F PT FALLS ASSESS-DOCD LE1/YR: CPT | Performed by: INTERNAL MEDICINE

## 2021-05-24 PROCEDURE — 3725F SCREEN DEPRESSION PERFORMED: CPT | Performed by: INTERNAL MEDICINE

## 2021-05-24 RX ORDER — PROPRANOLOL/HYDROCHLOROTHIAZID 40 MG-25MG
TABLET ORAL DAILY
COMMUNITY
End: 2021-11-09

## 2021-05-24 RX ORDER — ATORVASTATIN CALCIUM 10 MG/1
5 TABLET, FILM COATED ORAL
Qty: 45 TABLET | Refills: 1 | OUTPATIENT
Start: 2021-05-24

## 2021-05-24 RX ORDER — ATORVASTATIN CALCIUM 10 MG/1
5 TABLET, FILM COATED ORAL
Qty: 45 TABLET | Refills: 1 | Status: SHIPPED | OUTPATIENT
Start: 2021-05-24 | End: 2022-02-14 | Stop reason: SDUPTHER

## 2021-05-24 NOTE — PROGRESS NOTES
Assessment/Plan:    Mixed hyperlipidemia  Follow up CMP and CK  Continue Lipitor 5 mg daily  Multiple subsolid lung nodules greater than 6 mm in diameter  PET scan reordered  Chronic pain syndrome  Continue current pain regimen, symptoms well controlled  Continue follow-up with spine and pain  Vitamin D deficiency  Continue vitamin supplementation  Tobacco abuse  Counseled patient on smoking cessation  Currently not ready to quit  Neuropathy  Controlled  Continue Lyrica b i d  as needed  Diagnoses and all orders for this visit:    Tobacco abuse    Vitamin D deficiency    Chronic pain syndrome    Mixed hyperlipidemia  -     atorvastatin (LIPITOR) 10 mg tablet; Take 0 5 tablets (5 mg total) by mouth daily at bedtime    Multiple subsolid lung nodules greater than 6 mm in diameter  -     NM pet ct tumor imaging whole body; Future    Neuropathy    Lumbar radiculopathy - Right    Other orders  -     Turmeric 500 MG CAPS; Take by mouth daily            Depression Screening and Follow-up Plan: Patient's depression screening was positive with a PHQ-2 score of 1  Clincally patient does not have depression  No treatment is required  Patient advised to follow-up with PCP for further management  Tobacco Cessation Counseling: Tobacco cessation counseling was provided  The patient is sincerely urged to quit consumption of tobacco  He is not ready to quit tobacco  Medication options and side effects of medication discussed  Patient refused medication  Subjective:      Patient ID: Antoinette Paul is a 76 y o  male  Chief Complaint   Patient presents with    Follow-up     6 month, lab not done from [facility]     PHQ9, fall risk, AWV after 11/25/21       76year-old male is seen today for follow-up of chronic conditions  No laboratory studies to review today    He follows up with spine and pain regularly for his pain management to which she states is currently well controlled  He has no active complaints or concerns at this time  Hyperlipidemia  This is a chronic problem  The current episode started more than 1 year ago  The problem is controlled  Recent lipid tests were reviewed and are normal  Pertinent negatives include no chest pain or shortness of breath  Current antihyperlipidemic treatment includes statins  The current treatment provides moderate improvement of lipids  There are no compliance problems  Back Pain  This is a chronic problem  The current episode started more than 1 year ago  The problem occurs daily  The problem is unchanged  The pain is present in the lumbar spine  Pertinent negatives include no abdominal pain, chest pain, dysuria, fever, headaches, numbness or weakness  He has tried analgesics, home exercises, NSAIDs and walking for the symptoms  The treatment provided moderate relief  Arthritis  Presents for follow-up visit  He complains of pain and stiffness  The symptoms have been stable  Affected locations include the right wrist, right knee, left knee, left hip and right hip  Pertinent negatives include no diarrhea, dysuria, fatigue or fever  The following portions of the patient's history were reviewed and updated as appropriate: allergies, current medications, past family history, past medical history, past social history, past surgical history and problem list     Review of Systems   Constitutional: Negative for activity change, appetite change, chills, diaphoresis, fatigue and fever  HENT: Negative for congestion, postnasal drip, rhinorrhea, sinus pressure, sinus pain, sneezing and sore throat  Eyes: Negative for visual disturbance  Respiratory: Negative for apnea, cough, choking, chest tightness, shortness of breath and wheezing  Cardiovascular: Negative for chest pain, palpitations and leg swelling     Gastrointestinal: Negative for abdominal distention, abdominal pain, anal bleeding, blood in stool, constipation, diarrhea, nausea and vomiting  Endocrine: Negative for cold intolerance and heat intolerance  Genitourinary: Negative for difficulty urinating, dysuria and hematuria  Musculoskeletal: Positive for arthritis, back pain and stiffness  Skin: Negative  Neurological: Negative for dizziness, weakness, light-headedness, numbness and headaches  Hematological: Negative for adenopathy  Psychiatric/Behavioral: Negative for agitation, sleep disturbance and suicidal ideas  All other systems reviewed and are negative          Past Medical History:   Diagnosis Date    Chronic back pain     Chronic pain disorder     Depression     GERD (gastroesophageal reflux disease)     L4-L5 disc bulge 06/16/2017    Low back pain     Muscle weakness     Neuropathy     Osteoarthritis     Right foot drop          Current Outpatient Medications:     acetaminophen (TYLENOL) 500 mg tablet, Take 1,000 mg by mouth every 12 (twelve) hours as needed for mild pain, Disp: , Rfl:     ascorbic Acid (VITAMIN C) 500 MG CPCR, Take 500 mg by mouth daily, Disp: , Rfl:     atorvastatin (LIPITOR) 10 mg tablet, Take 0 5 tablets (5 mg total) by mouth daily at bedtime, Disp: 45 tablet, Rfl: 1    cholecalciferol (VITAMIN D3) 1,000 units tablet, Take 3,000 Units by mouth daily , Disp: , Rfl:     clotrimazole (LOTRIMIN) 1 % cream, Apply 1 g (1 application total) topically daily To affected area, Disp: 60 g, Rfl: 2    Elastic Bandages & Supports (KNEE BRACE/HINGED/REGULAR) MISC, by Does not apply route daily as needed (B/L knee pain) B/L knee braces, Disp: 2 each, Rfl: 0    Flaxseed, Linseed, (FLAX SEED OIL PO), Take 1,200 capsules by mouth daily , Disp: , Rfl:     ibuprofen (MOTRIN) 600 mg tablet, Take 600 mg by mouth every 6 (six) hours as needed for mild pain, Disp: , Rfl:     METHYLCOBALAMIN SL, Place 1,000 mcg under the tongue, Disp: , Rfl:     pregabalin (LYRICA) 100 mg capsule, Take 1 capsule (100 mg total) by mouth 3 (three) times a day (Patient taking differently: Take 100 mg by mouth as needed ), Disp: 90 capsule, Rfl: 2    pyridoxine (B-6) 100 MG tablet, Take 100 mg by mouth daily, Disp: , Rfl:     transdermal buprenorphine (BUTRANS) 5 mcg/hr TD patch, Place 1 patch on the skin once a week Do not fill until 08/27/2020, Disp: 4 patch, Rfl: 2    Turmeric 500 MG CAPS, Take by mouth daily, Disp: , Rfl:     Allergies   Allergen Reactions    Gabapentin GI Intolerance    Tramadol Confusion    Morphine Other (See Comments)     constipation       Social History   Past Surgical History:   Procedure Laterality Date    EGD AND COLONOSCOPY N/A 6/29/2018    Procedure: EGD AND COLONOSCOPY;  Surgeon: Jeronimo Barahona MD;  Location: [facility];   Service: Gastroenterology    EPIDURAL BLOCK INJECTION      needle    ORTHOPEDIC SURGERY      OR HIP [address] BODY,PLASTY/RESECTN Left 4/30/2018    Procedure: LEFT HIP ARTHROSCOPIC LABRAL DEBRIDEMENT;  Surgeon: Santiago Pulliam MD;  Location: [facility];  Service: Orthopedics    TONSILLECTOMY      WISDOM TOOTH EXTRACTION       Family History   Problem Relation Age of Onset    Osteoarthritis Family     Glaucoma Mother     No Known Problems Father        Objective:  /68 (BP Location: Right arm, Patient Position: Sitting, Cuff Size: Standard)   Pulse 83   Temp 98 3 °F (36 8 °C) (Temporal)   Resp 18   Ht 6' 3" (1 905 m)   Wt 86 6 kg (191 lb)   SpO2 98%   BMI 23 87 kg/m²     Recent Results (from the past 1344 hour(s))   MM ALL_Prescribed Meds and Special Instructions    Collection Time: 05/12/21  1:14 PM   Result Value Ref Range    RESULT ALL_PRESC MEDS SP INSTRNS Not Applicable    MM DT_Alprazolam Definitive Test    Collection Time: 05/12/21  1:14 PM   Result Value Ref Range    Alpha-Hydroxyalprazolam Quantification UR negative 20 ng/mL   MM DT_Amphetamine Definitive Test    Collection Time: 05/12/21  1:14 PM   Result Value Ref Range    Amphetamine Quantification negative 100 ng/mL   MM DT_Aripiprazole Definitive Test    Collection Time: 05/12/21  1:14 PM   Result Value Ref Range    Aripiprazole Quantification negative 5 ng/mL    [id number] QUANTIFICATION negative 15   MM DT_Bath Salts Definitive Test    Collection Time: 05/12/21  1:14 PM   Result Value Ref Range    BATH SALTS negative 3 ng/mL    Mephedrone Quantification negative 3 ng/mL    METHYLONE QUANTIFICATION negative 3 ng/mL   MM DT_Buprenorphine Definitive Test    Collection Time: 05/12/21  1:14 PM   Result Value Ref Range    Buprenorphine Quantification positive-6 870 5 ng/mL    Norbuprenorphine Quantification negative 20 ng/mL   MM DT_Butalbital Definitive Test    Collection Time: 05/12/21  1:14 PM   Result Value Ref Range    Butalbital Quantification negative 200 ng/mL   MM DT_Clonazepam Definitive Test    Collection Time: 05/12/21  1:14 PM   Result Value Ref Range    7-Amino-Clonazepam Quantification UR negative 20 ng/mL   MM DT_Clozapine Definitive Test    Collection Time: 05/12/21  1:14 PM   Result Value Ref Range    Clozapine Quantification negative 25 ng/mL    N-desmethylclozapine Quantification negative 25 ng/mL   MM DT_Cocaine Definitive Test    Collection Time: 05/12/21  1:14 PM   Result Value Ref Range    Cocaine metabolite Quantification negative 50 ng/mL   MM DT_Codeine Definitive Test    Collection Time: 05/12/21  1:14 PM   Result Value Ref Range    Codeine Quantification negative 50 ng/mL    Morphine Quantification negative 50 ng/mL    Hydrocodone Quantification negative 50 ng/mL    Norhydrocodone Quantification negative 50 ng/mL    Hydromorphone Quantification negative 50 ng/mL   MM DT_Desipramine Definitive Test    Collection Time: 05/12/21  1:14 PM   Result Value Ref Range    Desipramine Quantification negative 50 ng/mL   MM DT_Dextromethorphan Definitive Test    Collection Time: 05/12/21  1:14 PM   Result Value Ref Range    Dextromethorphan Quantification negative 50 ng/mL    Dextrorphan (Dextromethorphan metabolite) Quant negative 50 ng/mL   MM Diazepam Definitive Test    Collection Time: 05/12/21  1:14 PM   Result Value Ref Range    Nordiazepam Quantification negative 40 ng/mL    Temazepam Quantification negative 50 ng/mL    Oxazepam Quantification negative 40 ng/mL   MM DT_Ethyl Glucuronide/Ethyl Sulfate Definitive Test    Collection Time: 05/12/21  1:14 PM   Result Value Ref Range    Ethyl Glucuronide Quantification negative 500 ng/mL    Ethyl Sulfate Quantification negative 500 ng/mL   MM DT_Fentanyl Definitive Test    Collection Time: 05/12/21  1:14 PM   Result Value Ref Range    Fentanyl Quantification negative 1 ng/mL    Norfentanyl Quantification negative 8 ng/mL    3-methyl-fentanyl Quantification Fen Neg 2 ng/mL    4-ANPP Quantification Fen Neg 2 ng/mL    4-FiBF Quantification Fen Neg 2 ng/mL    Acetyl fentanyl Quantification Fen Neg 2 ng/mL    Acetyl norfentanyl Quantification Fen Neg 5 ng/mL    Acryl fentanyl Quantification Fen Neg 1 ng/mL    Butryl fentanyl Quantification Fen Neg 1 ng/mL    Carfentanil Quantification Fen Neg 2 ng/mL    Cyclopropyl fentanyl Quantification Fen Neg 1 ng/mL    Furanyl fentanyl Quantification Fen Neg 2 ng/mL    Methoxyacetyl fentanyl Quantification Fen Neg 2 ng/mL    [id number] Quantification Fen Neg 2 ng/mL    N-desmethyl [id number] Quantification Fen Neg 2 ng/mL   MM DT_Heroin Definitive Test    Collection Time: 05/12/21  1:14 PM   Result Value Ref Range    6-KALIN (Heroin metabolite) Quantification UR negative 10 ng/mL   MM DT_Hydrocodone Definitive Test    Collection Time: 05/12/21  1:14 PM   Result Value Ref Range    Hydrocodone Quantification negative 50 ng/mL    Norhydrocodone Quantification negative 50 ng/mL    Hydromorphone Quantification negative 50 ng/mL   MM DT_Hydromorphone Definitive Test    Collection Time: 05/12/21  1:14 PM   Result Value Ref Range    Hydromorphone Quantification negative 50 ng/mL   MM DT_Kratom Definitive Test    Collection Time: 05/12/21  1:14 PM   Result Value Ref Range    Mitragynine (Kratom alkaloid) Quantification negative 1 ng/mL    6-NH-Vxkvsoiknzf (Kratom alkaloid) Quantification negative 1 ng/mL   MM DT_Levorphanol Definitive Test    Collection Time: 05/12/21  1:14 PM   Result Value Ref Range    Dextrorphan (Dextromethorphan metabolite) Quant negative 50 ng/mL   MM Lorazepam Definitive Test    Collection Time: 05/12/21  1:14 PM   Result Value Ref Range    Lorazepam Quantification negative 40 ng/mL   MM DT_MDMA Definitive Test    Collection Time: 05/12/21  1:14 PM   Result Value Ref Range    MDMA Quantification negative 100 ng/mL   MM DT_Meperidine Definitive Test    Collection Time: 05/12/21  1:14 PM   Result Value Ref Range    Meperidine Quantification negative 50 ng/mL    Normeperidine Quantification negative 50 ng/mL   MM DT_Methadone Definitive Test    Collection Time: 05/12/21  1:14 PM   Result Value Ref Range    Methadone Quantification negative 100 ng/mL    EDDP (Methadone metabolite) Quantification negative 100 ng/mL   MM DT_Methamphetamine Definitive Test    Collection Time: 05/12/21  1:14 PM   Result Value Ref Range    Amphetamine Quantification negative 100 ng/mL    Methamphetamine Quantification negative 100 ng/mL   MM DT_Methylphenidate Definitive Test    Collection Time: 05/12/21  1:14 PM   Result Value Ref Range    Methylphenidate Quantification negative 50 ng/mL    RITALINIC ACID QUANTIFICATION negative 50 ng/mL   MM DT_Morphine Definitive Test    Collection Time: 05/12/21  1:14 PM   Result Value Ref Range    Morphine Quantification negative 50 ng/mL    Hydromorphone Quantification negative 50 ng/mL   MM DT_Olanzapine Definitive Test    Collection Time: 05/12/21  1:14 PM   Result Value Ref Range    OLANZAPINE QUANTIFICATION negative 25 ng/mL   MM DT_Oxazepam Definitive Test    Collection Time: 05/12/21  1:14 PM   Result Value Ref Range    Oxazepam Quantification negative 40 ng/mL   MM DT_Oxycodone Definitive Test    Collection Time: 05/12/21 1:14 PM   Result Value Ref Range    Oxycodone Quantification negative 50 ng/mL    Noroxycodone Quantification negative 50 ng/mL    Oxymorphone Quantification negative 50 ng/mL   MM DT_Oxymorphone Definitive Test    Collection Time: 05/12/21  1:14 PM   Result Value Ref Range    Oxymorphone Quantification negative 50 ng/mL   MM DT_Phenobarbital Definitive Test    Collection Time: 05/12/21  1:14 PM   Result Value Ref Range    Phenobarbital Quantification negative 200 ng/mL   MM DT_Phentermine Definitive Test    Collection Time: 05/12/21  1:14 PM   Result Value Ref Range    PHENTERMINE QUANTIFICATION negative 50 ng/mL   MM DT_Secobarbital Definitive Test    Collection Time: 05/12/21  1:14 PM   Result Value Ref Range    Secobarbital Quantification negative 200 ng/mL   MM DT_Spice Definitive Test    Collection Time: 05/12/21  1:14 PM   Result Value Ref Range    5F-ADB-M7 negative 10 ng/mL    SF-[id number]-C8 METABOLITE QUANTIFICATION  negative 10 ng/mL    [id number]-[id number]-F8 METABOLITE QUANTIFICATION negative 10 ng/mL    MSM388 metabolite Quantification negative 10 ng/mL    ZYL920 metabolite Quantification negative 10 ng/mL    RCS4 METABOLITE QUANTIFICATION negative 10 ng/mL    XLR11/ METABOLITE QUANTIFICATION negative 10 ng/mL   MM DT_Tapentadol Definitive Test    Collection Time: 05/12/21  1:14 PM   Result Value Ref Range    Tapentadol Quantification negative 50 ng/mL   MM DT_Temazapam Definitive Test    Collection Time: 05/12/21  1:14 PM   Result Value Ref Range    Temazepam Quantification negative 50 ng/mL    Oxazepam Quantification negative 40 ng/mL   MM DT_THC Definitive Test    Collection Time: 05/12/21  1:14 PM   Result Value Ref Range    cTHC (Marijuana metabolite) Quantification negative 15 ng/mL   MM DT_Tramadol Definitive Test    Collection Time: 05/12/21  1:14 PM   Result Value Ref Range    Tramadol Quantification negative 100 ng/mL    O-desmethyl-tramadol Quantification negative 100 ng/mL N-DESMETHYL-TRAMADOL QUANTIFICATION negative 100 ng/mL   MM DT_Validity Specific    Collection Time: 05/12/21  1:14 PM   Result Value Ref Range    SPECIFIC GRAVITY URINE normal-1 014 1 003 - 1 035   MM DT_Validity pH    Collection Time: 05/12/21  1:14 PM   Result Value Ref Range    pH normal-5 8 4 5 - 9 5   MM DT_Validity Creatinine    Collection Time: 05/12/21  1:14 PM   Result Value Ref Range    CREATININE normal-75 6 >20 mg/dL mg/dL   MM DT_Validity Oxidant    Collection Time: 05/12/21  1:14 PM   Result Value Ref Range    OXIDANT normal-0 <200 ug/mL ug/mL            Physical Exam  Vitals signs and nursing note reviewed  Constitutional:       General: He is not in acute distress  Appearance: He is well-developed  He is not diaphoretic  HENT:      Head: Normocephalic and atraumatic  Eyes:      General: No scleral icterus  Right eye: No discharge  Left eye: No discharge  Conjunctiva/sclera: Conjunctivae normal       Pupils: Pupils are equal, round, and reactive to light  Neck:      Musculoskeletal: Normal range of motion and neck supple  Thyroid: No thyromegaly  Vascular: No JVD  Cardiovascular:      Rate and Rhythm: Normal rate and regular rhythm  Heart sounds: Normal heart sounds  No murmur  No friction rub  No gallop  Pulmonary:      Effort: Pulmonary effort is normal  No respiratory distress  Breath sounds: Normal breath sounds  No wheezing or rales  Chest:      Chest wall: No tenderness  Abdominal:      General: Bowel sounds are normal  There is no distension  Palpations: Abdomen is soft  There is no mass  Tenderness: There is no abdominal tenderness  There is no guarding or rebound  Musculoskeletal: Normal range of motion  General: No tenderness or deformity  Lymphadenopathy:      Cervical: No cervical adenopathy  Skin:     General: Skin is warm and dry  Coloration: Skin is not pale  Findings: No erythema or rash  Neurological:      Mental Status: He is alert and oriented to person, place, and time  Cranial Nerves: No cranial nerve deficit  Coordination: Coordination normal       Deep Tendon Reflexes: Reflexes are normal and symmetric  Psychiatric:         Behavior: Behavior normal          Thought Content:  Thought content normal          Judgment: Judgment normal

## 2021-06-15 ENCOUNTER — TELEPHONE (OUTPATIENT)
Dept: INTERNAL MEDICINE CLINIC | Facility: CLINIC | Age: 66
End: 2021-06-15

## 2021-06-15 NOTE — TELEPHONE ENCOUNTER
Dhara Tapia contacted us to let us know that the code needs to be changed on this patients prior authorization for his NM pet CT  The code that needs to be used now is 70675  She stated the insurance would need to be called to change it

## 2021-06-18 NOTE — TELEPHONE ENCOUNTER
We got another message from Delroy Jamil today that the CPT code for this authorization needs to be changed to [id number]

## 2021-06-22 ENCOUNTER — TELEPHONE (OUTPATIENT)
Dept: INTERNAL MEDICINE CLINIC | Facility: CLINIC | Age: 66
End: 2021-06-22

## 2021-06-22 ENCOUNTER — HOSPITAL ENCOUNTER (OUTPATIENT)
Dept: NUCLEAR MEDICINE | Facility: HOSPITAL | Age: 66
Discharge: HOME | End: 2021-06-22
Attending: INTERNAL MEDICINE
Payer: COMMERCIAL

## 2021-06-22 DIAGNOSIS — R91.8 MULTIPLE SUBSOLID LUNG NODULES GREATER THAN 6 MM IN DIAMETER: ICD-10-CM

## 2021-06-22 PROCEDURE — 82948 REAGENT STRIP/BLOOD GLUCOSE: CPT

## 2021-06-22 PROCEDURE — 78816 PET IMAGE W/CT FULL BODY: CPT

## 2021-06-22 PROCEDURE — A9552 F18 FDG: HCPCS

## 2021-06-22 PROCEDURE — G1004 CDSM NDSC: HCPCS

## 2021-06-24 ENCOUNTER — TELEPHONE (OUTPATIENT)
Dept: PAIN MEDICINE | Facility: MEDICAL CENTER | Age: 66
End: 2021-06-24

## 2021-06-24 DIAGNOSIS — I72.4 ANEURYSM OF RIGHT POPLITEAL ARTERY (HCC): Primary | ICD-10-CM

## 2021-06-24 LAB — GLUCOSE SERPL-MCNC: 113 MG/DL (ref 65–140)

## 2021-06-24 NOTE — TELEPHONE ENCOUNTER
Pt has completed his CT scan and would like for the doctor to review and get back him  Please be advise thank you        Please call patient back @ [phone]

## 2021-06-24 NOTE — TELEPHONE ENCOUNTER
Imaging results reviewed  No new treatment recommendations  Follow the recommendations from whomever ordered the studies

## 2021-06-24 NOTE — TELEPHONE ENCOUNTER
RN s/w pt regarding previous  Per pt he is aware that Jamil Holloway did not order his CT but would like him to be aware of any new information incase the findings show anything that would be helpful in treating his back /leg pain  Rn advised that she would send this to Jamil Holloway and call back with his recs and suggestions for same      --please advise thank you--

## 2021-07-15 ENCOUNTER — CONSULT (OUTPATIENT)
Dept: VASCULAR SURGERY | Facility: CLINIC | Age: 66
End: 2021-07-15
Payer: COMMERCIAL

## 2021-07-15 VITALS
SYSTOLIC BLOOD PRESSURE: 110 MMHG | HEIGHT: 75 IN | HEART RATE: 78 BPM | DIASTOLIC BLOOD PRESSURE: 72 MMHG | TEMPERATURE: 98.3 F | BODY MASS INDEX: 24.37 KG/M2 | WEIGHT: 196 LBS

## 2021-07-15 DIAGNOSIS — Z72.0 TOBACCO ABUSE: Primary | ICD-10-CM

## 2021-07-15 DIAGNOSIS — I72.4 ANEURYSM OF RIGHT POPLITEAL ARTERY (HCC): ICD-10-CM

## 2021-07-15 PROCEDURE — 3008F BODY MASS INDEX DOCD: CPT | Performed by: SURGERY

## 2021-07-15 PROCEDURE — 99204 OFFICE O/P NEW MOD 45 MIN: CPT | Performed by: SURGERY

## 2021-07-15 PROCEDURE — 4004F PT TOBACCO SCREEN RCVD TLK: CPT | Performed by: SURGERY

## 2021-07-15 NOTE — LETTER
July 15, 2021     Jonatan Russell, [address] [facility]    Patient: Mariya Henriquez   YOB: 1955   Date of Visit: 7/15/2021       Dear Dr Leonila Barker:    Thank you for referring Shakira Abdul to me for evaluation  Below are the relevant portions of my assessment and plan of care  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HCC)    2 7 cm right popliteal artery aneurysm incidentally identified on a PET-CT to evaluate pulmonary nodules  We discussed this finding along with the pathophysiology of popliteal artery aneurysm disease and the indications for treatment, the treatment options available and the associated risks and benefits  At this point he will require formal arterial imaging to better define the extent of aneurysm and planning treatment  He will also require a formal noninvasive study to evaluate distal perfusion also in preparation for repair  Following this he will return to the office for final treatment recommendations but based upon size he is at a stage in which treatment is advisable  If you have questions, please do not hesitate to call me  I look forward to following [facility] along with you           Sincerely,        Janey Leung MD        CC: No Recipients

## 2021-07-15 NOTE — PROGRESS NOTES
Assessment/Plan:    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HCC)    2 7 cm right popliteal artery aneurysm incidentally identified on a PET-CT to evaluate pulmonary nodules  We discussed this finding along with the pathophysiology of popliteal artery aneurysm disease and the indications for treatment, the treatment options available and the associated risks and benefits  At this point he will require formal arterial imaging to better define the extent of aneurysm and planning treatment  He will also require a formal noninvasive study to evaluate distal perfusion also in preparation for repair  Following this he will return to the office for final treatment recommendations but based upon size he is at a stage in which treatment is advisable  Diagnoses and all orders for this visit:    Tobacco abuse    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[facility] [address] )  -     Ambulatory referral to Vascular Surgery  -     CTA abdominal w run off w wo contrast; Future  -     Basic metabolic panel; Future  -     VAS lower limb arterial duplex, complete bilateral; Future          Subjective:      Patient ID: Pari Love is a 76 y o  male  Patient presents today to consult for a right popliteal artery aneurysm that was seen on a PET scan on 6/22/21  Patient was referred by Otilia Sever, MD  Patient is experiencing numbness and tingling in both feet x 3-4 years  He does report swelling at the ankles and bruising at the left ankle, which causes him pain at night  patient denies wounds or ulcers  Patient is taking Atorvastatin and smoking 1/2 PPD  58-year-old presents for evaluation of the right popliteal artery aneurysm  Evidently he was noted to have pulmonary nodules and underwent a PET-CT scan for evaluation and was incidentally found to have a 2 7 cm right popliteal artery aneurysm  On evaluation today he denies any discomfort in the popliteal fossa    He denies any claudication symptoms but does have a chronic right footdrop secondary to spinal disease  This has been present for 3-4 years  He also notes numbness of both feet which is also associated with his spinal disease  He denies any chest pain or shortness of breath  CT scan 06/22/2021 with 2 7 cm right popliteal artery aneurysm  There is no evidence of aortic aneurysm and the left popliteal artery is relatively normal in size  Of note this study is without IV contrast       The following portions of the patient's history were reviewed and updated as appropriate: allergies, current medications, past family history, past medical history, past social history, past surgical history and problem list     I have reviewed and made appropriate changes to the review of systems input by the medical assistant  Vitals:    07/15/21 1440   BP: 110/72   BP Location: Right arm   Patient Position: Sitting   Cuff Size: Adult   Pulse: 78   Temp: 98 3 °F (36 8 °C)   TempSrc: Tympanic   Weight: 88 9 kg (196 lb)   Height: 6' 3" (1 905 m)       Patient Active Problem List   Diagnosis    Lumbar radiculopathy - Right    Bone lesion    Neuropathy    Polyarthropathy    Tobacco abuse    Work related injury    Colon cancer screening    Right foot drop    Lumbar spondylosis - Bilateral    Vitamin D deficiency    Thoracic spine pain    Chronic pain syndrome    Uncomplicated opioid dependence (HCC)    Chronic pain of both knees    Chronic pain of both shoulders    Chronic pain of left ankle    Multiple subsolid lung nodules greater than 6 mm in diameter    Mixed hyperlipidemia    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HCC)       Past Surgical History:   Procedure Laterality Date    EGD AND COLONOSCOPY N/A 6/29/2018    Procedure: EGD AND COLONOSCOPY;  Surgeon: Lea Chicas MD;  Location: [facility] GI LAB;   Service: Gastroenterology    EPIDURAL BLOCK INJECTION      needle    ORTHOPEDIC SURGERY      [place] HIP [address] BODY,PLASTY/RESECTN Left 4/30/2018    Procedure: LEFT HIP ARTHROSCOPIC LABRAL DEBRIDEMENT;  Surgeon: April Montanez MD;  [facility];  Service: Orthopedics    TONSILLECTOMY      WISDOM TOOTH EXTRACTION         Family History   Problem Relation Age of Onset    Osteoarthritis Family     Glaucoma Mother     No Known Problems Father        Social History     Socioeconomic History    Marital status:      Spouse name: Not on file    Number of children: Not on file    Years of education: Not on file    Highest education level: Not on file   Occupational History    Not on file   Tobacco Use    Smoking status: Current Every Day Smoker     Packs/day: 0 50    Smokeless tobacco: Never Used    Tobacco comment: 1/2 a pack a day   Vaping Use    Vaping Use: Never used   Substance and Sexual Activity    Alcohol use: No    Drug use: No    Sexual activity: Not Currently   Other Topics Concern    Not on file   Social History Narrative    Not on file     Social Determinants of Health     Financial Resource Strain:     Difficulty of Paying Living Expenses:    Food Insecurity:     Worried About Running Out of Food in the Last Year:     Ran Out of Food in the Last Year:    Transportation Needs:     Lack of Transportation (Medical):  Lack of Transportation (Non-Medical):    Physical Activity:     Days of Exercise per Week:     Minutes of Exercise per Session:    Stress:     Feeling of Stress :    Social Connections:     Frequency of Communication with Friends and Family:     Frequency of Social Gatherings with Friends and Family:     Attends [employer] Services:     Active Member of Clubs or Organizations:     Attends Club or Organization Meetings:     Marital Status:    Intimate Partner Violence:     Fear of Current or Ex-Partner:     Emotionally Abused:     Physically Abused:     Sexually Abused:         Allergies   Allergen Reactions    Gabapentin GI Intolerance    Tramadol Confusion    Morphine Other (See Comments)     constipation         Current Outpatient Medications:     acetaminophen (TYLENOL) 500 mg tablet, Take 1,000 mg by mouth every 12 (twelve) hours as needed for mild pain, Disp: , Rfl:     ascorbic Acid (VITAMIN C) 500 MG CPCR, Take 500 mg by mouth daily, Disp: , Rfl:     atorvastatin (LIPITOR) 10 mg tablet, Take 0 5 tablets (5 mg total) by mouth daily at bedtime, Disp: 45 tablet, Rfl: 1    cholecalciferol (VITAMIN D3) 1,000 units tablet, Take 2,000 Units by mouth 2 (two) times a day , Disp: , Rfl:     clotrimazole (LOTRIMIN) 1 % cream, Apply 1 g (1 application total) topically daily To affected area, Disp: 60 g, Rfl: 2    Elastic Bandages & Supports (KNEE BRACE/HINGED/REGULAR) MISC, by Does not apply route daily as needed (B/L knee pain) B/L knee braces, Disp: 2 each, Rfl: 0    Flaxseed, Linseed, (FLAX SEED OIL PO), Take 1,200 capsules by mouth daily , Disp: , Rfl:     ibuprofen (MOTRIN) 600 mg tablet, Take 600 mg by mouth every 6 (six) hours as needed for mild pain, Disp: , Rfl:     METHYLCOBALAMIN SL, Place 1,000 mcg under the tongue 2 (two) times a day , Disp: , Rfl:     pregabalin (LYRICA) 100 mg capsule, Take 1 capsule (100 mg total) by mouth 3 (three) times a day (Patient taking differently: Take 100 mg by mouth as needed ), Disp: 90 capsule, Rfl: 2    pyridoxine (B-6) 100 MG tablet, Take 100 mg by mouth daily, Disp: , Rfl:     transdermal buprenorphine (BUTRANS) 5 mcg/hr TD patch, Place 1 patch on the skin once a week Do not fill until 08/27/2020, Disp: 4 patch, Rfl: 2    Turmeric 500 MG CAPS, Take by mouth daily, Disp: , Rfl:        Review of Systems   Constitutional: Negative  HENT: Negative  Eyes: Negative  Respiratory: Negative  Cardiovascular: Positive for leg swelling  Gastrointestinal: Positive for constipation  Endocrine: Positive for cold intolerance  Genitourinary: Negative  Musculoskeletal: Positive for arthralgias, back pain and gait problem (uses a cane)  Leg pain     Skin: Negative  Allergic/Immunologic: Negative  Neurological: Positive for numbness  Hematological: Bruises/bleeds easily  Psychiatric/Behavioral: Negative  Depression         Objective:      /72 (BP Location: Right arm, Patient Position: Sitting, Cuff Size: Adult)   Pulse 78   Temp 98 3 °F (36 8 °C) (Tympanic)   Ht 6' 3" (1 905 m)   Wt 88 9 kg (196 lb)   BMI 24 50 kg/m²          Physical Exam  Constitutional:       Appearance: Normal appearance  He is well-developed  HENT:      Head: Normocephalic and atraumatic  Eyes:      Conjunctiva/sclera: Conjunctivae normal    Neck:      Vascular: No carotid bruit or JVD  Cardiovascular:      Rate and Rhythm: Normal rate and regular rhythm  Pulses:           Carotid pulses are 2+ on the right side and 2+ on the left side  Radial pulses are 2+ on the right side and 2+ on the left side  Femoral pulses are 2+ on the right side and 2+ on the left side  Popliteal pulses are 3+ on the right side and 0 on the left side  Dorsalis pedis pulses are 2+ on the right side and 0 on the left side  Posterior tibial pulses are 2+ on the right side and 0 on the left side  Heart sounds: Normal heart sounds, S1 normal and S2 normal  No murmur heard  Comments:   Pulsatile mass palpated above the knee joint consistent with popliteal artery aneurysm  Pulmonary:      Effort: Pulmonary effort is normal       Breath sounds: Normal breath sounds  Abdominal:      General: There is no abdominal bruit  Palpations: Abdomen is soft  There is no pulsatile mass  Tenderness: There is no abdominal tenderness  Hernia: No hernia is present  Musculoskeletal:         General: No tenderness or deformity  Normal range of motion  Cervical back: Normal range of motion and neck supple  Skin:     General: Skin is warm and dry  Coloration: Skin is not pale     Neurological:      Mental Status: He is alert and oriented to person, place, and time  Sensory: No sensory deficit  Gait: Gait abnormal (Uses a cane)        Comments: Right foot drop   Psychiatric:         Mood and Affect: Mood normal          Speech: Speech normal          Behavior: Behavior normal

## 2021-07-15 NOTE — ASSESSMENT & PLAN NOTE
2 7 cm right popliteal artery aneurysm incidentally identified on a PET-CT to evaluate pulmonary nodules  We discussed this finding along with the pathophysiology of popliteal artery aneurysm disease and the indications for treatment, the treatment options available and the associated risks and benefits  At this point he will require formal arterial imaging to better define the extent of aneurysm and planning treatment  He will also require a formal noninvasive study to evaluate distal perfusion also in preparation for repair  Following this he will return to the office for final treatment recommendations but based upon size he is at a stage in which treatment is advisable

## 2021-07-15 NOTE — PATIENT INSTRUCTIONS
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HCC)    2 7 cm right popliteal artery aneurysm incidentally identified on a PET-CT to evaluate pulmonary nodules  We discussed this finding along with the pathophysiology of popliteal artery aneurysm disease and the indications for treatment, the treatment options available and the associated risks and benefits  At this point he will require formal arterial imaging to better define the extent of aneurysm and planning treatment  He will also require a formal noninvasive study to evaluate distal perfusion also in preparation for repair  Following this he will return to the office for final treatment recommendations but based upon size he is at a stage in which treatment is advisable

## 2021-07-27 ENCOUNTER — TELEPHONE (OUTPATIENT)
Dept: VASCULAR SURGERY | Facility: CLINIC | Age: 66
End: 2021-07-27

## 2021-07-27 ENCOUNTER — HOSPITAL ENCOUNTER (OUTPATIENT)
Dept: NON INVASIVE DIAGNOSTICS | Facility: HOSPITAL | Age: 66
Discharge: HOME | End: 2021-07-27
Attending: SURGERY
Payer: COMMERCIAL

## 2021-07-27 ENCOUNTER — HOSPITAL ENCOUNTER (OUTPATIENT)
Dept: NON INVASIVE DIAGNOSTICS | Facility: HOSPITAL | Age: 66
Discharge: HOME | End: 2021-07-27
Payer: COMMERCIAL

## 2021-07-27 DIAGNOSIS — I82.412 DEEP VEIN THROMBOSIS (DVT) OF FEMORAL VEIN OF LEFT LOWER EXTREMITY, UNSPECIFIED CHRONICITY (HCC): Primary | ICD-10-CM

## 2021-07-27 DIAGNOSIS — I82.412 DEEP VEIN THROMBOSIS (DVT) OF FEMORAL VEIN OF LEFT LOWER EXTREMITY, UNSPECIFIED CHRONICITY (HCC): ICD-10-CM

## 2021-07-27 DIAGNOSIS — I72.4 ANEURYSM OF RIGHT POPLITEAL ARTERY (HCC): ICD-10-CM

## 2021-07-27 PROCEDURE — 93925 LOWER EXTREMITY STUDY: CPT

## 2021-07-27 PROCEDURE — 93971 EXTREMITY STUDY: CPT | Performed by: SURGERY

## 2021-07-27 PROCEDURE — 93922 UPR/L XTREMITY ART 2 LEVELS: CPT | Performed by: SURGERY

## 2021-07-27 PROCEDURE — 93923 UPR/LXTR ART STDY 3+ LVLS: CPT

## 2021-07-27 PROCEDURE — 93971 EXTREMITY STUDY: CPT

## 2021-07-27 PROCEDURE — 93925 LOWER EXTREMITY STUDY: CPT | Performed by: SURGERY

## 2021-07-27 NOTE — TELEPHONE ENCOUNTER
Spoke with patient, he is limited to [facility] [address] only due to transport  Advised I would have to try and rearrange the schedule and give him a call back  Patient is aware  Advised I will call him in the AM once I am able to facilitate an appt in [facility] [address] office

## 2021-07-27 NOTE — TELEPHONE ENCOUNTER
Received call from [facility], vascular tech, re: DAO  Per [facility], she started the DAO and incidentally found that pt has an occlusive thrombus from the L proximal femoral to the PT and peroneal veins so she completed an LEV of the LLE in addition to the DAO  Per [facility], pt is asymptomatic  She asked if we can place an order for the LEV  Discussed w/ Marian Clemons who advised that it is ok to place order for the LEV  She is going to discuss w/ Moni and she will call back but pt will likely need anticoagulation  Pt was allowed to leave and advised to expect a call from the office

## 2021-07-27 NOTE — TELEPHONE ENCOUNTER
Received call from nursing triage, pt having R DAO to evaluate popliteal artery aneurysm for treatment planning  Pt found to have thrombus in Left proximal femoral vein extending to PT and peroneal veins  Triage called with significant findings  Pt is asymptomatic  Arterial imaging completed on RLE  Will start anticoagulation  Please call pt to let him know findings and Rx sent to pharmacy  Please set up appointment for him to be seen in office

## 2021-07-27 NOTE — TELEPHONE ENCOUNTER
Pt notified of findings- confirmed he is asymptomatic at this time  Pt was given instructions for starting eliquis  Advised to avoid NSAIDs while on eliquis  Pt verbalized understanding  Will have scheduling call pt for OV

## 2021-07-28 NOTE — PROGRESS NOTES
Assessment/Plan:    Deep vein thrombosis (DVT) of femoral vein of left lower extremity ([address] )  Patient is a 67-year-old male who presents to the office today s/p incidental finding of unprovoked left LE DVT  Patient was at vascular lab for bilateral DAO when vascular tech discovered an acute occlusive DVT in the Left LE proximal, mid and distal femoral vein, popliteal vein and the PT and peroneal vein  Patient is asymptomatic  Denies leg pain or swelling  Patient denies CP or SOB  Patient denies history of DVTs  Patient has not yet started prescribed Eliquis  Patient states he will be picking up this afternoon  Plan:  - Start! Eliquis as prescribed  - Compression stocking to LLE  - Repeat LEVD in 3 months  - Keep OV w/ Dr Crespo Means to review DAO and Abd CTA with runoff when obtained  - Call or return to the office with new or worsening symptoms   - call 911 or go to the ER with CP or SOB         Diagnoses and all orders for this visit:    Deep vein thrombosis (DVT) of femoral vein of left lower extremity, unspecified chronicity (HCC)  -     VAS lower limb venous duplex study, unilateral/limited; Future          Subjective:      Patient ID: Macie Andre is a 76 y o  male  Patient presents today to review the results of the DAO and LEV done on 7/27/21  There was an incidental finding of a DVT in the left femoral vein  Patient denies claudication or rest pain but reports numbness and tingling  They are c/o a discomfort at the posterior right knee and intermittent swelling at the ankles  Patient is taking Atorvastatin and will start on Eliquis soon  They are smoking 1/2 PPD       Patient is a 67-year-old male, smoker with history of lumbar radiculopathy, bilateral lower extremity numbness x 3-4 years, spinal stenosis, chronic left foot drop, right popliteal artery aneurysm ( 2 7cm) recent incidental finding on PET scan who presents the office today s/p incidental finding of unprovoked left LE DVT   Patient was at vascular lab for bilateral DAO imaging for right popliteal artery aneurysm workup, when vascular tech discovered an acute occlusive DVT in the Left LE proximal, mid and distal femoral vein, popliteal vein and the PT and peroneal vein  Patient is asymptomatic at this time  He denies leg pain or swelling, CP or SOB  Patient denies history of DVTs  Patient admits to having a more sedentary lifestyle  He does walk around his home with a cane, and reports he gets up and down working with his "crystal molds"  No recent injury, trauma, or provoking incident  Patient has not yet started his prescribed Eliquis, and states he will be picking it up this afternoon  I reiterated the importance of anticoagulation and starting this medicine ASAP  Of note his recent DAO shows occlusion of the left SFA with reconstitution of the proximal popliteal artery  DEVONTE 0 44/42/25  Patient denies pain, claudication, rest pain, or wounds  His left lower extremity is well perfused with weak, but palpable pulses, confirmed with Doppler  Foot is warm  Patient does describe numbness and tingling that has been chronic x 3-4 years with overlap of spinal stenosis and neuropathy  Patient has abdominal CTA with runoff scheduled and will return to office with Dr Corina Hutchinson  At that time further discussion can be made of the length of anticoagulation  The following portions of the patient's history were reviewed and updated as appropriate: allergies, current medications, past family history, past medical history, past social history, past surgical history and problem list     Review of Systems   Constitutional: Negative  HENT: Negative  Eyes: Negative  Respiratory: Negative  Cardiovascular: Positive for leg swelling  Gastrointestinal: Positive for constipation  Endocrine: Negative  Genitourinary: Negative  Musculoskeletal: Positive for gait problem (Uses a cane) and joint swelling  Skin: Negative  Allergic/Immunologic: Negative  Neurological: Positive for numbness (b/l feet)  Hematological: Bruises/bleeds easily  Psychiatric/Behavioral: Negative  I have reviewed and made appropriate changes to the review of systems input by the medical assistant  Objective:      /70 (BP Location: Right arm, Patient Position: Sitting, Cuff Size: Adult)   Pulse 74   Temp (!) 96 °F (35 6 °C) (Tympanic)   Ht 6' 3" (1 905 m)   Wt 88 9 kg (196 lb)   BMI 24 50 kg/m²          Physical Exam  Constitutional:       Appearance: Normal appearance  Cardiovascular:      Rate and Rhythm: Normal rate and regular rhythm  Pulses:           Dorsalis pedis pulses are 2+ on the right side and 1+ on the left side  Posterior tibial pulses are 2+ on the right side and 1+ on the left side  Heart sounds: Normal heart sounds  Pulmonary:      Breath sounds: Normal breath sounds  Musculoskeletal:      Right lower leg: No edema  Left lower leg: No edema  Skin:     General: Skin is warm and dry  Comments: LLE with dilated/varicose vein to left medial thigh and calf  Telangiectasia to left ankle and foot   Neurological:      General: No focal deficit present  Mental Status: He is alert and oriented to person, place, and time  Psychiatric:         Mood and Affect: Mood normal          Behavior: Behavior normal            I have reviewed and made appropriate changes to the review of systems input by the medical assistant      Vitals:    07/29/21 1118   BP: 100/70   BP Location: Right arm   Patient Position: Sitting   Cuff Size: Adult   Pulse: 74   Temp: (!) 96 °F (35 6 °C)   TempSrc: Tympanic   Weight: 88 9 kg (196 lb)   Height: 6' 3" (1 905 m)       Patient Active Problem List   Diagnosis    Lumbar radiculopathy - Right    Bone lesion    Neuropathy    Polyarthropathy    Tobacco abuse    Work related injury    Colon cancer screening    Right foot drop    Lumbar spondylosis - Bilateral    Vitamin D deficiency    Thoracic spine pain    Chronic pain syndrome    Uncomplicated opioid dependence (HCC)    Chronic pain of both knees    Chronic pain of both shoulders    Chronic pain of left ankle    Multiple subsolid lung nodules greater than 6 mm in diameter    Mixed hyperlipidemia    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HCC)    Deep vein thrombosis (DVT) of femoral vein of left lower extremity ([facility] [address] )       Past Surgical History:   Procedure Laterality Date    EGD AND COLONOSCOPY N/A 6/29/2018    Procedure: EGD AND COLONOSCOPY;  Surgeon: Anayeli Mariee MD;  Location: [facility];   Service: Gastroenterology    EPIDURAL BLOCK INJECTION      needle    ORTHOPEDIC SURGERY      [place] HIP [address] BODY,PLASTY/RESECTN Left 4/30/2018    Procedure: LEFT HIP ARTHROSCOPIC LABRAL DEBRIDEMENT;  Surgeon: Jeanine Coombs MD;  [facility];  Service: Orthopedics    TONSILLECTOMY      WISDOM TOOTH EXTRACTION         Family History   Problem Relation Age of Onset    Osteoarthritis Family     Glaucoma Mother     No Known Problems Father        Social History     Socioeconomic History    Marital status:      Spouse name: Not on file    Number of children: Not on file    Years of education: Not on file    Highest education level: Not on file   Occupational History    Not on file   Tobacco Use    Smoking status: Current Every Day Smoker     Packs/day: 0 50    Smokeless tobacco: Never Used    Tobacco comment: 1/2 a pack a day   Vaping Use    Vaping Use: Never used   Substance and Sexual Activity    Alcohol use: No    Drug use: No    Sexual activity: Not Currently   Other Topics Concern    Not on file   Social History Narrative    Not on file     Social Determinants of Health     Financial Resource Strain:     Difficulty of Paying Living Expenses:    Food Insecurity:     Worried About Running Out of Food in the Last Year:     Kary of Food in the Last Year:    Transportation Needs:     Lack of Transportation (Medical):  Lack of Transportation (Non-Medical):    Physical Activity:     Days of Exercise per Week:     Minutes of Exercise per Session:    Stress:     Feeling of Stress :    Social Connections:     Frequency of Communication with Friends and Family:     Frequency of Social Gatherings with Friends and Family:     Attends [employer] Services:     Active Member of Clubs or Organizations:     Attends Club or Organization Meetings:     Marital Status:    Intimate Partner Violence:     Fear of Current or Ex-Partner:     Emotionally Abused:     Physically Abused:     Sexually Abused:         Allergies   Allergen Reactions    Gabapentin GI Intolerance    Tramadol Confusion    Morphine Other (See Comments)     constipation         Current Outpatient Medications:     acetaminophen (TYLENOL) 500 mg tablet, Take 1,000 mg by mouth every 12 (twelve) hours as needed for mild pain, Disp: , Rfl:     ascorbic Acid (VITAMIN C) 500 MG CPCR, Take 500 mg by mouth daily, Disp: , Rfl:     atorvastatin (LIPITOR) 10 mg tablet, Take 0 5 tablets (5 mg total) by mouth daily at bedtime, Disp: 45 tablet, Rfl: 1    cholecalciferol (VITAMIN D3) 1,000 units tablet, Take 2,000 Units by mouth 2 (two) times a day , Disp: , Rfl:     clotrimazole (LOTRIMIN) 1 % cream, Apply 1 g (1 application total) topically daily To affected area, Disp: 60 g, Rfl: 2    Elastic Bandages & Supports (KNEE BRACE/HINGED/REGULAR) MISC, by Does not apply route daily as needed (B/L knee pain) B/L knee braces, Disp: 2 each, Rfl: 0    Flaxseed, Linseed, (FLAX SEED OIL PO), Take 1,200 capsules by mouth daily , Disp: , Rfl:     METHYLCOBALAMIN SL, Place 1,000 mcg under the tongue 2 (two) times a day , Disp: , Rfl:     pregabalin (LYRICA) 100 mg capsule, Take 1 capsule (100 mg total) by mouth 3 (three) times a day (Patient taking differently: Take 100 mg by mouth as needed ), Disp: 90 capsule, Rfl: 2    pyridoxine (B-6) 100 MG tablet, Take 100 mg by mouth daily, Disp: , Rfl:     transdermal buprenorphine (BUTRANS) 5 mcg/hr TD patch, Place 1 patch on the skin once a week Do not fill until 08/27/2020, Disp: 4 patch, Rfl: 2    Turmeric 500 MG CAPS, Take by mouth daily, Disp: , Rfl:     apixaban (ELIQUIS) 5 mg, Take 2 tablets (10 mg total) by mouth 2 (two) times a day for 7 days, THEN 1 tablet (5 mg total) 2 (two) times a day for 23 days   (Patient not taking: Reported on 7/29/2021), Disp: 74 tablet, Rfl: 0    [START ON 8/25/2021] apixaban (ELIQUIS) 5 mg, Take 1 tablet (5 mg total) by mouth 2 (two) times a day (Patient not taking: Reported on 7/29/2021), Disp: 60 tablet, Rfl: 2

## 2021-07-28 NOTE — TELEPHONE ENCOUNTER
Patient called to ask when he should start his Eliquis  The pharmacy called him to let him know it will not be ready until 1500 today  He is not sure if he should wait until tomorrow to start it after his appt with vascular  Advised patient to start Eliquis as soon as he gets it  He verbalized understanding and agrees to plan

## 2021-07-28 NOTE — TELEPHONE ENCOUNTER
Appt scheduled for 11 am tomorrow with Shayan Osorio  Left message for patient to see if this appt will work for him

## 2021-07-29 ENCOUNTER — OFFICE VISIT (OUTPATIENT)
Dept: VASCULAR SURGERY | Facility: CLINIC | Age: 66
End: 2021-07-29
Payer: COMMERCIAL

## 2021-07-29 VITALS
TEMPERATURE: 96 F | HEIGHT: 75 IN | DIASTOLIC BLOOD PRESSURE: 70 MMHG | BODY MASS INDEX: 24.37 KG/M2 | WEIGHT: 196 LBS | SYSTOLIC BLOOD PRESSURE: 100 MMHG | HEART RATE: 74 BPM

## 2021-07-29 DIAGNOSIS — I82.412 DEEP VEIN THROMBOSIS (DVT) OF FEMORAL VEIN OF LEFT LOWER EXTREMITY, UNSPECIFIED CHRONICITY (HCC): Primary | ICD-10-CM

## 2021-07-29 PROCEDURE — 99213 OFFICE O/P EST LOW 20 MIN: CPT | Performed by: NURSE PRACTITIONER

## 2021-07-29 NOTE — ASSESSMENT & PLAN NOTE
Patient is a 58-year-old male who presents to the office today s/p incidental finding of unprovoked left LE DVT  Patient was at vascular lab for bilateral DAO when vascular tech discovered an acute occlusive DVT in the Left LE proximal, mid and distal femoral vein, popliteal vein and the PT and peroneal vein  Patient is asymptomatic  Denies leg pain or swelling  Patient denies CP or SOB  Patient denies history of DVTs  Patient has not yet started prescribed Eliquis  Patient states he will be picking up this afternoon  Plan:  - Start! Eliquis as prescribed  - Compression stocking to LLE  - Repeat LEVD in 3 months  - Keep OV w/ Dr Davalos Loud to review DAO and Abd CTA with runoff when obtained    - Call or return to the office with new or worsening symptoms   - call 911 or go to the ER with CP or SOB

## 2021-08-04 ENCOUNTER — OFFICE VISIT (OUTPATIENT)
Dept: PAIN MEDICINE | Facility: MEDICAL CENTER | Age: 66
End: 2021-08-04
Payer: COMMERCIAL

## 2021-08-04 VITALS
TEMPERATURE: 97.8 F | BODY MASS INDEX: 24.74 KG/M2 | SYSTOLIC BLOOD PRESSURE: 102 MMHG | DIASTOLIC BLOOD PRESSURE: 56 MMHG | HEIGHT: 75 IN | WEIGHT: 199 LBS | HEART RATE: 66 BPM

## 2021-08-04 DIAGNOSIS — M54.16 LUMBAR RADICULOPATHY: ICD-10-CM

## 2021-08-04 DIAGNOSIS — G89.29 CHRONIC PAIN OF BOTH KNEES: ICD-10-CM

## 2021-08-04 DIAGNOSIS — M25.562 CHRONIC PAIN OF BOTH KNEES: ICD-10-CM

## 2021-08-04 DIAGNOSIS — M25.561 CHRONIC PAIN OF BOTH KNEES: ICD-10-CM

## 2021-08-04 DIAGNOSIS — M54.50 CHRONIC BILATERAL LOW BACK PAIN WITHOUT SCIATICA: ICD-10-CM

## 2021-08-04 DIAGNOSIS — F11.20 UNCOMPLICATED OPIOID DEPENDENCE (HCC): Primary | ICD-10-CM

## 2021-08-04 DIAGNOSIS — M47.816 LUMBAR SPONDYLOSIS: ICD-10-CM

## 2021-08-04 DIAGNOSIS — G89.29 CHRONIC BILATERAL LOW BACK PAIN WITHOUT SCIATICA: ICD-10-CM

## 2021-08-04 DIAGNOSIS — G89.29 CHRONIC PAIN OF BOTH SHOULDERS: ICD-10-CM

## 2021-08-04 DIAGNOSIS — G89.4 CHRONIC PAIN SYNDROME: ICD-10-CM

## 2021-08-04 DIAGNOSIS — Z79.891 LONG-TERM CURRENT USE OF OPIATE ANALGESIC: ICD-10-CM

## 2021-08-04 DIAGNOSIS — M25.511 CHRONIC PAIN OF BOTH SHOULDERS: ICD-10-CM

## 2021-08-04 DIAGNOSIS — M25.512 CHRONIC PAIN OF BOTH SHOULDERS: ICD-10-CM

## 2021-08-04 DIAGNOSIS — M13.0 POLYARTHROPATHY: ICD-10-CM

## 2021-08-04 DIAGNOSIS — G62.9 NEUROPATHY: ICD-10-CM

## 2021-08-04 PROCEDURE — 4004F PT TOBACCO SCREEN RCVD TLK: CPT | Performed by: NURSE PRACTITIONER

## 2021-08-04 PROCEDURE — 1160F RVW MEDS BY RX/DR IN RCRD: CPT | Performed by: NURSE PRACTITIONER

## 2021-08-04 PROCEDURE — 80305 DRUG TEST PRSMV DIR OPT OBS: CPT | Performed by: NURSE PRACTITIONER

## 2021-08-04 PROCEDURE — 3008F BODY MASS INDEX DOCD: CPT | Performed by: NURSE PRACTITIONER

## 2021-08-04 PROCEDURE — 99214 OFFICE O/P EST MOD 30 MIN: CPT | Performed by: NURSE PRACTITIONER

## 2021-08-04 RX ORDER — BUPRENORPHINE 5 UG/H
1 PATCH TRANSDERMAL WEEKLY
Qty: 4 PATCH | Refills: 2 | Status: SHIPPED | OUTPATIENT
Start: 2021-08-04 | End: 2021-11-03 | Stop reason: SDUPTHER

## 2021-08-04 RX ORDER — PREGABALIN 100 MG/1
100 CAPSULE ORAL 2 TIMES DAILY
Qty: 60 CAPSULE | Refills: 2 | Status: SHIPPED | OUTPATIENT
Start: 2021-08-04 | End: 2022-08-02

## 2021-08-04 NOTE — PROGRESS NOTES
Assessment:  1  Uncomplicated opioid dependence ([facility] [address] )    2  Chronic pain syndrome    3  Long-term current use of opiate analgesic    4  Lumbar radiculopathy - Right    5  Neuropathy    6  Lumbar spondylosis - Bilateral    7  Polyarthropathy    8  Lumbar radiculopathy    9  Chronic bilateral low back pain without sciatica    10  Chronic pain of both knees    11  Chronic pain of both shoulders        Plan:   continue with the Butrans patch as prescribed this was refilled for him today with a 3 month supply  I did discuss with the patient that we could increase him to the next level of 7 5 micro g if he would like to since he is having an increase of his pain however he would like to hold off for now  Continue with Lyrica this was refilled today as well  [facility] Prescription Drug Monitoring Program report was reviewed and was appropriate     A urine drug screen was collected at today's office visit as part of our medication management protocol  The point of care testing results were appropriate for what was being prescribed  The specimen will be sent for confirmatory testing  The drug screen is medically necessary because the patient is either dependent on opioid medication or is being considered for opioid medication therapy and the results could impact ongoing or future treatment  The drug screen is to evaluate for the presences or absence of prescribed, non-prescribed, and/or illicit drugs/substances  There are risks associated with opioid medications, including dependence, addiction and tolerance  The patient understands and agrees to use these medications only as prescribed  Potential side effects of the medications include, but are not limited to, constipation, drowsiness, addiction, impaired judgment and risk of fatal overdose if not taken as prescribed  The patient was warned against driving while taking sedation medications  Sharing medications is a felony   At this point in time, the patient is showing no signs of addiction, abuse, diversion or suicidal ideation  History of Present Illness: The patient is a 76 y o  male last seen on   May 12, 2021who presents for a follow up office visit in regards to chronic pain secondary to  Lumbar radiculopathy on the right, neuropathy, lumbar spondylosis, chronic pain of the knees and the shoulders  The patient currently reports  Worsening pain symptoms rated 9/10 this is constant described as dull, aching, pressure-like, shooting, numbness, pins and needles and stiffness  Current pain medications includes:  Butrans patch 5 micro g, and Lyrica 100 mg twice daily    The patient reports that this regimen is providing  inadequate pain relief  The patient is reporting no side effects from this pain medication regimen  since we last saw the patient he has been diagnosed with a right popliteal artery aneurysm and has been seeing vascular surgery  During 1 of his venous duplex studies they did find blood clots in his entire left leg he tells me and he has been taking Eliquis for the past 6 days which has been causing him a lot of joint pain  butrans is on day 6    Pain Contract Signed: 2/21/21  Last Urine Drug Screen: 08/04/21    I have personally reviewed and/or updated the patient's past medical history, past surgical history, family history, social history, current medications, allergies, and vital signs today  Review of Systems:    Review of Systems   Respiratory: Negative for shortness of breath  Cardiovascular: Negative for chest pain  Gastrointestinal: Positive for constipation  Negative for diarrhea, nausea and vomiting  Musculoskeletal: Positive for back pain, gait problem, joint swelling and myalgias  Negative for arthralgias  Skin: Negative for rash  Neurological: Negative for dizziness, seizures and weakness  All other systems reviewed and are negative          Past Medical History:   Diagnosis Date    Chronic back pain     Chronic pain disorder     Depression     GERD (gastroesophageal reflux disease)     L4-L5 disc bulge 06/16/2017    Low back pain     Muscle weakness     Neuropathy     Osteoarthritis     Right foot drop        Past Surgical History:   Procedure Laterality Date    EGD AND COLONOSCOPY N/A 6/29/2018    Procedure: EGD AND COLONOSCOPY;  Surgeon: Catalino Feldman MD;  Location: [facility]; Service: Gastroenterology    EPIDURAL BLOCK INJECTION      needle    ORTHOPEDIC SURGERY      [place] HIP [address] BODY,PLASTY/RESECTN Left 4/30/2018    Procedure: LEFT HIP ARTHROSCOPIC LABRAL DEBRIDEMENT;  Surgeon: Panfilo Mccabe MD;  [facility];  Service: Orthopedics    TONSILLECTOMY      WISDOM TOOTH EXTRACTION         Family History   Problem Relation Age of Onset    Osteoarthritis Family     Glaucoma Mother     No Known Problems Father        Social History     Occupational History    Not on file   Tobacco Use    Smoking status: Current Every Day Smoker     Packs/day: 0 50    Smokeless tobacco: Never Used    Tobacco comment: 1/2 a pack a day   Vaping Use    Vaping Use: Never used   Substance and Sexual Activity    Alcohol use: No    Drug use: No    Sexual activity: Not Currently         Current Outpatient Medications:     acetaminophen (TYLENOL) 500 mg tablet, Take 1,000 mg by mouth every 12 (twelve) hours as needed for mild pain, Disp: , Rfl:     apixaban (ELIQUIS) 5 mg, Take 2 tablets (10 mg total) by mouth 2 (two) times a day for 7 days, THEN 1 tablet (5 mg total) 2 (two) times a day for 23 days  , Disp: 74 tablet, Rfl: 0    ascorbic Acid (VITAMIN C) 500 MG CPCR, Take 500 mg by mouth daily, Disp: , Rfl:     atorvastatin (LIPITOR) 10 mg tablet, Take 0 5 tablets (5 mg total) by mouth daily at bedtime, Disp: 45 tablet, Rfl: 1    cholecalciferol (VITAMIN D3) 1,000 units tablet, Take 2,000 Units by mouth 2 (two) times a day , Disp: , Rfl:     clotrimazole (LOTRIMIN) 1 % cream, Apply 1 g (1 application total) topically daily To affected area, Disp: 60 g, Rfl: 2    Flaxseed, Linseed, (FLAX SEED OIL PO), Take 1,200 capsules by mouth daily , Disp: , Rfl:     METHYLCOBALAMIN SL, Place 1,000 mcg under the tongue 2 (two) times a day , Disp: , Rfl:     pregabalin (LYRICA) 100 mg capsule, Take 1 capsule (100 mg total) by mouth 2 (two) times a day, Disp: 60 capsule, Rfl: 2    pyridoxine (B-6) 100 MG tablet, Take 100 mg by mouth daily, Disp: , Rfl:     transdermal buprenorphine (BUTRANS) 5 mcg/hr TD patch, Place 1 patch on the skin once a week Do not fill until 08/27/2020, Disp: 4 patch, Rfl: 2    Turmeric 500 MG CAPS, Take by mouth daily, Disp: , Rfl:     [START ON 8/25/2021] apixaban (ELIQUIS) 5 mg, Take 1 tablet (5 mg total) by mouth 2 (two) times a day (Patient not taking: Reported on 7/29/2021), Disp: 60 tablet, Rfl: 2    Elastic Bandages & Supports (KNEE BRACE/HINGED/REGULAR) MISC, by Does not apply route daily as needed (B/L knee pain) B/L knee braces, Disp: 2 each, Rfl: 0    Allergies   Allergen Reactions    Gabapentin GI Intolerance    Tramadol Confusion    Morphine Other (See Comments)     constipation       Physical Exam:    /56   Pulse 66   Temp 97 8 °F (36 6 °C)   Ht 6' 3" (1 905 m)   Wt 90 3 kg (199 lb)   BMI 24 87 kg/m²     Constitutional:normal, well developed, well nourished, alert, in no distress and non-toxic and no overt pain behavior    Eyes:anicteric  HEENT:grossly intact  Neck:supple, symmetric, trachea midline and no masses   Pulmonary:even and unlabored  Cardiovascular:No edema or pitting edema present  Skin:Normal without rashes or lesions and well hydrated  Psychiatric:Mood and affect appropriate  Neurologic:Cranial Nerves II-XII grossly intact  Musculoskeletal:ambulates with cane      Imaging  No orders to display         Orders Placed This Encounter   Procedures    MM ALL_Prescribed Meds and Special Instructions    MM DT_Alprazolam Definitive Test    BEATRIZ DT_Amphetamine Definitive Test    MM DT_Aripiprazole Definitive Test    MM DT_Bath Salts Definitive Test    MM DT_Buprenorphine Definitive Test    MM DT_Butalbital Definitive Test    MM DT_Clonazepam Definitive Test    MM DT_Clozapine Definitive Test    MM DT_Cocaine Definitive Test    MM DT_Codeine Definitive Test    MM DT_Desipramine Definitive Test    MM DT_Dextromethorphan Definitive Test    MM Diazepam Definitive Test    MM DT_Ethyl Glucuronide/Ethyl Sulfate Definitive Test    MM DT_Fentanyl Definitive Test    MM DT_Heroin Definitive Test    MM DT_Hydrocodone Definitive Test    MM DT_Hydromorphone Definitive Test    MM DT_Kratom Definitive Test    MM DT_Levorphanol Definitive Test    MM Lorazepam Definitive Test    MM DT_MDMA Definitive Test    MM DT_Meperidine Definitive Test    MM DT_Methadone Definitive Test    MM DT_Methamphetamine Definitive Test    MM DT_Methylphenidate Definitive Test    MM DT_Morphine Definitive Test    MM DT_Olanzapine Definitive Test    MM DT_Oxazepam Definitive Test    MM DT_Oxycodone Definitive Test    MM DT_Oxymorphone Definitive Test    MM DT_Phenobarbital Definitive Test    MM DT_Phentermine Definitive Test    MM DT_Secobarbital Definitive Test    MM DT_Spice Definitive Test    MM DT_Tapentadol Definitive Test    MM DT_Temazapam Definitive Test    MM DT_THC Definitive Test    MM DT_Tramadol Definitive Test    MM DT_Validity Specific    MM DT_Validity pH    MM DT_Validity Creatinine    MM DT_Validity Oxidant

## 2021-08-05 ENCOUNTER — HOSPITAL ENCOUNTER (OUTPATIENT)
Dept: CT IMAGING | Facility: HOSPITAL | Age: 66
Discharge: HOME | End: 2021-08-05
Attending: SURGERY
Payer: COMMERCIAL

## 2021-08-05 DIAGNOSIS — I72.4 ANEURYSM OF RIGHT POPLITEAL ARTERY (HCC): ICD-10-CM

## 2021-08-05 PROCEDURE — 75635 CT ANGIO ABDOMINAL ARTERIES: CPT

## 2021-08-05 PROCEDURE — G1004 CDSM NDSC: HCPCS

## 2021-08-05 RX ADMIN — IOHEXOL 150 ML: 350 INJECTION, SOLUTION INTRAVENOUS at 09:25

## 2021-08-11 NOTE — PROGRESS NOTES
Assessment/Plan:    Aneurysm of right popliteal artery [facility])  76year old male with asymptomatic right popliteal artery aneurysm (3cm)  -will schedule patient for right fem pop bypass with aneurysm ligation  -will have patient see cardiology preop  -will obtain vein mapping   -will plan to perform surgery in October (after completing 3 months of eliquis therapy for left femoral dvt)  -will send message to Dr Olman Najera (spine and pain) as patient has pain patch and would like preop evaluation        Diagnoses and all orders for this visit:    Popliteal aneurysm ([address] )  -     VAS lower limb vein mapping bypass graft; Future  -     Ambulatory referral to Cardiology; Future    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HCC)          Subjective:      Patient ID: Paris Ybarra is a 76 y o  male  Patient presents today to review the results of the CTA abdominal w/ runoff done on 8/5/21  Patient denies claudication but had an episode of leg cramping at night recently  Patient continues to have R> L tingling in the toes  Patient is taking Atorvastatin and Eliquis  They are smoking 1/2 PPD  HPI    The following portions of the patient's history were reviewed and updated as appropriate: allergies, current medications, past family history, past medical history, past social history, past surgical history and problem list     Review of Systems   Constitutional: Negative  HENT: Positive for sore throat  Eyes: Negative  Respiratory: Negative  Cardiovascular: Positive for leg swelling (ankles)  Gastrointestinal: Negative  Endocrine: Negative  Genitourinary: Negative  Musculoskeletal: Positive for gait problem (Uses a cane) and joint swelling (pain at joints in knees, hips, and hands)  Skin: Negative  Allergic/Immunologic: Negative  Hematological: Negative  Psychiatric/Behavioral: Negative            Objective:      /78 (BP Location: Left arm, Patient Position: Sitting, Cuff Size: Adult) Pulse 74   Temp 98 5 °F (36 9 °C) (Tympanic)   Ht 6' 3" (1 905 m)   Wt 88 9 kg (196 lb)   BMI 24 50 kg/m²          Physical Exam  Constitutional:       General: He is not in acute distress  Appearance: Normal appearance  He is normal weight  He is not ill-appearing or toxic-appearing  HENT:      Head: Normocephalic  Cardiovascular:      Rate and Rhythm: Normal rate and regular rhythm  Pulses: Normal pulses  Heart sounds: Normal heart sounds  No murmur heard  No friction rub  Pulmonary:      Effort: Pulmonary effort is normal       Breath sounds: No wheezing or rhonchi  Abdominal:      Palpations: Abdomen is soft  Tenderness: There is no abdominal tenderness  There is no guarding  Musculoskeletal:         General: No swelling  Right lower leg: No edema  Left lower leg: No edema  Skin:     General: Skin is warm and dry  Capillary Refill: Capillary refill takes less than 2 seconds  Neurological:      General: No focal deficit present  Mental Status: He is alert and oriented to person, place, and time  Operative Scheduling Information:    Hospital:  [facility]    Physician:  Alexandra Aldridge    Surgery: right SFA to BK Pop Bypass with insitu GSV, ligation of popliteal artery aneurysm, completion angiogram     Urgency:  Standard    Level:  Level 4: Outpatients to be scheduled for screening procedures and elective surgery that can be delayed for longer than one month without reasonable expectation of detriment to patient      Case Length:  Normal    Post-op Bed:  Stepdown    OR Table:  Discovery    Equipment Needs:  None    Medication Instructions:  Eliquis:  Hold for 2 days prior to procedure    Hydration:  No

## 2021-08-12 ENCOUNTER — TELEPHONE (OUTPATIENT)
Dept: VASCULAR SURGERY | Facility: CLINIC | Age: 66
End: 2021-08-12

## 2021-08-12 ENCOUNTER — OFFICE VISIT (OUTPATIENT)
Dept: VASCULAR SURGERY | Facility: CLINIC | Age: 66
End: 2021-08-12
Payer: COMMERCIAL

## 2021-08-12 ENCOUNTER — TELEPHONE (OUTPATIENT)
Dept: INTERNAL MEDICINE CLINIC | Facility: CLINIC | Age: 66
End: 2021-08-12

## 2021-08-12 ENCOUNTER — TELEPHONE (OUTPATIENT)
Dept: PAIN MEDICINE | Facility: MEDICAL CENTER | Age: 66
End: 2021-08-12

## 2021-08-12 VITALS
SYSTOLIC BLOOD PRESSURE: 116 MMHG | HEART RATE: 74 BPM | TEMPERATURE: 98.5 F | DIASTOLIC BLOOD PRESSURE: 78 MMHG | WEIGHT: 196 LBS | HEIGHT: 75 IN | BODY MASS INDEX: 24.37 KG/M2

## 2021-08-12 DIAGNOSIS — I72.4 POPLITEAL ANEURYSM (HCC): Primary | ICD-10-CM

## 2021-08-12 DIAGNOSIS — I72.4 ANEURYSM OF RIGHT POPLITEAL ARTERY (HCC): Chronic | ICD-10-CM

## 2021-08-12 PROCEDURE — 99214 OFFICE O/P EST MOD 30 MIN: CPT | Performed by: SURGERY

## 2021-08-12 RX ORDER — CHLORHEXIDINE GLUCONATE 0.12 MG/ML
15 RINSE ORAL ONCE
Status: CANCELLED | OUTPATIENT
Start: 2021-08-12 | End: 2021-08-12

## 2021-08-12 RX ORDER — CEFAZOLIN SODIUM 2 G/50ML
2000 SOLUTION INTRAVENOUS ONCE
Status: CANCELLED | OUTPATIENT
Start: 2021-08-12 | End: 2021-08-12

## 2021-08-12 NOTE — TELEPHONE ENCOUNTER
Patient called in stating that    He has an aneurysm and Dr Kim will be in touch with Dr Corinna Sanchez     his driving range reduced by 50%     acetaminophen (TYLENOL) 500 mg tablet  Doesn't work but he'll deal with the pain        [phone]

## 2021-08-12 NOTE — LETTER
August 12, 2021     Mumtaz Germain, [address] [facility]    Patient: Mary Alejo   YOB: 1955   Date of Visit: 8/12/2021       Dear Dr Carolyn Reinoso:    Thank you for referring Yoel Kumar to me for evaluation  Below are the relevant portions of my assessment and plan of care  Impression:  1  Right popliteal artery aneurysm  We discussed the indications for treatment, treatment options available and associated risks and benefits  We will plan on proceeding with aneurysm ligation with superficial femoral to below-knee popliteal artery bypass  We will obtain cardiac clearance in vein mapping  This will be scheduled in approximately 2 months after he completes 3 months of anticoagulation for his DVT  2  Extensive left lower extremity DVT  Continue Eliquis therapy  If you have questions, please do not hesitate to call me  I look forward to following Clent Prophet along with you           Sincerely,        Bruno Love MD        CC: No Recipients

## 2021-08-12 NOTE — TELEPHONE ENCOUNTER
REMINDER: Under Reason For Call, comments MUST be formatted as:   (Surgeon's Initials) / (Procedure)    Physician / Chiquita Morales: Alek (NPI: [id number]) or Cesar Moreno (NPI: [id number]) / [place] (Tax: [id number] / NPI: [id number])    Procedure: right SFA to BK Pop Bypass with insitu GSV, ligation of popliteal artery aneurysm, completion angiogram     Level: 4 - Route clearance(s) to The [facility] Surgery Coordinator Pool    Equipment / Rep Needs: No    Assistant Surgeon: No    Allergies: Gabapentin, Tramadol, and Morphine    Instructions Given: NO Bowel Prep General Instructions     Blood Thinners / Medication Hold: Hold Rx - Eliquis (Apixaban) , 2days days prior to procedure  Hydration Required: Patient does not require hydration  Dialysis: Patient is not on dialysis  Consent: I certify that patient has signed, printed, timed, and dated their surgery consent  I certify that BOTH sides of the completed surgery consent have been scanned into the patient's Epic chart by myself on 8/12/2021  Yes, I have LABELED the consent in Epic as Consent for Vascular Procedure  Clearances     Levels   1-3 ROUTE this encounter to The [facility] Clearance Pool   AND   SEND Clearance Form(s) to Vascular Nursing e-mail group   Level   4 ROUTE this encounter to The [facility] Surgery Coordinator Pool  AND   SEND Clearance Form(s) to Vascular Surgery Schedulers e-mail group     (1) ONE CLEARANCE NEEDED - Patient requires Cardiology  clearance  Spoke with, [facility], at [facility] card  Patient's appointment with Dr Kelly Balbuena has been scheduled for 08/30/21 at 11:20AM     Office contact information P: [phone] - F: [phone]    Yes, I have ROUTED this encounter to The [facility] Surgery Coordinator and/or The [address] [place] [address]

## 2021-08-12 NOTE — LETTER
August 12, 2021     Kelle De Paz, [address] [facility]    Patient: Matthias Zhao   YOB: 1955   Date of Visit: 8/12/2021       Dear Dr Bill Cruz:    Thank you for referring Chelsey Jordan to me for evaluation  Below are the relevant portions of my assessment and plan of care  Diagnoses and all orders for this visit:    Aneurysm of right popliteal artery [facility])  76year old male with asymptomatic right popliteal artery aneurysm (3cm)  -will schedule patient for right fem pop bypass with aneurysm ligation  -will have patient see cardiology preop  -will obtain vein mapping   -will plan to perform surgery in October (after completing 3 months of eliquis therapy for left femoral dvt)  -will send message to Dr Katey Alexander (spine and pain) as patient has pain patch and would like preop evaluation     If you have questions, please do not hesitate to call me  I look forward to following Best May along with you           Sincerely,        Sarah Abraham MD        CC: Blima Meckel,

## 2021-08-12 NOTE — TELEPHONE ENCOUNTER
Patient calling to let you know that he has anuerysm bipass surgery coming up  He also would like you to berhane him about the lung nodules that were found  He also stated that blood clots were found a few weeks ago and he is on medication  Would like you to give him a call to discuss

## 2021-08-12 NOTE — ASSESSMENT & PLAN NOTE
76year old male with asymptomatic right popliteal artery aneurysm (3cm)  -will schedule patient for right fem pop bypass with aneurysm ligation  -will have patient see cardiology preop  -will obtain vein mapping   -will plan to perform surgery in October (after completing 3 months of eliquis therapy for left femoral dvt)  -will send message to Dr Kelle Harada (spine and pain) as patient has pain patch and would like preop evaluation

## 2021-08-12 NOTE — PATIENT INSTRUCTIONS
Impression:  1  Right popliteal artery aneurysm  We discussed the indications for treatment, treatment options available and associated risks and benefits  We will plan on proceeding with aneurysm ligation with superficial femoral to below-knee popliteal artery bypass  We will obtain cardiac clearance in vein mapping  This will be scheduled in approximately 2 months after he completes 3 months of anticoagulation for his DVT  2  Extensive left lower extremity DVT  Continue Eliquis therapy

## 2021-08-17 NOTE — TELEPHONE ENCOUNTER
Patient called about his date for surgery told him that once he is cleared from his cardiologist on 8-30-21 we will have a date for him as well as tell him when to go for blood work  Patient has been covid vaccinated as well   [facility]

## 2021-08-17 NOTE — TELEPHONE ENCOUNTER
Pt called and said he is weather sensitive  His joints swell up and hurt  He uses a Butrans patch and 400-600 mg of ibuprofen when he needs it as well  He wanted us to be aware

## 2021-08-18 ENCOUNTER — HOSPITAL ENCOUNTER (OUTPATIENT)
Dept: NON INVASIVE DIAGNOSTICS | Facility: HOSPITAL | Age: 66
Discharge: HOME | End: 2021-08-18
Payer: COMMERCIAL

## 2021-08-18 DIAGNOSIS — I72.4 POPLITEAL ANEURYSM (HCC): ICD-10-CM

## 2021-08-18 PROCEDURE — 93971 EXTREMITY STUDY: CPT | Performed by: SURGERY

## 2021-08-18 PROCEDURE — 93971 EXTREMITY STUDY: CPT

## 2021-08-19 ENCOUNTER — TELEPHONE (OUTPATIENT)
Dept: VASCULAR SURGERY | Facility: CLINIC | Age: 66
End: 2021-08-19

## 2021-08-19 NOTE — TELEPHONE ENCOUNTER
Patient contacted office to inquire about what his physical limitations will be and what he will need to acquire for after surgery and if he needs to prepare his home for post surgery  Informed him that he will be evaluated by physical therapy in the hospital after his procedure and they will provide their recommendations for him in regards to therapy and device use  Advised him of general post surgical activity restrictions  Informed him that after he has his clearance appointments, one of out surgery schedulers will be in touch with him to schedule his surgery  All questions answered

## 2021-08-20 ENCOUNTER — TELEPHONE (OUTPATIENT)
Dept: VASCULAR SURGERY | Facility: CLINIC | Age: 66
End: 2021-08-20

## 2021-08-20 NOTE — TELEPHONE ENCOUNTER
Pt called the office to report that he has been having "handing cramping" while writing and he believes it is due to starting eliquis for LLE DVT  He also reports that he has rheumatoid arthritis  He said it is not something that is bothering him a lot at this time but he just wanted to let us know  I advised him to call back w/ worsening symptoms

## 2021-08-20 NOTE — PROGRESS NOTES
Cardiology Consultation     Karla Reardon  [id number]  1955  HEART & VASCULAR [facility] [place] [facility] [id number]  1  Acute deep vein thrombosis (DVT) of femoral vein of left lower extremity (HCC)  POCT ECG   2  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HCC)  POCT ECG   3  Essential (primary) hypertension  POCT ECG   4  Pure hypercholesterolemia     5  Popliteal aneurysm [facility])  Ambulatory referral to Cardiology     Patient Active Problem List   Diagnosis    Lumbar radiculopathy - Right    Bone lesion    Neuropathy    Polyarthropathy    Tobacco abuse    Work related injury    Colon cancer screening    Right foot drop    Lumbar spondylosis - Bilateral    Vitamin D deficiency    Thoracic spine pain    Chronic pain syndrome    Uncomplicated opioid dependence ([address] )    Chronic pain of both knees    Chronic pain of both shoulders    Chronic pain of left ankle    Multiple subsolid lung nodules greater than 6 mm in diameter    Mixed hyperlipidemia    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HCC)    Deep vein thrombosis (DVT) of femoral vein of left lower extremity ([address] )    Preop exam for internal medicine       HPI   Patient is here for a cardiology evaluation and preoperative clearance in reference to a R popliteal artery aneurysm repair  The plan is to ligate the aneurysm and do superficial femoral to below the knee popliteal artery bypass  This will be done approximately in October after he completes three months of anticoagulation for the DVT  This was found incidentally on a  PET-CT to evaluate for pulmonary nodules June 2021  He is on Eliquis in reference to an extensive left lower extremity DVT noted on a venous Doppler done July 27, 2021  Patient has a history of tobacco use  He has hypertension and hyperlipidemia  He has chronic pain in reference to neuropathy and back issues     EKG done August 24, 2021 demonstrated sinus rhythm and was a normal tracing  Patient smokes half a pack a cigarettes per day  Patient has had no chest pain or significant dyspnea  His vital signs are stable today  EKG today demonstrates sinus rhythm and is a normal tracing  PMH-  Past Medical History:   Diagnosis Date    Chronic back pain     Chronic pain disorder     Depression     GERD (gastroesophageal reflux disease)     L4-L5 disc bulge 06/16/2017    Low back pain     Muscle weakness     Neuropathy     Osteoarthritis     Right foot drop         SOCIAL HISTORY-  Social History     Socioeconomic History    Marital status:      Spouse name: Not on file    Number of children: Not on file    Years of education: Not on file    Highest education level: Not on file   Occupational History    Not on file   Tobacco Use    Smoking status: Current Every Day Smoker     Packs/day: 0 50    Smokeless tobacco: Never Used    Tobacco comment: 1/2 a pack a day   Vaping Use    Vaping Use: Never used   Substance and Sexual Activity    Alcohol use: No    Drug use: No    Sexual activity: Not Currently   Other Topics Concern    Not on file   Social History Narrative    Not on file     Social Determinants of Health     Financial Resource Strain:     Difficulty of Paying Living Expenses:    Food Insecurity:     Worried About Running Out of Food in the Last Year:     Ran Out of Food in the Last Year:    Transportation Needs:     Lack of Transportation (Medical):      Lack of Transportation (Non-Medical):    Physical Activity:     Days of Exercise per Week:     Minutes of Exercise per Session:    Stress:     Feeling of Stress :    Social Connections:     Frequency of Communication with Friends and Family:     Frequency of Social Gatherings with Friends and Family:     Attends Voodoo Services:     Active Member of Clubs or Organizations:     Attends Club or Organization Meetings:     Marital Status: Intimate Partner Violence:     Fear of Current or Ex-Partner:     Emotionally Abused:     Physically Abused:     Sexually Abused:         FAMILY HISTORY-  Family History   Problem Relation Age of Onset    Osteoarthritis Family     Glaucoma Mother     No Known Problems Father        SURGICAL HISTORY-  Past Surgical History:   Procedure Laterality Date    EGD AND COLONOSCOPY N/A 6/29/2018    Procedure: EGD AND COLONOSCOPY;  Surgeon: Alona Miller MD;  Location: [facility] GI LAB;   Service: Gastroenterology    EPIDURAL BLOCK INJECTION      needle    ORTHOPEDIC SURGERY      [place] [facility] [address] BODY,PLASTY/RESECTN Left 4/30/2018    Procedure: LEFT HIP ARTHROSCOPIC LABRAL DEBRIDEMENT;  Surgeon: Jennifer Rucker MD;  [facility];  Service: Orthopedics    TONSILLECTOMY      WISDOM TOOTH EXTRACTION           Current Outpatient Medications:     acetaminophen (TYLENOL) 500 mg tablet, Take 1,000 mg by mouth every 12 (twelve) hours as needed for mild pain, Disp: , Rfl:     apixaban (ELIQUIS) 5 mg, Take 1 tablet (5 mg total) by mouth 2 (two) times a day, Disp: 60 tablet, Rfl: 2    ascorbic Acid (VITAMIN C) 500 MG CPCR, Take 500 mg by mouth daily, Disp: , Rfl:     atorvastatin (LIPITOR) 10 mg tablet, Take 0 5 tablets (5 mg total) by mouth daily at bedtime, Disp: 45 tablet, Rfl: 1    cholecalciferol (VITAMIN D3) 1,000 units tablet, Take 2,000 Units by mouth 2 (two) times a day , Disp: , Rfl:     clotrimazole (LOTRIMIN) 1 % cream, Apply 1 g (1 application total) topically daily To affected area, Disp: 60 g, Rfl: 2    Elastic Bandages & Supports (KNEE BRACE/HINGED/REGULAR) MISC, by Does not apply route daily as needed (B/L knee pain) B/L knee braces, Disp: 2 each, Rfl: 0    Flaxseed, Linseed, (FLAX SEED OIL PO), Take 1,200 capsules by mouth daily , Disp: , Rfl:     METHYLCOBALAMIN SL, Place 1,000 mcg under the tongue 2 (two) times a day , Disp: , Rfl:     pregabalin (LYRICA) 100 mg capsule, Take 1 capsule (100 mg total) by mouth 2 (two) times a day (Patient taking differently: Take 100 mg by mouth daily ), Disp: 60 capsule, Rfl: 2    pyridoxine (B-6) 100 MG tablet, Take 100 mg by mouth daily, Disp: , Rfl:     transdermal buprenorphine (BUTRANS) 5 mcg/hr TD patch, Place 1 patch on the skin once a week Do not fill until 08/27/2020, Disp: 4 patch, Rfl: 2    Turmeric 500 MG CAPS, Take by mouth daily, Disp: , Rfl:   Allergies   Allergen Reactions    Gabapentin GI Intolerance    Tramadol Confusion    Morphine Other (See Comments)     constipation     Vitals:    08/30/21 1116   BP: 110/78   BP Location: Left arm   Patient Position: Sitting   Cuff Size: Standard   Pulse: 77   Weight: 89 4 kg (197 lb 3 2 oz)   Height: 6' 3" (1 905 m)         Review of Systems:  Review of Systems   All other systems reviewed and are negative  Physical Exam:  Physical Exam  Vitals reviewed  Constitutional:       Appearance: He is well-developed  HENT:      Head: Normocephalic and atraumatic  Eyes:      Conjunctiva/sclera: Conjunctivae normal       Pupils: Pupils are equal, round, and reactive to light  Cardiovascular:      Rate and Rhythm: Normal rate  Heart sounds: Normal heart sounds  Pulmonary:      Effort: Pulmonary effort is normal       Breath sounds: Normal breath sounds  Musculoskeletal:      Cervical back: Normal range of motion and neck supple  Skin:     General: Skin is warm and dry  Neurological:      Mental Status: He is alert and oriented to person, place, and time  Discussion/Summary:  Patient appears stable from a cardiac point of view  He has no cardiac symptoms  His EKG looks normal   I will check an echocardiogram to assess LV wall thickness and LV systolic function and valvular structure and function  Unless there is a significant abnormality I should be able to clear him for his upcoming surgical procedure    I have asked him to call if there is a problem in the interim otherwise I will see him as needed going forward if his testing looks good

## 2021-08-23 ENCOUNTER — TELEPHONE (OUTPATIENT)
Dept: INTERNAL MEDICINE CLINIC | Facility: CLINIC | Age: 66
End: 2021-08-23

## 2021-08-23 NOTE — TELEPHONE ENCOUNTER
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

## 2021-08-24 ENCOUNTER — HOSPITAL ENCOUNTER (OUTPATIENT)
Dept: RADIOLOGY | Facility: IMAGING CENTER | Age: 66
Discharge: HOME | End: 2021-08-24
Payer: COMMERCIAL

## 2021-08-24 ENCOUNTER — CONSULT (OUTPATIENT)
Dept: INTERNAL MEDICINE CLINIC | Facility: CLINIC | Age: 66
End: 2021-08-24
Payer: COMMERCIAL

## 2021-08-24 VITALS
HEIGHT: 75 IN | HEART RATE: 55 BPM | TEMPERATURE: 99.8 F | BODY MASS INDEX: 24.22 KG/M2 | DIASTOLIC BLOOD PRESSURE: 76 MMHG | RESPIRATION RATE: 20 BRPM | WEIGHT: 194.8 LBS | OXYGEN SATURATION: 96 % | SYSTOLIC BLOOD PRESSURE: 114 MMHG

## 2021-08-24 DIAGNOSIS — I72.4 ANEURYSM OF RIGHT POPLITEAL ARTERY (HCC): Chronic | ICD-10-CM

## 2021-08-24 DIAGNOSIS — I82.412 ACUTE DEEP VEIN THROMBOSIS (DVT) OF FEMORAL VEIN OF LEFT LOWER EXTREMITY (HCC): ICD-10-CM

## 2021-08-24 DIAGNOSIS — I72.4 POPLITEAL ANEURYSM (HCC): ICD-10-CM

## 2021-08-24 DIAGNOSIS — M54.16 LUMBAR RADICULOPATHY: ICD-10-CM

## 2021-08-24 DIAGNOSIS — Z12.5 PROSTATE CANCER SCREENING: ICD-10-CM

## 2021-08-24 DIAGNOSIS — E78.2 MIXED HYPERLIPIDEMIA: ICD-10-CM

## 2021-08-24 DIAGNOSIS — G62.9 NEUROPATHY: ICD-10-CM

## 2021-08-24 DIAGNOSIS — R91.8 MULTIPLE SUBSOLID LUNG NODULES GREATER THAN 6 MM IN DIAMETER: ICD-10-CM

## 2021-08-24 DIAGNOSIS — Z01.818 PREOP EXAM FOR INTERNAL MEDICINE: Primary | ICD-10-CM

## 2021-08-24 DIAGNOSIS — M13.0 POLYARTHROPATHY: ICD-10-CM

## 2021-08-24 PROCEDURE — 99214 OFFICE O/P EST MOD 30 MIN: CPT | Performed by: INTERNAL MEDICINE

## 2021-08-24 PROCEDURE — 71046 X-RAY EXAM CHEST 2 VIEWS: CPT

## 2021-08-24 NOTE — PROGRESS NOTES
Subjective:  Chief Complaint   Patient presents with    Pre-op Exam     pre-op - anuerysm bipass in right knee, Dr Ena Thayer, will need EKG, will have bloodwork completed when he knows surgery date  Surgery date still unknown   health maintenance     AWV after 11/25/21    Medication Problem     eliquis causing joint stiffness wondering maybe if there is something he can take OTC for it rather than switch his meds         Sin Cárdenas is a 76y o  year old male who presents to the office today for a preoperative consultation at the request of surgeon Dr Ena Thayer who plans on performing right knee popliteal artery aneurysm bypass on September   This consultation is requested for the specific conditions prompting preoperative evaluation (i e  because of potential affect on operative risk)  Planned anesthesia: general and IV sedation  The patient has the following known anesthesia issues: none  Patients bleeding risk: no recent abnormal bleeding, no remote history of abnormal bleeding and no use of Ca-channel blockers  Patient does not have objections to receiving blood products if needed  Patient is able to walk 4 blocks without symptoms  Patient is able to walk up 2 flights of stairs without symptoms  Significant past medical history includes acute DVT (7/2021) on Eliquis, lumbar radiculopathy, neuropathy, polyarthropathy, hyperlipidemia, lung nodules  Tobacco use:  Current (half pack per day)  Alcohol use:  Negative  Illicit drug use:  Negative    Symptoms:   Easy bleeding: no  Easy bruising: no  Frequent nose bleeds: no  Chest pain: no  Cough: no  Dyspnea on exertion:no  Edema: no  Palpitations: no  Wheezing: no    Living situation: Patient lives with himself (alone) in a two story home  A friend will be caring for him after surgery  hedoes not have post-op concerns       The following portions of the patient's history were reviewed and updated as appropriate: allergies, current medications, past family history, past medical history, past social history, past surgical history and problem list     Review of Systems  Review of Systems   Constitutional: Negative for activity change, appetite change, chills, diaphoresis, fatigue and fever  HENT: Negative for congestion, postnasal drip, rhinorrhea, sinus pressure, sinus pain, sneezing and sore throat  Eyes: Negative for visual disturbance  Respiratory: Negative for apnea, cough, choking, chest tightness, shortness of breath and wheezing  Cardiovascular: Negative for chest pain, palpitations and leg swelling  Gastrointestinal: Negative for abdominal distention, abdominal pain, anal bleeding, blood in stool, constipation, diarrhea, nausea and vomiting  Endocrine: Negative for cold intolerance and heat intolerance  Genitourinary: Negative for difficulty urinating, dysuria and hematuria  Musculoskeletal: Negative  Skin: Negative  Neurological: Negative for dizziness, weakness, light-headedness, numbness and headaches  Hematological: Negative for adenopathy  Psychiatric/Behavioral: Negative for agitation, sleep disturbance and suicidal ideas  All other systems reviewed and are negative  Past Medical History:   Diagnosis Date    Chronic back pain     Chronic pain disorder     Depression     GERD (gastroesophageal reflux disease)     L4-L5 disc bulge 06/16/2017    Low back pain     Muscle weakness     Neuropathy     Osteoarthritis     Right foot drop      Past Surgical History:   Procedure Laterality Date    EGD AND COLONOSCOPY N/A 6/29/2018    Procedure: EGD AND COLONOSCOPY;  Surgeon: Taylor Sewell MD;  Location: [facility] GI LAB;   Service: Gastroenterology    EPIDURAL BLOCK INJECTION      needle    ORTHOPEDIC SURGERY      [place] [facility] [address] BODY,PLASTY/RESECTN Left 4/30/2018    Procedure: LEFT HIP ARTHROSCOPIC LABRAL DEBRIDEMENT;  Surgeon: Rob Jenkins MD;  [facility];  Service: Orthopedics    TONSILLECTOMY  WISDOM TOOTH EXTRACTION       Social History     Tobacco Use    Smoking status: Current Every Day Smoker     Packs/day: 0 50    Smokeless tobacco: Never Used    Tobacco comment: 1/2 a pack a day   Vaping Use    Vaping Use: Never used   Substance Use Topics    Alcohol use: No    Drug use: No     Family History   Problem Relation Age of Onset    Osteoarthritis Family     Glaucoma Mother     No Known Problems Father      Gabapentin, Tramadol, and Morphine  Current Outpatient Medications   Medication Sig Dispense Refill    acetaminophen (TYLENOL) 500 mg tablet Take 1,000 mg by mouth every 12 (twelve) hours as needed for mild pain      [START ON 8/25/2021] apixaban (ELIQUIS) 5 mg Take 1 tablet (5 mg total) by mouth 2 (two) times a day (Patient not taking: Reported on 7/29/2021) 60 tablet 2    ascorbic Acid (VITAMIN C) 500 MG CPCR Take 500 mg by mouth daily      atorvastatin (LIPITOR) 10 mg tablet Take 0 5 tablets (5 mg total) by mouth daily at bedtime 45 tablet 1    cholecalciferol (VITAMIN D3) 1,000 units tablet Take 2,000 Units by mouth 2 (two) times a day       clotrimazole (LOTRIMIN) 1 % cream Apply 1 g (1 application total) topically daily To affected area 60 g 2    Elastic Bandages & Supports (KNEE BRACE/HINGED/REGULAR) MISC by Does not apply route daily as needed (B/L knee pain) B/L knee braces 2 each 0    Flaxseed, Linseed, (FLAX SEED OIL PO) Take 1,200 capsules by mouth daily       METHYLCOBALAMIN SL Place 1,000 mcg under the tongue 2 (two) times a day       pregabalin (LYRICA) 100 mg capsule Take 1 capsule (100 mg total) by mouth 2 (two) times a day (Patient taking differently: Take 100 mg by mouth daily ) 60 capsule 2    pyridoxine (B-6) 100 MG tablet Take 100 mg by mouth daily      transdermal buprenorphine (BUTRANS) 5 mcg/hr TD patch Place 1 patch on the skin once a week Do not fill until 08/27/2020 4 patch 2    Turmeric 500 MG CAPS Take by mouth daily       No current facility-administered medications for this visit  Objective:       /76 (BP Location: Right arm, Patient Position: Sitting, Cuff Size: Standard)   Pulse 55   Temp 99 8 °F (37 7 °C) (Temporal)   Resp 20   Ht 6' 3" (1 905 m)   Wt 88 4 kg (194 lb 12 8 oz)   SpO2 96%   BMI 24 35 kg/m²   Physical Exam  Vitals and nursing note reviewed  Constitutional:       General: He is not in acute distress  Appearance: He is well-developed  He is not diaphoretic  HENT:      Head: Normocephalic and atraumatic  Eyes:      General: No scleral icterus  Right eye: No discharge  Left eye: No discharge  Conjunctiva/sclera: Conjunctivae normal       Pupils: Pupils are equal, round, and reactive to light  Neck:      Thyroid: No thyromegaly  Vascular: No JVD  Cardiovascular:      Rate and Rhythm: Normal rate and regular rhythm  Heart sounds: Normal heart sounds  No murmur heard  No friction rub  No gallop  Pulmonary:      Effort: Pulmonary effort is normal  No respiratory distress  Breath sounds: Normal breath sounds  No wheezing or rales  Chest:      Chest wall: No tenderness  Abdominal:      General: Bowel sounds are normal  There is no distension  Palpations: Abdomen is soft  There is no mass  Tenderness: There is no abdominal tenderness  There is no guarding or rebound  Musculoskeletal:         General: No tenderness or deformity  Normal range of motion  Cervical back: Normal range of motion and neck supple  Lymphadenopathy:      Cervical: No cervical adenopathy  Skin:     General: Skin is warm and dry  Coloration: Skin is not pale  Findings: No erythema or rash  Neurological:      Mental Status: He is alert and oriented to person, place, and time  Cranial Nerves: No cranial nerve deficit  Coordination: Coordination normal       Deep Tendon Reflexes: Reflexes are normal and symmetric     Psychiatric:         Behavior: Behavior normal          Thought Content: Thought content normal          Judgment: Judgment normal               Cardiographics  ECG: normal sinus rhythm, no blocks or conduction defects, no ischemic changes      Lab Review   Office Visit on 08/04/2021   Component Date Value    RESULT ALL_PRESC MEDS SP* 34/68/6027 Not Applicable     Alpha-Hydroxyalprazolam * 08/04/2021 negative     Amphetamine Quantificati* 08/04/2021 negative     Aripiprazole Quantificat* 08/04/2021 negative     OPC-[id number] QUANTIFICATION 08/04/2021 negative     BATH SALTS 08/04/2021 negative     Mephedrone Quantification 08/04/2021 negative     METHYLONE QUANTIFICATION 08/04/2021 negative     Buprenorphine Quantifica* 08/04/2021 positive-16  325     Norbuprenorphine Quantif* 08/04/2021 negative     Butalbital Quantification 08/04/2021 negative     7-Amino-Clonazepam Quant* 08/04/2021 negative     Clozapine Quantification 08/04/2021 negative     N-desmethylclozapine Guero* 08/04/2021 negative     Cocaine metabolite Quant* 08/04/2021 negative     Codeine Quantification 08/04/2021 negative     Morphine Quantification 08/04/2021 negative     Hydrocodone Quantificati* 08/04/2021 negative     Norhydrocodone Quantific* 08/04/2021 negative     Hydromorphone Quantifica* 08/04/2021 negative     Desipramine Quantificati* 08/04/2021 negative     Dextromethorphan Quantif* 08/04/2021 negative     Dextrorphan (Dextrometho* 08/04/2021 negative     Nordiazepam Quantificati* 08/04/2021 negative     Temazepam Quantification 08/04/2021 negative     Oxazepam Quantification 08/04/2021 negative     Ethyl Glucuronide Quanti* 08/04/2021 negative     Ethyl Sulfate Quantifica* 08/04/2021 negative     Fentanyl Quantification 08/04/2021 negative     Norfentanyl Quantificati* 08/04/2021 negative     3-methyl-fentanyl Quanti* 08/04/2021 Fen Neg     4-ANPP Quantification 08/04/2021 Fen Neg     4-FiBF Quantification 08/04/2021 Fen Neg     Acetyl fentanyl Quantifi* 08/04/2021 Fen Neg     Acetyl norfentanyl Quant* 08/04/2021 Fen Neg     Acryl fentanyl Quantific* 08/04/2021 Fen Neg     Butryl fentanyl Quantifi* 08/04/2021 Fen Neg     Carfentanil Quantificati* 08/04/2021 Fen Neg     Cyclopropyl fentanyl Guero* 08/04/2021 Fen Neg     Furanyl fentanyl Quantif* 08/04/2021 Fen Neg     Methoxyacetyl fentanyl Q* 08/04/2021 Fen Neg     [id number] Quantification 08/04/2021 Fen Neg     N-desmethyl L-[id number] Jeramie* 08/04/2021 Fen Neg     6-KALIN (Heroin metabolite* 08/04/2021 negative     Hydrocodone Quantificati* 08/04/2021 negative     Norhydrocodone Quantific* 08/04/2021 negative     Hydromorphone Quantifica* 08/04/2021 negative     Hydromorphone Quantifica* 08/04/2021 negative     Mitragynine (Kratom collins* 08/04/2021 negative     3-EZ-Moxnjkqpkpr (Kratom* 08/04/2021 negative     Dextrorphan (Dextrometho* 08/04/2021 negative     Lorazepam Quantification 08/04/2021 negative     MDMA Quantification 08/04/2021 negative     Meperidine Quantification 08/04/2021 negative     Normeperidine Quantifica* 08/04/2021 negative     Methadone Quantification 08/04/2021 negative     EDDP (Methadone metaboli* 08/04/2021 negative     Amphetamine Quantificati* 08/04/2021 negative     Methamphetamine Quantifi* 08/04/2021 negative     Methylphenidate Quantifi* 08/04/2021 negative     RITALINIC ACID QUANTIFIC* 08/04/2021 negative     Morphine Quantification 08/04/2021 negative     Hydromorphone Quantifica* 08/04/2021 negative     OLANZAPINE QUANTIFICATION 08/04/2021 negative     Oxazepam Quantification 08/04/2021 negative     Oxycodone Quantification 08/04/2021 negative     Noroxycodone Quantificat* 08/04/2021 negative     Oxymorphone Quantificati* 08/04/2021 negative     Oxymorphone Quantificati* 08/04/2021 negative     Phenobarbital Quantifica* 08/04/2021 negative     PHENTERMINE QUANTIFICATI* 08/04/2021 negative     Secobarbital Quantificat* 08/04/2021 negative     5F-ADB-M7 08/04/2021 negative     JY-COVOVJKB-D4 METABOLIT* 08/04/2021 negative     QYSB-[id number]-C5 METABOL* 08/04/2021 negative     EHY899 metabolite Quanti* 08/04/2021 negative     LUX005 metabolite Quanti* 08/04/2021 negative     RCS4 METABOLITE QUANTIFI* 08/04/2021 negative     JWN71/ METABOLITE Q* 08/04/2021 negative     Tapentadol Quantification 08/04/2021 negative     Temazepam Quantification 08/04/2021 negative     Oxazepam Quantification 08/04/2021 negative     cTHC (Marijuana metaboli* 08/04/2021 negative     Tramadol Quantification 08/04/2021 negative     O-desmethyl-tramadol Guero* 08/04/2021 negative     N-DESMETHYL-TRAMADOL GUERO* 08/04/2021 negative     SPECIFIC GRAVITY URINE 08/04/2021 normal-1 016     pH 08/04/2021 normal-5 8     CREATININE 08/04/2021 normal-162 6     OXIDANT 08/04/2021 normal-0         Assessment:     76 y o  male with planned surgery as above  Known risk factors for perioperative complications: None      Cardiac Risk Estimation: low risk    Gina Richmond is cleared from a cardiovascular standpoint to proceed with surgery  he is at a low risk from a cardiovascular standpoint at this time without any additional cardiac testing  Reevaluation needed if he should present with symptoms prior to surgery  Plan:  Preoperative workup as follows none  Change in medication regimen before surgery: HOLD Eliquis 48-72 hours prior to surgery         Assessment/Plan:    Problem List Items Addressed This Visit        Cardiovascular and Mediastinum    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HCC) (Chronic)    Deep vein thrombosis (DVT) of femoral vein of left lower extremity (HCC)       Nervous and Auditory    Lumbar radiculopathy - Right    Neuropathy       Musculoskeletal and Integument    Polyarthropathy       Other    Multiple subsolid lung nodules greater than 6 mm in diameter    Mixed hyperlipidemia    Relevant Orders    CBC    Comprehensive metabolic panel    Lipid panel    Preop exam for internal medicine - Primary      Other Visit Diagnoses     Prostate cancer screening        Relevant Orders    PSA, Total Screen            Depression Screening and Follow-up Plan: Clincally patient does not have depression  No treatment is required  Patient advised to follow-up with PCP for further management  Falls Plan of Care: balance, strength, and gait training instructions were provided  Recommended assistive device to help with gait and balance  Medications that increase falls were reviewed  Vitamin D supplementation was recommended  Tobacco Cessation Counseling: Tobacco cessation counseling was provided  The patient is sincerely urged to quit consumption of tobacco  He is not ready to quit tobacco  Medication options and side effects of medication discussed  Patient refused medication

## 2021-08-30 ENCOUNTER — CONSULT (OUTPATIENT)
Dept: CARDIOLOGY CLINIC | Facility: CLINIC | Age: 66
End: 2021-08-30
Payer: COMMERCIAL

## 2021-08-30 VITALS
BODY MASS INDEX: 24.52 KG/M2 | HEIGHT: 75 IN | DIASTOLIC BLOOD PRESSURE: 78 MMHG | HEART RATE: 77 BPM | WEIGHT: 197.2 LBS | SYSTOLIC BLOOD PRESSURE: 110 MMHG

## 2021-08-30 DIAGNOSIS — I72.4 POPLITEAL ANEURYSM (HCC): ICD-10-CM

## 2021-08-30 DIAGNOSIS — I82.412 ACUTE DEEP VEIN THROMBOSIS (DVT) OF FEMORAL VEIN OF LEFT LOWER EXTREMITY (HCC): Primary | ICD-10-CM

## 2021-08-30 DIAGNOSIS — I72.4 ANEURYSM OF RIGHT POPLITEAL ARTERY (HCC): Chronic | ICD-10-CM

## 2021-08-30 DIAGNOSIS — I10 ESSENTIAL (PRIMARY) HYPERTENSION: ICD-10-CM

## 2021-08-30 DIAGNOSIS — E78.00 PURE HYPERCHOLESTEROLEMIA: ICD-10-CM

## 2021-08-30 PROCEDURE — 3008F BODY MASS INDEX DOCD: CPT | Performed by: INTERNAL MEDICINE

## 2021-08-30 PROCEDURE — 99204 OFFICE O/P NEW MOD 45 MIN: CPT | Performed by: INTERNAL MEDICINE

## 2021-08-30 PROCEDURE — 4004F PT TOBACCO SCREEN RCVD TLK: CPT | Performed by: INTERNAL MEDICINE

## 2021-08-30 PROCEDURE — 1160F RVW MEDS BY RX/DR IN RCRD: CPT | Performed by: INTERNAL MEDICINE

## 2021-08-30 PROCEDURE — 93000 ELECTROCARDIOGRAM COMPLETE: CPT | Performed by: INTERNAL MEDICINE

## 2021-09-03 PROCEDURE — 93000 ELECTROCARDIOGRAM COMPLETE: CPT | Performed by: INTERNAL MEDICINE

## 2021-09-13 ENCOUNTER — TELEPHONE (OUTPATIENT)
Dept: INTERNAL MEDICINE CLINIC | Facility: CLINIC | Age: 66
End: 2021-09-13

## 2021-09-13 NOTE — TELEPHONE ENCOUNTER
joshuai pt takes osteobiflex joint health triple strength plus tumeric, he said takes everyday since last lake and he does not hurt as much

## 2021-10-04 ENCOUNTER — HOSPITAL ENCOUNTER (OUTPATIENT)
Dept: NON INVASIVE DIAGNOSTICS | Facility: CLINIC | Age: 66
Discharge: HOME | End: 2021-10-04
Payer: COMMERCIAL

## 2021-10-04 DIAGNOSIS — E78.00 PURE HYPERCHOLESTEROLEMIA: ICD-10-CM

## 2021-10-04 DIAGNOSIS — I72.4 ANEURYSM OF RIGHT POPLITEAL ARTERY (HCC): Chronic | ICD-10-CM

## 2021-10-04 DIAGNOSIS — I10 ESSENTIAL (PRIMARY) HYPERTENSION: ICD-10-CM

## 2021-10-04 DIAGNOSIS — I72.4 POPLITEAL ANEURYSM (HCC): ICD-10-CM

## 2021-10-04 DIAGNOSIS — I82.412 ACUTE DEEP VEIN THROMBOSIS (DVT) OF FEMORAL VEIN OF LEFT LOWER EXTREMITY (HCC): ICD-10-CM

## 2021-10-04 PROCEDURE — 93306 TTE W/DOPPLER COMPLETE: CPT

## 2021-10-04 PROCEDURE — 93306 TTE W/DOPPLER COMPLETE: CPT | Performed by: INTERNAL MEDICINE

## 2021-10-05 ENCOUNTER — TELEPHONE (OUTPATIENT)
Dept: PAIN MEDICINE | Facility: MEDICAL CENTER | Age: 66
End: 2021-10-05

## 2021-10-07 ENCOUNTER — PREP FOR PROCEDURE (OUTPATIENT)
Dept: VASCULAR SURGERY | Facility: CLINIC | Age: 66
End: 2021-10-07

## 2021-10-07 NOTE — TELEPHONE ENCOUNTER
Verified patient's insurance   CONFIRMED - Patient's insurance is [employer]   Is patient requesting a call when authorization has been obtained? Patient did not request a call  Surgery Date: 11/24/21  Primary Surgeon: Carlos Wilson // Anil Patel (NPI: [id number])  Assisting Surgeon: Getachew Lainez (NPI: [id number])  Facility: [facility] (Tax: [id number] / [id number]: [id number])  Inpatient / Outpatient: Inpatient  Level: 4    Clearance Received: Yes, Cardiology   Consent Received: Yes, scanned into Epic on 8/12/21  Medication Hold / Last Dose: Eliquis:  Hold for 2 days prior to procedure    VQI Spreadsheet: 10/7/21  IR Notified: Not Applicable (N/A)  Rep  Notified: Not Applicable (N/A)  Equipment Needs: Not Applicable (N/A)  Vas Lab Requested: Not Applicable (N/A)  Patient Contacted: 10/7/21 by Bryant Lopes    Diagnosis: I 72 4  Procedure/ CPT Code(s): BYPASS - Femoral-Popliteal // CPT: 28777 [id number]    For varicose vein related procedures:   Last LEVDR: Not Applicable (N/A)  CEAP Classification: Not Applicable (N/A)  VCSS: Not Applicable (N/A)    Post Operative Date/ Time: To Be Determined (Abby Flores Stamp     *Please review with patient med hold, PATs, and check H&P *  PATIENT WAS MAILED SURGERY/SHOWERING/DISCHARGE/COVID INSTRUCTIONS AFTER REVIEWING WITH THEM VIA PHONE CALL

## 2021-10-11 NOTE — TELEPHONE ENCOUNTER
RN s/w pt regarding previous  Per pt he is willing to try the nucynta ER  Pt would also like to know if AO could prescribe anything else to help with his achy joints  Per pt it isn't every day, but with weather changes it really hinders him  Per pt he uses tylenol and ibuprofen and it does help a little but not much for how much he takes      Per pt any other option?    --please advise thank you-- Report to [facility], RN

## 2021-10-27 NOTE — TELEPHONE ENCOUNTER
Authorization requirements reviewed  Please refer to Tobi Moran / Sherif Guajardoer number [id number] for case updates

## 2021-10-29 ENCOUNTER — HOSPITAL ENCOUNTER (OUTPATIENT)
Dept: NON INVASIVE DIAGNOSTICS | Facility: CLINIC | Age: 66
Discharge: HOME | End: 2021-10-29
Payer: COMMERCIAL

## 2021-10-29 DIAGNOSIS — I82.412 DEEP VEIN THROMBOSIS (DVT) OF FEMORAL VEIN OF LEFT LOWER EXTREMITY, UNSPECIFIED CHRONICITY (HCC): ICD-10-CM

## 2021-10-29 PROCEDURE — 93970 EXTREMITY STUDY: CPT

## 2021-10-29 PROCEDURE — 93970 EXTREMITY STUDY: CPT | Performed by: SURGERY

## 2021-11-03 ENCOUNTER — OFFICE VISIT (OUTPATIENT)
Dept: PAIN MEDICINE | Facility: MEDICAL CENTER | Age: 66
End: 2021-11-03
Payer: COMMERCIAL

## 2021-11-03 VITALS
BODY MASS INDEX: 24.87 KG/M2 | TEMPERATURE: 98.9 F | HEART RATE: 85 BPM | HEIGHT: 75 IN | DIASTOLIC BLOOD PRESSURE: 56 MMHG | SYSTOLIC BLOOD PRESSURE: 118 MMHG | WEIGHT: 200 LBS

## 2021-11-03 DIAGNOSIS — M25.561 CHRONIC PAIN OF BOTH KNEES: ICD-10-CM

## 2021-11-03 DIAGNOSIS — G89.4 CHRONIC PAIN SYNDROME: ICD-10-CM

## 2021-11-03 DIAGNOSIS — G89.29 CHRONIC PAIN OF BOTH KNEES: ICD-10-CM

## 2021-11-03 DIAGNOSIS — G89.29 CHRONIC BILATERAL LOW BACK PAIN WITHOUT SCIATICA: ICD-10-CM

## 2021-11-03 DIAGNOSIS — M25.511 CHRONIC PAIN OF BOTH SHOULDERS: ICD-10-CM

## 2021-11-03 DIAGNOSIS — F11.20 UNCOMPLICATED OPIOID DEPENDENCE (HCC): Primary | ICD-10-CM

## 2021-11-03 DIAGNOSIS — M54.16 LUMBAR RADICULOPATHY: ICD-10-CM

## 2021-11-03 DIAGNOSIS — G62.9 NEUROPATHY: ICD-10-CM

## 2021-11-03 DIAGNOSIS — M54.50 CHRONIC BILATERAL LOW BACK PAIN WITHOUT SCIATICA: ICD-10-CM

## 2021-11-03 DIAGNOSIS — M25.512 CHRONIC PAIN OF BOTH SHOULDERS: ICD-10-CM

## 2021-11-03 DIAGNOSIS — M25.562 CHRONIC PAIN OF BOTH KNEES: ICD-10-CM

## 2021-11-03 DIAGNOSIS — G89.29 CHRONIC PAIN OF BOTH SHOULDERS: ICD-10-CM

## 2021-11-03 DIAGNOSIS — Z79.891 LONG-TERM CURRENT USE OF OPIATE ANALGESIC: ICD-10-CM

## 2021-11-03 PROCEDURE — 80305 DRUG TEST PRSMV DIR OPT OBS: CPT | Performed by: NURSE PRACTITIONER

## 2021-11-03 PROCEDURE — 3008F BODY MASS INDEX DOCD: CPT | Performed by: NURSE PRACTITIONER

## 2021-11-03 PROCEDURE — 99214 OFFICE O/P EST MOD 30 MIN: CPT | Performed by: NURSE PRACTITIONER

## 2021-11-03 RX ORDER — BUPRENORPHINE 5 UG/H
1 PATCH TRANSDERMAL WEEKLY
Qty: 4 PATCH | Refills: 2 | Status: SHIPPED | OUTPATIENT
Start: 2021-11-03 | End: 2022-01-26 | Stop reason: SDUPTHER

## 2021-11-04 ENCOUNTER — APPOINTMENT (OUTPATIENT)
Dept: LAB | Facility: IMAGING CENTER | Age: 66
End: 2021-11-04
Payer: COMMERCIAL

## 2021-11-04 DIAGNOSIS — Z12.5 PROSTATE CANCER SCREENING: ICD-10-CM

## 2021-11-04 DIAGNOSIS — I72.4 POPLITEAL ANEURYSM (HCC): ICD-10-CM

## 2021-11-04 DIAGNOSIS — E78.2 MIXED HYPERLIPIDEMIA: ICD-10-CM

## 2021-11-04 LAB
ABO GROUP BLD: NORMAL
ALBUMIN SERPL BCP-MCNC: 3.7 G/DL (ref 3.5–5)
ALP SERPL-CCNC: 62 U/L (ref 46–116)
ALT SERPL W P-5'-P-CCNC: 29 U/L (ref 12–78)
ANION GAP SERPL CALCULATED.3IONS-SCNC: 4 MMOL/L (ref 4–13)
AST SERPL W P-5'-P-CCNC: 18 U/L (ref 5–45)
BILIRUB SERPL-MCNC: 1.1 MG/DL (ref 0.2–1)
BLD GP AB SCN SERPL QL: NEGATIVE
BUN SERPL-MCNC: 12 MG/DL (ref 5–25)
CALCIUM SERPL-MCNC: 9.3 MG/DL (ref 8.3–10.1)
CHLORIDE SERPL-SCNC: 106 MMOL/L (ref 100–108)
CHOLEST SERPL-MCNC: 146 MG/DL (ref 50–200)
CO2 SERPL-SCNC: 27 MMOL/L (ref 21–32)
CREAT SERPL-MCNC: 0.88 MG/DL (ref 0.6–1.3)
ERYTHROCYTE [DISTWIDTH] IN BLOOD BY AUTOMATED COUNT: 14.1 % (ref 11.6–15.1)
GFR SERPL CREATININE-BSD FRML MDRD: 90 ML/MIN/1.73SQ M
GLUCOSE P FAST SERPL-MCNC: 100 MG/DL (ref 65–99)
HCT VFR BLD AUTO: 45.8 % (ref 36.5–49.3)
HDLC SERPL-MCNC: 56 MG/DL
HGB BLD-MCNC: 15.4 G/DL (ref 12–17)
LDLC SERPL CALC-MCNC: 74 MG/DL (ref 0–100)
MCH RBC QN AUTO: 32.1 PG (ref 26.8–34.3)
MCHC RBC AUTO-ENTMCNC: 33.6 G/DL (ref 31.4–37.4)
MCV RBC AUTO: 95 FL (ref 82–98)
NONHDLC SERPL-MCNC: 90 MG/DL
PLATELET # BLD AUTO: 356 THOUSANDS/UL (ref 149–390)
PMV BLD AUTO: 9.8 FL (ref 8.9–12.7)
POTASSIUM SERPL-SCNC: 4.1 MMOL/L (ref 3.5–5.3)
PROT SERPL-MCNC: 7.1 G/DL (ref 6.4–8.2)
PSA SERPL-MCNC: 2.2 NG/ML (ref 0–4)
RBC # BLD AUTO: 4.8 MILLION/UL (ref 3.88–5.62)
RH BLD: NEGATIVE
SODIUM SERPL-SCNC: 137 MMOL/L (ref 136–145)
SPECIMEN EXPIRATION DATE: NORMAL
TRIGL SERPL-MCNC: 81 MG/DL
WBC # BLD AUTO: 10.36 THOUSAND/UL (ref 4.31–10.16)

## 2021-11-04 PROCEDURE — 80053 COMPREHEN METABOLIC PANEL: CPT

## 2021-11-04 PROCEDURE — 85730 THROMBOPLASTIN TIME PARTIAL: CPT

## 2021-11-04 PROCEDURE — 80061 LIPID PANEL: CPT

## 2021-11-04 PROCEDURE — 85610 PROTHROMBIN TIME: CPT

## 2021-11-04 PROCEDURE — 86901 BLOOD TYPING SEROLOGIC RH(D): CPT

## 2021-11-04 PROCEDURE — 86900 BLOOD TYPING SEROLOGIC ABO: CPT

## 2021-11-04 PROCEDURE — G0103 PSA SCREENING: HCPCS

## 2021-11-04 PROCEDURE — 86850 RBC ANTIBODY SCREEN: CPT

## 2021-11-04 PROCEDURE — 36415 COLL VENOUS BLD VENIPUNCTURE: CPT

## 2021-11-04 PROCEDURE — 85027 COMPLETE CBC AUTOMATED: CPT

## 2021-11-05 LAB
6MAM UR QL CFM: NEGATIVE NG/ML
7AMINOCLONAZEPAM UR QL CFM: NEGATIVE NG/ML
A-OH ALPRAZ UR QL CFM: NEGATIVE NG/ML
ACCEPTABLE CREAT UR QL: NORMAL MG/DL
ACCEPTIBLE SP GR UR QL: NORMAL
AMPHET UR QL CFM: NEGATIVE NG/ML
AMPHET UR QL CFM: NEGATIVE NG/ML
APTT PPP: 48 SECONDS (ref 23–37)
BUPRENORPHINE UR QL CFM: NORMAL NG/ML
BUTALBITAL UR QL CFM: NEGATIVE NG/ML
BZE UR QL CFM: NEGATIVE NG/ML
CODEINE UR QL CFM: NEGATIVE NG/ML
DESIPRAMINE UR QL CFM: NEGATIVE NG/ML
EDDP UR QL CFM: NEGATIVE NG/ML
ETHYL GLUCURONIDE UR QL CFM: NEGATIVE NG/ML
ETHYL SULFATE UR QL SCN: NEGATIVE NG/ML
FENTANYL UR QL CFM: NEGATIVE NG/ML
GLIADIN IGG SER IA-ACNC: NEGATIVE NG/ML
GLUCOSE 30M P 50 G LAC PO SERPL-MCNC: NEGATIVE NG/ML
HYDROCODONE UR QL CFM: NEGATIVE NG/ML
HYDROCODONE UR QL CFM: NEGATIVE NG/ML
HYDROMORPHONE UR QL CFM: NEGATIVE NG/ML
INR PPP: 1.15 (ref 0.84–1.19)
LORAZEPAM UR QL CFM: NEGATIVE NG/ML
MDMA UR QL CFM: NEGATIVE NG/ML
ME-PHENIDATE UR QL CFM: NEGATIVE NG/ML
MEPERIDINE UR QL CFM: NEGATIVE NG/ML
MEPHEDRONE UR QL CFM: NEGATIVE NG/ML
METHADONE UR QL CFM: NEGATIVE NG/ML
METHAMPHET UR QL CFM: NEGATIVE NG/ML
MORPHINE UR QL CFM: NEGATIVE NG/ML
MORPHINE UR QL CFM: NEGATIVE NG/ML
NITRITE UR QL: NORMAL UG/ML
NORBUPRENORPHINE UR QL CFM: NEGATIVE NG/ML
NORDIAZEPAM UR QL CFM: NEGATIVE NG/ML
NORFENTANYL UR QL CFM: NEGATIVE NG/ML
NORHYDROCODONE UR QL CFM: NEGATIVE NG/ML
NORHYDROCODONE UR QL CFM: NEGATIVE NG/ML
NORMEPERIDINE UR QL CFM: NEGATIVE NG/ML
NOROXYCODONE UR QL CFM: NEGATIVE NG/ML
OLANZAPINE QUANTIFICATION: NEGATIVE NG/ML
OPC-3373 QUANTIFICATION: NEGATIVE
OXAZEPAM UR QL CFM: NEGATIVE NG/ML
OXYCODONE UR QL CFM: NEGATIVE NG/ML
OXYMORPHONE UR QL CFM: NEGATIVE NG/ML
OXYMORPHONE UR QL CFM: NEGATIVE NG/ML
PHENOBARB UR QL CFM: NEGATIVE NG/ML
PROTHROMBIN TIME: 14.2 SECONDS (ref 11.6–14.5)
RESULT ALL_PRESCRIBED MEDS AND SPECIAL INSTRUCTIONS: NORMAL
SECOBARBITAL UR QL CFM: NEGATIVE NG/ML
SL AMB 3-METHYL-FENTANYL QUANTIFICATION: NORMAL NG/ML
SL AMB 4-ANPP QUANTIFICATION: NORMAL NG/ML
SL AMB 4-FIBF QUANTIFICATION: NORMAL NG/ML
SL AMB 5F-ADB-M7 METABOLITE QUANTIFICATION: NEGATIVE NG/ML
SL AMB 7-OH-MITRAGYNINE (KRATOM ALKALOID) QUANTIFICATION: NEGATIVE NG/ML
SL AMB AB-FUBINACA-M3 METABOLITE QUANTIFICATION: NEGATIVE NG/ML
SL AMB ACETYL FENTANYL QUANTIFICATION: NORMAL NG/ML
SL AMB ACETYL NORFENTANYL QUANTIFICATION: NORMAL NG/ML
SL AMB ACRYL FENTANYL QUANTIFICATION: NORMAL NG/ML
SL AMB BATH SALTS: NEGATIVE NG/ML
SL AMB BUTRYL FENTANYL QUANTIFICATION: NORMAL NG/ML
SL AMB CARFENTANIL QUANTIFICATION: NORMAL NG/ML
SL AMB CLOZAPINE QUANTIFICATION: NEGATIVE NG/ML
SL AMB CTHC (MARIJUANA METABOLITE) QUANTIFICATION: NEGATIVE NG/ML
SL AMB CYCLOPROPYL FENTANYL QUANTIFICATION: NORMAL NG/ML
SL AMB DEXTROMETHORPHAN QUANTIFICATION: NEGATIVE NG/ML
SL AMB DEXTRORPHAN (DEXTROMETHORPHAN METABOLITE) QUANT: NEGATIVE NG/ML
SL AMB DEXTRORPHAN (DEXTROMETHORPHAN METABOLITE) QUANT: NEGATIVE NG/ML
SL AMB FURANYL FENTANYL QUANTIFICATION: NORMAL NG/ML
SL AMB JWH018 METABOLITE QUANTIFICATION: NEGATIVE NG/ML
SL AMB JWH073 METABOLITE QUANTIFICATION: NEGATIVE NG/ML
SL AMB MDMB-FUBINACA-M1 METABOLITE QUANTIFICATION: NEGATIVE NG/ML
SL AMB METHOXYACETYL FENTANYL QUANTIFICATION: NORMAL NG/ML
SL AMB METHYLONE QUANTIFICATION: NEGATIVE NG/ML
SL AMB N-DESMETHYL U-47700 QUANTIFICATION: NORMAL NG/ML
SL AMB N-DESMETHYL-TRAMADOL QUANTIFICATION: NEGATIVE NG/ML
SL AMB N-DESMETHYLCLOZAPINE QUANTIFICATION: NEGATIVE NG/ML
SL AMB PHENTERMINE QUANTIFICATION: NEGATIVE NG/ML
SL AMB RCS4 METABOLITE QUANTIFICATION: NEGATIVE NG/ML
SL AMB RITALINIC ACID QUANTIFICATION: NEGATIVE NG/ML
SL AMB U-47700 QUANTIFICATION: NORMAL NG/ML
SPECIMEN DRAWN SERPL: NEGATIVE NG/ML
SPECIMEN PH ACCEPTABLE UR: NORMAL
TAPENTADOL UR QL CFM: NEGATIVE NG/ML
TEMAZEPAM UR QL CFM: NEGATIVE NG/ML
TEMAZEPAM UR QL CFM: NEGATIVE NG/ML
TRAMADOL UR QL CFM: NEGATIVE NG/ML
URATE/CREAT 24H UR: NEGATIVE NG/ML

## 2021-11-09 ENCOUNTER — ANESTHESIA EVENT (OUTPATIENT)
Dept: PERIOP | Facility: HOSPITAL | Age: 66
DRG: 253 | End: 2021-11-09
Payer: COMMERCIAL

## 2021-11-09 ENCOUNTER — TELEPHONE (OUTPATIENT)
Dept: VASCULAR SURGERY | Facility: CLINIC | Age: 66
End: 2021-11-09

## 2021-11-09 NOTE — TELEPHONE ENCOUNTER
Aga Ragland	 Per Dr Nils Rosas, patient does not need to hold his Bio-Flex prior to surgery  Reminded patient NPO after midnight  Also, LMOM for him to CB to confirm new surgery date 12/8/21 at [facility] with Dr Tyler Rosas is PTO on 11/24/21 and requested I r/s case to 12/8/21

## 2021-11-10 DIAGNOSIS — I82.412 DEEP VEIN THROMBOSIS (DVT) OF FEMORAL VEIN OF LEFT LOWER EXTREMITY, UNSPECIFIED CHRONICITY (HCC): ICD-10-CM

## 2021-11-10 NOTE — TELEPHONE ENCOUNTER
Flora Truong / Dora Matter number [id number] has been updated to reflect date of service change and procedure location updated

## 2021-11-10 NOTE — TELEPHONE ENCOUNTER
Rec call back from [facility] date of 12/8/21  He is requesting a refill of his Eliquis  Forwarded request to nursing department

## 2021-11-18 ENCOUNTER — TELEPHONE (OUTPATIENT)
Dept: PAIN MEDICINE | Facility: MEDICAL CENTER | Age: 66
End: 2021-11-18

## 2021-11-23 ENCOUNTER — CONSULT (OUTPATIENT)
Dept: ANESTHESIOLOGY | Facility: CLINIC | Age: 66
End: 2021-11-23
Payer: COMMERCIAL

## 2021-11-23 DIAGNOSIS — T65.291A TOXIC EFFECT OF TOBACCO, ACCIDENTAL OR UNINTENTIONAL, INITIAL ENCOUNTER: ICD-10-CM

## 2021-11-23 DIAGNOSIS — Z72.0 TOBACCO ABUSE: Primary | ICD-10-CM

## 2021-11-23 PROCEDURE — 99215 OFFICE O/P EST HI 40 MIN: CPT | Performed by: NURSE PRACTITIONER

## 2021-11-23 PROCEDURE — 1160F RVW MEDS BY RX/DR IN RCRD: CPT | Performed by: NURSE PRACTITIONER

## 2021-11-23 PROCEDURE — 4004F PT TOBACCO SCREEN RCVD TLK: CPT | Performed by: NURSE PRACTITIONER

## 2021-11-23 RX ORDER — POLYETHYLENE GLYCOL 3350 17 G
4 POWDER IN PACKET (EA) ORAL AS NEEDED
Qty: 100 EACH | Refills: 0 | Status: SHIPPED | OUTPATIENT
Start: 2021-11-23 | End: 2021-12-07

## 2021-11-23 RX ORDER — NICOTINE 21 MG/24HR
1 PATCH, TRANSDERMAL 24 HOURS TRANSDERMAL EVERY 24 HOURS
Qty: 28 PATCH | Refills: 0 | Status: SHIPPED | OUTPATIENT
Start: 2021-11-23 | End: 2021-12-07

## 2021-11-24 ENCOUNTER — ANESTHESIA (OUTPATIENT)
Dept: PERIOP | Facility: HOSPITAL | Age: 66
DRG: 253 | End: 2021-11-24
Payer: COMMERCIAL

## 2021-11-29 ENCOUNTER — OFFICE VISIT (OUTPATIENT)
Dept: ANESTHESIOLOGY | Facility: CLINIC | Age: 66
End: 2021-11-29
Payer: COMMERCIAL

## 2021-11-29 DIAGNOSIS — Z72.0 TOBACCO ABUSE: Primary | ICD-10-CM

## 2021-11-29 PROCEDURE — 99212 OFFICE O/P EST SF 10 MIN: CPT | Performed by: NURSE PRACTITIONER

## 2021-12-03 NOTE — TELEPHONE ENCOUNTER
S/w pt and reminded him that Sunday 12/5/21 is his last dose of Eliquis prior to his surgery with Dr Angel Mesa  Pt confirmed and understood

## 2021-12-07 ENCOUNTER — OFFICE VISIT (OUTPATIENT)
Dept: ANESTHESIOLOGY | Facility: CLINIC | Age: 66
End: 2021-12-07
Payer: COMMERCIAL

## 2021-12-07 ENCOUNTER — RA CDI HCC (OUTPATIENT)
Dept: OTHER | Facility: HOSPITAL | Age: 66
End: 2021-12-07

## 2021-12-07 DIAGNOSIS — Z72.0 TOBACCO ABUSE: Primary | ICD-10-CM

## 2021-12-07 PROCEDURE — 4004F PT TOBACCO SCREEN RCVD TLK: CPT | Performed by: NURSE PRACTITIONER

## 2021-12-07 PROCEDURE — 99212 OFFICE O/P EST SF 10 MIN: CPT | Performed by: NURSE PRACTITIONER

## 2021-12-07 PROCEDURE — 1160F RVW MEDS BY RX/DR IN RCRD: CPT | Performed by: NURSE PRACTITIONER

## 2021-12-08 ENCOUNTER — HOSPITAL ENCOUNTER (INPATIENT)
Facility: HOSPITAL | Age: 66
LOS: 4 days | Discharge: HOME HEALTH | DRG: 253 | End: 2021-12-12
Attending: SURGERY | Admitting: SURGERY
Payer: COMMERCIAL

## 2021-12-08 ENCOUNTER — APPOINTMENT (INPATIENT)
Dept: RADIOLOGY | Facility: HOSPITAL | Age: 66
DRG: 253 | End: 2021-12-08
Payer: COMMERCIAL

## 2021-12-08 DIAGNOSIS — I72.4 ANEURYSM OF RIGHT POPLITEAL ARTERY (HCC): Chronic | ICD-10-CM

## 2021-12-08 DIAGNOSIS — T65.291A TOXIC EFFECT OF TOBACCO AND NICOTINE, ACCIDENTAL OR UNINTENTIONAL, INITIAL ENCOUNTER: Primary | ICD-10-CM

## 2021-12-08 LAB
ABO GROUP BLD: NORMAL
ANION GAP SERPL CALCULATED.3IONS-SCNC: 7 MMOL/L (ref 4–13)
BLD GP AB SCN SERPL QL: NEGATIVE
BUN SERPL-MCNC: 12 MG/DL (ref 5–25)
CALCIUM SERPL-MCNC: 9.2 MG/DL (ref 8.3–10.1)
CHLORIDE SERPL-SCNC: 112 MMOL/L (ref 100–108)
CO2 SERPL-SCNC: 22 MMOL/L (ref 21–32)
CREAT SERPL-MCNC: 0.91 MG/DL (ref 0.6–1.3)
GFR SERPL CREATININE-BSD FRML MDRD: 87 ML/MIN/1.73SQ M
GLUCOSE SERPL-MCNC: 131 MG/DL (ref 65–140)
KCT BLD-ACNC: 225 SEC (ref 89–137)
KCT BLD-ACNC: 245 SEC (ref 89–137)
KCT BLD-ACNC: 278 SEC (ref 89–137)
PLATELET # BLD AUTO: 326 THOUSANDS/UL (ref 149–390)
PMV BLD AUTO: 10.2 FL (ref 8.9–12.7)
POTASSIUM SERPL-SCNC: 4.1 MMOL/L (ref 3.5–5.3)
RH BLD: NEGATIVE
SODIUM SERPL-SCNC: 141 MMOL/L (ref 136–145)
SPECIMEN EXPIRATION DATE: NORMAL
SPECIMEN SOURCE: ABNORMAL

## 2021-12-08 PROCEDURE — 86850 RBC ANTIBODY SCREEN: CPT | Performed by: SURGERY

## 2021-12-08 PROCEDURE — C1725 CATH, TRANSLUMIN NON-LASER: HCPCS | Performed by: SURGERY

## 2021-12-08 PROCEDURE — 85049 AUTOMATED PLATELET COUNT: CPT | Performed by: STUDENT IN AN ORGANIZED HEALTH CARE EDUCATION/TRAINING PROGRAM

## 2021-12-08 PROCEDURE — NC001 PR NO CHARGE: Performed by: SURGERY

## 2021-12-08 PROCEDURE — 85347 COAGULATION TIME ACTIVATED: CPT

## 2021-12-08 PROCEDURE — 06BP0ZZ EXCISION OF RIGHT SAPHENOUS VEIN, OPEN APPROACH: ICD-10-PCS | Performed by: SURGERY

## 2021-12-08 PROCEDURE — C1894 INTRO/SHEATH, NON-LASER: HCPCS | Performed by: SURGERY

## 2021-12-08 PROCEDURE — 86900 BLOOD TYPING SEROLOGIC ABO: CPT | Performed by: SURGERY

## 2021-12-08 PROCEDURE — 37224 PR REVSC OPN/PRG FEM/POP W/ANGIOPLASTY UNI: CPT | Performed by: SURGERY

## 2021-12-08 PROCEDURE — 86901 BLOOD TYPING SEROLOGIC RH(D): CPT | Performed by: SURGERY

## 2021-12-08 PROCEDURE — 80048 BASIC METABOLIC PNL TOTAL CA: CPT | Performed by: SURGERY

## 2021-12-08 PROCEDURE — 75710 ARTERY X-RAYS ARM/LEG: CPT

## 2021-12-08 PROCEDURE — 99024 POSTOP FOLLOW-UP VISIT: CPT | Performed by: SURGERY

## 2021-12-08 PROCEDURE — C1769 GUIDE WIRE: HCPCS | Performed by: SURGERY

## 2021-12-08 PROCEDURE — B41FYZZ FLUOROSCOPY OF RIGHT LOWER EXTREMITY ARTERIES USING OTHER CONTRAST: ICD-10-PCS | Performed by: SURGERY

## 2021-12-08 PROCEDURE — 041K09L BYPASS RIGHT FEMORAL ARTERY TO POPLITEAL ARTERY WITH AUTOLOGOUS VENOUS TISSUE, OPEN APPROACH: ICD-10-PCS | Performed by: SURGERY

## 2021-12-08 PROCEDURE — 35151 REPAIR DEFECT OF ARTERY: CPT | Performed by: SURGERY

## 2021-12-08 RX ORDER — PREGABALIN 100 MG/1
100 CAPSULE ORAL 2 TIMES DAILY
Status: DISCONTINUED | OUTPATIENT
Start: 2021-12-09 | End: 2021-12-12 | Stop reason: HOSPADM

## 2021-12-08 RX ORDER — DOCUSATE SODIUM 100 MG/1
100 CAPSULE, LIQUID FILLED ORAL 2 TIMES DAILY
Status: DISCONTINUED | OUTPATIENT
Start: 2021-12-08 | End: 2021-12-12 | Stop reason: HOSPADM

## 2021-12-08 RX ORDER — ONDANSETRON 2 MG/ML
4 INJECTION INTRAMUSCULAR; INTRAVENOUS EVERY 6 HOURS PRN
Status: DISCONTINUED | OUTPATIENT
Start: 2021-12-08 | End: 2021-12-12 | Stop reason: HOSPADM

## 2021-12-08 RX ORDER — ATORVASTATIN CALCIUM 40 MG/1
40 TABLET, FILM COATED ORAL
Status: DISCONTINUED | OUTPATIENT
Start: 2021-12-08 | End: 2021-12-12 | Stop reason: HOSPADM

## 2021-12-08 RX ORDER — ROCURONIUM BROMIDE 10 MG/ML
INJECTION, SOLUTION INTRAVENOUS AS NEEDED
Status: DISCONTINUED | OUTPATIENT
Start: 2021-12-08 | End: 2021-12-08

## 2021-12-08 RX ORDER — ASPIRIN 81 MG/1
81 TABLET ORAL DAILY
Status: DISCONTINUED | OUTPATIENT
Start: 2021-12-08 | End: 2021-12-12 | Stop reason: HOSPADM

## 2021-12-08 RX ORDER — OXYCODONE HYDROCHLORIDE 5 MG/1
5 TABLET ORAL EVERY 4 HOURS PRN
Status: DISCONTINUED | OUTPATIENT
Start: 2021-12-08 | End: 2021-12-12 | Stop reason: HOSPADM

## 2021-12-08 RX ORDER — SODIUM CHLORIDE, SODIUM LACTATE, POTASSIUM CHLORIDE, CALCIUM CHLORIDE 600; 310; 30; 20 MG/100ML; MG/100ML; MG/100ML; MG/100ML
125 INJECTION, SOLUTION INTRAVENOUS CONTINUOUS
Status: DISCONTINUED | OUTPATIENT
Start: 2021-12-08 | End: 2021-12-08

## 2021-12-08 RX ORDER — ONDANSETRON 2 MG/ML
4 INJECTION INTRAMUSCULAR; INTRAVENOUS ONCE AS NEEDED
Status: DISCONTINUED | OUTPATIENT
Start: 2021-12-08 | End: 2021-12-08 | Stop reason: HOSPADM

## 2021-12-08 RX ORDER — SODIUM CHLORIDE, SODIUM LACTATE, POTASSIUM CHLORIDE, CALCIUM CHLORIDE 600; 310; 30; 20 MG/100ML; MG/100ML; MG/100ML; MG/100ML
100 INJECTION, SOLUTION INTRAVENOUS CONTINUOUS
Status: DISCONTINUED | OUTPATIENT
Start: 2021-12-08 | End: 2021-12-09

## 2021-12-08 RX ORDER — ONDANSETRON 2 MG/ML
INJECTION INTRAMUSCULAR; INTRAVENOUS AS NEEDED
Status: DISCONTINUED | OUTPATIENT
Start: 2021-12-08 | End: 2021-12-08

## 2021-12-08 RX ORDER — LIDOCAINE HYDROCHLORIDE 10 MG/ML
INJECTION, SOLUTION EPIDURAL; INFILTRATION; INTRACAUDAL; PERINEURAL AS NEEDED
Status: DISCONTINUED | OUTPATIENT
Start: 2021-12-08 | End: 2021-12-08

## 2021-12-08 RX ORDER — HYDROMORPHONE HCL IN WATER/PF 6 MG/30 ML
0.2 PATIENT CONTROLLED ANALGESIA SYRINGE INTRAVENOUS
Status: DISCONTINUED | OUTPATIENT
Start: 2021-12-08 | End: 2021-12-08 | Stop reason: HOSPADM

## 2021-12-08 RX ORDER — GLYCOPYRROLATE 0.2 MG/ML
INJECTION INTRAMUSCULAR; INTRAVENOUS AS NEEDED
Status: DISCONTINUED | OUTPATIENT
Start: 2021-12-08 | End: 2021-12-08

## 2021-12-08 RX ORDER — CEFAZOLIN SODIUM 1 G/50ML
1000 SOLUTION INTRAVENOUS EVERY 8 HOURS
Status: COMPLETED | OUTPATIENT
Start: 2021-12-08 | End: 2021-12-09

## 2021-12-08 RX ORDER — ALBUMIN, HUMAN INJ 5% 5 %
SOLUTION INTRAVENOUS CONTINUOUS PRN
Status: DISCONTINUED | OUTPATIENT
Start: 2021-12-08 | End: 2021-12-08

## 2021-12-08 RX ORDER — OXYCODONE HYDROCHLORIDE 5 MG/1
10 TABLET ORAL EVERY 4 HOURS PRN
Status: DISCONTINUED | OUTPATIENT
Start: 2021-12-08 | End: 2021-12-12 | Stop reason: HOSPADM

## 2021-12-08 RX ORDER — BISACODYL 10 MG
10 SUPPOSITORY, RECTAL RECTAL DAILY PRN
Status: DISCONTINUED | OUTPATIENT
Start: 2021-12-08 | End: 2021-12-12 | Stop reason: HOSPADM

## 2021-12-08 RX ORDER — ACETAMINOPHEN 325 MG/1
650 TABLET ORAL EVERY 4 HOURS PRN
Status: DISCONTINUED | OUTPATIENT
Start: 2021-12-08 | End: 2021-12-12 | Stop reason: HOSPADM

## 2021-12-08 RX ORDER — NEOSTIGMINE METHYLSULFATE 1 MG/ML
INJECTION INTRAVENOUS AS NEEDED
Status: DISCONTINUED | OUTPATIENT
Start: 2021-12-08 | End: 2021-12-08

## 2021-12-08 RX ORDER — MIDAZOLAM HYDROCHLORIDE 2 MG/2ML
INJECTION, SOLUTION INTRAMUSCULAR; INTRAVENOUS AS NEEDED
Status: DISCONTINUED | OUTPATIENT
Start: 2021-12-08 | End: 2021-12-08

## 2021-12-08 RX ORDER — SODIUM CHLORIDE 9 MG/ML
INJECTION, SOLUTION INTRAVENOUS CONTINUOUS PRN
Status: DISCONTINUED | OUTPATIENT
Start: 2021-12-08 | End: 2021-12-08

## 2021-12-08 RX ORDER — CHLORHEXIDINE GLUCONATE 0.12 MG/ML
15 RINSE ORAL ONCE
Status: COMPLETED | OUTPATIENT
Start: 2021-12-08 | End: 2021-12-08

## 2021-12-08 RX ORDER — SUCCINYLCHOLINE/SOD CL,ISO/PF 100 MG/5ML
SYRINGE (ML) INTRAVENOUS AS NEEDED
Status: DISCONTINUED | OUTPATIENT
Start: 2021-12-08 | End: 2021-12-08

## 2021-12-08 RX ORDER — FENTANYL CITRATE/PF 50 MCG/ML
25 SYRINGE (ML) INJECTION
Status: DISCONTINUED | OUTPATIENT
Start: 2021-12-08 | End: 2021-12-08 | Stop reason: HOSPADM

## 2021-12-08 RX ORDER — FENTANYL CITRATE 50 UG/ML
INJECTION, SOLUTION INTRAMUSCULAR; INTRAVENOUS AS NEEDED
Status: DISCONTINUED | OUTPATIENT
Start: 2021-12-08 | End: 2021-12-08

## 2021-12-08 RX ORDER — CEFAZOLIN SODIUM 2 G/50ML
2000 SOLUTION INTRAVENOUS ONCE
Status: COMPLETED | OUTPATIENT
Start: 2021-12-08 | End: 2021-12-08

## 2021-12-08 RX ORDER — ATORVASTATIN CALCIUM 10 MG/1
5 TABLET, FILM COATED ORAL
Status: DISCONTINUED | OUTPATIENT
Start: 2021-12-08 | End: 2021-12-08

## 2021-12-08 RX ORDER — SODIUM CHLORIDE, SODIUM LACTATE, POTASSIUM CHLORIDE, CALCIUM CHLORIDE 600; 310; 30; 20 MG/100ML; MG/100ML; MG/100ML; MG/100ML
INJECTION, SOLUTION INTRAVENOUS CONTINUOUS PRN
Status: DISCONTINUED | OUTPATIENT
Start: 2021-12-08 | End: 2021-12-08

## 2021-12-08 RX ORDER — HEPARIN SODIUM 5000 [USP'U]/ML
5000 INJECTION, SOLUTION INTRAVENOUS; SUBCUTANEOUS EVERY 8 HOURS SCHEDULED
Status: DISCONTINUED | OUTPATIENT
Start: 2021-12-08 | End: 2021-12-09

## 2021-12-08 RX ORDER — HEPARIN SODIUM 1000 [USP'U]/ML
INJECTION, SOLUTION INTRAVENOUS; SUBCUTANEOUS AS NEEDED
Status: DISCONTINUED | OUTPATIENT
Start: 2021-12-08 | End: 2021-12-08

## 2021-12-08 RX ORDER — DEXAMETHASONE SODIUM PHOSPHATE 10 MG/ML
INJECTION, SOLUTION INTRAMUSCULAR; INTRAVENOUS AS NEEDED
Status: DISCONTINUED | OUTPATIENT
Start: 2021-12-08 | End: 2021-12-08

## 2021-12-08 RX ADMIN — PHENYLEPHRINE HYDROCHLORIDE 30 MCG/MIN: 10 INJECTION INTRAVENOUS at 08:26

## 2021-12-08 RX ADMIN — ONDANSETRON 4 MG: 2 INJECTION INTRAMUSCULAR; INTRAVENOUS at 12:06

## 2021-12-08 RX ADMIN — Medication 25 MCG: at 13:02

## 2021-12-08 RX ADMIN — LIDOCAINE HYDROCHLORIDE 50 MG: 10 INJECTION, SOLUTION EPIDURAL; INFILTRATION; INTRACAUDAL; PERINEURAL at 08:21

## 2021-12-08 RX ADMIN — SODIUM CHLORIDE, SODIUM LACTATE, POTASSIUM CHLORIDE, AND CALCIUM CHLORIDE 100 ML/HR: .6; .31; .03; .02 INJECTION, SOLUTION INTRAVENOUS at 14:52

## 2021-12-08 RX ADMIN — HEPARIN SODIUM 2000 UNITS: 1000 INJECTION INTRAVENOUS; SUBCUTANEOUS at 10:42

## 2021-12-08 RX ADMIN — HEPARIN SODIUM 10000 UNITS: 1000 INJECTION INTRAVENOUS; SUBCUTANEOUS at 10:00

## 2021-12-08 RX ADMIN — MIDAZOLAM 2 MG: 1 INJECTION INTRAMUSCULAR; INTRAVENOUS at 08:12

## 2021-12-08 RX ADMIN — OXYCODONE HYDROCHLORIDE 5 MG: 5 TABLET ORAL at 15:43

## 2021-12-08 RX ADMIN — SODIUM CHLORIDE, SODIUM LACTATE, POTASSIUM CHLORIDE, AND CALCIUM CHLORIDE 100 ML/HR: .6; .31; .03; .02 INJECTION, SOLUTION INTRAVENOUS at 22:57

## 2021-12-08 RX ADMIN — SODIUM CHLORIDE: 0.9 INJECTION, SOLUTION INTRAVENOUS at 08:35

## 2021-12-08 RX ADMIN — ROCURONIUM BROMIDE 50 MG: 50 INJECTION, SOLUTION INTRAVENOUS at 08:30

## 2021-12-08 RX ADMIN — ALBUMIN (HUMAN): 12.5 INJECTION, SOLUTION INTRAVENOUS at 10:13

## 2021-12-08 RX ADMIN — SODIUM CHLORIDE, SODIUM LACTATE, POTASSIUM CHLORIDE, AND CALCIUM CHLORIDE 500 ML: .6; .31; .03; .02 INJECTION, SOLUTION INTRAVENOUS at 22:18

## 2021-12-08 RX ADMIN — DOCUSATE SODIUM 100 MG: 100 CAPSULE ORAL at 15:43

## 2021-12-08 RX ADMIN — SODIUM CHLORIDE: 0.9 INJECTION, SOLUTION INTRAVENOUS at 10:09

## 2021-12-08 RX ADMIN — SODIUM CHLORIDE, SODIUM LACTATE, POTASSIUM CHLORIDE, AND CALCIUM CHLORIDE: .6; .31; .03; .02 INJECTION, SOLUTION INTRAVENOUS at 08:12

## 2021-12-08 RX ADMIN — GLYCOPYRROLATE 0.4 MG: 0.2 INJECTION, SOLUTION INTRAMUSCULAR; INTRAVENOUS at 12:06

## 2021-12-08 RX ADMIN — CHLORHEXIDINE GLUCONATE 15 ML: 1.2 SOLUTION ORAL at 06:39

## 2021-12-08 RX ADMIN — Medication 25 MCG: at 13:09

## 2021-12-08 RX ADMIN — ASPIRIN 81 MG: 81 TABLET, COATED ORAL at 15:43

## 2021-12-08 RX ADMIN — OXYCODONE HYDROCHLORIDE 10 MG: 5 TABLET ORAL at 20:22

## 2021-12-08 RX ADMIN — FENTANYL CITRATE 25 MCG: 50 INJECTION INTRAMUSCULAR; INTRAVENOUS at 12:15

## 2021-12-08 RX ADMIN — FENTANYL CITRATE 50 MCG: 50 INJECTION INTRAMUSCULAR; INTRAVENOUS at 09:05

## 2021-12-08 RX ADMIN — FENTANYL CITRATE 25 MCG: 50 INJECTION INTRAMUSCULAR; INTRAVENOUS at 12:17

## 2021-12-08 RX ADMIN — NEOSTIGMINE METHYLSULFATE 3 MG: 1 INJECTION INTRAVENOUS at 12:06

## 2021-12-08 RX ADMIN — CEFAZOLIN SODIUM 1000 MG: 1 SOLUTION INTRAVENOUS at 20:52

## 2021-12-08 RX ADMIN — FENTANYL CITRATE 100 MCG: 50 INJECTION INTRAMUSCULAR; INTRAVENOUS at 08:21

## 2021-12-08 RX ADMIN — GLYCOPYRROLATE 0.2 MG: 0.2 INJECTION, SOLUTION INTRAMUSCULAR; INTRAVENOUS at 09:00

## 2021-12-08 RX ADMIN — ATORVASTATIN CALCIUM 40 MG: 40 TABLET, FILM COATED ORAL at 22:18

## 2021-12-08 RX ADMIN — HEPARIN SODIUM 5000 UNITS: 5000 INJECTION INTRAVENOUS; SUBCUTANEOUS at 22:18

## 2021-12-08 RX ADMIN — CEFAZOLIN SODIUM 2000 MG: 2 SOLUTION INTRAVENOUS at 08:25

## 2021-12-08 RX ADMIN — Medication 100 MG: at 08:21

## 2021-12-08 RX ADMIN — DEXAMETHASONE SODIUM PHOSPHATE 10 MG: 10 INJECTION, SOLUTION INTRAMUSCULAR; INTRAVENOUS at 08:45

## 2021-12-09 LAB
ANION GAP SERPL CALCULATED.3IONS-SCNC: 6 MMOL/L (ref 4–13)
BUN SERPL-MCNC: 12 MG/DL (ref 5–25)
CALCIUM SERPL-MCNC: 9 MG/DL (ref 8.3–10.1)
CHLORIDE SERPL-SCNC: 111 MMOL/L (ref 100–108)
CO2 SERPL-SCNC: 25 MMOL/L (ref 21–32)
CREAT SERPL-MCNC: 0.7 MG/DL (ref 0.6–1.3)
ERYTHROCYTE [DISTWIDTH] IN BLOOD BY AUTOMATED COUNT: 14.2 % (ref 11.6–15.1)
GFR SERPL CREATININE-BSD FRML MDRD: 98 ML/MIN/1.73SQ M
GLUCOSE SERPL-MCNC: 96 MG/DL (ref 65–140)
HCT VFR BLD AUTO: 38.7 % (ref 36.5–49.3)
HGB BLD-MCNC: 12.8 G/DL (ref 12–17)
INR PPP: 1.09 (ref 0.84–1.19)
MCH RBC QN AUTO: 31.9 PG (ref 26.8–34.3)
MCHC RBC AUTO-ENTMCNC: 33.1 G/DL (ref 31.4–37.4)
MCV RBC AUTO: 97 FL (ref 82–98)
PLATELET # BLD AUTO: 308 THOUSANDS/UL (ref 149–390)
PMV BLD AUTO: 10.4 FL (ref 8.9–12.7)
POTASSIUM SERPL-SCNC: 4 MMOL/L (ref 3.5–5.3)
PROTHROMBIN TIME: 13.6 SECONDS (ref 11.6–14.5)
RBC # BLD AUTO: 4.01 MILLION/UL (ref 3.88–5.62)
SODIUM SERPL-SCNC: 142 MMOL/L (ref 136–145)
WBC # BLD AUTO: 19.02 THOUSAND/UL (ref 4.31–10.16)

## 2021-12-09 PROCEDURE — 99024 POSTOP FOLLOW-UP VISIT: CPT | Performed by: SURGERY

## 2021-12-09 PROCEDURE — 80048 BASIC METABOLIC PNL TOTAL CA: CPT | Performed by: STUDENT IN AN ORGANIZED HEALTH CARE EDUCATION/TRAINING PROGRAM

## 2021-12-09 PROCEDURE — 97163 PT EVAL HIGH COMPLEX 45 MIN: CPT

## 2021-12-09 PROCEDURE — 85610 PROTHROMBIN TIME: CPT | Performed by: STUDENT IN AN ORGANIZED HEALTH CARE EDUCATION/TRAINING PROGRAM

## 2021-12-09 PROCEDURE — 85027 COMPLETE CBC AUTOMATED: CPT | Performed by: STUDENT IN AN ORGANIZED HEALTH CARE EDUCATION/TRAINING PROGRAM

## 2021-12-09 PROCEDURE — 97167 OT EVAL HIGH COMPLEX 60 MIN: CPT

## 2021-12-09 PROCEDURE — 99222 1ST HOSP IP/OBS MODERATE 55: CPT | Performed by: ANESTHESIOLOGY

## 2021-12-09 RX ORDER — TAMSULOSIN HYDROCHLORIDE 0.4 MG/1
0.4 CAPSULE ORAL
Status: DISCONTINUED | OUTPATIENT
Start: 2021-12-09 | End: 2021-12-12 | Stop reason: HOSPADM

## 2021-12-09 RX ADMIN — APIXABAN 5 MG: 5 TABLET, FILM COATED ORAL at 08:50

## 2021-12-09 RX ADMIN — PREGABALIN 100 MG: 100 CAPSULE ORAL at 08:50

## 2021-12-09 RX ADMIN — TAMSULOSIN HYDROCHLORIDE 0.4 MG: 0.4 CAPSULE ORAL at 17:18

## 2021-12-09 RX ADMIN — HEPARIN SODIUM 5000 UNITS: 5000 INJECTION INTRAVENOUS; SUBCUTANEOUS at 05:02

## 2021-12-09 RX ADMIN — PREGABALIN 100 MG: 100 CAPSULE ORAL at 17:18

## 2021-12-09 RX ADMIN — SODIUM CHLORIDE, SODIUM LACTATE, POTASSIUM CHLORIDE, AND CALCIUM CHLORIDE 100 ML/HR: .6; .31; .03; .02 INJECTION, SOLUTION INTRAVENOUS at 06:02

## 2021-12-09 RX ADMIN — CEFAZOLIN SODIUM 1000 MG: 1 SOLUTION INTRAVENOUS at 04:54

## 2021-12-09 RX ADMIN — ATORVASTATIN CALCIUM 40 MG: 40 TABLET, FILM COATED ORAL at 21:37

## 2021-12-09 RX ADMIN — APIXABAN 5 MG: 5 TABLET, FILM COATED ORAL at 17:18

## 2021-12-09 RX ADMIN — ASPIRIN 81 MG: 81 TABLET, COATED ORAL at 08:50

## 2021-12-09 RX ADMIN — SODIUM CHLORIDE, SODIUM LACTATE, POTASSIUM CHLORIDE, AND CALCIUM CHLORIDE 500 ML: .6; .31; .03; .02 INJECTION, SOLUTION INTRAVENOUS at 06:00

## 2021-12-10 LAB
ANION GAP SERPL CALCULATED.3IONS-SCNC: 8 MMOL/L (ref 4–13)
BUN SERPL-MCNC: 11 MG/DL (ref 5–25)
CALCIUM SERPL-MCNC: 9.9 MG/DL (ref 8.3–10.1)
CHLORIDE SERPL-SCNC: 110 MMOL/L (ref 100–108)
CO2 SERPL-SCNC: 22 MMOL/L (ref 21–32)
CREAT SERPL-MCNC: 0.74 MG/DL (ref 0.6–1.3)
ERYTHROCYTE [DISTWIDTH] IN BLOOD BY AUTOMATED COUNT: 14.2 % (ref 11.6–15.1)
GFR SERPL CREATININE-BSD FRML MDRD: 95 ML/MIN/1.73SQ M
GLUCOSE SERPL-MCNC: 99 MG/DL (ref 65–140)
HCT VFR BLD AUTO: 42.5 % (ref 36.5–49.3)
HGB BLD-MCNC: 14.1 G/DL (ref 12–17)
MCH RBC QN AUTO: 31.7 PG (ref 26.8–34.3)
MCHC RBC AUTO-ENTMCNC: 33.2 G/DL (ref 31.4–37.4)
MCV RBC AUTO: 96 FL (ref 82–98)
PLATELET # BLD AUTO: 324 THOUSANDS/UL (ref 149–390)
PMV BLD AUTO: 10.1 FL (ref 8.9–12.7)
POTASSIUM SERPL-SCNC: 3.8 MMOL/L (ref 3.5–5.3)
RBC # BLD AUTO: 4.45 MILLION/UL (ref 3.88–5.62)
SODIUM SERPL-SCNC: 140 MMOL/L (ref 136–145)
WBC # BLD AUTO: 15.34 THOUSAND/UL (ref 4.31–10.16)

## 2021-12-10 PROCEDURE — 85027 COMPLETE CBC AUTOMATED: CPT | Performed by: PHYSICIAN ASSISTANT

## 2021-12-10 PROCEDURE — 99024 POSTOP FOLLOW-UP VISIT: CPT | Performed by: SURGERY

## 2021-12-10 PROCEDURE — 97116 GAIT TRAINING THERAPY: CPT

## 2021-12-10 PROCEDURE — 97530 THERAPEUTIC ACTIVITIES: CPT

## 2021-12-10 PROCEDURE — 80048 BASIC METABOLIC PNL TOTAL CA: CPT | Performed by: PHYSICIAN ASSISTANT

## 2021-12-10 RX ORDER — METHOCARBAMOL 500 MG/1
1000 TABLET, FILM COATED ORAL EVERY 6 HOURS SCHEDULED
Status: DISCONTINUED | OUTPATIENT
Start: 2021-12-10 | End: 2021-12-12 | Stop reason: HOSPADM

## 2021-12-10 RX ADMIN — METHOCARBAMOL 1000 MG: 500 TABLET, FILM COATED ORAL at 11:51

## 2021-12-10 RX ADMIN — APIXABAN 5 MG: 5 TABLET, FILM COATED ORAL at 08:00

## 2021-12-10 RX ADMIN — DOCUSATE SODIUM 100 MG: 100 CAPSULE ORAL at 17:59

## 2021-12-10 RX ADMIN — ATORVASTATIN CALCIUM 40 MG: 40 TABLET, FILM COATED ORAL at 21:13

## 2021-12-10 RX ADMIN — ASPIRIN 81 MG: 81 TABLET, COATED ORAL at 08:01

## 2021-12-10 RX ADMIN — METHOCARBAMOL 1000 MG: 500 TABLET, FILM COATED ORAL at 17:59

## 2021-12-10 RX ADMIN — OXYCODONE HYDROCHLORIDE 5 MG: 5 TABLET ORAL at 07:50

## 2021-12-10 RX ADMIN — METHOCARBAMOL 1000 MG: 500 TABLET, FILM COATED ORAL at 06:38

## 2021-12-10 RX ADMIN — ACETAMINOPHEN 650 MG: 325 TABLET, FILM COATED ORAL at 21:14

## 2021-12-10 RX ADMIN — DOCUSATE SODIUM 100 MG: 100 CAPSULE ORAL at 08:00

## 2021-12-10 RX ADMIN — TAMSULOSIN HYDROCHLORIDE 0.4 MG: 0.4 CAPSULE ORAL at 17:59

## 2021-12-10 RX ADMIN — PREGABALIN 100 MG: 100 CAPSULE ORAL at 17:59

## 2021-12-10 RX ADMIN — APIXABAN 5 MG: 5 TABLET, FILM COATED ORAL at 18:00

## 2021-12-10 RX ADMIN — PREGABALIN 100 MG: 100 CAPSULE ORAL at 08:00

## 2021-12-11 LAB
ANION GAP SERPL CALCULATED.3IONS-SCNC: 3 MMOL/L (ref 4–13)
BUN SERPL-MCNC: 14 MG/DL (ref 5–25)
CALCIUM SERPL-MCNC: 8.7 MG/DL (ref 8.3–10.1)
CHLORIDE SERPL-SCNC: 111 MMOL/L (ref 100–108)
CO2 SERPL-SCNC: 27 MMOL/L (ref 21–32)
CREAT SERPL-MCNC: 0.66 MG/DL (ref 0.6–1.3)
ERYTHROCYTE [DISTWIDTH] IN BLOOD BY AUTOMATED COUNT: 13.9 % (ref 11.6–15.1)
GFR SERPL CREATININE-BSD FRML MDRD: 100 ML/MIN/1.73SQ M
GLUCOSE SERPL-MCNC: 92 MG/DL (ref 65–140)
HCT VFR BLD AUTO: 37.6 % (ref 36.5–49.3)
HGB BLD-MCNC: 12.8 G/DL (ref 12–17)
MCH RBC QN AUTO: 32.2 PG (ref 26.8–34.3)
MCHC RBC AUTO-ENTMCNC: 34 G/DL (ref 31.4–37.4)
MCV RBC AUTO: 95 FL (ref 82–98)
PLATELET # BLD AUTO: 288 THOUSANDS/UL (ref 149–390)
PMV BLD AUTO: 10.1 FL (ref 8.9–12.7)
POTASSIUM SERPL-SCNC: 3.7 MMOL/L (ref 3.5–5.3)
RBC # BLD AUTO: 3.98 MILLION/UL (ref 3.88–5.62)
SODIUM SERPL-SCNC: 141 MMOL/L (ref 136–145)
WBC # BLD AUTO: 11.75 THOUSAND/UL (ref 4.31–10.16)

## 2021-12-11 PROCEDURE — 85027 COMPLETE CBC AUTOMATED: CPT | Performed by: PHYSICIAN ASSISTANT

## 2021-12-11 PROCEDURE — 80048 BASIC METABOLIC PNL TOTAL CA: CPT | Performed by: PHYSICIAN ASSISTANT

## 2021-12-11 PROCEDURE — 99024 POSTOP FOLLOW-UP VISIT: CPT | Performed by: SURGERY

## 2021-12-11 RX ADMIN — METHOCARBAMOL 1000 MG: 500 TABLET, FILM COATED ORAL at 05:07

## 2021-12-11 RX ADMIN — PREGABALIN 100 MG: 100 CAPSULE ORAL at 17:34

## 2021-12-11 RX ADMIN — APIXABAN 5 MG: 5 TABLET, FILM COATED ORAL at 08:15

## 2021-12-11 RX ADMIN — DOCUSATE SODIUM 100 MG: 100 CAPSULE ORAL at 17:33

## 2021-12-11 RX ADMIN — METHOCARBAMOL 1000 MG: 500 TABLET, FILM COATED ORAL at 17:34

## 2021-12-11 RX ADMIN — SODIUM CHLORIDE 500 ML: 0.9 INJECTION, SOLUTION INTRAVENOUS at 22:46

## 2021-12-11 RX ADMIN — PREGABALIN 100 MG: 100 CAPSULE ORAL at 08:15

## 2021-12-11 RX ADMIN — APIXABAN 5 MG: 5 TABLET, FILM COATED ORAL at 17:34

## 2021-12-11 RX ADMIN — OXYCODONE HYDROCHLORIDE 5 MG: 5 TABLET ORAL at 08:15

## 2021-12-11 RX ADMIN — DOCUSATE SODIUM 100 MG: 100 CAPSULE ORAL at 08:15

## 2021-12-11 RX ADMIN — METHOCARBAMOL 1000 MG: 500 TABLET, FILM COATED ORAL at 11:29

## 2021-12-11 RX ADMIN — TAMSULOSIN HYDROCHLORIDE 0.4 MG: 0.4 CAPSULE ORAL at 17:33

## 2021-12-11 RX ADMIN — ATORVASTATIN CALCIUM 40 MG: 40 TABLET, FILM COATED ORAL at 21:24

## 2021-12-11 RX ADMIN — ASPIRIN 81 MG: 81 TABLET, COATED ORAL at 08:15

## 2021-12-12 VITALS
BODY MASS INDEX: 24.87 KG/M2 | RESPIRATION RATE: 16 BRPM | OXYGEN SATURATION: 96 % | DIASTOLIC BLOOD PRESSURE: 50 MMHG | HEIGHT: 75 IN | TEMPERATURE: 98.1 F | HEART RATE: 77 BPM | SYSTOLIC BLOOD PRESSURE: 96 MMHG | WEIGHT: 200 LBS

## 2021-12-12 LAB
ANION GAP SERPL CALCULATED.3IONS-SCNC: 2 MMOL/L (ref 4–13)
BASOPHILS # BLD AUTO: 0.05 THOUSANDS/ΜL (ref 0–0.1)
BASOPHILS NFR BLD AUTO: 1 % (ref 0–1)
BUN SERPL-MCNC: 12 MG/DL (ref 5–25)
CALCIUM SERPL-MCNC: 8.5 MG/DL (ref 8.3–10.1)
CHLORIDE SERPL-SCNC: 111 MMOL/L (ref 100–108)
CO2 SERPL-SCNC: 27 MMOL/L (ref 21–32)
CREAT SERPL-MCNC: 0.77 MG/DL (ref 0.6–1.3)
EOSINOPHIL # BLD AUTO: 0.15 THOUSAND/ΜL (ref 0–0.61)
EOSINOPHIL NFR BLD AUTO: 1 % (ref 0–6)
ERYTHROCYTE [DISTWIDTH] IN BLOOD BY AUTOMATED COUNT: 13.7 % (ref 11.6–15.1)
GFR SERPL CREATININE-BSD FRML MDRD: 94 ML/MIN/1.73SQ M
GLUCOSE SERPL-MCNC: 124 MG/DL (ref 65–140)
HCT VFR BLD AUTO: 37.8 % (ref 36.5–49.3)
HGB BLD-MCNC: 12.7 G/DL (ref 12–17)
IMM GRANULOCYTES # BLD AUTO: 0.1 THOUSAND/UL (ref 0–0.2)
IMM GRANULOCYTES NFR BLD AUTO: 1 % (ref 0–2)
LYMPHOCYTES # BLD AUTO: 2.15 THOUSANDS/ΜL (ref 0.6–4.47)
LYMPHOCYTES NFR BLD AUTO: 20 % (ref 14–44)
MCH RBC QN AUTO: 31.9 PG (ref 26.8–34.3)
MCHC RBC AUTO-ENTMCNC: 33.6 G/DL (ref 31.4–37.4)
MCV RBC AUTO: 95 FL (ref 82–98)
MONOCYTES # BLD AUTO: 0.96 THOUSAND/ΜL (ref 0.17–1.22)
MONOCYTES NFR BLD AUTO: 9 % (ref 4–12)
NEUTROPHILS # BLD AUTO: 7.29 THOUSANDS/ΜL (ref 1.85–7.62)
NEUTS SEG NFR BLD AUTO: 68 % (ref 43–75)
NRBC BLD AUTO-RTO: 0 /100 WBCS
PLATELET # BLD AUTO: 306 THOUSANDS/UL (ref 149–390)
PMV BLD AUTO: 9.7 FL (ref 8.9–12.7)
POTASSIUM SERPL-SCNC: 3.7 MMOL/L (ref 3.5–5.3)
RBC # BLD AUTO: 3.98 MILLION/UL (ref 3.88–5.62)
SODIUM SERPL-SCNC: 140 MMOL/L (ref 136–145)
WBC # BLD AUTO: 10.7 THOUSAND/UL (ref 4.31–10.16)

## 2021-12-12 PROCEDURE — 99024 POSTOP FOLLOW-UP VISIT: CPT | Performed by: SURGERY

## 2021-12-12 PROCEDURE — 80048 BASIC METABOLIC PNL TOTAL CA: CPT | Performed by: SURGERY

## 2021-12-12 PROCEDURE — 85025 COMPLETE CBC W/AUTO DIFF WBC: CPT | Performed by: SURGERY

## 2021-12-12 PROCEDURE — 97530 THERAPEUTIC ACTIVITIES: CPT

## 2021-12-12 PROCEDURE — NC001 PR NO CHARGE: Performed by: SURGERY

## 2021-12-12 RX ORDER — CEPHALEXIN 500 MG/1
500 CAPSULE ORAL EVERY 6 HOURS SCHEDULED
Status: DISCONTINUED | OUTPATIENT
Start: 2021-12-12 | End: 2021-12-12 | Stop reason: HOSPADM

## 2021-12-12 RX ORDER — CEPHALEXIN 500 MG/1
500 CAPSULE ORAL EVERY 6 HOURS SCHEDULED
Qty: 6 CAPSULE | Refills: 0 | Status: SHIPPED | OUTPATIENT
Start: 2021-12-12 | End: 2021-12-14

## 2021-12-12 RX ORDER — ASPIRIN 81 MG/1
81 TABLET ORAL DAILY
Qty: 90 TABLET | Refills: 0 | Status: SHIPPED | OUTPATIENT
Start: 2021-12-13

## 2021-12-12 RX ADMIN — METHOCARBAMOL 1000 MG: 500 TABLET, FILM COATED ORAL at 05:08

## 2021-12-12 RX ADMIN — ASPIRIN 81 MG: 81 TABLET, COATED ORAL at 09:09

## 2021-12-12 RX ADMIN — DOCUSATE SODIUM 100 MG: 100 CAPSULE ORAL at 09:09

## 2021-12-12 RX ADMIN — PREGABALIN 100 MG: 100 CAPSULE ORAL at 09:09

## 2021-12-12 RX ADMIN — CEPHALEXIN 500 MG: 500 CAPSULE ORAL at 09:12

## 2021-12-12 RX ADMIN — APIXABAN 5 MG: 5 TABLET, FILM COATED ORAL at 09:09

## 2021-12-12 RX ADMIN — METHOCARBAMOL 1000 MG: 500 TABLET, FILM COATED ORAL at 11:47

## 2021-12-12 RX ADMIN — METHOCARBAMOL 1000 MG: 500 TABLET, FILM COATED ORAL at 00:53

## 2021-12-12 RX ADMIN — CEPHALEXIN 500 MG: 500 CAPSULE ORAL at 14:30

## 2021-12-13 ENCOUNTER — TRANSITIONAL CARE MANAGEMENT (OUTPATIENT)
Dept: INTERNAL MEDICINE CLINIC | Facility: CLINIC | Age: 66
End: 2021-12-13

## 2021-12-14 ENCOUNTER — TELEPHONE (OUTPATIENT)
Dept: INTERNAL MEDICINE CLINIC | Facility: CLINIC | Age: 66
End: 2021-12-14

## 2021-12-14 ENCOUNTER — TELEPHONE (OUTPATIENT)
Dept: VASCULAR SURGERY | Facility: CLINIC | Age: 66
End: 2021-12-14

## 2021-12-17 ENCOUNTER — TELEPHONE (OUTPATIENT)
Dept: PAIN MEDICINE | Facility: MEDICAL CENTER | Age: 66
End: 2021-12-17

## 2021-12-17 ENCOUNTER — TELEPHONE (OUTPATIENT)
Dept: VASCULAR SURGERY | Facility: CLINIC | Age: 66
End: 2021-12-17

## 2021-12-17 NOTE — TELEPHONE ENCOUNTER
Pt left a vm stating he is back home from the hospital but would like a call back to discuss his pain medication (patches) because he is having a lot of pain    Pt can be reached at [phone]

## 2021-12-17 NOTE — TELEPHONE ENCOUNTER
S/w pt  Pt had vascular surgery on 12/8 and states his pain is post op related pain  Pt was advised to contact hs surgeon  Pt is currently taking Butrans 5 mcg, Lyrica 100 mg bid and Tylenol 1000 mg daily  Pt was advised he can try 1000 mg tid as long as there are no contraindications but advised he draws the line at 1000 mg per day  Please advise- Are there any other recommendations?

## 2021-12-20 ENCOUNTER — TELEPHONE (OUTPATIENT)
Dept: VASCULAR SURGERY | Facility: CLINIC | Age: 66
End: 2021-12-20

## 2021-12-22 ENCOUNTER — TELEPHONE (OUTPATIENT)
Dept: VASCULAR SURGERY | Facility: CLINIC | Age: 66
End: 2021-12-22

## 2021-12-22 ENCOUNTER — OFFICE VISIT (OUTPATIENT)
Dept: INTERNAL MEDICINE CLINIC | Facility: OTHER | Age: 66
End: 2021-12-22
Payer: COMMERCIAL

## 2021-12-22 VITALS
HEIGHT: 75 IN | SYSTOLIC BLOOD PRESSURE: 110 MMHG | TEMPERATURE: 97.4 F | OXYGEN SATURATION: 97 % | WEIGHT: 197 LBS | DIASTOLIC BLOOD PRESSURE: 66 MMHG | HEART RATE: 78 BPM | RESPIRATION RATE: 18 BRPM | BODY MASS INDEX: 24.49 KG/M2

## 2021-12-22 DIAGNOSIS — Z01.818 PREOP EXAM FOR INTERNAL MEDICINE: ICD-10-CM

## 2021-12-22 DIAGNOSIS — I72.4 ANEURYSM OF RIGHT POPLITEAL ARTERY (HCC): Primary | Chronic | ICD-10-CM

## 2021-12-22 DIAGNOSIS — Z95.828 S/P FEMORAL-POPLITEAL BYPASS SURGERY: ICD-10-CM

## 2021-12-22 DIAGNOSIS — L03.115 CELLULITIS OF RIGHT LOWER EXTREMITY: ICD-10-CM

## 2021-12-22 PROCEDURE — 1111F DSCHRG MED/CURRENT MED MERGE: CPT | Performed by: INTERNAL MEDICINE

## 2021-12-22 PROCEDURE — 99495 TRANSJ CARE MGMT MOD F2F 14D: CPT | Performed by: INTERNAL MEDICINE

## 2021-12-22 RX ORDER — CEPHALEXIN 500 MG/1
500 CAPSULE ORAL 2 TIMES DAILY
COMMUNITY
Start: 2021-12-17 | End: 2022-06-29

## 2021-12-23 ENCOUNTER — OFFICE VISIT (OUTPATIENT)
Dept: VASCULAR SURGERY | Facility: CLINIC | Age: 66
End: 2021-12-23

## 2021-12-23 VITALS
HEART RATE: 80 BPM | HEIGHT: 75 IN | TEMPERATURE: 98.4 F | BODY MASS INDEX: 24.62 KG/M2 | DIASTOLIC BLOOD PRESSURE: 62 MMHG | SYSTOLIC BLOOD PRESSURE: 106 MMHG | WEIGHT: 198 LBS

## 2021-12-23 DIAGNOSIS — I82.412 ACUTE DEEP VEIN THROMBOSIS (DVT) OF FEMORAL VEIN OF LEFT LOWER EXTREMITY (HCC): ICD-10-CM

## 2021-12-23 DIAGNOSIS — I72.4 ANEURYSM OF RIGHT POPLITEAL ARTERY (HCC): Primary | Chronic | ICD-10-CM

## 2021-12-23 PROCEDURE — 99024 POSTOP FOLLOW-UP VISIT: CPT | Performed by: SURGERY

## 2021-12-23 PROCEDURE — 3008F BODY MASS INDEX DOCD: CPT | Performed by: NURSE PRACTITIONER

## 2021-12-28 ENCOUNTER — TELEPHONE (OUTPATIENT)
Dept: INTERNAL MEDICINE CLINIC | Facility: OTHER | Age: 66
End: 2021-12-28

## 2021-12-28 DIAGNOSIS — Z95.828 S/P FEMORAL-POPLITEAL BYPASS SURGERY: Primary | ICD-10-CM

## 2021-12-28 DIAGNOSIS — M13.0 POLYARTHROPATHY: ICD-10-CM

## 2022-01-04 ENCOUNTER — TELEPHONE (OUTPATIENT)
Dept: VASCULAR SURGERY | Facility: CLINIC | Age: 67
End: 2022-01-04

## 2022-01-04 NOTE — TELEPHONE ENCOUNTER
Not being able to see the wound makes it difficult to assess  If draining any purulent drainage, increased redness, fever or chills he should be seen  He should not "pull any sutures"  If he can not come in, please have [facility] triage it when they next go there on 1/6  Recommend keeping area clean and dry, may leave open to air, or can cover with non stick pad to avoid rubbing

## 2022-01-04 NOTE — TELEPHONE ENCOUNTER
S/w pt and notified of same, pt verbalized understanding  He will call if any changes/concerns  Advised I would also notify [facility] v[facility], called vna and was transf to  of nurse, [address], left  w/ update re: pt and request they call us w/ update when seeing pt on 1/6/22  Request she call if any ? Ben Calvillo

## 2022-01-04 NOTE — TELEPHONE ENCOUNTER
Pt called to report his large scab at R knee incision fell off last night  This area is now covered in "white mucous"  He also pulled a "3/4" clear suture" from this area  He saw Dr Daryl Rodriguez 12/23/21 and is s/p R fem AK pop bypass for popliteal aneurysm 12/8/21  He denies drainage, no fever/chiills  He notes the area around it is slightly red and warm  He states the problem is that due to location when he bends his knee the area "cracks and breaks"  He has my chart, ? If he can send photo, he states he cannot, he does not have a smart phone  Offered ov to have eval, pt is unable to drive and cannot come in this week due to needing ride  [address] [facility] does see him weekly and are due to see him again 1/6 (they were last there 12/30 and scab was still intact at that visit)  Pt is ? If he can apply vitamin E ointment or neosporin to area and cover w/ non stick pad  Advised him we do not recommend vitamin E be applied to incisions/wounds typically  Would route his concerns to triage provider and get back to him w/ recommendations  Pt states he screens calls, if he does not answer leave him a message

## 2022-01-06 NOTE — TELEPHONE ENCOUNTER
Destiny from [facility] returned call  She did see pt today and tiger texted photo to Shanelle Sanford PA-C and received orders directly from her  They are applying maxsorb and she will see  him again 1/12  She states she will send updated photo to Cadence at that time  If anything further needed she will let us know

## 2022-01-06 NOTE — TELEPHONE ENCOUNTER
Called [facility] and was transf to  of [facility] nurse for pt's team, left  requesting call back w/ update on pt

## 2022-01-20 ENCOUNTER — OFFICE VISIT (OUTPATIENT)
Dept: VASCULAR SURGERY | Facility: CLINIC | Age: 67
End: 2022-01-20

## 2022-01-20 VITALS
WEIGHT: 194.8 LBS | BODY MASS INDEX: 24.22 KG/M2 | SYSTOLIC BLOOD PRESSURE: 110 MMHG | HEART RATE: 88 BPM | HEIGHT: 75 IN | DIASTOLIC BLOOD PRESSURE: 70 MMHG

## 2022-01-20 DIAGNOSIS — I72.4 ANEURYSM OF RIGHT POPLITEAL ARTERY (HCC): Primary | Chronic | ICD-10-CM

## 2022-01-20 PROCEDURE — 99024 POSTOP FOLLOW-UP VISIT: CPT | Performed by: SURGERY

## 2022-01-20 NOTE — LETTER
January 20, 2022     Hudson Nugent, [address] [facility]    Patient: Ari White   YOB: 1955   Date of Visit: 1/20/2022       Dear Dr Aarti Jack:    Thank you for referring Gilda Grider to me for evaluation  Below are the relevant portions of my assessment and plan of care  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HCC)  Right popliteal artery aneurysm now status post femoral-popliteal bypass  He is doing well in this regard  He has a very small opening in the most superior aspect of the below-knee incision which is superficial   We will continue his current dressing regiment with daily silver alginate  Prior to this dressing change she will shower  We will plan standard follow-up with duplex imaging which is scheduled in approximately 2 months  Following that he will return to the office for re-evaluation  He will slowly increase his activity as tolerated  If you have questions, please do not hesitate to call me  I look forward to following Cornelia Confer along with you           Sincerely,        Suzanne Orantes MD        CC: No Recipients

## 2022-01-20 NOTE — PATIENT INSTRUCTIONS
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HCC)  Right popliteal artery aneurysm now status post femoral-popliteal bypass  He is doing well in this regard  He has a very small opening in the most superior aspect of the below-knee incision which is superficial   We will continue his current dressing regiment with daily silver alginate  Prior to this dressing change she will shower  We will plan standard follow-up with duplex imaging which is scheduled in approximately 2 months  Following that he will return to the office for re-evaluation  He will slowly increase his activity as tolerated

## 2022-01-20 NOTE — ASSESSMENT & PLAN NOTE
Right popliteal artery aneurysm now status post femoral-popliteal bypass  He is doing well in this regard  He has a very small opening in the most superior aspect of the below-knee incision which is superficial   We will continue his current dressing regiment with daily silver alginate  Prior to this dressing change she will shower  We will plan standard follow-up with duplex imaging which is scheduled in approximately 2 months  Following that he will return to the office for re-evaluation  He will slowly increase his activity as tolerated

## 2022-01-20 NOTE — PROGRESS NOTES
Assessment/Plan:    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HCC)  Right popliteal artery aneurysm now status post femoral-popliteal bypass  He is doing well in this regard  He has a very small opening in the most superior aspect of the below-knee incision which is superficial   We will continue his current dressing regiment with daily silver alginate  Prior to this dressing change she will shower  We will plan standard follow-up with duplex imaging which is scheduled in approximately 2 months  Following that he will return to the office for re-evaluation  He will slowly increase his activity as tolerated  Diagnoses and all orders for this visit:    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HCC)          Subjective:      Patient ID: Conrad Gao is a 78 y o  male  Patient present for 3 week post up Right SFA to BK pop bypass  Pt c/o some swelling from the knee down and a part of the incision that pt states is not healing right  Pt is currently taking ASA, Eliquis, Atorvastatin  Pt is smoking 5 cigarettes a day  70-year-old with right popliteal artery aneurysm underwent right femoral-popliteal artery bypass 12/08/2021  On evaluation today he states he is doing well with no major limitations  He notes discomfort at the wound sites has improved significantly  He does note area of opening in his wound below the knee  He has been applying a dressing daily with alginate and a Band-Aid  He notes no significant drainage or redness  On examination he has an easily palpable graft and posterior tibial artery pulse  There is a small 10 mm x 3 mm superficial opening at the superior aspect of his below knee incision  There was a small amount of fibrinous material removed with a gauze  There is mild swelling at the foot and ankle level        The following portions of the patient's history were reviewed and updated as appropriate: allergies, current medications, past family history, past medical history, past social history, past surgical history and problem list     I have reviewed and made appropriate changes to the review of systems input by the medical assistant  Vitals:    01/20/22 1135   BP: 110/70   BP Location: Left arm   Patient Position: Sitting   Cuff Size: Adult   Pulse: 88   Weight: 88 4 kg (194 lb 12 8 oz)   Height: 6' 3" (1 905 m)       Patient Active Problem List   Diagnosis    Lumbar radiculopathy - Right    Bone lesion    Neuropathy    Polyarthropathy    Tobacco abuse    Work related injury    Colon cancer screening    Right foot drop    Lumbar spondylosis - Bilateral    Vitamin D deficiency    Thoracic spine pain    Chronic pain syndrome    Uncomplicated opioid dependence (HCC)    Chronic pain of both knees    Chronic pain of both shoulders    Chronic pain of left ankle    Multiple subsolid lung nodules greater than 6 mm in diameter    Mixed hyperlipidemia    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HCC)    Deep vein thrombosis (DVT) of femoral vein of left lower extremity (HCC)    S/P femoral-popliteal bypass surgery    Cellulitis of right lower extremity       Past Surgical History:   Procedure Laterality Date    COLONOSCOPY      EGD AND COLONOSCOPY N/A 6/29/2018    Procedure: EGD AND COLONOSCOPY;  Surgeon: Nohelia Ventura MD;  [facility];   Service: Gastroenterology    EPIDURAL BLOCK INJECTION      needle    HIP ARTHROSCOPY W/ LABRAL DEBRIDEMENT Left     "hip surgery"    IR LOWER EXTREMITY ANGIOGRAM  12/8/2021    ORTHOPEDIC SURGERY      PA HIP [address] BODY,PLASTY/RESECTN Left 4/30/2018    Procedure: LEFT HIP ARTHROSCOPIC LABRAL DEBRIDEMENT;  Surgeon: Danitza Alfonso MD;  Location[facility];  Service: Orthopedics    PA [address] [facility] 3RD+ ORD [address] PEL/LXTR [facility] Right 12/8/2021    Procedure: COMPLETION ARTERIOGRAM WITH 5 MM ANGIOPLASTY OF MID SUPERFICIAL FEMORAL ARTERY;  Surgeon: Marisol Mojica MD;  [facility];  Service: Vascular    PA VEIN IN SITU BYPASS GRAFT,FEM-POP Right 12/8/2021    Procedure: R SFA to BK Pop Bypass using insitu GSV,  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;  Surgeon: Slade Fernandez MD;  [facility];  Service: Vascular    TONSILLECTOMY      WISDOM TOOTH EXTRACTION         Family History   Problem Relation Age of Onset    Osteoarthritis Family     Glaucoma Mother     No Known Problems Father        Social History     Socioeconomic History    Marital status:      Spouse name: Not on file    Number of children: Not on file    Years of education: Not on file    Highest education level: Not on file   Occupational History    Not on file   Tobacco Use    Smoking status: Current Every Day Smoker     Packs/day: 0 50     Types: Cigarettes    Smokeless tobacco: Never Used    Tobacco comment: 1/2 a pack a day/plans to stop 3-4 days before surgery   Vaping Use    Vaping Use: Never used   Substance and Sexual Activity    Alcohol use: No    Drug use: No    Sexual activity: Not on file     Comment: defer   Other Topics Concern    Not on file   Social History Narrative    Not on file     Social Determinants of Health     Financial Resource Strain: Not on file   Food Insecurity: Not on file   Transportation Needs: No Transportation Needs    Lack of Transportation (Medical): No    Lack of Transportation (Non-Medical):  No   Physical Activity: Not on file   Stress: Not on file   Social Connections: Not on file   Intimate Partner Violence: Not on file   Housing Stability: Low Risk     Unable to Pay for Housing in the Last Year: No    Number of Places Lived in the Last Year: 1    Unstable Housing in the Last Year: No       Allergies   Allergen Reactions    Cyanocobalamin [Vitamin B12] GI Intolerance    Gabapentin Other (See Comments) and GI Intolerance     Difficulty walking    Morphine Other (See Comments)     "acute constipation"    Tramadol Confusion    Nsaids Other (See Comments)     Pt reports avoids as is on Eliquis         Current Outpatient Medications:     acetaminophen (TYLENOL) 500 mg tablet, Take 1,000 mg by mouth every 12 (twelve) hours as needed for mild pain, Disp: , Rfl:     apixaban (ELIQUIS) 5 mg, Take 1 tablet (5 mg total) by mouth 2 (two) times a day, Disp: 60 tablet, Rfl: 2    ascorbic Acid (VITAMIN C) 500 MG CPCR, Take 500 mg by mouth daily, Disp: , Rfl:     aspirin (ECOTRIN LOW STRENGTH) 81 mg EC tablet, Take 1 tablet (81 mg total) by mouth daily, Disp: 90 tablet, Rfl: 0    atorvastatin (LIPITOR) 10 mg tablet, Take 0 5 tablets (5 mg total) by mouth daily at bedtime, Disp: 45 tablet, Rfl: 1    cholecalciferol (VITAMIN D3) 1,000 units tablet, Take 2,000 Units by mouth 2 (two) times a day , Disp: , Rfl:     clotrimazole (LOTRIMIN) 1 % cream, Apply 1 g (1 application total) topically daily To affected area (Patient taking differently: Apply 1 application topically as needed To affected area ), Disp: 60 g, Rfl: 2    Elastic Bandages & Supports (KNEE BRACE/HINGED/REGULAR) MISC, by Does not apply route daily as needed (B/L knee pain) B/L knee braces, Disp: 2 each, Rfl: 0    Flaxseed, Linseed, (FLAX SEED OIL PO), Take 1,200 capsules by mouth 2 (two) times a day  , Disp: , Rfl:     METHYLCOBALAMIN SL, Place 1,000 mcg under the tongue 2 (two) times a day , Disp: , Rfl:     Misc Natural Products (Osteo Bi-Flex Triple Strength) TABS, Take by mouth 2 (two) times a day  , Disp: , Rfl:     pregabalin (LYRICA) 100 mg capsule, Take 1 capsule (100 mg total) by mouth 2 (two) times a day, Disp: 60 capsule, Rfl: 2    pyridoxine (B-6) 100 MG tablet, Take 100 mg by mouth daily, Disp: , Rfl:     transdermal buprenorphine (BUTRANS) 5 mcg/hr TD patch, Place 1 patch on the skin once a week Do not fill until 08/27/2020 (Patient taking differently: Place 1 patch on the skin once a week Do not fill until 08/27/2020/pt changes every Thursday ), Disp: 4 patch, Rfl: 2    cephalexin (KEFLEX) 500 mg capsule, Take 500 mg by mouth 2 (two) times a day (Patient not taking: Reported on 1/20/2022 ), Disp: , Rfl:        Review of Systems      Objective:      /70 (BP Location: Left arm, Patient Position: Sitting, Cuff Size: Adult)   Pulse 88   Ht 6' 3" (1 905 m)   Wt 88 4 kg (194 lb 12 8 oz)   BMI 24 35 kg/m²          Physical Exam

## 2022-01-26 ENCOUNTER — OFFICE VISIT (OUTPATIENT)
Dept: PAIN MEDICINE | Facility: MEDICAL CENTER | Age: 67
End: 2022-01-26
Payer: COMMERCIAL

## 2022-01-26 VITALS
WEIGHT: 195.4 LBS | HEIGHT: 75 IN | DIASTOLIC BLOOD PRESSURE: 69 MMHG | HEART RATE: 87 BPM | SYSTOLIC BLOOD PRESSURE: 109 MMHG | BODY MASS INDEX: 24.29 KG/M2

## 2022-01-26 DIAGNOSIS — G62.9 NEUROPATHY: ICD-10-CM

## 2022-01-26 DIAGNOSIS — M25.562 CHRONIC PAIN OF BOTH KNEES: ICD-10-CM

## 2022-01-26 DIAGNOSIS — M25.512 CHRONIC PAIN OF BOTH SHOULDERS: ICD-10-CM

## 2022-01-26 DIAGNOSIS — G89.29 CHRONIC BILATERAL LOW BACK PAIN WITHOUT SCIATICA: ICD-10-CM

## 2022-01-26 DIAGNOSIS — G89.4 CHRONIC PAIN SYNDROME: ICD-10-CM

## 2022-01-26 DIAGNOSIS — M54.50 CHRONIC BILATERAL LOW BACK PAIN WITHOUT SCIATICA: ICD-10-CM

## 2022-01-26 DIAGNOSIS — G89.29 CHRONIC PAIN OF BOTH SHOULDERS: ICD-10-CM

## 2022-01-26 DIAGNOSIS — G89.29 CHRONIC PAIN OF BOTH KNEES: ICD-10-CM

## 2022-01-26 DIAGNOSIS — M54.16 LUMBAR RADICULOPATHY: ICD-10-CM

## 2022-01-26 DIAGNOSIS — M25.511 CHRONIC PAIN OF BOTH SHOULDERS: ICD-10-CM

## 2022-01-26 DIAGNOSIS — M25.561 CHRONIC PAIN OF BOTH KNEES: ICD-10-CM

## 2022-01-26 PROCEDURE — 3008F BODY MASS INDEX DOCD: CPT | Performed by: NURSE PRACTITIONER

## 2022-01-26 PROCEDURE — 1160F RVW MEDS BY RX/DR IN RCRD: CPT | Performed by: NURSE PRACTITIONER

## 2022-01-26 PROCEDURE — 99214 OFFICE O/P EST MOD 30 MIN: CPT | Performed by: NURSE PRACTITIONER

## 2022-01-26 PROCEDURE — 4004F PT TOBACCO SCREEN RCVD TLK: CPT | Performed by: NURSE PRACTITIONER

## 2022-01-26 RX ORDER — BUPRENORPHINE 5 UG/H
1 PATCH TRANSDERMAL WEEKLY
Qty: 4 PATCH | Refills: 2 | Status: SHIPPED | OUTPATIENT
Start: 2022-01-26 | End: 2022-05-02 | Stop reason: SDUPTHER

## 2022-01-26 NOTE — PATIENT INSTRUCTIONS
Opioid Safety   AMBULATORY CARE:   Opioid safety  includes the correct use, storage, and disposal of opioids  Examples of opioid medicines to treat pain include oxycodone, morphine, fentanyl, and codeine  Call your local emergency number (911 in the [address]), or have someone else call if:   · You have a seizure  · You cannot be woken  · You have trouble staying awake and your breathing is slow or shallow  · Your speech is slurred, or you are confused  · You are dizzy or stumble when you walk  Call your doctor, or have someone close to you call if:   · You are extremely drowsy, or you have trouble staying awake or speaking  · You have pale or clammy skin  · You have blue fingernails or lips  · Your heartbeat is slower than normal     · You cannot stop vomiting  · You have questions or concerns about your condition or care  Use opioids safely:   · Take prescribed opioids exactly as directed  Opioids come with directions based on the kind of opioid and how it is given  Do not take more than the recommended amount, or for longer than needed  · Do not give opioids to others or take opioids that belong to someone else  Misuse of opioids can lead to an addiction or overdose  · Do not mix opioids with other medicines or alcohol  The combination can cause an overdose, or lead to a coma  · Do not drive or operate heavy machinery after you take the opioid  Your provider or pharmacist can tell you how long to wait after a dose before you do these activities  · Talk to your healthcare provider if you have any side effects  He or she can help you prevent or relieve side effects  Side effects include nausea, sleepiness, itching, and trouble thinking clearly  Manage constipation:  Constipation is the most common side effect of opioid medicine  Constipation is when you have hard, dry bowel movements, or you go longer than usual between bowel movements   Tell your healthcare provider about all changes in your bowel movements while you are taking opioids  He or she may recommend laxative medicine to help you have a bowel movement  He or she may also change the kind of opioid you are taking, or change when you take it  The following are more ways you can prevent or relieve constipation:  · Drink liquids as directed  You may need to drink extra liquids to help soften and move your bowels  Ask how much liquid to drink each day and which liquids are best for you  · Eat high-fiber foods  This may help decrease constipation by adding bulk to your bowel movements  High-fiber foods include fruits, vegetables, whole-grain breads and cereals, and beans  Your healthcare provider or dietitian can help you create a high-fiber meal plan  Your provider may also recommend a fiber supplement if you cannot get enough fiber from food  · Exercise regularly  Regular physical activity can help stimulate your intestines  Walking is a good exercise to prevent or relieve constipation  Ask which exercises are best for you  · Schedule a time each day to have a bowel movement  This may help train your body to have regular bowel movements  Bend forward while you are on the toilet to help move the bowel movement out  Sit on the toilet for at least 10 minutes, even if you do not have a bowel movement  Store opioids safely:   · Store opioids where others cannot easily get them  Keep them in a locked cabinet or secure area  Do not  keep them in a purse or other bag you carry with you  A person may be looking for something else and find the opioids  · Make sure opioids are stored out of the reach of children  A child can easily overdose on opioids  Opioids may look like candy to a small child  The best way to dispose of opioids: The laws vary by country and area   In the [place], the best way is to return the opioids through a take-back program  This program is offered by the [employer] Agency ([address] [facility])  The following are options for using the program:  · Take the opioids to a [employer] collection site  The site is often a law enforcement center  Call your local law enforcement center for scheduled take-back days in your area  You will be given information on where to go if the collection site is in a different location  · Take the opioids to an approved pharmacy or hospital   A pharmacy or hospital may be set up as a collection site  You will need to ask if it is a [employer]ER[employer] collection site if you were not directed there  A pharmacy or doctor's office may not be able to take back opioids unless it is a SHERLY site  · Use a mail-back system  This means you are given containers to put the opioids into  You will then mail them in the containers  · Use a take-back drop box  This is a place to leave the opioids at any time  People and animals will not be able to get into the box  Your local law enforcement agency can tell you where to find a drop box in your area  Other safe ways to dispose of opioids: The medicine may come with disposal instructions  The instructions may vary depending on the brand of medicine you are using  Instructions may come in a Medication Guide, but not every medicine has one  You may instead get instructions from your pharmacy or doctor  Follow instructions carefully  The following are general guidelines to follow:  · Find out if you can flush the opioid  Some opioids can be flushed down the toilet or poured into the sink  You will need to contact authorities in your area to see if this is an option for you  The FDA also offers a list of medicines that are safe to flush down the toilet  You can check the list if you cannot get the information for your local area  · Ask your waste management company about rules for putting opioids in the trash  The company will be able to give you specific directions   Scratch out personal information on the original medicine label so it cannot be read  Then put it in the trash  Do not label the trash or put any information on it about the opioids  It should look like regular household trash so no one is tempted to look for the opioids  Keep the trash out of the reach of children and animals  Always make sure trash is secure  · Talk to officials if you live in a facility  If you live in a nursing home or assisted living center, talk to an official  The person will know the rules for your area  Other ways to manage pain:   · Ask your healthcare provider about non-opioid medicines to control pain  Nonprescription medicines include NSAIDs (such as ibuprofen) and acetaminophen  Prescription medicines include muscle relaxers, antidepressants, and steroids  · Pain may be managed without any medicines  Some ways to relieve pain include massage, aromatherapy, or meditation  Physical or occupational therapy may also help  For more information:   · Drug Enforcement Administration  [facility] [facility] [address]  Phone: 8- 394 - [phone]  Web Address: [facility]/drug_[email or web address]/    · [address]  and Drug Administration  [facility] , [address] [place] [address]  Phone: 0- 164 - [phone]  Web Address: http://[employer]/    Follow up with your doctor or pain specialist as directed: You may need to have your dose adjusted  Your doctor or pain specialist can also help you find ways to manage pain without opioids  Write down your questions so you remember to ask them during your visits  © Copyright [employer] 2021 Information is for End User's use only and may not be sold, redistributed or otherwise used for commercial purposes  All illustrations and images included in CareNotes® are the copyrighted property of [employer]  or Yola Cervantes  The above information is an  only  It is not intended as medical advice for individual conditions or treatments   Talk to your doctor, nurse or pharmacist before following any medical regimen to see if it is safe and effective for you

## 2022-01-26 NOTE — PROGRESS NOTES
Assessment:  1  Chronic pain syndrome    2  Lumbar radiculopathy - Right    3  Neuropathy    4  Chronic bilateral low back pain without sciatica    5  Chronic pain of both knees    6  Chronic pain of both shoulders        Plan:  Continue with Butrans patch this was refilled today for 3 months  Patient would like to discontinue his Lyrica as he does not feel he needs it anymore  He was given instruction to cut down to 1 tablet for a week before stopping completely  Patient to follow-up in 12 weeks for medication refills        There are risks associated with opioid medications, including dependence, addiction and tolerance  The patient understands and agrees to use these medications only as prescribed  Potential side effects of the medications include, but are not limited to, constipation, drowsiness, addiction, impaired judgment and risk of fatal overdose if not taken as prescribed  The patient was warned against driving while taking sedation medications  Sharing medications is a felony  At this point in time, the patient is showing no signs of addiction, abuse, diversion or suicidal ideation  [place] Prescription Drug Monitoring Program report was reviewed and was appropriate       History of Present Illness: The patient is a 78 y o  male who presents for a follow up office visit in regards to Back Pain and Leg Pain  The patients current symptoms include improved pain symptoms rated 2/10  His pain is intermittent and described as dull, aching, pressure-like, numbness, and pins and needles  He is status post a right fem-pop bypass on December 8, 2021 with Dr Stephan Coates  He tells me he does have a small area of his incision that is not fully healed yet  Current pain medications includes:  Butrans patch 5 micro g and Lyrica 100 mg twice daily   The patient reports that this regimen is providing 30-40 % pain relief    The patient is reporting no side effects from this pain medication regimen  I have personally reviewed and/or updated the patient's past medical history, past surgical history, family history, social history, current medications, allergies, and vital signs today  Review of Systems  Review of Systems   Constitutional: Negative for fatigue and unexpected weight change  HENT: Negative for dental problem, ear pain, hearing loss and sneezing  Eyes: Negative for visual disturbance  Respiratory: Negative for cough and chest tightness  Cardiovascular: Negative for leg swelling  Gastrointestinal: Positive for constipation  Negative for anal bleeding  Endocrine: Negative for heat intolerance  Genitourinary: Negative for flank pain and genital sores  Musculoskeletal: Positive for arthralgias, gait problem and myalgias  Decreased range of motion  Joint stiffness  Swelling: right leg   Skin: Negative for wound  Allergic/Immunologic: Negative for immunocompromised state  Neurological: Negative for speech difficulty and light-headedness  Hematological: Negative for adenopathy  Psychiatric/Behavioral: Negative for confusion  The patient is not hyperactive  All other systems reviewed and are negative          Past Medical History:   Diagnosis Date    Chronic back pain     Chronic narcotic dependence (HCC)     Chronic pain disorder     Sees pain management Dr Marie Ran    Cigarette smoker     Depression     Dry skin     Dyslexia     Full dentures     upper and no teeth lower/"avoid raw vegetables and chewy food"    GERD (gastroesophageal reflux disease)     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 related    History of blood clots     "left leg" takes Eliquis    History of bronchitis     History of pneumonia     childhood    Increased pressure in the eye, bilateral     L4-L5 disc bulge 06/16/2017    Low back pain     Muscle weakness     Neuropathy     right foot and left foot    Osteoarthritis     Osteoarthritis     and" if becomes cold joint pain worsens"    Pulmonary emphysema ([address] )     "mild"    Right foot drop     Shortness of breath     "only on humid days"    Smokers' cough ([address] )     Wears glasses        Past Surgical History:   Procedure Laterality Date    COLONOSCOPY      EGD AND COLONOSCOPY N/A 6/29/2018    Procedure: EGD AND COLONOSCOPY;  Surgeon: Sasha Brown MD;  [facility];   Service: Gastroenterology    EPIDURAL BLOCK INJECTION      needle    HIP ARTHROSCOPY W/ LABRAL DEBRIDEMENT Left     "hip surgery"    IR LOWER EXTREMITY ANGIOGRAM  12/8/2021    ORTHOPEDIC SURGERY      CT HIP [address] BODY,PLASTY/RESECTN Left 4/30/2018    Procedure: LEFT HIP ARTHROSCOPIC LABRAL DEBRIDEMENT;  Surgeon: Saleem Tracey MD;  [facility];  Service: Orthopedics    CT Antony Cordon 3RD+ ORD SLCTV ABDL PEL/LXTR [facility] Right 12/8/2021    Procedure: COMPLETION ARTERIOGRAM WITH 5 MM ANGIOPLASTY OF MID SUPERFICIAL FEMORAL ARTERY;  Surgeon: Bard Lucas MD;  [facility];  Service: Vascular    CT VEIN IN SITU BYPASS GRAFT,FEM-POP Right 12/8/2021    Procedure: R SFA to BK Pop Bypass using insitu GSV,  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;  Surgeon: Bard Lucas MD;  [facility];  Service: Vascular    TONSILLECTOMY      WISDOM TOOTH EXTRACTION         Family History   Problem Relation Age of Onset    Osteoarthritis Family     Glaucoma Mother     No Known Problems Father        Social History     Occupational History    Not on file   Tobacco Use    Smoking status: Current Every Day Smoker     Packs/day: 0 50     Types: Cigarettes    Smokeless tobacco: Never Used    Tobacco comment: 1/2 a pack a day/plans to stop 3-4 days before surgery   Vaping Use    Vaping Use: Never used   Substance and Sexual Activity    Alcohol use: No    Drug use: No    Sexual activity: Not on file     Comment: defer         Current Outpatient Medications:     acetaminophen (TYLENOL) 500 mg tablet, Take 1,000 mg by mouth every 12 (twelve) hours as needed for mild pain, Disp: , Rfl:     apixaban (ELIQUIS) 5 mg, Take 1 tablet (5 mg total) by mouth 2 (two) times a day, Disp: 60 tablet, Rfl: 2    ascorbic Acid (VITAMIN C) 500 MG CPCR, Take 500 mg by mouth daily, Disp: , Rfl:     aspirin (ECOTRIN LOW STRENGTH) 81 mg EC tablet, Take 1 tablet (81 mg total) by mouth daily, Disp: 90 tablet, Rfl: 0    atorvastatin (LIPITOR) 10 mg tablet, Take 0 5 tablets (5 mg total) by mouth daily at bedtime, Disp: 45 tablet, Rfl: 1    cholecalciferol (VITAMIN D3) 1,000 units tablet, Take 2,000 Units by mouth 2 (two) times a day , Disp: , Rfl:     clotrimazole (LOTRIMIN) 1 % cream, Apply 1 g (1 application total) topically daily To affected area (Patient taking differently: Apply 1 application topically as needed To affected area ), Disp: 60 g, Rfl: 2    Elastic Bandages & Supports (KNEE BRACE/HINGED/REGULAR) MISC, by Does not apply route daily as needed (B/L knee pain) B/L knee braces, Disp: 2 each, Rfl: 0    Flaxseed, Linseed, (FLAX SEED OIL PO), Take 1,200 capsules by mouth 2 (two) times a day  , Disp: , Rfl:     METHYLCOBALAMIN SL, Place 1,000 mcg under the tongue 2 (two) times a day , Disp: , Rfl:     Misc Natural Products (Osteo Bi-Flex Triple Strength) TABS, Take by mouth 2 (two) times a day  , Disp: , Rfl:     pregabalin (LYRICA) 100 mg capsule, Take 1 capsule (100 mg total) by mouth 2 (two) times a day, Disp: 60 capsule, Rfl: 2    pyridoxine (B-6) 100 MG tablet, Take 100 mg by mouth daily, Disp: , Rfl:     transdermal buprenorphine (BUTRANS) 5 mcg/hr TD patch, Place 1 patch on the skin once a week, Disp: 4 patch, Rfl: 2    cephalexin (KEFLEX) 500 mg capsule, Take 500 mg by mouth 2 (two) times a day (Patient not taking: Reported on 1/20/2022 ), Disp: , Rfl:     Allergies   Allergen Reactions    Cyanocobalamin [Vitamin B12] GI Intolerance    Gabapentin Other (See Comments) and GI Intolerance     Difficulty walking    Morphine Other (See Comments)     "acute constipation"    Tramadol Confusion    Nsaids Other (See Comments)     Pt reports avoids as is on Eliquis       Physical Exam:    /69   Pulse 87   Ht 6' 3" (1 905 m)   Wt 88 6 kg (195 lb 6 4 oz)   BMI 24 42 kg/m²     Constitutional:normal, well developed, well nourished, alert, in no distress and non-toxic and no overt pain behavior  Eyes:anicteric  HEENT:grossly intact  Neck:supple, symmetric, trachea midline and no masses   Pulmonary:even and unlabored  Cardiovascular:No edema or pitting edema present  Skin:Normal without rashes or lesions and well hydrated  Psychiatric:Mood and affect appropriate  Neurologic:Cranial Nerves II-XII grossly intact  Musculoskeletal:ambulates with cane    Imaging  No orders to display       No orders of the defined types were placed in this encounter

## 2022-01-28 ENCOUNTER — TELEPHONE (OUTPATIENT)
Dept: VASCULAR SURGERY | Facility: CLINIC | Age: 67
End: 2022-01-28

## 2022-02-14 DIAGNOSIS — E78.2 MIXED HYPERLIPIDEMIA: ICD-10-CM

## 2022-02-15 RX ORDER — ATORVASTATIN CALCIUM 10 MG/1
5 TABLET, FILM COATED ORAL
Qty: 45 TABLET | Refills: 1 | Status: SHIPPED | OUTPATIENT
Start: 2022-02-15

## 2022-03-14 DIAGNOSIS — I82.412 DEEP VEIN THROMBOSIS (DVT) OF FEMORAL VEIN OF LEFT LOWER EXTREMITY, UNSPECIFIED CHRONICITY (HCC): ICD-10-CM

## 2022-03-23 ENCOUNTER — HOSPITAL ENCOUNTER (OUTPATIENT)
Dept: NON INVASIVE DIAGNOSTICS | Facility: CLINIC | Age: 67
Discharge: HOME | End: 2022-03-23
Payer: COMMERCIAL

## 2022-03-23 DIAGNOSIS — I72.4 ANEURYSM OF RIGHT POPLITEAL ARTERY (HCC): Chronic | ICD-10-CM

## 2022-03-23 PROCEDURE — 93926 LOWER EXTREMITY STUDY: CPT

## 2022-03-24 PROCEDURE — 93922 UPR/L XTREMITY ART 2 LEVELS: CPT | Performed by: SURGERY

## 2022-03-24 PROCEDURE — 93926 LOWER EXTREMITY STUDY: CPT | Performed by: SURGERY

## 2022-04-14 ENCOUNTER — OFFICE VISIT (OUTPATIENT)
Dept: VASCULAR SURGERY | Facility: CLINIC | Age: 67
End: 2022-04-14
Payer: COMMERCIAL

## 2022-04-14 VITALS
HEART RATE: 78 BPM | WEIGHT: 188.2 LBS | DIASTOLIC BLOOD PRESSURE: 70 MMHG | OXYGEN SATURATION: 94 % | HEIGHT: 75 IN | BODY MASS INDEX: 23.4 KG/M2 | SYSTOLIC BLOOD PRESSURE: 90 MMHG

## 2022-04-14 DIAGNOSIS — I72.4 ANEURYSM OF RIGHT POPLITEAL ARTERY (HCC): Primary | Chronic | ICD-10-CM

## 2022-04-14 DIAGNOSIS — I70.202 ATHEROSCLEROSIS OF ARTERY OF LEFT LOWER EXTREMITY (HCC): Chronic | ICD-10-CM

## 2022-04-14 PROCEDURE — 3008F BODY MASS INDEX DOCD: CPT | Performed by: SURGERY

## 2022-04-14 PROCEDURE — 99214 OFFICE O/P EST MOD 30 MIN: CPT | Performed by: SURGERY

## 2022-04-14 PROCEDURE — 4004F PT TOBACCO SCREEN RCVD TLK: CPT | Performed by: SURGERY

## 2022-04-14 PROCEDURE — 1160F RVW MEDS BY RX/DR IN RCRD: CPT | Performed by: SURGERY

## 2022-04-14 NOTE — PROGRESS NOTES
Assessment/Plan:    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HCC)  Right popliteal artery aneurysm now status post femoral-popliteal artery bypass with aneurysm ligation  He is doing very well in this regard  We discussed the findings on recent duplex evaluation which showed wide patency of the bypass graft and some mild proximal superficial femoral artery stenosis  At this time no further workup or intervention is necessary other than standard follow-up  He will be scheduled for 3 month follow-up duplex  Atherosclerosis of artery of left lower extremity (HCC)  Atherosclerotic occlusive disease left lower extremity with chronic superficial femoral artery occlusion  This remains relatively asymptomatic  No further workup will be pursued other than standard follow-up which will be performed at the same setting as his follow-up for his right leg bypass  Diagnoses and all orders for this visit:    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HCC)  -     VAS lower limb arterial duplex, complete bilateral; Future    Atherosclerosis of artery of left lower extremity (HCC)          Subjective:      Patient ID: Ar Rios is a 78 y o  male  Patient is here today to review results of DAO done 3/23/2022  Pt is s/p a right SFA to BK popliteal bypass using insitu GSV with ligation of popliteal artery aneurysm done 12/18/2021  Pt denies any pain  He c/o tightness in his right shin  Pt is taking ASA 81 mg, Eliquis and Atorvastatin  Pt is a current smoker  65-year-old with history of right popliteal artery aneurysm treated with femoral-popliteal artery bypass with greater saphenous vein graft on 12/08/2021  He also had some proximal superficial femoral artery stenosis which was treated with balloon angioplasty  On follow-up he had slow healing of the superior aspect of his below-knee incision  On evaluation today he states he is doing well with healing of all wounds    He notes no claudication symptoms in either lower extremity  Arterial duplex 03/23/2022  The right femoral-popliteal bypass is widely patent  There is a mild/moderate proximal superficial femoral artery stenosis with flow velocity 152 centimeters/second  Ankle-brachial index of 1 24 and toe pressure of 99  Left ankle-brachial index of 0 68 with toe pressure 39, prior ankle-brachial index 0 44  He has a known superficial femoral artery occlusion  The following portions of the patient's history were reviewed and updated as appropriate: allergies, current medications, past family history, past medical history, past social history, past surgical history and problem list     Past Medical History:  Past Medical History:   Diagnosis Date    Chronic back pain     Chronic narcotic dependence ([address] )     Chronic pain disorder     Sees pain management Dr Tiffany Venegas    Cigarette smoker     Depression     Dry skin     Dyslexia     Full dentures     upper and no teeth lower/"avoid raw vegetables and chewy food"    GERD (gastroesophageal reflux disease)     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 related    History of blood clots     "left leg" takes Eliquis    History of bronchitis     History of pneumonia     childhood    Increased pressure in the eye, bilateral     L4-L5 disc bulge 06/16/2017    Low back pain     Muscle weakness     Neuropathy     right foot and left foot    Osteoarthritis     Osteoarthritis     and" if becomes cold joint pain worsens"    Pulmonary emphysema (HCC)     "mild"    Right foot drop     Shortness of breath     "only on humid days"    Smokers' cough ([address] )     Wears glasses        Past Surgical History:  Past Surgical History:   Procedure Laterality Date    COLONOSCOPY      EGD AND COLONOSCOPY N/A 6/29/2018    Procedure: EGD AND COLONOSCOPY;  Surgeon: Donna Lozoya MD;  Location: [facility];   Service: Gastroenterology    EPIDURAL BLOCK INJECTION      needle    HIP ARTHROSCOPY W/ LABRAL DEBRIDEMENT Left     "hip surgery"  IR LOWER EXTREMITY ANGIOGRAM  12/8/2021    ORTHOPEDIC SURGERY      MT HIP SCOPE/REMV BODY,PLASTY/RESECTN Left 4/30/2018    Procedure: LEFT HIP ARTHROSCOPIC LABRAL DEBRIDEMENT;  Surgeon: Italia Thmoason MD;  [facility];  Service: Orthopedics    MT Slade Cotton 3RD+ ORD SLCTV ABDL PEL/LXTR [facility] Right 12/8/2021    Procedure: COMPLETION ARTERIOGRAM WITH 5 MM ANGIOPLASTY OF MID SUPERFICIAL FEMORAL ARTERY;  Surgeon: Yehuda Bautista MD;  [facility];  Service: Vascular    MT VEIN IN SITU BYPASS GRAFT,FEM-POP Right 12/8/2021    Procedure: R SFA to BK Pop Bypass using insitu GSV,  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;  Surgeon: Yehuda Bautista MD;  [facility];  Service: Vascular    TONSILLECTOMY      WISDOM TOOTH EXTRACTION         Social History:  Social History     Substance and Sexual Activity   Alcohol Use No     Social History     Substance and Sexual Activity   Drug Use No     Social History     Tobacco Use   Smoking Status Current Every Day Smoker    Packs/day: 0 50    Types: Cigarettes   Smokeless Tobacco Never Used   Tobacco Comment    1/2 a pack a day/plans to stop 3-4 days before surgery       Family History:  Family History   Problem Relation Age of Onset    Osteoarthritis Family     Glaucoma Mother     No Known Problems Father        Allergies:   Allergies   Allergen Reactions    Cyanocobalamin [Vitamin B12] GI Intolerance    Gabapentin Other (See Comments) and GI Intolerance     Difficulty walking    Morphine Other (See Comments)     "acute constipation"    Tramadol Confusion    Nsaids Other (See Comments)     Pt reports avoids as is on Eliquis       Medications:    Current Outpatient Medications:     acetaminophen (TYLENOL) 500 mg tablet, Take 1,000 mg by mouth every 12 (twelve) hours as needed for mild pain, Disp: , Rfl:     apixaban (ELIQUIS) 5 mg, Take 1 tablet (5 mg total) by mouth 2 (two) times a day, Disp: 60 tablet, Rfl: 2    ascorbic Acid (VITAMIN C) 500 MG CPCR, Take 500 mg by mouth daily, Disp: , Rfl:     aspirin (ECOTRIN LOW STRENGTH) 81 mg EC tablet, Take 1 tablet (81 mg total) by mouth daily, Disp: 90 tablet, Rfl: 0    atorvastatin (LIPITOR) 10 mg tablet, Take 0 5 tablets (5 mg total) by mouth daily at bedtime, Disp: 45 tablet, Rfl: 1    cholecalciferol (VITAMIN D3) 1,000 units tablet, Take 2,000 Units by mouth 2 (two) times a day , Disp: , Rfl:     Elastic Bandages & Supports (KNEE BRACE/HINGED/REGULAR) MISC, by Does not apply route daily as needed (B/L knee pain) B/L knee braces, Disp: 2 each, Rfl: 0    Flaxseed, Linseed, (FLAX SEED OIL PO), Take 1,200 capsules by mouth 2 (two) times a day  , Disp: , Rfl:     METHYLCOBALAMIN SL, Place 1,000 mcg under the tongue 2 (two) times a day , Disp: , Rfl:     Misc Natural Products (Osteo Bi-Flex Triple Strength) TABS, Take by mouth 2 (two) times a day  , Disp: , Rfl:     pregabalin (LYRICA) 100 mg capsule, Take 1 capsule (100 mg total) by mouth 2 (two) times a day, Disp: 60 capsule, Rfl: 2    pyridoxine (B-6) 100 MG tablet, Take 100 mg by mouth daily, Disp: , Rfl:     transdermal buprenorphine (BUTRANS) 5 mcg/hr TD patch, Place 1 patch on the skin once a week, Disp: 4 patch, Rfl: 2    cephalexin (KEFLEX) 500 mg capsule, Take 500 mg by mouth 2 (two) times a day (Patient not taking: Reported on 1/20/2022 ), Disp: , Rfl:     clotrimazole (LOTRIMIN) 1 % cream, Apply 1 g (1 application total) topically daily To affected area (Patient not taking: Reported on 4/14/2022 ), Disp: 60 g, Rfl: 2    Vitals:  BP 90/70 (04/14/22 1119)    Temp      Pulse 78 (04/14/22 1119)   Resp      SpO2 94 % (04/14/22 1119)      Lab Results and Cultures:   CBC with diff:   Lab Results   Component Value Date    WBC 10 70 (H) 12/12/2021    HGB 12 7 12/12/2021    HCT 37 8 12/12/2021    MCV 95 12/12/2021     12/12/2021    MCH 31 9 12/12/2021    MCHC 33 6 12/12/2021    RDW 13 7 12/12/2021    MPV 9 7 12/12/2021    NRBC 0 12/12/2021   , BMP/CMP:  Lab Results   Component Value Date    K 3 7 12/12/2021    K 4 5 11/17/2020     (H) 12/12/2021     11/17/2020    CO2 27 12/12/2021    CO2 26 11/17/2020    BUN 12 12/12/2021    BUN 18 11/17/2020    CREATININE 0 77 12/12/2021    CALCIUM 8 5 12/12/2021    CALCIUM 9 6 11/17/2020    AST 18 11/04/2021    AST 21 11/17/2020    ALT 29 11/04/2021    ALT 19 11/17/2020    ALKPHOS 62 11/04/2021    ALKPHOS 58 11/17/2020    EGFR 94 12/12/2021   ,   Lipid Panel: No results found for: CHOL,   Coags:   Lab Results   Component Value Date    PTT 48 (H) 11/04/2021    INR 1 09 12/09/2021   ,       Review of Systems   Constitutional: Negative  HENT: Negative  Eyes: Positive for visual disturbance  Respiratory: Negative  Cardiovascular: Negative  Gastrointestinal: Negative  Endocrine: Negative  Genitourinary: Negative  Musculoskeletal: Positive for arthralgias, back pain and joint swelling  Skin: Negative  Allergic/Immunologic: Negative  Neurological: Negative  Hematological: Negative  Psychiatric/Behavioral: Negative  Objective:      BP 90/70 (BP Location: Left arm, Patient Position: Sitting, Cuff Size: Standard)   Pulse 78   Ht 6' 3" (1 905 m)   Wt 85 4 kg (188 lb 3 2 oz)   SpO2 94%   BMI 23 52 kg/m²          Physical Exam  Constitutional:       Appearance: Normal appearance  Cardiovascular:      Rate and Rhythm: Normal rate  Pulses:           Popliteal pulses are 0 on the left side  Dorsalis pedis pulses are 0 on the left side  Posterior tibial pulses are 0 on the left side  Comments: On the right there is an easily palpable graft pulse  Musculoskeletal:         General: No tenderness or deformity  Normal range of motion  Skin:     General: Skin is warm and dry  Comments: All surgical incisions on the right leg are well healed  Neurological:      General: No focal deficit present        Mental Status: He is alert and oriented to person, place, and time  Gait: Gait abnormal (Uses a cane)     Psychiatric:         Mood and Affect: Mood normal          Behavior: Behavior normal

## 2022-04-14 NOTE — ASSESSMENT & PLAN NOTE
Atherosclerotic occlusive disease left lower extremity with chronic superficial femoral artery occlusion  This remains relatively asymptomatic  No further workup will be pursued other than standard follow-up which will be performed at the same setting as his follow-up for his right leg bypass

## 2022-04-14 NOTE — LETTER
April 14, 2022     [facility], [address] [facility]    Patient: Papa Whitman   YOB: 1955   Date of Visit: 4/14/2022       Dear Dr Nava Valladares:    Thank you for referring Geoff Saab to me for evaluation  Below are the relevant portions of my assessment and plan of care  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HCC)  Right popliteal artery aneurysm now status post femoral-popliteal artery bypass with aneurysm ligation  He is doing very well in this regard  We discussed the findings on recent duplex evaluation which showed wide patency of the bypass graft and some mild proximal superficial femoral artery stenosis  At this time no further workup or intervention is necessary other than standard follow-up  He will be scheduled for 3 month follow-up duplex  Atherosclerosis of artery of left lower extremity (HCC)  Atherosclerotic occlusive disease left lower extremity with chronic superficial femoral artery occlusion  This remains relatively asymptomatic  No further workup will be pursued other than standard follow-up which will be performed at the same setting as his follow-up for his right leg bypass  If you have questions, please do not hesitate to call me  I look forward to following Nicolas Sanderson along with you           Sincerely,        Nanci Wright MD        CC: No Recipients

## 2022-04-14 NOTE — PATIENT INSTRUCTIONS
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HCC)  Right popliteal artery aneurysm now status post femoral-popliteal artery bypass with aneurysm ligation  He is doing very well in this regard  We discussed the findings on recent duplex evaluation which showed wide patency of the bypass graft and some mild proximal superficial femoral artery stenosis  At this time no further workup or intervention is necessary other than standard follow-up  He will be scheduled for 3 month follow-up duplex  Atherosclerosis of artery of left lower extremity (HCC)  Atherosclerotic occlusive disease left lower extremity with chronic superficial femoral artery occlusion  This remains relatively asymptomatic  No further workup will be pursued other than standard follow-up which will be performed at the same setting as his follow-up for his right leg bypass

## 2022-04-14 NOTE — ASSESSMENT & PLAN NOTE
Right popliteal artery aneurysm now status post femoral-popliteal artery bypass with aneurysm ligation  He is doing very well in this regard  We discussed the findings on recent duplex evaluation which showed wide patency of the bypass graft and some mild proximal superficial femoral artery stenosis  At this time no further workup or intervention is necessary other than standard follow-up  He will be scheduled for 3 month follow-up duplex

## 2022-05-02 ENCOUNTER — OFFICE VISIT (OUTPATIENT)
Dept: PAIN MEDICINE | Facility: MEDICAL CENTER | Age: 67
End: 2022-05-02
Payer: COMMERCIAL

## 2022-05-02 VITALS
WEIGHT: 186.8 LBS | HEART RATE: 83 BPM | SYSTOLIC BLOOD PRESSURE: 126 MMHG | BODY MASS INDEX: 23.23 KG/M2 | HEIGHT: 75 IN | DIASTOLIC BLOOD PRESSURE: 81 MMHG

## 2022-05-02 DIAGNOSIS — Z79.891 LONG-TERM CURRENT USE OF OPIATE ANALGESIC: ICD-10-CM

## 2022-05-02 DIAGNOSIS — G89.29 CHRONIC PAIN OF BOTH KNEES: ICD-10-CM

## 2022-05-02 DIAGNOSIS — G62.9 NEUROPATHY: ICD-10-CM

## 2022-05-02 DIAGNOSIS — M25.512 CHRONIC PAIN OF BOTH SHOULDERS: ICD-10-CM

## 2022-05-02 DIAGNOSIS — F11.20 UNCOMPLICATED OPIOID DEPENDENCE (HCC): ICD-10-CM

## 2022-05-02 DIAGNOSIS — G89.29 CHRONIC PAIN OF BOTH SHOULDERS: ICD-10-CM

## 2022-05-02 DIAGNOSIS — G89.29 CHRONIC BILATERAL LOW BACK PAIN WITHOUT SCIATICA: ICD-10-CM

## 2022-05-02 DIAGNOSIS — M25.562 CHRONIC PAIN OF BOTH KNEES: ICD-10-CM

## 2022-05-02 DIAGNOSIS — M25.511 CHRONIC PAIN OF BOTH SHOULDERS: ICD-10-CM

## 2022-05-02 DIAGNOSIS — M54.50 CHRONIC BILATERAL LOW BACK PAIN WITHOUT SCIATICA: ICD-10-CM

## 2022-05-02 DIAGNOSIS — M54.16 LUMBAR RADICULOPATHY: ICD-10-CM

## 2022-05-02 DIAGNOSIS — G89.4 CHRONIC PAIN SYNDROME: Primary | ICD-10-CM

## 2022-05-02 DIAGNOSIS — M25.561 CHRONIC PAIN OF BOTH KNEES: ICD-10-CM

## 2022-05-02 PROCEDURE — 99214 OFFICE O/P EST MOD 30 MIN: CPT | Performed by: NURSE PRACTITIONER

## 2022-05-02 PROCEDURE — 4004F PT TOBACCO SCREEN RCVD TLK: CPT | Performed by: NURSE PRACTITIONER

## 2022-05-02 PROCEDURE — 3008F BODY MASS INDEX DOCD: CPT | Performed by: NURSE PRACTITIONER

## 2022-05-02 PROCEDURE — 1160F RVW MEDS BY RX/DR IN RCRD: CPT | Performed by: NURSE PRACTITIONER

## 2022-05-02 PROCEDURE — 80305 DRUG TEST PRSMV DIR OPT OBS: CPT | Performed by: NURSE PRACTITIONER

## 2022-05-02 RX ORDER — BUPRENORPHINE 5 UG/H
1 PATCH TRANSDERMAL WEEKLY
Qty: 4 PATCH | Refills: 2 | Status: SHIPPED | OUTPATIENT
Start: 2022-05-02 | End: 2022-08-02 | Stop reason: SDUPTHER

## 2022-05-02 NOTE — PROGRESS NOTES
Assessment  1  Chronic pain syndrome    2  Long-term current use of opiate analgesic    3  Lumbar radiculopathy - Right    4  Neuropathy    5  Chronic bilateral low back pain without sciatica    6  Chronic pain of both knees    7  Chronic pain of both shoulders        Plan  Continue with Butrans patch as prescribed this was refilled today for the next 3 months  While the patient was in the office today an opioid contract was thoroughly reviewed and signed by the patient  The patient was given adequate time to ask questions in regards to the contract and a signed copy was sent home for his/her records  Follow-up visit in 3 months for medication refills        There are risks associated with opioid medications, including dependence, addiction and tolerance  The patient understands and agrees to use these medications only as prescribed  Potential side effects of the medications include, but are not limited to, constipation, drowsiness, addiction, impaired judgment and risk of fatal overdose if not taken as prescribed  The patient was warned against driving while taking sedation medications  Sharing medications is a felony  At this point in time, the patient is showing no signs of addiction, abuse, diversion or suicidal ideation  A urine drug screen was collected at today's office visit as part of our medication management protocol  The point of care testing results were appropriate for what was being prescribed  The specimen will be sent for confirmatory testing  The drug screen is medically necessary because the patient is either dependent on opioid medication or is being considered for opioid medication therapy and the results could impact ongoing or future treatment  The drug screen is to evaluate for the presences or absence of prescribed, non-prescribed, and/or illicit drugs/substances      [place] Prescription Drug Monitoring Program report was reviewed and was appropriate         My impressions and treatment recommendations were discussed in detail with the patient who verbalized understanding and had no further questions  Discharge instructions were provided  I personally saw and examined the patient and I agree with the above discussed plan of care  Orders Placed This Encounter   Procedures    MM ALL_Prescribed Meds and Special Instructions     Order Specific Question:   [place] Is ATORVASTATIN prescribed? Answer:   Yes     Order Specific Question:   [place] Is PREGABALIN Prescribed? Answer:   Yes     Order Specific Question:   [place] Is BUPRENORPHINE Prescribed? Answer:    Yes    MM DT_Alprazolam Definitive Test    MM DT_Amphetamine Definitive Test    MM DT_Aripiprazole Definitive Test    MM DT_Buprenorphine Definitive Test    MM DT_Butalbital Definitive Test    MM DT_Clozapine Definitive Test    MM DT_Clonazepam Definitive Test    MM DT_Cocaine Definitive Test    MM DT_Desipramine Definitive Test    MM DT_Dextromethorphan Definitive Test    MM Diazepam Definitive Test    MM DT_Ethyl Glucuronide/Ethyl Sulfate Definitive Test    MM DT_Fentanyl Definitive Test    MM DT_Heroin Definitive Test    MM DT_Hydrocodone Definitive Test    MM DT_Hydromorphone Definitive Test    MM DT_Kratom Definitive Test    MM DT_Levorphanol Definitive Test    MM DT_MDMA Definitive Test    MM DT_Meperidine Definitive Test    MM DT_Methadone Definitive Test    MM DT_Methamphetaine-d/l Isomers Definitive    MM DT_Morphine Confirm Positive    MM Lorazepam Definitive Test    MM DT_Olanzapine Definitive Test    MM DT_Oxycodone Definitive Test    MM DT_Oxymorphone Definitive Test    MM DT_Phentermine Definitive Test    MM DT_Quetiapine Definitive Test    MM DT_Risperidone Definitive Test    MM DT_Pregablin Definitive    MM DT_Tapentadol Definitive Test    MM DT_Temazapam Definitive Test    MM DT_THC Definitive Test    MM DT_Tramadol Definitive Test    MM DT_Validity Creatinine  MM DT_Validity Oxidant    MM DT_Validity pH    MM DT_Validity Specific     New Medications Ordered This Visit   Medications    transdermal buprenorphine (BUTRANS) 5 mcg/hr TD patch     Sig: Place 1 patch on the skin once a week     Dispense:  4 patch     Refill:  2       History of Present Illness    Christine Ramos is a 78 y o  male presents for follow-up related to his chronic low back and leg pain symptoms  Today he rates his pain 2 to 3/10 which is intermittent and bothersome in the morning in the evening  He describes the pain as dull, aching, pressure-like, shooting, numbness, and pins and needles  Patient is using a Butrans patch 5 micro g weekly with 20-30% relief which he feels is adequate and no side effects or issues  He also uses Tylenol as needed  I have personally reviewed and/or updated the patient's past medical history, past surgical history, family history, social history, current medications, allergies, and vital signs today  Review of Systems   Gastrointestinal: Positive for constipation  Musculoskeletal: Positive for arthralgias, back pain and gait problem  All other systems reviewed and are negative        Patient Active Problem List   Diagnosis    Lumbar radiculopathy - Right    Bone lesion    Neuropathy    Polyarthropathy    Tobacco abuse    Work related injury    Colon cancer screening    Right foot drop    Lumbar spondylosis - Bilateral    Vitamin D deficiency    Thoracic spine pain    Chronic pain syndrome    Uncomplicated opioid dependence ([address] )    Chronic pain of both knees    Chronic pain of both shoulders    Chronic pain of left ankle    Multiple subsolid lung nodules greater than 6 mm in diameter    Mixed hyperlipidemia    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HCC)    Deep vein thrombosis (DVT) of femoral vein of left lower extremity (HCC)    S/P femoral-popliteal bypass surgery    Cellulitis of right lower extremity    Atherosclerosis of artery of left lower extremity (HCC)       Past Medical History:   Diagnosis Date    Chronic back pain     Chronic narcotic dependence (HCC)     Chronic pain disorder     Sees pain management Dr Duane Course    Cigarette smoker     Depression     Dry skin     Dyslexia     Full dentures     upper and no teeth lower/"avoid raw vegetables and chewy food"    GERD (gastroesophageal reflux disease)     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 related    History of blood clots     "left leg" takes Eliquis    History of bronchitis     History of pneumonia     childhood    Increased pressure in the eye, bilateral     L4-L5 disc bulge 06/16/2017    Low back pain     Muscle weakness     Neuropathy     right foot and left foot    Osteoarthritis     Osteoarthritis     and" if becomes cold joint pain worsens"    Pulmonary emphysema (HCC)     "mild"    Right foot drop     Shortness of breath     "only on humid days"    Smokers' cough ([address] )     Wears glasses        Past Surgical History:   Procedure Laterality Date    COLONOSCOPY      EGD AND COLONOSCOPY N/A 6/29/2018    Procedure: EGD AND COLONOSCOPY;  Surgeon: Omar Fontana MD;  Location: [facility] GI LAB;   Service: Gastroenterology    EPIDURAL BLOCK INJECTION      needle    HIP ARTHROSCOPY W/ LABRAL DEBRIDEMENT Left     "hip surgery"    IR LOWER EXTREMITY ANGIOGRAM  12/8/2021    ORTHOPEDIC SURGERY      MD HIP [address] BODY,PLASTY/RESECTN Left 4/30/2018    Procedure: LEFT HIP ARTHROSCOPIC LABRAL DEBRIDEMENT;  Surgeon: Alberta Reyes MD;  [facility];  Service: Orthopedics    MD Vincent Saldivar 3RD+ ORD SLCTV ABDL PEL/[facility] Right 12/8/2021    Procedure: COMPLETION ARTERIOGRAM WITH 5 MM ANGIOPLASTY OF MID SUPERFICIAL FEMORAL ARTERY;  Surgeon: Porter Russell MD;  [facility];  Service: Vascular    MD VEIN IN SITU BYPASS GRAFT,FEM-POP Right 12/8/2021    Procedure: R SFA to BK Pop Bypass using insitu GSV,  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;  Surgeon: Marquis Baig MD;  [facility];  Service: Vascular    TONSILLECTOMY      WISDOM TOOTH EXTRACTION         Family History   Problem Relation Age of Onset    Osteoarthritis Family     Glaucoma Mother     No Known Problems Father        Social History     Occupational History    Not on file   Tobacco Use    Smoking status: Current Every Day Smoker     Packs/day: 0 50     Types: Cigarettes    Smokeless tobacco: Never Used    Tobacco comment: 1/2 a pack a day/plans to stop 3-4 days before surgery   Vaping Use    Vaping Use: Never used   Substance and Sexual Activity    Alcohol use: No    Drug use: No    Sexual activity: Not on file     Comment: defer       Current Outpatient Medications on File Prior to Visit   Medication Sig    acetaminophen (TYLENOL) 500 mg tablet Take 1,000 mg by mouth every 12 (twelve) hours as needed for mild pain    apixaban (ELIQUIS) 5 mg Take 1 tablet (5 mg total) by mouth 2 (two) times a day    ascorbic Acid (VITAMIN C) 500 MG CPCR Take 500 mg by mouth daily    aspirin (ECOTRIN LOW STRENGTH) 81 mg EC tablet Take 1 tablet (81 mg total) by mouth daily    atorvastatin (LIPITOR) 10 mg tablet Take 0 5 tablets (5 mg total) by mouth daily at bedtime    cholecalciferol (VITAMIN D3) 1,000 units tablet Take 2,000 Units by mouth 2 (two) times a day     clotrimazole (LOTRIMIN) 1 % cream Apply 1 g (1 application total) topically daily To affected area    Flaxseed, Linseed, (FLAX SEED OIL PO) Take 1,200 capsules by mouth 2 (two) times a day      METHYLCOBALAMIN SL Place 1,000 mcg under the tongue 2 (two) times a day     Misc Natural Products (Osteo Bi-Flex Triple Strength) TABS Take by mouth 2 (two) times a day      pregabalin (LYRICA) 100 mg capsule Take 1 capsule (100 mg total) by mouth 2 (two) times a day    pyridoxine (B-6) 100 MG tablet Take 100 mg by mouth daily    [DISCONTINUED] transdermal buprenorphine (BUTRANS) 5 mcg/hr TD patch Place 1 patch on the skin once a week    cephalexin (KEFLEX) 500 mg capsule Take 500 mg by mouth 2 (two) times a day (Patient not taking: Reported on 1/20/2022 )    Elastic Bandages & Supports (KNEE BRACE/HINGED/REGULAR) MISC by Does not apply route daily as needed (B/L knee pain) B/L knee braces     No current facility-administered medications on file prior to visit  Allergies   Allergen Reactions    Cyanocobalamin [Vitamin B12] GI Intolerance    Gabapentin Other (See Comments) and GI Intolerance     Difficulty walking    Morphine Other (See Comments)     "acute constipation"    Tramadol Confusion    Nsaids Other (See Comments)     Pt reports avoids as is on Eliquis       Physical Exam    /81   Pulse 83   Ht 6' 3" (1 905 m)   Wt 84 7 kg (186 lb 12 8 oz)   BMI 23 35 kg/m²     Constitutional: normal, well developed, well nourished, alert, in no distress and non-toxic and no overt pain behavior    Eyes: anicteric  HEENT: grossly intact  Neck: supple, symmetric, trachea midline and no masses   Pulmonary:even and unlabored  Cardiovascular:No edema or pitting edema present  Skin:Normal without rashes or lesions and well hydrated  Psychiatric:Mood and affect appropriate  Neurologic:Cranial Nerves II-XII grossly intact  Musculoskeletal:ambulates with cane    Imaging

## 2022-05-02 NOTE — PATIENT INSTRUCTIONS
Opioid Safety   AMBULATORY CARE:   Opioid safety  includes the correct use, storage, and disposal of opioids  Examples of opioid medicines to treat pain include oxycodone, morphine, fentanyl, and codeine  Call your local emergency number (911 in the [address]), or have someone else call if:   · You have a seizure  · You cannot be woken  · You have trouble staying awake and your breathing is slow or shallow  · Your speech is slurred, or you are confused  · You are dizzy or stumble when you walk  Call your doctor, or have someone close to you call if:   · You are extremely drowsy, or you have trouble staying awake or speaking  · You have pale or clammy skin  · You have blue fingernails or lips  · Your heartbeat is slower than normal     · You cannot stop vomiting  · You have questions or concerns about your condition or care  Use opioids safely:   · Take prescribed opioids exactly as directed  Opioids come with directions based on the kind of opioid and how it is given  Do not take more than the recommended amount, or for longer than needed  · Do not give opioids to others or take opioids that belong to someone else  Misuse of opioids can lead to an addiction or overdose  · Do not mix opioids with other medicines or alcohol  The combination can cause an overdose, or lead to a coma  · Do not drive or operate heavy machinery after you take the opioid  Your provider or pharmacist can tell you how long to wait after a dose before you do these activities  · Talk to your healthcare provider if you have any side effects  He or she can help you prevent or relieve side effects  Side effects include nausea, sleepiness, itching, and trouble thinking clearly  Manage constipation:  Constipation is the most common side effect of opioid medicine  Constipation is when you have hard, dry bowel movements, or you go longer than usual between bowel movements   Tell your healthcare provider about all changes in your bowel movements while you are taking opioids  He or she may recommend laxative medicine to help you have a bowel movement  He or she may also change the kind of opioid you are taking, or change when you take it  The following are more ways you can prevent or relieve constipation:  · Drink liquids as directed  You may need to drink extra liquids to help soften and move your bowels  Ask how much liquid to drink each day and which liquids are best for you  · Eat high-fiber foods  This may help decrease constipation by adding bulk to your bowel movements  High-fiber foods include fruits, vegetables, whole-grain breads and cereals, and beans  Your healthcare provider or dietitian can help you create a high-fiber meal plan  Your provider may also recommend a fiber supplement if you cannot get enough fiber from food  · Exercise regularly  Regular physical activity can help stimulate your intestines  Walking is a good exercise to prevent or relieve constipation  Ask which exercises are best for you  · Schedule a time each day to have a bowel movement  This may help train your body to have regular bowel movements  Bend forward while you are on the toilet to help move the bowel movement out  Sit on the toilet for at least 10 minutes, even if you do not have a bowel movement  Store opioids safely:   · Store opioids where others cannot easily get them  Keep them in a locked cabinet or secure area  Do not  keep them in a purse or other bag you carry with you  A person may be looking for something else and find the opioids  · Make sure opioids are stored out of the reach of children  A child can easily overdose on opioids  Opioids may look like candy to a small child  The best way to dispose of opioids: The laws vary by country and area   In the [place], the best way is to return the opioids through a take-back program  This program is offered by the [employer] ([address] [facility] [address])  The following are options for using the program:  · Take the opioids to a [facility] collection site  The site is often a law enforcement center  Call your local law enforcement center for scheduled take-back days in your area  You will be given information on where to go if the collection site is in a different location  · Take the opioids to an approved pharmacy or hospital   A pharmacy or hospital may be set up as a collection site  You will need to ask if it is a [employer] collection site if you were not directed there  A pharmacy or doctor's office may not be able to take back opioids unless it is a [employer] site  · Use a mail-back system  This means you are given containers to put the opioids into  You will then mail them in the containers  · Use a take-back drop box  This is a place to leave the opioids at any time  People and animals will not be able to get into the box  Your local law enforcement agency can tell you where to find a drop box in your area  Other safe ways to dispose of opioids: The medicine may come with disposal instructions  The instructions may vary depending on the brand of medicine you are using  Instructions may come in a Medication Guide, but not every medicine has one  You may instead get instructions from your pharmacy or doctor  Follow instructions carefully  The following are general guidelines to follow:  · Find out if you can flush the opioid  Some opioids can be flushed down the toilet or poured into the sink  You will need to contact authorities in your area to see if this is an option for you  The FDA also offers a list of medicines that are safe to flush down the toilet  You can check the list if you cannot get the information for your local area  · Ask your waste management company about rules for putting opioids in the trash  The company will be able to give you specific directions   Scratch out personal information on the original medicine label so it cannot be read  Then put it in the trash  Do not label the trash or put any information on it about the opioids  It should look like regular household trash so no one is tempted to look for the opioids  Keep the trash out of the reach of children and animals  Always make sure trash is secure  · Talk to officials if you live in a facility  If you live in a nursing home or assisted living center, talk to an official  The person will know the rules for your area  Other ways to manage pain:   · Ask your healthcare provider about non-opioid medicines to control pain  Nonprescription medicines include NSAIDs (such as ibuprofen) and acetaminophen  Prescription medicines include muscle relaxers, antidepressants, and steroids  · Pain may be managed without any medicines  Some ways to relieve pain include massage, aromatherapy, or meditation  Physical or occupational therapy may also help  For more information:   · Drug Enforcement Administration  [facility] [address]  Phone: 4- 570 - 998-7189  Web Address: [facility]/drug_disposal/    · [address]  and Drug Administration  [facility] , [address] [place] [address]  Phone: 5- 925 - 833-6263  Web Address: http://Think[employer]/    Follow up with your doctor or pain specialist as directed: You may need to have your dose adjusted  Your doctor or pain specialist can also help you find ways to manage pain without opioids  Write down your questions so you remember to ask them during your visits  © Copyright [address] 2022 Information is for End User's use only and may not be sold, redistributed or otherwise used for commercial purposes  All illustrations and images included in CareNotes® are the copyrighted property of [employer]  or Yola Cervantes  The above information is an  only  It is not intended as medical advice for individual conditions or treatments   Talk to your doctor, nurse or pharmacist before following any medical regimen to see if it is safe and effective for you

## 2022-05-05 LAB
6MAM UR QL CFM: NEGATIVE NG/ML
7AMINOCLONAZEPAM UR QL CFM: NEGATIVE NG/ML
A-OH ALPRAZ UR QL CFM: NEGATIVE NG/ML
ACCEPTABLE CREAT UR QL: NORMAL MG/DL
ACCEPTIBLE SP GR UR QL: NORMAL
AMPHET UR QL CFM: NEGATIVE NG/ML
BUPRENORPHINE UR QL CFM: NORMAL NG/ML
BUTALBITAL UR QL CFM: NEGATIVE NG/ML
BZE UR QL CFM: NEGATIVE NG/ML
DESIPRAMINE UR QL CFM: NEGATIVE NG/ML
EDDP UR QL CFM: NEGATIVE NG/ML
ETHYL GLUCURONIDE UR QL CFM: NEGATIVE NG/ML
ETHYL SULFATE UR QL SCN: NEGATIVE NG/ML
FENTANYL UR QL CFM: NEGATIVE NG/ML
GLIADIN IGG SER IA-ACNC: NEGATIVE NG/ML
GLUCOSE 30M P 50 G LAC PO SERPL-MCNC: NEGATIVE NG/ML
HYDROCODONE UR QL CFM: NEGATIVE NG/ML
HYDROMORPHONE UR QL CFM: NEGATIVE NG/ML
HYDROMORPHONE UR QL CFM: NEGATIVE NG/ML
LORAZEPAM UR QL CFM: NEGATIVE NG/ML
MDMA UR QL CFM: NEGATIVE NG/ML
MEPERIDINE UR QL CFM: NEGATIVE NG/ML
METHADONE UR QL CFM: NEGATIVE NG/ML
MORPHINE UR QL CFM: NORMAL
NITRITE UR QL: NORMAL UG/ML
NORBUPRENORPHINE UR QL CFM: NEGATIVE NG/ML
NORDIAZEPAM UR QL CFM: NEGATIVE NG/ML
NORFENTANYL UR QL CFM: NEGATIVE NG/ML
NORHYDROCODONE UR QL CFM: NEGATIVE NG/ML
NORMEPERIDINE UR QL CFM: NEGATIVE NG/ML
NOROXYCODONE UR QL CFM: NEGATIVE NG/ML
OLANZAPINE QUANTIFICATION: NEGATIVE NG/ML
OPC-3373 QUANTIFICATION: NEGATIVE
OXAZEPAM UR QL CFM: NEGATIVE NG/ML
OXAZEPAM UR QL CFM: NEGATIVE NG/ML
OXYCODONE UR QL CFM: NEGATIVE NG/ML
OXYMORPHONE UR QL CFM: NEGATIVE NG/ML
OXYMORPHONE UR QL CFM: NEGATIVE NG/ML
RESULT ALL_PRESCRIBED MEDS AND SPECIAL INSTRUCTIONS: NORMAL
SL AMB 3-METHYL-FENTANYL QUANTIFICATION: NORMAL NG/ML
SL AMB 4-ANPP QUANTIFICATION: NORMAL NG/ML
SL AMB 4-FIBF QUANTIFICATION: NORMAL NG/ML
SL AMB 7-OH-MITRAGYNINE (KRATOM ALKALOID) QUANTIFICATION: NEGATIVE NG/ML
SL AMB ACETYL FENTANYL QUANTIFICATION: NORMAL NG/ML
SL AMB ACETYL NORFENTANYL QUANTIFICATION: NORMAL NG/ML
SL AMB ACRYL FENTANYL QUANTIFICATION: NORMAL NG/ML
SL AMB BUTRYL FENTANYL QUANTIFICATION: NORMAL NG/ML
SL AMB CARFENTANIL QUANTIFICATION: NORMAL NG/ML
SL AMB CLOZAPINE QUANTIFICATION: NEGATIVE NG/ML
SL AMB CTHC (MARIJUANA METABOLITE) QUANTIFICATION: NEGATIVE NG/ML
SL AMB CYCLOPROPYL FENTANYL QUANTIFICATION: NORMAL NG/ML
SL AMB DEXTROMETHORPHAN QUANTIFICATION: NEGATIVE NG/ML
SL AMB DEXTRORPHAN (DEXTROMETHORPHAN METABOLITE) QUANT: NEGATIVE NG/ML
SL AMB DEXTRORPHAN (DEXTROMETHORPHAN METABOLITE) QUANT: NEGATIVE NG/ML
SL AMB FURANYL FENTANYL QUANTIFICATION: NORMAL NG/ML
SL AMB HYDROXYRISPERIDONE QUANTIFICATION: NEGATIVE NG/ML
SL AMB METHOXYACETYL FENTANYL QUANTIFICATION: NORMAL NG/ML
SL AMB N-DESMETHYL U-47700 QUANTIFICATION: NORMAL NG/ML
SL AMB N-DESMETHYL-TRAMADOL QUANTIFICATION: NEGATIVE NG/ML
SL AMB N-DESMETHYLCLOZAPINE QUANTIFICATION: NEGATIVE NG/ML
SL AMB NORQUETIAPINE QUANTIFICATION: NEGATIVE NG/ML
SL AMB PHENTERMINE QUANTIFICATION: NEGATIVE NG/ML
SL AMB PREGABALIN QUANTIFICATION: ABNORMAL
SL AMB QUETIAPINE QUANTIFICATION: NEGATIVE NG/ML
SL AMB RISPERIDONE QUANTIFICATION: NEGATIVE NG/ML
SL AMB U-47700 QUANTIFICATION: NORMAL NG/ML
SPECIMEN PH ACCEPTABLE UR: NORMAL
TAPENTADOL UR QL CFM: NEGATIVE NG/ML
TEMAZEPAM UR QL CFM: NEGATIVE NG/ML
TEMAZEPAM UR QL CFM: NEGATIVE NG/ML
TRAMADOL UR QL CFM: NEGATIVE NG/ML
URATE/CREAT 24H UR: NEGATIVE NG/ML

## 2022-05-19 ENCOUNTER — VBI (OUTPATIENT)
Dept: ADMINISTRATIVE | Facility: OTHER | Age: 67
End: 2022-05-19

## 2022-05-19 NOTE — TELEPHONE ENCOUNTER
05/19/22 10:45 AM     [facility]       [facility]

## 2022-06-15 ENCOUNTER — RA CDI HCC (OUTPATIENT)
Dept: OTHER | Facility: HOSPITAL | Age: 67
End: 2022-06-15

## 2022-06-15 NOTE — PROGRESS NOTES
[facility]  coding opportunities       Chart reviewed, no opportunity found:   [address]        Patients Insurance     Medicare Insurance: [facility] Advantage

## 2022-06-17 ENCOUNTER — TELEPHONE (OUTPATIENT)
Dept: PAIN MEDICINE | Facility: MEDICAL CENTER | Age: 67
End: 2022-06-17

## 2022-06-17 DIAGNOSIS — I82.412 DEEP VEIN THROMBOSIS (DVT) OF FEMORAL VEIN OF LEFT LOWER EXTREMITY, UNSPECIFIED CHRONICITY (HCC): ICD-10-CM

## 2022-06-17 NOTE — TELEPHONE ENCOUNTER
Pt called stating that Vitamin C has been added to his medication list and the patient wanted to make the office know that his neighbor was smoking marijuana and the local police was adivsed-pt wants to be sure that if his sample should come back positive, hes made the office aware that he he didn't smoke       [phone]

## 2022-06-17 NOTE — TELEPHONE ENCOUNTER
Pt requesting 90 day refill of Eliquis to be sent to [facility]  Per OV on 4/14/22, pt is to continue the med

## 2022-06-23 ENCOUNTER — TELEPHONE (OUTPATIENT)
Dept: INTERNAL MEDICINE CLINIC | Facility: OTHER | Age: 67
End: 2022-06-23

## 2022-06-24 ENCOUNTER — HOSPITAL ENCOUNTER (OUTPATIENT)
Dept: NON INVASIVE DIAGNOSTICS | Facility: CLINIC | Age: 67
Discharge: HOME | End: 2022-06-24
Payer: COMMERCIAL

## 2022-06-24 DIAGNOSIS — I72.4 ANEURYSM OF RIGHT POPLITEAL ARTERY (HCC): Chronic | ICD-10-CM

## 2022-06-24 PROCEDURE — 93922 UPR/L XTREMITY ART 2 LEVELS: CPT | Performed by: SURGERY

## 2022-06-24 PROCEDURE — 93925 LOWER EXTREMITY STUDY: CPT

## 2022-06-24 PROCEDURE — 93925 LOWER EXTREMITY STUDY: CPT | Performed by: SURGERY

## 2022-06-24 PROCEDURE — 93923 UPR/LXTR ART STDY 3+ LVLS: CPT

## 2022-06-27 ENCOUNTER — TRANSCRIBE ORDERS (OUTPATIENT)
Dept: VASCULAR SURGERY | Facility: CLINIC | Age: 67
End: 2022-06-27

## 2022-06-27 DIAGNOSIS — I72.4 ANEURYSM OF RIGHT POPLITEAL ARTERY (HCC): Primary | ICD-10-CM

## 2022-06-29 ENCOUNTER — OFFICE VISIT (OUTPATIENT)
Dept: INTERNAL MEDICINE CLINIC | Facility: OTHER | Age: 67
End: 2022-06-29
Payer: COMMERCIAL

## 2022-06-29 VITALS
HEIGHT: 75 IN | TEMPERATURE: 98 F | HEART RATE: 85 BPM | WEIGHT: 184.8 LBS | SYSTOLIC BLOOD PRESSURE: 108 MMHG | BODY MASS INDEX: 22.98 KG/M2 | DIASTOLIC BLOOD PRESSURE: 66 MMHG | RESPIRATION RATE: 20 BRPM | OXYGEN SATURATION: 96 %

## 2022-06-29 DIAGNOSIS — F11.20 UNCOMPLICATED OPIOID DEPENDENCE (HCC): ICD-10-CM

## 2022-06-29 DIAGNOSIS — I82.412 ACUTE DEEP VEIN THROMBOSIS (DVT) OF FEMORAL VEIN OF LEFT LOWER EXTREMITY (HCC): ICD-10-CM

## 2022-06-29 DIAGNOSIS — Z13.1 SCREENING FOR DIABETES MELLITUS: ICD-10-CM

## 2022-06-29 DIAGNOSIS — Z12.12 SCREENING FOR COLORECTAL CANCER: ICD-10-CM

## 2022-06-29 DIAGNOSIS — J44.9 CHRONIC OBSTRUCTIVE PULMONARY DISEASE, UNSPECIFIED COPD TYPE (HCC): ICD-10-CM

## 2022-06-29 DIAGNOSIS — G62.9 NEUROPATHY: ICD-10-CM

## 2022-06-29 DIAGNOSIS — E78.2 MIXED HYPERLIPIDEMIA: Primary | ICD-10-CM

## 2022-06-29 DIAGNOSIS — Z12.5 SCREENING FOR PROSTATE CANCER: ICD-10-CM

## 2022-06-29 DIAGNOSIS — Z13.6 SCREENING FOR CARDIOVASCULAR CONDITION: ICD-10-CM

## 2022-06-29 DIAGNOSIS — Z12.11 SCREENING FOR COLORECTAL CANCER: ICD-10-CM

## 2022-06-29 DIAGNOSIS — Z12.2 ENCOUNTER FOR SCREENING FOR LUNG CANCER: ICD-10-CM

## 2022-06-29 DIAGNOSIS — R91.8 MULTIPLE SUBSOLID LUNG NODULES GREATER THAN 6 MM IN DIAMETER: ICD-10-CM

## 2022-06-29 DIAGNOSIS — Z00.00 MEDICARE ANNUAL WELLNESS VISIT, SUBSEQUENT: ICD-10-CM

## 2022-06-29 DIAGNOSIS — M06.4 INFLAMMATORY POLYARTHROPATHY (HCC): ICD-10-CM

## 2022-06-29 DIAGNOSIS — M25.511 ACUTE PAIN OF RIGHT SHOULDER: ICD-10-CM
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
## 2022-06-29 PROCEDURE — 99214 OFFICE O/P EST MOD 30 MIN: CPT | Performed by: INTERNAL MEDICINE

## 2022-06-29 PROCEDURE — G0439 PPPS, SUBSEQ VISIT: HCPCS | Performed by: INTERNAL MEDICINE

## 2022-06-29 PROCEDURE — 3008F BODY MASS INDEX DOCD: CPT | Performed by: NURSE PRACTITIONER

## 2022-06-29 RX ORDER — MULTIVITAMIN WITH IRON
8000 TABLET ORAL DAILY
COMMUNITY

## 2022-06-29 RX ORDER — LANOLIN ALCOHOL/MO/W.PET/CERES
400 CREAM (GRAM) TOPICAL DAILY
COMMUNITY

## 2022-06-29 NOTE — PATIENT INSTRUCTIONS
Medicare Preventive Visit Patient Instructions  Thank you for completing your Welcome to Medicare Visit or Medicare Annual Wellness Visit today  Your next wellness visit will be due in one year (6/30/2023)  The screening/preventive services that you may require over the next 5-10 years are detailed below  Some tests may not apply to you based off risk factors and/or age  Screening tests ordered at today's visit but not completed yet may show as past due  Also, please note that scanned in results may not display below  Preventive Screenings:  Service Recommendations Previous Testing/Comments   Colorectal Cancer Screening  · Colonoscopy    · Fecal Occult Blood Test (FOBT)/Fecal Immunochemical Test (FIT)  · Fecal DNA/Cologuard Test  · Flexible Sigmoidoscopy Age: 54-65 years old   Colonoscopy: every 10 years (May be performed more frequently if at higher risk)  OR  FOBT/FIT: every 1 year  OR  Cologuard: every 3 years  OR  Sigmoidoscopy: every 5 years  Screening may be recommended earlier than age 48 if at higher risk for colorectal cancer  Also, an individualized decision between you and your healthcare provider will decide whether screening between the ages of 74-80 would be appropriate   Colonoscopy: 06/29/2018  FOBT/FIT: Not on file  Cologuard: Not on file  Sigmoidoscopy: Not on file    Screening Current     Prostate Cancer Screening Individualized decision between patient and health care provider in men between ages of 53-78   Medicare will cover every 12 months beginning on the day after your 50th birthday PSA: 2 2 ng/mL     Screening Current     Hepatitis C Screening Once for adults born between 1945 and 1965  More frequently in patients at high risk for Hepatitis C Hep C Antibody: 09/10/2018    Screening Current   Diabetes Screening 1-2 times per year if you're at risk for diabetes or have pre-diabetes Fasting glucose: 100 mg/dL   A1C: No results in last 5 years    Screening Current   Cholesterol Screening Once every 5 years if you don't have a lipid disorder  May order more often based on risk factors  Lipid panel: 11/04/2021    Screening Not Indicated  History Lipid Disorder      Other Preventive Screenings Covered by Medicare:  1  Abdominal Aortic Aneurysm (AAA) Screening: covered once if your at risk  You're considered to be at risk if you have a family history of AAA or a male between the age of 73-68 who smoking at least 100 cigarettes in your lifetime  2  Lung Cancer Screening: covers low dose CT scan once per year if you meet all of the following conditions: (1) Age 50-69; (2) No signs or symptoms of lung cancer; (3) Current smoker or have quit smoking within the last 15 years; (4) You have a tobacco smoking history of at least 30 pack years (packs per day x number of years you smoked); (5) You get a written order from a healthcare provider  3  Glaucoma Screening: covered annually if you're considered high risk: (1) You have diabetes OR (2) Family history of glaucoma OR (3)  aged 48 and older OR (3)  American aged 72 and older  3  Osteoporosis Screening: covered every 2 years if you meet one of the following conditions: (1) Have a vertebral abnormality; (2) On glucocorticoid therapy for more than 3 months; (3) Have primary hyperparathyroidism; (4) On osteoporosis medications and need to assess response to drug therapy  5  HIV Screening: covered annually if you're between the age of 12-76  Also covered annually if you are younger than 13 and older than 72 with risk factors for HIV infection  For pregnant patients, it is covered up to 3 times per pregnancy      Immunizations:  Immunization Recommendations   Influenza Vaccine Annual influenza vaccination during flu season is recommended for all persons aged >= 6 months who do not have contraindications   Pneumococcal Vaccine (Prevnar and Pneumovax)  * Prevnar = PCV13  * Pneumovax = PPSV23 Adults 25-60 years old: 1-3 doses may be recommended based on certain risk factors  Adults 72 years old: Prevnar (PCV13) vaccine recommended followed by Pneumovax (PPSV23) vaccine  If already received PPSV23 since turning 65, then PCV13 recommended at least one year after PPSV23 dose  Hepatitis B Vaccine 3 dose series if at intermediate or high risk (ex: diabetes, end stage renal disease, liver disease)   Tetanus (Td) Vaccine - COST NOT COVERED BY MEDICARE PART B Following completion of primary series, a booster dose should be given every 10 years to maintain immunity against tetanus  Td may also be given as tetanus wound prophylaxis  Tdap Vaccine - COST NOT COVERED BY MEDICARE PART B Recommended at least once for all adults  For pregnant patients, recommended with each pregnancy  Shingles Vaccine (Shingrix) - COST NOT COVERED BY MEDICARE PART B  2 shot series recommended in those aged 48 and above     Health Maintenance Due:      Topic Date Due    Colorectal Cancer Screening  06/29/2028    Hepatitis C Screening  Completed     Immunizations Due:      Topic Date Due    DTaP,Tdap,and Td Vaccines (1 - Tdap) Never done    COVID-19 Vaccine (4 - Booster for Moderna series) 03/04/2022     Advance Directives   What are advance directives? Advance directives are legal documents that state your wishes and plans for medical care  These plans are made ahead of time in case you lose your ability to make decisions for yourself  Advance directives can apply to any medical decision, such as the treatments you want, and if you want to donate organs  What are the types of advance directives? There are many types of advance directives, and each state has rules about how to use them  You may choose a combination of any of the following:  · Living will: This is a written record of the treatment you want  You can also choose which treatments you do not want, which to limit, and which to stop at a certain time   This includes surgery, medicine, IV fluid, and tube feedings  · Durable power of  for healthcare [place] [facility]): This is a written record that states who you want to make healthcare choices for you when you are unable to make them for yourself  This person, called a proxy, is usually a family member or a friend  You may choose more than 1 proxy  · Do not resuscitate (DNR) order:  A DNR order is used in case your heart stops beating or you stop breathing  It is a request not to have certain forms of treatment, such as CPR  A DNR order may be included in other types of advance directives  · Medical directive: This covers the care that you want if you are in a coma, near death, or unable to make decisions for yourself  You can list the treatments you want for each condition  Treatment may include pain medicine, surgery, blood transfusions, dialysis, IV or tube feedings, and a ventilator (breathing machine)  · Values history: This document has questions about your views, beliefs, and how you feel and think about life  This information can help others choose the care that you would choose  Why are advance directives important? An advance directive helps you control your care  Although spoken wishes may be used, it is better to have your wishes written down  Spoken wishes can be misunderstood, or not followed  Treatments may be given even if you do not want them  An advance directive may make it easier for your family to make difficult choices about your care  Cigarette Smoking and Your Health   Risks to your health if you smoke:  Nicotine and other chemicals found in tobacco damage every cell in your body  Even if you are a light smoker, you have an increased risk for cancer, heart disease, and lung disease  If you are pregnant or have diabetes, smoking increases your risk for complications  Benefits to your health if you stop smoking:   · You decrease respiratory symptoms such as coughing, wheezing, and shortness of breath     · You reduce your risk for cancers of the lung, mouth, throat, kidney, bladder, pancreas, stomach, and cervix  If you already have cancer, you increase the benefits of chemotherapy  You also reduce your risk for cancer returning or a second cancer from developing  · You reduce your risk for heart disease, blood clots, heart attack, and stroke  · You reduce your risk for lung infections, and diseases such as pneumonia, asthma, chronic bronchitis, and emphysema  · Your circulation improves  More oxygen can be delivered to your body  If you have diabetes, you lower your risk for complications, such as kidney, artery, and eye diseases  You also lower your risk for nerve damage  Nerve damage can lead to amputations, poor vision, and blindness  · You improve your body's ability to heal and to fight infections  For more information and support to stop smoking:   · [employer]  Phone: 1- 197 - 908-1537  Web Address: [employer]   [address] 2018 Information is for End User's use only and may not be sold, redistributed or otherwise used for commercial purposes  All illustrations and images included in CareNotes® are the copyrighted property of [employer]  or [facility] Preventive Visit Patient Instructions  Thank you for completing your Welcome to Medicare Visit or Medicare Annual Wellness Visit today  Your next wellness visit will be due in one year (6/30/2023)  The screening/preventive services that you may require over the next 5-10 years are detailed below  Some tests may not apply to you based off risk factors and/or age  Screening tests ordered at today's visit but not completed yet may show as past due  Also, please note that scanned in results may not display below    Preventive Screenings:  Service Recommendations Previous Testing/Comments   Colorectal Cancer Screening  · Colonoscopy    · Fecal Occult Blood Test (FOBT)/Fecal Immunochemical Test (FIT)  · Fecal DNA/Cologuard Test  · Flexible Sigmoidoscopy Age: 54-65 years old   Colonoscopy: every 10 years (May be performed more frequently if at higher risk)  OR  FOBT/FIT: every 1 year  OR  Cologuard: every 3 years  OR  Sigmoidoscopy: every 5 years  Screening may be recommended earlier than age 48 if at higher risk for colorectal cancer  Also, an individualized decision between you and your healthcare provider will decide whether screening between the ages of 74-80 would be appropriate  Colonoscopy: 06/29/2018  FOBT/FIT: Not on file  Cologuard: Not on file  Sigmoidoscopy: Not on file    Screening Current     Prostate Cancer Screening Individualized decision between patient and health care provider in men between ages of 53-78   Medicare will cover every 12 months beginning on the day after your 50th birthday PSA: 2 2 ng/mL     Screening Current     Hepatitis C Screening Once for adults born between 1945 and 1965  More frequently in patients at high risk for Hepatitis C Hep C Antibody: 09/10/2018    Screening Current   Diabetes Screening 1-2 times per year if you're at risk for diabetes or have pre-diabetes Fasting glucose: 100 mg/dL   A1C: No results in last 5 years    Screening Current   Cholesterol Screening Once every 5 years if you don't have a lipid disorder  May order more often based on risk factors  Lipid panel: 11/04/2021    Screening Not Indicated  History Lipid Disorder      Other Preventive Screenings Covered by Medicare:  6  Abdominal Aortic Aneurysm (AAA) Screening: covered once if your at risk  You're considered to be at risk if you have a family history of AAA or a male between the age of 73-68 who smoking at least 100 cigarettes in your lifetime    7  Lung Cancer Screening: covers low dose CT scan once per year if you meet all of the following conditions: (1) Age 50-69; (2) No signs or symptoms of lung cancer; (3) Current smoker or have quit smoking within the last 15 years; (4) You have a tobacco smoking history of at least 30 pack years (packs per day x number of years you smoked); (5) You get a written order from a healthcare provider  8  Glaucoma Screening: covered annually if you're considered high risk: (1) You have diabetes OR (2) Family history of glaucoma OR (3)  aged 48 and older OR (3)  American aged 72 and older  5  Osteoporosis Screening: covered every 2 years if you meet one of the following conditions: (1) Have a vertebral abnormality; (2) On glucocorticoid therapy for more than 3 months; (3) Have primary hyperparathyroidism; (4) On osteoporosis medications and need to assess response to drug therapy  10  HIV Screening: covered annually if you're between the age of 12-76  Also covered annually if you are younger than 13 and older than 72 with risk factors for HIV infection  For pregnant patients, it is covered up to 3 times per pregnancy  Immunizations:  Immunization Recommendations   Influenza Vaccine Annual influenza vaccination during flu season is recommended for all persons aged >= 6 months who do not have contraindications   Pneumococcal Vaccine (Prevnar and Pneumovax)  * Prevnar = PCV13  * Pneumovax = PPSV23 Adults 25-60 years old: 1-3 doses may be recommended based on certain risk factors  Adults 72 years old: Prevnar (PCV13) vaccine recommended followed by Pneumovax (PPSV23) vaccine  If already received PPSV23 since turning 65, then PCV13 recommended at least one year after PPSV23 dose  Hepatitis B Vaccine 3 dose series if at intermediate or high risk (ex: diabetes, end stage renal disease, liver disease)   Tetanus (Td) Vaccine - COST NOT COVERED BY MEDICARE PART B Following completion of primary series, a booster dose should be given every 10 years to maintain immunity against tetanus  Td may also be given as tetanus wound prophylaxis  Tdap Vaccine - COST NOT COVERED BY MEDICARE PART B Recommended at least once for all adults  For pregnant patients, recommended with each pregnancy     Shingles Vaccine (Shingrix) - COST NOT COVERED BY MEDICARE PART B  2 shot series recommended in those aged 48 and above     Health Maintenance Due:      Topic Date Due    Colorectal Cancer Screening  06/29/2028    Hepatitis C Screening  Completed     Immunizations Due:      Topic Date Due    DTaP,Tdap,and Td Vaccines (1 - Tdap) Never done    COVID-19 Vaccine (4 - Booster for Moderna series) 03/04/2022     Advance Directives   What are advance directives? Advance directives are legal documents that state your wishes and plans for medical care  These plans are made ahead of time in case you lose your ability to make decisions for yourself  Advance directives can apply to any medical decision, such as the treatments you want, and if you want to donate organs  What are the types of advance directives? There are many types of advance directives, and each state has rules about how to use them  You may choose a combination of any of the following:  · Living will: This is a written record of the treatment you want  You can also choose which treatments you do not want, which to limit, and which to stop at a certain time  This includes surgery, medicine, IV fluid, and tube feedings  · Durable power of  for healthcare [place] [facility]): This is a written record that states who you want to make healthcare choices for you when you are unable to make them for yourself  This person, called a proxy, is usually a family member or a friend  You may choose more than 1 proxy  · Do not resuscitate (DNR) order:  A DNR order is used in case your heart stops beating or you stop breathing  It is a request not to have certain forms of treatment, such as CPR  A DNR order may be included in other types of advance directives  · Medical directive: This covers the care that you want if you are in a coma, near death, or unable to make decisions for yourself  You can list the treatments you want for each condition   Treatment may include pain medicine, surgery, blood transfusions, dialysis, IV or tube feedings, and a ventilator (breathing machine)  · Values history: This document has questions about your views, beliefs, and how you feel and think about life  This information can help others choose the care that you would choose  Why are advance directives important? An advance directive helps you control your care  Although spoken wishes may be used, it is better to have your wishes written down  Spoken wishes can be misunderstood, or not followed  Treatments may be given even if you do not want them  An advance directive may make it easier for your family to make difficult choices about your care  Cigarette Smoking and Your Health   Risks to your health if you smoke:  Nicotine and other chemicals found in tobacco damage every cell in your body  Even if you are a light smoker, you have an increased risk for cancer, heart disease, and lung disease  If you are pregnant or have diabetes, smoking increases your risk for complications  Benefits to your health if you stop smoking:   · You decrease respiratory symptoms such as coughing, wheezing, and shortness of breath  · You reduce your risk for cancers of the lung, mouth, throat, kidney, bladder, pancreas, stomach, and cervix  If you already have cancer, you increase the benefits of chemotherapy  You also reduce your risk for cancer returning or a second cancer from developing  · You reduce your risk for heart disease, blood clots, heart attack, and stroke  · You reduce your risk for lung infections, and diseases such as pneumonia, asthma, chronic bronchitis, and emphysema  · Your circulation improves  More oxygen can be delivered to your body  If you have diabetes, you lower your risk for complications, such as kidney, artery, and eye diseases  You also lower your risk for nerve damage  Nerve damage can lead to amputations, poor vision, and blindness    · You improve your body's ability to heal and to fight infections  For more information and support to stop smoking:   · [employer]  [employer]  Phone: 9- 652 - 762-4231  Web Address: www [employer]  [address] [place] [address] 2018 Information is for End User's use only and may not be sold, redistributed or otherwise used for commercial purposes   All illustrations and images included in CareNotes® are the copyrighted property of [employer]  or [address] [place] [address]

## 2022-06-29 NOTE — PROGRESS NOTES
Assessment and Plan:     Problem List Items Addressed This Visit        Respiratory    Chronic obstructive pulmonary disease, unspecified COPD type ([facility] [address] )       Nervous and Auditory    Neuropathy       Musculoskeletal and Integument    Inflammatory polyarthropathy ([facility] [address] )       Other    Multiple subsolid lung nodules greater than 6 mm in diameter    Relevant Orders    CT lung nodule follow-up    Mixed hyperlipidemia - Primary    Relevant Orders    CBC    Lipid panel    Comprehensive metabolic panel      Other Visit Diagnoses     Medicare annual wellness visit, subsequent        Screening for diabetes mellitus        Screening for cardiovascular condition        Screening for colorectal cancer        Encounter for screening for lung cancer        Screening for prostate cancer        Relevant Orders    PSA, Total Screen    Acute pain of right shoulder        Relevant Orders    XR shoulder 2+ vw right          Depression Screening and Follow-up Plan: Patient was screened for depression during today's encounter  They screened negative with a PHQ-2 score of 0  Tobacco Cessation Counseling: Tobacco cessation counseling was provided  The patient is sincerely urged to quit consumption of tobacco  He is not ready to quit tobacco  Medication options and side effects of medication discussed  Patient refused medication  Preventive health issues were discussed with patient, and age appropriate screening tests were ordered as noted in patient's After Visit Summary  Personalized health advice and appropriate referrals for health education or preventive services given if needed, as noted in patient's After Visit Summary       History of Present Illness:     Patient presents for a Medicare Wellness Visit    HPI   Patient Care Team:  Con Hancock MD as PCP - General Lindsay Sotelo DO as PCP - [address] [place], [place] [id number] [facility] ([facility])  DO Faviola Moe III, DO (Pain Medicine)  Deanna Cochran MD as Linh Houser Harvey Carvalho (Pain Medicine)     Review of Systems:     Review of Systems   Constitutional: Negative for activity change, appetite change, chills, diaphoresis, fatigue and fever  HENT: Negative for congestion, postnasal drip, rhinorrhea, sinus pressure, sinus pain, sneezing and sore throat  Eyes: Negative for visual disturbance  Respiratory: Negative for apnea, cough, choking, chest tightness, shortness of breath and wheezing  Cardiovascular: Negative for chest pain, palpitations and leg swelling  Gastrointestinal: Negative for abdominal distention, abdominal pain, anal bleeding, blood in stool, constipation, diarrhea, nausea and vomiting  Endocrine: Negative for cold intolerance and heat intolerance  Genitourinary: Negative for difficulty urinating, dysuria and hematuria  Musculoskeletal: Negative  Skin: Negative  Neurological: Negative for dizziness, weakness, light-headedness, numbness and headaches  Hematological: Negative for adenopathy  Psychiatric/Behavioral: Negative for agitation, sleep disturbance and suicidal ideas  All other systems reviewed and are negative         Problem List:     Patient Active Problem List   Diagnosis    Lumbar radiculopathy - Right    Bone lesion    Neuropathy    Inflammatory polyarthropathy ([address] )    Tobacco abuse    Work related injury    Colon cancer screening    Right foot drop    Lumbar spondylosis - Bilateral    Vitamin D deficiency    Thoracic spine pain    Chronic pain syndrome    Uncomplicated opioid dependence ([address] )    Chronic pain of both knees    Chronic pain of both shoulders    Chronic pain of left ankle    Multiple subsolid lung nodules greater than 6 mm in diameter    Mixed hyperlipidemia    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[facility])    Deep vein thrombosis (DVT) of femoral vein of left lower extremity ([facility])    S/P femoral-popliteal bypass surgery    Cellulitis of right lower extremity    Atherosclerosis of artery of left lower extremity ([facility] [address] )    Chronic obstructive pulmonary disease, unspecified COPD type ([facility] [address] )      Past Medical and Surgical History:     Past Medical History:   Diagnosis Date    Chronic back pain     Chronic narcotic dependence ([facility] [address] )     Chronic pain disorder     Sees pain management Dr Antonio Limb    Cigarette smoker     Depression     Dry skin     Dyslexia     Full dentures     upper and no teeth lower/"avoid raw vegetables and chewy food"    GERD (gastroesophageal reflux disease)     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 related    History of blood clots     "left leg" takes Eliquis    History of bronchitis     History of pneumonia     childhood    Increased pressure in the eye, bilateral     L4-L5 disc bulge 06/16/2017    Low back pain     Muscle weakness     Neuropathy     right foot and left foot    Osteoarthritis     Osteoarthritis     and" if becomes cold joint pain worsens"    Pulmonary emphysema (HCC)     "mild"    Right foot drop     Shortness of breath     "only on humid days"    Smokers' cough ([facility] [address] )     Wears glasses      Past Surgical History:   Procedure Laterality Date    COLONOSCOPY      EGD AND COLONOSCOPY N/A 6/29/2018    Procedure: EGD AND COLONOSCOPY;  Surgeon: Serena Gómez MD;  Location: [facility];   Service: Gastroenterology    EPIDURAL BLOCK INJECTION      needle    HIP ARTHROSCOPY W/ LABRAL DEBRIDEMENT Left     "hip surgery"    IR LOWER EXTREMITY ANGIOGRAM  12/8/2021    ORTHOPEDIC SURGERY      PA HIP [address] BODY,PLASTY/RESECTN Left 4/30/2018    Procedure: LEFT HIP ARTHROSCOPIC LABRAL DEBRIDEMENT;  Surgeon: Anand Rodarte MD;  [facility];  Service: Orthopedics    PA Moe Combs 3RD+ ORD [address] PEL/TR [facility] Right 12/8/2021    Procedure: COMPLETION ARTERIOGRAM WITH 5 MM ANGIOPLASTY OF MID SUPERFICIAL FEMORAL ARTERY;  Surgeon: Cuong Nielson MD;  [facility]: [facility];  Service: Vascular    PA VEIN IN SITU BYPASS GRAFT,FEM-POP Right 12/8/2021 Procedure: R SFA to BK Pop Bypass using insitu GSV,  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;  Surgeon: Alexandra Woodall MD;  [facility];  Service: Vascular    TONSILLECTOMY      WISDOM TOOTH EXTRACTION        Family History:     Family History   Problem Relation Age of Onset    Dementia Mother     Glaucoma Mother     No Known Problems Father     Osteoarthritis Family       Social History:     Social History     Socioeconomic History    Marital status:      Spouse name: None    Number of children: None    Years of education: None    Highest education level: None   Occupational History    None   Tobacco Use    Smoking status: Current Every Day Smoker     Packs/day: 0 50     Types: Cigarettes    Smokeless tobacco: Never Used    Tobacco comment: 1/2 a pack a day/plans to stop 3-4 days before surgery   Vaping Use    Vaping Use: Never used   Substance and Sexual Activity    Alcohol use: No    Drug use: No    Sexual activity: None     Comment: defer   Other Topics Concern    None   Social History Narrative    None     Social Determinants of Health     Financial Resource Strain: Not on file   Food Insecurity: Not on file   Transportation Needs: No Transportation Needs    Lack of Transportation (Medical): No    Lack of Transportation (Non-Medical):  No   Physical Activity: Not on file   Stress: Not on file   Social Connections: Not on file   Intimate Partner Violence: Not on file   Housing Stability: Low Risk     Unable to Pay for Housing in the Last Year: No    Number of Places Lived in the Last Year: 1    Unstable Housing in the Last Year: No      Medications and Allergies:     Current Outpatient Medications   Medication Sig Dispense Refill    acetaminophen (TYLENOL) 500 mg tablet Take 1,000 mg by mouth every 12 (twelve) hours as needed for mild pain      apixaban (ELIQUIS) 5 mg Take 1 tablet (5 mg total) by mouth 2 (two) times a day 180 tablet 3    ascorbic Acid (VITAMIN C) 500 MG CPCR Take 500 mg by mouth 2 (two) times a day      aspirin (ECOTRIN LOW STRENGTH) 81 mg EC tablet Take 1 tablet (81 mg total) by mouth daily 90 tablet 0    atorvastatin (LIPITOR) 10 mg tablet Take 0 5 tablets (5 mg total) by mouth daily at bedtime 45 tablet 1    cholecalciferol (VITAMIN D3) 1,000 units tablet Take 2,000 Units by mouth 2 (two) times a day       clotrimazole (LOTRIMIN) 1 % cream Apply 1 g (1 application total) topically daily To affected area (Patient taking differently: Apply 1 application topically as needed To affected area) 60 g 2    Flaxseed, Linseed, (FLAX SEED OIL PO) Take 1,200 capsules by mouth 2 (two) times a day        folic acid (FOLVITE) 979 mcg tablet Take 400 mcg by mouth daily      METHYLCOBALAMIN SL Place 1,000 mcg under the tongue 2 (two) times a day       Misc Natural Products (Osteo Bi-Flex Triple Strength) TABS Take by mouth 2 (two) times a day        pregabalin (LYRICA) 100 mg capsule Take 1 capsule (100 mg total) by mouth 2 (two) times a day (Patient taking differently: Take 100 mg by mouth as needed) 60 capsule 2    pyridoxine (B-6) 100 MG tablet Take 100 mg by mouth daily      transdermal buprenorphine (BUTRANS) 5 mcg/hr TD patch Place 1 patch on the skin once a week 4 patch 2    vitamin A 2400 MCG (8000 UT) capsule Take 8,000 Units by mouth daily       No current facility-administered medications for this visit       Allergies   Allergen Reactions    Cyanocobalamin [Vitamin B12] GI Intolerance    Gabapentin Other (See Comments) and GI Intolerance     Difficulty walking    Morphine Other (See Comments)     "acute constipation"    Tramadol Confusion    Nsaids Other (See Comments)     Pt reports avoids as is on Eliquis      Immunizations:     Immunization History   Administered Date(s) Administered    COVID-19 MODERNA VACC 0 5 ML IM 03/23/2021, 04/23/2021, 11/04/2021    INFLUENZA 10/21/2021    Influenza, high dose seasonal 0 7 mL 11/25/2020    Pneumococcal Conjugate 13-Valent 11/21/2019    Pneumococcal Polysaccharide PPV23 11/25/2020      Health Maintenance:         Topic Date Due    Colorectal Cancer Screening  06/29/2028    Hepatitis C Screening  Completed         Topic Date Due    COVID-19 Vaccine (4 - Booster for Donzella Creed series) 03/04/2022      Medicare Screening Tests and Risk Assessments:     Chilo Kinney is here for his Subsequent Wellness visit  Last Medicare Wellness visit information reviewed, patient interviewed and updates made to the record today  Health Risk Assessment:   Patient rates overall health as good  Patient feels that their physical health rating is slightly better  Patient is satisfied with their life  Eyesight was rated as slightly worse  Hearing was rated as same  Patient feels that their emotional and mental health rating is slightly better  Patients states they are sometimes angry  Patient states they are often unusually tired/fatigued  Pain experienced in the last 7 days has been some  Patient's pain rating has been 6/10  Patient states that he has experienced weight loss or gain in last 6 months  Depression Screening:   PHQ-2 Score: 0      Fall Risk Screening: In the past year, patient has experienced: no history of falling in past year      Home Safety:  Patient has trouble with stairs inside or outside of their home  Patient has working smoke alarms and has working carbon monoxide detector  Home safety hazards include: none  Nutrition:   Current diet is Regular and Frequent junk food  Medications:   Patient is currently taking over-the-counter supplements  OTC medications include: see medication list  Patient is able to manage medications  Activities of Daily Living (ADLs)/Instrumental Activities of Daily Living (IADLs):   Walk and transfer into and out of bed and chair?: Yes  Dress and groom yourself?: Yes    Bathe or shower yourself?: Yes    Feed yourself?  Yes  Do your laundry/housekeeping?: Yes  Manage your money, pay your bills and track your expenses?: Yes  Make your own meals?: Yes    Do your own shopping?: Yes    Previous Hospitalizations:   Any hospitalizations or ED visits within the last 12 months?: Yes    How many hospitalizations have you had in the last year?: 1-2    Advance Care Planning:   Living will: No    Advanced directive: No    Advanced directive counseling given: Yes    End of Life Decisions reviewed with patient: Yes    Provider agrees with end of life decisions: Yes      Comments: Discussed with patient: FULL CODE  Cognitive Screening:   Provider or family/friend/caregiver concerned regarding cognition?: No    PREVENTIVE SCREENINGS      Cardiovascular Screening:    General: Screening Not Indicated, History Lipid Disorder, Risks and Benefits Discussed and Screening Current    Due for: Lipid Panel      Diabetes Screening:     General: Screening Current and Risks and Benefits Discussed    Due for: Blood Glucose      Colorectal Cancer Screening:     General: Screening Current and Risks and Benefits Discussed      Prostate Cancer Screening:    General: Screening Current and Risks and Benefits Discussed    Due for: PSA      Osteoporosis Screening:    General: Screening Not Indicated      Abdominal Aortic Aneurysm (AAA) Screening:    Risk factors include: age between 73-67 yo and tobacco use        General: Screening Not Indicated      Lung Cancer Screening:     General: Screening Not Indicated      Hepatitis C Screening:    General: Screening Current    Screening, Brief Intervention, and Referral to Treatment (SBIRT)    Screening  Typical number of drinks in a day: 0  Typical number of drinks in a week: 0  Interpretation: Low risk drinking behavior      Single Item Drug Screening:  How often have you used an illegal drug (including marijuana) or a prescription medication for non-medical reasons in the past year? never    Single Item Drug Screen Score: 0  Interpretation: Negative screen for possible drug use disorder    Brief Intervention  Alcohol & drug use screenings were reviewed  No concerns regarding substance use disorder identified  Other Counseling Topics:   Car/seat belt/driving safety, skin self-exam, sunscreen and calcium and vitamin D intake and regular weightbearing exercise       No exam data present     Physical Exam:     /66 (BP Location: Left arm, Patient Position: Sitting, Cuff Size: Standard)   Pulse 85   Temp 98 °F (36 7 °C) (Temporal)   Resp 20   Ht 6' 3" (1 905 m)   Wt 83 8 kg (184 lb 12 8 oz)   SpO2 96%   BMI 23 10 kg/m²     Physical Exam     Carlos Aldrich MD

## 2022-06-29 NOTE — PROGRESS NOTES
Assessment/Plan:    Deep vein thrombosis (DVT) of femoral vein of left lower extremity (HCC)  continue eliquis anticoagulation  Uncomplicated opioid dependence ([facility] [address] )  Continue follow-up with pain management  Mixed hyperlipidemia  Discussed diet and exercise  Continue atorvastatin 10 mg daily  Multiple subsolid lung nodules greater than 6 mm in diameter  CT chest ordered  Acute pain of right shoulder  Will order an x-ray of the right shoulder for further evaluation  Diagnoses and all orders for this visit:    Mixed hyperlipidemia  -     CBC; Future  -     Lipid panel; Future  -     Comprehensive metabolic panel; Future    Medicare annual wellness visit, subsequent    Screening for diabetes mellitus    Screening for cardiovascular condition    Screening for colorectal cancer    Encounter for screening for lung cancer  -     Cancel: CT lung screening program; Future    Screening for prostate cancer  -     PSA, Total Screen; Future    Chronic obstructive pulmonary disease, unspecified COPD type ([facility] [address] )    Inflammatory polyarthropathy (HCC)    Acute pain of right shoulder  -     XR shoulder 2+ vw right; Future    Neuropathy    Multiple subsolid lung nodules greater than 6 mm in diameter  -     CT lung nodule follow-up; Future    Acute deep vein thrombosis (DVT) of femoral vein of left lower extremity (HCC)    Uncomplicated opioid dependence ([facility] [address] )    Other orders  -     vitamin A 2400 MCG (8000 UT) capsule; Take 8,000 Units by mouth daily  -     folic acid (FOLVITE) 280 mcg tablet; Take 400 mcg by mouth daily                Subjective:      Patient ID: Benja Ojeda is a 78 y o  male  Chief Complaint   Patient presents with    Follow-up     6 month, no labs due    Medicare Wellness Visit    hm     Nothing due       78year old male is seen today for follow up of chronic conditions  No labs to review today     He has been experiencing right shoulder discomfort with limited range of motion due to pain  he denies any recent trauma or exertional activity  He has been compliant with his medication regimen  he follows up with pain management doctor regularly  Otherwise, he has no active complaints concerns at this time         Hyperlipidemia  This is a chronic problem  The current episode started more than 1 year ago  The problem is controlled  Recent lipid tests were reviewed and are normal  Pertinent negatives include no chest pain or shortness of breath  Current antihyperlipidemic treatment includes statins  The current treatment provides moderate improvement of lipids  There are no compliance problems  Shoulder Pain   The pain is present in the right shoulder  This is a chronic problem  The current episode started more than 1 month ago  There has been no history of extremity trauma  The problem occurs daily  The problem has been unchanged  Pertinent negatives include no fever or numbness  The following portions of the patient's history were reviewed and updated as appropriate: allergies, current medications, past family history, past medical history, past social history, past surgical history and problem list     Review of Systems   Constitutional: Negative for activity change, appetite change, chills, diaphoresis, fatigue and fever  HENT: Negative for congestion, postnasal drip, rhinorrhea, sinus pressure, sinus pain, sneezing and sore throat  Eyes: Negative for visual disturbance  Respiratory: Negative for apnea, cough, choking, chest tightness, shortness of breath and wheezing  Cardiovascular: Negative for chest pain, palpitations and leg swelling  Gastrointestinal: Negative for abdominal distention, abdominal pain, anal bleeding, blood in stool, constipation, diarrhea, nausea and vomiting  Endocrine: Negative for cold intolerance and heat intolerance  Genitourinary: Negative for difficulty urinating, dysuria and hematuria  Musculoskeletal: Negative      Skin: Negative  Neurological: Negative for dizziness, weakness, light-headedness, numbness and headaches  Hematological: Negative for adenopathy  Psychiatric/Behavioral: Negative for agitation, sleep disturbance and suicidal ideas  All other systems reviewed and are negative          Past Medical History:   Diagnosis Date    Chronic back pain     Chronic narcotic dependence (HCC)     Chronic pain disorder     Sees pain management Dr Nikki Edgar    Cigarette smoker     Depression     Dry skin     Dyslexia     Full dentures     upper and no teeth lower/"avoid raw vegetables and chewy food"    GERD (gastroesophageal reflux disease)     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 related    History of blood clots     "left leg" takes Eliquis    History of bronchitis     History of pneumonia     childhood    Increased pressure in the eye, bilateral     L4-L5 disc bulge 06/16/2017    Low back pain     Muscle weakness     Neuropathy     right foot and left foot    Osteoarthritis     Osteoarthritis     and" if becomes cold joint pain worsens"    Pulmonary emphysema (HCC)     "mild"    Right foot drop     Shortness of breath     "only on humid days"    Smokers' cough ([address] )     Wears glasses     Work related injury 4/24/2018         Current Outpatient Medications:     acetaminophen (TYLENOL) 500 mg tablet, Take 1,000 mg by mouth every 12 (twelve) hours as needed for mild pain, Disp: , Rfl:     apixaban (ELIQUIS) 5 mg, Take 1 tablet (5 mg total) by mouth 2 (two) times a day, Disp: 180 tablet, Rfl: 3    ascorbic Acid (VITAMIN C) 500 MG CPCR, Take 500 mg by mouth 2 (two) times a day, Disp: , Rfl:     aspirin (ECOTRIN LOW STRENGTH) 81 mg EC tablet, Take 1 tablet (81 mg total) by mouth daily, Disp: 90 tablet, Rfl: 0    atorvastatin (LIPITOR) 10 mg tablet, Take 0 5 tablets (5 mg total) by mouth daily at bedtime, Disp: 45 tablet, Rfl: 1    cholecalciferol (VITAMIN D3) 1,000 units tablet, Take 2,000 Units by mouth 2 (two) times a day , Disp: , Rfl:     clotrimazole (LOTRIMIN) 1 % cream, Apply 1 g (1 application total) topically daily To affected area (Patient taking differently: Apply 1 application topically as needed To affected area), Disp: 60 g, Rfl: 2    Flaxseed, Linseed, (FLAX SEED OIL PO), Take 1,200 capsules by mouth 2 (two) times a day  , Disp: , Rfl:     folic acid (FOLVITE) 890 mcg tablet, Take 400 mcg by mouth daily, Disp: , Rfl:     METHYLCOBALAMIN SL, Place 1,000 mcg under the tongue 2 (two) times a day , Disp: , Rfl:     Misc Natural Products (Osteo Bi-Flex Triple Strength) TABS, Take by mouth 2 (two) times a day  , Disp: , Rfl:     pregabalin (LYRICA) 100 mg capsule, Take 1 capsule (100 mg total) by mouth 2 (two) times a day (Patient taking differently: Take 100 mg by mouth as needed), Disp: 60 capsule, Rfl: 2    pyridoxine (B-6) 100 MG tablet, Take 100 mg by mouth daily, Disp: , Rfl:     transdermal buprenorphine (BUTRANS) 5 mcg/hr TD patch, Place 1 patch on the skin once a week, Disp: 4 patch, Rfl: 2    vitamin A 2400 MCG (8000 UT) capsule, Take 8,000 Units by mouth daily, Disp: , Rfl:     Allergies   Allergen Reactions    Cyanocobalamin [Vitamin B12] GI Intolerance    Gabapentin Other (See Comments) and GI Intolerance     Difficulty walking    Morphine Other (See Comments)     "acute constipation"    Tramadol Confusion    Nsaids Other (See Comments)     Pt reports avoids as is on Eliquis       Social History   Past Surgical History:   Procedure Laterality Date    COLONOSCOPY      EGD AND COLONOSCOPY N/A 6/29/2018    Procedure: EGD AND COLONOSCOPY;  Surgeon: Claire Fairbanks MD;  Location: [facility] GI [facility];   Service: Gastroenterology    EPIDURAL BLOCK INJECTION      needle    HIP ARTHROSCOPY W/ LABRAL DEBRIDEMENT Left     "hip surgery"    IR LOWER EXTREMITY ANGIOGRAM  12/8/2021    ORTHOPEDIC SURGERY      [place] HIP [address] BODY,PLASTY/RESECTN Left 4/30/2018    Procedure: LEFT HIP ARTHROSCOPIC LABRAL DEBRIDEMENT;  Surgeon: Darvin Singh MD;  [facility];  Service: Orthopedics    [place] [address] 3RD+ ORD SLCTV ABDL [facility]/LXTR [facility] Right 12/8/2021    Procedure: COMPLETION ARTERIOGRAM WITH 5 MM ANGIOPLASTY OF MID SUPERFICIAL FEMORAL ARTERY;  Surgeon: Laurence Orellana MD;  [facility];  Service: Vascular    NM VEIN IN SITU BYPASS GRAFT,FEM-POP Right 12/8/2021    Procedure: R SFA to BK Pop Bypass using insitu GSV,  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;  Surgeon: Laurence Orellana MD;  [facility];  Service: Vascular    TONSILLECTOMY      WISDOM TOOTH EXTRACTION       Family History   Problem Relation Age of Onset    Dementia Mother     Glaucoma Mother     No Known Problems Father     Osteoarthritis Family        Objective:  /66 (BP Location: Left arm, Patient Position: Sitting, Cuff Size: Standard)   Pulse 85   Temp 98 °F (36 7 °C) (Temporal)   Resp 20   Ht 6' 3" (1 905 m)   Wt 83 8 kg (184 lb 12 8 oz)   SpO2 96%   BMI 23 10 kg/m²     No results found for this or any previous visit (from the past 1344 hour(s))  Physical Exam  Vitals and nursing note reviewed  Constitutional:       General: He is not in acute distress  Appearance: He is well-developed  He is not diaphoretic  HENT:      Head: Normocephalic and atraumatic  Eyes:      General: No scleral icterus  Right eye: No discharge  Left eye: No discharge  Conjunctiva/sclera: Conjunctivae normal       Pupils: Pupils are equal, round, and reactive to light  Neck:      Thyroid: No thyromegaly  Vascular: No JVD  Cardiovascular:      Rate and Rhythm: Normal rate and regular rhythm  Heart sounds: Normal heart sounds  No murmur heard  No friction rub  No gallop  Pulmonary:      Effort: Pulmonary effort is normal  No respiratory distress  Breath sounds: Normal breath sounds  No wheezing or rales  Chest:      Chest wall: No tenderness     Abdominal:      General: Bowel sounds are normal  There is no distension  Palpations: Abdomen is soft  There is no mass  Tenderness: There is no abdominal tenderness  There is no guarding or rebound  Musculoskeletal:         General: No tenderness or deformity  Right shoulder: Decreased range of motion  Cervical back: Normal range of motion and neck supple  Lymphadenopathy:      Cervical: No cervical adenopathy  Skin:     General: Skin is warm and dry  Coloration: Skin is not pale  Findings: No erythema or rash  Neurological:      Mental Status: He is alert and oriented to person, place, and time  Cranial Nerves: No cranial nerve deficit  Coordination: Coordination normal       Deep Tendon Reflexes: Reflexes are normal and symmetric  Psychiatric:         Behavior: Behavior normal          Thought Content:  Thought content normal          Judgment: Judgment normal

## 2022-06-30 ENCOUNTER — HOSPITAL ENCOUNTER (OUTPATIENT)
Dept: RADIOLOGY | Facility: IMAGING CENTER | Age: 67
Discharge: HOME | End: 2022-06-30
Payer: COMMERCIAL

## 2022-06-30 DIAGNOSIS — M25.511 ACUTE PAIN OF RIGHT SHOULDER: ICD-10-CM

## 2022-06-30 PROCEDURE — 73030 X-RAY EXAM OF SHOULDER: CPT

## 2022-07-18 ENCOUNTER — HOSPITAL ENCOUNTER (OUTPATIENT)
Dept: CT IMAGING | Facility: HOSPITAL | Age: 67
Discharge: HOME | End: 2022-07-18
Attending: INTERNAL MEDICINE
Payer: COMMERCIAL

## 2022-07-18 DIAGNOSIS — R91.8 MULTIPLE SUBSOLID LUNG NODULES GREATER THAN 6 MM IN DIAMETER: ICD-10-CM

## 2022-07-18 PROCEDURE — 71250 CT THORAX DX C-: CPT

## 2022-07-18 PROCEDURE — G1004 CDSM NDSC: HCPCS

## 2022-07-25 ENCOUNTER — TELEPHONE (OUTPATIENT)
Dept: INTERNAL MEDICINE CLINIC | Facility: OTHER | Age: 67
End: 2022-07-25

## 2022-07-25 DIAGNOSIS — R91.8 MULTIPLE SUBSOLID LUNG NODULES GREATER THAN 6 MM IN DIAMETER: Primary | ICD-10-CM

## 2022-07-25 NOTE — TELEPHONE ENCOUNTER
Geoffrey Rivera from [facility] radiology calling with significant findings from the CT scan of the lung done on 7/18/22   Result final in epic

## 2022-07-26 ENCOUNTER — PATIENT OUTREACH (OUTPATIENT)
Dept: HEMATOLOGY ONCOLOGY | Facility: CLINIC | Age: 67
End: 2022-07-26

## 2022-07-26 ENCOUNTER — TELEPHONE (OUTPATIENT)
Dept: INTERNAL MEDICINE CLINIC | Facility: OTHER | Age: 67
End: 2022-07-26

## 2022-07-26 DIAGNOSIS — M25.511 ACUTE PAIN OF RIGHT SHOULDER: Primary | ICD-10-CM

## 2022-07-26 DIAGNOSIS — R91.8 MULTIPLE SUBSOLID LUNG NODULES GREATER THAN 6 MM IN DIAMETER: ICD-10-CM

## 2022-07-26 NOTE — TELEPHONE ENCOUNTER
Spoke with patient regarding CT chest results which show increase in size of right-sided spiculated lung nodule  He has an appointment scheduled with thoracic surgery on August 4  Right shoulder XR showed mild arthritis  He continues to have right shoulder pain  Will refer to orthopedic surgery for further evaluation and treatment

## 2022-07-26 NOTE — PROGRESS NOTES
Referral received  Chart reviewed  Patient with RUL lung spiculated nodule increasing in size  Patient is scheduled for consult with Thoracic Surgery on 8/4/22  Will outreach patient when appropriate

## 2022-07-26 NOTE — PROGRESS NOTES
Intake received/ Chart reviewed 7/26/22    Pathology completed: none planned at this time    Imaging completed: CT lung screen 12/3/19, PET/CT 6/22/21, CT abd 8/05/21, CT lung nodule FU 7/18/22- [facility]    All records needed are in patients chart  No records retrieval needed at this time  Hem/Onc appnt cancelled  Pt will see Thoracic on 8/04/22 to discuss options for obtain tissue Dx

## 2022-08-01 ENCOUNTER — OFFICE VISIT (OUTPATIENT)
Dept: OBGYN CLINIC | Facility: MEDICAL CENTER | Age: 67
End: 2022-08-01
Payer: COMMERCIAL

## 2022-08-01 VITALS
BODY MASS INDEX: 23 KG/M2 | SYSTOLIC BLOOD PRESSURE: 110 MMHG | WEIGHT: 185 LBS | HEIGHT: 75 IN | DIASTOLIC BLOOD PRESSURE: 74 MMHG

## 2022-08-01 DIAGNOSIS — M25.511 ACUTE PAIN OF RIGHT SHOULDER: ICD-10-CM

## 2022-08-01 DIAGNOSIS — M67.911 ROTATOR CUFF DISORDER, RIGHT: Primary | ICD-10-CM

## 2022-08-01 PROCEDURE — 99203 OFFICE O/P NEW LOW 30 MIN: CPT | Performed by: ORTHOPAEDIC SURGERY

## 2022-08-01 NOTE — PROGRESS NOTES
Ortho Sports Medicine Shoulder New Patient Visit     Assesment:   78 y o  male right shoulder possible small rotator cuff tear    Plan:    Conservative treatment:    Ice to shoulder 1-2 times daily, for 20 minutes at a time  PT for ROM and strengthening to shoulder, rotator cuff, scapular stabilizers  Imaging: All imaging from today was reviewed by myself and explained to the patient  Injection:    No Injection planned at this time  May consider future corticosteroid injection depending on clinical exam/imaging  Surgery:     No surgery is recommended at this point, continue with conservative treatment plan as noted  Follow up:    No follow-ups on file  Chief Complaint   Patient presents with    Right Shoulder - Pain     History of Present Illness: The patient is a 78 y o , right hand dominant male whose occupation is retired, referred to me by their primary care physician, seen in clinic for consultation of right shoulder pain  The patient denies a history of diabetes  The patient denies a history of thyroid disorder  Pain is located deep  The patient rates the pain as a 7/10  The pain has been present for 8 months  The patient does not recall sustaining a specific injury  He states that he did have a blood clot in the back of his knee and was using his right arm a lot to be able to pull this up up out of a chair     The mechanism of injury was overuse  The pain is characterized as sharp, stabbing  The pain is present daily  He has pain at night that wakes him out of sleep  Pain is improved by rest, ice and NSAIDS  Pain is aggravated by overhead activity  Symptoms include clicking, catching, popping and cracking  The patient denies weakness  The patient denies numbness and tingling       The patient has tried rest         Shoulder Surgical History:  None    Past Medical, Social and Family History:  Past Medical History:   Diagnosis Date    Chronic back pain     Chronic narcotic dependence (HCC)     Chronic pain disorder     Sees pain management Dr Digna Pineda    Cigarette smoker     Depression     Dry skin     Dyslexia     Full dentures     upper and no teeth lower/"avoid raw vegetables and chewy food"    GERD (gastroesophageal reflux disease)     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 related    History of blood clots     "left leg" takes Eliquis    History of bronchitis     History of pneumonia     childhood    Increased pressure in the eye, bilateral     L4-L5 disc bulge 06/16/2017    Low back pain     Muscle weakness     Neuropathy     right foot and left foot    Osteoarthritis     Osteoarthritis     and" if becomes cold joint pain worsens"    Pulmonary emphysema (HCC)     "mild"    Right foot drop     Shortness of breath     "only on humid days"    Smokers' cough ([address] )     Wears glasses     Work related injury 4/24/2018     Past Surgical History:   Procedure Laterality Date    COLONOSCOPY      EGD AND COLONOSCOPY N/A 6/29/2018    Procedure: EGD AND COLONOSCOPY;  Surgeon: Alessandra Landeros MD;  Location: [facility] GI LAB;   Service: Gastroenterology    EPIDURAL BLOCK INJECTION      needle    HIP ARTHROSCOPY W/ LABRAL DEBRIDEMENT Left     "hip surgery"    IR LOWER EXTREMITY ANGIOGRAM  12/8/2021    ORTHOPEDIC SURGERY      [place] HIP [address] BODY,PLASTY/RESECTN Left 4/30/2018    Procedure: LEFT HIP ARTHROSCOPIC LABRAL DEBRIDEMENT;  Surgeon: Machelle Howard MD;  [facility];  Service: Orthopedics    TX [address] 3RD+ ORD SLCTV ABDL [facility]TR [facility] Right 12/8/2021    Procedure: COMPLETION ARTERIOGRAM WITH 5 MM ANGIOPLASTY OF MID SUPERFICIAL FEMORAL ARTERY;  Surgeon: Kim Hernandez MD;  [facility];  Service: Vascular    [place] VEIN IN SITU BYPASS GRAFT,FEM-POP Right 12/8/2021    Procedure: R SFA to BK Pop Bypass using insitu GSV,  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;  Surgeon: Kim Hernandez MD;  [facility];  Service: Vascular    TONSILLECTOMY  WISDOM TOOTH EXTRACTION       Allergies   Allergen Reactions    Cyanocobalamin [Vitamin B12] GI Intolerance    Gabapentin Other (See Comments) and GI Intolerance     Difficulty walking    Morphine Other (See Comments)     "acute constipation"    Tramadol Confusion    Nsaids Other (See Comments)     Pt reports avoids as is on Eliquis     Current Outpatient Medications on File Prior to Visit   Medication Sig Dispense Refill    acetaminophen (TYLENOL) 500 mg tablet Take 1,000 mg by mouth every 12 (twelve) hours as needed for mild pain      apixaban (ELIQUIS) 5 mg Take 1 tablet (5 mg total) by mouth 2 (two) times a day 180 tablet 3    ascorbic Acid (VITAMIN C) 500 MG CPCR Take 500 mg by mouth 2 (two) times a day      aspirin (ECOTRIN LOW STRENGTH) 81 mg EC tablet Take 1 tablet (81 mg total) by mouth daily 90 tablet 0    atorvastatin (LIPITOR) 10 mg tablet Take 0 5 tablets (5 mg total) by mouth daily at bedtime 45 tablet 1    cholecalciferol (VITAMIN D3) 1,000 units tablet Take 2,000 Units by mouth 2 (two) times a day       clotrimazole (LOTRIMIN) 1 % cream Apply 1 g (1 application total) topically daily To affected area (Patient taking differently: Apply 1 application topically as needed To affected area) 60 g 2    Flaxseed, Linseed, (FLAX SEED OIL PO) Take 1,200 capsules by mouth 2 (two) times a day        folic acid (FOLVITE) 812 mcg tablet Take 400 mcg by mouth daily      METHYLCOBALAMIN SL Place 1,000 mcg under the tongue 2 (two) times a day       Misc Natural Products (Osteo Bi-Flex Triple Strength) TABS Take by mouth 2 (two) times a day        pregabalin (LYRICA) 100 mg capsule Take 1 capsule (100 mg total) by mouth 2 (two) times a day (Patient taking differently: Take 100 mg by mouth as needed) 60 capsule 2    pyridoxine (B-6) 100 MG tablet Take 100 mg by mouth daily      transdermal buprenorphine (BUTRANS) 5 mcg/hr TD patch Place 1 patch on the skin once a week 4 patch 2    vitamin A 2400 MCG (8000 UT) capsule Take 8,000 Units by mouth daily       No current facility-administered medications on file prior to visit  Social History     Socioeconomic History    Marital status:      Spouse name: Not on file    Number of children: Not on file    Years of education: Not on file    Highest education level: Not on file   Occupational History    Not on file   Tobacco Use    Smoking status: Current Every Day Smoker     Packs/day: 0 50     Types: Cigarettes    Smokeless tobacco: Never Used    Tobacco comment: 1/2 a pack a day/plans to stop 3-4 days before surgery   Vaping Use    Vaping Use: Never used   Substance and Sexual Activity    Alcohol use: No    Drug use: No    Sexual activity: Not on file     Comment: defer   Other Topics Concern    Not on file   Social History Narrative    Not on file     Social Determinants of Health     Financial Resource Strain: Not on file   Food Insecurity: Not on file   Transportation Needs: No Transportation Needs    Lack of Transportation (Medical): No    Lack of Transportation (Non-Medical): No   Physical Activity: Not on file   Stress: Not on file   Social Connections: Not on file   Intimate Partner Violence: Not on file   Housing Stability: Low Risk     Unable to Pay for Housing in the Last Year: No    Number of Places Lived in the Last Year: 1    Unstable Housing in the Last Year: No         I have reviewed the past medical, surgical, social and family history, medications and allergies as documented in the EMR  Review of systems: ROS is negative other than that noted in the HPI  Constitutional: Negative for fatigue and fever  HENT: Negative for sore throat  Respiratory: Negative for shortness of breath  Cardiovascular: Negative for chest pain  Gastrointestinal: Negative for abdominal pain  Endocrine: Negative for cold intolerance and heat intolerance  Genitourinary: Negative for flank pain     Musculoskeletal: Negative for back pain  Skin: Negative for rash  Allergic/Immunologic: Negative for immunocompromised state  Neurological: Negative for dizziness  Psychiatric/Behavioral: Negative for agitation  Physical Exam:    Weight 83 9 kg (185 lb)  General/Constitutional: NAD, well developed, well nourished  HENT: Normocephalic, atraumatic  CV: Intact distal pulses, regular rate  Resp: No respiratory distress or labored breathing  Lymphatic: No lymphadenopathy palpated  Neuro: Alert and Oriented x 3, no focal deficits  Psych: Normal mood, normal affect, normal judgement, normal behavior  Skin: Warm, dry, no rashes, no erythema      Shoulder focused exam:       RIGHT LEFT    Scapula Atrophy Negative Negative     Winging Negative Negative     Protraction Negative Negative    Rotator cuff SS 4+/5 5/5     IS 5/5 5/5     SubS 5/5 5/5    ROM     170     ER0 30 60     IRb Iliac crest    T6    TTP: AC Negative Negative     Biceps Negative Negative     Coracoid Negative Negative    Special Tests: O'Briens Negative Negative     [place]-shear Negative Negative     Cross body Adduction Negative Negative     Speeds  Negative Negative     Mac's Negative Negative     Whipple Positive Negative       Neer Negative Negative     Young Negative Negative    Instability: Apprehension & relocation not tested not tested     Load & shift not tested not tested    Other: Crank Negative Negative                 UE NV Exam: +2 Radial pulses bilaterally  Sensation intact to light touch C5-T1 bilaterally, Radial/median/ulnar nerve motor intact      Bilateral elbow, wrist, and and forearm ROM full, painless with passive ROM, no ttp or crepitance throughout extremities below shoulder joint    Cervical ROM is full without pain, numbness or tingling      Shoulder Imaging    X-rays of the right shoulder were reviewed, which demonstrate moderate AC joint OA  I have reviewed the radiology report and agree with their impression        Scribe Attestation    I,:  Eduin Mclaughlin am acting as a scribe while in the presence of the attending physician :       I,:  Neto Torres, DO personally performed the services described in this documentation    as scribed in my presence :

## 2022-08-02 ENCOUNTER — OFFICE VISIT (OUTPATIENT)
Dept: PAIN MEDICINE | Facility: MEDICAL CENTER | Age: 67
End: 2022-08-02
Payer: COMMERCIAL

## 2022-08-02 VITALS
DIASTOLIC BLOOD PRESSURE: 60 MMHG | SYSTOLIC BLOOD PRESSURE: 108 MMHG | HEIGHT: 75 IN | HEART RATE: 83 BPM | BODY MASS INDEX: 22.88 KG/M2 | WEIGHT: 184 LBS | TEMPERATURE: 97.8 F | OXYGEN SATURATION: 93 %

## 2022-08-02 DIAGNOSIS — G89.29 CHRONIC PAIN OF BOTH KNEES: ICD-10-CM

## 2022-08-02 DIAGNOSIS — M47.816 LUMBAR SPONDYLOSIS: ICD-10-CM

## 2022-08-02 DIAGNOSIS — G89.29 CHRONIC PAIN OF BOTH SHOULDERS: ICD-10-CM

## 2022-08-02 DIAGNOSIS — M54.50 CHRONIC BILATERAL LOW BACK PAIN WITHOUT SCIATICA: ICD-10-CM

## 2022-08-02 DIAGNOSIS — Z79.891 LONG-TERM CURRENT USE OF OPIATE ANALGESIC: ICD-10-CM

## 2022-08-02 DIAGNOSIS — M54.16 LUMBAR RADICULOPATHY: ICD-10-CM

## 2022-08-02 DIAGNOSIS — G89.4 CHRONIC PAIN SYNDROME: Primary | ICD-10-CM

## 2022-08-02 DIAGNOSIS — M25.511 CHRONIC PAIN OF BOTH SHOULDERS: ICD-10-CM

## 2022-08-02 DIAGNOSIS — F11.20 UNCOMPLICATED OPIOID DEPENDENCE (HCC): ICD-10-CM

## 2022-08-02 DIAGNOSIS — M25.561 CHRONIC PAIN OF BOTH KNEES: ICD-10-CM

## 2022-08-02 DIAGNOSIS — M25.562 CHRONIC PAIN OF BOTH KNEES: ICD-10-CM

## 2022-08-02 DIAGNOSIS — G89.29 CHRONIC BILATERAL LOW BACK PAIN WITHOUT SCIATICA: ICD-10-CM

## 2022-08-02 DIAGNOSIS — M25.512 CHRONIC PAIN OF BOTH SHOULDERS: ICD-10-CM

## 2022-08-02 DIAGNOSIS — G62.9 NEUROPATHY: ICD-10-CM

## 2022-08-02 PROCEDURE — 80305 DRUG TEST PRSMV DIR OPT OBS: CPT | Performed by: NURSE PRACTITIONER

## 2022-08-02 PROCEDURE — 99214 OFFICE O/P EST MOD 30 MIN: CPT | Performed by: NURSE PRACTITIONER

## 2022-08-02 RX ORDER — BUPRENORPHINE 5 UG/H
1 PATCH TRANSDERMAL WEEKLY
Qty: 4 PATCH | Refills: 2 | Status: SHIPPED | OUTPATIENT
Start: 2022-08-14 | End: 2022-10-28 | Stop reason: SDUPTHER

## 2022-08-02 NOTE — PROGRESS NOTES
Assessment:  1  Chronic pain syndrome    2  Lumbar radiculopathy - Right    3  Neuropathy    4  Lumbar spondylosis - Bilateral    5  Uncomplicated opioid dependence ([address] )    6  Long-term current use of opiate analgesic    7  Chronic bilateral low back pain without sciatica    8  Chronic pain of both knees    9  Chronic pain of both shoulders        Plan:  1  Patient may continue Butrans patch 5 mcg/hour 1 patch TD weekly for pain  Patient was provided with a 3 month prescription today    [place] Prescription Drug Monitoring Program report was reviewed and was appropriate     A urine drug screen was collected at today's office visit as part of our medication management protocol  The point of care testing results were appropriate for what was being prescribed  The specimen will be sent for confirmatory testing  The drug screen is medically necessary because the patient is either dependent on opioid medication or is being considered for opioid medication therapy and the results could impact ongoing or future treatment  The drug screen is to evaluate for the presences or absence of prescribed, non-prescribed, and/or illicit drugs/substances  There are risks associated with opioid medications, including dependence, addiction and tolerance  The patient understands and agrees to use these medications only as prescribed  Potential side effects of the medications include, but are not limited to, constipation, drowsiness, addiction, impaired judgment and risk of fatal overdose if not taken as prescribed  The patient was warned against driving while taking sedation medications  Sharing medications is a felony  At this point in time, the patient is showing no signs of addiction, abuse, diversion or suicidal ideation  2  The patient will follow-up in 3 months or sooner if needed    History of Present Illness:     The patient is a 78 y o  male last seen on 5/2/22 who presents for a follow up office visit in regards to chronic low back and right leg pain with numbness in the right shin   He denies bowel or bladder incontinence or saddle anesthesia  He states rates his pain a 3 to 5/10 on the numeric pain rating scale  The pain is intermittent in the evening and it is described as dull aching, sharp, pressure-like, numbness and pins and needles  He takes Butrans patch 5 mcg/hour 1 patch TD weekly with 20-40% improvement of his pain without side effects    Pt is on day 6 of the butrans patch    Pain Contract Signed: 05/14/22  Last Urine Drug Screen: 08/02/22    I have personally reviewed and/or updated the patient's past medical history, past surgical history, family history, social history, current medications, allergies, and vital signs today  Review of Systems:    Review of Systems   Respiratory: Negative for shortness of breath  Cardiovascular: Negative for chest pain  Gastrointestinal: Positive for constipation  Negative for diarrhea, nausea and vomiting  Musculoskeletal: Positive for back pain, gait problem, joint swelling and myalgias  Negative for arthralgias  Skin: Negative for rash  Neurological: Negative for dizziness, seizures and weakness  All other systems reviewed and are negative          Past Medical History:   Diagnosis Date    Arthritis feb 2016    osto back    Chronic back pain     Chronic narcotic dependence ([facility] [address] [facility]     Chronic pain disorder     Sees pain management Dr Jim Cardoza    Cigarette smoker     Depression     Dry skin     Dyslexia     Full dentures     upper and no teeth lower/"avoid raw vegetables and chewy food"    GERD (gastroesophageal reflux disease)     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 related    History of blood clots     "left leg" takes Eliquis    History of bronchitis     History of pneumonia     childhood    Increased pressure in the eye, bilateral     L4-L5 disc bulge 06/16/2017    Low back pain     Muscle weakness     Neuropathy     right foot and left foot    Osteoarthritis     Osteoarthritis     and" if becomes cold joint pain worsens"    Peripheral neuropathy 8/ 15    Pulmonary emphysema ([address] )     "mild"    Right foot drop     Shortness of breath     "only on humid days"    Smokers' cough ([facility] [address] )     Wears glasses     Work related injury 04/24/2018       Past Surgical History:   Procedure Laterality Date   [facility] SURGERY  aug 2015- [id number]    2017 no affect    COLONOSCOPY      EGD AND COLONOSCOPY N/A 06/29/2018    Procedure: EGD AND COLONOSCOPY;  Surgeon: Cody Delong MD;  Location: [facility] GI LAB; Service: Gastroenterology    EPIDURAL BLOCK INJECTION      needle    HIP ARTHROSCOPY W/ LABRAL DEBRIDEMENT Left     "hip surgery"    IR LOWER EXTREMITY ANGIOGRAM  12/08/2021    ORTHOPEDIC SURGERY      MS HIP [address] BODY,PLASTY/RESECTN Left 04/30/2018    Procedure: LEFT HIP ARTHROSCOPIC LABRAL DEBRIDEMENT;  Surgeon: Vane Perkins MD;  [facility];  Service: Orthopedics    [address] PEL/TR [facility] Right 12/08/2021    Procedure: COMPLETION ARTERIOGRAM WITH 5 MM ANGIOPLASTY OF MID SUPERFICIAL FEMORAL ARTERY;  Surgeon: Mendel Angel, MD;  [facility];  Service: Vascular    MS VEIN IN SITU BYPASS GRAFT,FEM-POP Right 12/08/2021    Procedure: R SFA to BK Pop Bypass using insitu GSV,  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;  Surgeon: Mendel Angel, MD;  [facility];  Service: Vascular    TONSILLECTOMY      WISDOM TOOTH EXTRACTION         Family History   Problem Relation Age of Onset    Dementia Mother     Glaucoma Mother     Arthritis Mother         nursing home    Cancer Father         dead    Osteoarthritis Family        Social History     Occupational History    Not on file   Tobacco Use    Smoking status: Passive Smoke Exposure - Never Smoker    Smokeless tobacco: Never Used    Tobacco comment: stress caused by workers comp   not covering meds    delays   Vaping Use    Vaping Use: Never used Substance and Sexual Activity    Alcohol use: No    Drug use: No    Sexual activity: Never     Comment: defer         Current Outpatient Medications:     acetaminophen (TYLENOL) 500 mg tablet, Take 1,000 mg by mouth every 12 (twelve) hours as needed for mild pain, Disp: , Rfl:     apixaban (ELIQUIS) 5 mg, Take 1 tablet (5 mg total) by mouth 2 (two) times a day, Disp: 180 tablet, Rfl: 3    ascorbic Acid (VITAMIN C) 500 MG CPCR, Take 500 mg by mouth 2 (two) times a day, Disp: , Rfl:     aspirin (ECOTRIN LOW STRENGTH) 81 mg EC tablet, Take 1 tablet (81 mg total) by mouth daily, Disp: 90 tablet, Rfl: 0    atorvastatin (LIPITOR) 10 mg tablet, Take 0 5 tablets (5 mg total) by mouth daily at bedtime, Disp: 45 tablet, Rfl: 1    cholecalciferol (VITAMIN D3) 1,000 units tablet, Take 2,000 Units by mouth 2 (two) times a day , Disp: , Rfl:     clotrimazole (LOTRIMIN) 1 % cream, Apply 1 g (1 application total) topically daily To affected area (Patient taking differently: Apply 1 application topically as needed To affected area), Disp: 60 g, Rfl: 2    Flaxseed, Linseed, (FLAX SEED OIL PO), Take 1,200 capsules by mouth 2 (two) times a day  , Disp: , Rfl:     folic acid (FOLVITE) 714 mcg tablet, Take 400 mcg by mouth daily, Disp: , Rfl:     METHYLCOBALAMIN SL, Place 1,000 mcg under the tongue 2 (two) times a day , Disp: , Rfl:     Misc Natural Products (Osteo Bi-Flex Triple Strength) TABS, Take by mouth 2 (two) times a day  , Disp: , Rfl:     pyridoxine (B-6) 100 MG tablet, Take 100 mg by mouth daily, Disp: , Rfl:     [START ON 8/14/2022] transdermal buprenorphine (BUTRANS) 5 mcg/hr TD patch, Place 1 patch on the skin once a week, Disp: 4 patch, Rfl: 2    vitamin A 2400 MCG (8000 UT) capsule, Take 8,000 Units by mouth daily, Disp: , Rfl:     Allergies   Allergen Reactions    Cyanocobalamin [Vitamin B12] GI Intolerance    Gabapentin Other (See Comments) and GI Intolerance     Difficulty walking    Morphine Other (See Comments)     "acute constipation"    Tramadol Confusion    Nsaids Other (See Comments)     Pt reports avoids as is on Eliquis       Physical Exam:    /60   Pulse 83   Temp 97 8 °F (36 6 °C)   Ht 6' 3" (1 905 m)   Wt 83 5 kg (184 lb)   SpO2 93%   BMI 23 00 kg/m²     Constitutional:normal, well developed, well nourished, alert, in no distress and non-toxic and no overt pain behavior    Eyes:anicteric  HEENT:grossly intact  Neck:supple, symmetric, trachea midline and no masses   Pulmonary:even and unlabored  Cardiovascular:No edema or pitting edema present  Skin:Normal without rashes or lesions and well hydrated  Psychiatric:Mood and affect appropriate  Neurologic:Cranial Nerves II-XII grossly intact  Musculoskeletal:normal gait      Imaging  No orders to display         Orders Placed This Encounter   Procedures    MM ALL_Prescribed Meds and Special Instructions    MM DT_Alprazolam Definitive Test    MM DT_Amphetamine Definitive Test    MM DT_Aripiprazole Definitive Test    MM DT_Bath Salts Definitive Test    MM DT_Buprenorphine Definitive Test    MM DT_Butalbital Definitive Test    MM DT_Clonazepam Definitive Test    MM DT_Clozapine Definitive Test    MM DT_Cocaine Definitive Test    MM DT_Codeine Definitive Test    MM DT_Desipramine Definitive Test    MM DT_Dextromethorphan Definitive Test    MM Diazepam Definitive Test    MM DT_Ethyl Glucuronide/Ethyl Sulfate Definitive Test    MM DT_Fentanyl Definitive Test    MM DT_Heroin Definitive Test    MM DT_Hydrocodone Definitive Test    MM DT_Hydromorphone Definitive Test    MM DT_Kratom Definitive Test    MM DT_Levorphanol Definitive Test    MM Lorazepam Definitive Test    MM DT_MDMA Definitive Test    MM DT_Meperidine Definitive Test    MM DT_Methadone Definitive Test    MM DT_Methamphetamine Definitive Test    MM DT_Methylphenidate Definitive Test    MM DT_Morphine Definitive Test    MM DT_Olanzapine Definitive Test  MM DT_Oxazepam Definitive Test    MM DT_Oxycodone Definitive Test    MM DT_Oxymorphone Definitive Test    MM DT_Phenobarbital Definitive Test    MM DT_Phentermine Definitive Test    MM DT_Secobarbital Definitive Test    MM DT_Spice Definitive Test    MM DT_Tapentadol Definitive Test    MM DT_Temazapam Definitive Test    MM DT_THC Definitive Test    MM DT_Tramadol Definitive Test    MM DT_Validity Specific    MM DT_Validity pH    MM DT_Validity Oxidant    MM DT_Validity Creatinine

## 2022-08-04 ENCOUNTER — OFFICE VISIT (OUTPATIENT)
Dept: CARDIAC SURGERY | Facility: CLINIC | Age: 67
End: 2022-08-04
Payer: COMMERCIAL

## 2022-08-04 VITALS
WEIGHT: 182.98 LBS | SYSTOLIC BLOOD PRESSURE: 110 MMHG | BODY MASS INDEX: 22.75 KG/M2 | RESPIRATION RATE: 17 BRPM | OXYGEN SATURATION: 93 % | HEIGHT: 75 IN | DIASTOLIC BLOOD PRESSURE: 74 MMHG | TEMPERATURE: 97.8 F | HEART RATE: 52 BPM

## 2022-08-04 DIAGNOSIS — R91.8 MULTIPLE SUBSOLID LUNG NODULES GREATER THAN 6 MM IN DIAMETER: ICD-10-CM

## 2022-08-04 PROCEDURE — 1160F RVW MEDS BY RX/DR IN RCRD: CPT | Performed by: THORACIC SURGERY (CARDIOTHORACIC VASCULAR SURGERY)

## 2022-08-04 PROCEDURE — 99205 OFFICE O/P NEW HI 60 MIN: CPT | Performed by: THORACIC SURGERY (CARDIOTHORACIC VASCULAR SURGERY)

## 2022-08-04 NOTE — PROGRESS NOTES
Thoracic Consult  Assessment/Plan:    Multiple subsolid lung nodules greater than 6 mm in diameter  We had a discussion with Mr Immanuel Aldridge, after personally reviewing his medical history and imaging  He has some right upper lobe lung nodules that have increased in size since his previous imaging  Therefore, these are concerning for a malignant process  Dr Haley Tomas is recommending complete PFT's, a repeat PET scan, and being presented at [facility] after the above testing is completed  We will have him return to our office to discuss further treatment options at that time, as he might require a biopsy  He is in agreement with the plan  Diagnoses and all orders for this visit:    Multiple subsolid lung nodules greater than 6 mm in diameter  -     Ambulatory Referral to Thoracic Surgery  -     Ambulatory Referral to Oncology Social Worker; Future  -     Complete PFT with post bronchodilator; Future  -     NM PET CT skull base to mid thigh; Future             Thoracic History   Diagnosis: Multiple pulmonary nodules   Procedures/Surgeries:    Pathology:    Adjuvant Therapy:          Patient ID: Jarvis Aj is a 78 y o  male  ECOG 1    HPI     Mr Immanuel Aldridge is a 77 yo gentleman with a history of COPD, DVT summer 2021, HLD, OA, and PVD who was referred by Dr Carmen Brady for multiple sub-solid pulmonary nodules  He had a lung cancer screening CT in December 2019 which revealed a couple of right upper lobe nodules, including a 10 x 8 x 7 mm spiculated nodule  PET from 6/21 did not have any associated activity within any nodules and the previously seen nodules were either stable or had decreased in size  A follow up CT scan of the chest on 7/18/22 revealed multiple pulmonary nodules within the right upper lobe measuring 9 x 6 mm (previously 5 x 4 mm), 7 x 12 mm (previously 7 x 9 mm), and a 10 x 7 mm spiculated solid nodule: unchanged  Radiology recommended a repeat PET scan vs  3 mo follow up CT scan       He quit smoking 4 days ago  He was a 50 ppy smoker  He has not had any previous chest surgeries  He is on [place] and Eliquis  On discussion, he uses a cane for back/hip/knee pain  He also has right foot drop  The following portions of the patient's history were reviewed and updated as appropriate: allergies, current medications, past family history, past medical history, past social history, past surgical history and problem list     Past Medical History:   Diagnosis Date    Arthritis feb 2016    osto back    Chronic back pain     Chronic narcotic dependence (HCC)     Chronic pain disorder     Sees pain management Dr Jim Cardoza    Cigarette smoker     Depression     Dry skin     Dyslexia     Full dentures     upper and no teeth lower/"avoid raw vegetables and chewy food"    GERD (gastroesophageal reflux disease)     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 related    History of blood clots     "left leg" takes Eliquis    History of bronchitis     History of pneumonia     childhood    Increased pressure in the eye, bilateral     L4-L5 disc bulge 06/16/2017    Low back pain     Muscle weakness     Neuropathy     right foot and left foot    Osteoarthritis     Osteoarthritis     and" if becomes cold joint pain worsens"    Peripheral neuropathy 8/ 15    Pulmonary emphysema ([address] )     "mild"    Right foot drop     Shortness of breath     "only on humid days"    Smokers' cough ([address] )     Wears glasses     Work related injury 04/24/2018      Past Surgical History:   Procedure Laterality Date   [facility] SURGERY  aug 2015- [id number]    2017 no affect    COLONOSCOPY      EGD AND COLONOSCOPY N/A 06/29/2018    Procedure: EGD AND COLONOSCOPY;  Surgeon: Swathi Reyes MD;  Location: [facility] GI LAB;   Service: Gastroenterology    EPIDURAL BLOCK INJECTION      needle    HIP ARTHROSCOPY W/ LABRAL DEBRIDEMENT Left     "hip surgery"    IR LOWER EXTREMITY ANGIOGRAM  12/08/2021    ORTHOPEDIC SURGERY      IN HIP SCOPE/REMV BODY,PLASTY/RESECTN Left 04/30/2018    Procedure: LEFT HIP ARTHROSCOPIC LABRAL DEBRIDEMENT;  Surgeon: Erin Turner MD;  [facility];  Service: Orthopedics    FL [address] 3RD+ ORD SLCTV ABDL PEL/LXTR [facility] Right 12/08/2021    Procedure: COMPLETION ARTERIOGRAM WITH 5 MM ANGIOPLASTY OF MID SUPERFICIAL FEMORAL ARTERY;  Surgeon: Lion Stephenson MD;  [facility];  Service: Vascular    FL VEIN IN SITU BYPASS GRAFT,FEM-POP Right 12/08/2021    Procedure: R SFA to BK Pop Bypass using insitu GSV,  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;  Surgeon: Lion Stephenson MD;  [facility];  Service: Vascular    TONSILLECTOMY      WISDOM TOOTH EXTRACTION        Family History   Problem Relation Age of Onset    Dementia Mother     Glaucoma Mother     Arthritis Mother         nursing home    Cancer Father         dead    Osteoarthritis Family       Social History     Socioeconomic History    Marital status:      Spouse name: Not on file    Number of children: Not on file    Years of education: Not on file    Highest education level: Not on file   Occupational History    Not on file   Tobacco Use    Smoking status: Passive Smoke Exposure - Never Smoker    Smokeless tobacco: Never Used    Tobacco comment: stress caused by workers comp   not covering meds  delays   Vaping Use    Vaping Use: Never used   Substance and Sexual Activity    Alcohol use: No    Drug use: No    Sexual activity: Never     Comment: defer   Other Topics Concern    Not on file   Social History Narrative    Not on file     Social Determinants of Health     Financial Resource Strain: Not on file   Food Insecurity: Not on file   Transportation Needs: No Transportation Needs    Lack of Transportation (Medical): No    Lack of Transportation (Non-Medical):  No   Physical Activity: Not on file   Stress: Not on file   Social Connections: Not on file   Intimate Partner Violence: Not on file   Housing Stability: Low Risk     Unable to Pay for Housing in the Last Year: No    Number of Places Lived in the Last Year: 1    Unstable Housing in the Last Year: No        Allergies   Allergen Reactions    Cyanocobalamin [Vitamin B12] GI Intolerance    Gabapentin Other (See Comments) and GI Intolerance     Difficulty walking    Morphine Other (See Comments)     "acute constipation"    Tramadol Confusion    Nsaids Other (See Comments)     Pt reports avoids as is on Eliquis     Current Outpatient Medications on File Prior to Visit   Medication Sig Dispense Refill    acetaminophen (TYLENOL) 500 mg tablet Take 1,000 mg by mouth every 12 (twelve) hours as needed for mild pain      apixaban (ELIQUIS) 5 mg Take 1 tablet (5 mg total) by mouth 2 (two) times a day 180 tablet 3    ascorbic Acid (VITAMIN C) 500 MG CPCR Take 500 mg by mouth 2 (two) times a day      aspirin (ECOTRIN LOW STRENGTH) 81 mg EC tablet Take 1 tablet (81 mg total) by mouth daily 90 tablet 0    atorvastatin (LIPITOR) 10 mg tablet Take 0 5 tablets (5 mg total) by mouth daily at bedtime 45 tablet 1    cholecalciferol (VITAMIN D3) 1,000 units tablet Take 2,000 Units by mouth 2 (two) times a day       clotrimazole (LOTRIMIN) 1 % cream Apply 1 g (1 application total) topically daily To affected area (Patient taking differently: Apply 1 application topically as needed To affected area) 60 g 2    Flaxseed, Linseed, (FLAX SEED OIL PO) Take 1,200 capsules by mouth 2 (two) times a day        folic acid (FOLVITE) 994 mcg tablet Take 400 mcg by mouth daily      METHYLCOBALAMIN SL Place 1,000 mcg under the tongue 2 (two) times a day       Misc Natural Products (Osteo Bi-Flex Triple Strength) TABS Take by mouth 2 (two) times a day        pyridoxine (B-6) 100 MG tablet Take 100 mg by mouth daily      [START ON 8/14/2022] transdermal buprenorphine (BUTRANS) 5 mcg/hr TD patch Place 1 patch on the skin once a week 4 patch 2    vitamin A 2400 MCG (8000 UT) capsule Take 8,000 Units by mouth daily No current facility-administered medications on file prior to visit  Review of Systems   Constitutional: Positive for unexpected weight change (10 lbs wt loss over 3 months secondary to increased activity )  Negative for activity change, appetite change, chills, fatigue and fever  HENT: Positive for trouble swallowing  Negative for postnasal drip, sore throat and voice change  Eyes: Positive for visual disturbance (wears glasses)  Respiratory: Negative for cough, chest tightness, shortness of breath and wheezing  Cardiovascular: Negative for chest pain and leg swelling  Gastrointestinal: Negative for abdominal pain, nausea and vomiting  Musculoskeletal: Positive for back pain and gait problem  Negative for arthralgias and myalgias  Right shoulder pain  Skin: Negative for color change  Neurological: Negative for dizziness, syncope, light-headedness and headaches  Psychiatric/Behavioral: Negative for agitation, behavioral problems and confusion  The patient is not nervous/anxious  All other systems reviewed and are negative  Objective:   Physical Exam  Vitals reviewed  Constitutional:       General: He is not in acute distress  Appearance: Normal appearance  He is well-developed  He is not ill-appearing or diaphoretic  HENT:      Head: Normocephalic and atraumatic  Mouth/Throat:      Comments: Masked   Eyes:      General: No scleral icterus  Extraocular Movements: Extraocular movements intact  Comments: Glasses     Neck:      Trachea: No tracheal deviation  Cardiovascular:      Rate and Rhythm: Normal rate and regular rhythm  Pulses: Normal pulses  Heart sounds: Normal heart sounds  No murmur heard  Pulmonary:      Effort: Pulmonary effort is normal  No respiratory distress  Breath sounds: Normal breath sounds  No wheezing  Abdominal:      General: Bowel sounds are normal  There is no distension  Palpations: Abdomen is soft  Musculoskeletal:         General: Normal range of motion  Cervical back: Normal range of motion and neck supple  Right lower leg: No edema  Left lower leg: No edema  Comments: Uses a cane for back/hip/knee pain  Left ankle/foot varicose veins     Lymphadenopathy:      Cervical: No cervical adenopathy  Skin:     General: Skin is warm and dry  Findings: No erythema  Neurological:      Mental Status: He is alert and oriented to person, place, and time  Cranial Nerves: No cranial nerve deficit  Gait: Gait abnormal    Psychiatric:         Mood and Affect: Mood normal          Behavior: Behavior normal          Thought Content:  Thought content normal      /74   Pulse (!) 52   Temp 97 8 °F (36 6 °C)   Resp 17   Ht 6' 3" (1 905 m)   Wt 83 kg (182 lb 15 7 oz)   SpO2 93%   BMI 22 87 kg/m²

## 2022-08-04 NOTE — ASSESSMENT & PLAN NOTE
We had a discussion with Mr Mari Beasley, after personally reviewing his medical history and imaging  He has some right upper lobe lung nodules that have increased in size since his previous imaging  Therefore, these are concerning for a malignant process  Dr Eusebia Gaming is recommending complete PFT's, a repeat PET scan, and being presented at [facility] after the above testing is completed  We will have him return to our office to discuss further treatment options at that time, as he might require a biopsy  He is in agreement with the plan

## 2022-08-05 ENCOUNTER — PATIENT OUTREACH (OUTPATIENT)
Dept: CASE MANAGEMENT | Facility: OTHER | Age: 67
End: 2022-08-05

## 2022-08-05 ENCOUNTER — DOCUMENTATION (OUTPATIENT)
Dept: HEMATOLOGY ONCOLOGY | Facility: CLINIC | Age: 67
End: 2022-08-05

## 2022-08-05 LAB
6MAM UR QL CFM: NEGATIVE NG/ML
7AMINOCLONAZEPAM UR QL CFM: NEGATIVE NG/ML
A-OH ALPRAZ UR QL CFM: NEGATIVE NG/ML
ACCEPTABLE CREAT UR QL: NORMAL MG/DL
ACCEPTIBLE SP GR UR QL: NORMAL
AMPHET UR QL CFM: NEGATIVE NG/ML
AMPHET UR QL CFM: NEGATIVE NG/ML
BUPRENORPHINE UR QL CFM: ABNORMAL NG/ML
BUTALBITAL UR QL CFM: NEGATIVE NG/ML
BZE UR QL CFM: NEGATIVE NG/ML
CODEINE UR QL CFM: NEGATIVE NG/ML
DESIPRAMINE UR QL CFM: NEGATIVE NG/ML
EDDP UR QL CFM: NEGATIVE NG/ML
ETHYL GLUCURONIDE UR QL CFM: NEGATIVE NG/ML
ETHYL SULFATE UR QL SCN: NEGATIVE NG/ML
EUTYLONE UR QL: NEGATIVE NG/ML
FENTANYL UR QL CFM: NEGATIVE NG/ML
GLIADIN IGG SER IA-ACNC: NEGATIVE NG/ML
GLUCOSE 30M P 50 G LAC PO SERPL-MCNC: NEGATIVE NG/ML
HYDROCODONE UR QL CFM: NEGATIVE NG/ML
HYDROCODONE UR QL CFM: NEGATIVE NG/ML
HYDROMORPHONE UR QL CFM: NEGATIVE NG/ML
LORAZEPAM UR QL CFM: NEGATIVE NG/ML
MDMA UR QL CFM: NEGATIVE NG/ML
ME-PHENIDATE UR QL CFM: NEGATIVE NG/ML
MEPERIDINE UR QL CFM: NEGATIVE NG/ML
METHADONE UR QL CFM: NEGATIVE NG/ML
METHAMPHET UR QL CFM: NEGATIVE NG/ML
MORPHINE UR QL CFM: NEGATIVE NG/ML
MORPHINE UR QL CFM: NEGATIVE NG/ML
NITRITE UR QL: NORMAL UG/ML
NORBUPRENORPHINE UR QL CFM: ABNORMAL NG/ML
NORDIAZEPAM UR QL CFM: NEGATIVE NG/ML
NORFENTANYL UR QL CFM: NEGATIVE NG/ML
NORHYDROCODONE UR QL CFM: NEGATIVE NG/ML
NORHYDROCODONE UR QL CFM: NEGATIVE NG/ML
NORMEPERIDINE UR QL CFM: NEGATIVE NG/ML
NOROXYCODONE UR QL CFM: NEGATIVE NG/ML
OLANZAPINE QUANTIFICATION: NEGATIVE NG/ML
OPC-3373 QUANTIFICATION: NEGATIVE
OXAZEPAM UR QL CFM: NEGATIVE NG/ML
OXYCODONE UR QL CFM: NEGATIVE NG/ML
OXYMORPHONE UR QL CFM: NEGATIVE NG/ML
OXYMORPHONE UR QL CFM: NEGATIVE NG/ML
PHENOBARB UR QL CFM: NEGATIVE NG/ML
RESULT ALL_PRESCRIBED MEDS AND SPECIAL INSTRUCTIONS: NORMAL
SECOBARBITAL UR QL CFM: NEGATIVE NG/ML
SL AMB 3-METHYL-FENTANYL QUANTIFICATION: NORMAL NG/ML
SL AMB 4-ANPP QUANTIFICATION: NORMAL NG/ML
SL AMB 4-FIBF QUANTIFICATION: NORMAL NG/ML
SL AMB 5F-ADB-M7 METABOLITE QUANTIFICATION: NEGATIVE NG/ML
SL AMB 7-OH-MITRAGYNINE (KRATOM ALKALOID) QUANTIFICATION: NEGATIVE NG/ML
SL AMB AB-FUBINACA-M3 METABOLITE QUANTIFICATION: NEGATIVE NG/ML
SL AMB ACETYL FENTANYL QUANTIFICATION: NORMAL NG/ML
SL AMB ACETYL NORFENTANYL QUANTIFICATION: NORMAL NG/ML
SL AMB ACRYL FENTANYL QUANTIFICATION: NORMAL NG/ML
SL AMB BUTRYL FENTANYL QUANTIFICATION: NORMAL NG/ML
SL AMB CARFENTANIL QUANTIFICATION: NORMAL NG/ML
SL AMB CLOZAPINE QUANTIFICATION: NEGATIVE NG/ML
SL AMB CTHC (MARIJUANA METABOLITE) QUANTIFICATION: NEGATIVE NG/ML
SL AMB CYCLOPROPYL FENTANYL QUANTIFICATION: NORMAL NG/ML
SL AMB DEXTROMETHORPHAN QUANTIFICATION: NEGATIVE NG/ML
SL AMB DEXTRORPHAN (DEXTROMETHORPHAN METABOLITE) QUANT: NEGATIVE NG/ML
SL AMB DEXTRORPHAN (DEXTROMETHORPHAN METABOLITE) QUANT: NEGATIVE NG/ML
SL AMB FURANYL FENTANYL QUANTIFICATION: NORMAL NG/ML
SL AMB JWH018 METABOLITE QUANTIFICATION: NEGATIVE NG/ML
SL AMB JWH073 METABOLITE QUANTIFICATION: NEGATIVE NG/ML
SL AMB MDMB-FUBINACA-M1 METABOLITE QUANTIFICATION: NEGATIVE NG/ML
SL AMB METHOXYACETYL FENTANYL QUANTIFICATION: NORMAL NG/ML
SL AMB METHYLONE QUANTIFICATION: NEGATIVE NG/ML
SL AMB N-DESMETHYL U-47700 QUANTIFICATION: NORMAL NG/ML
SL AMB N-DESMETHYL-TRAMADOL QUANTIFICATION: NEGATIVE NG/ML
SL AMB N-DESMETHYLCLOZAPINE QUANTIFICATION: NEGATIVE NG/ML
SL AMB PHENTERMINE QUANTIFICATION: NEGATIVE NG/ML
SL AMB RCS4 METABOLITE QUANTIFICATION: NEGATIVE NG/ML
SL AMB RITALINIC ACID QUANTIFICATION: NEGATIVE NG/ML
SL AMB U-47700 QUANTIFICATION: NORMAL NG/ML
SPECIMEN DRAWN SERPL: NEGATIVE NG/ML
SPECIMEN PH ACCEPTABLE UR: NORMAL
TAPENTADOL UR QL CFM: NEGATIVE NG/ML
TEMAZEPAM UR QL CFM: NEGATIVE NG/ML
TEMAZEPAM UR QL CFM: NEGATIVE NG/ML
TRAMADOL UR QL CFM: NEGATIVE NG/ML
URATE/CREAT 24H UR: NEGATIVE NG/ML

## 2022-08-05 NOTE — PROGRESS NOTES
Chart reviewed in preparation of Thoracic Tumor Conference presentation by Dr Garett Perez on 8/22/22  Patient had lung screening CT on 12/3/19 demonstrating several right upper lobe nodules, including a 10 x 8 x 7 mm spiculated nodule  PET from 6/21 did not have any associated activity within any nodules and the previously seen nodules were either stable or had decreased in size  Follow up chest CT on 7/18/22 revealed multiple pulmonary nodules within the right upper lobe measuring 9 x 6 mm (previously 5 x 4 mm), 7 x 12 mm (previously 7 x 9 mm), and a 10 x 7 mm spiculated solid nodule: unchanged  PET/CT is scheduled for 8/15/22

## 2022-08-08 ENCOUNTER — EVALUATION (OUTPATIENT)
Dept: PHYSICAL THERAPY | Facility: REHABILITATION | Age: 67
End: 2022-08-08
Payer: COMMERCIAL

## 2022-08-08 DIAGNOSIS — M25.511 ACUTE PAIN OF RIGHT SHOULDER: Primary | ICD-10-CM

## 2022-08-08 DIAGNOSIS — M67.911 ROTATOR CUFF DISORDER, RIGHT: ICD-10-CM

## 2022-08-08 PROCEDURE — 97163 PT EVAL HIGH COMPLEX 45 MIN: CPT | Performed by: PHYSICAL THERAPIST

## 2022-08-08 PROCEDURE — 97110 THERAPEUTIC EXERCISES: CPT | Performed by: PHYSICAL THERAPIST

## 2022-08-08 NOTE — PROGRESS NOTES
PT Evaluation     Today's date: 2022  Patient name: Chucho Aranda  : 1955  MRN: [id number]  Referring provider: Candelario Harmon DO  Dx:   Encounter Diagnosis     ICD-10-CM    1  Acute pain of right shoulder  M25 511 Ambulatory Referral to Physical Therapy   2  Rotator cuff disorder, right  M67 911 Ambulatory Referral to Physical Therapy                  Assessment  Assessment details: Pt is a 78year old right-handed male presenting to PT with 9 month history of right shoulder pain  Pt presents with pain, decreased range of motion, decreased strength, and decreased tolerance to activity  Pt is a good candidate for outpatient physical therapy and would benefit from skilled physical therapy to address limitations and to achieve goals  Thank you for this referral    Impairments: abnormal coordination, abnormal or restricted ROM, activity intolerance, impaired physical strength, lacks appropriate home exercise program, pain with function and poor posture   Understanding of Dx/Px/POC: good   Prognosis: good    Goals  ST  Patient will report 25% decrease in pain in 4 weeks  2  Patient will demonstrate 25% improvement in ROM in 4 weeks  3  Patient will demonstrate 1/2 grade improvement in strength in 4 weeks  LT  Patient will be able to perform IADLS without restriction or pain by discharge  2  Patient will be independent in HEP by discharge  3  Patient will be able to return to recreational/work duties without restriction or pain by discharge        Plan  Patient would benefit from: PT eval and skilled PT  Planned modality interventions: cryotherapy and thermotherapy: hydrocollator packs  Planned therapy interventions: IADL retraining, body mechanics training, flexibility, functional ROM exercises, home exercise program, neuromuscular re-education, manual therapy, postural training, strengthening, stretching, therapeutic activities, therapeutic exercise and joint mobilization  Frequency: 2x week  Duration in visits: 8  Duration in weeks: 4  Treatment plan discussed with: patient        Subjective Evaluation    History of Present Illness  Mechanism of injury: Pt is a 78year old right-handed male presenting to PT with 9 month history of right shoulder pain  Pt reports he had right leg surgery in December 2021, and feels he injured his shoulder "pulling" himself up from bed and out of the chair for several days  Pt reports he waiting several months to see if his pain would improve, when it didn't he went to PCP where x-rays revealed mild osteoarthritis of the glenohumeral and acromioclavicular joints  Pt referred to Dr Herminia Barker for further evaluation, no new diagnostic imaging performed, no medications prescribed  Pt referred to OPPT  Pt is not working due to disability      Pain Location: right proximal biceps and right shoulder region    Pain Type: stabbing, sharp    Pain Intensity:  Current: 0  Best: 0  Worst: 7      Pt reports increased pain and/or difficulty with: reaching his right arm overhead, behind his back, lifting, extending right arm, reaching across body, ADLs, laying on right side      Pt reports decreased pain with: Tylenol, Icy Hot, pain patch            Recurrent probem    Quality of life: good      Diagnostic Tests  X-ray: abnormal  Treatments  Current treatment: medication  Patient Goals  Patient goals for therapy: decreased pain, increased motion, increased strength and independence with ADLs/IADLs          Objective     Postural Observations  Seated posture: fair  Standing posture: fair    Additional Postural Observation Details  Moderate forward head, rounded shoulders, increased thoracic kyphosis    Cervical/Thoracic Screen   Cervical range of motion within normal limits    Active Range of Motion   Left Shoulder   Flexion: WFL  Abduction: WFL  External rotation BTH: C7   Internal rotation BTB: T12     Right Shoulder   Flexion: 95 degrees with pain  Abduction: 90 degrees with pain  External rotation BTH: Active external rotation behind the head: right ear  with pain  Internal rotation BTB: Active internal rotation behind the back: right greater trochanter  with pain    Joint Play   Left Shoulder  Joints within functional limits are the anterior capsule, posterior capsule and inferior capsule  Right Shoulder  Hypomobile in the anterior capsule, posterior capsule and inferior capsule  Strength/Myotome Testing     Left Shoulder   Normal muscle strength    Isolated Muscles   Biceps: 5   Lower trapezius: 4+   Triceps: 5     Right Shoulder     Planes of Motion   Flexion: 3-   Abduction: 3-   External rotation at 0°: 3   Internal rotation at 0°: 3-     Isolated Muscles   Biceps: 4   Lower trapezius: 3+   Triceps: 4+     Tests     Right Shoulder   Positive belly press, lift-off and Speed's                Precautions: chronic back pain, chronic narcotic dependence, chronic pain disorder, depression, GERD, h/o DVT, bilateral LE neuropathy, peripheral neuropathy, pulmonary emphysema, right foot drop         Daily Treatment Diary      Assessment  8/8                     Dorinaal/Noé GOMEZ                     FOTO 41/64       **         **   Manuals   Graston R proximal biceps and biceps tendon                       R Shoulder PROM                          Exercise Diary     Corner Pec Stretch  30"x3                      Supine Pec Stretch  30"x3                                                                                                                                                                                                                                                                                                                                                                     Modalities    CP R Shoulder   Post-Tx

## 2022-08-10 ENCOUNTER — OFFICE VISIT (OUTPATIENT)
Dept: PHYSICAL THERAPY | Facility: REHABILITATION | Age: 67
End: 2022-08-10
Payer: COMMERCIAL

## 2022-08-10 DIAGNOSIS — M25.511 ACUTE PAIN OF RIGHT SHOULDER: Primary | ICD-10-CM

## 2022-08-10 DIAGNOSIS — M67.911 ROTATOR CUFF DISORDER, RIGHT: ICD-10-CM

## 2022-08-10 PROCEDURE — 97140 MANUAL THERAPY 1/> REGIONS: CPT

## 2022-08-10 PROCEDURE — 97110 THERAPEUTIC EXERCISES: CPT

## 2022-08-10 NOTE — PROGRESS NOTES
Daily Note     Today's date: 8/10/2022  Patient name: Arcadio Boast  : 1955  MRN: [id number]  Referring provider: Darcy Friend,   Dx:   Encounter Diagnosis     ICD-10-CM    1  Acute pain of right shoulder  M25 511    2  Rotator cuff disorder, right  M67 911                   Subjective:   Patient reports that he did get sore from the stretches and he can pin point the exact spot  Objective: See treatment diary below      Assessment: Patient tolerated treatment well  Patient was seen for his first follow up visit since his initial evaluation  Patient received manual therapy and performed stretches as noted  Reviewed stretching technique to ensure appropriate stretch without irritation  Patient demonstrated a good carry over of cues and reported understanding   + tightness noted along the R bicep lateral border>medial   Patient reported soreness post ex and manual which decreased with cold pack use  Patient would benefit from continued PT intervention to address deficits and attain set goals  Plan: Continue per plan of care        Precautions: chronic back pain, chronic narcotic dependence, chronic pain disorder, depression, GERD, h/o DVT, bilateral LE neuropathy, peripheral neuropathy, pulmonary emphysema, right foot drop         Daily Treatment Diary      Assessment  8/8  8/10                   Jaciel/Noé GOMEZ                     FOTO 41/64       **         **   Manuals   Graston R proximal biceps and biceps tendon    CC                   R Shoulder PROM  CC                        Exercise Diary     Corner Pec Stretch  30"x3  30"x3                    Supine Pec Stretch  30"x3  30"x3                    bicep stretch edge of table    30'x3 Modalities    CP R Shoulder   Post-Tx    x10'

## 2022-08-11 ENCOUNTER — TELEPHONE (OUTPATIENT)
Dept: HEMATOLOGY ONCOLOGY | Facility: CLINIC | Age: 67
End: 2022-08-11

## 2022-08-11 NOTE — TELEPHONE ENCOUNTER
Hello,     This patients PET is still pending with his insurance  I called and tired to review with a nurse and was told by a manager that their "process" is I have to wait for a nurse to call me they will not transfer me  She said the case is due to have a decision by Saturday, even with it marked urgent  For reference- PET sched on 8/5, our team submitted on 8/5 and as of 8/11 [facility]) has not even reviewed the case yet  Can the pt be r/s until we have an answer from insurance? Thank you for your help

## 2022-08-16 ENCOUNTER — OFFICE VISIT (OUTPATIENT)
Dept: PHYSICAL THERAPY | Facility: REHABILITATION | Age: 67
End: 2022-08-16
Payer: COMMERCIAL

## 2022-08-16 DIAGNOSIS — M25.511 ACUTE PAIN OF RIGHT SHOULDER: Primary | ICD-10-CM

## 2022-08-16 DIAGNOSIS — M67.911 ROTATOR CUFF DISORDER, RIGHT: ICD-10-CM

## 2022-08-16 PROCEDURE — 97112 NEUROMUSCULAR REEDUCATION: CPT | Performed by: PHYSICAL THERAPIST

## 2022-08-16 PROCEDURE — 97140 MANUAL THERAPY 1/> REGIONS: CPT | Performed by: PHYSICAL THERAPIST

## 2022-08-16 PROCEDURE — 97110 THERAPEUTIC EXERCISES: CPT | Performed by: PHYSICAL THERAPIST

## 2022-08-16 NOTE — PROGRESS NOTES
Daily Note     Today's date: 2022  Patient name: Jacki Walter  : 1955  MRN: [id number]  Referring provider: Bisi Draper DO  Dx:   Encounter Diagnosis     ICD-10-CM    1  Acute pain of right shoulder  M25 511    2  Rotator cuff disorder, right  M67 911                   Subjective: Pt reports he is doing well with his current home exercise program     Objective: See treatment diary below  Pt issued updated HEP to which he demonstrated and verbalized understanding  Assessment: Pt with good response to manual techniques with improved mobility and flexibility with completion  Pt required moderate verbal and manual cues for correct positioning and performance with new exercises added today  Will continue to progress program as able  Pt will benefit from continued skilled PT intervention in order to address his remaining limitations and to restore maximal function  Plan: Continue per plan of care  Progress treatment as tolerated         Precautions: chronic back pain, chronic narcotic dependence, chronic pain disorder, depression, GERD, h/o DVT, bilateral LE neuropathy, peripheral neuropathy, pulmonary emphysema, right foot drop         Daily Treatment Diary      Assessment  8/8  8/10  8/16                 Jaciel/Noé GOMEZ                     FOTO 41/64       **         **   Manuals   Javi R proximal biceps and biceps tendon    CC  MD                 R Shoulder PROM  CC  MD                       Exercise Diary     Corner Pec Stretch  30"x3  30"x3  HEP                  Supine Pec Stretch  30"x3  30"x3                    bicep stretch edge of table    30'x3  5' w/MHP                  Flexion Wall Slides      10"x5                  TB Rows      Green   5"  1x10                  TB Ext      Orange  5"  1x10                  TB H  ABD      NV Supine Pec Stretch w/MHP                       Modalities    CP R Shoulder   Post-Tx    x10'  x10'  W/pec stretch                  MHP R Pec/Biceps with Supine Pec Stretch  Pre-Tx   10'

## 2022-08-18 ENCOUNTER — OFFICE VISIT (OUTPATIENT)
Dept: PHYSICAL THERAPY | Facility: REHABILITATION | Age: 67
End: 2022-08-18
Payer: COMMERCIAL

## 2022-08-18 DIAGNOSIS — M67.911 ROTATOR CUFF DISORDER, RIGHT: ICD-10-CM

## 2022-08-18 DIAGNOSIS — M25.511 ACUTE PAIN OF RIGHT SHOULDER: Primary | ICD-10-CM

## 2022-08-18 PROCEDURE — 97110 THERAPEUTIC EXERCISES: CPT | Performed by: PHYSICAL THERAPIST

## 2022-08-18 PROCEDURE — 97140 MANUAL THERAPY 1/> REGIONS: CPT | Performed by: PHYSICAL THERAPIST

## 2022-08-18 PROCEDURE — 97112 NEUROMUSCULAR REEDUCATION: CPT | Performed by: PHYSICAL THERAPIST

## 2022-08-18 NOTE — PROGRESS NOTES
Daily Note     Today's date: 2022  Patient name: Loretta Segovia  : 1955  MRN: [id number]  Referring provider: Suzy Santoyo DO  Dx:   Encounter Diagnosis     ICD-10-CM    1  Acute pain of right shoulder  M25 511    2  Rotator cuff disorder, right  M67 911                   Subjective: Pt reports he is doing well with his current home exercise program     Objective: See treatment diary below  Assessment: Pt with good response to manual techniques with improved mobility and flexibility with completion  Pt required moderate verbal and manual cues for correct positioning and performance with new exercises added today  Will continue to progress program as able  Modified to seated position on PBall due to pt c/o low back irritation, pt with resolution of symptoms in this position  Pt will benefit from continued skilled PT intervention in order to address his remaining limitations and to restore maximal function  Plan: Continue per plan of care  Progress treatment as tolerated         Precautions: chronic back pain, chronic narcotic dependence, chronic pain disorder, depression, GERD, h/o DVT, bilateral LE neuropathy, peripheral neuropathy, pulmonary emphysema, right foot drop    Daily Treatment Diary      Assessment  8/8  8/10  8/16  8/18               Jaciel/Reval  MD                     FOTO 41/64       **         **   Manuals   Graston R proximal biceps and biceps tendon    CC  MD GOMEZ               R Shoulder PROM  CC  MD GOMEZ                      Exercise Diary     UBE - Retro    L1x5'          Corner Pec Stretch  30"x3  30"x3  HEP                  Supine Pec Stretch  30"x3  30"x3                    bicep stretch edge of table    30'x3  5' w/MHP                  Flexion Wall Slides      10"x5  10"x5                TB Rows      Green   5"  1x10  seated on blue PBall  Green   5"  1x10                TB Ext      Orange  5"  1x10  seated on blue PBall  Orange   5"  1x10                TB H  ABD NV  seated on blue PBall  Orange   5"  1x10                                                                                                                                                                                                                Supine Pec Stretch w/MHP                       Modalities    CP R Shoulder   Post-Tx    x10'  x10'  W/pec stretch  x10'  W/pec stretch                MHP R Pec/Biceps with Supine Pec Stretch  Pre-Tx   10'

## 2022-08-19 ENCOUNTER — PATIENT OUTREACH (OUTPATIENT)
Dept: CASE MANAGEMENT | Facility: OTHER | Age: 67
End: 2022-08-19

## 2022-08-22 ENCOUNTER — OFFICE VISIT (OUTPATIENT)
Dept: PHYSICAL THERAPY | Facility: REHABILITATION | Age: 67
End: 2022-08-22
Payer: COMMERCIAL

## 2022-08-22 DIAGNOSIS — M25.511 ACUTE PAIN OF RIGHT SHOULDER: Primary | ICD-10-CM

## 2022-08-22 DIAGNOSIS — M67.911 ROTATOR CUFF DISORDER, RIGHT: ICD-10-CM

## 2022-08-22 PROCEDURE — 97110 THERAPEUTIC EXERCISES: CPT

## 2022-08-22 PROCEDURE — 97140 MANUAL THERAPY 1/> REGIONS: CPT

## 2022-08-22 NOTE — PROGRESS NOTES
Daily Note     Today's date: 2022  Patient name: Nupur Domingo  : 1955  MRN: [id number]  Referring provider: Dasia Garcia DO  Dx:   Encounter Diagnosis     ICD-10-CM    1  Acute pain of right shoulder  M25 511    2  Rotator cuff disorder, right  M67 911                   Subjective: Pt reports his shoulder is feeling better with increased mobility, however he gets some "stabbing" pain when he is bringing his arm back in towards his body after reaching into flex or abd  He reports decreased LBP with exercises completed in seated lv  Objective: See treatment diary below  Assessment: Pt tolerated treatment fair  Continues to demonstrate favorable response to manuals with improved ROM  Slightly painful arc around mid range in both concentric and eccentric motion during wall slides  TB strengthening performed to pt tolerance with occ rest breaks needed due to fatigue and some self reported discomfort  Will continue to progress as tolerated  Pt would benefit from continued skilled PT to improve current deficits and maximize function  Plan: Continue per plan of care  Progress treatment as tolerated         Precautions: chronic back pain, chronic narcotic dependence, chronic pain disorder, depression, GERD, h/o DVT, bilateral LE neuropathy, peripheral neuropathy, pulmonary emphysema, right foot drop    Daily Treatment Diary      Assessment  8/8  8/10  8/16  8/18  8/22             Jaciel/Noé GOMEZ                     FOTO 41/64       perf         **   Manuals   Graston R proximal biceps and biceps tendon    CC  MD GOMEZ  SC             R Shoulder PROM  CC  MD GOMEZ SC                     Exercise Diary     UBE - Retro    L1x5' L1x5'         Corner Pec Stretch  30"x3  30"x3  HEP                  Supine Pec Stretch  30"x3  30"x3                    bicep stretch edge of table    30'x3  5' w/MHP                  Flexion Wall Slides      10"x5  10"x5  10"x5              TB Rows      Green   5"  1x10 seated on blue PBall  Green   5"  1x10  seated on blue PB  Green  5"  1x13                TB Ext      Orange  5"  1x10  seated on blue PBall  Orange   5"  1x10  seated on blue PB orange  5"  1x10              TB H  ABD      NV  seated on blue PBall  Orange   5"  1x10  seated on blue PB  5"  2x5                                                                                                                                                                                                              Supine Pec Stretch w/MHP                       Modalities    CP R Shoulder   Post-Tx    x10'  x10'  W/pec stretch  x10'  W/pec stretch 10'              MHP R Pec/Biceps with Supine Pec Stretch  Pre-Tx   10'  10'

## 2022-08-24 ENCOUNTER — OFFICE VISIT (OUTPATIENT)
Dept: PHYSICAL THERAPY | Facility: REHABILITATION | Age: 67
End: 2022-08-24
Payer: COMMERCIAL

## 2022-08-24 ENCOUNTER — TELEPHONE (OUTPATIENT)
Dept: OBGYN CLINIC | Facility: HOSPITAL | Age: 67
End: 2022-08-24

## 2022-08-24 DIAGNOSIS — M67.911 ROTATOR CUFF DISORDER, RIGHT: ICD-10-CM

## 2022-08-24 DIAGNOSIS — M25.511 ACUTE PAIN OF RIGHT SHOULDER: Primary | ICD-10-CM

## 2022-08-24 PROCEDURE — 97112 NEUROMUSCULAR REEDUCATION: CPT | Performed by: PHYSICAL THERAPIST

## 2022-08-24 PROCEDURE — 97110 THERAPEUTIC EXERCISES: CPT | Performed by: PHYSICAL THERAPIST

## 2022-08-24 PROCEDURE — 97140 MANUAL THERAPY 1/> REGIONS: CPT | Performed by: PHYSICAL THERAPIST

## 2022-08-24 NOTE — TELEPHONE ENCOUNTER
Patient would like to advise he has been completing PT , they advised him to be seen by Dr Raimundo Fischer if possible  PT has uploaded notes in [address] [place] [address] Rd  Patient would like a call at [phone]

## 2022-08-24 NOTE — PROGRESS NOTES
Daily Note     Today's date: 2022  Patient name: Jessi Clark  : 1955  MRN: [id number]  Referring provider: Eugenio Kovacs DO  Dx:   Encounter Diagnosis     ICD-10-CM    1  Acute pain of right shoulder  M25 511    2  Rotator cuff disorder, right  M67 911                   Subjective: Pt reports his shoulder is feeling better with slightly increased mobility, however, he continues to with "stabbing" pain with certain motions  Objective: See treatment diary below  Assessment: Pt tolerated treatment fair  Continues to demonstrate favorable response to manuals with improved ROM  Painful arc mid range in both concentric and eccentric motion during wall slides, therefore, this exercise was held  TB strengthening performed in sitting position on theraball due to pt's history of low back pain, he continues to wear lumbar brace for support  Due to patient's limited improvement at this point in his treatment program, recommended he return to MD for further evaluation and/or imaging  Pt agrees with this plan  Plan: Hold PT until MD follow-up       Precautions: chronic back pain, chronic narcotic dependence, chronic pain disorder, depression, GERD, h/o DVT, bilateral LE neuropathy, peripheral neuropathy, pulmonary emphysema, right foot drop    Daily Treatment Diary      Assessment  8/8  8/10  8/16  8/18  8/22  8/24           Eval/Reval  MD                     FOTO 41/64       perf         **   Manuals   Graston R proximal biceps and biceps tendon    CC  MD GOMEZ  SC  MD           R Shoulder PROM  CC  MD GOMEZ SC  MD                    Exercise Diary     UBE - Retro    L1x5' L1x5' L1x3'        Corner Pec Stretch  30"x3  30"x3  HEP                  Supine Pec Stretch  30"x3  30"x3                    bicep stretch edge of table    30'x3  5' w/MHP                  Flexion Wall Slides      10"x5  10"x5  10"x5  Hold            TB Rows      Green   5"  1x10  seated on blue PBall  Green   5"  1x10  seated on blue PB  Green  5"  1x13    Green  5"  1x10            TB Ext      Orange  5"  1x10  seated on blue PBall  Orange   5"  1x10  seated on blue PB orange  5"  1x10  Orange   5"  1x10            TB H  ABD      NV  seated on blue PBall  Orange   5"  1x10  seated on blue PB  5"  2x5  Orange   5"  1x10                                                                                                                                                                                                            Supine Pec Stretch w/MHP                       Modalities    CP R Shoulder   Post-Tx    x10'  x10'  W/pec stretch  x10'  W/pec stretch 10'  10'            MHP R Pec/Biceps with Supine Pec Stretch  Pre-Tx   10'  10'  10'

## 2022-08-25 NOTE — TELEPHONE ENCOUNTER
Left message to call back if patient would like to schedule sooner appointment than the one already scheduled for 9/30/22

## 2022-08-25 NOTE — TELEPHONE ENCOUNTER
Patient returned a call for a sooner appointment than 9/30/22 due to the fact that therapy is not working for him  Offered this morning at 10:45 but not enough time to get in  Please call [phone]    Thank you

## 2022-08-30 ENCOUNTER — APPOINTMENT (OUTPATIENT)
Dept: PHYSICAL THERAPY | Facility: REHABILITATION | Age: 67
End: 2022-08-30
Payer: COMMERCIAL

## 2022-09-01 ENCOUNTER — OFFICE VISIT (OUTPATIENT)
Dept: OBGYN CLINIC | Facility: MEDICAL CENTER | Age: 67
End: 2022-09-01
Payer: COMMERCIAL

## 2022-09-01 VITALS
BODY MASS INDEX: 22.63 KG/M2 | DIASTOLIC BLOOD PRESSURE: 80 MMHG | SYSTOLIC BLOOD PRESSURE: 119 MMHG | HEIGHT: 75 IN | WEIGHT: 182 LBS | HEART RATE: 83 BPM

## 2022-09-01 DIAGNOSIS — M24.811 INTERNAL DERANGEMENT OF SHOULDER, RIGHT: ICD-10-CM

## 2022-09-01 DIAGNOSIS — M67.911 ROTATOR CUFF DISORDER, RIGHT: ICD-10-CM

## 2022-09-01 DIAGNOSIS — M25.511 ACUTE PAIN OF RIGHT SHOULDER: Primary | ICD-10-CM

## 2022-09-01 PROCEDURE — 99213 OFFICE O/P EST LOW 20 MIN: CPT | Performed by: ORTHOPAEDIC SURGERY

## 2022-09-01 NOTE — PROGRESS NOTES
Ortho Sports Medicine Shoulder Follow Up Visit     Assesment:   right shoulder possible small rotator cuff tear and lump over distal deltoid region possible lipoma     Plan:    Conservative treatment:        Imaging: All imaging from today was reviewed by myself and explained to the patient  We will obtain an MRI of the right shoulder to rule out soft tissue pathology  Please include images lateral distal deltoid due to having a lump to r/o lymphoma       Injection:    No Injection planned at this time  Surgery:     No surgery is recommended at this point, continue with conservative treatment plan as noted  Follow up:    No follow-ups on file  Chief Complaint   Patient presents with    Right Shoulder - Follow-up         History of Present Illness: The patient is returns for follow up of right shoulder pain  He states he is doing better from the last office visit but still having pain  He did did go to physical therapy and states this helps with his mom approved his motion but still having difficulty with lifting  He is having pain over the lateral aspect of his biceps with lifting  The patient denies weakness  The patient has tried rest, NSAIDS and physical therapy  I have reviewed the past medical, surgical, social and family history, medications and allergies as documented in the EMR  Review of systems: ROS is negative other than that noted in the HPI  Constitutional: Negative for fatigue and fever  Physical Exam:    Blood pressure 119/80, pulse 83, height 6' 3" (1 905 m), weight 82 6 kg (182 lb)      General/Constitutional: NAD, well developed, well nourished  HENT: Normocephalic, atraumatic  CV: Intact distal pulses, regular rate  Resp: No respiratory distress or labored breathing  Lymphatic: No lymphadenopathy palpated  Neuro: Alert and Oriented x 3, no focal deficits  Psych: Normal mood, normal affect, normal judgement, normal behavior  Skin: Warm, dry, no rashes, no erythema      Shoulder focused exam:       RIGHT LEFT    Scapula Atrophy Negative Negative     Winging Negative Negative     Protraction Negative Negative    Rotator cuff SS 5/5 5/5     IS 5/5 5/5     SubS 5/5 5/5    ROM     170     ER0 60 60     ER90 90    90     IR90 T6    T6     IRb T6    T6    TTP: AC Negative Negative     Biceps Negative Negative     Coracoid Negative Negative    Special Tests: O'Briens Negative Negative     [place]-shear Negative Negative     Cross body Adduction Negative Negative     Speeds  Negative Negative     Mac's Negative Negative     Whipple Positive Negative       Neer Negative Negative     Young Negative Negative    Instability: Apprehension & relocation not tested not tested     Load & shift not tested not tested    Other: Crank Negative Negative               UE NV Exam: +2 Radial pulses bilaterally  Sensation intact to light touch C5-T1 bilaterally, Radial/median/ulnar nerve motor intact    Cervical ROM is full without pain, numbness or tingling      Shoulder Imaging    No imaging was performed today      Scribe Attestation    I,:  Jared Mccracken am acting as a scribe while in the presence of the attending physician :       I,:  Zheng Winkler DO personally performed the services described in this documentation    as scribed in my presence :

## 2022-09-08 ENCOUNTER — TELEPHONE (OUTPATIENT)
Dept: OBGYN CLINIC | Facility: CLINIC | Age: 67
End: 2022-09-08

## 2022-09-08 ENCOUNTER — HOSPITAL ENCOUNTER (OUTPATIENT)
Dept: RADIOLOGY | Facility: IMAGING CENTER | Age: 67
End: 2022-09-08
Payer: COMMERCIAL

## 2022-09-08 ENCOUNTER — TELEPHONE (OUTPATIENT)
Dept: PAIN MEDICINE | Facility: MEDICAL CENTER | Age: 67
End: 2022-09-08

## 2022-09-08 DIAGNOSIS — M24.811 INTERNAL DERANGEMENT OF SHOULDER, RIGHT: ICD-10-CM

## 2022-09-08 DIAGNOSIS — M25.511 ACUTE PAIN OF RIGHT SHOULDER: ICD-10-CM

## 2022-09-08 DIAGNOSIS — M67.911 ROTATOR CUFF DISORDER, RIGHT: ICD-10-CM

## 2022-09-08 PROCEDURE — 73221 MRI JOINT UPR EXTREM W/O DYE: CPT

## 2022-09-08 PROCEDURE — G1004 CDSM NDSC: HCPCS

## 2022-09-08 NOTE — TELEPHONE ENCOUNTER
Patient had MRI today 9/8 they made him take his last patch off due to foil  He called pharmacy they told him he can refill it by tomorrow but his schedule has been pushed up by a week       Please advise     transdermal buprenorphine (Harini Gayle)    [id number] [place], [place] - [id number] [place] [id number]   Phone:  [phone]  Fax:  [phone]

## 2022-09-08 NOTE — TELEPHONE ENCOUNTER
--JAYLEN--    RN s/w pt regarding previous  Per pt he put a new patch on yesterday and then had his shoulder MRI today and they made him take off the butrans patch  Pt tried to re stick it after the MRI but it would not adhere  Pt will be able to fill his script tomorrow but wanted JE to know that he moved up his schedule by a whole week

## 2022-09-08 NOTE — TELEPHONE ENCOUNTER
Dr Winkler  Re: MRI   #: [phone]      Patient had MRI today , states order was for shoulder but the lump was in his bicep  He states the nurses caught the error and repositioned him to hopefully show the bump  He wanted to make provider aware

## 2022-09-15 ENCOUNTER — OFFICE VISIT (OUTPATIENT)
Dept: OBGYN CLINIC | Facility: MEDICAL CENTER | Age: 67
End: 2022-09-15
Payer: COMMERCIAL

## 2022-09-15 VITALS
SYSTOLIC BLOOD PRESSURE: 110 MMHG | HEIGHT: 75 IN | WEIGHT: 189 LBS | BODY MASS INDEX: 23.5 KG/M2 | DIASTOLIC BLOOD PRESSURE: 70 MMHG

## 2022-09-15 DIAGNOSIS — M75.81 TENDINITIS OF RIGHT ROTATOR CUFF: Primary | ICD-10-CM

## 2022-09-15 PROCEDURE — 99214 OFFICE O/P EST MOD 30 MIN: CPT | Performed by: ORTHOPAEDIC SURGERY

## 2022-09-15 PROCEDURE — 20610 DRAIN/INJ JOINT/BURSA W/O US: CPT | Performed by: ORTHOPAEDIC SURGERY

## 2022-09-15 RX ORDER — BUPIVACAINE HYDROCHLORIDE 5 MG/ML
2 INJECTION, SOLUTION EPIDURAL; INTRACAUDAL
Status: COMPLETED | OUTPATIENT
Start: 2022-09-15 | End: 2022-09-15

## 2022-09-15 RX ORDER — TRIAMCINOLONE ACETONIDE 40 MG/ML
40 INJECTION, SUSPENSION INTRA-ARTICULAR; INTRAMUSCULAR
Status: COMPLETED | OUTPATIENT
Start: 2022-09-15 | End: 2022-09-15

## 2022-09-15 RX ADMIN — TRIAMCINOLONE ACETONIDE 40 MG: 40 INJECTION, SUSPENSION INTRA-ARTICULAR; INTRAMUSCULAR at 13:44

## 2022-09-15 RX ADMIN — BUPIVACAINE HYDROCHLORIDE 2 ML: 5 INJECTION, SOLUTION EPIDURAL; INTRACAUDAL at 13:44

## 2022-09-15 NOTE — PROGRESS NOTES
Ortho Sports Medicine Shoulder Visit     Assesment:   right shoulder rotator cuff tendonitis, mild osteoarthritis of shoulder   Plan:    Conservative treatment:    Ice to shoulder 1-2 times daily, for 20 minutes at a time  Continue with physical therapy for shoulder motion, strengthening  See back on as needed basis  OTC anti inflammatories as needed for pain  Imaging: All imaging from today was reviewed by myself and explained to the patient  Injection:    The risks and benefits of the injection (which include but are not limited to: infection, bleeding,damage to nerve/artery, need for further intervention), as well as the risks and benefits of all alternative treatments were explained and understood  The patient elected to proceed with injection  The procedure was done with aseptic technique, and the patient tolerated the procedure well with no complications  A corticosteroid injection of the right subacromial space was performed  The patient should take 1-2 days off of activity, with gradual return to activity as tolerated  Ice to the shoulder 1-2 times daily for 20 minutes, for next 24-48 hrs  Surgery:     No surgery is recommended at this point, continue with conservative treatment plan as noted  History of Present Illness: The patient is returns for follow up of right shoulder pain  He is here to go over MRI of right shoulder  MRI no full thickness cuff tear  He still gets sharp stabbing pain lifting anything heavy like gallon of milk lateral portion of arm  He has done physical therapy and maintains HEP  The patient denies weakness  The patient has tried rest, NSAIDS and physical therapy            I have reviewed the past medical, surgical, social and family history, medications and allergies as documented in the EMR  Review of systems: ROS is negative other than that noted in the HPI  Constitutional: Negative for fatigue and fever     Cardiovascular: Negative for chest pain  Pulmonary: negative for shortness of breath    PMH/PSH:  Past Medical History:   Diagnosis Date    Arthritis feb 2016    osto back    Chronic back pain     Chronic narcotic dependence (HCC)     Chronic pain disorder     Sees pain management Dr Courtney Alanis    Cigarette smoker     Depression     Dry skin     Dyslexia     Full dentures     upper and no teeth lower/"avoid raw vegetables and chewy food"    GERD (gastroesophageal reflux disease)     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 related    History of blood clots     "left leg" takes Eliquis    History of bronchitis     History of pneumonia     childhood    Increased pressure in the eye, bilateral     L4-L5 disc bulge 06/16/2017    Low back pain     Muscle weakness     Neuropathy     right foot and left foot    Osteoarthritis     Osteoarthritis     and" if becomes cold joint pain worsens"    Peripheral neuropathy 8/ 15    Pulmonary emphysema ([address] )     "mild"    Right foot drop     Shortness of breath     "only on humid days"    Smokers' cough ([address] )     Wears glasses     Work related injury 04/24/2018     Past Surgical History:   Procedure Laterality Date   [facility] SURGERY  aug 2015- [id number]    2017 no affect    COLONOSCOPY      EGD AND COLONOSCOPY N/A 06/29/2018    Procedure: EGD AND COLONOSCOPY;  Surgeon: Reji Atkins MD;  Location: [facility] GI [facility];   Service: Gastroenterology    EPIDURAL BLOCK INJECTION      needle    HIP ARTHROSCOPY W/ LABRAL DEBRIDEMENT Left     "hip surgery"    IR LOWER EXTREMITY ANGIOGRAM  12/08/2021    ORTHOPEDIC SURGERY      FL HIP [address] BODY,PLASTY/RESECTN Left 04/30/2018    Procedure: LEFT HIP ARTHROSCOPIC LABRAL DEBRIDEMENT;  Surgeon: Suzie Stallings MD;  [facility];  Service: Orthopedics    FL Paula Ivan 3RD+ ORD [address] PEL/LXTR [facility] Right 12/08/2021    Procedure: COMPLETION ARTERIOGRAM WITH 5 MM ANGIOPLASTY OF MID SUPERFICIAL FEMORAL ARTERY;  Surgeon: Jacklyn Ansari MD; [facility];  Service: Vascular    VT VEIN IN SITU BYPASS GRAFT,FEM-POP Right 12/08/2021    Procedure: R SFA to BK Pop Bypass using insitu GSV,  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;  Surgeon: Anna Weinberg MD;  [facility];  Service: Vascular    TONSILLECTOMY      WISDOM TOOTH EXTRACTION          Physical Exam:    Blood pressure 110/70, height 6' 3" (1 905 m), weight 85 7 kg (189 lb)  General/Constitutional: NAD, well developed, well nourished  HENT: Normocephalic, atraumatic  CV: Intact distal pulses, regular rate  Resp: No respiratory distress or labored breathing  Lymphatic: No lymphadenopathy palpated  Neuro: Alert and Oriented x 3, no focal deficits  Psych: Normal mood, normal affect, normal judgement, normal behavior  Skin: Warm, dry, no rashes, no erythema     Shoulder Exam (focused):     Shoulder focused exam:       RIGHT LEFT    Scapula Atrophy Negative Negative     Winging Negative Negative     Protraction Negative Negative    Rotator cuff SS 5/5 5/5     IS 5/5 5/5     SubS 5/5 5/5    ROM  170     ER0 60 60     ER90 90 90     IR90 40 40     IRb T6 T6    TTP: AC Negative Negative     Biceps Negative Negative     Coracoid Negative Negative    Special Tests: O'Briens Negative Negative     [place]-shear Negative Negative     Cross body Adduction Negative Negative     Speeds  Negative Negative     Mac's Negative Negative     Whipple Positive Negative       Neer Negative Negative     Young Negative Negative    Instability: Apprehension & relocation not tested not tested     Load & shift not tested not tested    Other: Crank Negative Negative               UE NV Exam: +2 Radial pulses bilaterally  Sensation intact to light touch C5-T1 bilaterally, Radial/median/ulnar nerve motor intact    Cervical ROM is full without pain, numbness or tingling      Shoulder Imaging    MRI of the right shoulder were reviewed, which demonstrate supraspinatus, subscapularis, infraspinatus insertional tendonitis w/o evidence of full thickness rotator cuff tear, mild biceps tendinosis, mild glenohumeral and AC joint arthritis  I have reviewed the radiology report and agree with their impression  Large joint arthrocentesis: R subacromial bursa  Universal Protocol:  Consent: Verbal consent obtained  Risks and benefits: risks, benefits and alternatives were discussed  Consent given by: patient  Time out: Immediately prior to procedure a "time out" was called to verify the correct patient, procedure, equipment, support staff and site/side marked as required    Timeout called at: 9/15/2022 1:43 PM   Patient understanding: patient states understanding of the procedure being performed  Site marked: the operative site was marked  Patient identity confirmed: verbally with patient    Supporting Documentation  Indications: pain   Procedure Details  Location: shoulder - R subacromial bursa  Preparation: Patient was prepped and draped in the usual sterile fashion  Needle size: 22 G  Ultrasound guidance: no  Approach: posterolateral  Medications administered: 2 mL bupivacaine (PF) 0 5 %; 40 mg triamcinolone acetonide 40 mg/mL    Patient tolerance: patient tolerated the procedure well with no immediate complications  Dressing:  Sterile dressing applied          Scribe Attestation    I,:  Joi Hunt am acting as a scribe while in the presence of the attending physician :       I,:  Leroy Winkler DO personally performed the services described in this documentation    as scribed in my presence :

## 2022-09-19 ENCOUNTER — TELEPHONE (OUTPATIENT)
Dept: SURGICAL ONCOLOGY | Facility: CLINIC | Age: 67
End: 2022-09-19

## 2022-09-19 ENCOUNTER — HOSPITAL ENCOUNTER (OUTPATIENT)
Dept: NUCLEAR MEDICINE | Facility: HOSPITAL | Age: 67
Discharge: HOME | End: 2022-09-19
Payer: COMMERCIAL

## 2022-09-19 DIAGNOSIS — D38.1 NEOPLASM OF UNCERTAIN BEHAVIOR OF RIGHT UPPER LOBE OF LUNG: ICD-10-CM

## 2022-09-19 LAB — GLUCOSE SERPL-MCNC: 105 MG/DL (ref 65–140)

## 2022-09-19 PROCEDURE — 78815 PET IMAGE W/CT SKULL-THIGH: CPT

## 2022-09-19 PROCEDURE — G1004 CDSM NDSC: HCPCS

## 2022-09-19 PROCEDURE — A9552 F18 FDG: HCPCS

## 2022-09-19 PROCEDURE — 82948 REAGENT STRIP/BLOOD GLUCOSE: CPT

## 2022-09-21 DIAGNOSIS — E78.2 MIXED HYPERLIPIDEMIA: ICD-10-CM

## 2022-09-21 RX ORDER — ALBUTEROL SULFATE 2.5 MG/3ML
2.5 SOLUTION RESPIRATORY (INHALATION) ONCE AS NEEDED
Status: DISCONTINUED | OUTPATIENT
Start: 2022-09-21 | End: 2022-09-21

## 2022-09-21 RX ORDER — ATORVASTATIN CALCIUM 10 MG/1
5 TABLET, FILM COATED ORAL
Qty: 45 TABLET | Refills: 1 | Status: SHIPPED | OUTPATIENT
Start: 2022-09-21

## 2022-09-22 ENCOUNTER — HOSPITAL ENCOUNTER (OUTPATIENT)
Dept: PULMONOLOGY | Facility: HOSPITAL | Age: 67
End: 2022-09-22
Payer: COMMERCIAL

## 2022-09-22 DIAGNOSIS — R91.8 MULTIPLE SUBSOLID LUNG NODULES GREATER THAN 6 MM IN DIAMETER: ICD-10-CM

## 2022-09-22 PROCEDURE — 94010 BREATHING CAPACITY TEST: CPT

## 2022-09-22 PROCEDURE — 94060 EVALUATION OF WHEEZING: CPT | Performed by: INTERNAL MEDICINE

## 2022-09-22 PROCEDURE — 94726 PLETHYSMOGRAPHY LUNG VOLUMES: CPT

## 2022-09-22 PROCEDURE — 94729 DIFFUSING CAPACITY: CPT | Performed by: INTERNAL MEDICINE

## 2022-09-22 PROCEDURE — 94726 PLETHYSMOGRAPHY LUNG VOLUMES: CPT | Performed by: INTERNAL MEDICINE

## 2022-09-22 PROCEDURE — 94729 DIFFUSING CAPACITY: CPT

## 2022-09-22 PROCEDURE — 94760 N-INVAS EAR/PLS OXIMETRY 1: CPT

## 2022-09-22 RX ORDER — ALBUTEROL SULFATE 2.5 MG/3ML
2.5 SOLUTION RESPIRATORY (INHALATION) ONCE AS NEEDED
Status: COMPLETED | OUTPATIENT
Start: 2022-09-22 | End: 2022-09-22

## 2022-09-22 RX ADMIN — ALBUTEROL SULFATE 2.5 MG: 2.5 SOLUTION RESPIRATORY (INHALATION) at 11:18

## 2022-09-23 ENCOUNTER — PATIENT OUTREACH (OUTPATIENT)
Dept: HEMATOLOGY ONCOLOGY | Facility: CLINIC | Age: 67
End: 2022-09-23

## 2022-09-23 NOTE — PROGRESS NOTES
In-basket message received from Blair to add pt to 9/26/22 tumor board  Patient was pushed over for further studies to be present after  Chart Reviewed and tumor board prep completed

## 2022-09-26 ENCOUNTER — DOCUMENTATION (OUTPATIENT)
Dept: SURGICAL ONCOLOGY | Facility: CLINIC | Age: 67
End: 2022-09-26

## 2022-09-26 ENCOUNTER — PATIENT OUTREACH (OUTPATIENT)
Dept: CASE MANAGEMENT | Facility: OTHER | Age: 67
End: 2022-09-26

## 2022-09-26 NOTE — PROGRESS NOTES
THORACIC ONCOLOGY MULTIDISCIPLINARY CASE REVIEW    DATE: 9/26/2022     PRESENTING DOCTOR: Francis Lin PA-C for Dr Garett Perez    DIAGNOSIS:  Pulmonary nodules  STAGING:     Yoly Perdomo is a 78 y o  male who was presented at the Thoracic Oncology Multidisciplinary Tumor Conference today  He presented with multiple pulmonary nodules, all located within right upper lobe  They have increased in size since previous imaging  His PET/CT revealed nodules appear stable with minimal activity  PHYSICIAN RECOMMENDED PLAN:  Repeat CT Chest in 6 months  Imaging reviewed:   7/2022- CTC  9/19/2022- PET/CT    Pathology reviewed: None     PFT's reviewed: Fev1- 85%   DLCOcor- 47%    Future imaging: CT Chest - 6months    Referrals: None    Procedures: None          Team agreed to plan  NCCN guidelines were readily available for review at this discussion    The final treatment plan will be left to the discretion of the patient and the treating physician  DISCLAIMERS:  TO THE TREATING PHYSICIAN:  This conference is a meeting of clinicians from various specialty areas who evaluate and discuss patients for whom a multidisciplinary treatment approach is being considered  Please note that the above opinion was a consensus of the conference attendees and is intended only to assist in quality care of the discussed patient  The responsibility for follow up on the input given during the conference, along with any final decisions regarding plan of care, is that of the patient and the patient's provider  Accordingly, appointments have only been recommended based on this information and have NOT been scheduled unless otherwise noted  TO THE PATIENT:  This summary is a brief record of major aspects of your cancer treatment  You may choose to share a copy with any of your doctors or nurses  However, this is not a detailed or comprehensive record of your care

## 2022-09-29 ENCOUNTER — OFFICE VISIT (OUTPATIENT)
Dept: CARDIAC SURGERY | Facility: CLINIC | Age: 67
End: 2022-09-29
Payer: COMMERCIAL

## 2022-09-29 VITALS
RESPIRATION RATE: 17 BRPM | BODY MASS INDEX: 23.66 KG/M2 | HEART RATE: 92 BPM | TEMPERATURE: 98.2 F | SYSTOLIC BLOOD PRESSURE: 110 MMHG | HEIGHT: 75 IN | OXYGEN SATURATION: 96 % | DIASTOLIC BLOOD PRESSURE: 72 MMHG | WEIGHT: 190.26 LBS

## 2022-09-29 DIAGNOSIS — R91.8 MULTIPLE SUBSOLID LUNG NODULES GREATER THAN 6 MM IN DIAMETER: Primary | ICD-10-CM

## 2022-09-29 PROCEDURE — 1160F RVW MEDS BY RX/DR IN RCRD: CPT | Performed by: THORACIC SURGERY (CARDIOTHORACIC VASCULAR SURGERY)

## 2022-09-29 PROCEDURE — 99214 OFFICE O/P EST MOD 30 MIN: CPT | Performed by: THORACIC SURGERY (CARDIOTHORACIC VASCULAR SURGERY)

## 2022-09-29 NOTE — PROGRESS NOTES
Assessment/Plan:    Multiple subsolid lung nodules greater than 6 mm in diameter  Mr Jorge Webb is a 50-year-old male who has multiple pulmonary nodules that I have been following  He underwent a PET-CT scan and pulmonary function testing and returns to my office to discuss these results  I have personally reviewed his PET-CT scan, this demonstrates minimal avidity within all of these nodules  The nodules are also stable from his previous CT scan, although this was a short follow-up  His pulmonary function testing is marginal with a DLCO 47%  Today in the office, we discussed his PET-CT scan, PFTs and the tumor board discussion  I explained to the patient that at this time, we are just going to continue to follow these nodules  I have ordered a CT scan for 6 months from now he will follow up with me at that time  All of his questions were answered and he agrees with the plan  Gabe Osuna MD  Thoracic Surgery  (Available on [employer])  Office: [phone]             Diagnoses and all orders for this visit:    Multiple subsolid lung nodules greater than 6 mm in diameter  -     CT chest wo contrast; Future          Thoracic History          Subjective:    Patient ID: Gregory Wylie is a 78 y o  male  HPI  Mr Jorge Webb is a 50-year-old male who has multiple pulmonary nodules that I have been following  He underwent a PET-CT scan and pulmonary function testing and returns to my office to discuss these results  I have personally reviewed his PET-CT scan, this demonstrates minimal avidity within all of these nodules  The nodules are also stable from his previous CT scan, although this was a short follow-up  His pulmonary function testing is marginal with a DLCO 47%  We also discussed this patient at tumor board  The decision was to follow these nodules with a 6 month CT scan    Today in the office, he has no changes in his health since his last visit        From [facility] by Gómez Lora, ELIZABET on 8/4/22:  Mr Jeannette Vázquez is a 79 yo gentleman with a history of COPD, DVT summer 2021, HLD, OA, and PVD who was referred by Dr Jem Moses for multiple sub-solid pulmonary nodules  He had a lung cancer screening CT in December 2019 which revealed a couple of right upper lobe nodules, including a 10 x 8 x 7 mm spiculated nodule  PET from 6/21 did not have any associated activity within any nodules and the previously seen nodules were either stable or had decreased in size  A follow up CT scan of the chest on 7/18/22 revealed multiple pulmonary nodules within the right upper lobe measuring 9 x 6 mm (previously 5 x 4 mm), 7 x 12 mm (previously 7 x 9 mm), and a 10 x 7 mm spiculated solid nodule: unchanged  Radiology recommended a repeat PET scan vs  3 mo follow up CT scan       He quit smoking 4 days ago  He was a 50 ppy smoker  He has not had any previous chest surgeries  He is on [place] and Eliquis  On discussion, he uses a cane for back/hip/knee pain  He also has right foot drop  The following portions of the patient's history were reviewed and updated as appropriate: allergies, current medications, past family history, past medical history, past social history, past surgical history and problem list     Review of Systems   Constitutional: Negative  Negative for activity change, chills, fatigue, fever and unexpected weight change  HENT: Negative for sore throat, trouble swallowing and voice change  Eyes: Negative  Negative for visual disturbance  Respiratory: Negative for cough, chest tightness, shortness of breath, wheezing and stridor  Cardiovascular: Negative for chest pain and leg swelling  Gastrointestinal: Negative  Endocrine: Negative  Genitourinary: Negative  Musculoskeletal: Negative  Skin: Negative  Allergic/Immunologic: Negative  Neurological: Negative for seizures, syncope, speech difficulty, weakness, light-headedness and headaches     Hematological: Negative for adenopathy  Psychiatric/Behavioral: Negative  Objective:   Physical Exam  Vitals and nursing note reviewed  Constitutional:       Appearance: He is well-developed  He is not diaphoretic  HENT:      Head: Normocephalic and atraumatic  Nose: Nose normal  No congestion  Mouth/Throat:      Mouth: Mucous membranes are moist       Pharynx: Oropharynx is clear  Eyes:      Extraocular Movements: Extraocular movements intact  Pupils: Pupils are equal, round, and reactive to light  Neck:      Trachea: No tracheal deviation  Cardiovascular:      Rate and Rhythm: Normal rate and regular rhythm  Heart sounds: Normal heart sounds  No murmur heard  Pulmonary:      Effort: Pulmonary effort is normal  No respiratory distress  Breath sounds: Normal breath sounds  No stridor  No wheezing or rales  Chest:      Chest wall: No tenderness  Abdominal:      General: Bowel sounds are normal  There is no distension  Palpations: Abdomen is soft  Tenderness: There is no abdominal tenderness  Musculoskeletal:         General: Normal range of motion  Cervical back: Normal range of motion  Lymphadenopathy:      Cervical: No cervical adenopathy  Skin:     General: Skin is warm and dry  Coloration: Skin is not pale  Findings: No erythema or rash  Neurological:      Mental Status: He is alert and oriented to person, place, and time  Psychiatric:         Behavior: Behavior normal          Thought Content: Thought content normal          Judgment: Judgment normal      /72   Pulse 92   Temp 98 2 °F (36 8 °C)   Resp 17   Ht 6' 3" (1 905 m)   Wt 86 3 kg (190 lb 4 1 oz)   SpO2 96%   BMI 23 78 kg/m²     No Chest XR results available for this patient  No CT Chest results available for this patient  No CT Chest,Abdomen,Pelvis results available for this patient     NM PET CT skull base to mid thigh    Result Date: 9/19/2022  Narrative PET/CT SCAN INDICATION: D38 1: Neoplasm of uncertain behavior of trachea, bronchus and lung   , increasing in size lung nodules MODIFIER: PS COMPARISON: PET/CT dated 6/22/2021 and CT of chest dated 7/18/2022 CELL TYPE:  Not applicable TECHNIQUE:   8 8 mCi F-18-FDG administered IV  Multiplanar attenuation corrected and non attenuation corrected PET images were acquired 60 minutes post injection  Contiguous, low dose, axial CT sections were obtained from the skull base through the femurs   Intravenous contrast material was not utilized  This examination, like all CT scans [facility], was performed utilizing techniques to minimize radiation dose exposure, including the use of iterative reconstruction and automated exposure control  Fasting serum glucose: 105 mg/dl FINDINGS: VISUALIZED BRAIN:   No acute abnormalities are seen  HEAD/NECK:   There is a physiologic distribution of FDG  No FDG avid cervical adenopathy is seen  CT images: Unremarkable  CHEST:   There are stable hypermetabolic mediastinal lymph nodes  For example, stable in size mildly prominent clustered AP window lymph nodes on images 78 and 79 of series 4 have a max SUV of 4 2, previously 4 7  Stable precarinal/ right paratracheal lymph node  on image 83 of series 4 measures 7 mm in short axis with max SUV of 4 4, previously 4 1  There is stable mild activity in the hilar regions with max SUV of 3 9 on the right and 3 6 on the left, previously 3 6 on the right and 3 6 on the left  The interval stability favors a benign reactive/inflammatory process with underlying sumit malignancy considered less likely but not excluded  Attention to these regions on follow-up imaging is recommended  On image 73 there is minimal FDG activity associated with patient's known 7 mm spiculated right upper lobe lung nodule with max SUV of 1 2 (for reference max SUV of the mediastinal blood pool is 2 6)    On prior PET/CT, this nodule was similar in size with max SUV of 0 8  There is no significant FDG activity associated with patient's remaining known lung nodules  As noted on recent dedicated CT of chest dated 7/18/2022 there is a 9 mm subpleural right upper lobe lung nodule on image 70 and an 8 mm right upper lobe lung nodule on image 84  Accounting for differences in imaging technique, these nodules appear essentially stable since 7/18/2022  CT images: Atherosclerotic aortic calcifications are noted  Emphysematous changes  ABDOMEN:   No FDG avid soft tissue lesions are seen  CT images: Probable cholelithiasis  Probable tiny nonobstructing left renal calculi versus vascular calcification  Stable lower pole hypodensity involving the right kidney  Stable irregular appearance to the left lower renal pole  Diverticulosis coli  Nonspecific mural thickening of the proximal to mid gastric wall which could be related to underdistention with underlying gastric mucosal process not excluded  Atherosclerotic vascular calcifications are noted  PELVIS: There is mild nonspecific patchy FDG activity associated with a mildly enlarged prostate gland without definite focal FDG activity  CT images: Unremarkable  OSSEOUS STRUCTURES: No FDG avid lesions are seen  CT images: Degenerative changes are noted involving the spine  Possible osteopenia  Stable tiny sclerotic densities most probably involving the proximal left femur and pelvic bones  Impression 1  Minimal FDG activity associated with a 7 mm right upper lobe lung nodule which appears stable in size since prior PET/CT but was described to be increased in size on most recent dedicated CT of chest   Findings are nonspecific and differential diagnosis includes benign nodule versus developing malignancy of low metabolic activity  Remaining lung nodules do not exhibit significant FDG activity  Short interval follow-up with CT of chest in 3 months is recommended to ensure stability if tissue sampling is not performed   2   Stable nonspecific FDG avid mediastinal lymph nodes of uncertain clinical significance but favored to be reactive/inflammatory in etiology  Attention to to these regions on short interval follow-up with contrast-enhanced imaging is recommended  Please see above for details and additional findings  The study was marked in EPIC for significant notification  The study was marked in Epic for follow-up  Workstation performed: [id number]      No Barium Swallow results available for this patient

## 2022-09-29 NOTE — ASSESSMENT & PLAN NOTE
Mr Jyothi Roach is a 57-year-old male who has multiple pulmonary nodules that I have been following  He underwent a PET-CT scan and pulmonary function testing and returns to my office to discuss these results  I have personally reviewed his PET-CT scan, this demonstrates minimal avidity within all of these nodules  The nodules are also stable from his previous CT scan, although this was a short follow-up  His pulmonary function testing is marginal with a DLCO 47%  Today in the office, we discussed his PET-CT scan, PFTs and the tumor board discussion  I explained to the patient that at this time, we are just going to continue to follow these nodules  I have ordered a CT scan for 6 months from now he will follow up with me at that time  All of his questions were answered and he agrees with the plan      Royce Fabian MD  Thoracic Surgery  (Available on [employer] Text)  Office: [phone]

## 2022-10-06 ENCOUNTER — PATIENT OUTREACH (OUTPATIENT)
Dept: CASE MANAGEMENT | Facility: OTHER | Age: 67
End: 2022-10-06

## 2022-10-28 ENCOUNTER — TELEPHONE (OUTPATIENT)
Dept: PAIN MEDICINE | Facility: MEDICAL CENTER | Age: 67
End: 2022-10-28

## 2022-10-28 DIAGNOSIS — M25.511 CHRONIC PAIN OF BOTH SHOULDERS: ICD-10-CM

## 2022-10-28 DIAGNOSIS — G62.9 NEUROPATHY: ICD-10-CM

## 2022-10-28 DIAGNOSIS — M25.512 CHRONIC PAIN OF BOTH SHOULDERS: ICD-10-CM

## 2022-10-28 DIAGNOSIS — M54.50 CHRONIC BILATERAL LOW BACK PAIN WITHOUT SCIATICA: ICD-10-CM

## 2022-10-28 DIAGNOSIS — M25.561 CHRONIC PAIN OF BOTH KNEES: ICD-10-CM

## 2022-10-28 DIAGNOSIS — G89.29 CHRONIC PAIN OF BOTH SHOULDERS: ICD-10-CM

## 2022-10-28 DIAGNOSIS — G89.29 CHRONIC PAIN OF BOTH KNEES: ICD-10-CM

## 2022-10-28 DIAGNOSIS — M54.16 LUMBAR RADICULOPATHY: ICD-10-CM

## 2022-10-28 DIAGNOSIS — G89.29 CHRONIC BILATERAL LOW BACK PAIN WITHOUT SCIATICA: ICD-10-CM

## 2022-10-28 DIAGNOSIS — G89.4 CHRONIC PAIN SYNDROME: ICD-10-CM

## 2022-10-28 DIAGNOSIS — M25.562 CHRONIC PAIN OF BOTH KNEES: ICD-10-CM

## 2022-10-28 RX ORDER — BUPRENORPHINE 5 UG/H
1 PATCH TRANSDERMAL WEEKLY
Qty: 4 PATCH | Refills: 2 | Status: SHIPPED | OUTPATIENT
Start: 2022-10-28 | End: 2022-12-05 | Stop reason: SDUPTHER

## 2022-10-28 NOTE — TELEPHONE ENCOUNTER
S/W pt  Advised pt of the same and to be sure his appts are prior to running out of medication  Pt verbalized understanding      -please see FREDDY's message about S[facility] appts

## 2022-10-28 NOTE — TELEPHONE ENCOUNTER
[4:04 PM] Roni Walker  for [facility] you give permission for early fill for patch, Pharm said he last picked up on 10/8 so not due to Nov 5 and month of Nov he has no insurance     [4:12 PM] Checo Hwang

## 2022-10-28 NOTE — TELEPHONE ENCOUNTER
Pt contacted Call Center requested refill of their medication  Medication Name: transdermal buprenorphine (BUTRANS) 5 mcg/hr TD patch [[id number]          Dosage of Med: Dose: 1 patch      Frequency of Med: Frequency: Weekly      Remaining Medication: 1      Pharmacy and Location:  Pharmacy    [facility] [id number] [place], [place] - [address]   [address] [place] [address]   Phone:  [phone]  Fax:  [phone]              Pt  Preferred Callback Phone Number:   [phone]    Thank you

## 2022-10-28 NOTE — TELEPHONE ENCOUNTER
--BRETTI--    S/W pt and advised of the previous task  He stated he found 1 more patch in the box stuck to the side effect paper  Confirmed next appt w/ pt

## 2022-10-28 NOTE — TELEPHONE ENCOUNTER
I will refill this one for him one time (schedule III medicine)    I see he has a follow up visit in December, team - please make sure patients are being scheduled for follow up PRIOR to their medicines running out so that [address] and I don't have to send in refills like this, they should be occurring at office visits  Thank you!

## 2022-10-28 NOTE — TELEPHONE ENCOUNTER
S/W pt  Pt stated the patch is due to be changed tomorrow and he has none  He stated starting November 1 he will not have [employer] and Dec 1 is when Shaylee Samson will start  S/W Rosa at the pharmacy  He stated the pt filled it on 8/14 , 9/9 and 10/6  Please advise

## 2022-10-28 NOTE — TELEPHONE ENCOUNTER
S/W Rosa at the pharmacy  He stated the patch is due 11/5 and the pt last picked it up 10/8  Advised him of the pt's insurance issue  He is asking if the dr gives permission for a early refill  Sent message to FREDDY, awaiting response

## 2022-10-28 NOTE — TELEPHONE ENCOUNTER
--JAYLEN--    S/W Rosa at the pharmacy  Advised him JE gave permission to fill the patch early  He verbalized understanding

## 2022-10-28 NOTE — TELEPHONE ENCOUNTER
Caller: Pt     Doctor: Magdalene Rapp    Reason for call: Pt is calling in stating that the Dr Street Flair to call into the pharmacy to let them know it is ok for his to get the script   He will not have ins for next month     Call back#: [phone]

## 2022-12-05 ENCOUNTER — OFFICE VISIT (OUTPATIENT)
Dept: PAIN MEDICINE | Facility: MEDICAL CENTER | Age: 67
End: 2022-12-05

## 2022-12-05 VITALS
HEIGHT: 75 IN | BODY MASS INDEX: 24.18 KG/M2 | DIASTOLIC BLOOD PRESSURE: 79 MMHG | WEIGHT: 194.5 LBS | SYSTOLIC BLOOD PRESSURE: 128 MMHG | HEART RATE: 90 BPM

## 2022-12-05 DIAGNOSIS — G89.4 CHRONIC PAIN SYNDROME: Primary | ICD-10-CM

## 2022-12-05 DIAGNOSIS — G89.29 CHRONIC PAIN OF BOTH KNEES: ICD-10-CM

## 2022-12-05 DIAGNOSIS — G89.29 CHRONIC BILATERAL LOW BACK PAIN WITHOUT SCIATICA: ICD-10-CM

## 2022-12-05 DIAGNOSIS — G89.29 CHRONIC PAIN OF BOTH SHOULDERS: ICD-10-CM

## 2022-12-05 DIAGNOSIS — M25.512 CHRONIC PAIN OF BOTH SHOULDERS: ICD-10-CM

## 2022-12-05 DIAGNOSIS — G62.9 NEUROPATHY: ICD-10-CM

## 2022-12-05 DIAGNOSIS — M47.816 LUMBAR SPONDYLOSIS: ICD-10-CM

## 2022-12-05 DIAGNOSIS — M25.562 CHRONIC PAIN OF BOTH KNEES: ICD-10-CM

## 2022-12-05 DIAGNOSIS — M25.561 CHRONIC PAIN OF BOTH KNEES: ICD-10-CM

## 2022-12-05 DIAGNOSIS — F11.29 OPIOID DEPENDENCE WITH OPIOID-INDUCED DISORDER (HCC): ICD-10-CM

## 2022-12-05 DIAGNOSIS — Z79.891 LONG-TERM CURRENT USE OF OPIATE ANALGESIC: ICD-10-CM

## 2022-12-05 DIAGNOSIS — M54.16 LUMBAR RADICULOPATHY: ICD-10-CM

## 2022-12-05 DIAGNOSIS — M25.511 CHRONIC PAIN OF BOTH SHOULDERS: ICD-10-CM

## 2022-12-05 DIAGNOSIS — M54.50 CHRONIC BILATERAL LOW BACK PAIN WITHOUT SCIATICA: ICD-10-CM

## 2022-12-05 RX ORDER — BUPRENORPHINE 5 UG/H
1 PATCH TRANSDERMAL WEEKLY
Qty: 4 PATCH | Refills: 2 | Status: SHIPPED | OUTPATIENT
Start: 2022-12-05

## 2022-12-05 NOTE — PROGRESS NOTES
Assessment:  1  Chronic pain syndrome    2  Opioid dependence with opioid-induced disorder (HCC)    3  Long-term current use of opiate analgesic    4  Lumbar radiculopathy - Right    5  Neuropathy    6  Chronic bilateral low back pain without sciatica    7  Chronic pain of both knees    8  Chronic pain of both shoulders    9  Lumbar spondylosis        Plan:  While the patient was in the office today, I did have a thorough conversation regarding their chronic pain syndrome, medication management, and treatment plan options  The patient remains clinically stable on the current medication regimen of Butrans 5 mcg transdermal patch  I have provided refills for the patient on today's visit  He is complaining of an increase in right shoulder pain and states that the last injection performed by  Dr Shivam Downs in September helped significantly  He will reach out to his office if needed for another injection  He will follow-up in our office in 3 months or sooner if needed if the pain changes or worsens  There are risks associated with opioid medications, including dependence, addiction and tolerance  The patient understands and agrees to use these medications only as prescribed  Potential side effects of the medications include, but are not limited to, constipation, drowsiness, addiction, impaired judgment and risk of fatal overdose if not taken as prescribed  The patient was warned against driving while taking sedation medications  Sharing medications is a felony  At this point in time, the patient is showing no signs of addiction, abuse, diversion or suicidal ideation  A urine drug screen was collected at today's office visit as part of our medication management protocol  The point of care testing results were appropriate for what was being prescribed  The specimen will be sent for confirmatory testing   The drug screen is medically necessary because the patient is either dependent on opioid medication or is being considered for opioid medication therapy and the results could impact ongoing or future treatment  The drug screen is to evaluate for the presences or absence of prescribed, non-prescribed, and/or illicit drugs/substances  [place] Prescription Drug Monitoring Program report was reviewed and was appropriate     My impressions and treatment recommendations were discussed in detail with the patient who verbalized understanding and had no further questions  Discharge instructions were provided  I personally saw and examined the patient and I agree with the above discussed plan of care  Orders Placed This Encounter   Procedures   • [facility] All Prescribed Meds and Special Instructions     Order Specific Question:   [facility] Is ATORVASTATIN prescribed? Answer:   Yes     Order Specific Question:   [facility] Is BUPRENORPHINE Prescribed? Answer: Yes   • Amphetamines, Methamphetamines   • Butalbital   • Phenobarbital   • Secobarbital   • Temazepam   • Alprazolam   • Clonazepam   • Diazepam   • Lorazepam   • Oxazepam   • Gabapentin   • Pregabalin   • Cocaine   • Heroin   • Buprenorphine   • Levorphanol   • Meperidine   • Naltrexone   • Fentanyl   • Methadone   • Oxycodone   • Oxymorphone   • Tapentadol   • THC   • Tramadol   • Codeine, Hydrocodone, Hydropmorphone, Morphine   • Bath Salts   • Ethyl Glucuronide/Ethyl Sulfate   • Kratom   • Spice   • Methylphenidate   • Phentermine   • Validity Oxidant   • Validity Creatinine   • Validity pH   • Validity Specific     New Medications Ordered This Visit   Medications   • transdermal buprenorphine (BUTRANS) 5 mcg/hr TD patch     Sig: Place 1 patch on the skin once a week For ongoing therapy     Dispense:  4 patch     Refill:  2     Do not fill before 12/30/2022       History of Present Illness:  Jacki Walter is a 75 y o  male who presents for a follow up office visit in regards to back pain and joint pain     The patient’s current symptoms include chronic low back pain and also multisite joint pain that he presently rates a 4/10 on the pain scale and describes it as an intermittent dull, aching and shooting pain  He is maintained on Butrans 5 mcg transdermal patch that he changes every 7 days  He is complaining of a slight increase in right shoulder pain and had significant relief in the past with injection done by ortho in September and he is considering reaching out to them if the pain continues to increase  Otherwise, he has no new symptoms or acute issues on today's visit and is pleased with the relief he has with the current medication  Opioid contract date 05/14/2022    Last UDS Date 12/05/2022    I have personally reviewed and/or updated the patient's past medical history, past surgical history, family history, social history, current medications, allergies, and vital signs today  Review of Systems   Gastrointestinal: Positive for constipation  Musculoskeletal: Positive for back pain and gait problem  All other systems reviewed and are negative        Patient Active Problem List   Diagnosis   • Lumbar radiculopathy - Right   • Bone lesion   • Neuropathy   • Inflammatory polyarthropathy (HCC)   • Tobacco abuse   • Colon cancer screening   • Right foot drop   • Lumbar spondylosis - Bilateral   • Vitamin D deficiency   • Thoracic spine pain   • Chronic pain syndrome   • Uncomplicated opioid dependence ([facility])   • Chronic pain of both knees   • Chronic pain of both shoulders   • Chronic pain of left ankle   • Multiple subsolid lung nodules greater than 6 mm in diameter   • Mixed hyperlipidemia   • 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[facility])   • Deep vein thrombosis (DVT) of femoral vein of left lower extremity ([facility])   • S/P femoral-popliteal bypass surgery   • Cellulitis of right lower extremity   • Atherosclerosis of artery of left lower extremity ([facility])   • Chronic obstructive pulmonary disease, unspecified COPD type ([facility])   • Acute pain of right shoulder       Past Medical History:   Diagnosis Date   • Arthritis feb 2016    osto back   • Chronic back pain    • Chronic narcotic dependence ([facility] [address] )    • Chronic pain disorder     Sees pain management Dr Shaina Sewell    • Cigarette smoker    • Depression    • Dry skin    • Dyslexia    • Full dentures     upper and no teeth lower/"avoid raw vegetables and chewy food"   • GERD (gastroesophageal reflux disease)    • 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 related   • History of blood clots     "left leg" takes Eliquis   • History of bronchitis    • History of pneumonia     childhood   • Increased pressure in the eye, bilateral    • L4-L5 disc bulge 06/16/2017   • Low back pain    • Muscle weakness    • Neuropathy     right foot and left foot   • Osteoarthritis    • Osteoarthritis     and" if becomes cold joint pain worsens"   • Peripheral neuropathy 8/ 15   • Pulmonary emphysema ([facility] [address] )     "mild"   • Right foot drop    • Shortness of breath     "only on humid days"   • Smokers' cough [facility])    • Wears glasses    • Work related injury 04/24/2018       Past Surgical History:   Procedure Laterality Date   • BACK SURGERY  aug 2015- jun2017 2017 no affect   • COLONOSCOPY     • EGD AND COLONOSCOPY N/A 06/29/2018    Procedure: EGD AND COLONOSCOPY;  Surgeon: Keely Wesley MD;  Location: [facility] GI LAB;   Service: Gastroenterology   • EPIDURAL BLOCK INJECTION      needle   • HIP ARTHROSCOPY W/ LABRAL DEBRIDEMENT Left     "hip surgery"   • IR LOWER EXTREMITY ANGIOGRAM  12/08/2021   • ORTHOPEDIC SURGERY     • UT HIP [address] BODY,PLASTY/RESECTN Left 04/30/2018    Procedure: LEFT HIP ARTHROSCOPIC LABRAL DEBRIDEMENT;  Surgeon: Saul Bustos MD;  Location: [facility];  Service: Orthopedics   • UT Shilpa Trevizo 3RD+ ORD [address] PEL/TR [facility] Right 12/08/2021    Procedure: COMPLETION ARTERIOGRAM WITH 5 MM ANGIOPLASTY OF MID SUPERFICIAL FEMORAL ARTERY;  Surgeon: Rao Yun MD;  Location: [facility];  Service: Vascular   • UT VEIN IN SITU BYPASS GRAFT,FEM-POP Right 12/08/2021    Procedure: R SFA to BK Pop Bypass using insitu GSV,  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;  Surgeon: Vi Domínguez MD;  Location: [facility];  Service: Vascular   • TONSILLECTOMY     • WISDOM TOOTH EXTRACTION         Family History   Problem Relation Age of Onset   • Dementia Mother    • Glaucoma Mother    • Arthritis Mother         nursing home   • Cancer Father         dead   • Osteoarthritis Family        Social History     Occupational History   • Not on file   Tobacco Use   • Smoking status: Passive Smoke Exposure - Never Smoker   • Smokeless tobacco: Never   • Tobacco comments:     stress caused by workers comp   not covering meds    delays   Vaping Use   • Vaping Use: Never used   Substance and Sexual Activity   • Alcohol use: No   • Drug use: No   • Sexual activity: Never     Comment: defer       Current Outpatient Medications on File Prior to Visit   Medication Sig   • acetaminophen (TYLENOL) 500 mg tablet Take 1,000 mg by mouth every 12 (twelve) hours as needed for mild pain   • apixaban (ELIQUIS) 5 mg Take 1 tablet (5 mg total) by mouth 2 (two) times a day   • ascorbic Acid (VITAMIN C) 500 MG CPCR Take 500 mg by mouth 2 (two) times a day   • aspirin (ECOTRIN LOW STRENGTH) 81 mg EC tablet Take 1 tablet (81 mg total) by mouth daily   • atorvastatin (LIPITOR) 10 mg tablet Take 0 5 tablets (5 mg total) by mouth daily at bedtime   • cholecalciferol (VITAMIN D3) 1,000 units tablet Take 2,000 Units by mouth 2 (two) times a day    • clotrimazole (LOTRIMIN) 1 % cream Apply 1 g (1 application total) topically daily To affected area (Patient taking differently: Apply 1 application topically as needed To affected area)   • Flaxseed, Linseed, (FLAX SEED OIL PO) Take 1,200 capsules by mouth 2 (two) times a day     • folic acid (FOLVITE) 732 mcg tablet Take 400 mcg by mouth daily   • METHYLCOBALAMIN SL Place 1,000 mcg under the tongue 2 (two) times a day    • Misc Natural Products (Osteo Bi-Flex Triple Strength) TABS Take by mouth 2 (two) times a day     • pyridoxine (B-6) 100 MG tablet Take 100 mg by mouth daily   • vitamin A 2400 MCG (8000 UT) capsule Take 8,000 Units by mouth daily   • [DISCONTINUED] transdermal buprenorphine (BUTRANS) 5 mcg/hr TD patch Place 1 patch on the skin once a week     No current facility-administered medications on file prior to visit  Allergies   Allergen Reactions   • Cyanocobalamin [Vitamin B12] GI Intolerance   • Gabapentin Other (See Comments) and GI Intolerance     Difficulty walking   • Morphine Other (See Comments)     "acute constipation"   • Tramadol Confusion   • Nsaids Other (See Comments)     Pt reports avoids as is on Eliquis       Physical Exam:    /79   Pulse 90   Ht 6' 3" (1 905 m) Comment: verbal  Wt 88 2 kg (194 lb 8 oz)   BMI 24 31 kg/m²     Constitutional:normal, well developed, well nourished, alert, in no distress and non-toxic and no overt pain behavior    Eyes:anicteric  HEENT:grossly intact  Pulmonary:even and unlabored  Cardiovascular:No edema or pitting edema present  Skin:Normal without rashes or lesions and well hydrated  Psychiatric:Mood and affect appropriate  Neurologic:Cranial Nerves II-XII grossly intact  Musculoskeletal:normal gait    Imaging

## 2022-12-08 LAB

## 2022-12-08 NOTE — TELEPHONE ENCOUNTER
----- Message from Jaime Wilson sent at 7/12/2018  4:07 PM EDT -----  Regarding: FW: Test Results Question  Contact: [phone]  Please advise  ----- Message -----  From: Matthias Zhao  Sent: 7/12/2018   3:44 PM  To: [facility]  Subject: Test Results Question                            Greetings,     What can be done to solve, or ? The fire in the stomach    Nick Sanderson
Called pt and made him aware of lab; advised him to get this done 2-3 weeks after stopping antibiotics and to hold Omeprazole 10 days prior   - mailed lab script to pt 
Called pt; scheduled 9/4 with Dr Peewee Marin (None of the PA's have any sooner appointments in the [place] office)  Do you want to put in an H  Pylori lab to be done when meds are finished?
H  Pylori stool antigen was ordered   Please let the patient know
I signed the report and sent h  Pylori treatment  Thanks 
Patient aware of results and will  medication for treatment of h pylori at his pharmacy  Please call him to schedule f/u office visit 
Patient called for path results from EGD  Report showed "few h  Pylori organisms" and chronic mild active gastritis  Patient is reporting "burning" despite taking omeprazole 40 mg daily in the AM and avoiding spicy and fatty foods  Please review report with your recommendation  Thank you 
left upper arm

## 2022-12-19 ENCOUNTER — TELEPHONE (OUTPATIENT)
Dept: PAIN MEDICINE | Facility: MEDICAL CENTER | Age: 67
End: 2022-12-19

## 2022-12-19 NOTE — TELEPHONE ENCOUNTER
Caller: Modesta Marrero    Doctor: Isaac    Reason for call: patient needs proof that this medication enhances quality of life   transdermal buprenorphine (BUTRANS) 5 mcg/hr TD patch    Per [facility] [place] [facility] sent him letter     [phone]    Call back#: [phone]

## 2022-12-19 NOTE — TELEPHONE ENCOUNTER
RN s/w pt regarding previous  Pt needs a prior auth for this medication that he has been on  Per pt this happened to him before with [place] and he was on Tramadol that messed his head up since then he has been on butrans and has been happy ever since        --please see previous this pt needs an AUTH--

## 2022-12-27 ENCOUNTER — RA CDI HCC (OUTPATIENT)
Dept: OTHER | Facility: HOSPITAL | Age: 67
End: 2022-12-27

## 2022-12-27 ENCOUNTER — HOSPITAL ENCOUNTER (OUTPATIENT)
Dept: NON INVASIVE DIAGNOSTICS | Facility: CLINIC | Age: 67
Discharge: HOME | End: 2022-12-27

## 2022-12-27 DIAGNOSIS — I72.4 ANEURYSM OF RIGHT POPLITEAL ARTERY (HCC): ICD-10-CM

## 2022-12-27 NOTE — PROGRESS NOTES
[id number]  [facility]  coding opportunities          Chart Reviewed number of suggestions sent to Provider: 1     Patients Insurance     Medicare Insurance: [employer]

## 2022-12-28 ENCOUNTER — TRANSCRIBE ORDERS (OUTPATIENT)
Dept: ADMINISTRATIVE | Facility: HOSPITAL | Age: 67
End: 2022-12-28

## 2022-12-28 DIAGNOSIS — I72.4 ANEURYSM OF RIGHT POPLITEAL ARTERY (HCC): Primary | Chronic | ICD-10-CM

## 2022-12-28 DIAGNOSIS — I70.202 ATHEROSCLEROSIS OF ARTERY OF LEFT LOWER EXTREMITY (HCC): Chronic | ICD-10-CM

## 2023-01-04 ENCOUNTER — TELEPHONE (OUTPATIENT)
Dept: VASCULAR SURGERY | Facility: CLINIC | Age: 68
End: 2023-01-04

## 2023-01-04 ENCOUNTER — OFFICE VISIT (OUTPATIENT)
Dept: INTERNAL MEDICINE CLINIC | Facility: OTHER | Age: 68
End: 2023-01-04

## 2023-01-04 VITALS
WEIGHT: 195.4 LBS | SYSTOLIC BLOOD PRESSURE: 100 MMHG | OXYGEN SATURATION: 94 % | DIASTOLIC BLOOD PRESSURE: 64 MMHG | HEART RATE: 93 BPM | TEMPERATURE: 98.2 F | RESPIRATION RATE: 20 BRPM | HEIGHT: 75 IN | BODY MASS INDEX: 24.29 KG/M2

## 2023-01-04 DIAGNOSIS — M06.4 INFLAMMATORY POLYARTHROPATHY (HCC): ICD-10-CM

## 2023-01-04 DIAGNOSIS — E78.2 MIXED HYPERLIPIDEMIA: Primary | ICD-10-CM

## 2023-01-04 DIAGNOSIS — J44.9 CHRONIC OBSTRUCTIVE PULMONARY DISEASE, UNSPECIFIED COPD TYPE (HCC): ICD-10-CM

## 2023-01-04 DIAGNOSIS — Z72.0 TOBACCO ABUSE: ICD-10-CM

## 2023-01-04 DIAGNOSIS — I82.412 DEEP VEIN THROMBOSIS (DVT) OF FEMORAL VEIN OF LEFT LOWER EXTREMITY, UNSPECIFIED CHRONICITY (HCC): Primary | ICD-10-CM

## 2023-01-04 DIAGNOSIS — R91.8 MULTIPLE SUBSOLID LUNG NODULES GREATER THAN 6 MM IN DIAMETER: ICD-10-CM

## 2023-01-04 DIAGNOSIS — F11.29 OPIOID DEPENDENCE WITH OPIOID-INDUCED DISORDER (HCC): ICD-10-CM

## 2023-01-04 DIAGNOSIS — Z12.5 PROSTATE CANCER SCREENING: ICD-10-CM

## 2023-01-04 DIAGNOSIS — Z23 NEEDS FLU SHOT: ICD-10-CM

## 2023-01-04 DIAGNOSIS — M25.511 CHRONIC RIGHT SHOULDER PAIN: ICD-10-CM

## 2023-01-04 DIAGNOSIS — I82.412 ACUTE DEEP VEIN THROMBOSIS (DVT) OF FEMORAL VEIN OF LEFT LOWER EXTREMITY (HCC): ICD-10-CM

## 2023-01-04 DIAGNOSIS — I74.3 EMBOLISM AND THROMBOSIS OF ARTERIES OF THE LOWER EXTREMITIES (HCC): ICD-10-CM

## 2023-01-04 DIAGNOSIS — G89.29 CHRONIC RIGHT SHOULDER PAIN: ICD-10-CM

## 2023-01-04 RX ORDER — ATORVASTATIN CALCIUM 10 MG/1
5 TABLET, FILM COATED ORAL
Qty: 45 TABLET | Refills: 1 | Status: SHIPPED | OUTPATIENT
Start: 2023-01-04

## 2023-01-04 NOTE — PROGRESS NOTES
Assessment/Plan:    Deep vein thrombosis (DVT) of femoral vein of left lower extremity ([address] )  Follow up with vascular surgery, continue Eliquis    Chronic right shoulder pain  Follow up with orthopedic surgery  Mixed hyperlipidemia  Continue atorvastatin 5 mg daily  Multiple subsolid lung nodules greater than 6 mm in diameter  Repeat CT scan chest is scheduled for March  Continue follow up with CT surgery  Tobacco abuse  Discussed smoking cessation  Diagnoses and all orders for this visit:    Mixed hyperlipidemia  -     atorvastatin (LIPITOR) 10 mg tablet; Take 0 5 tablets (5 mg total) by mouth daily at bedtime  -     CBC; Future  -     Comprehensive metabolic panel; Future  -     Lipid panel; Future    Needs flu shot  -     influenza vaccine, quadrivalent, recombinant, PF, 0 5 mL, for patients 18 yr+ (FLUBLOK)    Acute deep vein thrombosis (DVT) of femoral vein of left lower extremity (HCC)    Chronic right shoulder pain    Embolism and thrombosis of arteries of the lower extremities (HCC)    Inflammatory polyarthropathy (HCC)    Chronic obstructive pulmonary disease, unspecified COPD type (HCC)    Opioid dependence with opioid-induced disorder ([facility] )    Multiple subsolid lung nodules greater than 6 mm in diameter    Tobacco abuse    Prostate cancer screening  -     PSA, Total Screen; Future          Depression Screening and Follow-up Plan: Patient was screened for depression during today's encounter  They screened negative with a PHQ-2 score of 0  Tobacco Cessation Counseling: Tobacco cessation counseling was provided  The patient is sincerely urged to quit consumption of tobacco  He is not ready to quit tobacco  Medication options and side effects of medication discussed  Patient refused medication  Subjective:      Patient ID: Triston Plan is a 75 y o  male  Chief Complaint   Patient presents with   • Follow-up     6 month, labs from June not done     • margot     PHQ, AWV due after 6/29/22   • Arm Pain     Right shoulder had shot in august and is starting to were off  • Mass     On upper forearm moves but does not hurt   • Flu Vaccine     Would like the flu shot today       75year old male is seen today for follow up of chronic conditions  Recent labs reviewed  He is having recurrence of right shoulder pain to which he had a steroid injection in September which did not provide relief however has been noticing recent recurrence of right shoulder pain  He has established care with cardiothoracic surgery regarding lung nodules to which he has a follow-up CT scan scheduled in March  He follows up regularly with pain management and has been compliant with his pain regimen  Pain is controlled  He has been compliant with his medication regimen  Hyperlipidemia  This is a chronic problem  The current episode started more than 1 year ago  The problem is controlled  Recent lipid tests were reviewed and are normal  Pertinent negatives include no shortness of breath  Current antihyperlipidemic treatment includes statins  The current treatment provides moderate improvement of lipids  There are no compliance problems  The following portions of the patient's history were reviewed and updated as appropriate: allergies, current medications, past family history, past medical history, past social history, past surgical history and problem list     Review of Systems   Constitutional: Negative for activity change, appetite change, chills, diaphoresis, fatigue and fever  HENT: Negative for congestion, postnasal drip, rhinorrhea, sinus pressure, sinus pain, sneezing and sore throat  Eyes: Negative for visual disturbance  Respiratory: Negative for apnea, cough, choking, chest tightness, shortness of breath and wheezing  Cardiovascular: Negative for palpitations and leg swelling     Gastrointestinal: Negative for abdominal distention, abdominal pain, anal bleeding, blood in stool, constipation, diarrhea, nausea and vomiting  Endocrine: Negative for cold intolerance and heat intolerance  Genitourinary: Negative for difficulty urinating, dysuria and hematuria  Musculoskeletal: Negative  Skin: Negative  Neurological: Negative for dizziness, weakness, light-headedness and headaches  Hematological: Negative for adenopathy  Psychiatric/Behavioral: Negative for agitation, sleep disturbance and suicidal ideas  All other systems reviewed and are negative          Past Medical History:   Diagnosis Date   • Arthritis feb 2016    osto back   • Chronic back pain    • Chronic narcotic dependence ([facility] )    • Chronic pain disorder     Sees pain management Dr Margarito Domingo    • Cigarette smoker    • Depression    • Dry skin    • Dyslexia    • Full dentures     upper and no teeth lower/"avoid raw vegetables and chewy food"   • GERD (gastroesophageal reflux disease)    • 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 related   • History of blood clots     "left leg" takes Eliquis   • History of bronchitis    • History of pneumonia     childhood   • Increased pressure in the eye, bilateral    • L4-L5 disc bulge 06/16/2017   • Low back pain    • Muscle weakness    • Neuropathy     right foot and left foot   • Osteoarthritis    • Osteoarthritis     and" if becomes cold joint pain worsens"   • Peripheral neuropathy 8/ 15   • Pul[facility] emphysema ([facility] )     "mild"   • Right foot drop    • Shortness of breath     "only on humid days"   • Smokers' cough ([facility] )    • Wears glasses    • Work related injury 04/24/2018         Current Outpatient Medications:   •  acetaminophen (TYLENOL) 500 mg tablet, Take 1,000 mg by mouth every 12 (twelve) hours as needed for mild pain, Disp: , Rfl:   •  apixaban (ELIQUIS) 5 mg, Take 1 tablet (5 mg total) by mouth 2 (two) times a day, Disp: 180 tablet, Rfl: 3  •  ascorbic Acid (VITAMIN C) 500 MG CPCR, Take 500 mg by mouth 2 (two) times a day, Disp: , Rfl:   •  aspirin (ECOTRIN LOW STRENGTH) 81 mg EC tablet, Take 1 tablet (81 mg total) by mouth daily, Disp: 90 tablet, Rfl: 0  •  atorvastatin (LIPITOR) 10 mg tablet, Take 0 5 tablets (5 mg total) by mouth daily at bedtime, Disp: 45 tablet, Rfl: 1  •  cholecalciferol (VITAMIN D3) 1,000 units tablet, Take 2,000 Units by mouth 2 (two) times a day , Disp: , Rfl:   •  clotrimazole (LOTRIMIN) 1 % cream, Apply 1 g (1 application total) topically daily To affected area (Patient taking differently: Apply 1 application topically as needed To affected area), Disp: 60 g, Rfl: 2  •  Flaxseed, Linseed, (FLAX SEED OIL PO), Take 1,200 capsules by mouth 2 (two) times a day  , Disp: , Rfl:   •  folic acid (FOLVITE) 337 mcg tablet, Take 400 mcg by mouth daily, Disp: , Rfl:   •  METHYLCOBALAMIN SL, Place 1,000 mcg under the tongue 2 (two) times a day , Disp: , Rfl:   •  Misc Natural Products (Osteo Bi-Flex Triple Strength) TABS, Take by mouth 2 (two) times a day  , Disp: , Rfl:   •  pyridoxine (B-6) 100 MG tablet, Take 100 mg by mouth daily, Disp: , Rfl:   •  transdermal buprenorphine (BUTRANS) 5 mcg/hr TD patch, Place 1 patch on the skin once a week For ongoing therapy, Disp: 4 patch, Rfl: 2  •  vitamin A 2400 MCG (8000 UT) capsule, Take 8,000 Units by mouth daily, Disp: , Rfl:     Allergies   Allergen Reactions   • Cyanocobalamin [Vitamin B12] GI Intolerance   • Gabapentin Other (See Comments) and GI Intolerance     Difficulty walking   • Morphine Other (See Comments)     "acute constipation"   • Tramadol Confusion   • Nsaids Other (See Comments)     Pt reports avoids as is on Eliquis       Social History   Past Surgical History:   Procedure Laterality Date   • BACK SURGERY  aug 2015- jun2017 2017 no affect   • COLONOSCOPY     • EGD AND COLONOSCOPY N/A 06/29/2018    Procedure: EGD AND COLONOSCOPY;  Surgeon: Sulaiman Snell MD;  Location: [facility] GI LAB;   Service: Gastroenterology   • EPIDURAL BLOCK INJECTION      needle   • HIP ARTHROSCOPY W/ LABRAL DEBRIDEMENT Left     "hip surgery" • IR LOWER EXTREMITY ANGIOGRAM  12/08/2021   • ORTHOPEDIC SURGERY     • ND ARTHRS HIP DEBRIDEMENT/SHAVING ARTICULAR CRTLG Left 04/30/2018    Procedure: LEFT HIP ARTHROSCOPIC LABRAL DEBRIDEMENT;  Surgeon: Thi Jerome MD;  Location: [facility];  Service: Orthopedics   • ND IN-SITU VEIN BYPASS FEMORAL-POPLITEAL Right 12/08/2021    Procedure: R SFA to BK Pop Bypass using insitu GSV,  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;  Surgeon: Omar Noel MD;  Location: [facility];  Service: Vascular   • ND Gage Solares 3RD+ ORD SLCTV ABDL PEL/LXTR [facility] Right 12/08/2021    Procedure: COMPLETION ARTERIOGRAM WITH 5 MM ANGIOPLASTY OF MID SUPERFICIAL FEMORAL ARTERY;  Surgeon: Omar Noel MD;  Location: [facility];  Service: Vascular   • TONSILLECTOMY     • WISDOM TOOTH EXTRACTION       Family History   Problem Relation Age of Onset   • Dementia Mother    • Glaucoma Mother    • Arthritis Mother         nursing home   • Cancer Father         dead   • Osteoarthritis Family        Objective:  /64 (BP Location: Left arm, Patient Position: Sitting, Cuff Size: Standard)   Pulse 93   Temp 98 2 °F (36 8 °C) (Temporal)   Resp 20   Ht 6' 3" (1 905 m)   Wt 88 6 kg (195 lb 6 4 oz)   SpO2 94%   BMI 24 42 kg/m²     Recent Results (from the past 1344 hour(s))   [facility] All Prescribed Meds and Special Instructions    Collection Time: 12/05/22  2:22 PM   Result Value Ref Range    RESULT ALL_PRESC MEDS SP INSTRNS Not Applicable    Amphetamines, Methamphetamines    Collection Time: 12/05/22  2:22 PM   Result Value Ref Range    Amphetamine Quantification negative 100 ng/mL    Methamphetamine Quantification negative 100 ng/mL   Butalbital    Collection Time: 12/05/22  2:22 PM   Result Value Ref Range    Butalbital Quantification negative 200 ng/mL   Phenobarbital    Collection Time: 12/05/22  2:22 PM   Result Value Ref Range    Phenobarbital Quantification negative 200 ng/mL   Secobarbital    Collection Time: 12/05/22  2:22 PM Result Value Ref Range    Secobarbital Quantification negative 200 ng/mL   Temazepam    Collection Time: 12/05/22  2:22 PM   Result Value Ref Range    Temazepam Quantification negative 50 ng/mL    Oxazepam Quantification negative 40 ng/mL   Alprazolam    Collection Time: 12/05/22  2:22 PM   Result Value Ref Range    Alpha-Hydroxyalprazolam Quantification UR negative 20 ng/mL   Clonazepam    Collection Time: 12/05/22  2:22 PM   Result Value Ref Range    7-Amino-Clonazepam Quantification UR negative 20 ng/mL   Diazepam    Collection Time: 12/05/22  2:22 PM   Result Value Ref Range    Nordiazepam Quantification negative 40 ng/mL    Temazepam Quantification negative 50 ng/mL    Oxazepam Quantification negative 40 ng/mL   Lorazepam    Collection Time: 12/05/22  2:22 PM   Result Value Ref Range    Lorazepam Quantification negative 40 ng/mL   Oxazepam    Collection Time: 12/05/22  2:22 PM   Result Value Ref Range    Oxazepam Quantification negative 40 ng/mL   Gabapentin    Collection Time: 12/05/22  2:22 PM   Result Value Ref Range    Gabapentin Quantification negative 1000   Pregabalin    Collection Time: 12/05/22  2:22 PM   Result Value Ref Range    PREGABALIN QUANTIFICATION negative 400   Cocaine    Collection Time: 12/05/22  2:22 PM   Result Value Ref Range    Cocaine metabolite Quantification negative 50 ng/mL   Heroin    Collection Time: 12/05/22  2:22 PM   Result Value Ref Range    6-KALIN (Heroin metabolite) Quantification UR negative 10 ng/mL   Buprenorphine    Collection Time: 12/05/22  2:22 PM   Result Value Ref Range    Buprenorphine Quantification positive-17  727 5 ng/mL    Norbuprenorphine Quantification negative 20 ng/mL   Levorphanol    Collection Time: 12/05/22  2:22 PM   Result Value Ref Range    Dextrorphan (Dextromethorphan metabolite) Quant negative 50 ng/mL   Meperidine    Collection Time: 12/05/22  2:22 PM   Result Value Ref Range    Meperidine Quantification negative 50 ng/mL    Normeperidine Quantification negative 50 ng/mL   Naltrexone    Collection Time: 12/05/22  2:22 PM   Result Value Ref Range    Naltrexone Quantification negative 10 ng/mL    NALTREXOL (NALTREXONE METABOLITE) QUANTIFICATION negative 10 ng/mL   Fentanyl    Collection Time: 12/05/22  2:22 PM   Result Value Ref Range    Fentanyl Quantification negative 1 ng/mL    Norfentanyl Quantification negative 8 ng/mL    4-ANPP Quantification Fen Neg 2 ng/mL    Acetyl fentanyl Quantification Fen Neg 2 ng/mL    Acetyl norfentanyl Quantification Fen Neg 5 ng/mL    Acryl fentanyl Quantification Fen Neg 1 ng/mL    Carfentanil Quantification Fen Neg 2 ng/mL    Para-fluorofentanyl Quantification Fen Neg 1 ng/mL   Methadone    Collection Time: 12/05/22  2:22 PM   Result Value Ref Range    Methadone Quantification negative 100 ng/mL    EDDP (Methadone metabolite) Quantification negative 100 ng/mL   Oxycodone    Collection Time: 12/05/22  2:22 PM   Result Value Ref Range    Oxycodone Quantification negative 50 ng/mL    Noroxycodone Quantification negative 50 ng/mL    Oxymorphone Quantification negative 50 ng/mL   Oxymorphone    Collection Time: 12/05/22  2:22 PM   Result Value Ref Range    Oxymorphone Quantification negative 50 ng/mL   Tapentadol    Collection Time: 12/05/22  2:22 PM   Result Value Ref Range    Tapentadol Quantification negative 50 ng/mL   THC    Collection Time: 12/05/22  2:22 PM   Result Value Ref Range    cTHC (Marijuana metabolite) Quantification negative 15 ng/mL   Tramadol    Collection Time: 12/05/22  2:22 PM   Result Value Ref Range    Tramadol Quantification negative 100 ng/mL    O-desmethyl-tramadol Quantification negative 100 ng/mL    N-DESMETHYL-TRAMADOL QUANTIFICATION negative 100 ng/mL   Codeine, Hydrocodone, Hydropmorphone, Morphine    Collection Time: 12/05/22  2:22 PM   Result Value Ref Range    Codeine Quantification negative 50 ng/mL    Morphine Quantification negative 50 ng/mL    Hydrocodone Quantification negative 50 ng/mL    Norhydrocodone Quantification negative 50 ng/mL    Hydromorphone Quantification negative 50 ng/mL   Bath Salts    Collection Time: 12/05/22  2:22 PM   Result Value Ref Range    EUTYLONE QUANTIFICATION negative 10 ng/mL    METHYLONE QUANTIFICATION negative 3 ng/mL   Ethyl Glucuronide/Ethyl Sulfate    Collection Time: 12/05/22  2:22 PM   Result Value Ref Range    Ethyl Glucuronide Quantification negative 500 ng/mL    Ethyl Sulfate Quantification negative 500 ng/mL   Kratom    Collection Time: 12/05/22  2:22 PM   Result Value Ref Range    Mitragynine (Kratom alkaloid) Quantification negative 1 ng/mL    5-OJ-Mjbejejvhdx (Kratom alkaloid) Quantification negative 1 ng/mL   Spice    Collection Time: 12/05/22  2:22 PM   Result Value Ref Range    5F-ADB-M7 negative 10 ng/mL    VJ-[id number]-X6 METABOLITE QUANTIFICATION  negative 10 ng/mL    COQJ-NJKJHEVH-N9 METABOLITE QUANTIFICATION negative 10 ng/mL    BNB296 metabolite Quantification negative 10 ng/mL    XXD816 metabolite Quantification negative 10 ng/mL    RCS4 METABOLITE QUANTIFICATION negative 10 ng/mL    XLR11/ METABOLITE QUANTIFICATION negative 10 ng/mL   Methylphenidate    Collection Time: 12/05/22  2:22 PM   Result Value Ref Range    Methylphenidate Quantification negative 50 ng/mL    RITALINIC ACID QUANTIFICATION negative 50 ng/mL   Phentermine    Collection Time: 12/05/22  2:22 PM   Result Value Ref Range    PHENTERMINE QUANTIFICATION negative 50 ng/mL   Validity Oxidant    Collection Time: 12/05/22  2:22 PM   Result Value Ref Range    OXIDANT normal-0 <200 ug/mL ug/mL   Validity Creatinine    Collection Time: 12/05/22  2:22 PM   Result Value Ref Range    CREATININE normal-108 7 >20 mg/dL mg/dL   Validity pH    Collection Time: 12/05/22  2:22 PM   Result Value Ref Range    pH normal-5 6 4 5 - 9 5   Validity Specific    Collection Time: 12/05/22  2:22 PM   Result Value Ref Range    SPECIFIC GRAVITY URINE normal-1 011 1 003 - 1 035            Physical Exam  Vitals and nursing note reviewed  Constitutional:       General: He is not in acute distress  Appearance: He is well-developed  He is not diaphoretic  HENT:      Head: Normocephalic and atraumatic  Eyes:      General: No scleral icterus  Right eye: No discharge  Left eye: No discharge  Conjunctiva/sclera: Conjunctivae normal       Pupils: Pupils are equal, round, and reactive to light  Neck:      Thyroid: No thyromegaly  Vascular: No JVD  Cardiovascular:      Rate and Rhythm: Normal rate and regular rhythm  Heart sounds: Normal heart sounds  No murmur heard  No friction rub  No gallop  Pulmonary:      Effort: Pulmonary effort is normal  No respiratory distress  Breath sounds: Normal breath sounds  No wheezing or rales  Chest:      Chest wall: No tenderness  Abdominal:      General: Bowel sounds are normal  There is no distension  Palpations: Abdomen is soft  There is no mass  Tenderness: There is no abdominal tenderness  There is no guarding or rebound  Musculoskeletal:         General: No tenderness or deformity  Normal range of motion  Cervical back: Normal range of motion and neck supple  Lymphadenopathy:      Cervical: No cervical adenopathy  Skin:     General: Skin is warm and dry  Coloration: Skin is not pale  Findings: No erythema or rash  Neurological:      Mental Status: He is alert and oriented to person, place, and time  Cranial Nerves: No cranial nerve deficit  Coordination: Coordination normal       Deep Tendon Reflexes: Reflexes are normal and symmetric  Psychiatric:         Behavior: Behavior normal          Thought Content:  Thought content normal          Judgment: Judgment normal

## 2023-01-04 NOTE — TELEPHONE ENCOUNTER
Patient called, he saw his PCP today and he referred him to see vascular again  He has a history of left leg DVT and his left leg is larger than the right and the veins are very pronounced on the left leg  This has been like this for about a year , and he has been on the eliquis the entire time  He wanted to make an appt with Dr Dwaine Milian and I will ask vascular triage if he should have a venous duplex prior to appt

## 2023-01-09 ENCOUNTER — HOSPITAL ENCOUNTER (OUTPATIENT)
Dept: NON INVASIVE DIAGNOSTICS | Facility: CLINIC | Age: 68
Discharge: HOME | End: 2023-01-09

## 2023-01-09 DIAGNOSIS — I82.412 DEEP VEIN THROMBOSIS (DVT) OF FEMORAL VEIN OF LEFT LOWER EXTREMITY, UNSPECIFIED CHRONICITY (HCC): ICD-10-CM

## 2023-01-15 NOTE — TELEPHONE ENCOUNTER
Refill request routed to triage to sign  pt stable Patient OOB walking from bathroom, Patient denies any chest pain or SOB. Patient is now resting in the bed and is in no distress.

## 2023-01-18 ENCOUNTER — OFFICE VISIT (OUTPATIENT)
Dept: OBGYN CLINIC | Facility: MEDICAL CENTER | Age: 68
End: 2023-01-18

## 2023-01-18 VITALS
BODY MASS INDEX: 24.12 KG/M2 | WEIGHT: 194 LBS | HEIGHT: 75 IN | HEART RATE: 93 BPM | DIASTOLIC BLOOD PRESSURE: 74 MMHG | SYSTOLIC BLOOD PRESSURE: 105 MMHG

## 2023-01-18 DIAGNOSIS — M75.81 TENDINITIS OF RIGHT ROTATOR CUFF: Primary | ICD-10-CM

## 2023-01-18 RX ORDER — LIDOCAINE HYDROCHLORIDE 10 MG/ML
2.5 INJECTION, SOLUTION INFILTRATION; PERINEURAL
Status: COMPLETED | OUTPATIENT
Start: 2023-01-18 | End: 2023-01-18

## 2023-01-18 RX ORDER — METHYLPREDNISOLONE ACETATE 40 MG/ML
1 INJECTION, SUSPENSION INTRA-ARTICULAR; INTRALESIONAL; INTRAMUSCULAR; SOFT TISSUE
Status: COMPLETED | OUTPATIENT
Start: 2023-01-18 | End: 2023-01-18

## 2023-01-18 RX ADMIN — LIDOCAINE HYDROCHLORIDE 2.5 ML: 10 INJECTION, SOLUTION INFILTRATION; PERINEURAL at 11:08

## 2023-01-18 RX ADMIN — METHYLPREDNISOLONE ACETATE 1 ML: 40 INJECTION, SUSPENSION INTRA-ARTICULAR; INTRALESIONAL; INTRAMUSCULAR; SOFT TISSUE at 11:08

## 2023-01-18 NOTE — PROGRESS NOTES
Ortho Sports Medicine Shoulder Visit     Assesment:   right shoulder rotator cuff tendonitis, mild osteoarthritis of shoulder     Plan:    Conservative treatment:    Ice to shoulder 1-2 times daily, for 20 minutes at a time  Home exercise program for shoulder, including ROM and strenthening  Instructions given to patient of what exercises to perform  Imaging:    No imaging was available for review today  Injection:    The risks and benefits of the injection (which include but are not limited to: infection, bleeding,damage to nerve/artery, need for further intervention), as well as the risks and benefits of all alternative treatments were explained and understood  The patient elected to proceed with injection  The procedure was done with aseptic technique, and the patient tolerated the procedure well with no complications  A corticosteroid injection of the right subacromial space was performed  The patient should take 1-2 days off of activity, with gradual return to activity as tolerated  Ice to the shoulder 1-2 times daily for 20 minutes, for next 24-48 hrs  Surgery:     No surgery is recommended at this point, continue with conservative treatment plan as noted  History of Present Illness: The patient is returns for follow up of right shoulder pain       MRI no full thickness cuff tear reviewed at last appt  He was given CSI 9/15/22 but injection has worn off       He still gets sharp stabbing pain lifting anything heavy like gallon of milk lateral portion of arm  He has done physical therapy and maintains HEP  The patient denies weakness        The patient has tried rest, NSAIDS and physical therapy       I have reviewed the past medical, surgical, social and family history, medications and allergies as documented in the EMR  Review of systems: ROS is negative other than that noted in the HPI  Constitutional: Negative for fatigue and fever     Cardiovascular: Negative for chest pain  Pulmonary: negative for shortness of breath    PMH/PSH:  Past Medical History:   Diagnosis Date   • Arthritis feb 2016    osto back   • Chronic back pain    • Chronic narcotic dependence (HCC)    • Chronic pain disorder     Sees pain management Dr Mariusz MARTINEZ   • Cigarette smoker    • Depression    • Dry skin    • Dyslexia    • Full dentures     upper and no teeth lower/"avoid raw vegetables and chewy food"   • GERD (gastroesophageal reflux disease)    • 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 related   • History of blood clots     "left leg" takes Eliquis   • History of bronchitis    • History of pneumonia     childhood   • Increased pressure in the eye, bilateral    • L4-L5 disc bulge 06/16/2017   • Low back pain    • Muscle weakness    • Neuropathy     right foot and left foot   • Osteoarthritis    • Osteoarthritis     and" if becomes cold joint pain worsens"   • Peripheral neuropathy 8/ 15   • Pulmonary emphysema ([address] )     "mild"   • Right foot drop    • Shortness of breath     "only on humid days"   • Smokers' cough [facility])    • Wears glasses    • Work related injury 04/24/2018     Past Surgical History:   Procedure Laterality Date   • BACK SURGERY  aug 2015- jun2017 2017 no affect   • COLONOSCOPY     • EGD AND COLONOSCOPY N/A 06/29/2018    Procedure: EGD AND COLONOSCOPY;  Surgeon: Otf Tipton MD;  Location: [facility] GI LAB;   Service: Gastroenterology   • EPIDURAL BLOCK INJECTION      needle   • HIP ARTHROSCOPY W/ LABRAL DEBRIDEMENT Left     "hip surgery"   • IR LOWER EXTREMITY ANGIOGRAM  12/08/2021   • ORTHOPEDIC SURGERY     • [place] ARTHRS HIP DEBRIDEMENT/SHAVING ARTICULAR CRTLG Left 04/30/2018    Procedure: LEFT HIP ARTHROSCOPIC LABRAL DEBRIDEMENT;  Surgeon: Sabine Peng MD;  [facility];  Service: Orthopedics   • [place] IN-SITU VEIN BYPASS FEMORAL-POPLITEAL Right 12/08/2021    Procedure: R SFA to BK Pop Bypass using insitu GSV,  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;  Surgeon: Yasmin Tong MD;  Location:  MAIN OR;  Service: Vascular   • [place] SLCTV CATHJ 3RD+ ORD SLCTV ABDL PEL/LXTR [facility] Right 12/08/2021    Procedure: COMPLETION ARTERIOGRAM WITH 5 MM ANGIOPLASTY OF MID SUPERFICIAL FEMORAL ARTERY;  Surgeon: Karin Swan MD;  [facility];  Service: Vascular   • TONSILLECTOMY     • WISDOM TOOTH EXTRACTION          Physical Exam:    Blood pressure 105/74, pulse 93, height 6' 3" (1 905 m), weight 88 kg (194 lb)  General/Constitutional: NAD, well developed, well nourished  HENT: Normocephalic, atraumatic  CV: Intact distal pulses, regular rate  Resp: No respiratory distress or labored breathing  Lymphatic: No lymphadenopathy palpated  Neuro: Alert and Oriented x 3, no focal deficits  Psych: Normal mood, normal affect, normal judgement, normal behavior  Skin: Warm, dry, no rashes, no erythema     Shoulder Exam (focused):     Shoulder focused exam:       RIGHT LEFT    Scapula Atrophy Negative Negative     Winging Negative Negative     Protraction Negative Negative    Rotator cuff SS 5/5 5/5     IS 5/5 5/5     SubS 5/5 5/5    ROM  170     ER0 60 60     ER90 90 90     IR90 40 40     IRb T6 T6    TTP: AC Negative Negative     Biceps Negative Negative     Coracoid Negative Negative    Special Tests: O'Briens Negative Negative     [place]-shear Negative Negative     Cross body Adduction Negative Negative     Speeds  Negative Negative     Mac's Negative Negative     Whipple Positive Negative       Neer Negative Negative     Young Negative Negative    Instability: Apprehension & relocation not tested not tested     Load & shift not tested not tested    Other: Crank Negative Negative               UE NV Exam: +2 Radial pulses bilaterally  Sensation intact to light touch C5-T1 bilaterally, Radial/median/ulnar nerve motor intact    Cervical ROM is full without pain, numbness or tingling      Shoulder Imaging    No new imaging to review today    Large joint arthrocentesis: R subacromial bursa  [place] Protocol:  Consent: Verbal consent obtained    Risks and benefits: risks, benefits and alternatives were discussed  Consent given by: patient  Patient understanding: patient states understanding of the procedure being performed  Site marked: the operative site was marked  Patient identity confirmed: verbally with patient    Supporting Documentation  Indications: pain   Procedure Details  Location: shoulder - R subacromial bursa  Preparation: Patient was prepped and draped in the usual sterile fashion  Needle size: 22 G  Ultrasound guidance: no  Approach: posterolateral  Medications administered: 2 5 mL lidocaine 1 %; 1 mL methylPREDNISolone acetate 40 mg/mL    Patient tolerance: patient tolerated the procedure well with no immediate complications  Dressing:  Sterile dressing applied          Scribe Attestation    I,:  Diogenes Rosas am acting as a scribe while in the presence of the attending physician :       I,:  45 Teodora Winkler DO personally performed the services described in this documentation    as scribed in my presence :

## 2023-01-26 ENCOUNTER — OFFICE VISIT (OUTPATIENT)
Dept: VASCULAR SURGERY | Facility: CLINIC | Age: 68
End: 2023-01-26

## 2023-01-26 VITALS
DIASTOLIC BLOOD PRESSURE: 70 MMHG | HEART RATE: 81 BPM | WEIGHT: 191 LBS | HEIGHT: 75 IN | BODY MASS INDEX: 23.75 KG/M2 | SYSTOLIC BLOOD PRESSURE: 90 MMHG | OXYGEN SATURATION: 97 %

## 2023-01-26 DIAGNOSIS — I70.202 ATHEROSCLEROSIS OF ARTERY OF LEFT LOWER EXTREMITY (HCC): Chronic | ICD-10-CM

## 2023-01-26 DIAGNOSIS — I82.512 CHRONIC DEEP VEIN THROMBOSIS (DVT) OF FEMORAL VEIN OF LEFT LOWER EXTREMITY (HCC): ICD-10-CM

## 2023-01-26 DIAGNOSIS — I72.4 ANEURYSM OF RIGHT POPLITEAL ARTERY (HCC): Primary | Chronic | ICD-10-CM

## 2023-01-26 NOTE — ASSESSMENT & PLAN NOTE
Remote history of right lower extremity DVT on lifelong anticoagulation with Eliquis  He has findings consistent with postphlebitic syndrome  We discussed this disorder along with the treatment options available  I have prescribed him a 20-30 mm knee-high compressive stocking and encouraged him to elevate his legs when not on ambulatory

## 2023-01-26 NOTE — ASSESSMENT & PLAN NOTE
Asymptomatic atherosclerotic occlusive disease left lower extremity  No further work-up or intervention is necessary other than continued antiplatelet and statin therapy

## 2023-01-26 NOTE — LETTER
January 26, 2023     Marilin Hayward, [address] [facility]    Patient: Josué Yang   YOB: 1955   Date of Visit: 1/26/2023       Dear Dr Bharath Peralta:    Thank you for referring Peggy Carmona to me for evaluation  Below are the relevant portions of my assessment and plan of care  Deep vein thrombosis (DVT) of femoral vein of left lower extremity (HCC)  Remote history of right lower extremity DVT on lifelong anticoagulation with Eliquis  He has findings consistent with postphlebitic syndrome  We discussed this disorder along with the treatment options available  I have prescribed him a 20-30 mm knee-high compressive stocking and encouraged him to elevate his legs when not on ambulatory  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HCC)  Remote history of right popliteal artery aneurysm treated with femoral to popliteal artery bypass approximately 1 year ago  He is doing well in this regard with some mild proximal SFA disease but normal graft flow and distal perfusion  At this point we will continue follow-up with duplex imaging on a 6-month basis  Atherosclerosis of artery of left lower extremity (HCC)  Asymptomatic atherosclerotic occlusive disease left lower extremity  No further work-up or intervention is necessary other than continued antiplatelet and statin therapy  If you have questions, please do not hesitate to call me  I look forward to following Chelo Mendoza along with you           Sincerely,        Joe Alvarado MD        CC: No Recipients

## 2023-01-26 NOTE — ASSESSMENT & PLAN NOTE
Remote history of right popliteal artery aneurysm treated with femoral to popliteal artery bypass approximately 1 year ago  He is doing well in this regard with some mild proximal SFA disease but normal graft flow and distal perfusion  At this point we will continue follow-up with duplex imaging on a 6-month basis

## 2023-01-26 NOTE — PATIENT INSTRUCTIONS
Deep vein thrombosis (DVT) of femoral vein of left lower extremity (HCC)  Remote history of right lower extremity DVT on lifelong anticoagulation with Eliquis  He has findings consistent with postphlebitic syndrome  We discussed this disorder along with the treatment options available  I have prescribed him a 20-30 mm knee-high compressive stocking and encouraged him to elevate his legs when not on ambulatory  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HCC)  Remote history of right popliteal artery aneurysm treated with femoral to popliteal artery bypass approximately 1 year ago  He is doing well in this regard with some mild proximal SFA disease but normal graft flow and distal perfusion  At this point we will continue follow-up with duplex imaging on a 6-month basis  Atherosclerosis of artery of left lower extremity (HCC)  Asymptomatic atherosclerotic occlusive disease left lower extremity  No further work-up or intervention is necessary other than continued antiplatelet and statin therapy

## 2023-01-26 NOTE — PROGRESS NOTES
Assessment/Plan:    Deep vein thrombosis (DVT) of femoral vein of left lower extremity (HCC)  Remote history of right lower extremity DVT on lifelong anticoagulation with Eliquis  He has findings consistent with postphlebitic syndrome  We discussed this disorder along with the treatment options available  I have prescribed him a 20-30 mm knee-high compressive stocking and encouraged him to elevate his legs when not on ambulatory  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HCC)  Remote history of right popliteal artery aneurysm treated with femoral to popliteal artery bypass approximately 1 year ago  He is doing well in this regard with some mild proximal SFA disease but normal graft flow and distal perfusion  At this point we will continue follow-up with duplex imaging on a 6-month basis  Atherosclerosis of artery of left lower extremity (HCC)  Asymptomatic atherosclerotic occlusive disease left lower extremity  No further work-up or intervention is necessary other than continued antiplatelet and statin therapy  Diagnoses and all orders for this visit:    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HCC)    Atherosclerosis of artery of left lower extremity (HCC)    Chronic deep vein thrombosis (DVT) of femoral vein of left lower extremity (HCC)  -     Compression Stocking          Subjective:      Patient ID: Erasmo Maurer is a 75 y o  male  Patient is here today to review results of a DAO done 12/27/2022 and a LEV done 1/9/2022  Patient is s/p a fem-pop bypass BK, right transluminal balloon angioplasty open SFA done 12/8/2021  Patient complains only of neuropathy pain in his right foot  He denies any pain in his legs when walking  Patient also denies any open wounds  He is taking ASA 81 mg, Eliquis and Atorvastatin  He is current smoker       54-year-old with history of right popliteal artery aneurysm treated with femoral to popliteal artery bypass also has a history of extensive left lower extremity DVT and is on lifelong anticoagulation with Eliquis  He is noticed varicosities and swelling of the left lower extremity and present for evaluation and treatment  On evaluation today he notes no specific discomfort in either lower extremity  He denies any claudication symptoms  He does note varicosities and swelling of the left lower extremity which worsened with periods of standing  He notes improvement with elevation  He does not utilize compressive stockings  Arterial duplex 12/27/2022 with 50 to 75% right SFA stenosis with patent Vasquez-popliteal bypass  Ankle-brachial index remains normal at 1 12 and toe pressure 91  On the left there is an chronic occlusion of the SFA with ankle-brachial index of 0 61  Arterial duplex 1/9/2023  On the right there is no evidence of DVT  On the left there is a chronic nonocclusive DVT in the common femoral, femoral, popliteal, gastrocnemius and posterior tibial veins        The following portions of the patient's history were reviewed and updated as appropriate: allergies, current medications, past family history, past medical history, past social history, past surgical history and problem list     Past Medical History:  Past Medical History:   Diagnosis Date   • Arthritis feb 2016    osto back   • Chronic back pain    • Chronic narcotic dependence ([facility] [address] )    • Chronic pain disorder     Sees pain management Dr Benito Colvin    • Cigarette smoker    • Depression    • Dry skin    • Dyslexia    • Full dentures     upper and no teeth lower/"avoid raw vegetables and chewy food"   • GERD (gastroesophageal reflux disease)    • 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 related   • History of blood clots     "left leg" takes Eliquis   • History of bronchitis    • History of pneumonia     childhood   • Increased pressure in the eye, bilateral    • L4-L5 disc bulge 06/16/2017   • Low back pain    • Muscle weakness    • Neuropathy     right foot and left foot   • Osteoarthritis    • Osteoarthritis     and" if becomes cold joint pain worsens"   • Peripheral neuropathy 8/ 15   • Pulmonary emphysema ([address] )     "mild"   • Right foot drop    • Shortness of breath     "only on humid days"   • Smokers' cough [facility])    • Wears glasses    • Work related injury 2018       Past Surgical History:  Past Surgical History:   Procedure Laterality Date   • BACK SURGERY  aug 2015- 2017 no affect   • COLONOSCOPY     • EGD AND COLONOSCOPY N/A 2018    Procedure: EGD AND COLONOSCOPY;  Surgeon: Dahlia Greenwood MD;  Location: [facility] GI [facility]; Service: Gastroenterology   • EPIDURAL BLOCK INJECTION      needle   • HIP ARTHROSCOPY W/ LABRAL DEBRIDEMENT Left     "hip surgery"   • IR LOWER EXTREMITY ANGIOGRAM  2021   • ORTHOPEDIC SURGERY     • [place] ARTHRS HIP DEBRIDEMENT/SHAVING ARTICULAR CRTLG Left 2018    Procedure: LEFT HIP ARTHROSCOPIC LABRAL DEBRIDEMENT;  Surgeon: Kim Robins MD;  [facility];  Service: Orthopedics   • [place] IN-SITU VEIN BYPASS FEMORAL-POPLITEAL Right 2021    Procedure: R SFA to BK Pop Bypass using insitu GSV,  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;  Surgeon: Karin Swan MD;  [facility];  Service: Vascular   • [place] Sherrybarbra Mon[place] 3RD+ ORD [address] PEL/LXTR [facility] Right 2021    Procedure: COMPLETION ARTERIOGRAM WITH 5 MM ANGIOPLASTY OF MID SUPERFICIAL FEMORAL ARTERY;  Surgeon: Karin Swan MD;  [facility];  Service: Vascular   • TONSILLECTOMY     • WISDOM TOOTH EXTRACTION         Social History:  Social History     Substance and Sexual Activity   Alcohol Use No     Social History     Substance and Sexual Activity   Drug Use No     Social History     Tobacco Use   Smoking Status Every Day   • Packs/day: 0 10   • Years: 15 00   • Pack years: 1 50   • Types: Cigarettes   • Last attempt to quit: 2017   • Years since quittin 1   • Passive exposure: Yes   Smokeless Tobacco Never   Tobacco Comments    stress caused by workers comp   not covering meds  delays       Family History:  Family History   Problem Relation Age of Onset   • Dementia Mother    • Glaucoma Mother    • Arthritis Mother         nursing home   • Cancer Father         dead   • Osteoarthritis Family        Allergies:   Allergies   Allergen Reactions   • Cyanocobalamin [Vitamin B12] GI Intolerance   • Gabapentin Other (See Comments) and GI Intolerance     Difficulty walking   • Morphine Other (See Comments)     "acute constipation"   • Tramadol Confusion   • Nsaids Other (See Comments)     Pt reports avoids as is on Eliquis       Medications:    Current Outpatient Medications:   •  acetaminophen (TYLENOL) 500 mg tablet, Take 1,000 mg by mouth every 12 (twelve) hours as needed for mild pain, Disp: , Rfl:   •  apixaban (ELIQUIS) 5 mg, Take 1 tablet (5 mg total) by mouth 2 (two) times a day, Disp: 180 tablet, Rfl: 3  •  ascorbic Acid (VITAMIN C) 500 MG CPCR, Take 500 mg by mouth 2 (two) times a day, Disp: , Rfl:   •  aspirin (ECOTRIN LOW STRENGTH) 81 mg EC tablet, Take 1 tablet (81 mg total) by mouth daily, Disp: 90 tablet, Rfl: 0  •  atorvastatin (LIPITOR) 10 mg tablet, Take 0 5 tablets (5 mg total) by mouth daily at bedtime, Disp: 45 tablet, Rfl: 1  •  cholecalciferol (VITAMIN D3) 1,000 units tablet, Take 2,000 Units by mouth 2 (two) times a day , Disp: , Rfl:   •  clotrimazole (LOTRIMIN) 1 % cream, Apply 1 g (1 application total) topically daily To affected area (Patient taking differently: Apply 1 application topically as needed To affected area), Disp: 60 g, Rfl: 2  •  Flaxseed, Linseed, (FLAX SEED OIL PO), Take 1,200 capsules by mouth 2 (two) times a day  , Disp: , Rfl:   •  folic acid (FOLVITE) 063 mcg tablet, Take 400 mcg by mouth daily, Disp: , Rfl:   •  METHYLCOBALAMIN SL, Place 1,000 mcg under the tongue 2 (two) times a day , Disp: , Rfl:   •  Misc Natural Products (Osteo Bi-Flex Triple Strength) TABS, Take by mouth 2 (two) times a day  , Disp: , Rfl:   •  pyridoxine (B-6) 100 MG tablet, Take 100 mg by mouth daily, Disp: , Rfl:   •  transdermal buprenorphine (BUTRANS) 5 mcg/hr TD patch, Place 1 patch on the skin once a week For ongoing therapy, Disp: 4 patch, Rfl: 2  •  vitamin A 2400 MCG (8000 UT) capsule, Take 8,000 Units by mouth daily, Disp: , Rfl:     Vitals:  BP 90/70 (01/26/23 1438)    Temp      Pulse 81 (01/26/23 1438)   Resp      SpO2 97 % (01/26/23 1438)      Lab Results and Cultures:   CBC with diff:   Lab Results   Component Value Date    WBC 10 70 (H) 12/12/2021    HGB 12 7 12/12/2021    HCT 37 8 12/12/2021    MCV 95 12/12/2021     12/12/2021    MCH 31 9 12/12/2021    MCHC 33 6 12/12/2021    RDW 13 7 12/12/2021    MPV 9 7 12/12/2021    NRBC 0 12/12/2021   ,   BMP/CMP:  Lab Results   Component Value Date    K 3 7 12/12/2021    K 4 5 11/17/2020     (H) 12/12/2021     11/17/2020    CO2 27 12/12/2021    CO2 26 11/17/2020    BUN 12 12/12/2021    BUN 18 11/17/2020    CREATININE 0 77 12/12/2021    CALCIUM 8 5 12/12/2021    CALCIUM 9 6 11/17/2020    AST 18 11/04/2021    AST 21 11/17/2020    ALT 29 11/04/2021    ALT 19 11/17/2020    ALKPHOS 62 11/04/2021    ALKPHOS 58 11/17/2020    EGFR 94 12/12/2021   ,   Lipid Panel: No results found for: CHOL,   Coags:   Lab Results   Component Value Date    PTT 48 (H) 11/04/2021    INR 1 09 12/09/2021   ,       Review of Systems   Constitutional: Negative  HENT: Negative  Eyes: Negative  Respiratory: Negative  Cardiovascular: Negative  Gastrointestinal: Negative  Endocrine: Negative  Genitourinary: Negative  Musculoskeletal: Positive for arthralgias and back pain  Skin: Negative  Allergic/Immunologic: Negative  Neurological: Negative  Hematological: Bruises/bleeds easily  Psychiatric/Behavioral: Negative            Objective:      BP 90/70 (BP Location: Left arm, Patient Position: Sitting, Cuff Size: Standard)   Pulse 81   Ht 6' 3" (1 905 m)   Wt 86 6 kg (191 lb)   SpO2 97%   BMI 23 87 kg/m² Physical Exam  Constitutional:       Appearance: Normal appearance  He is well-developed  HENT:      Head: Normocephalic and atraumatic  Eyes:      Conjunctiva/sclera: Conjunctivae normal    Neck:      Vascular: No carotid bruit or JVD  Cardiovascular:      Rate and Rhythm: Normal rate and regular rhythm  Pulses:           Carotid pulses are 2+ on the right side and 2+ on the left side  Radial pulses are 2+ on the right side and 2+ on the left side  Femoral pulses are 2+ on the right side and 2+ on the left side  Popliteal pulses are 0 on the left side  Dorsalis pedis pulses are 2+ on the right side and 0 on the left side  Posterior tibial pulses are 0 on the left side  Heart sounds: Normal heart sounds, S1 normal and S2 normal  No murmur heard  Comments: Easily palpable graft pulse on the medial aspect of the right knee  Left foot is pink, warm and well-perfused  On the medial aspect the left calf there are varicosities ranging in size from 4- 6 mm  With dense truncal, spider and reticular varicosities on the medial ankle/foot  Pulmonary:      Effort: Pulmonary effort is normal       Breath sounds: Normal breath sounds  Abdominal:      General: There is no abdominal bruit  Palpations: Abdomen is soft  There is no pulsatile mass  Tenderness: There is no abdominal tenderness  Hernia: No hernia is present  Musculoskeletal:         General: Swelling (1+ swelling of left ankle and calf) present  No tenderness or deformity  Normal range of motion  Cervical back: Normal range of motion and neck supple  Skin:     General: Skin is warm and dry  Coloration: Skin is not pale  Comments: Hyperpigmentation of skin involving left ankle and calf with skin thickening consistent with lipodermatosclerosis   Neurological:      General: No focal deficit present  Mental Status: He is alert and oriented to person, place, and time  Sensory: No sensory deficit  Motor: No weakness        Gait: Gait normal    Psychiatric:         Mood and Affect: Mood normal          Speech: Speech normal          Behavior: Behavior normal

## 2023-03-06 ENCOUNTER — OFFICE VISIT (OUTPATIENT)
Dept: PAIN MEDICINE | Facility: MEDICAL CENTER | Age: 68
End: 2023-03-06

## 2023-03-06 VITALS
SYSTOLIC BLOOD PRESSURE: 108 MMHG | BODY MASS INDEX: 23.75 KG/M2 | HEART RATE: 92 BPM | DIASTOLIC BLOOD PRESSURE: 71 MMHG | HEIGHT: 75 IN | OXYGEN SATURATION: 93 % | WEIGHT: 191 LBS

## 2023-03-06 DIAGNOSIS — M54.16 LUMBAR RADICULOPATHY: ICD-10-CM

## 2023-03-06 DIAGNOSIS — G89.29 CHRONIC BILATERAL LOW BACK PAIN WITHOUT SCIATICA: ICD-10-CM

## 2023-03-06 DIAGNOSIS — Z79.891 LONG-TERM CURRENT USE OF OPIATE ANALGESIC: ICD-10-CM

## 2023-03-06 DIAGNOSIS — G62.9 NEUROPATHY: ICD-10-CM

## 2023-03-06 DIAGNOSIS — M25.511 CHRONIC PAIN OF BOTH SHOULDERS: ICD-10-CM

## 2023-03-06 DIAGNOSIS — G89.4 CHRONIC PAIN SYNDROME: ICD-10-CM

## 2023-03-06 DIAGNOSIS — G89.29 CHRONIC PAIN OF BOTH SHOULDERS: ICD-10-CM

## 2023-03-06 DIAGNOSIS — F11.20 UNCOMPLICATED OPIOID DEPENDENCE (HCC): Primary | ICD-10-CM

## 2023-03-06 DIAGNOSIS — M54.50 CHRONIC BILATERAL LOW BACK PAIN WITHOUT SCIATICA: ICD-10-CM

## 2023-03-06 DIAGNOSIS — M25.512 CHRONIC PAIN OF BOTH SHOULDERS: ICD-10-CM

## 2023-03-06 RX ORDER — BUPRENORPHINE 5 UG/H
1 PATCH TRANSDERMAL WEEKLY
Qty: 4 PATCH | Refills: 2 | Status: SHIPPED | OUTPATIENT
Start: 2023-03-06

## 2023-03-06 NOTE — PROGRESS NOTES
Assessment:  1  Uncomplicated opioid dependence ([facility] [address] )    2  Chronic pain syndrome    3  Long-term current use of opiate analgesic    4  Lumbar radiculopathy - Right    5  Chronic bilateral low back pain without sciatica    6  Chronic pain of both shoulders    7  Neuropathy        Plan:  While the patient was in the office today, I did have a thorough conversation regarding their chronic pain syndrome, medication management, and treatment plan options  Patient remains clinically stable on the current medication regimen of Butrans 5 mcg transdermal patch  He reports approximately 40% reduction in pain with this regimen  I have provided refills for the next 3 months for the patient  There are risks associated with opioid medications, including dependence, addiction and tolerance  The patient understands and agrees to use these medications only as prescribed  Potential side effects of the medications include, but are not limited to, constipation, drowsiness, addiction, impaired judgment and risk of fatal overdose if not taken as prescribed  The patient was warned against driving while taking sedation medications  Sharing medications is a felony  At this point in time, the patient is showing no signs of addiction, abuse, diversion or suicidal ideation  A urine drug screen was collected at today's office visit as part of our medication management protocol  The point of care testing results were appropriate for what was being prescribed  The specimen will be sent for confirmatory testing  The drug screen is medically necessary because the patient is either dependent on opioid medication or is being considered for opioid medication therapy and the results could impact ongoing or future treatment  The drug screen is to evaluate for the presences or absence of prescribed, non-prescribed, and/or illicit drugs/substances      [place] Prescription Drug Monitoring Program report was reviewed and was appropriate My impressions and treatment recommendations were discussed in detail with the patient who verbalized understanding and had no further questions  Discharge instructions were provided  I personally saw and examined the patient and I agree with the above discussed plan of care  Orders Placed This Encounter   Procedures   • MM ALL_Prescribed Meds and Special Instructions     Order Specific Question:   [place] Is ATORVASTATIN prescribed? Answer:   Yes     Order Specific Question:   [place] Is BUPRENORPHINE Prescribed? Answer:    Yes   • MM DT_Alprazolam Definitive Test   • MM DT_Amitriptyline Definitive Test   • MM DT_Amphetamine Definitive Test   • MM DT_Buprenorphine Definitive Test   • MM DT_Carisoprodol Definitive Test   • MM DT_Clonazepam Definitive Test   • MM DT_Cocaine Definitive Test   • MM DT_Codeine Definitive Test   • MM Diazepam Definitive Test   • MM DT_Ethyl Glucuronide/Ethyl Sulfate Definitive Test   • MM DT_Fentanyl Definitive Test   • MM DT_Heroin Definitive Test   • MM DT_Hydrocodone Definitive Test   • MM DT_Hydromorphone Definitive Test   • MM DT_Kratom Definitive Test   • MM Lorazepam Definitive Test   • MM DT_MDMA Definitive Test   • MM DT_Methadone Definitive Test   • MM DT_Methamphetaine-d/l Isomers Definitive   • MM DT_Methamphetamine Definitive Test   • MM DT_Morphine Definitive Test   • MM DT_Oxazepam Definitive Test   • MM DT_Oxycodone Definitive Test   • MM DT_Oxymorphone Definitive Test   • MM DT_Phentermine Definitive Test   • MM DT_Secobarbital Definitive Test   • MM DT_Tapentadol Definitive Test   • MM DT_Temazapam Definitive Test   • MM DT_THC Definitive Test   • MM DT_Tramadol Definitive Test   • MM DT_Validity Creatinine   • MM DT_Validity Oxidant   • MM DT_Validity pH   • MM DT_Validity Specific     New Medications Ordered This Visit   Medications   • transdermal buprenorphine (BUTRANS) 5 mcg/hr TD patch     Sig: Place 1 patch on the skin over 7 days once a week For ongoing therapy     Dispense:  4 patch     Refill:  2       History of Present Illness:  Janet Lora is a 75 y o  male who presents for a follow up office visit in regards to back pain  The patient’s current symptoms include chronic back pain that he presently rates a 3 out of 10 on pain scale describes it as an occasional dull, aching pain across the lumbar region  He continues with lower extremity numbness and weakness but he keeps active to maintain his strength  He has been maintained on Butrans 5 mcg transdermal patch with significant reduction of pain and no side effects  Patient denies any new symptoms and has no acute issues on today's visit  Opioid contract date 3/06/23    Last UDS Date 3/06/23     Pt is on day 3 of butrans patch                   I have personally reviewed and/or updated the patient's past medical history, past surgical history, family history, social history, current medications, allergies, and vital signs today  Review of Systems   Respiratory: Negative for shortness of breath  Cardiovascular: Negative for chest pain  Gastrointestinal: Positive for constipation  Negative for diarrhea, nausea and vomiting  Musculoskeletal: Positive for back pain, gait problem, joint swelling and myalgias  Negative for arthralgias  Skin: Negative for rash  Neurological: Negative for dizziness, seizures and weakness  All other systems reviewed and are negative        Patient Active Problem List   Diagnosis   • Lumbar radiculopathy - Right   • Bone lesion   • Neuropathy   • Inflammatory polyarthropathy (HCC)   • Tobacco abuse   • Colon cancer screening   • Right foot drop   • Lumbar spondylosis - Bilateral   • Vitamin D deficiency   • Thoracic spine pain   • Chronic pain syndrome   • Uncomplicated opioid dependence (HCC)   • Chronic pain of both knees   • Chronic pain of both shoulders   • Chronic pain of left ankle   • Multiple subsolid lung nodules greater than 6 mm in diameter   • Mixed hyperlipidemia   • 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HCC)   • Deep vein thrombosis (DVT) of femoral vein of left lower extremity (HCC)   • S/P femoral-popliteal bypass surgery   • Cellulitis of right lower extremity   • Atherosclerosis of artery of left lower extremity (HCC)   • Chronic obstructive pulmonary disease, unspecified COPD type (HCC)   • Chronic right shoulder pain   • Embolism and thrombosis of arteries of the lower extremities (HCC)       Past Medical History:   Diagnosis Date   • Arthritis feb 2016    osto back   • Chronic back pain    • Chronic narcotic dependence ([facility])    • Chronic pain disorder     Sees pain management Dr Jonathan Edwards    • Cigarette smoker    • Depression    • Dry skin    • Dyslexia    • Full dentures     upper and no teeth lower/"avoid raw vegetables and chewy food"   • GERD (gastroesophageal reflux disease)    • 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 related   • History of blood clots     "left leg" takes Eliquis   • History of bronchitis    • History of pneumonia     childhood   • Increased pressure in the eye, bilateral    • L4-L5 disc bulge 06/16/2017   • Low back pain    • Muscle weakness    • Neuropathy     right foot and left foot   • Osteoarthritis    • Osteoarthritis     and" if becomes cold joint pain worsens"   • Peripheral neuropathy 8/ 15   • Pulmonary emphysema ([address] )     "mild"   • Right foot drop    • Shortness of breath     "only on humid days"   • Smokers' cough [facility])    • Wears glasses    • Work related injury 04/24/2018       Past Surgical History:   Procedure Laterality Date   • BACK SURGERY  aug 2015- jun2017 2017 no affect   • COLONOSCOPY     • EGD AND COLONOSCOPY N/A 06/29/2018    Procedure: EGD AND COLONOSCOPY;  Surgeon: Bernadette Cao MD;  Location: [facility] GI LAB;   Service: Gastroenterology   • EPIDURAL BLOCK INJECTION      needle   • HIP ARTHROSCOPY W/ LABRAL DEBRIDEMENT Left     "hip surgery"   • IR LOWER EXTREMITY ANGIOGRAM  12/08/2021   • ORTHOPEDIC SURGERY     • WI ARTHRS HIP DEBRIDEMENT/SHAVING ARTICULAR CRTLG Left 2018    Procedure: LEFT HIP ARTHROSCOPIC LABRAL DEBRIDEMENT;  Surgeon: Amber Jerome MD;  Location: [facility];  Service: Orthopedics   • WI IN-SITU VEIN BYPASS FEMORAL-POPLITEAL Right 2021    Procedure: R SFA to BK Pop Bypass using insitu GSV,  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;  Surgeon: Yuki Chase MD;  Location: [facility];  Service: Vascular   • WI SLCTV CATHJ 3RD+ ORD SLCTV ABDL PEL/LXTR [facility] Right 2021    Procedure: COMPLETION ARTERIOGRAM WITH 5 MM ANGIOPLASTY OF MID SUPERFICIAL FEMORAL ARTERY;  Surgeon: Yuki Chase MD;  Location: [facility];  Service: Vascular   • TONSILLECTOMY     • WISDOM TOOTH EXTRACTION         Family History   Problem Relation Age of Onset   • Dementia Mother    • Glaucoma Mother    • Arthritis Mother         nursing home   • Cancer Father         dead   • Osteoarthritis Family        Social History     Occupational History   • Not on file   Tobacco Use   • Smoking status: Every Day     Packs/day: 0 10     Years: 15 00     Pack years: 1 50     Types: Cigarettes     Last attempt to quit: 2017     Years since quittin 2     Passive exposure: Yes   • Smokeless tobacco: Never   • Tobacco comments:     stress caused by workers comp   not covering meds    delays   Vaping Use   • Vaping Use: Never used   Substance and Sexual Activity   • Alcohol use: No   • Drug use: No   • Sexual activity: Never     Comment: defer       Current Outpatient Medications on File Prior to Visit   Medication Sig   • acetaminophen (TYLENOL) 500 mg tablet Take 1,000 mg by mouth every 12 (twelve) hours as needed for mild pain   • apixaban (ELIQUIS) 5 mg Take 1 tablet (5 mg total) by mouth 2 (two) times a day   • ascorbic Acid (VITAMIN C) 500 MG CPCR Take 500 mg by mouth 2 (two) times a day   • aspirin (ECOTRIN LOW STRENGTH) 81 mg EC tablet Take 1 tablet (81 mg total) by mouth daily   • atorvastatin (LIPITOR) 10 mg tablet Take 0 5 tablets (5 mg total) by mouth daily at bedtime   • cholecalciferol (VITAMIN D3) 1,000 units tablet Take 2,000 Units by mouth 2 (two) times a day    • clotrimazole (LOTRIMIN) 1 % cream Apply 1 g (1 application total) topically daily To affected area (Patient taking differently: Apply 1 application  topically as needed To affected area)   • Flaxseed, Linseed, (FLAX SEED OIL PO) Take 1,200 capsules by mouth 2 (two) times a day     • folic acid (FOLVITE) 772 mcg tablet Take 400 mcg by mouth daily   • METHYLCOBALAMIN SL Place 1,000 mcg under the tongue 2 (two) times a day    • Misc Natural Products (Osteo Bi-Flex Triple Strength) TABS Take by mouth 2 (two) times a day     • pyridoxine (B-6) 100 MG tablet Take 100 mg by mouth daily   • vitamin A 2400 MCG (8000 UT) capsule Take 8,000 Units by mouth daily   • [DISCONTINUED] transdermal buprenorphine (BUTRANS) 5 mcg/hr TD patch Place 1 patch on the skin once a week For ongoing therapy     No current facility-administered medications on file prior to visit  Allergies   Allergen Reactions   • Cyanocobalamin [Vitamin B12] GI Intolerance   • Gabapentin Other (See Comments) and GI Intolerance     Difficulty walking   • Morphine Other (See Comments)     "acute constipation"   • Tramadol Confusion   • Nsaids Other (See Comments)     Pt reports avoids as is on Eliquis       Physical Exam:    /71   Pulse 92   Ht 6' 3" (1 905 m)   Wt 86 6 kg (191 lb)   SpO2 93%   BMI 23 87 kg/m²     Constitutional:normal, well developed, well nourished, alert, in no distress and non-toxic and no overt pain behavior    Eyes:anicteric  HEENT:grossly intact  Neck:supple, symmetric, trachea midline and no masses   Pulmonary:even and unlabored  Cardiovascular:No edema or pitting edema present  Skin:Normal without rashes or lesions and well hydrated  Psychiatric:Mood and affect appropriate  Neurologic:Cranial Nerves II-XII grossly intact  Musculoskeletal:normal Imaging

## 2023-03-08 LAB
6MAM UR QL CFM: NEGATIVE NG/ML
7AMINOCLONAZEPAM UR QL CFM: NEGATIVE NG/ML
A-OH ALPRAZ UR QL CFM: NEGATIVE NG/ML
ACCEPTABLE CREAT UR QL: NORMAL MG/DL
ACCEPTIBLE SP GR UR QL: NORMAL
AMITRIP UR QL CFM: NEGATIVE NG/ML
AMPHET UR QL CFM: NEGATIVE NG/ML
AMPHET UR QL CFM: NEGATIVE NG/ML
BUPRENORPHINE UR QL CFM: ABNORMAL NG/ML
BZE UR QL CFM: NEGATIVE NG/ML
CARISOPRODOL UR QL CFM: NEGATIVE NG/ML
CODEINE UR QL CFM: NEGATIVE NG/ML
EDDP UR QL CFM: NEGATIVE NG/ML
ETHYL GLUCURONIDE UR QL CFM: NEGATIVE NG/ML
ETHYL SULFATE UR QL SCN: NEGATIVE NG/ML
FENTANYL UR QL CFM: NEGATIVE NG/ML
GLIADIN IGG SER IA-ACNC: NEGATIVE NG/ML
HYDROCODONE UR QL CFM: NEGATIVE NG/ML
HYDROCODONE UR QL CFM: NEGATIVE NG/ML
HYDROMORPHONE UR QL CFM: NEGATIVE NG/ML
LORAZEPAM UR QL CFM: NEGATIVE NG/ML
MDMA UR QL CFM: NEGATIVE NG/ML
MEPROBAMATE UR QL CFM: NEGATIVE NG/ML
METHADONE UR QL CFM: NEGATIVE NG/ML
METHAMPHET UR QL CFM: NEGATIVE NG/ML
MORPHINE UR QL CFM: NEGATIVE NG/ML
MORPHINE UR QL CFM: NEGATIVE NG/ML
NITRITE UR QL: NORMAL UG/ML
NORBUPRENORPHINE UR QL CFM: ABNORMAL NG/ML
NORDIAZEPAM UR QL CFM: NEGATIVE NG/ML
NORFENTANYL UR QL CFM: NEGATIVE NG/ML
NORHYDROCODONE UR QL CFM: NEGATIVE NG/ML
NORHYDROCODONE UR QL CFM: NEGATIVE NG/ML
NOROXYCODONE UR QL CFM: NEGATIVE NG/ML
NORTRIP UR QL CFM: NEGATIVE NG/ML
OXAZEPAM UR QL CFM: NEGATIVE NG/ML
OXYCODONE UR QL CFM: NEGATIVE NG/ML
OXYMORPHONE UR QL CFM: NEGATIVE NG/ML
OXYMORPHONE UR QL CFM: NEGATIVE NG/ML
PARA-FLUOROFENTANYL QUANTIFICATION: NORMAL NG/ML
RESULT ALL_PRESCRIBED MEDS AND SPECIAL INSTRUCTIONS: NORMAL
SECOBARBITAL UR QL CFM: NEGATIVE NG/ML
SL AMB 4-ANPP QUANTIFICATION: NORMAL NG/ML
SL AMB 7-OH-MITRAGYNINE (KRATOM ALKALOID) QUANTIFICATION: NEGATIVE NG/ML
SL AMB ACETYL FENTANYL QUANTIFICATION: NORMAL NG/ML
SL AMB ACETYL NORFENTANYL QUANTIFICATION: NORMAL NG/ML
SL AMB ACRYL FENTANYL QUANTIFICATION: NORMAL NG/ML
SL AMB CARFENTANIL QUANTIFICATION: NORMAL NG/ML
SL AMB CTHC (MARIJUANA METABOLITE) QUANTIFICATION: NEGATIVE NG/ML
SL AMB N-DESMETHYL-TRAMADOL QUANTIFICATION: NEGATIVE NG/ML
SL AMB PHENTERMINE QUANTIFICATION: NEGATIVE NG/ML
SPECIMEN PH ACCEPTABLE UR: NORMAL
TAPENTADOL UR QL CFM: NEGATIVE NG/ML
TEMAZEPAM UR QL CFM: NEGATIVE NG/ML
TEMAZEPAM UR QL CFM: NEGATIVE NG/ML
TRAMADOL UR QL CFM: NEGATIVE NG/ML
URATE/CREAT 24H UR: NEGATIVE NG/ML

## 2023-03-22 ENCOUNTER — HOSPITAL ENCOUNTER (OUTPATIENT)
Dept: RADIOLOGY | Facility: IMAGING CENTER | Age: 68
Discharge: HOME | End: 2023-03-22

## 2023-03-22 DIAGNOSIS — R91.8 MULTIPLE SUBSOLID LUNG NODULES GREATER THAN 6 MM IN DIAMETER: ICD-10-CM

## 2023-03-30 ENCOUNTER — PREP FOR PROCEDURE (OUTPATIENT)
Dept: INTERVENTIONAL RADIOLOGY/VASCULAR | Facility: CLINIC | Age: 68
End: 2023-03-30

## 2023-03-30 ENCOUNTER — DOCUMENTATION (OUTPATIENT)
Dept: CARDIAC SURGERY | Facility: CLINIC | Age: 68
End: 2023-03-30

## 2023-03-30 ENCOUNTER — OFFICE VISIT (OUTPATIENT)
Dept: CARDIAC SURGERY | Facility: CLINIC | Age: 68
End: 2023-03-30

## 2023-03-30 VITALS
DIASTOLIC BLOOD PRESSURE: 70 MMHG | TEMPERATURE: 98.4 F | RESPIRATION RATE: 16 BRPM | SYSTOLIC BLOOD PRESSURE: 120 MMHG | WEIGHT: 195.99 LBS | OXYGEN SATURATION: 96 % | HEART RATE: 71 BPM | HEIGHT: 75 IN | BODY MASS INDEX: 24.37 KG/M2

## 2023-03-30 DIAGNOSIS — R91.8 MULTIPLE SUBSOLID LUNG NODULES GREATER THAN 6 MM IN DIAMETER: Primary | ICD-10-CM

## 2023-03-30 RX ORDER — SODIUM CHLORIDE 9 MG/ML
75 INJECTION, SOLUTION INTRAVENOUS CONTINUOUS
OUTPATIENT
Start: 2023-03-30

## 2023-03-30 NOTE — ASSESSMENT & PLAN NOTE
Mr Marianne Buckner has multiple right-sided pulmonary nodules  I personally reviewed his imaging in PACS  His recent CT chest shows that one of these nodules in the right upper lobe is enlarging now measuring 9 x 4mm  This nodule is concerning for a slow growing malignancy  For further evaluation we will refer him to IR for a biopsy  We will see him back after this is complete  All questions were answered and he is in agreement with this plan

## 2023-03-30 NOTE — PROGRESS NOTES
Thoracic Follow-Up  Assessment/Plan:    Multiple subsolid lung nodules greater than 6 mm in diameter  Mr Russell has multiple right-sided pulmonary nodules  I personally reviewed his imaging in PACS  His recent CT chest shows that one of these nodules in the right upper lobe is enlarging now measuring 9 x 4mm  This nodule is concerning for a slow growing malignancy  For further evaluation we will refer him to IR for a biopsy  We will see him back after this is complete  All questions were answered and he is in agreement with this plan  Diagnoses and all orders for this visit:    Multiple subsolid lung nodules greater than 6 mm in diameter  -     Ambulatory Referral to Interventional Radiology; Future          Thoracic History    Diagnosis: Multiple pulmonary nodules, enlarging right upper lobe pulmonary nodule         Subjective:    Patient ID: Gem Bauer is a 75 y o  male  ECOG 1    HPI   Mr Luke Gutierrez is a 75year old male with a PMH COPD, DVT (Eliquis), HLD, OA and PVD who we are following for multiple pulmonary nodules  She was last seen on 9/29/2022 at which point he had PET-CT which revealed stable hypermetabolic mediastinal lymph nodes with max SUV 4 2  Stable precarinal/right paratracheal lymph node, 7mm with max SUV 4 4  There is stable mild activity in the hilar regions with max SUV 3 9 on right and 3 6 on L  There was minimal FDG activity in the 7mm spiculated right upper lobe nodule with max SUV 1 2  There was no significant FDG activitiy in the remaining right sided lung nodules  At that time his case was presented at our multidisciplinary tumor board, and decision was made for continued surveillance  He now presents for review of his most recent CT chest       3/22/2023 CT chest showed increasing size of the spiculated subpleural nodule in the right upper lobe now measuring 9 x 4mm from 7 x 4mm in July 2022 and 4mm in December 2019  His other right lung nodular opacities are unchanged  No mediastinal nor hilar adenopathy  The hypermetabolic node seen on previous PET-CT on 9/19/2022 measured less than 1cm with a benign morphology  8/4/2022 PFTs showed FEV1 3 2L 85% predicted, FEV1/FVC 73%, DLCO 49%    On discussion, he is feeling overall well  He denies SOB, has a chronic non-productive cough,       The following portions of the patient's history were reviewed and updated as appropriate: allergies, current medications, past family history, past medical history, past social history, past surgical history and problem list     Review of Systems   Constitutional: Negative for chills, diaphoresis and unexpected weight change  HENT: Negative  Eyes: Negative  Respiratory: Negative for cough, shortness of breath and wheezing  Cardiovascular: Negative for chest pain and leg swelling  Gastrointestinal: Negative for abdominal pain, diarrhea and nausea  Endocrine: Negative  Genitourinary: Negative  Musculoskeletal: Negative  Skin: Negative  Allergic/Immunologic: Negative  Neurological: Negative  Hematological: Negative for adenopathy  Does not bruise/bleed easily  Psychiatric/Behavioral: Negative  All other systems reviewed and are negative  Objective:   Physical Exam  Vitals reviewed  Constitutional:       General: He is not in acute distress  Appearance: Normal appearance  He is not ill-appearing  HENT:      Head: Normocephalic  Nose: Nose normal       Mouth/Throat:      Mouth: Mucous membranes are moist    Eyes:      Pupils: Pupils are equal, round, and reactive to light  Cardiovascular:      Rate and Rhythm: Normal rate and regular rhythm  Heart sounds: Normal heart sounds  Pulmonary:      Effort: Pulmonary effort is normal       Breath sounds: Normal breath sounds  No wheezing, rhonchi or rales  Abdominal:      Palpations: Abdomen is soft  Musculoskeletal:         General: No swelling  Normal range of motion     Lymphadenopathy: " Cervical: No cervical adenopathy  Skin:     General: Skin is warm  Neurological:      General: No focal deficit present  Mental Status: He is alert and oriented to person, place, and time  Psychiatric:         Mood and Affect: Mood normal      /70 (BP Location: Left arm, Patient Position: Sitting, Cuff Size: Standard)   Pulse 71   Temp 98 4 °F (36 9 °C) (Temporal)   Resp 16   Ht 6' 3\" (1 905 m)   Wt 88 9 kg (195 lb 15 8 oz)   SpO2 96%   BMI 24 50 kg/m²     No Chest XR results available for this patient  CT chest wo contrast    Result Date: 3/28/2023  Narrative CT CHEST WITHOUT IV CONTRAST INDICATION:   R91 8: Other nonspecific abnormal finding of lung field  COMPARISON: Multiple prior comparison studies the most recent: PET CT 9/19/2022  CT lung 7/18/2022  TECHNIQUE: CT examination of the chest was performed without intravenous contrast  Multiplanar 2D reformatted images were created from the source data  Radiation dose length product (DLP) for this visit:  242 mGy-cm   This examination, like all CT scans performed in the [facility], was performed utilizing techniques to minimize radiation dose exposure, including the use of iterative reconstruction and automated exposure control  FINDINGS: LUNGS:  Increase size of a spiculated FDG avid subpleural right upper lobe nodule now measuring 9 x 4 mm, #5/79, measured 7 x 4 mm on the 7/18/2022 study and 4 mm in maximum diameter on the 12/3/2019 study  An adjacent slightly more superior subpleural opacity measuring 9 x 6 mm #5/67 is unchanged  Posterior right upper lobe 10 x 7 mm spiculated nodule is unchanged #5/132 2 No new pulmonary nodules  No endotracheal or endobronchial lesion  Unchanged mild upper lobe predominant centrilobular and paraseptal emphysema  PLEURA:  Unremarkable  HEART/GREAT VESSELS: Heart is unremarkable for patient's age  No thoracic aortic aneurysm  MEDIASTINUM AND ALEJANDRA:  No adenopathy    A hypermetabolic " node seen on the PET/CT have benign morphology with a short axis dimension of less than 1 cm  Hilar nodes are not apparent on this noncontrast study  CHEST WALL AND LOWER NECK:  Unremarkable  VISUALIZED STRUCTURES IN THE UPPER ABDOMEN:  Unchanged vague 8 mm hepatic segment 7 hypodensity OSSEOUS STRUCTURES:  No acute fracture or destructive osseous lesion  Impression Increasing size of a spiculated FDG avid right upper lobe subpleural nodule now measuring 9 x 4 mm measured 7 x 4 mm on the 7/18/2022 study and 4 mm in maximum diameter on the 12/3/2019 study  Management as per cardiothoracic surgery  Other right lung nodular opacities are unchanged in size and appearance  The study was marked in EPIC for significant notification  Workstation performed: [id number]     No CT Chest,Abdomen,Pelvis results available for this patient  NM PET CT skull base to mid thigh    Result Date: 9/19/2022  Narrative PET/CT SCAN INDICATION:  D38 1: Neoplasm of uncertain behavior of trachea, bronchus and lung   , increasing in size lung nodules MODIFIER: PS COMPARISON: PET/CT dated 6/22/2021 and CT of chest dated 7/18/2022 CELL TYPE:  Not applicable TECHNIQUE:   8 8 mCi F-18-FDG administered IV  Multiplanar attenuation corrected and non attenuation corrected PET images were acquired 60 minutes post injection  Contiguous, low dose, axial CT sections were obtained from the skull base through the femurs   Intravenous contrast material was not utilized  This examination, like all CT scans performed in the [facility], was performed utilizing techniques to minimize radiation dose exposure, including the use of iterative reconstruction and automated exposure control  Fasting serum glucose: 105 mg/dl FINDINGS: VISUALIZED BRAIN:   No acute abnormalities are seen  HEAD/NECK:   There is a physiologic distribution of FDG  No FDG avid cervical adenopathy is seen  CT images: Unremarkable   CHEST:   There are stable hypermetabolic mediastinal lymph nodes  For example, stable in size mildly prominent clustered AP window lymph nodes on images 78 and 79 of series 4 have a max SUV of 4 2, previously 4 7  Stable precarinal/ right paratracheal lymph node  on image 83 of series 4 measures 7 mm in short axis with max SUV of 4 4, previously 4 1  There is stable mild activity in the hilar regions with max SUV of 3 9 on the right and 3 6 on the left, previously 3 6 on the right and 3 6 on the left  The interval stability favors a benign reactive/inflammatory process with underlying sumit malignancy considered less likely but not excluded  Attention to these regions on follow-up imaging is recommended  On image 73 there is minimal FDG activity associated with patient's known 7 mm spiculated right upper lobe lung nodule with max SUV of 1 2 (for reference max SUV of the mediastinal blood pool is 2 6)  On prior PET/CT, this nodule was similar in size with max SUV of 0 8  There is no significant FDG activity associated with patient's remaining known lung nodules  As noted on recent dedicated CT of chest dated 7/18/2022 there is a 9 mm subpleural right upper lobe lung nodule on image 70 and an 8 mm right upper lobe lung nodule on image 84  Accounting for differences in imaging technique, these nodules appear essentially stable since 7/18/2022  CT images: Atherosclerotic aortic calcifications are noted  Emphysematous changes  ABDOMEN:   No FDG avid soft tissue lesions are seen  CT images: Probable cholelithiasis  Probable tiny nonobstructing left renal calculi versus vascular calcification  Stable lower pole hypodensity involving the right kidney  Stable irregular appearance to the left lower renal pole  Diverticulosis coli  Nonspecific mural thickening of the proximal to mid gastric wall which could be related to underdistention with underlying gastric mucosal process not excluded  Atherosclerotic vascular calcifications are noted   PELVIS: There is mild nonspecific patchy FDG activity associated with a mildly enlarged prostate gland without definite focal FDG activity  CT images: Unremarkable  OSSEOUS STRUCTURES: No FDG avid lesions are seen  CT images: Degenerative changes are noted involving the spine  Possible osteopenia  Stable tiny sclerotic densities most probably involving the proximal left femur and pelvic bones  Impression 1  Minimal FDG activity associated with a 7 mm right upper lobe lung nodule which appears stable in size since prior PET/CT but was described to be increased in size on most recent dedicated CT of chest   Findings are nonspecific and differential diagnosis includes benign nodule versus developing malignancy of low metabolic activity  Remaining lung nodules do not exhibit significant FDG activity  Short interval follow-up with CT of chest in 3 months is recommended to ensure stability if tissue sampling is not performed  2   Stable nonspecific FDG avid mediastinal lymph nodes of uncertain clinical significance but favored to be reactive/inflammatory in etiology  Attention to to these regions on short interval follow-up with contrast-enhanced imaging is recommended  Please see above for details and additional findings  The study was marked in EPIC for significant notification  The study was marked in Epic for follow-up  Workstation performed: [id number]      No Barium Swallow results available for this patient

## 2023-03-30 NOTE — PROGRESS NOTES
I met with Fredo Farmer at his visit with Dr La Ramires today  I introduced myself and explained my role to him  Questions answered  He will be seen back after biopsy

## 2023-04-17 ENCOUNTER — HOSPITAL ENCOUNTER (INPATIENT)
Dept: CT IMAGING | Facility: HOSPITAL | Age: 68
LOS: 1 days | Discharge: HOME | End: 2023-04-18
Attending: RADIOLOGY | Admitting: INTERNAL MEDICINE

## 2023-04-17 ENCOUNTER — HOSPITAL ENCOUNTER (OUTPATIENT)
Dept: RADIOLOGY | Facility: HOSPITAL | Age: 68
End: 2023-04-17

## 2023-04-17 DIAGNOSIS — J95.811 PNEUMOTHORAX AFTER BIOPSY: Primary | ICD-10-CM

## 2023-04-17 PROBLEM — Z15.89 MTHFR MUTATION: Status: ACTIVE | Noted: 2023-04-17

## 2023-04-17 PROBLEM — Z86.718 HISTORY OF DVT (DEEP VEIN THROMBOSIS): Status: ACTIVE | Noted: 2023-04-17

## 2023-04-17 PROBLEM — I73.9 PAD (PERIPHERAL ARTERY DISEASE) (HCC): Status: ACTIVE | Noted: 2023-04-17

## 2023-04-17 PROCEDURE — 0W9930Z DRAINAGE OF RIGHT PLEURAL CAVITY WITH DRAINAGE DEVICE, PERCUTANEOUS APPROACH: ICD-10-PCS | Performed by: RADIOLOGY

## 2023-04-17 PROCEDURE — 0BBC3ZX EXCISION OF RIGHT UPPER LUNG LOBE, PERCUTANEOUS APPROACH, DIAGNOSTIC: ICD-10-PCS | Performed by: RADIOLOGY

## 2023-04-17 RX ORDER — NICOTINE 21 MG/24HR
1 PATCH, TRANSDERMAL 24 HOURS TRANSDERMAL DAILY
Status: DISCONTINUED | OUTPATIENT
Start: 2023-04-18 | End: 2023-04-18 | Stop reason: HOSPADM

## 2023-04-17 RX ORDER — KETOROLAC TROMETHAMINE 10 MG/1
10 TABLET, FILM COATED ORAL EVERY 6 HOURS
Status: DISCONTINUED | OUTPATIENT
Start: 2023-04-17 | End: 2023-04-17

## 2023-04-17 RX ORDER — ASPIRIN 81 MG/1
81 TABLET ORAL DAILY
Status: DISCONTINUED | OUTPATIENT
Start: 2023-04-18 | End: 2023-04-18 | Stop reason: HOSPADM

## 2023-04-17 RX ORDER — BUPRENORPHINE 5 UG/H
1 PATCH TRANSDERMAL WEEKLY
Status: DISCONTINUED | OUTPATIENT
Start: 2023-04-22 | End: 2023-04-18 | Stop reason: HOSPADM

## 2023-04-17 RX ORDER — ONDANSETRON 2 MG/ML
4 INJECTION INTRAMUSCULAR; INTRAVENOUS EVERY 4 HOURS PRN
Status: DISCONTINUED | OUTPATIENT
Start: 2023-04-17 | End: 2023-04-18 | Stop reason: HOSPADM

## 2023-04-17 RX ORDER — FENTANYL CITRATE 50 UG/ML
INJECTION, SOLUTION INTRAMUSCULAR; INTRAVENOUS AS NEEDED
Status: COMPLETED | OUTPATIENT
Start: 2023-04-17 | End: 2023-04-17

## 2023-04-17 RX ORDER — PYRIDOXINE HCL (VITAMIN B6) 50 MG
100 TABLET ORAL DAILY
Status: DISCONTINUED | OUTPATIENT
Start: 2023-04-18 | End: 2023-04-18 | Stop reason: HOSPADM

## 2023-04-17 RX ORDER — LANOLIN ALCOHOL/MO/W.PET/CERES
400 CREAM (GRAM) TOPICAL DAILY
Status: DISCONTINUED | OUTPATIENT
Start: 2023-04-18 | End: 2023-04-18 | Stop reason: HOSPADM

## 2023-04-17 RX ORDER — DIMENHYDRINATE 50 MG
TABLET ORAL 2 TIMES DAILY
Status: DISCONTINUED | OUTPATIENT
Start: 2023-04-17 | End: 2023-04-17 | Stop reason: RX

## 2023-04-17 RX ORDER — MELATONIN
2000 DAILY
Status: DISCONTINUED | OUTPATIENT
Start: 2023-04-18 | End: 2023-04-18 | Stop reason: HOSPADM

## 2023-04-17 RX ORDER — ATORVASTATIN CALCIUM 10 MG/1
5 TABLET, FILM COATED ORAL
Status: DISCONTINUED | OUTPATIENT
Start: 2023-04-17 | End: 2023-04-18 | Stop reason: HOSPADM

## 2023-04-17 RX ORDER — OXYCODONE HYDROCHLORIDE 5 MG/1
5 TABLET ORAL EVERY 4 HOURS PRN
Status: DISCONTINUED | OUTPATIENT
Start: 2023-04-17 | End: 2023-04-18 | Stop reason: HOSPADM

## 2023-04-17 RX ORDER — LIDOCAINE HYDROCHLORIDE 10 MG/ML
INJECTION, SOLUTION EPIDURAL; INFILTRATION; INTRACAUDAL; PERINEURAL AS NEEDED
Status: COMPLETED | OUTPATIENT
Start: 2023-04-17 | End: 2023-04-17

## 2023-04-17 RX ORDER — ASCORBIC ACID 500 MG
500 TABLET ORAL 2 TIMES DAILY
Status: DISCONTINUED | OUTPATIENT
Start: 2023-04-17 | End: 2023-04-18 | Stop reason: HOSPADM

## 2023-04-17 RX ORDER — ACETAMINOPHEN 325 MG/1
650 TABLET ORAL EVERY 6 HOURS PRN
Status: DISCONTINUED | OUTPATIENT
Start: 2023-04-17 | End: 2023-04-18 | Stop reason: HOSPADM

## 2023-04-17 RX ADMIN — FENTANYL CITRATE 50 MCG: 50 INJECTION INTRAMUSCULAR; INTRAVENOUS at 16:05

## 2023-04-17 RX ADMIN — LIDOCAINE HYDROCHLORIDE 10 ML: 10 INJECTION, SOLUTION EPIDURAL; INFILTRATION; INTRACAUDAL; PERINEURAL at 16:05

## 2023-04-17 RX ADMIN — FENTANYL CITRATE 50 MCG: 50 INJECTION INTRAMUSCULAR; INTRAVENOUS at 16:21

## 2023-04-17 NOTE — PLAN OF CARE
Problem: PAIN - ADULT  Goal: Verbalizes/displays adequate comfort level or baseline comfort level  Description: Interventions:  - Encourage patient to monitor pain and request assistance  - Assess pain using appropriate pain scale  - Administer analgesics based on type and severity of pain and evaluate response  - Implement non-pharmacological measures as appropriate and evaluate response  - Consider cultural and social influences on pain and pain management  - Notify physician/advanced practitioner if interventions unsuccessful or patient reports new pain  Outcome: Progressing     Problem: INFECTION - ADULT  Goal: Absence or prevention of progression during hospitalization  Description: INTERVENTIONS:  - Assess and monitor for signs and symptoms of infection  - Monitor lab/diagnostic results  - Monitor all insertion sites, i e  indwelling lines, tubes, and drains  - Monitor endotracheal if appropriate and nasal secretions for changes in amount and color  - [place] appropriate cooling/warming therapies per order  - Administer medications as ordered  - Instruct and encourage patient and family to use good hand hygiene technique  - Identify and instruct in appropriate isolation precautions for identified infection/condition  Outcome: Progressing  Goal: Absence of fever/infection during neutropenic period  Description: INTERVENTIONS:  - Monitor WBC    Outcome: Progressing     Problem: SAFETY ADULT  Goal: Patient will remain free of falls  Description: INTERVENTIONS:  - Educate patient/family on patient safety including physical limitations  - Instruct patient to call for assistance with activity   - Consult OT/PT to assist with strengthening/mobility   - Keep Call bell within reach  - Keep bed low and locked with side rails adjusted as appropriate  - Keep care items and personal belongings within reach  - Initiate and maintain comfort rounds  - Make Fall Risk Sign visible to staff  - Offer Toileting every  Hours, in advance of need  - Initiate/Maintain alarm  - Obtain necessary fall risk management equipment:   - Apply yellow socks and bracelet for high fall risk patients  - Consider moving patient to room near nurses station  Outcome: Progressing  Goal: Maintain or return to baseline ADL function  Description: INTERVENTIONS:  -  Assess patient's ability to carry out ADLs; assess patient's baseline for ADL function and identify physical deficits which impact ability to perform ADLs (bathing, care of mouth/teeth, toileting, grooming, dressing, etc )  - Assess/evaluate cause of self-care deficits   - Assess range of motion  - Assess patient's mobility; develop plan if impaired  - Assess patient's need for assistive devices and provide as appropriate  - Encourage maximum independence but intervene and supervise when necessary  - Involve family in performance of ADLs  - Assess for home care needs following discharge   - Consider OT consult to assist with ADL evaluation and planning for discharge  - Provide patient education as appropriate  Outcome: Progressing  Goal: Maintains/Returns to pre admission functional level  Description: INTERVENTIONS:  - Perform BMAT or MOVE assessment daily    - Set and communicate daily mobility goal to care team and patient/family/caregiver  - Collaborate with rehabilitation services on mobility goals if consulted  - Perform Range of Motion  times a day  - Reposition patient every  hours    - Dangle patient  times a day  - Stand patient  times a day  - Ambulate patient  times a day  - Out of bed to chair  times a day   - Out of bed for meals  times a day  - Out of bed for toileting  - Record patient progress and toleration of activity level   Outcome: Progressing     Problem: DISCHARGE PLANNING  Goal: Discharge to home or other facility with appropriate resources  Description: INTERVENTIONS:  - Identify barriers to discharge w/patient and caregiver  - Arrange for needed discharge resources and transportation as appropriate  - Identify discharge learning needs (meds, wound care, etc )  - Arrange for interpretive services to assist at discharge as needed  - Refer to Case Management Department for coordinating discharge planning if the patient needs post-hospital services based on physician/advanced practitioner order or complex needs related to functional status, cognitive ability, or social support system  Outcome: Progressing     Problem: Knowledge Deficit  Goal: Patient/family/caregiver demonstrates understanding of disease process, treatment plan, medications, and discharge instructions  Description: Complete learning assessment and assess knowledge base    Interventions:  - Provide teaching at level of understanding  - Provide teaching via preferred learning methods  Outcome: Progressing

## 2023-04-17 NOTE — ASSESSMENT & PLAN NOTE
· PAD left lower extremity with history of right lower extremity bypass for aneurysm  · Follows with vascular surgery as an outpatient  · Continue aspirin and statin

## 2023-04-17 NOTE — TELEPHONE ENCOUNTER
I called Senia Alfonso back and left a message on his voice mail with Dinesh's advice  Render In Strict Bullet Format?: No Detail Level: Zone Initiate Treatment: triamcinolone acetonide 0.1 % topical Apply BID to torso and back

## 2023-04-17 NOTE — ASSESSMENT & PLAN NOTE
Background: Lung nodules presents for IR biopsy subsequently had pneumothorax  · Right chest tube placed  Continue to suction  · Add incentive spirometer    Avoiding NSAIDs due to anticoagulation and risk of bleeding  · Consult pulmonology to help with management

## 2023-04-17 NOTE — H&P
[address] [place] [address]  H&P  Name: Marian Geroinmo 75 y o  male I MRN: [id number]  Unit/Bed#: E5 -01 I Date of Admission: 4/17/2023   Date of Service: 4/17/2023 I Hospital Day: 0      Assessment/Plan   * Pneumothorax after biopsy  Assessment & Plan  Background: Lung nodules presents for IR biopsy subsequently had pneumothorax  · Right chest tube placed  Continue to suction  · Add incentive spirometer  Avoiding NSAIDs due to anticoagulation and risk of bleeding  · Consult pulmonology to help with management    PAD (peripheral artery disease) ([facility] [address] )  Assessment & Plan  · PAD left lower extremity with history of right lower extremity bypass for aneurysm  · Follows with vascular surgery as an outpatient  · Continue aspirin and statin    History of DVT (deep vein thrombosis)  Assessment & Plan  · Continue Eliquis    MTHFR mutation  Assessment & Plan  · MTHFR carrier, on multiple supplements    Chronic pain syndrome  Assessment & Plan  · Continue butrans patch    VTE Pharmacologic Prophylaxis:   Moderate Risk (Score 3-4) - Pharmacological DVT Prophylaxis Ordered: apixaban (Eliquis)  Code Status: Level 1 - Full Code  Discussion with family:     Anticipated Length of Stay: Patient will be admitted on an inpatient basis with an anticipated length of stay of greater than 2 midnights secondary to Pneumothorax  Total Time Spent on Date of Encounter in care of patient: This time was spent on one or more of the following: performing physical exam; counseling and coordination of care; obtaining or reviewing history; documenting in the medical record; reviewing/ordering tests, medications or procedures; communicating with other healthcare professionals and discussing with patient's family/caregivers      Chief Complaint:     Pneumothorax    History of Present Illness:    Marian Geronimo is a 75 y o  male with a past medical history of MTHFR mutation, PAD, chronic pain, DVT who presented initially to "the hospital for lung nodule biopsy  He suffered a pneumothorax postprocedure and chest tube was placed  Currently he has some discomfort but pain is controlled  He denies any fevers chills nausea vomiting diarrhea  Review of Systems:  Review of Systems   Constitutional: Negative for chills, diaphoresis and fever  HENT: Negative for facial swelling and trouble swallowing  Eyes: Negative for pain  Respiratory: Positive for chest tightness  Negative for shortness of breath  Cardiovascular: Negative for chest pain and palpitations  Gastrointestinal: Negative for abdominal distention, abdominal pain, diarrhea, nausea and vomiting  Genitourinary: Negative for hematuria and urgency  Musculoskeletal: Positive for arthralgias and back pain  Negative for myalgias  Skin: Negative for rash  Neurological: Negative for speech difficulty, numbness and headaches  Psychiatric/Behavioral: The patient is not nervous/anxious  All other systems reviewed and are negative  Past Medical and Surgical History:   Past Medical History:   Diagnosis Date   • Arthritis feb 2016    osto back   • Chronic back pain    • Chronic pain disorder     Sees pain management Dr Oumou Webb    • Cigarette smoker    • GERD (gastroesophageal reflux disease)    • 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 related   • History of blood clots    • L4-L5 disc bulge 06/16/2017   • Low back pain    • MTHFR mutation    • Neuropathy     right foot and left foot   • Osteoarthritis    • Peripheral neuropathy 8/ 15   • Pulmonary emphysema ([address] )     \"mild\"   • Right foot drop      Past Surgical History:   Procedure Laterality Date   • BACK SURGERY  aug 2015- jun2017 2017 no affect   • COLONOSCOPY     • EGD AND COLONOSCOPY N/A 06/29/2018    Procedure: EGD AND COLONOSCOPY;  Surgeon: Idania Nettles MD;  Location: [facility];   Service: Gastroenterology   • EPIDURAL BLOCK INJECTION      needle   • HIP ARTHROSCOPY W/ LABRAL DEBRIDEMENT Left     \"hip surgery\" " "  • IR LOWER EXTREMITY ANGIOGRAM  12/08/2021   • ORTHOPEDIC SURGERY     • ND ARTHRS HIP DEBRIDEMENT/SHAVING ARTICULAR CRTLG Left 04/30/2018    Procedure: LEFT HIP ARTHROSCOPIC LABRAL DEBRIDEMENT;  Surgeon: Argelia Morejon MD;  Location: [facility];  Service: Orthopedics   • ND IN-SITU VEIN BYPASS FEMORAL-POPLITEAL Right 12/08/2021    Procedure: R SFA to BK Pop Bypass using insitu GSV,  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;  Surgeon: Thomasenia Cockayne, MD;  Location: [facility];  Service: Vascular   • ND SLCTV CATHJ 3RD+ ORD SLCTV ABDL PEL/LXTR [facility] Right 12/08/2021    Procedure: COMPLETION ARTERIOGRAM WITH 5 MM ANGIOPLASTY OF MID SUPERFICIAL FEMORAL ARTERY;  Surgeon: Thomasenia Cockayne, MD;  Location: [facility];  Service: Vascular   • TONSILLECTOMY     • WISDOM TOOTH EXTRACTION       Meds/Allergies: Allergies: Allergies   Allergen Reactions   • Cyanocobalamin [Vitamin B12] GI Intolerance   • Gabapentin Other (See Comments) and GI Intolerance     Difficulty walking   • Morphine Other (See Comments)     \"acute constipation\"   • Tramadol Confusion   • Nsaids Other (See Comments)     Pt reports avoids as is on Eliquis     Prior to Admission Medications   Prescriptions Last Dose Informant Patient Reported? Taking?    Flaxseed, Linseed, (FLAX SEED OIL PO) Unknown  Yes No   Sig: Take 1,200 capsules by mouth 2 (two) times a day     METHYLCOBALAMIN SL Unknown  Yes No   Sig: Place 1,000 mcg under the tongue 2 (two) times a day    Misc Natural Products (Osteo Bi-Flex Triple Strength) TABS Unknown  Yes No   Sig: Take by mouth 2 (two) times a day     acetaminophen (TYLENOL) 500 mg tablet Unknown  Yes No   Sig: Take 1,000 mg by mouth every 12 (twelve) hours as needed for mild pain   apixaban (ELIQUIS) 5 mg Unknown  No No   Sig: Take 1 tablet (5 mg total) by mouth 2 (two) times a day   ascorbic Acid (VITAMIN C) 500 MG CPCR Unknown  Yes No   Sig: Take 500 mg by mouth 2 (two) times a day   aspirin (ECOTRIN LOW STRENGTH) 81 mg EC tablet " Unknown  No No   Sig: Take 1 tablet (81 mg total) by mouth daily   atorvastatin (LIPITOR) 10 mg tablet Unknown  No No   Sig: Take 0 5 tablets (5 mg total) by mouth daily at bedtime   cholecalciferol (VITAMIN D3) 1,000 units tablet Unknown  Yes No   Sig: Take 2,000 Units by mouth 2 (two) times a day    folic acid (FOLVITE) 967 mcg tablet Unknown  Yes No   Sig: Take 400 mcg by mouth daily   pyridoxine (B-6) 100 MG tablet Unknown  Yes No   Sig: Take 100 mg by mouth daily   transdermal buprenorphine (BUTRANS) 5 mcg/hr TD patch Unknown  No No   Sig: Place 1 patch on the skin over 7 days once a week For ongoing therapy   vitamin A 2400 MCG (8000 UT) capsule Unknown  Yes No   Sig: Take 8,000 Units by mouth daily      Facility-Administered Medications: None     Social History:     Social History     Socioeconomic History   • Marital status:      Spouse name: Not on file   • Number of children: Not on file   • Years of education: Not on file   • Highest education level: Not on file   Occupational History   • Not on file   Tobacco Use   • Smoking status: Every Day     Packs/day: 0 10     Years: 15 00     Pack years: 1 50     Types: Cigarettes     Last attempt to quit: 2017     Years since quittin 3     Passive exposure: Yes   • Smokeless tobacco: Never   • Tobacco comments:     stress caused by workers comp   not covering meds  Delays     Last smoked 23   Vaping Use   • Vaping Use: Never used   Substance and Sexual Activity   • Alcohol use: No   • Drug use: No   • Sexual activity: Not on file     Comment: defer   Other Topics Concern   • Not on file   Social History Narrative   • Not on file     Social Determinants of Health     Financial Resource Strain: Not on file   Food Insecurity: Not on file   Transportation Needs: Not on file   Physical Activity: Not on file   Stress: Not on file   Social Connections: Not on file   Intimate Partner Violence: Not on file   Housing Stability: Not on file Patient Pre-hospital Living Situation:   Patient Pre-hospital Level of Mobility:   Patient Pre-hospital Diet Restrictions:     Family History:  Family History   Problem Relation Age of Onset   • Dementia Mother    • Glaucoma Mother    • Arthritis Mother         nursing home   • Cancer Father         dead   • Osteoarthritis Family      Physical Exam:   Vitals:   Blood Pressure: 129/73 (04/17/23 1649)  Pulse: 58 (04/17/23 1649)  Temperature: 98 2 °F (36 8 °C) (04/17/23 1649)  Temp Source: Oral (04/17/23 1649)  Respirations: 16 (04/17/23 1649)  SpO2: 96 % (04/17/23 1649)    Physical Exam  Vitals reviewed  Constitutional:       General: He is not in acute distress  Appearance: Normal appearance  HENT:      Head: Atraumatic  Eyes:      Extraocular Movements: Extraocular movements intact  Cardiovascular:      Rate and Rhythm: Regular rhythm  Heart sounds: Normal heart sounds  Pulmonary:      Effort: Pulmonary effort is normal       Breath sounds: Decreased breath sounds present  No wheezing  Comments: Right chest tube present  Abdominal:      General: Bowel sounds are normal       Palpations: Abdomen is soft  Tenderness: There is no abdominal tenderness  There is no rebound  Musculoskeletal:         General: No swelling or tenderness  Cervical back: Normal range of motion  Skin:     General: Skin is warm and dry  Neurological:      General: No focal deficit present  Mental Status: He is alert  Cranial Nerves: No cranial nerve deficit  Motor: No weakness  Psychiatric:         Mood and Affect: Mood normal        Lab Results: I have personally reviewed pertinent reports      Results from last 7 days   Lab Units 04/17/23  0749   WBC Thousand/uL 11 41*   HEMOGLOBIN g/dL 14 1   HEMATOCRIT % 41 1   PLATELETS Thousands/uL 307   NEUTROS PCT % 71   LYMPHS PCT % 18   MONOS PCT % 9   EOS PCT % 1         Results from last 7 days   Lab Units 04/17/23  0749   INR  0 99 Lines/Drains  Invasive Devices     Peripheral Intravenous Line  Duration           Peripheral IV 04/17/23 Lower;Right;Ventral (anterior) Forearm <1 day          Drain  Duration           Chest Tube 1 Right Anterior 8 5 Fr  <1 day                Imaging: I have personally reviewed pertinent films in PACS  Chest x-rays  Right pneumothorax subsequent chest tube placement    EKG, Pathology, and Other Studies Reviewed on Admission:       ** Please Note: This note has been constructed using a voice recognition system   **

## 2023-04-18 VITALS
HEART RATE: 70 BPM | TEMPERATURE: 98.6 F | SYSTOLIC BLOOD PRESSURE: 112 MMHG | DIASTOLIC BLOOD PRESSURE: 74 MMHG | RESPIRATION RATE: 18 BRPM | OXYGEN SATURATION: 95 %

## 2023-04-18 LAB
ALBUMIN SERPL BCP-MCNC: 3.6 G/DL (ref 3.5–5)
ALP SERPL-CCNC: 51 U/L (ref 34–104)
ALT SERPL W P-5'-P-CCNC: 15 U/L (ref 7–52)
ANION GAP SERPL CALCULATED.3IONS-SCNC: 5 MMOL/L (ref 4–13)
AST SERPL W P-5'-P-CCNC: 16 U/L (ref 13–39)
BASOPHILS # BLD AUTO: 0.07 THOUSANDS/ΜL (ref 0–0.1)
BASOPHILS NFR BLD AUTO: 1 % (ref 0–1)
BILIRUB SERPL-MCNC: 0.97 MG/DL (ref 0.2–1)
BUN SERPL-MCNC: 11 MG/DL (ref 5–25)
CALCIUM SERPL-MCNC: 9.1 MG/DL (ref 8.4–10.2)
CHLORIDE SERPL-SCNC: 111 MMOL/L (ref 96–108)
CO2 SERPL-SCNC: 24 MMOL/L (ref 21–32)
CREAT SERPL-MCNC: 0.67 MG/DL (ref 0.6–1.3)
EOSINOPHIL # BLD AUTO: 0.14 THOUSAND/ΜL (ref 0–0.61)
EOSINOPHIL NFR BLD AUTO: 1 % (ref 0–6)
ERYTHROCYTE [DISTWIDTH] IN BLOOD BY AUTOMATED COUNT: 14.3 % (ref 11.6–15.1)
GFR SERPL CREATININE-BSD FRML MDRD: 98 ML/MIN/1.73SQ M
GLUCOSE SERPL-MCNC: 88 MG/DL (ref 65–140)
HCT VFR BLD AUTO: 41.1 % (ref 36.5–49.3)
HGB BLD-MCNC: 13.8 G/DL (ref 12–17)
IMM GRANULOCYTES # BLD AUTO: 0.04 THOUSAND/UL (ref 0–0.2)
IMM GRANULOCYTES NFR BLD AUTO: 0 % (ref 0–2)
LYMPHOCYTES # BLD AUTO: 2.6 THOUSANDS/ΜL (ref 0.6–4.47)
LYMPHOCYTES NFR BLD AUTO: 23 % (ref 14–44)
MCH RBC QN AUTO: 31.8 PG (ref 26.8–34.3)
MCHC RBC AUTO-ENTMCNC: 33.6 G/DL (ref 31.4–37.4)
MCV RBC AUTO: 95 FL (ref 82–98)
MONOCYTES # BLD AUTO: 0.89 THOUSAND/ΜL (ref 0.17–1.22)
MONOCYTES NFR BLD AUTO: 8 % (ref 4–12)
NEUTROPHILS # BLD AUTO: 7.43 THOUSANDS/ΜL (ref 1.85–7.62)
NEUTS SEG NFR BLD AUTO: 67 % (ref 43–75)
NRBC BLD AUTO-RTO: 0 /100 WBCS
PLATELET # BLD AUTO: 295 THOUSANDS/UL (ref 149–390)
PMV BLD AUTO: 9.9 FL (ref 8.9–12.7)
POTASSIUM SERPL-SCNC: 3.6 MMOL/L (ref 3.5–5.3)
PROT SERPL-MCNC: 5.7 G/DL (ref 6.4–8.4)
RBC # BLD AUTO: 4.34 MILLION/UL (ref 3.88–5.62)
SODIUM SERPL-SCNC: 140 MMOL/L (ref 135–147)
WBC # BLD AUTO: 11.17 THOUSAND/UL (ref 4.31–10.16)

## 2023-04-18 RX ADMIN — PYRIDOXINE HCL TAB 50 MG 100 MG: 50 TAB at 08:42

## 2023-04-18 RX ADMIN — Medication 2000 UNITS: at 08:42

## 2023-04-18 RX ADMIN — APIXABAN 5 MG: 5 TABLET, FILM COATED ORAL at 08:42

## 2023-04-18 RX ADMIN — ASPIRIN 81 MG: 81 TABLET, COATED ORAL at 08:42

## 2023-04-18 RX ADMIN — OXYCODONE HYDROCHLORIDE AND ACETAMINOPHEN 500 MG: 500 TABLET ORAL at 08:42

## 2023-04-18 RX ADMIN — FOLIC ACID TAB 400 MCG 400 MCG: 400 TAB at 08:42

## 2023-04-18 RX ADMIN — Medication 10000 UNITS: at 08:42

## 2023-04-18 NOTE — PROGRESS NOTES
[address] [place] [address]  Progress Note  Name: Edmund Hughes I  MRN: [id number]  Unit/Bed#: E5 -01 I Date of Admission: 4/17/2023   Date of Service: 4/18/2023 I Hospital Day: 1    Assessment/Plan   * Pneumothorax after biopsy  Assessment & Plan  · Lung nodules presents for IR biopsy 4/17 subsequently had pneumothorax  · Right chest tube placed  · Holding NSAIDs due to anticoagulation and risk of bleeding  · Continue incentive spirometer  · Patient on room air at 96%, with improvement in breathing  · Repeat pulmonology recommendations  · Chest tube this morning with no air leak on suction, placed to waterseal  · Repeat chest x-ray in 2 to 3 hours -discussed with radiology  · Pending repeat chest x-ray results, IR to remove tube  · Follow-up with Dr Allyson Kingsley with thoracic surgery who is following him for his lung nodules and ordered the biopsy    History of DVT (deep vein thrombosis)  Assessment & Plan  · Continue Eliquis 5 mg by mouth twice daily    PAD (peripheral artery disease) ([address] )  Assessment & Plan  · PAD with left lower extremity with history of right lower extremity bypass for aneurysm  · Follows with vascular surgery as an outpatient  · Continue aspirin 81 mg daily and Lipitor 5 mg nightly    Chronic pain syndrome  Assessment & Plan  · Pain controlled and at baseline  · Continue butrans patch    MTHFR mutation  Assessment & Plan  · MTHFR carrier, on multiple supplements  · Continue supplementation    Mixed hyperlipidemia  Assessment & Plan  · With panel from 11/2021 reviewed, well controlled  · Has repeat lipid panel pending outpatient order, follow-up as scheduled  · Continue Lipitor 5 mg nightly    Tobacco abuse  Assessment & Plan  · Reports smoking 1 pack every 1 to 2 days  · Contributes this to life stressors, reports he has issues with his neighbors  · Continue NRT while inpatient  · Smoking cessation education provided      VTE Pharmacologic Prophylaxis:   Moderate Risk (Score 3-4) - Pharmacological DVT Prophylaxis Ordered: apixaban (Eliquis)  Patient Centered Rounds: I performed bedside rounds with nursing staff today  Discussions with Specialists or Other Care Team Provider: Pulmonlogy, IR    Education and Discussions with Family / Patient: Patient declined call to   Total Time Spent on Date of Encounter in care of patient: 35 minutes This time was spent on one or more of the following: performing physical exam; counseling and coordination of care; obtaining or reviewing history; documenting in the medical record; reviewing/ordering tests, medications or procedures; communicating with other healthcare professionals and discussing with patient's family/caregivers  Current Length of Stay: 1 day(s)  Current Patient Status: Inpatient   Certification Statement: The patient will continue to require additional inpatient hospital stay due to chest tube, repeat xray, removal of tube - IR/pulm recs  Discharge Plan: Anticipate discharge tomorrow to home  Code Status: Level 1 - Full Code    Subjective:   Patient seen and examined  Reports he feels well today but cannot fully take a deep breath  He is however breathing much better than yesterday  Has an intermittent cough that has been ongoing  Denies fever, chills, chest pressure, increasing shortness of breath, abdominal pain, nausea or vomiting  Does have some chest pain where the chest tube is located  He is eating and drinking okay without difficulty  Objective:     Vitals:   Temp (24hrs), Av 2 °F (36 8 °C), Min:97 8 °F (36 6 °C), Max:98 5 °F (36 9 °C)    Temp:  [97 8 °F (36 6 °C)-98 5 °F (36 9 °C)] 98 4 °F (36 9 °C)  HR:  [58-73] 68  Resp:  [10-18] 18  BP: ()/(52-79) 110/62  SpO2:  [93 %-97 %] 94 %  There is no height or weight on file to calculate BMI  Input and Output Summary (last 24 hours):      Intake/Output Summary (Last 24 hours) at 2023 0923  Last data filed at 2023 0601  Gross per 24 hour   Intake 236 ml   Output --   Net 236 ml       Physical Exam:   Physical Exam  Vitals and nursing note reviewed  Constitutional:       General: He is not in acute distress  Appearance: Normal appearance  He is well-developed  He is not ill-appearing  HENT:      Head: Normocephalic and atraumatic  Eyes:      Conjunctiva/sclera: Conjunctivae normal    Cardiovascular:      Rate and Rhythm: Normal rate and regular rhythm  Heart sounds: No murmur heard  Pulmonary:      Effort: Pulmonary effort is normal  No respiratory distress  Comments: Chest tube in place  On room air at 96%, nonlabored breathing  Coarse breath sounds  Abdominal:      General: Bowel sounds are normal       Palpations: Abdomen is soft  Tenderness: There is no abdominal tenderness  Musculoskeletal:      Right lower leg: No edema  Left lower leg: No edema  Skin:     General: Skin is warm and dry  Neurological:      General: No focal deficit present  Mental Status: He is alert and oriented to person, place, and time     Psychiatric:         Mood and Affect: Mood normal         Additional Data:     Labs:  Results from last 7 days   Lab Units 04/18/23  0510   WBC Thousand/uL 11 17*   HEMOGLOBIN g/dL 13 8   HEMATOCRIT % 41 1   PLATELETS Thousands/uL 295   NEUTROS PCT % 67   LYMPHS PCT % 23   MONOS PCT % 8   EOS PCT % 1     Results from last 7 days   Lab Units 04/18/23  0510   SODIUM mmol/L 140   POTASSIUM mmol/L 3 6   CHLORIDE mmol/L 111*   CO2 mmol/L 24   BUN mg/dL 11   CREATININE mg/dL 0 67   ANION GAP mmol/L 5   CALCIUM mg/dL 9 1   ALBUMIN g/dL 3 6   TOTAL BILIRUBIN mg/dL 0 97   ALK PHOS U/L 51   ALT U/L 15   AST U/L 16   GLUCOSE RANDOM mg/dL 88     Results from last 7 days   Lab Units 04/17/23  0749   INR  0 99     Lines/Drains:  Invasive Devices     Peripheral Intravenous Line  Duration           Peripheral IV 04/17/23 Lower;Right;Ventral (anterior) Forearm <1 day          Drain  Duration Chest Tube 1 Right Anterior 8 5 Fr  <1 day                Imaging: Reviewed radiology reports from this admission including: chest xray    Recent Cultures (last 7 days):     Last 24 Hours Medication List:   Current Facility-Administered Medications   Medication Dose Route Frequency Provider Last Rate   • acetaminophen  650 mg Oral Q6H PRN Katiana Seals, DO     • apixaban  5 mg Oral BID Katiana Seals, DO     • ascorbic acid  500 mg Oral BID Katiana Seals, DO     • aspirin  81 mg Oral Daily Katiana Seals, DO     • atorvastatin  5 mg Oral HS Katiana Seals, DO     • cholecalciferol  2,000 Units Oral Daily Slava Saúl, DO     • folic acid  957 mcg Oral Daily Katiana Seals, DO     • nicotine  1 patch Transdermal Daily Slava Saúl, DO     • ondansetron  4 mg Intravenous Q4H PRN Katiana Seals, DO     • oxyCODONE  5 mg Oral Q4H PRN Katiana Seals, DO     • pyridoxine  100 mg Oral Daily Slava Saúl, DO     • [START ON 4/22/2023] transdermal buprenorphine  1 patch Transdermal Weekly Katiana Seals, DO     • vitamin A  10,000 Units Oral Daily Katiana Seals, DO          Today, Patient Was Seen By: Osmani Elmore PA-C    **Please Note: This note may have been constructed using a voice recognition system  **

## 2023-04-18 NOTE — ASSESSMENT & PLAN NOTE
· Lung nodules presents for IR biopsy 4/17 subsequently had pneumothorax  · Right chest tube placed  · Holding NSAIDs due to anticoagulation and risk of bleeding  · Continue incentive spirometer  · Patient on room air at 96%, with improvement in breathing  · Repeat pulmonology recommendations  · Chest tube this morning with no air leak on suction, placed to waterseal  · Repeat chest x-ray in 2 to 3 hours -discussed with radiology  · Pending repeat chest x-ray results, IR to remove tube  · Follow-up with Dr Conrad Jackson with thoracic surgery who is following him for his lung nodules and ordered the biopsy

## 2023-04-18 NOTE — ASSESSMENT & PLAN NOTE
· Reports smoking 1 pack every 1 to 2 days  · Contributes this to life stressors, reports he has issues with his neighbors  · Smoking cessation education provided - denying NRT on discharge

## 2023-04-18 NOTE — ASSESSMENT & PLAN NOTE
· With panel from 11/2021 reviewed, well controlled  · Has repeat lipid panel pending outpatient order, follow-up as scheduled  · Continue Lipitor 5 mg nightly

## 2023-04-18 NOTE — ASSESSMENT & PLAN NOTE
· PAD with left lower extremity with history of right lower extremity bypass for aneurysm  · Follows with vascular surgery as an outpatient  · Continue aspirin 81 mg daily and Lipitor 5 mg nightly

## 2023-04-18 NOTE — PLAN OF CARE
Problem: PAIN - ADULT  Goal: Verbalizes/displays adequate comfort level or baseline comfort level  Description: Interventions:  - Encourage patient to monitor pain and request assistance  - Assess pain using appropriate pain scale  - Administer analgesics based on type and severity of pain and evaluate response  - Implement non-pharmacological measures as appropriate and evaluate response  - Consider cultural and social influences on pain and pain management  - Notify physician/advanced practitioner if interventions unsuccessful or patient reports new pain  Outcome: Progressing     Problem: INFECTION - ADULT  Goal: Absence or prevention of progression during hospitalization  Description: INTERVENTIONS:  - Assess and monitor for signs and symptoms of infection  - Monitor lab/diagnostic results  - Monitor all insertion sites, i e  indwelling lines, tubes, and drains  - Monitor endotracheal if appropriate and nasal secretions for changes in amount and color  - [place] appropriate cooling/warming therapies per order  - Administer medications as ordered  - Instruct and encourage patient and family to use good hand hygiene technique  - Identify and instruct in appropriate isolation precautions for identified infection/condition  Outcome: Progressing  Goal: Absence of fever/infection during neutropenic period  Description: INTERVENTIONS:  - Monitor WBC    Outcome: Progressing     Problem: SAFETY ADULT  Goal: Patient will remain free of falls  Description: INTERVENTIONS:  - Educate patient/family on patient safety including physical limitations  - Instruct patient to call for assistance with activity   - Consult OT/PT to assist with strengthening/mobility   - Keep Call bell within reach  - Keep bed low and locked with side rails adjusted as appropriate  - Keep care items and personal belongings within reach  - Initiate and maintain comfort rounds  - Make Fall Risk Sign visible to staff  - Apply yellow socks and bracelet for high fall risk patients  - Consider moving patient to room near nurses station  Outcome: Progressing  Goal: Maintain or return to baseline ADL function  Description: INTERVENTIONS:  -  Assess patient's ability to carry out ADLs; assess patient's baseline for ADL function and identify physical deficits which impact ability to perform ADLs (bathing, care of mouth/teeth, toileting, grooming, dressing, etc )  - Assess/evaluate cause of self-care deficits   - Assess range of motion  - Assess patient's mobility; develop plan if impaired  - Assess patient's need for assistive devices and provide as appropriate  - Encourage maximum independence but intervene and supervise when necessary  - Involve family in performance of ADLs  - Assess for home care needs following discharge   - Consider OT consult to assist with ADL evaluation and planning for discharge  - Provide patient education as appropriate  Outcome: Progressing  Goal: Maintains/Returns to pre admission functional level  Description: INTERVENTIONS:  - Perform BMAT or MOVE assessment daily    - Set and communicate daily mobility goal to care team and patient/family/caregiver  - Collaborate with rehabilitation services on mobility goals if consulted  - Perform Range of Motion 3 times a day  - Reposition patient every 2 hours    - Dangle patient 3 times a day  - Stand patient 3 times a day  - Ambulate patient 3 times a day  - Out of bed to chair 3 times a day   - Out of bed for meals 3 times a day  - Out of bed for toileting  - Record patient progress and toleration of activity level   Outcome: Progressing     Problem: DISCHARGE PLANNING  Goal: Discharge to home or other facility with appropriate resources  Description: INTERVENTIONS:  - Identify barriers to discharge w/patient and caregiver  - Arrange for needed discharge resources and transportation as appropriate  - Identify discharge learning needs (meds, wound care, etc )  - Arrange for interpretive services to assist at discharge as needed  - Refer to Case Management Department for coordinating discharge planning if the patient needs post-hospital services based on physician/advanced practitioner order or complex needs related to functional status, cognitive ability, or social support system  Outcome: Progressing     Problem: Knowledge Deficit  Goal: Patient/family/caregiver demonstrates understanding of disease process, treatment plan, medications, and discharge instructions  Description: Complete learning assessment and assess knowledge base    Interventions:  - Provide teaching at level of understanding  - Provide teaching via preferred learning methods  Outcome: Progressing

## 2023-04-18 NOTE — CONSULTS
PULMONOLOGY CONSULT NOTE     Name: Britany Savage   Age & Sex: 75 y o  male   MRN: [id number]  Unit/Bed#: E5 -01   Encounter: [id number]        Reason for consultation: Pneumothorax    Requesting physician: Internal  Medicine    Assessment:  R sided Iatrogenic Pneumothorax after R lung biopsy 4/17  Pulmonary nodules- predominant nodule 9 mm subpleural RUL nodule PET avid s/p IR biopsy 4/17  Pulmonary emphysema (but no COPD- PFTs with no obstruction)  Tobacco use disorder  Prior DVT on Eliquis    Recommendations:  Chest tube this morning with no air leak on suction  Place to water seal   Check chest x-ray in 3 hours  If repeat chest x-ray on water seal has no pneumothorax then IR can likely remove the chest tube  Nicotine patch  Smoking cessation counseling provided  Should have outpatient follow-up with Dr Michael Herzog with thoracic surgery who is following him for his lung nodules and ordered the lung nodule biopsy  Discussed with the Internal Medicine PASCUAL at the bedside in person  History of Present Illness   HPI:  Britany Savage is a 75 y o  male with PMHx pulmonary emphysema (but no obstruction on PFTs), pulmonary nodules, DVT on Eliquis, peripheral vascular disease who is admitted to [facility] since 4/17 due to R pneumothorax after R lung nodule biopsy  Patient with PET avid 9 mm subpleural RUL nodule biopsied 4/17  A small-bore chest tube was placed on R with CT guidance by IR last evening and the chest tube was placed on suction  The plan was a chest x-ray this morning after placing the chest tube on water seal if there is no air leak  He has a little R chest soreness and dyspnea which are improved from yesterday  No other complaints  He does not have baseline dyspnea  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  He is a current smoker- smokes up to 1 PPD    Has around 50 pack-years      Review of systems:  12 point review of systems was completed "and was otherwise negative except as listed in HPI  Historical Information   Past Medical History:   Diagnosis Date   • Arthritis feb 2016    osto back   • Chronic back pain    • Chronic narcotic dependence ([facility] [address] )    • Chronic pain disorder     Sees pain management Dr Jhony Momin SL   • Cigarette smoker    • Depression    • Dry skin    • Dyslexia    • Full dentures     upper and no teeth lower/\"avoid raw vegetables and chewy food\"   • GERD (gastroesophageal reflux disease)    • 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 related   • History of blood clots     \"left leg\" takes Eliquis   • History of bronchitis    • History of pneumonia     childhood   • Increased pressure in the eye, bilateral    • L4-L5 disc bulge 06/16/2017   • Low back pain    • MTHFR mutation    • Muscle weakness    • Neuropathy     right foot and left foot   • Osteoarthritis    • Osteoarthritis     and\" if becomes cold joint pain worsens\"   • Peripheral neuropathy 8/ 15   • Pulmonary emphysema ([facility] [address] )     \"mild\"   • Right foot drop    • Shortness of breath     \"only on humid days\"   • Smokers' cough ([facility] [address] )    • Wears glasses    • Work related injury 04/24/2018     Past Surgical History:   Procedure Laterality Date   • BACK SURGERY  aug 2015- jun2017 2017 no affect   • COLONOSCOPY     • EGD AND COLONOSCOPY N/A 06/29/2018    Procedure: EGD AND COLONOSCOPY;  Surgeon: Arminda Mosher MD;  Location: [facility];   Service: Gastroenterology   • EPIDURAL BLOCK INJECTION      needle   • HIP ARTHROSCOPY W/ LABRAL DEBRIDEMENT Left     \"hip surgery\"   • IR LOWER EXTREMITY ANGIOGRAM  12/08/2021   • ORTHOPEDIC SURGERY     • SC ARTHRS HIP DEBRIDEMENT/SHAVING ARTICULAR CRTLG Left 04/30/2018    Procedure: LEFT HIP ARTHROSCOPIC LABRAL DEBRIDEMENT;  Surgeon: Bang Pickett MD;  Location: [facility];  Service: Orthopedics   • SC IN-SITU VEIN BYPASS FEMORAL-POPLITEAL Right 12/08/2021    Procedure: R SFA to BK Pop Bypass using insitu GSV,  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;  Surgeon: " Letty Mathias MD;  Location: [facility];  Service: Vascular   • FL Dorethea Score 3RD+ ORD SLCTV ABDL PEL/LXTR [facility] Right 2021    Procedure: COMPLETION ARTERIOGRAM WITH 5 MM ANGIOPLASTY OF MID SUPERFICIAL FEMORAL ARTERY;  Surgeon: Letty Mathias MD;  Location: [facility];  Service: Vascular   • TONSILLECTOMY     • WISDOM TOOTH EXTRACTION       Family History   Problem Relation Age of Onset   • Dementia Mother    • Glaucoma Mother    • Arthritis Mother         nursing home   • Cancer Father         dead   • Osteoarthritis Family          Social History:   Social History     Tobacco Use   Smoking Status Every Day   • Packs/day: 0 10   • Years: 15 00   • Pack years: 1 50   • Types: Cigarettes   • Last attempt to quit: 2017   • Years since quittin 3   • Passive exposure: Yes   Smokeless Tobacco Never   Tobacco Comments    stress caused by workers comp   not covering meds  Delays     Last smoked 23         Meds/Allergies   Current Facility-Administered Medications   Medication Dose Route Frequency   • acetaminophen (TYLENOL) tablet 650 mg  650 mg Oral Q6H PRN   • apixaban (ELIQUIS) tablet 5 mg  5 mg Oral BID   • ascorbic acid (VITAMIN C) tablet 500 mg  500 mg Oral BID   • aspirin (ECOTRIN LOW STRENGTH) EC tablet 81 mg  81 mg Oral Daily   • atorvastatin (LIPITOR) tablet 5 mg  5 mg Oral HS   • cholecalciferol (VITAMIN D3) tablet 2,000 Units  2,000 Units Oral Daily   • folic acid (FOLVITE) tablet 400 mcg  400 mcg Oral Daily   • nicotine (NICODERM CQ) 14 mg/24hr TD 24 hr patch 1 patch  1 patch Transdermal Daily   • ondansetron (ZOFRAN) injection 4 mg  4 mg Intravenous Q4H PRN   • oxyCODONE (ROXICODONE) IR tablet 5 mg  5 mg Oral Q4H PRN   • pyridoxine (VITAMIN B6) tablet 100 mg  100 mg Oral Daily   • [START ON 2023] transdermal buprenorphine (BUTRANS) 5 mcg/hr TD patch 1 patch  1 patch Transdermal Weekly   • vitamin A capsule 10,000 Units  10,000 Units Oral Daily     Medications Prior to Admission "  Medication   • acetaminophen (TYLENOL) 500 mg tablet   • apixaban (ELIQUIS) 5 mg   • ascorbic Acid (VITAMIN C) 500 MG CPCR   • aspirin (ECOTRIN LOW STRENGTH) 81 mg EC tablet   • atorvastatin (LIPITOR) 10 mg tablet   • cholecalciferol (VITAMIN D3) 1,000 units tablet   • Flaxseed, Linseed, (FLAX SEED OIL PO)   • folic acid (FOLVITE) 169 mcg tablet   • METHYLCOBALAMIN SL   • Misc Natural Products (Osteo Bi-Flex Triple Strength) TABS   • pyridoxine (B-6) 100 MG tablet   • transdermal buprenorphine (BUTRANS) 5 mcg/hr TD patch   • vitamin A 2400 MCG (8000 UT) capsule     Allergies   Allergen Reactions   • Cyanocobalamin [Vitamin B12] GI Intolerance   • Gabapentin Other (See Comments) and GI Intolerance     Difficulty walking   • Morphine Other (See Comments)     \"acute constipation\"   • Tramadol Confusion   • Nsaids Other (See Comments)     Pt reports avoids as is on Eliquis       Vitals: Blood pressure 110/62, pulse 68, temperature 98 4 °F (36 9 °C), temperature source Oral, resp  rate 18, SpO2 94 %  , Room air, There is no height or weight on file to calculate BMI  Intake/Output Summary (Last 24 hours) at 4/18/2023 0842  Last data filed at 4/18/2023 0601  Gross per 24 hour   Intake 236 ml   Output --   Net 236 ml       Physical Exam  Vitals and nursing note reviewed  Constitutional:       General: He is not in acute distress  Appearance: He is well-developed  HENT:      Head: Normocephalic and atraumatic  Eyes:      Conjunctiva/sclera: Conjunctivae normal    Cardiovascular:      Rate and Rhythm: Normal rate and regular rhythm  Heart sounds: No murmur heard  Pulmonary:      Effort: Pulmonary effort is normal  No respiratory distress  Breath sounds: Normal breath sounds  Comments: R chest tube anterior, on suction with no air leak  Abdominal:      Palpations: Abdomen is soft  Tenderness: There is no abdominal tenderness  Musculoskeletal:         General: No swelling        Cervical " back: Neck supple  Skin:     General: Skin is warm and dry  Capillary Refill: Capillary refill takes less than 2 seconds  Neurological:      Mental Status: He is alert  Psychiatric:         Mood and Affect: Mood normal          Labs: I have personally reviewed pertinent lab results  Laboratory and Diagnostics  Results from last 7 days   Lab Units 23  0510 23  0749   WBC Thousand/uL 11 17* 11 41*   HEMOGLOBIN g/dL 13 8 14 1   HEMATOCRIT % 41 1 41 1   PLATELETS Thousands/uL 295 307   NEUTROS PCT % 67 71   MONOS PCT % 8 9     Results from last 7 days   Lab Units 23  0510   SODIUM mmol/L 140   POTASSIUM mmol/L 3 6   CHLORIDE mmol/L 111*   CO2 mmol/L 24   ANION GAP mmol/L 5   BUN mg/dL 11   CREATININE mg/dL 0 67   CALCIUM mg/dL 9 1   GLUCOSE RANDOM mg/dL 88   ALT U/L 15   AST U/L 16   ALK PHOS U/L 51   ALBUMIN g/dL 3 6   TOTAL BILIRUBIN mg/dL 0 97          Results from last 7 days   Lab Units 23  0749   INR  0 99                                    Microbiology:        ABG: No results found for: PHART, FSR8KHW, PO2ART, YSY7RAQ, O1CRMITY, BEART, SOURCE      Imaging and other studies: I have personally reviewed pertinent reports  and I have personally reviewed pertinent films in PACS    CT Chest 3/22/23  Increasing size of a spiculated FDG avid right upper lobe subpleural nodule now measuring 9 x 4 mm measured 7 x 4 mm on the 2022 study and 4 mm in maximum diameter on the 12/3/2019 study  Management as per cardiothoracic surgery  Other right lung nodular opacities are unchanged in size and appearance  Also- upper-lobe predominant centrilobular and paraseptal emphysema    CXR - R sided pneumothorax after biopsy      Pulmonary function testin/2022 PFT  • Normal lung volumes  • Normal airflow on vital capacity  • No significant improvement in airflow or vital capacity following the administration of bronchodilators  • Reduced diffusion capacity    • Normal airway resistance as indicated by the specific airway conductance  EKG, Pathology, and Other Studies: I have personally reviewed pertinent reports  TTE 2021  LEFT VENTRICLE:  Systolic function was normal  Ejection fraction was estimated to be 60 %  Although no diagnostic regional wall motion abnormality was identified, this possibility cannot be completely excluded on the basis of this study      RIGHT VENTRICLE:  The size was normal   Systolic function was normal      TRICUSPID VALVE:  There was trace regurgitation          Code Status: Level 1 - Full Code        Rafael Khan MD  Attending Physician  Pulmonary and Critical Care Medicine

## 2023-04-18 NOTE — DISCHARGE SUMMARY
[address]  Discharge- Danya Nissen 1955, 75 y o  male MRN: [id number]  Unit/Bed#: E5 -01 Encounter: [id number]  Primary Care Provider: An Leiva MD   Date and time admitted to hospital: 4/17/2023  4:44 PM    * Pneumothorax after biopsy  Assessment & Plan  · Lung nodules presents for IR biopsy 4/17 subsequently had pneumothorax  · Right chest tube placed 4/17  · Patient on room air at 96%, with improvement in breathing  · Chest tube this morning with no air leak on suction, placed to waterseal  · Repeat chest x-ray in 2 to 3 hours with no pneumothorax  · S/p removal by IR 4/18 with subjective improvement in breathing  · Seen and examined after tube removal, reports improvement in breathing, without new cardiopulmonary complaints   Hemodynamically stable, on room air at 95-96%  · Follow-up with Dr Myrna Jj with thoracic surgery who is following him for his lung nodules and ordered the biopsy, follow up scheduled    History of DVT (deep vein thrombosis)  Assessment & Plan  · Continue Eliquis 5 mg by mouth twice daily    PAD (peripheral artery disease) ([address] )  Assessment & Plan  · PAD with left lower extremity with history of right lower extremity bypass for aneurysm  · Follows with vascular surgery as an outpatient  · Continue aspirin 81 mg daily and Lipitor 5 mg nightly    Chronic pain syndrome  Assessment & Plan  · Pain controlled and at baseline  · Continue butrans patch    MTHFR mutation  Assessment & Plan  · MTHFR carrier, on multiple supplements  · Continue supplementation    Mixed hyperlipidemia  Assessment & Plan  · With panel from 11/2021 reviewed, well controlled  · Has repeat lipid panel pending outpatient order, follow-up as scheduled  · Continue Lipitor 5 mg nightly    Tobacco abuse  Assessment & Plan  · Reports smoking 1 pack every 1 to 2 days  · Contributes this to life stressors, reports he has issues with his neighbors  · Smoking cessation education provided - denying NRT on discharge    Medical Problems     Resolved Problems  Date Reviewed: 4/18/2023   None       Discharging Physician / Practitioner: Marcos Holbrook PA-C  PCP: Jair Kohler MD  Admission Date:   Admission Orders (From admission, onward)     Ordered        04/17/23 1631  Inpatient Admission  Once                      Discharge Date: 04/18/23    Consultations During Hospital Stay:  · Pulmonology, IR    Procedures Performed:   · Right sided chest tube placement 4/17/2023  · Right sided chest tube removal 4/18/2023    Significant Findings / Test Results:     IR biopsy lung  Result Date: 4/18/2023  Impression: Impression: Technically successful image-guided biopsy of subcentimeter right upper lobe lung nodule     Incidental Findings:   · None     Test Results Pending at Discharge (will require follow up): · None      Outpatient Tests Requested:  · Defer to follow up providers    Complications:  None    Reason for Admission: Pneumothorax    Hospital Course:   Lolly Avalos is a 75 y o  male patient who originally presented to the hospital on 4/17/2023 due to lung nodule biopsy  During the biopsy he suffered a pneumothorax postprocedural and a right-sided chest tube was placed  Patient remained on room air and stayed hemodynamically stable while inpatient  He was seen in consultation by pulmonology who noted the chest tube the next day was with no air leak on suction  A waterseal was placed and a chest x-ray was repeated 3 hours after with no pneumothorax  After noted improvement, interventional radiology removed the chest tube on 4/18/2023  Patient has scheduled follow-up with Dr Brian Maldonado to review lung biopsies taken  Please see above list of diagnoses and related plan for additional information  Condition at Discharge: good    Discharge Day Visit / Exam:   * Please refer to separate progress note for these details *    Discussion with Family: Patient updating       Discharge instructions/Information to patient and family:   See after visit summary for information provided to patient and family  Provisions for Follow-Up Care:  See after visit summary for information related to follow-up care and any pertinent home health orders  Disposition:   Home    Planned Readmission: No     Discharge Statement:  I spent 35 minutes discharging the patient  This time was spent on the day of discharge  I had direct contact with the patient on the day of discharge  Greater than 50% of the total time was spent examining patient, answering all patient questions, arranging and discussing plan of care with patient as well as directly providing post-discharge instructions  Additional time then spent on discharge activities  Discharge Medications:  See after visit summary for reconciled discharge medications provided to patient and/or family        **Please Note: This note may have been constructed using a voice recognition system**

## 2023-04-18 NOTE — UTILIZATION REVIEW
Initial Clinical Review    Admission: Date/Time/Statement:   Admission Orders (From admission, onward)     Ordered        04/17/23 1631  Inpatient Admission  Once                      Orders Placed This Encounter   Procedures   • Inpatient Admission     Standing Status:   Standing     Number of Occurrences:   1     Order Specific Question:   Level of Care     Answer:   Med Surg [16]     Order Specific Question:   Estimated length of stay     Answer:   More than 2 Midnights     Order Specific Question:   Certification     Answer:   I certify that inpatient services are medically necessary for this patient for a duration of greater than two midnights  See H&P and MD Progress Notes for additional information about the patient's course of treatment  Initial Presentation: 75 y o  male   Directly admitted from IR after a lung  Biopsy, suffered a  PNTX post procedure and chest tube inserted  Has some discomfort  But pain  Controlled  PMH  Is MTHFR mutation, PAD, chronic pain and DVT  Admit  Ipo  With Pneumonthorax after biopsy  And plan is   Pulmonary consult, chest tube to suction and continue home meds  Date: 4/17/2023  Preoperative diagnosis:   1  Multiple subsolid lung nodules greater than 6 mm in diameter     Findings: Slowly enlarging right pneumothorax indicative of slow air leak  Right-sided chest tube placed with CT guidance        We will place order for for suction overnight and for placement to waterseal in the morning if no airleak  Date:    4/18       Day 2:   Pulmonary consult  Chest tube remains intact on suction with no air leak, palced to water seal  Repeat  CXR in few hours  Unable to take deep breath, breathing improved since admission  Has intermittent cough, ongoing  Discomfort at chest tube site        ED Triage Vitals   Temperature Pulse Respirations Blood Pressure SpO2   04/17/23 1649 04/17/23 1558 04/17/23 1558 04/17/23 1558 04/17/23 1558   98 2 °F (36 8 °C) 63 12 138/79 95 %      Temp Source Heart Rate Source Patient Position - Orthostatic VS BP Location FiO2 (%)   04/17/23 1649 -- 04/17/23 1649 04/17/23 1649 --   Oral  Lying Left arm       Pain Score       04/17/23 1700       2          Wt Readings from Last 1 Encounters:   04/17/23 87 kg (191 lb 12 8 oz)     Additional Vital Signs:   98 4 °F (36 9 °C) 68 18 110/62 78 94 % None (Room air) Lying    04/17/23 23:16:36 98 5 °F (36 9 °C) 62 16 94/52 66 94 % None (Room air) Lying   04/17/23 16:49:33 98 2 °F (36 8 °C) 58 16 129/73 92 96 % None (Room air) Lying   04/17/23 1611 -- 70 12 131/78 -- 94 % -- --   04/17/23 1606 -- 65 10 Abnormal  129/78 -- 95 % -- --   04/17/23 1601 -- 66 16 125/78 -- 94 % -- --   04/17/23 1558 -- 63 12 138/79 -- 95 % None (Room air)        Pertinent Labs/Diagnostic Test Results:   XR chest portable    (Results Pending)         Results from last 7 days   Lab Units 04/18/23  0510 04/17/23  0749   WBC Thousand/uL 11 17* 11 41*   HEMOGLOBIN g/dL 13 8 14 1   HEMATOCRIT % 41 1 41 1   PLATELETS Thousands/uL 295 307   NEUTROS ABS Thousands/µL 7 43 8 04*         Results from last 7 days   Lab Units 04/18/23  0510   SODIUM mmol/L 140   POTASSIUM mmol/L 3 6   CHLORIDE mmol/L 111*   CO2 mmol/L 24   ANION GAP mmol/L 5   BUN mg/dL 11   CREATININE mg/dL 0 67   EGFR ml/min/1 73sq m 98   CALCIUM mg/dL 9 1     Results from last 7 days   Lab Units 04/18/23  0510   AST U/L 16   ALT U/L 15   ALK PHOS U/L 51   TOTAL PROTEIN g/dL 5 7*   ALBUMIN g/dL 3 6   TOTAL BILIRUBIN mg/dL 0 97         Results from last 7 days   Lab Units 04/18/23  0510   GLUCOSE RANDOM mg/dL 88           Results from last 7 days   Lab Units 04/17/23  0749   PROTIME seconds 13 1   INR  0 99               Present on Admission:  • Chronic pain syndrome  • PAD (peripheral artery disease) (HCC)  • Pneumothorax after biopsy  • Tobacco abuse  • Mixed hyperlipidemia      Admitting Diagnosis: Encounter for chest tube placement [Z46 [id number]]  Age/Sex: 75 y o  male  Admission Orders:  Scheduled Medications:  apixaban, 5 mg, Oral, BID  ascorbic acid, 500 mg, Oral, BID  aspirin, 81 mg, Oral, Daily  atorvastatin, 5 mg, Oral, HS  cholecalciferol, 2,000 Units, Oral, Daily  folic acid, 314 mcg, Oral, Daily  nicotine, 1 patch, Transdermal, Daily  pyridoxine, 100 mg, Oral, Daily  [START ON 4/22/2023] transdermal buprenorphine, 1 patch, Transdermal, Weekly  vitamin A, 10,000 Units, Oral, Daily      Continuous IV Infusions:     PRN Meds:  acetaminophen, 650 mg, Oral, Q6H PRN  ondansetron, 4 mg, Intravenous, Q4H PRN  oxyCODONE, 5 mg, Oral, Q4H PRN        IP CONSULT TO PULMONOLOGY    Network Utilization Review Department  ATTENTION: Please call with any questions or concerns to [phone] and carefully listen to the prompts so that you are directed to the right person  All voicemails are confidential   Birda Manners all requests for admission clinical reviews, approved or denied determinations and any other requests to dedicated fax number below belonging to the campus where the patient is receiving treatment   List of dedicated fax numbers for the Facilities:  [address] DENIALS (Administrative/Medical Necessity) [phone]   [address] (Maternity/NICU/Pediatrics) [phone]   1 Brenda Wesley [phone]   Isis Bustamante  [phone]   [address] [address] Fern Sousa  [phone]   [facility] 134 8[address] [facility] [phone]

## 2023-04-18 NOTE — PLAN OF CARE
Problem: PAIN - ADULT  Goal: Verbalizes/displays adequate comfort level or baseline comfort level  Description: Interventions:  - Encourage patient to monitor pain and request assistance  - Assess pain using appropriate pain scale  - Administer analgesics based on type and severity of pain and evaluate response  - Implement non-pharmacological measures as appropriate and evaluate response  - Consider cultural and social influences on pain and pain management  - Notify physician/advanced practitioner if interventions unsuccessful or patient reports new pain  Outcome: Progressing     Problem: INFECTION - ADULT  Goal: Absence or prevention of progression during hospitalization  Description: INTERVENTIONS:  - Assess and monitor for signs and symptoms of infection  - Monitor lab/diagnostic results  - Monitor all insertion sites, i e  indwelling lines, tubes, and drains  - Monitor endotracheal if appropriate and nasal secretions for changes in amount and color  - Devol appropriate cooling/warming therapies per order  - Administer medications as ordered  - Instruct and encourage patient and family to use good hand hygiene technique  - Identify and instruct in appropriate isolation precautions for identified infection/condition  Outcome: Progressing  Goal: Absence of fever/infection during neutropenic period  Description: INTERVENTIONS:  - Monitor WBC    Outcome: Progressing     Problem: SAFETY ADULT  Goal: Patient will remain free of falls  Description: INTERVENTIONS:  - Educate patient/family on patient safety including physical limitations  - Instruct patient to call for assistance with activity   - Consult OT/PT to assist with strengthening/mobility   - Keep Call bell within reach  - Keep bed low and locked with side rails adjusted as appropriate  - Keep care items and personal belongings within reach  - Initiate and maintain comfort rounds  - Make Fall Risk Sign visible to staff  - Offer Toileting   - Obtain necessary fall risk management equipment:   - Apply yellow socks and bracelet for high fall risk patients  - Consider moving patient to room near nurses station  Outcome: Progressing  Goal: Maintain or return to baseline ADL function  Description: INTERVENTIONS:  -  Assess patient's ability to carry out ADLs; assess patient's baseline for ADL function and identify physical deficits which impact ability to perform ADLs (bathing, care of mouth/teeth, toileting, grooming, dressing, etc )  - Assess/evaluate cause of self-care deficits   - Assess range of motion  - Assess patient's mobility; develop plan if impaired  - Assess patient's need for assistive devices and provide as appropriate  - Encourage maximum independence but intervene and supervise when necessary  - Involve family in performance of ADLs  - Assess for home care needs following discharge   - Consider OT consult to assist with ADL evaluation and planning for discharge  - Provide patient education as appropriate  Outcome: Progressing  Goal: Maintains/Returns to pre admission functional level  Description: INTERVENTIONS:  - Perform BMAT or MOVE assessment daily    - Set and communicate daily mobility goal to care team and patient/family/caregiver     - Collaborate with rehabilitation services on mobility goals if consulted  - Perform Range of Motion  - Reposition patient   - Dangle patient   - Stand patient   - Ambulate patient   - Out of bed to chair   - Out of bed for meals   - Out of bed for toileting  - Record patient progress and toleration of activity level   Outcome: Progressing     Problem: DISCHARGE PLANNING  Goal: Discharge to home or other facility with appropriate resources  Description: INTERVENTIONS:  - Identify barriers to discharge w/patient and caregiver  - Arrange for needed discharge resources and transportation as appropriate  - Identify discharge learning needs (meds, wound care, etc )  - Arrange for interpretive services to assist at discharge as needed  - Refer to Case Management Department for coordinating discharge planning if the patient needs post-hospital services based on physician/advanced practitioner order or complex needs related to functional status, cognitive ability, or social support system  Outcome: Progressing     Problem: Knowledge Deficit  Goal: Patient/family/caregiver demonstrates understanding of disease process, treatment plan, medications, and discharge instructions  Description: Complete learning assessment and assess knowledge base    Interventions:  - Provide teaching at level of understanding  - Provide teaching via preferred learning methods  Outcome: Progressing

## 2023-04-18 NOTE — ASSESSMENT & PLAN NOTE
· Reports smoking 1 pack every 1 to 2 days  · Contributes this to life stressors, reports he has issues with his neighbors  · Continue NRT while inpatient  · Smoking cessation education provided

## 2023-04-18 NOTE — NURSING NOTE
ABDIFATAH and kamlesh removed  Discharge instructions reviewed  Questions answered  Discharged to home

## 2023-04-18 NOTE — PROGRESS NOTES
Progress Note -Interventional Radiology KEE Richardson 75 y o  male MRN: [id number]  Unit/Bed#: E5 -01 Encounter: [id number]    Assessment:    75year old male with pmh of tobacco abuse, PAD, COPD, DVT, presenting for outpatient right lung mass biopsy with subsequent right pneumothorax requiring IR chest tube placement  Chest tube: -AL, minimal serosanguinous output    Plan:    - chest xray reviewed on water seal  No pneumothorax  No air leak  Chest tube removed by myself at bedside  - instructed patient to keep bandage on for 24 hours  - patient to return immediately to ED if short of breath, light headed, fatigue    Patient Active Problem List   Diagnosis   • Lumbar radiculopathy - Right   • Bone lesion   • Neuropathy   • Inflammatory polyarthropathy (HCC)   • Tobacco abuse   • Colon cancer screening   • Right foot drop   • Lumbar spondylosis - Bilateral   • Vitamin D deficiency   • Thoracic spine pain   • Chronic pain syndrome   • Uncomplicated opioid dependence (HCC)   • Chronic pain of both knees   • Chronic pain of both shoulders   • Chronic pain of left ankle   • Multiple subsolid lung nodules greater than 6 mm in diameter   • Mixed hyperlipidemia   • 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HCC)   • Deep vein thrombosis (DVT) of femoral vein of left lower extremity (HCC)   • S/P femoral-popliteal bypass surgery   • Cellulitis of right lower extremity   • Atherosclerosis of artery of left lower extremity (HCC)   • Chronic obstructive pulmonary disease, unspecified COPD type (HCC)   • Chronic right shoulder pain   • Embolism and thrombosis of arteries of the lower extremities (HCC)   • MTHFR mutation   • History of DVT (deep vein thrombosis)   • PAD (peripheral artery disease) (HCC)   • Pneumothorax after biopsy          Subjective: Chest xray reviewed with no pneumothorax and no air leak noted in atrium  Chest tube removed without issue  Patient feeling well  Tolerating diet  On RA  Objective:    Vitals:  /74   Pulse 70   Temp 98 6 °F (37 °C)   Resp 18   SpO2 95%   There is no height or weight on file to calculate BMI  Weight (last 2 days)     None          I/Os:    Intake/Output Summary (Last 24 hours) at 4/18/2023 1424  Last data filed at 4/18/2023 0601  Gross per 24 hour   Intake 236 ml   Output --   Net 236 ml       Invasive Devices     Peripheral Intravenous Line  Duration           Peripheral IV 04/17/23 Lower;Right;Ventral (anterior) Forearm <1 day          Drain  Duration           Chest Tube 1 Right Anterior 8 5 Fr  <1 day                Physical Exam:  General appearance: alert and oriented, in no acute distress  Lungs: clear to auscultation bilaterally and normal respiratory effort  minimal serosanguinous output from chest tube   no air leak  Skin: chest tube insertion site clean and dry                Lab Results and Cultures:   CBC with diff:   Lab Results   Component Value Date    WBC 11 17 (H) 04/18/2023    HGB 13 8 04/18/2023    HCT 41 1 04/18/2023    MCV 95 04/18/2023     04/18/2023    MCH 31 8 04/18/2023    MCHC 33 6 04/18/2023    RDW 14 3 04/18/2023    MPV 9 9 04/18/2023    NRBC 0 04/18/2023      BMP/CMP:  Lab Results   Component Value Date    K 3 6 04/18/2023    K 4 5 11/17/2020     (H) 04/18/2023     11/17/2020    CO2 24 04/18/2023    CO2 26 11/17/2020    BUN 11 04/18/2023    BUN 18 11/17/2020    CREATININE 0 67 04/18/2023    CALCIUM 9 1 04/18/2023    CALCIUM 9 6 11/17/2020    AST 16 04/18/2023    AST 21 11/17/2020    ALT 15 04/18/2023    ALT 19 11/17/2020    ALKPHOS 51 04/18/2023    ALKPHOS 58 11/17/2020    EGFR 98 04/18/2023   ,     Coags:   Lab Results   Component Value Date    PTT 48 (H) 11/04/2021    INR 0 99 04/17/2023   ,   Results from last 7 days   Lab Units 04/17/23  0749   INR  0 99        Lipid Panel: No results found for: CHOL  Lab Results   Component Value Date    HDL 56 11/04/2021     Lab Results   Component Value Date    HDL 56 11/04/2021     Lab Results   Component Value Date    LDLCALC 74 11/04/2021     Lab Results   Component Value Date    TRIG 81 11/04/2021       HgbA1c: No results found for: HGBA1C    Blood Culture: No results found for: BLOODCX,   Urinalysis:   Lab Results   Component Value Date    SPECGRAV normal-1 010 03/06/2023   ,   Urine Culture: No results found for: URINECX,   Wound Culure:  No results found for: WOUNDCULT      Thank you for allowing me to participate in the care of [facility]  Please don't hesitate to call, text, email, or TigerText with any questions  This text is generated with voice recognition software  There may be translation, syntax,  or grammatical errors  If you have any questions, please contact the dictating provider      May Teran PA-C

## 2023-04-18 NOTE — ASSESSMENT & PLAN NOTE
· Lung nodules presents for IR biopsy 4/17 subsequently had pneumothorax  · Right chest tube placed 4/17  · Patient on room air at 96%, with improvement in breathing  · Chest tube this morning with no air leak on suction, placed to waterseal  · Repeat chest x-ray in 2 to 3 hours with no pneumothorax  · S/p removal by IR 4/18 with subjective improvement in breathing  · Seen and examined after tube removal, reports improvement in breathing, without new cardiopulmonary complaints   Hemodynamically stable, on room air at 95-96%  · Follow-up with Dr Pily Oconnor with thoracic surgery who is following him for his lung nodules and ordered the biopsy, follow up scheduled

## 2023-04-19 ENCOUNTER — TRANSITIONAL CARE MANAGEMENT (OUTPATIENT)
Dept: INTERNAL MEDICINE CLINIC | Facility: OTHER | Age: 68
End: 2023-04-19

## 2023-04-19 NOTE — UTILIZATION REVIEW
NOTIFICATION OF ADMISSION DISCHARGE   This is a Notification of Discharge from [facility]  Please be advised that this patient has been discharge from our facility  Below you will find the admission and discharge date and time including the patient’s disposition  UTILIZATION REVIEW CONTACT:  Elisabeth Keller MA  Utilization   Network Utilization Review Department  Phone: [phone] x carefully listen to the prompts  All voicemails are confidential   Email: [email or web address]     ADMISSION INFORMATION  PRESENTATION DATE: 4/17/2023  4:44 PM  OBERVATION ADMISSION DATE:   INPATIENT ADMISSION DATE: 4/17/23  4:44 PM   DISCHARGE DATE: 4/18/2023  3:42 PM   DISPOSITION:Home/Self Care    IMPORTANT INFORMATION:  Send all requests for admission clinical reviews, approved or denied determinations and any other requests to dedicated fax number below belonging to the campus where the patient is receiving treatment   List of dedicated fax numbers:  [address] DENIALS (Administrative/Medical Necessity) [phone]   [address] (Maternity/NICU/Pediatrics) [phone]   [facility] [phone]   [facility] 87 [phone]   [facility] [phone]   220 [facility] [phone]   [address] [place] [address] [phone]   [facility]9 [facility] 119 [phone]   [facility]  [phone]   [address] [facility] [phone]   412 [facility] [address] [facility] [phone]

## 2023-04-20 PROBLEM — L03.115 CELLULITIS OF RIGHT LOWER EXTREMITY: Status: RESOLVED | Noted: 2021-12-22 | Resolved: 2023-04-20

## 2023-04-27 ENCOUNTER — OFFICE VISIT (OUTPATIENT)
Dept: CARDIAC SURGERY | Facility: CLINIC | Age: 68
End: 2023-04-27

## 2023-04-27 VITALS
RESPIRATION RATE: 17 BRPM | TEMPERATURE: 98.5 F | OXYGEN SATURATION: 97 % | HEART RATE: 82 BPM | WEIGHT: 195.55 LBS | BODY MASS INDEX: 24.31 KG/M2 | HEIGHT: 75 IN | DIASTOLIC BLOOD PRESSURE: 80 MMHG | SYSTOLIC BLOOD PRESSURE: 118 MMHG

## 2023-04-27 DIAGNOSIS — R91.1 RIGHT UPPER LOBE PULMONARY NODULE: Primary | ICD-10-CM

## 2023-04-27 RX ORDER — HEPARIN SODIUM 5000 [USP'U]/ML
5000 INJECTION, SOLUTION INTRAVENOUS; SUBCUTANEOUS
OUTPATIENT
Start: 2023-04-27 | End: 2023-04-28

## 2023-04-27 RX ORDER — CEFAZOLIN SODIUM 2 G/50ML
2000 SOLUTION INTRAVENOUS ONCE
OUTPATIENT
Start: 2023-04-27 | End: 2023-04-27

## 2023-04-27 RX ORDER — ACETAMINOPHEN 325 MG/1
975 TABLET ORAL ONCE
OUTPATIENT
Start: 2023-04-27 | End: 2023-04-27

## 2023-04-27 NOTE — PROGRESS NOTES
Assessment/Plan:    Right upper lobe pulmonary nodule  Mr Marian Black has a slowly growing right upper lobe pulmonary nodule that was recently biopsied, and pathology revealed at least atypical adenomatous hyperplasia, and possibly adenocarcinoma in situ  His PFTs are adequate for resection, considering he has some upper lobe predominant emphysema  Dr Speedy Caldera is recommending right VATS upper lobe wedge resection and possible completion lobectomy  Risks described and consent for surgery was obtained  His PET-CT is several months old so we will repeat one to ensure that this remains clinical Stage I  All of his questions were addressed  Of note, he has some right shoulder pain so we will need to be careful with positioning  Diagnoses and all orders for this visit:    Right upper lobe pulmonary nodule  -     EKG 12 lead; Future  -     Type and screen; Future  -     Basic metabolic panel; Future  -     CBC and Platelet; Future  -     APTT; Future  -     Protime-INR; Future  -     Case request operating room: UPPER LOBE RESECTION WEDGE LUNG, LOBECTOMY LUNG, THORACOSCOPY VIDEO ASSISTED SURGERY (VATS), BRONCHOSCOPY FLEXIBLE; Standing  -     Case request operating room: UPPER LOBE RESECTION WEDGE LUNG, LOBECTOMY LUNG, THORACOSCOPY VIDEO ASSISTED SURGERY (VATS), BRONCHOSCOPY FLEXIBLE  -     NM PET CT skull base to mid thigh; Future    Other orders  -     Diet NPO; Sips with meds; Standing  -     Nursing communication Please give pre-op Carbohydrate drink to patient 2-4 hours prior to surgery; Standing  -     Void on call to OR; Standing  -     Insert peripheral IV;  Standing  -     Place sequential compression device; Standing  -     acetaminophen (TYLENOL) tablet 975 mg  -     heparin (porcine) subcutaneous injection 5,000 Units  -     ceFAZolin (ANCEF) IVPB (premix in dextrose) 2,000 mg 50 mL          Thoracic History     Diagnosis: Multiple pulmonary nodules, enlarging right upper lobe pulmonary nodule      Subjective:    Patient ID: Anthony Dinero is a 75 y o  male  HPI   Mr Danis Mitchell is a 75year old man with multiple pulmonary nodules who presents for follow up  A previous PET 9/29/22 revealed stable hypermetabolic mediastinal lymph nodes with max SUV 4 2  Stable precarinal/right paratracheal lymph node, 7mm with max SUV 4 4  There is stable mild activity in the hilar regions with max SUV 3 9 on right and 3 6 on L  There was minimal FDG activity in the 7mm spiculated right upper lobe nodule with max SUV 1 2  There was no significant FDG activitiy in the remaining right sided lung nodules  Recent CT scan 3/22/23 showed increasing size of the spiculated subpleural nodule in the right upper lobe now measuring 9 x 4mm from 7 x 4mm in July 2022 and 4mm in December 2019  PFTs 9/22/23 shows DLCO 47% and FEV1 85%  He underwent an IR biopsy on 4/17/23  This was complicated by a post-procedural pneumothorax requiring chest tube placement  Pathology was consistent with at least atypical adenomatous hyperplasia, but cannot rule out adenocarcinoma in situ  On discussion, he is feeling pretty well  He has some coughing related to to PND  Otherwise ROS negative  The following portions of the patient's history were reviewed and updated as appropriate: allergies, current medications, past family history, past medical history, past social history, past surgical history and problem list     Review of Systems   HENT: Positive for postnasal drip  Respiratory: Positive for cough and shortness of breath (exertion stable)  Negative for chest tightness and wheezing  All other systems reviewed and are negative  Objective:   Physical Exam  Vitals reviewed  Constitutional:       General: He is not in acute distress  Appearance: Normal appearance  He is well-developed  He is not diaphoretic  HENT:      Head: Normocephalic and atraumatic        Mouth/Throat:      Comments: Masked   Eyes: "General: No scleral icterus  Extraocular Movements: Extraocular movements intact  Neck:      Trachea: No tracheal deviation  Cardiovascular:      Rate and Rhythm: Normal rate and regular rhythm  Pulses: Normal pulses  Heart sounds: Normal heart sounds  No murmur heard  Pulmonary:      Effort: Pulmonary effort is normal  No respiratory distress  Breath sounds: Normal breath sounds  No wheezing  Abdominal:      General: Bowel sounds are normal  There is no distension  Palpations: Abdomen is soft  Musculoskeletal:         General: Normal range of motion  Cervical back: Normal range of motion and neck supple  Right lower leg: No edema  Left lower leg: No edema  Lymphadenopathy:      Cervical: No cervical adenopathy  Skin:     General: Skin is warm and dry  Findings: No erythema  Neurological:      Mental Status: He is alert and oriented to person, place, and time  Cranial Nerves: No cranial nerve deficit  Psychiatric:         Mood and Affect: Mood normal          Behavior: Behavior normal          Thought Content: Thought content normal      /80 (BP Location: Left arm, Patient Position: Sitting, Cuff Size: Standard)   Pulse 82   Temp 98 5 °F (36 9 °C) (Temporal)   Resp 17   Ht 6' 3\" (1 905 m)   Wt 88 7 kg (195 lb 8 8 oz)   SpO2 97%   BMI 24 44 kg/m²       CT chest wo contrast    Result Date: 3/28/2023  Narrative CT CHEST WITHOUT IV CONTRAST INDICATION:   R91 8: Other nonspecific abnormal finding of lung field  COMPARISON: Multiple prior comparison studies the most recent: PET CT 9/19/2022  CT lung 7/18/2022  TECHNIQUE: CT examination of the chest was performed without intravenous contrast  Multiplanar 2D reformatted images were created from the source data  Radiation dose length product (DLP) for this visit:  242 mGy-cm     This examination, like all CT scans performed in the [facility], was performed utilizing techniques to " minimize radiation dose exposure, including the use of iterative reconstruction and automated exposure control  FINDINGS: LUNGS:  Increase size of a spiculated FDG avid subpleural right upper lobe nodule now measuring 9 x 4 mm, #5/79, measured 7 x 4 mm on the 7/18/2022 study and 4 mm in maximum diameter on the 12/3/2019 study  An adjacent slightly more superior subpleural opacity measuring 9 x 6 mm #5/67 is unchanged  Posterior right upper lobe 10 x 7 mm spiculated nodule is unchanged #5/132 2 No new pulmonary nodules  No endotracheal or endobronchial lesion  Unchanged mild upper lobe predominant centrilobular and paraseptal emphysema  PLEURA:  Unremarkable  HEART/GREAT VESSELS: Heart is unremarkable for patient's age  No thoracic aortic aneurysm  MEDIASTINUM AND ALEJANDRA:  No adenopathy  A hypermetabolic node seen on the PET/CT have benign morphology with a short axis dimension of less than 1 cm  Hilar nodes are not apparent on this noncontrast study  CHEST WALL AND LOWER NECK:  Unremarkable  VISUALIZED STRUCTURES IN THE UPPER ABDOMEN:  Unchanged vague 8 mm hepatic segment 7 hypodensity OSSEOUS STRUCTURES:  No acute fracture or destructive osseous lesion  Impression Increasing size of a spiculated FDG avid right upper lobe subpleural nodule now measuring 9 x 4 mm measured 7 x 4 mm on the 7/18/2022 study and 4 mm in maximum diameter on the 12/3/2019 study  Management as per cardiothoracic surgery  Other right lung nodular opacities are unchanged in size and appearance  The study was marked in EPIC for significant notification   Workstation performed: [id number]       NM PET CT skull base to mid thigh    Result Date: 9/19/2022  Narrative PET/CT SCAN INDICATION:  D38 1: Neoplasm of uncertain behavior of trachea, bronchus and lung   , increasing in size lung nodules MODIFIER: PS COMPARISON: PET/CT dated 6/22/2021 and CT of chest dated 7/18/2022 CELL TYPE:  Not applicable TECHNIQUE:   8 8 mCi F-18-FDG administered IV  Multiplanar attenuation corrected and non attenuation corrected PET images were acquired 60 minutes post injection  Contiguous, low dose, axial CT sections were obtained from the skull base through the femurs   Intravenous contrast material was not utilized  This examination, like all CT scans performed in the [facility], was performed utilizing techniques to minimize radiation dose exposure, including the use of iterative reconstruction and automated exposure control  Fasting serum glucose: 105 mg/dl FINDINGS: VISUALIZED BRAIN:   No acute abnormalities are seen  HEAD/NECK:   There is a physiologic distribution of FDG  No FDG avid cervical adenopathy is seen  CT images: Unremarkable  CHEST:   There are stable hypermetabolic mediastinal lymph nodes  For example, stable in size mildly prominent clustered AP window lymph nodes on images 78 and 79 of series 4 have a max SUV of 4 2, previously 4 7  Stable precarinal/ right paratracheal lymph node  on image 83 of series 4 measures 7 mm in short axis with max SUV of 4 4, previously 4 1  There is stable mild activity in the hilar regions with max SUV of 3 9 on the right and 3 6 on the left, previously 3 6 on the right and 3 6 on the left  The interval stability favors a benign reactive/inflammatory process with underlying sumit malignancy considered less likely but not excluded  Attention to these regions on follow-up imaging is recommended  On image 73 there is minimal FDG activity associated with patient's known 7 mm spiculated right upper lobe lung nodule with max SUV of 1 2 (for reference max SUV of the mediastinal blood pool is 2 6)  On prior PET/CT, this nodule was similar in size with max SUV of 0 8  There is no significant FDG activity associated with patient's remaining known lung nodules    As noted on recent dedicated CT of chest dated 7/18/2022 there is a 9 mm subpleural right upper lobe lung nodule on image 70 and an 8 mm right upper lobe lung nodule on image 84  Accounting for differences in imaging technique, these nodules appear essentially stable since 7/18/2022  CT images: Atherosclerotic aortic calcifications are noted  Emphysematous changes  ABDOMEN:   No FDG avid soft tissue lesions are seen  CT images: Probable cholelithiasis  Probable tiny nonobstructing left renal calculi versus vascular calcification  Stable lower pole hypodensity involving the right kidney  Stable irregular appearance to the left lower renal pole  Diverticulosis coli  Nonspecific mural thickening of the proximal to mid gastric wall which could be related to underdistention with underlying gastric mucosal process not excluded  Atherosclerotic vascular calcifications are noted  PELVIS: There is mild nonspecific patchy FDG activity associated with a mildly enlarged prostate gland without definite focal FDG activity  CT images: Unremarkable  OSSEOUS STRUCTURES: No FDG avid lesions are seen  CT images: Degenerative changes are noted involving the spine  Possible osteopenia  Stable tiny sclerotic densities most probably involving the proximal left femur and pelvic bones  Impression 1  Minimal FDG activity associated with a 7 mm right upper lobe lung nodule which appears stable in size since prior PET/CT but was described to be increased in size on most recent dedicated CT of chest   Findings are nonspecific and differential diagnosis includes benign nodule versus developing malignancy of low metabolic activity  Remaining lung nodules do not exhibit significant FDG activity  Short interval follow-up with CT of chest in 3 months is recommended to ensure stability if tissue sampling is not performed  2   Stable nonspecific FDG avid mediastinal lymph nodes of uncertain clinical significance but favored to be reactive/inflammatory in etiology  Attention to to these regions on short interval follow-up with contrast-enhanced imaging is recommended   Please see above for details and additional findings  The study was marked in EPIC for significant notification  The study was marked in Epic for follow-up   Workstation performed: [id number]

## 2023-04-27 NOTE — ASSESSMENT & PLAN NOTE
Mr  Adilia Rader has a slowly growing right upper lobe pulmonary nodule that was recently biopsied, and pathology revealed at least atypical adenomatous hyperplasia, and possibly adenocarcinoma in situ  His PFTs are adequate for resection, considering he has some upper lobe predominant emphysema  Dr Brian Maldonado is recommending right VATS upper lobe wedge resection and possible completion lobectomy  Risks described and consent for surgery was obtained  His PET-CT is several months old so we will repeat one to ensure that this remains clinical Stage I  All of his questions were addressed  Of note, he has some right shoulder pain so we will need to be careful with positioning

## 2023-04-27 NOTE — H&P (VIEW-ONLY)
Assessment/Plan:    Right upper lobe pulmonary nodule  Mr Florida Dent has a slowly growing right upper lobe pulmonary nodule that was recently biopsied, and pathology revealed at least atypical adenomatous hyperplasia, and possibly adenocarcinoma in situ  His PFTs are adequate for resection, considering he has some upper lobe predominant emphysema  Dr Victorino Dhillon is recommending right VATS upper lobe wedge resection and possible completion lobectomy  Risks described and consent for surgery was obtained  His PET-CT is several months old so we will repeat one to ensure that this remains clinical Stage I  All of his questions were addressed  Of note, he has some right shoulder pain so we will need to be careful with positioning  Diagnoses and all orders for this visit:    Right upper lobe pulmonary nodule  -     EKG 12 lead; Future  -     Type and screen; Future  -     Basic metabolic panel; Future  -     CBC and Platelet; Future  -     APTT; Future  -     Protime-INR; Future  -     Case request operating room: UPPER LOBE RESECTION WEDGE LUNG, LOBECTOMY LUNG, THORACOSCOPY VIDEO ASSISTED SURGERY (VATS), BRONCHOSCOPY FLEXIBLE; Standing  -     Case request operating room: UPPER LOBE RESECTION WEDGE LUNG, LOBECTOMY LUNG, THORACOSCOPY VIDEO ASSISTED SURGERY (VATS), BRONCHOSCOPY FLEXIBLE  -     NM PET CT skull base to mid thigh; Future    Other orders  -     Diet NPO; Sips with meds; Standing  -     Nursing communication Please give pre-op Carbohydrate drink to patient 2-4 hours prior to surgery; Standing  -     Void on call to OR; Standing  -     Insert peripheral IV;  Standing  -     Place sequential compression device; Standing  -     acetaminophen (TYLENOL) tablet 975 mg  -     heparin (porcine) subcutaneous injection 5,000 Units  -     ceFAZolin (ANCEF) IVPB (premix in dextrose) 2,000 mg 50 mL          Thoracic History     Diagnosis: Multiple pulmonary nodules, enlarging right upper lobe pulmonary nodule      Subjective:    Patient ID: Kelly Munoz is a 75 y o  male  HPI   Mr Charle Siemens is a 75year old man with multiple pulmonary nodules who presents for follow up  A previous PET 9/29/22 revealed stable hypermetabolic mediastinal lymph nodes with max SUV 4 2  Stable precarinal/right paratracheal lymph node, 7mm with max SUV 4 4  There is stable mild activity in the hilar regions with max SUV 3 9 on right and 3 6 on L  There was minimal FDG activity in the 7mm spiculated right upper lobe nodule with max SUV 1 2  There was no significant FDG activitiy in the remaining right sided lung nodules  Recent CT scan 3/22/23 showed increasing size of the spiculated subpleural nodule in the right upper lobe now measuring 9 x 4mm from 7 x 4mm in July 2022 and 4mm in December 2019  PFTs 9/22/23 shows DLCO 47% and FEV1 85%  He underwent an IR biopsy on 4/17/23  This was complicated by a post-procedural pneumothorax requiring chest tube placement  Pathology was consistent with at least atypical adenomatous hyperplasia, but cannot rule out adenocarcinoma in situ  On discussion, he is feeling pretty well  He has some coughing related to to PND  Otherwise ROS negative  The following portions of the patient's history were reviewed and updated as appropriate: allergies, current medications, past family history, past medical history, past social history, past surgical history and problem list     Review of Systems   HENT: Positive for postnasal drip  Respiratory: Positive for cough and shortness of breath (exertion stable)  Negative for chest tightness and wheezing  All other systems reviewed and are negative  Objective:   Physical Exam  Vitals reviewed  Constitutional:       General: He is not in acute distress  Appearance: Normal appearance  He is well-developed  He is not diaphoretic  HENT:      Head: Normocephalic and atraumatic        Mouth/Throat:      Comments: Masked   Eyes: "General: No scleral icterus  Extraocular Movements: Extraocular movements intact  Neck:      Trachea: No tracheal deviation  Cardiovascular:      Rate and Rhythm: Normal rate and regular rhythm  Pulses: Normal pulses  Heart sounds: Normal heart sounds  No murmur heard  Pulmonary:      Effort: Pulmonary effort is normal  No respiratory distress  Breath sounds: Normal breath sounds  No wheezing  Abdominal:      General: Bowel sounds are normal  There is no distension  Palpations: Abdomen is soft  Musculoskeletal:         General: Normal range of motion  Cervical back: Normal range of motion and neck supple  Right lower leg: No edema  Left lower leg: No edema  Lymphadenopathy:      Cervical: No cervical adenopathy  Skin:     General: Skin is warm and dry  Findings: No erythema  Neurological:      Mental Status: He is alert and oriented to person, place, and time  Cranial Nerves: No cranial nerve deficit  Psychiatric:         Mood and Affect: Mood normal          Behavior: Behavior normal          Thought Content: Thought content normal      /80 (BP Location: Left arm, Patient Position: Sitting, Cuff Size: Standard)   Pulse 82   Temp 98 5 °F (36 9 °C) (Temporal)   Resp 17   Ht 6' 3\" (1 905 m)   Wt 88 7 kg (195 lb 8 8 oz)   SpO2 97%   BMI 24 44 kg/m²       CT chest wo contrast    Result Date: 3/28/2023  Narrative CT CHEST WITHOUT IV CONTRAST INDICATION:   R91 8: Other nonspecific abnormal finding of lung field  COMPARISON: Multiple prior comparison studies the most recent: PET CT 9/19/2022  CT lung 7/18/2022  TECHNIQUE: CT examination of the chest was performed without intravenous contrast  Multiplanar 2D reformatted images were created from the source data  Radiation dose length product (DLP) for this visit:  242 mGy-cm     This examination, like all CT scans performed in the [facility], was performed utilizing techniques to " minimize radiation dose exposure, including the use of iterative reconstruction and automated exposure control  FINDINGS: LUNGS:  Increase size of a spiculated FDG avid subpleural right upper lobe nodule now measuring 9 x 4 mm, #5/79, measured 7 x 4 mm on the 7/18/2022 study and 4 mm in maximum diameter on the 12/3/2019 study  An adjacent slightly more superior subpleural opacity measuring 9 x 6 mm #5/67 is unchanged  Posterior right upper lobe 10 x 7 mm spiculated nodule is unchanged #5/132 2 No new pulmonary nodules  No endotracheal or endobronchial lesion  Unchanged mild upper lobe predominant centrilobular and paraseptal emphysema  PLEURA:  Unremarkable  HEART/GREAT VESSELS: Heart is unremarkable for patient's age  No thoracic aortic aneurysm  MEDIASTINUM AND ALEJANDRA:  No adenopathy  A hypermetabolic node seen on the PET/CT have benign morphology with a short axis dimension of less than 1 cm  Hilar nodes are not apparent on this noncontrast study  CHEST WALL AND LOWER NECK:  Unremarkable  VISUALIZED STRUCTURES IN THE UPPER ABDOMEN:  Unchanged vague 8 mm hepatic segment 7 hypodensity OSSEOUS STRUCTURES:  No acute fracture or destructive osseous lesion  Impression Increasing size of a spiculated FDG avid right upper lobe subpleural nodule now measuring 9 x 4 mm measured 7 x 4 mm on the 7/18/2022 study and 4 mm in maximum diameter on the 12/3/2019 study  Management as per cardiothoracic surgery  Other right lung nodular opacities are unchanged in size and appearance  The study was marked in EPIC for significant notification   Workstation performed: [id number]       NM PET CT skull base to mid thigh    Result Date: 9/19/2022  Narrative PET/CT SCAN INDICATION:  D38 1: Neoplasm of uncertain behavior of trachea, bronchus and lung   , increasing in size lung nodules MODIFIER: PS COMPARISON: PET/CT dated 6/22/2021 and CT of chest dated 7/18/2022 CELL TYPE:  Not applicable TECHNIQUE:   8 8 mCi F-18-FDG administered IV  Multiplanar attenuation corrected and non attenuation corrected PET images were acquired 60 minutes post injection  Contiguous, low dose, axial CT sections were obtained from the skull base through the femurs   Intravenous contrast material was not utilized  This examination, like all CT scans performed in the [facility], was performed utilizing techniques to minimize radiation dose exposure, including the use of iterative reconstruction and automated exposure control  Fasting serum glucose: 105 mg/dl FINDINGS: VISUALIZED BRAIN:   No acute abnormalities are seen  HEAD/NECK:   There is a physiologic distribution of FDG  No FDG avid cervical adenopathy is seen  CT images: Unremarkable  CHEST:   There are stable hypermetabolic mediastinal lymph nodes  For example, stable in size mildly prominent clustered AP window lymph nodes on images 78 and 79 of series 4 have a max SUV of 4 2, previously 4 7  Stable precarinal/ right paratracheal lymph node  on image 83 of series 4 measures 7 mm in short axis with max SUV of 4 4, previously 4 1  There is stable mild activity in the hilar regions with max SUV of 3 9 on the right and 3 6 on the left, previously 3 6 on the right and 3 6 on the left  The interval stability favors a benign reactive/inflammatory process with underlying sumit malignancy considered less likely but not excluded  Attention to these regions on follow-up imaging is recommended  On image 73 there is minimal FDG activity associated with patient's known 7 mm spiculated right upper lobe lung nodule with max SUV of 1 2 (for reference max SUV of the mediastinal blood pool is 2 6)  On prior PET/CT, this nodule was similar in size with max SUV of 0 8  There is no significant FDG activity associated with patient's remaining known lung nodules    As noted on recent dedicated CT of chest dated 7/18/2022 there is a 9 mm subpleural right upper lobe lung nodule on image 70 and an 8 mm right upper lobe lung nodule on image 84  Accounting for differences in imaging technique, these nodules appear essentially stable since 7/18/2022  CT images: Atherosclerotic aortic calcifications are noted  Emphysematous changes  ABDOMEN:   No FDG avid soft tissue lesions are seen  CT images: Probable cholelithiasis  Probable tiny nonobstructing left renal calculi versus vascular calcification  Stable lower pole hypodensity involving the right kidney  Stable irregular appearance to the left lower renal pole  Diverticulosis coli  Nonspecific mural thickening of the proximal to mid gastric wall which could be related to underdistention with underlying gastric mucosal process not excluded  Atherosclerotic vascular calcifications are noted  PELVIS: There is mild nonspecific patchy FDG activity associated with a mildly enlarged prostate gland without definite focal FDG activity  CT images: Unremarkable  OSSEOUS STRUCTURES: No FDG avid lesions are seen  CT images: Degenerative changes are noted involving the spine  Possible osteopenia  Stable tiny sclerotic densities most probably involving the proximal left femur and pelvic bones  Impression 1  Minimal FDG activity associated with a 7 mm right upper lobe lung nodule which appears stable in size since prior PET/CT but was described to be increased in size on most recent dedicated CT of chest   Findings are nonspecific and differential diagnosis includes benign nodule versus developing malignancy of low metabolic activity  Remaining lung nodules do not exhibit significant FDG activity  Short interval follow-up with CT of chest in 3 months is recommended to ensure stability if tissue sampling is not performed  2   Stable nonspecific FDG avid mediastinal lymph nodes of uncertain clinical significance but favored to be reactive/inflammatory in etiology  Attention to to these regions on short interval follow-up with contrast-enhanced imaging is recommended   Please see above for details and additional findings  The study was marked in EPIC for significant notification  The study was marked in Epic for follow-up   Workstation performed: [id number]

## 2023-05-01 ENCOUNTER — TELEPHONE (OUTPATIENT)
Dept: CARDIAC SURGERY | Facility: CLINIC | Age: 68
End: 2023-05-01

## 2023-05-01 ENCOUNTER — TELEPHONE (OUTPATIENT)
Dept: INTERNAL MEDICINE CLINIC | Facility: OTHER | Age: 68
End: 2023-05-01

## 2023-05-01 NOTE — TELEPHONE ENCOUNTER
Pt called in complaining of post nasal drip  He normally takes a 12 hour relief decongestive tablet and would like to know if he is still allowed to take this before surgery

## 2023-05-01 NOTE — TELEPHONE ENCOUNTER
Patient called and stated he is having a bid problem with increased post nasal drip  Patient is currently taking OTC [employer] nasal congestion 12 hr relief and would like to know if he can continue to use this medication with his current prescription medication  Patient would like to make sure there is no interactions       Please advise   Thank you

## 2023-05-04 ENCOUNTER — TELEPHONE (OUTPATIENT)
Dept: VASCULAR SURGERY | Facility: CLINIC | Age: 68
End: 2023-05-04

## 2023-05-04 ENCOUNTER — TELEPHONE (OUTPATIENT)
Dept: PAIN MEDICINE | Facility: CLINIC | Age: 68
End: 2023-05-04

## 2023-05-04 ENCOUNTER — ANESTHESIA EVENT (OUTPATIENT)
Dept: PERIOP | Facility: HOSPITAL | Age: 68
End: 2023-05-04

## 2023-05-04 NOTE — TELEPHONE ENCOUNTER
Please advise if you want ASA and Eliquis held  for upcoming surgery and if so for how long prior to surgery  Thank you

## 2023-05-04 NOTE — TELEPHONE ENCOUNTER
Caller: parth    Doctor: mitzi    Reason for call: pt wants to know when to take off the pain patch for sx he is having on May 17th?     Call back#: [phone]

## 2023-05-04 NOTE — ANESTHESIA PREPROCEDURE EVALUATION
Procedure:  Right thoracoscopic upper lobe wedge resection (Right: Chest)  possible completion lobectomy w/ lymph node dissection (Right: Chest)  THORACOSCOPY VIDEO ASSISTED SURGERY (VATS) (Right: Chest)  BRONCHOSCOPY FLEXIBLE ([facility])    Relevant Problems   CARDIO   (+) 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HCC)   (+) Atherosclerosis of artery of left lower extremity (HCC)   (+) Deep vein thrombosis (DVT) of femoral vein of left lower extremity (HCC)   (+) Embolism and thrombosis of arteries of the lower extremities (HCC)   (+) Mixed hyperlipidemia   (+) PAD (peripheral artery disease) (HCC)   (+) Thoracic spine pain      MUSCULOSKELETAL   (+) Inflammatory polyarthropathy (HCC)   (+) Lumbar spondylosis - Bilateral   (+) Thoracic spine pain      NEURO/PSYCH   (+) Chronic pain of both shoulders   (+) Chronic pain of left ankle   (+) Chronic pain syndrome   (+) Chronic right shoulder pain   (+) History of DVT (deep vein thrombosis)      PULMONARY   (+) Chronic obstructive pulmonary disease, unspecified COPD type (HCC)   (+) Pneumothorax after biopsy      Respiratory   (+) Multiple subsolid lung nodules greater than 6 mm in diameter   (+) Right upper lobe pulmonary nodule      Nervous and Auditory   (+) Neuropathy      Other   (+) S/P femoral-popliteal bypass surgery   (+) Tobacco abuse   (+) Uncomplicated opioid dependence (HCC)      High risk post-op acute pain  Consider intra-op ketamine infusion  Physical Exam    Airway    Mallampati score: I  TM Distance: >3 FB  Neck ROM: full     Dental       Cardiovascular      Pulmonary      Other Findings      Oct 2021:  LEFT VENTRICLE:  Systolic function was normal  Ejection fraction was estimated to be 60 %    Although no diagnostic regional wall motion abnormality was identified, this possibility cannot be completely excluded on the basis of this study      RIGHT VENTRICLE:  The size was normal   Systolic function was normal      TRICUSPID VALVE:  There was trace regurgitation    Normal sinus rhythm  Normal ECG  No previous ECGs available  Confirmed by Hiral Amaya ([id number]) on 5/10/2023 3:20:55 PM      Specimen Collected: 05/10/23 11:43        Component Ref Range & Units 5/9/23 0713 4/18/23 0510 4/17/23 0749 12/12/21 0909 12/11/21 0441 12/10/21 0454 12/9/21 0450   WBC 4 31 - 10 16 Thousand/uL 15 67 High   11 17 High   11 41 High   10 70 High   11 75 High   15 34 High   19 02 High     RBC 3 88 - 5 62 Million/uL 4 98  4 34  4 33  3 98  3 98  4 45  4 01    Hemoglobin 12 0 - 17 0 g/dL 16 0  13 8  14 1  12 7  12 8  14 1  12 8    Hematocrit 36 5 - 49 3 % 48 2  41 1  41 1  37 8  37 6  42 5  38 7    MCV 82 - 98 fL 97  95  95  95  95  96  97    MCH 26 8 - 34 3 pg 32 1  31 8  32 6  31 9  32 2  31 7  31 9    MCHC 31 4 - 37 4 g/dL 33 2  33 6  34 3  33 6  34 0  33 2  33 1    RDW 11 6 - 15 1 % 13 9  14 3  14 2  13 7  13 9  14 2  14 2    Platelets 986 - 691 Thousands/uL 434 High   295  307  306  288  324  308    MPV 8 9 - 12 7 fL 10 4  9 9  9 6  9 7  10 1  10 1  10 4        Component Ref Range & Units 5/9/23 0713 4/18/23 0510 12/12/21 0909 12/11/21 0441 12/10/21 0454 12/9/21 0450 12/8/21 2144   Sodium 135 - 147 mmol/L 136  140  140 R  141 R  140 R  142 R  141 R    Potassium 3 5 - 5 3 mmol/L 4 3  3 6  3 7  3 7  3 8  4 0  4 1    Chloride 96 - 108 mmol/L 110 High   111 High   111 High  R  111 High  R  110 High  R  111 High  R  112 High  R    CO2 21 - 32 mmol/L 21  24  27  27  22  25  22    ANION GAP 4 - 13 mmol/L 5  5  2 Low   3 Low   8  6  7    BUN 5 - 25 mg/dL 16  11  12  14  11  12  12    Creatinine 0 60 - 1 30 mg/dL 0 86  0 67 CM  0 77 CM  0 66 CM  0 74 CM  0 70 CM  0 91 CM    Comment: Standardized to IDMS reference method   Glucose, Fasting 65 - 99 mg/dL 134 High                 Anesthesia Plan  ASA Score- 3     Anesthesia Type- general with ASA Monitors  Additional Monitors: arterial line  Airway Plan: ETT  Plan Factors-    Chart reviewed  EKG reviewed   Existing labs reviewed  Patient summary reviewed  Patient is a current smoker  Induction- intravenous  Postoperative Plan- Plan for postoperative opioid use  Planned trial extubation    Informed Consent- Anesthetic plan and risks discussed with patient  I personally reviewed this patient with the CRNA  Discussed and agreed on the Anesthesia Plan with the CRNA  César Hooper

## 2023-05-04 NOTE — TELEPHONE ENCOUNTER
Chart reviewed  Patient has remote history of right lower extremity DVT on lifelong anticoagulation with Eliquis  Ok to hold Eliquis x 2 days prior planned procedure and resume after

## 2023-05-04 NOTE — TELEPHONE ENCOUNTER
Tiger Text sent to Kristen Bojorquez to inform of patient returning to [facility]

## 2023-05-04 NOTE — TELEPHONE ENCOUNTER
Please advise thank you  5/17 pt scheduled to have a wedge resection of his lung    Would like advice on when to remove his butrans patch

## 2023-05-04 NOTE — TELEPHONE ENCOUNTER
"Pt called stating he was advised by PAT nurse to call us re: Eliquis and ASA hold for upcoming CT surgery  Pt is scheduled for Right thoracoscopic upper lobe wedge resection (Right: Chest)       possible completion lobectomy w/ lymph node dissection (Right: Chest)       THORACOSCOPY VIDEO ASSISTED SURGERY (VATS) (Right: Chest)       BRONCHOSCOPY FLEXIBLE (Bronchus) 5/17/23 w/ Dr Antoni Rasheed  ? If CT surgery advised him to hold meds, he states they did not        Advised pt I would send message to CT surgery to see if they want meds held and if so for how long, we would then obtain approval from one of our providers     (per pt when we call him back if he does not answer and answering machine picks up we are to say [facility]" and provide instructions)  "

## 2023-05-04 NOTE — PRE-PROCEDURE INSTRUCTIONS
Pre-Surgery Instructions:   Medication Instructions   • acetaminophen (TYLENOL) 500 mg tablet Instructed to take as needed including DOS with sips water   • AF-PSEUDOEPHEDRINE HCL PO Take per normal schedule    • apixaban (ELIQUIS) 5 mg Instructions provided by MD   • ascorbic Acid (VITAMIN C) 500 MG CPCR Instructed to stop all Vitamins and Supplements over the counter from now until after surgery   • aspirin (ECOTRIN LOW STRENGTH) 81 mg EC tablet Instructions provided by MD   • atorvastatin (LIPITOR) 10 mg tablet Instructed to take per normal schedule including DOS with sips water   • cholecalciferol (VITAMIN D3) 1,000 units tablet Instructed to stop all Vitamins and Supplements over the counter from now until after surgery   • Flaxseed, Linseed, (FLAX SEED OIL PO) Instructed to stop all Vitamins and Supplements over the counter from now until after surgery   • folic acid (FOLVITE) 668 mcg tablet Instructed to stop all Vitamins and Supplements over the counter from now until after surgery   • METHYLCOBALAMIN SL Instructed to stop all Vitamins and Supplements over the counter from now until after surgery   • Misc Natural Products (Osteo Bi-Flex Triple Strength) TABS Instructed to stop all Vitamins and Supplements over the counter from now until after surgery   • pyridoxine (B-6) 100 MG tablet Instructed to stop all Vitamins and Supplements over the counter from now until after surgery   • transdermal buprenorphine (BUTRANS) 5 mcg/hr TD patch Take per normal schedule    • vitamin A 2400 MCG (8000 UT) capsule Instructed to stop all Vitamins and Supplements over the counter from now until after surgery    Medication instructions for day surgery reviewed  Please use only a sip of water to take your instructed medications  Avoid all over the counter vitamins, supplements and NSAIDS for one week prior to surgery per anesthesia guidelines  Tylenol is ok to take as needed       You will receive a call one business day prior to surgery with an arrival time and hospital directions  If your surgery is scheduled on a Monday, the hospital will be calling you on the Friday prior to your surgery  If you have not heard from anyone by 8pm, please call the hospital supervisor through the hospital  at [phone]  Glenny Holland [phone])  Do not eat or drink anything after midnight the night before your surgery, including candy, mints, lifesavers, or chewing gum  Do not drink alcohol 24hrs before your surgery  Try not to smoke at least 24hrs before your surgery  Eliquis and ASA instructions from MD        Follow the pre surgery showering instructions as listed in the [facility] Surgical Experience Booklet” or otherwise provided by your surgeon's office  Do not shave the surgical area 24 hours before surgery  Do not apply any lotions, creams, including makeup, cologne, deodorant, or perfumes after showering on the day of your surgery  No contact lenses, eye make-up, or artificial eyelashes  Remove nail polish, including gel polish, and any artificial, gel, or acrylic nails if possible  Remove all jewelry including rings and body piercing jewelry  Reviewed Carb Drink Instructions per Surgeon/Anes  Guidelines - Pt has 3 drinks    Wear causal clothing that is easy to take on and off  Consider your type of surgery  Keep any valuables, jewelry, piercings at home  Please bring any specially ordered equipment (sling, braces) if indicated  Arrange for a responsible person to drive you to and from the hospital on the day of your surgery  Visitor Guidelines discussed  Call the surgeon's office with any new illnesses, exposures, or additional questions prior to surgery  Please reference your [facility] Surgical Experience Booklet” for additional information to prepare for your upcoming surgery

## 2023-05-04 NOTE — TELEPHONE ENCOUNTER
Anna Halo does not need to be held  Eliquis has to be stopped at least 2 -3 days before surgery  So her last dose of Eliquis should be on 5/14/23

## 2023-05-05 NOTE — TELEPHONE ENCOUNTER
RN s/w pt regarding previous  Per pt he was told to get the information on when he should remove his pain patch so that the medication would be out of his system to no interfere with his upcoming surgery on 5/17 and his anesthesia    RN left a VMMOM for Tanisha, thoracic surgery coordinator per pt's request   At ph# [phone]

## 2023-05-08 ENCOUNTER — OFFICE VISIT (OUTPATIENT)
Dept: OBGYN CLINIC | Facility: MEDICAL CENTER | Age: 68
End: 2023-05-08

## 2023-05-08 VITALS
HEIGHT: 75 IN | HEART RATE: 87 BPM | SYSTOLIC BLOOD PRESSURE: 109 MMHG | WEIGHT: 191.8 LBS | BODY MASS INDEX: 23.85 KG/M2 | DIASTOLIC BLOOD PRESSURE: 73 MMHG

## 2023-05-08 DIAGNOSIS — M75.81 TENDINITIS OF RIGHT ROTATOR CUFF: Primary | ICD-10-CM

## 2023-05-08 RX ORDER — BETAMETHASONE SODIUM PHOSPHATE AND BETAMETHASONE ACETATE 3; 3 MG/ML; MG/ML
9 INJECTION, SUSPENSION INTRA-ARTICULAR; INTRALESIONAL; INTRAMUSCULAR; SOFT TISSUE
Status: COMPLETED | OUTPATIENT
Start: 2023-05-08 | End: 2023-05-08

## 2023-05-08 RX ADMIN — BETAMETHASONE SODIUM PHOSPHATE AND BETAMETHASONE ACETATE 9 MG: 3; 3 INJECTION, SUSPENSION INTRA-ARTICULAR; INTRALESIONAL; INTRAMUSCULAR; SOFT TISSUE at 15:28

## 2023-05-08 NOTE — PROGRESS NOTES
Ortho Sports Medicine Shoulder Visit     Assesment:   right shoulder rotator cuff tendonitis, mild osteoarthritis of shoulder     Plan:    Conservative treatment:    Ice to shoulder 1-2 times daily, for 20 minutes at a time  Home exercise program for shoulder, including ROM and strenthening  Instructions given to patient of what exercises to perform  Imaging:    No imaging was available for review today  Injection:    The risks and benefits of the injection (which include but are not limited to: infection, bleeding,damage to nerve/artery, need for further intervention), as well as the risks and benefits of all alternative treatments were explained and understood  The patient elected to proceed with injection  The procedure was done with aseptic technique, and the patient tolerated the procedure well with no complications  A corticosteroid injection of the right subacromial space was performed  The patient should take 1-2 days off of activity, with gradual return to activity as tolerated  Ice to the shoulder 1-2 times daily for 20 minutes, for next 24-48 hrs  Surgery:     No surgery is recommended at this point, continue with conservative treatment plan as noted   procdoc  History of Present Illness: The patient is returns for follow up of right shoulder pain       MRI no full thickness cuff tear reviewed at last appt  He was given CSI 1/18/23 but injection has worn off       He still gets sharp stabbing pain lifting anything heavy  He has done physical therapy and maintains HEP  The patient denies weakness        The patient has tried rest, NSAIDS and physical therapy       I have reviewed the past medical, surgical, social and family history, medications and allergies as documented in the EMR  Review of systems: ROS is negative other than that noted in the HPI  Constitutional: Negative for fatigue and fever     Cardiovascular: Negative for chest pain  Pulmonary: negative for "shortness of breath    PMH/PSH:  Past Medical History:   Diagnosis Date   • Arthritis feb 2016    osto back   • Chronic back pain    • Chronic narcotic dependence (HCC)    • Chronic pain disorder     Sees pain management Dr Eduardo MARTINEZ   • Cigarette smoker    • Depression    • Dry skin    • Dyslexia    • Full dentures     upper and no teeth lower/\"avoid raw vegetables and chewy food\"   • GERD (gastroesophageal reflux disease)    • 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 related   • History of blood clots     \"left leg\" takes Eliquis   • History of bronchitis    • History of pneumonia     childhood   • Increased pressure in the eye, bilateral    • L4-L5 disc bulge 06/16/2017   • Low back pain    • MTHFR mutation    • Muscle weakness    • Neuropathy     right foot and left foot   • Osteoarthritis    • Osteoarthritis     and\" if becomes cold joint pain worsens\"   • Peripheral neuropathy 8/ 15   • Pulmonary emphysema ([address] )     \"mild\"   • Right foot drop    • Shortness of breath     \"only on humid days\"   • Smokers' cough ([address] )    • Wears glasses    • Work related injury 04/24/2018     Past Surgical History:   Procedure Laterality Date   • BACK SURGERY  aug 2015- jun2017 2017 no affect   • COLONOSCOPY     • EGD AND COLONOSCOPY N/A 06/29/2018    Procedure: EGD AND COLONOSCOPY;  Surgeon: Danika Knutson MD;  Location: [facility];   Service: Gastroenterology   • EPIDURAL BLOCK INJECTION      needle   • HIP ARTHROSCOPY W/ LABRAL DEBRIDEMENT Left     \"hip surgery\"   • IR BIOPSY LUNG  4/17/2023   • IR CHEST TUBE PLACEMENT  4/17/2023   • IR LOWER EXTREMITY ANGIOGRAM  12/08/2021   • ORTHOPEDIC SURGERY     • OK ARTHRS HIP DEBRIDEMENT/SHAVING ARTICULAR CRTLG Left 04/30/2018    Procedure: LEFT HIP ARTHROSCOPIC LABRAL DEBRIDEMENT;  Surgeon: Sukhi Kaiser MD;  Location: [facility];  Service: Orthopedics   • OK IN-SITU VEIN BYPASS FEMORAL-POPLITEAL Right 12/08/2021    Procedure: R SFA to BK Pop Bypass using insitu GSV,  ligation at popliteal artery " "aneurysm;  Surgeon: Papito Shetty MD;  Location: [facility];  Service: Vascular   • CO Cloteal Bethanie 3RD+ ORD SLCTV ABDL PEL/LXTR [facility] Right 12/08/2021    Procedure: COMPLETION ARTERIOGRAM WITH 5 MM ANGIOPLASTY OF MID SUPERFICIAL FEMORAL ARTERY;  Surgeon: Papito Shetty MD;  Location: [facility];  Service: Vascular   • TONSILLECTOMY     • WISDOM TOOTH EXTRACTION          Physical Exam:    Blood pressure 109/73, pulse 87, height 6' 3\" (1 905 m), weight 87 kg (191 lb 12 8 oz)  General/Constitutional: NAD, well developed, well nourished  HENT: Normocephalic, atraumatic  CV: Intact distal pulses, regular rate  Resp: No respiratory distress or labored breathing  Lymphatic: No lymphadenopathy palpated  Neuro: Alert and Oriented x 3, no focal deficits  Psych: Normal mood, normal affect, normal judgement, normal behavior  Skin: Warm, dry, no rashes, no erythema     Shoulder Exam (focused):     Shoulder focused exam:       RIGHT LEFT    Scapula Atrophy Negative Negative     Winging Negative Negative     Protraction Negative Negative    Rotator cuff SS 5/5 5/5     IS 5/5 5/5     SubS 5/5 5/5    ROM  170     ER0 50 60     ER90 90 90     IR90 40 40     IRb T6 T6    TTP: AC Negative Negative     Biceps Negative Negative     Coracoid Negative Negative    Special Tests: O'Briens Negative Negative     [place]-shear Negative Negative     Cross body Adduction Negative Negative     Speeds  Negative Negative     Mac's Negative Negative     Whipple Positive Negative       Neer Negative Negative     Young Positive Negative    Instability: Apprehension & relocation not tested not tested     Load & shift not tested not tested    Other: Crank Negative Negative               UE NV Exam: +2 Radial pulses bilaterally  Sensation intact to light touch C5-T1 bilaterally, Radial/median/ulnar nerve motor intact    Cervical ROM is full without pain, numbness or tingling      Shoulder Imaging    No new imaging to review today    Large joint " arthrocentesis  Universal Protocol:  Consent given by: patient  Timeout called at: 5/8/2023 3:26 PM   Patient identity confirmed: verbally with patient    Supporting Documentation  Indications: pain   Procedure Details  Needle size: 22 G  Ultrasound guidance: no  Approach: posterolateral  Medications administered: 9 mg betamethasone acetate-betamethasone sodium phosphate 6 (3-3) mg/mL    Patient tolerance: patient tolerated the procedure well with no immediate complications  Dressing:  Sterile dressing applied          Scribe Attestation    I,:   am acting as a scribe while in the presence of the attending physician :       I,:   personally performed the services described in this documentation    as scribed in my presence :

## 2023-05-08 NOTE — TELEPHONE ENCOUNTER
RN s/w Darrius Cardoza from Thoracic surgery and Ariel Pallas agreed that that information usually comes from anesthesia but she will also reach out to the thoracic surgery PA's to see if they have an answer and if not the nurse from surgery will reach out this week regarding same

## 2023-05-09 ENCOUNTER — LAB REQUISITION (OUTPATIENT)
Dept: LAB | Facility: HOSPITAL | Age: 68
End: 2023-05-09

## 2023-05-09 ENCOUNTER — APPOINTMENT (OUTPATIENT)
Dept: LAB | Facility: IMAGING CENTER | Age: 68
End: 2023-05-09

## 2023-05-09 DIAGNOSIS — Z01.812 PRE-PROCEDURAL LABORATORY EXAMINATION: ICD-10-CM

## 2023-05-09 DIAGNOSIS — R91.1 RIGHT UPPER LOBE PULMONARY NODULE: ICD-10-CM

## 2023-05-09 DIAGNOSIS — Z01.812 ENCOUNTER FOR PREPROCEDURAL LABORATORY EXAMINATION: ICD-10-CM

## 2023-05-09 LAB
ABO GROUP BLD: NORMAL
ANION GAP SERPL CALCULATED.3IONS-SCNC: 5 MMOL/L (ref 4–13)
APTT PPP: 44 SECONDS (ref 23–37)
BLD GP AB SCN SERPL QL: NEGATIVE
BUN SERPL-MCNC: 16 MG/DL (ref 5–25)
CALCIUM SERPL-MCNC: 10 MG/DL (ref 8.3–10.1)
CHLORIDE SERPL-SCNC: 110 MMOL/L (ref 96–108)
CO2 SERPL-SCNC: 21 MMOL/L (ref 21–32)
CREAT SERPL-MCNC: 0.86 MG/DL (ref 0.6–1.3)
ERYTHROCYTE [DISTWIDTH] IN BLOOD BY AUTOMATED COUNT: 13.9 % (ref 11.6–15.1)
GFR SERPL CREATININE-BSD FRML MDRD: 89 ML/MIN/1.73SQ M
GLUCOSE P FAST SERPL-MCNC: 134 MG/DL (ref 65–99)
HCT VFR BLD AUTO: 48.2 % (ref 36.5–49.3)
HGB BLD-MCNC: 16 G/DL (ref 12–17)
INR PPP: 1.2 (ref 0.84–1.19)
MCH RBC QN AUTO: 32.1 PG (ref 26.8–34.3)
MCHC RBC AUTO-ENTMCNC: 33.2 G/DL (ref 31.4–37.4)
MCV RBC AUTO: 97 FL (ref 82–98)
PLATELET # BLD AUTO: 434 THOUSANDS/UL (ref 149–390)
PMV BLD AUTO: 10.4 FL (ref 8.9–12.7)
POTASSIUM SERPL-SCNC: 4.3 MMOL/L (ref 3.5–5.3)
PROTHROMBIN TIME: 15.5 SECONDS (ref 11.6–14.5)
RBC # BLD AUTO: 4.98 MILLION/UL (ref 3.88–5.62)
RH BLD: NEGATIVE
SODIUM SERPL-SCNC: 136 MMOL/L (ref 135–147)
SPECIMEN EXPIRATION DATE: NORMAL
WBC # BLD AUTO: 15.67 THOUSAND/UL (ref 4.31–10.16)

## 2023-05-09 NOTE — TELEPHONE ENCOUNTER
Caller: patient    Doctor: Mely Colindres    Reason for call: returning missed call  Patient stated is in a better mood today  Call back#:

## 2023-05-09 NOTE — TELEPHONE ENCOUNTER
S/w pt regarding previous and he will wait to hear from [address] [place] [address],[address] from thoracic surgery or from anesthesia regarding time frame to take off pain patch prior to the surgery

## 2023-05-10 ENCOUNTER — HOSPITAL ENCOUNTER (OUTPATIENT)
Dept: NUCLEAR MEDICINE | Facility: HOSPITAL | Age: 68
Discharge: HOME | End: 2023-05-10

## 2023-05-10 ENCOUNTER — OFFICE VISIT (OUTPATIENT)
Dept: LAB | Facility: HOSPITAL | Age: 68
End: 2023-05-10

## 2023-05-10 DIAGNOSIS — R91.1 RIGHT UPPER LOBE PULMONARY NODULE: ICD-10-CM

## 2023-05-10 LAB
ATRIAL RATE: 71 BPM
GLUCOSE SERPL-MCNC: 107 MG/DL (ref 65–140)
P AXIS: 84 DEGREES
PR INTERVAL: 160 MS
QRS AXIS: 71 DEGREES
QRSD INTERVAL: 102 MS
QT INTERVAL: 406 MS
QTC INTERVAL: 441 MS
T WAVE AXIS: 73 DEGREES
VENTRICULAR RATE: 71 BPM

## 2023-05-15 ENCOUNTER — TELEPHONE (OUTPATIENT)
Dept: CARDIAC SURGERY | Facility: CLINIC | Age: 68
End: 2023-05-15

## 2023-05-15 NOTE — TELEPHONE ENCOUNTER
Pt called in and wanted to make us aware that he is taking pseudoephedrine and that he is having pretty bad post nasal drip  He started to go over his medications with me but I assured him the the nurse from [facility] calls him she will go over with him what medications he can and can not take before surgery  He wanted to make sure it was ok to proceed with surgery if he is having post nasal drop  He is not having any other symptoms

## 2023-05-17 ENCOUNTER — HOSPITAL ENCOUNTER (INPATIENT)
Facility: HOSPITAL | Age: 68
LOS: 2 days | Discharge: HOME | End: 2023-05-19
Attending: THORACIC SURGERY (CARDIOTHORACIC VASCULAR SURGERY) | Admitting: THORACIC SURGERY (CARDIOTHORACIC VASCULAR SURGERY)

## 2023-05-17 ENCOUNTER — ANESTHESIA (OUTPATIENT)
Dept: PERIOP | Facility: HOSPITAL | Age: 68
End: 2023-05-17

## 2023-05-17 ENCOUNTER — APPOINTMENT (OUTPATIENT)
Dept: RADIOLOGY | Facility: HOSPITAL | Age: 68
End: 2023-05-17

## 2023-05-17 DIAGNOSIS — G89.4 CHRONIC PAIN SYNDROME: ICD-10-CM

## 2023-05-17 DIAGNOSIS — J44.9 CHRONIC OBSTRUCTIVE PULMONARY DISEASE, UNSPECIFIED COPD TYPE (HCC): ICD-10-CM

## 2023-05-17 DIAGNOSIS — G89.29 CHRONIC RIGHT SHOULDER PAIN: ICD-10-CM

## 2023-05-17 DIAGNOSIS — R91.1 RIGHT UPPER LOBE PULMONARY NODULE: ICD-10-CM

## 2023-05-17 DIAGNOSIS — F11.20 UNCOMPLICATED OPIOID DEPENDENCE (HCC): ICD-10-CM

## 2023-05-17 DIAGNOSIS — M89.9 BONE LESION: ICD-10-CM

## 2023-05-17 DIAGNOSIS — M25.511 CHRONIC RIGHT SHOULDER PAIN: ICD-10-CM

## 2023-05-17 DIAGNOSIS — G62.9 NEUROPATHY: ICD-10-CM

## 2023-05-17 DIAGNOSIS — T65.291A TOXIC EFFECT OF TOBACCO AND NICOTINE, ACCIDENTAL OR UNINTENTIONAL, INITIAL ENCOUNTER: Primary | ICD-10-CM

## 2023-05-17 LAB
ANION GAP SERPL CALCULATED.3IONS-SCNC: 0 MMOL/L (ref 4–13)
BUN SERPL-MCNC: 8 MG/DL (ref 5–25)
CALCIUM SERPL-MCNC: 8.1 MG/DL (ref 8.3–10.1)
CHLORIDE SERPL-SCNC: 110 MMOL/L (ref 96–108)
CO2 SERPL-SCNC: 27 MMOL/L (ref 21–32)
CREAT SERPL-MCNC: 0.9 MG/DL (ref 0.6–1.3)
ERYTHROCYTE [DISTWIDTH] IN BLOOD BY AUTOMATED COUNT: 14.1 % (ref 11.6–15.1)
GFR SERPL CREATININE-BSD FRML MDRD: 87 ML/MIN/1.73SQ M
GLUCOSE SERPL-MCNC: 144 MG/DL (ref 65–140)
HCT VFR BLD AUTO: 42.4 % (ref 36.5–49.3)
HGB BLD-MCNC: 14.5 G/DL (ref 12–17)
MAGNESIUM SERPL-MCNC: 2 MG/DL (ref 1.6–2.6)
MCH RBC QN AUTO: 32.7 PG (ref 26.8–34.3)
MCHC RBC AUTO-ENTMCNC: 34.2 G/DL (ref 31.4–37.4)
MCV RBC AUTO: 96 FL (ref 82–98)
PLATELET # BLD AUTO: 315 THOUSANDS/UL (ref 149–390)
PMV BLD AUTO: 9.2 FL (ref 8.9–12.7)
POTASSIUM SERPL-SCNC: 4.1 MMOL/L (ref 3.5–5.3)
RBC # BLD AUTO: 4.43 MILLION/UL (ref 3.88–5.62)
SODIUM SERPL-SCNC: 137 MMOL/L (ref 135–147)
WBC # BLD AUTO: 14.38 THOUSAND/UL (ref 4.31–10.16)

## 2023-05-17 PROCEDURE — 07B74ZX EXCISION OF THORAX LYMPHATIC, PERCUTANEOUS ENDOSCOPIC APPROACH, DIAGNOSTIC: ICD-10-PCS | Performed by: THORACIC SURGERY (CARDIOTHORACIC VASCULAR SURGERY)

## 2023-05-17 PROCEDURE — 0BBC4ZX EXCISION OF RIGHT UPPER LUNG LOBE, PERCUTANEOUS ENDOSCOPIC APPROACH, DIAGNOSTIC: ICD-10-PCS | Performed by: THORACIC SURGERY (CARDIOTHORACIC VASCULAR SURGERY)

## 2023-05-17 PROCEDURE — 0BJ08ZZ INSPECTION OF TRACHEOBRONCHIAL TREE, VIA NATURAL OR ARTIFICIAL OPENING ENDOSCOPIC: ICD-10-PCS | Performed by: THORACIC SURGERY (CARDIOTHORACIC VASCULAR SURGERY)

## 2023-05-17 RX ORDER — FENTANYL CITRATE 50 UG/ML
INJECTION, SOLUTION INTRAMUSCULAR; INTRAVENOUS AS NEEDED
Status: DISCONTINUED | OUTPATIENT
Start: 2023-05-17 | End: 2023-05-17

## 2023-05-17 RX ORDER — OXYCODONE HYDROCHLORIDE 5 MG/1
5 TABLET ORAL EVERY 4 HOURS PRN
Status: DISCONTINUED | OUTPATIENT
Start: 2023-05-17 | End: 2023-05-17

## 2023-05-17 RX ORDER — HYDROMORPHONE HCL/PF 1 MG/ML
0.5 SYRINGE (ML) INJECTION
Status: DISCONTINUED | OUTPATIENT
Start: 2023-05-17 | End: 2023-05-17 | Stop reason: HOSPADM

## 2023-05-17 RX ORDER — DEXAMETHASONE SODIUM PHOSPHATE 10 MG/ML
INJECTION, SOLUTION INTRAMUSCULAR; INTRAVENOUS AS NEEDED
Status: DISCONTINUED | OUTPATIENT
Start: 2023-05-17 | End: 2023-05-17

## 2023-05-17 RX ORDER — DOCUSATE SODIUM 100 MG/1
100 CAPSULE, LIQUID FILLED ORAL 2 TIMES DAILY
Status: DISCONTINUED | OUTPATIENT
Start: 2023-05-17 | End: 2023-05-19 | Stop reason: HOSPADM

## 2023-05-17 RX ORDER — ONDANSETRON 2 MG/ML
INJECTION INTRAMUSCULAR; INTRAVENOUS AS NEEDED
Status: DISCONTINUED | OUTPATIENT
Start: 2023-05-17 | End: 2023-05-17

## 2023-05-17 RX ORDER — ALBUTEROL SULFATE 2.5 MG/3ML
2.5 SOLUTION RESPIRATORY (INHALATION) ONCE AS NEEDED
Status: DISCONTINUED | OUTPATIENT
Start: 2023-05-17 | End: 2023-05-17 | Stop reason: HOSPADM

## 2023-05-17 RX ORDER — PROPOFOL 10 MG/ML
INJECTION, EMULSION INTRAVENOUS AS NEEDED
Status: DISCONTINUED | OUTPATIENT
Start: 2023-05-17 | End: 2023-05-17

## 2023-05-17 RX ORDER — MELATONIN
2000 2 TIMES DAILY
Status: DISCONTINUED | OUTPATIENT
Start: 2023-05-17 | End: 2023-05-19 | Stop reason: HOSPADM

## 2023-05-17 RX ORDER — ACETAMINOPHEN 325 MG/1
975 TABLET ORAL EVERY 6 HOURS
Status: DISCONTINUED | OUTPATIENT
Start: 2023-05-17 | End: 2023-05-19 | Stop reason: HOSPADM

## 2023-05-17 RX ORDER — ROCURONIUM BROMIDE 10 MG/ML
INJECTION, SOLUTION INTRAVENOUS AS NEEDED
Status: DISCONTINUED | OUTPATIENT
Start: 2023-05-17 | End: 2023-05-17

## 2023-05-17 RX ORDER — LIDOCAINE HYDROCHLORIDE 20 MG/ML
INJECTION, SOLUTION EPIDURAL; INFILTRATION; INTRACAUDAL; PERINEURAL AS NEEDED
Status: DISCONTINUED | OUTPATIENT
Start: 2023-05-17 | End: 2023-05-17

## 2023-05-17 RX ORDER — ACETAMINOPHEN 325 MG/1
975 TABLET ORAL ONCE
Status: COMPLETED | OUTPATIENT
Start: 2023-05-17 | End: 2023-05-17

## 2023-05-17 RX ORDER — SODIUM CHLORIDE, SODIUM LACTATE, POTASSIUM CHLORIDE, CALCIUM CHLORIDE 600; 310; 30; 20 MG/100ML; MG/100ML; MG/100ML; MG/100ML
60 INJECTION, SOLUTION INTRAVENOUS CONTINUOUS
Status: DISCONTINUED | OUTPATIENT
Start: 2023-05-17 | End: 2023-05-18

## 2023-05-17 RX ORDER — ONDANSETRON 2 MG/ML
4 INJECTION INTRAMUSCULAR; INTRAVENOUS EVERY 6 HOURS PRN
Status: DISCONTINUED | OUTPATIENT
Start: 2023-05-17 | End: 2023-05-19 | Stop reason: HOSPADM

## 2023-05-17 RX ORDER — ENOXAPARIN SODIUM 100 MG/ML
40 INJECTION SUBCUTANEOUS DAILY
Status: DISCONTINUED | OUTPATIENT
Start: 2023-05-18 | End: 2023-05-19 | Stop reason: HOSPADM

## 2023-05-17 RX ORDER — KETAMINE HCL IN NACL, ISO-OSM 100MG/10ML
SYRINGE (ML) INJECTION AS NEEDED
Status: DISCONTINUED | OUTPATIENT
Start: 2023-05-17 | End: 2023-05-17

## 2023-05-17 RX ORDER — BUPRENORPHINE 5 UG/H
1 PATCH TRANSDERMAL WEEKLY
Status: DISCONTINUED | OUTPATIENT
Start: 2023-05-17 | End: 2023-05-19 | Stop reason: HOSPADM

## 2023-05-17 RX ORDER — DIPHENHYDRAMINE HYDROCHLORIDE 50 MG/ML
12.5 INJECTION INTRAMUSCULAR; INTRAVENOUS ONCE AS NEEDED
Status: DISCONTINUED | OUTPATIENT
Start: 2023-05-17 | End: 2023-05-17 | Stop reason: HOSPADM

## 2023-05-17 RX ORDER — ONDANSETRON 2 MG/ML
4 INJECTION INTRAMUSCULAR; INTRAVENOUS ONCE AS NEEDED
Status: DISCONTINUED | OUTPATIENT
Start: 2023-05-17 | End: 2023-05-17 | Stop reason: HOSPADM

## 2023-05-17 RX ORDER — MULTIVITAMIN WITH IRON
100 TABLET ORAL DAILY
Status: DISCONTINUED | OUTPATIENT
Start: 2023-05-17 | End: 2023-05-19 | Stop reason: HOSPADM

## 2023-05-17 RX ORDER — CEFAZOLIN SODIUM 2 G/50ML
2000 SOLUTION INTRAVENOUS ONCE
Status: COMPLETED | OUTPATIENT
Start: 2023-05-17 | End: 2023-05-17

## 2023-05-17 RX ORDER — MIDAZOLAM HYDROCHLORIDE 2 MG/2ML
INJECTION, SOLUTION INTRAMUSCULAR; INTRAVENOUS AS NEEDED
Status: DISCONTINUED | OUTPATIENT
Start: 2023-05-17 | End: 2023-05-17

## 2023-05-17 RX ORDER — FENTANYL CITRATE/PF 50 MCG/ML
25 SYRINGE (ML) INJECTION
Status: DISCONTINUED | OUTPATIENT
Start: 2023-05-17 | End: 2023-05-17 | Stop reason: HOSPADM

## 2023-05-17 RX ORDER — OXYCODONE HYDROCHLORIDE 5 MG/1
5 TABLET ORAL EVERY 4 HOURS PRN
Status: DISCONTINUED | OUTPATIENT
Start: 2023-05-17 | End: 2023-05-18

## 2023-05-17 RX ORDER — POLYETHYLENE GLYCOL 3350 17 G/17G
17 POWDER, FOR SOLUTION ORAL DAILY PRN
Status: DISCONTINUED | OUTPATIENT
Start: 2023-05-17 | End: 2023-05-19 | Stop reason: HOSPADM

## 2023-05-17 RX ORDER — HEPARIN SODIUM 5000 [USP'U]/ML
5000 INJECTION, SOLUTION INTRAVENOUS; SUBCUTANEOUS
Status: COMPLETED | OUTPATIENT
Start: 2023-05-17 | End: 2023-05-17

## 2023-05-17 RX ORDER — HYDROMORPHONE HCL/PF 1 MG/ML
0.5 SYRINGE (ML) INJECTION
Status: DISCONTINUED | OUTPATIENT
Start: 2023-05-17 | End: 2023-05-17

## 2023-05-17 RX ORDER — PHENYLEPHRINE HCL IN 0.9% NACL 1 MG/10 ML
SYRINGE (ML) INTRAVENOUS AS NEEDED
Status: DISCONTINUED | OUTPATIENT
Start: 2023-05-17 | End: 2023-05-17

## 2023-05-17 RX ORDER — ATORVASTATIN CALCIUM 10 MG/1
5 TABLET, FILM COATED ORAL
Status: DISCONTINUED | OUTPATIENT
Start: 2023-05-17 | End: 2023-05-19 | Stop reason: HOSPADM

## 2023-05-17 RX ORDER — PANTOPRAZOLE SODIUM 40 MG/1
40 TABLET, DELAYED RELEASE ORAL
Status: DISCONTINUED | OUTPATIENT
Start: 2023-05-18 | End: 2023-05-19 | Stop reason: HOSPADM

## 2023-05-17 RX ORDER — HYDROMORPHONE HCL IN WATER/PF 6 MG/30 ML
0.2 PATIENT CONTROLLED ANALGESIA SYRINGE INTRAVENOUS EVERY 4 HOURS PRN
Status: DISCONTINUED | OUTPATIENT
Start: 2023-05-17 | End: 2023-05-19

## 2023-05-17 RX ORDER — LANOLIN ALCOHOL/MO/W.PET/CERES
400 CREAM (GRAM) TOPICAL DAILY
Status: DISCONTINUED | OUTPATIENT
Start: 2023-05-17 | End: 2023-05-19 | Stop reason: HOSPADM

## 2023-05-17 RX ORDER — SODIUM CHLORIDE 9 MG/ML
INJECTION, SOLUTION INTRAVENOUS CONTINUOUS PRN
Status: DISCONTINUED | OUTPATIENT
Start: 2023-05-17 | End: 2023-05-17

## 2023-05-17 RX ORDER — SODIUM CHLORIDE, SODIUM LACTATE, POTASSIUM CHLORIDE, CALCIUM CHLORIDE 600; 310; 30; 20 MG/100ML; MG/100ML; MG/100ML; MG/100ML
INJECTION, SOLUTION INTRAVENOUS CONTINUOUS PRN
Status: DISCONTINUED | OUTPATIENT
Start: 2023-05-17 | End: 2023-05-17

## 2023-05-17 RX ORDER — BUPRENORPHINE 5 UG/H
1 PATCH TRANSDERMAL WEEKLY
Status: DISCONTINUED | OUTPATIENT
Start: 2023-05-21 | End: 2023-05-17

## 2023-05-17 RX ORDER — SENNOSIDES 8.6 MG
1 TABLET ORAL DAILY
Status: DISCONTINUED | OUTPATIENT
Start: 2023-05-17 | End: 2023-05-19 | Stop reason: HOSPADM

## 2023-05-17 RX ORDER — METHOCARBAMOL 500 MG/1
250 TABLET, FILM COATED ORAL EVERY 6 HOURS SCHEDULED
Status: DISCONTINUED | OUTPATIENT
Start: 2023-05-17 | End: 2023-05-19 | Stop reason: HOSPADM

## 2023-05-17 RX ADMIN — PHENYLEPHRINE HYDROCHLORIDE 50 MCG/MIN: 10 INJECTION INTRAVENOUS at 08:47

## 2023-05-17 RX ADMIN — Medication 400 MCG: at 14:09

## 2023-05-17 RX ADMIN — SODIUM CHLORIDE, SODIUM LACTATE, POTASSIUM CHLORIDE, AND CALCIUM CHLORIDE 60 ML/HR: .6; .31; .03; .02 INJECTION, SOLUTION INTRAVENOUS at 21:40

## 2023-05-17 RX ADMIN — Medication 100 MG: at 17:14

## 2023-05-17 RX ADMIN — SODIUM CHLORIDE: 0.9 INJECTION, SOLUTION INTRAVENOUS at 08:34

## 2023-05-17 RX ADMIN — ROCURONIUM BROMIDE 20 MG: 10 INJECTION, SOLUTION INTRAVENOUS at 09:29

## 2023-05-17 RX ADMIN — KETAMINE HYDROCHLORIDE 0.2 MG/KG/HR: 50 INJECTION INTRAMUSCULAR; INTRAVENOUS at 09:18

## 2023-05-17 RX ADMIN — BUPRENORPHINE 1 PATCH: 5 PATCH TRANSDERMAL at 18:03

## 2023-05-17 RX ADMIN — Medication 2000 UNITS: at 21:33

## 2023-05-17 RX ADMIN — Medication 50 MG: at 08:26

## 2023-05-17 RX ADMIN — Medication 10 MG: at 09:18

## 2023-05-17 RX ADMIN — HEPARIN SODIUM 5000 UNITS: 5000 INJECTION INTRAVENOUS; SUBCUTANEOUS at 07:56

## 2023-05-17 RX ADMIN — DEXAMETHASONE SODIUM PHOSPHATE 10 MG: 10 INJECTION, SOLUTION INTRAMUSCULAR; INTRAVENOUS at 08:42

## 2023-05-17 RX ADMIN — ATORVASTATIN CALCIUM 5 MG: 10 TABLET, FILM COATED ORAL at 21:33

## 2023-05-17 RX ADMIN — PROPOFOL 150 MG: 10 INJECTION, EMULSION INTRAVENOUS at 08:26

## 2023-05-17 RX ADMIN — SENNOSIDES 8.6 MG: 8.6 TABLET, FILM COATED ORAL at 14:08

## 2023-05-17 RX ADMIN — Medication 100 MCG: at 08:47

## 2023-05-17 RX ADMIN — LIDOCAINE HYDROCHLORIDE 100 MG: 20 INJECTION, SOLUTION EPIDURAL; INFILTRATION; INTRACAUDAL; PERINEURAL at 08:26

## 2023-05-17 RX ADMIN — MIDAZOLAM 1 MG: 1 INJECTION INTRAMUSCULAR; INTRAVENOUS at 08:19

## 2023-05-17 RX ADMIN — ROCURONIUM BROMIDE 50 MG: 10 INJECTION, SOLUTION INTRAVENOUS at 08:27

## 2023-05-17 RX ADMIN — FENTANYL CITRATE 100 MCG: 50 INJECTION INTRAMUSCULAR; INTRAVENOUS at 08:26

## 2023-05-17 RX ADMIN — METHOCARBAMOL 250 MG: 500 TABLET ORAL at 17:14

## 2023-05-17 RX ADMIN — FENTANYL CITRATE 100 MCG: 50 INJECTION INTRAMUSCULAR; INTRAVENOUS at 10:28

## 2023-05-17 RX ADMIN — SUGAMMADEX 200 MG: 100 INJECTION, SOLUTION INTRAVENOUS at 10:25

## 2023-05-17 RX ADMIN — DOCUSATE SODIUM 100 MG: 100 CAPSULE, LIQUID FILLED ORAL at 17:14

## 2023-05-17 RX ADMIN — FENTANYL CITRATE 25 MCG: 50 INJECTION INTRAMUSCULAR; INTRAVENOUS at 11:48

## 2023-05-17 RX ADMIN — Medication 2000 UNITS: at 14:08

## 2023-05-17 RX ADMIN — ACETAMINOPHEN 975 MG: 325 TABLET ORAL at 21:32

## 2023-05-17 RX ADMIN — ACETAMINOPHEN 975 MG: 325 TABLET ORAL at 14:08

## 2023-05-17 RX ADMIN — ONDANSETRON 4 MG: 2 INJECTION INTRAMUSCULAR; INTRAVENOUS at 10:15

## 2023-05-17 RX ADMIN — MIDAZOLAM 1 MG: 1 INJECTION INTRAMUSCULAR; INTRAVENOUS at 08:26

## 2023-05-17 RX ADMIN — SODIUM CHLORIDE, SODIUM LACTATE, POTASSIUM CHLORIDE, AND CALCIUM CHLORIDE: .6; .31; .03; .02 INJECTION, SOLUTION INTRAVENOUS at 08:04

## 2023-05-17 RX ADMIN — CEFAZOLIN SODIUM 2000 MG: 2 SOLUTION INTRAVENOUS at 08:42

## 2023-05-17 RX ADMIN — ACETAMINOPHEN 975 MG: 325 TABLET ORAL at 07:25

## 2023-05-17 NOTE — OP NOTE
OPERATIVE REPORT  PATIENT NAME: Gilford Alderman    :  1955  MRN: [id number]  Pt Location: [facility] OR ROOM 08    SURGERY DATE: 2023    Surgeon(s) and Role:     * Jace Sosa MD - Primary     * Darius Sierra PA-C - Assisting     * Gagan Anton MD - Assisting    Preop Diagnosis:  Right upper lobe pulmonary nodule [R91 1]    Post-Op Diagnosis Codes:     * Right upper lobe pulmonary nodule [R91 1]    Procedure(s):  Right - Right thoracoscopic upper lobe wedge resection  Right - lymph node dissection; PLEURAL LYSIS  Right - THORACOSCOPY VIDEO ASSISTED SURGERY (VATS)  BRONCHOSCOPY FLEXIBLE    Specimen(s):  ID Type Source Tests Collected by Time Destination   1 : Right Upper Lobe Wedge Resection  Tissue Lung, Right Upper Lobe TISSUE EXAM Jace Sosa MD 2023 [id number]    2 : lymph node level 7  Tissue Lymph Node TISSUE EXAM Jace Sosa MD 2023 [id number]    3 : lymph node level 2 R  Tissue Lymph Node TISSUE EXAM Jace Sosa MD 2023 1812    4 : level 4 R lymph node  Tissue Lymph Node TISSUE EXAM Jace Sosa MD 2023 [id number]        Estimated Blood Loss:   50 mL    Drains:  Chest Tube 1 Right Anterior 8 5 Fr  (Active)   Number of days: 30       Chest Tube 1 Right Pleural 24 Fr  (Active)   Number of days: 0       [REMOVED] Urethral Catheter 16 Fr  (Removed)   Number of days: 0       Anesthesia Type:   General    Operative Indications:  Right upper lobe pulmonary nodule [R91 1]    Operative Findings:  RUL nodule, AAH vs AIS on frozen  Procedure completed with a wedge rather than lobe 2/2 to frozen results    Complications:   None    Procedure and Technique:  INDICATION:       Gilford Alderman is a 75 y o  male with a right upper lobe pulmonary nodule    This was biopsied but the tissue was scant and there was concern for AIH versus AIS but also concerned that there might have been a more invasive tumor present that could not be ruled out secondary to the scant material   The decision was made to take the patient for a right upper lobe wedge with possible completion lobectomy    The potential risks and benefits of the surgery were explained to the patient and he elected to proceed  All of his questions were answered  OPERATION IN DETAIL:    The patient was correctly identified by name and medical record number in the holding area and brought to the operative suite, where he was placed supine on the operative table  After satisfactory induction of general endotracheal anesthesia, a flexible bronchoscope was passed through the endotracheal tube visualizing the distal trachea, konstantin, right and left main stem bronchi, including all of the primary and secondary divisions  No evidence of any endobronchial tumor was noted although there was a significant amount of sputum present in the airways  No suspicion or identified risk for TB or other airborne infectious disease; bronchoscopy procedure being performed for diagnostic purposes  Flexible bronchoscopy was then terminated and the scope was withdrawn  The patient was positioned in the left lateral decubitus position, prepped and draped in the usual fashion  A time-out was performed to confirm procedure and laterality  A 2cm incision was made in the 7th intercostal space, in the mid axillary line  This was carried down to the pleura and the thoracic cavity was entered  The thoracoscope was introduced and the cavity explored  A working port, about 4cm in length, was then created in the 4th intercostal space in the anterior axillary line  One additional posterior port was placed in the 7th intercostal space in line with the scapula tip  An intercostal nerve block was then applied using Exparel in rib spaces 3-10  The lung was grasped with a ring clamp grasper  Using digital palpation, the nodule was identified in the right upper lobe  This was then resected using serial firings of a tissue load stapler   The staple line was hemostatic  The nodule was confirmed to be in the specimen and it was sent off the table to pathology  While awaiting the results of the frozen specimen, a mediastinal lymph node dissection was performed  The inferior pulmonary ligament was taken down but no lymph nodes were identified in the level 9 or level 8 position  The subcarinal space was opened and a level 7 lymph node was sent for routine pathology  Attention was turned to the paratracheal space  Level 2 and 4R were then dissected and specimens were sent from this area for routine pathology  The frozen results then came in and this was consistent with [address] versus AIS but no invasive adenocarcinoma was identified  Because of this, the decision was made to complete the operation with a wedge resection and not a lobectomy  The resection bed and all dissected areas were examined and hemostasis was deemed as adequate  A 24F chest tube was inserted through one of the port sites  The lung was re-expanded under direct visualization  The chest tube was secured to the skin with 0 prolene sutures  The working port incision was closed with serial Vicryl sutures and then closed with a subcuticular monofilament suture  The other sites were closed with Vicryl sutures and then closed with a subcuticular monofilament suture  Dermabond was applied to the wound  At the end of the procedure, the instrument, sponge and needle counts were confirmed to be correct x2  The patient tolerated the procedure well and was delivered to the PACU  I was present for the entire procedure      Patient Disposition:  PACU  and hemodynamically stable        SIGNATURE: Truman Payton MD  DATE: May 17, 2023  TIME: 10:53 AM

## 2023-05-17 NOTE — INTERVAL H&P NOTE
H&P reviewed  After examining the patient I find no changes in the patients condition since the H&P had been written  Vitals:    05/17/23 0631   BP: 110/73   Pulse: 99   Resp: 18   Temp: (!) 97 3 °F (36 3 °C)   SpO2: 97%     Safe to proceed   VATS RUL wedge resection with possible completion lobectomy    Enzo Flor MD  Thoracic Surgery  (Available on [employer])  Office: [phone]

## 2023-05-17 NOTE — CASE MANAGEMENT
Case Management Assessment    Patient name Jeronimo Ballesteros  Location [facility] 614-87 MRN [id number]  : 1955 Date 2023       Current Admission Date: 2023  Current Admission Diagnosis:Right upper lobe pulmonary nodule   Patient Active Problem List    Diagnosis Date Noted   • Right upper lobe pulmonary nodule 2023   • MTHFR mutation 2023   • History of DVT (deep vein thrombosis) 2023   • PAD (peripheral artery disease) ([address] ) 2023   • Pneumothorax after biopsy 2023   • Embolism and thrombosis of arteries of the lower extremities ([address] ) 2023   • Chronic obstructive pulmonary disease, unspecified COPD type ([address] ) 2022   • Chronic right shoulder pain 2022   • Atherosclerosis of artery of left lower extremity ([address] ) 2022   • S/P femoral-popliteal bypass surgery 2021   • Deep vein thrombosis (DVT) of femoral vein of left lower extremity ([address] ) 2021   • 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[address] ) 07/15/2021   • Multiple subsolid lung nodules greater than 6 mm in diameter 2020   • Mixed hyperlipidemia 2020   • Chronic pain of left ankle 2020   • Chronic pain of both knees 2019   • Chronic pain of both shoulders 2019   • Chronic pain syndrome    • Uncomplicated opioid dependence ([address] ) 2018   • Vitamin D deficiency 2018   • Thoracic spine pain 2018   • Lumbar spondylosis - Bilateral 2018   • Right foot drop 2018   • Colon cancer screening 2018   • Tobacco abuse 2018   • Inflammatory polyarthropathy ([address] )    • Lumbar radiculopathy - Right 2018   • Neuropathy 2017   • Bone lesion 10/19/2017      LOS (days): 0  Geometric Mean LOS (GMLOS) (days):   Days to GMLOS:     OBJECTIVE:  PATIENT READMITTED TO HOSPITAL  Risk of Unplanned Readmission Score: 16 55         Current admission status: Inpatient       Preferred Pharmacy:   [address] [place] [address] #[id number] KEE Dodson  [address]  [address]  [facility] [place] [address]  Phone: [phone] Fax: [address] [place], [place] [facility] [address] [place] [address] [address]  Phone: [phone] Fax: [phone]    Primary Care Provider:  Monalisa Carter MD    Primary Insurance: [facility]  Secondary Insurance:     ASSESSMENT:  [address] [facility]   Primary Phone: [phone] (Home)               CM called pt room to complete assessment, no answer

## 2023-05-17 NOTE — ANESTHESIA POSTPROCEDURE EVALUATION
Post-Op Assessment Note    CV Status:  Stable  Pain scale: aydin- patient has chronic pain  Pain management: adequate     Mental Status:  Arousable and sleepy   Hydration Status:  Euvolemic   PONV Controlled:  Controlled   Airway Patency:  Patent      Post Op Vitals Reviewed: Yes      Staff: CRNA, Anesthesiologist         No notable events documented      BP   122/77   Temp 97 5   Pulse 76   Resp 21   SpO2 100%

## 2023-05-17 NOTE — CONSULTS
Consult Note- Acute Pain Service   Carmita Calderón 75 y o  male MRN: [id number]  Unit/Bed#: OR POOL Encounter: [id number]               Assessment/Plan     Assessment:   Patient Active Problem List   Diagnosis   • Lumbar radiculopathy - Right   • Bone lesion   • Neuropathy   • Inflammatory polyarthropathy (HCC)   • Tobacco abuse   • Colon cancer screening   • Right foot drop   • Lumbar spondylosis - Bilateral   • Vitamin D deficiency   • Thoracic spine pain   • Chronic pain syndrome   • Uncomplicated opioid dependence (HCC)   • Chronic pain of both knees   • Chronic pain of both shoulders   • Chronic pain of left ankle   • Multiple subsolid lung nodules greater than 6 mm in diameter   • Mixed hyperlipidemia   • 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HCC)   • Deep vein thrombosis (DVT) of femoral vein of left lower extremity (HCC)   • S/P femoral-popliteal bypass surgery   • Atherosclerosis of artery of left lower extremity (HCC)   • Chronic obstructive pulmonary disease, unspecified COPD type (HCC)   • Chronic right shoulder pain   • Embolism and thrombosis of arteries of the lower extremities (HCC)   • MTHFR mutation   • History of DVT (deep vein thrombosis)   • PAD (peripheral artery disease) (HCC)   • Pneumothorax after biopsy   • Right upper lobe pulmonary nodule      Carmita Calderón is a 75 y o  male with a past medical history significant for COPD, osteoarthritis, MTHFR gene mutation, DVT, inflammatory polyarthropathy, peripheral arterial disease status post femoral popliteal bypass and chronic opioid dependence with transdermal buprenorphine in the setting of chronic lumbar back pain secondary to disc herniation status post surgical repair who follows with  [facility] for chronic pain  Patient presents with right upper lung nodule concerning for malignancy on PET CT associated with bilateral hilar and mediastinal nodes with mild FDG uptake, hypermetabolic liver lesion    Acute pain service was consulted for postoperative pain management in the setting of chronic opioid dependence  On evaluation, patient was resting comfortably in bed, in good spirits  Patient with dense block over region of peripheral nerve block  Patient reports deep sharp pain that is worsened with breathing  He is able to pull 1000 cc on incentive spirometry and is saturating in the mid to high 90s on room air  Patient was instructed to hold his Butrans patch several days prior to surgery  It was discussed with the patient that this patch should have little to no effect on blocking opioid analgesics so the plan was made between provider and patient to restart buprenorphine at this time  Plan:   Continue acetaminophen 975 mg p o  every 6 hours scheduled  Start buprenorphine transdermal patch at home dose weekly  Start oxycodone 2 5 mg p o  every 4 hours as needed for moderate pain  Continue oxycodone 5 mg p o  every 4 hours as needed for severe pain  Change hydromorphone to 0 2 mg IV every 4 hours as needed for breakthrough pain  Start methocarbamol 250 mg p o  every 6 hours scheduled  Bowel regimen per primary team to avoid opioid-induced constipation    [facility] will continue to follow  Please contact Acute Pain Service - [facility] via [facility] from [phone] with additional questions or concerns  See Susana or David for additional contacts and after hours information      History of Present Illness    Admit Date:  5/17/2023  Hospital Day:  0 days  Primary Service:  Thoracic Surgery  Attending Provider:  Alba Hathaway MD  Reason for Consult / Principal Problem: Postoperative pain management in the setting of chronic opioid dependence  HPI: Jeronimo Ballesteros is a 75 y o  male with a past medical history significant for COPD, osteoarthritis, MTHFR gene mutation, DVT, inflammatory polyarthropathy, peripheral arterial disease status post femoral popliteal bypass and chronic opioid dependence with transdermal buprenorphine in the setting of chronic lumbar back pain secondary to disc herniation status post surgical repair who follows with [facility] for chronic pain  Patient presents with right upper lung nodule concerning for malignancy on PET CT associated with bilateral hilar and mediastinal nodes with mild FDG uptake, hypermetabolic liver lesion  Over the past 24 hours, patient is remained hemodynamically stable and afebrile  Patient is not requiring supplemental oxygen  Most recent laboratory studies significant for creatinine 0 90 with EGFR 87, leukocytosis with WBC 14 38, INR 1 20, PTT 44  Most recent liver enzymes from 4/18/2023 within normal limits  Chest x-ray with small right pneumothorax  Current pain location(s): R chest wall  Pain Scale:   0-8  Quality: sharp  Current Analgesic regimen:    Acetaminophen 975 mg p o  every 6 hours scheduled  Oxycodone 5 mg p o  every 4 hours as needed for severe pain  Hydromorphone 0 5 mg IV every 3 hours as needed for breakthrough pain  Buprenorphine transdermal patch 5 mcg/h 1 patch every 7 days    Pain History: Patient follows with [facility] for chronic pain syndrome in the setting of lumbar radiculopathy on the right side, chronic low back pain, chronic bilateral shoulder pain and neuropathy  Patient's pain was reportedly stable on Butrans patch on the outpatient setting  I have reviewed the patient's controlled substance dispensing history in the Prescription Drug Monitoring Program in compliance with the [facility] regulations before prescribing any controlled substances  Inpatient consult to Acute Pain Service  Consult performed by: Anselmo Olea MD  Consult ordered by: Ansley Boyce MD    Inpatient consult to Acute Pain Service  Consult performed by: Anselmo Olea MD  Consult ordered by: dAonay Izquierdo MD          Review of Systems   Constitutional: Negative for appetite change, chills, diaphoresis, fatigue, fever and unexpected weight change     HENT: Negative for sore "throat  Eyes: Negative for visual disturbance  Respiratory: Negative for cough, chest tightness, shortness of breath and wheezing  Cardiovascular: Positive for chest pain  Negative for palpitations and leg swelling  Gastrointestinal: Negative for abdominal distention, abdominal pain, blood in stool, constipation, diarrhea, nausea and vomiting  Genitourinary: Negative for difficulty urinating, flank pain and urgency  Musculoskeletal: Negative for arthralgias and myalgias  Skin: Negative for pallor and rash  Neurological: Positive for numbness  Negative for dizziness, weakness, light-headedness and headaches         Historical Information   Past Medical History:   Diagnosis Date   • Arthritis feb 2016    osto back   • Chronic back pain    • Chronic narcotic dependence ([facility] [address] )    • Chronic pain disorder     Sees pain management Dr Yanet Sosa [facility]   • Cigarette smoker    • Depression    • Dry skin    • Dyslexia    • Full dentures     upper and no teeth lower/\"avoid raw vegetables and chewy food\"   • GERD (gastroesophageal reflux disease)    • 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 related   • History of blood clots     \"left leg\" takes Eliquis   • History of bronchitis    • History of pneumonia     childhood   • Increased pressure in the eye, bilateral    • L4-L5 disc bulge 06/16/2017   • Low back pain    • MTHFR mutation    • Muscle weakness    • Neuropathy     right foot and left foot   • Osteoarthritis    • Osteoarthritis     and\" if becomes cold joint pain worsens\"   • Peripheral neuropathy 8/ 15   • Pulmonary emphysema ([facility] [address] )     \"mild\"   • Right foot drop    • Shortness of breath     \"only on humid days\"   • Smokers' cough ([facility] [address] )    • Wears glasses    • Work related injury 04/24/2018     Past Surgical History:   Procedure Laterality Date   • BACK SURGERY  aug 2015- jun2017 2017 no affect   • COLONOSCOPY     • EGD AND COLONOSCOPY N/A 06/29/2018    Procedure: EGD AND COLONOSCOPY;  Surgeon: Cassandra Garduno MD;  Location: [facility]; Service: Gastroenterology   • EPIDURAL BLOCK INJECTION      needle   • HIP ARTHROSCOPY W/ LABRAL DEBRIDEMENT Left     \"hip surgery\"   • IR BIOPSY LUNG  4/17/2023   • IR CHEST TUBE PLACEMENT  4/17/2023   • IR LOWER EXTREMITY ANGIOGRAM  12/08/2021   • ORTHOPEDIC SURGERY     • DE ARTHRS HIP DEBRIDEMENT/SHAVING ARTICULAR CRTLG Left 04/30/2018    Procedure: LEFT HIP ARTHROSCOPIC LABRAL DEBRIDEMENT;  Surgeon: Rosa Diaz MD;  [facility];  Service: Orthopedics   • DE IN-SITU VEIN BYPASS FEMORAL-POPLITEAL Right 12/08/2021    Procedure: R SFA to BK Pop Bypass using insitu GSV,  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;  Surgeon: Ai Jones MD;  Location: [facility];  Service: Vascular   • DE SLCTV CATHJ 3RD+ ORD SLCTV ABDL PEL/LXTR [facility] Right 12/08/2021    Procedure: COMPLETION ARTERIOGRAM WITH 5 MM ANGIOPLASTY OF MID SUPERFICIAL FEMORAL ARTERY;  Surgeon: Ai Jones MD;  Location: [facility];  Service: Vascular   • TONSILLECTOMY     • WISDOM TOOTH EXTRACTION       Social History   Social History     Substance and Sexual Activity   Alcohol Use No     Social History     Substance and Sexual Activity   Drug Use No     Social History     Tobacco Use   Smoking Status Every Day   • Packs/day: 0 50   • Years: 15 00   • Pack years: 7 50   • Types: Cigarettes   • Passive exposure: Yes   Smokeless Tobacco Never   Tobacco Comments    stress caused by workers comp   not covering meds        Family History:   Family History   Problem Relation Age of Onset   • Dementia Mother    • Glaucoma Mother    • Arthritis Mother         nursing home   • Cancer Father         dead   • Osteoarthritis Family        Meds/Allergies   all current active meds have been reviewed    Allergies   Allergen Reactions   • Cyanocobalamin [Vitamin B12] GI Intolerance   • Gabapentin Other (See Comments) and GI Intolerance     Difficulty walking   • Morphine Other (See Comments)     \"acute constipation\"   • Tramadol Confusion   • Nsaids " Other (See Comments)     Pt reports avoids as is on Eliquis       Objective   Temp:  [97 °F (36 1 °C)-97 5 °F (36 4 °C)] 97 °F (36 1 °C)  HR:  [68-99] 72  Resp:  [16-23] 20  BP: ()/(66-77) 119/75    Intake/Output Summary (Last 24 hours) at 5/17/2023 1341  Last data filed at 5/17/2023 1026  Gross per 24 hour   Intake 1450 ml   Output 300 ml   Net 1150 ml       Physical Exam  Vitals and nursing note reviewed  Constitutional:       General: He is not in acute distress  Appearance: Normal appearance  He is not ill-appearing or toxic-appearing  HENT:      Head: Normocephalic and atraumatic  Eyes:      General: No scleral icterus  Conjunctiva/sclera: Conjunctivae normal    Cardiovascular:      Rate and Rhythm: Normal rate  Pulmonary:      Effort: Pulmonary effort is normal  No respiratory distress  Comments: Incentive spirometry 1000 cc  Room air  Chest:      Chest wall: No tenderness  Skin:     General: Skin is warm and dry  Neurological:      Mental Status: He is alert  Sensory: Sensory deficit present  Comments: Awake, alert and oriented to person place time situation  POSS 1   Psychiatric:         Thought Content: Thought content normal          Lab Results: I have personally reviewed pertinent labs  Imaging Studies: I have personally reviewed pertinent reports  EKG, Pathology, and Other Studies: I have personally reviewed pertinent reports  Please note that the APS provides consultative services regarding pain management only  With the exception of ketamine and epidural infusions and except when indicated, final decisions regarding starting or changing doses of analgesic medications are at the discretion of the consulting service  Off hours consultation and/or medication management is generally not available      Rosemary Nicole MD  Acute Pain Service

## 2023-05-18 ENCOUNTER — APPOINTMENT (OUTPATIENT)
Dept: RADIOLOGY | Facility: HOSPITAL | Age: 68
End: 2023-05-18

## 2023-05-18 LAB
ANION GAP SERPL CALCULATED.3IONS-SCNC: 2 MMOL/L (ref 4–13)
BUN SERPL-MCNC: 11 MG/DL (ref 5–25)
CALCIUM SERPL-MCNC: 9.1 MG/DL (ref 8.3–10.1)
CHLORIDE SERPL-SCNC: 110 MMOL/L (ref 96–108)
CO2 SERPL-SCNC: 25 MMOL/L (ref 21–32)
CREAT SERPL-MCNC: 0.71 MG/DL (ref 0.6–1.3)
ERYTHROCYTE [DISTWIDTH] IN BLOOD BY AUTOMATED COUNT: 14.1 % (ref 11.6–15.1)
GFR SERPL CREATININE-BSD FRML MDRD: 96 ML/MIN/1.73SQ M
GLUCOSE SERPL-MCNC: 114 MG/DL (ref 65–140)
HCT VFR BLD AUTO: 43.1 % (ref 36.5–49.3)
HGB BLD-MCNC: 14.6 G/DL (ref 12–17)
MAGNESIUM SERPL-MCNC: 2.1 MG/DL (ref 1.6–2.6)
MCH RBC QN AUTO: 32.5 PG (ref 26.8–34.3)
MCHC RBC AUTO-ENTMCNC: 33.9 G/DL (ref 31.4–37.4)
MCV RBC AUTO: 96 FL (ref 82–98)
PLATELET # BLD AUTO: 337 THOUSANDS/UL (ref 149–390)
PMV BLD AUTO: 9.8 FL (ref 8.9–12.7)
POTASSIUM SERPL-SCNC: 3.9 MMOL/L (ref 3.5–5.3)
RBC # BLD AUTO: 4.49 MILLION/UL (ref 3.88–5.62)
SODIUM SERPL-SCNC: 137 MMOL/L (ref 135–147)
WBC # BLD AUTO: 24.95 THOUSAND/UL (ref 4.31–10.16)

## 2023-05-18 RX ORDER — OXYCODONE HYDROCHLORIDE 5 MG/1
5 TABLET ORAL EVERY 4 HOURS PRN
Status: DISCONTINUED | OUTPATIENT
Start: 2023-05-18 | End: 2023-05-19

## 2023-05-18 RX ORDER — LIDOCAINE 50 MG/G
1 PATCH TOPICAL ONCE
Status: COMPLETED | OUTPATIENT
Start: 2023-05-18 | End: 2023-05-18

## 2023-05-18 RX ADMIN — Medication 2000 UNITS: at 09:46

## 2023-05-18 RX ADMIN — METHOCARBAMOL 250 MG: 500 TABLET ORAL at 17:49

## 2023-05-18 RX ADMIN — Medication 7.5 MG: at 09:45

## 2023-05-18 RX ADMIN — DOCUSATE SODIUM 100 MG: 100 CAPSULE, LIQUID FILLED ORAL at 09:45

## 2023-05-18 RX ADMIN — HYDROMORPHONE HYDROCHLORIDE 0.2 MG: 0.2 INJECTION, SOLUTION INTRAMUSCULAR; INTRAVENOUS; SUBCUTANEOUS at 10:56

## 2023-05-18 RX ADMIN — ATORVASTATIN CALCIUM 5 MG: 10 TABLET, FILM COATED ORAL at 21:06

## 2023-05-18 RX ADMIN — PANTOPRAZOLE SODIUM 40 MG: 40 TABLET, DELAYED RELEASE ORAL at 05:08

## 2023-05-18 RX ADMIN — ACETAMINOPHEN 975 MG: 325 TABLET ORAL at 21:09

## 2023-05-18 RX ADMIN — SENNOSIDES 8.6 MG: 8.6 TABLET, FILM COATED ORAL at 09:45

## 2023-05-18 RX ADMIN — Medication 400 MCG: at 09:45

## 2023-05-18 RX ADMIN — LIDOCAINE 5% 1 PATCH: 700 PATCH TOPICAL at 09:45

## 2023-05-18 RX ADMIN — Medication 2000 UNITS: at 21:07

## 2023-05-18 RX ADMIN — ACETAMINOPHEN 975 MG: 325 TABLET ORAL at 14:22

## 2023-05-18 RX ADMIN — Medication 100 MG: at 09:46

## 2023-05-18 RX ADMIN — Medication 7.5 MG: at 21:07

## 2023-05-18 RX ADMIN — METHOCARBAMOL 250 MG: 500 TABLET ORAL at 11:00

## 2023-05-18 RX ADMIN — ENOXAPARIN SODIUM 40 MG: 40 INJECTION SUBCUTANEOUS at 09:45

## 2023-05-18 RX ADMIN — ACETAMINOPHEN 975 MG: 325 TABLET ORAL at 09:45

## 2023-05-18 RX ADMIN — DOCUSATE SODIUM 100 MG: 100 CAPSULE, LIQUID FILLED ORAL at 17:49

## 2023-05-18 RX ADMIN — METHOCARBAMOL 250 MG: 500 TABLET ORAL at 05:08

## 2023-05-18 RX ADMIN — Medication 7.5 MG: at 16:29

## 2023-05-18 RX ADMIN — OXYCODONE HYDROCHLORIDE 5 MG: 5 TABLET ORAL at 05:08

## 2023-05-18 NOTE — OCCUPATIONAL THERAPY NOTE
"Occupational Therapy Evaluation      Stacia Arnoldo    5/18/2023    Active Problems:    * No active hospital problems  *      Past Medical History:   Diagnosis Date    Arthritis feb 2016    osto back    Chronic back pain     Chronic narcotic dependence (HCC)     Chronic pain disorder     Sees pain management Dr Billie Mistry    Cigarette smoker     Depression     Dry skin     Dyslexia     Full dentures     upper and no teeth lower/\"avoid raw vegetables and chewy food\"    GERD (gastroesophageal reflux disease)     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 related    History of blood clots     \"left leg\" takes Eliquis    History of bronchitis     History of pneumonia     childhood    Increased pressure in the eye, bilateral     L4-L5 disc bulge 06/16/2017    Low back pain     MTHFR mutation     Muscle weakness     Neuropathy     right foot and left foot    Osteoarthritis     Osteoarthritis     and\" if becomes cold joint pain worsens\"    Peripheral neuropathy 8/ 15    Pulmonary emphysema ([address] )     \"mild\"    Right foot drop     Shortness of breath     \"only on humid days\"    Smokers' cough ([address] )     Wears glasses     Work related injury 04/24/2018       Past Surgical History:   Procedure Laterality Date    BACK SURGERY  aug 2015- jun2017 2017 no affect    COLONOSCOPY      EGD AND COLONOSCOPY N/A 06/29/2018    Procedure: EGD AND COLONOSCOPY;  Surgeon: Prasad Mercado MD;  Location: [facility];   Service: Gastroenterology    EPIDURAL BLOCK INJECTION      needle    HIP ARTHROSCOPY W/ LABRAL DEBRIDEMENT Left     \"hip surgery\"    IR BIOPSY LUNG  4/17/2023    IR CHEST TUBE PLACEMENT  4/17/2023    IR LOWER EXTREMITY ANGIOGRAM  12/08/2021    LUNG LOBECTOMY Right 5/17/2023    Procedure: lymph node dissection; PLEURAL LYSIS;  Surgeon: Deepika Rapp MD;  Location:  MAIN OR;  Service: Thoracic    LUNG SEGMENTECTOMY Right 5/17/2023    Procedure: Right thoracoscopic upper lobe wedge resection;  Surgeon: Deepika Rapp MD;  Location: [facility];  Service: Thoracic    ORTHOPEDIC SURGERY      NC ARTHRS HIP DEBRIDEMENT/SHAVING ARTICULAR CRTLG Left 04/30/2018    Procedure: LEFT HIP ARTHROSCOPIC LABRAL DEBRIDEMENT;  Surgeon: Dee De La Cruz MD;  Location: [facility];  Service: Orthopedics    NC [address] INCL FLUOR GDNCE DX W/CELL [address] N/A 5/17/2023    Procedure: Conor Christine;  Surgeon: Ida Weller MD;  Location: [facility];  Service: Thoracic    NC IN-SITU VEIN BYPASS FEMORAL-POPLITEAL Right 12/08/2021    Procedure: R SFA to BK Pop Bypass using insitu GSV,  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;  Surgeon: Lili Tamayo MD;  Location: [facility];  Service: Vascular    NC [address] 3RD+ ORD [address] PEL/TR [facility] Right 12/08/2021    Procedure: COMPLETION ARTERIOGRAM WITH 5 MM ANGIOPLASTY OF MID SUPERFICIAL FEMORAL ARTERY;  Surgeon: Lili Tamayo MD;  Location: [facility];  Service: Vascular    NC THORACOSCOPY W/THERA WEDGE RESEXN INITIAL UNILAT Right 5/17/2023    Procedure: THORACOSCOPY VIDEO ASSISTED SURGERY (VATS); Surgeon: Ida Weller MD;  Location: [facility];  Service: Thoracic    TONSILLECTOMY      WISDOM TOOTH EXTRACTION          05/18/23 0908   OT Last Visit   OT Visit Date 05/18/23   Note Type   Note type Evaluation   Pain Assessment   Pain Assessment Tool 0-10   Pain Score 7   Pain Location/Orientation Orientation: Right;Location: Chest;Location: Rib Cage   Restrictions/Precautions   Other Precautions Multiple lines;Telemetry; Fall Risk   Home Living   Type of [address] Two level;Bed/bath upstairs   Bathroom Shower/Tub Tub/shower unit   Bathroom Toilet Standard   Home Equipment Walker;Cane   Prior Function   Level of [place] Independent with ADLs; Independent with functional mobility   Lives With Alone   Receives Help From Family; Neighbor   IADLs Independent with driving; Independent with meal prep; Independent with medication management   Falls in the last 6 months 0   Vocational Retired "  Lifestyle   Autonomy pt reports being independent w self care, mobility, driving etc   Reciprocal Relationships has a local sister who can assist as needed   Subjective   Subjective \"It hurts when I take a deep breath\"   ADL   Eating Assistance 7  Independent   Grooming Assistance 7  Independent   UB Bathing Assistance 5  Supervision/Setup   LB [address] 5  Supervision/Setup   [address] 5  Supervision/Setup   LB Dressing Assistance 4  [address] [place] [address]  4  Minimal Assistance   Transfers   Sit to Stand 4  Minimal assistance   Additional items Assist x 1   Stand to Sit 4  Minimal assistance   Additional items Assist x 1   Stand pivot 4  Minimal assistance   Additional Comments use of RW   Functional Mobility   Functional Mobility 4  Minimal assistance   Additional items Rolling walker   Balance   Static Sitting Fair +   Dynamic Sitting Fair   Static Standing Fair -   Dynamic Standing Poor +   Activity Tolerance   Activity Tolerance Patient limited by fatigue   Medical Staff Made Aware see for co-eval with PT Lucy Jobs due to medical complexity   Nurse Made Aware ok to see per RN Moni   RUE Assessment   RUE Assessment WFL   LUE Assessment   LUE Assessment WFL   Hand Function   Gross Motor Coordination Functional   Fine Motor Coordination Functional   Vision - Complex Assessment   Tracking Intact   Psychosocial   Psychosocial (WDL) WDL   Cognition   Overall Cognitive Status WFL   Arousal/Participation Alert; Cooperative   Attention Within functional limits   Orientation Level Oriented X4   Memory Within functional limits   Following Commands Follows one step commands without difficulty   Comments pt pleasant and cooperative   Assessment   Limitation Decreased ADL status; Decreased endurance;Decreased self-care trans;Decreased high-level ADLs   Assessment Pt is a 75 y o  male seen for OT evaluation s/p admit to [facility] on 5/17/2023 w/ <principal problem not " specified>  Pt had a R VATS RUL wedge resection done on 5/17 due to RUL nodule  pt  has a past medical history of Arthritis (feb 2016), Chronic back pain, Chronic narcotic dependence ([facility] [address] ), Chronic pain disorder, Cigarette smoker, Depression, Dry skin, Dyslexia, Full dentures, GERD (gastroesophageal reflux disease), Hearing loss, History of blood clots, History of bronchitis, History of pneumonia, Increased pressure in the eye, bilateral, L4-L5 disc bulge (06/16/2017), Low back pain, MTHFR mutation, Muscle weakness, Neuropathy, Osteoarthritis, Osteoarthritis, Peripheral neuropathy (8/ 15), Pulmonary emphysema ([facility] [address] ), Right foot drop, Shortness of breath, Smokers' cough ([facility] [address] ), Wears glasses, and Work related injury (04/24/2018)  Personal factors affecting pt at time of IE include:steps to enter environment and limited home support  Prior to admission, pt was living alone in a 2 , reports being fully independent, drives  Upon evaluation: Pt requires min assist  2* the following deficits impacting occupational performance: weakness, decreased balance and decreased tolerance  Pt to benefit from continued skilled OT tx while in the hospital to address deficits as defined above and maximize level of functional independence w ADL's and functional mobility  Occupational Performance areas to address include: bathing/shower, toilet hygiene, dressing, functional mobility and clothing management  Based on findings from OT evaluation and functional performance deficits, pt has been identified as a  moderate complexity evaluation  The patient's raw score on the AM-PAC Daily Activity inpatient short form is 21, standardized score is 44 27, greater than 39 4  Patients at this level are likely to benefit from discharge to home  Goals   Patient Goals to go home   Plan   Treatment Interventions ADL retraining;Functional transfer training; Endurance training;Patient/family training;Equipment evaluation/education; Compensatory technique education; Energy conservation; Activityengagement   Goal Expiration Date 05/31/23   OT Frequency 2-3x/wk   Recommendation   OT Discharge Recommendation No rehabilitation needs   AM-PAC Daily Activity Inpatient   Lower Body Dressing 3   Bathing 3   Toileting 3   Upper Body Dressing 4   Grooming 4   Eating 4   Daily Activity Raw Score 21   Daily Activity Standardized Score (Calc for Raw Score >=11) 44 27     OT GOALS TO BE ACHIEVED IN 14 DAYS:    Patient will complete bed mobility mod I  With good safety     Pt will demonstrate good balance sitting at EOB x 10 min for increased safety w self care and in preparation for increased indpendence    Pt will complete UB bathing and dressing independently    Pt will complete LB bathing and dressing independently    Pt will complete toileting w mod I and good safety     Pt will complete functional transfers with mod I and use of DME as needed demonstrating good safety     Pt will tolerate standing at sinkside x 5 min w F+ balance for increased safety w hygiene    Pt will perform simulated home management activitives w S and good safety     Pt will complete functional mobility in room and bathroom w mod I     Pt will demonstrate good ECT/self pacing skills with all self care and functional mobility

## 2023-05-18 NOTE — UTILIZATION REVIEW
"Initial Clinical Review    Elective Inpatient surgical procedure  Age/Sex: 75 y o  male  Surgery Date: 05/17/2023  Procedure:   Right - Right thoracoscopic upper lobe wedge resection  Right - lymph node dissection; PLEURAL LYSIS  Right - THORACOSCOPY VIDEO ASSISTED SURGERY (VATS)  BRONCHOSCOPY FLEXIBLE  Anesthesia: General  Operative Findings: RUL nodule, AAH vs AIS on frozen  Procedure completed with a wedge rather than lobe 2/2 to frozen results    POD#1 Progress Note: Pt s/p VATS RUL therapeutic wedge resection on 05/17  Doing okay but pain not well controlled  CT - no air leak, minimal output  Plan: DC IVF, DC chest tube  Check PA/Lat CXR  Will check on later this afternoon - if pain not well controlled, will likely need to stay overnight and continue to work with APS  Up and ambulate 4 times per day  IS 10x/hr  Admission Orders: Date/Time/Statement:   Admission Orders (From admission, onward)     Ordered        05/17/23 1053  Inpatient Admission  Once                      Orders Placed This Encounter   Procedures   • Inpatient Admission     Standing Status:   Standing     Number of Occurrences:   1     Order Specific Question:   Level of Care     Answer:   Med Surg [16]     Order Specific Question:   Estimated length of stay     Answer:   Inpatient Only Surgery     Vital Signs: /72   Pulse 78   Temp 98 4 °F (36 9 °C)   Resp 20   Ht 6' 3\" (1 905 m)   Wt 84 6 kg (186 lb 8 oz)   SpO2 95%   BMI 23 31 kg/m²     Pertinent Labs/Diagnostic Test Results:   XR chest portable   Final Result by Jacek Horan MD (05/17 1152)      Surgical changes in the right lung with small right pneumothorax                 Workstation performed: [id number]         XR chest pa & lateral    (Results Pending)         Results from last 7 days   Lab Units 05/18/23  0459 05/17/23  1106   WBC Thousand/uL 24 95* 14 38*   HEMOGLOBIN g/dL 14 6 14 5   HEMATOCRIT % 43 1 42 4   PLATELETS Thousands/uL 337 315         Results " from last 7 days   Lab Units 05/18/23  0459 05/17/23  1106   SODIUM mmol/L 137 137   POTASSIUM mmol/L 3 9 4 1   CHLORIDE mmol/L 110* 110*   CO2 mmol/L 25 27   ANION GAP mmol/L 2* 0*   BUN mg/dL 11 8   CREATININE mg/dL 0 71 0 90   EGFR ml/min/1 73sq m 96 87   CALCIUM mg/dL 9 1 8 1*   MAGNESIUM mg/dL 2 1 2 0             Results from last 7 days   Lab Units 05/18/23  0459 05/17/23  1106   GLUCOSE RANDOM mg/dL 114 144*         Diet: Regular House  Mobility: Ambulate   DVT Prophylaxis: SCD; Enoxaparin    Medications/Pain Control:   Scheduled Medications:  acetaminophen, 975 mg, Oral, Q6H  atorvastatin, 5 mg, Oral, HS  cholecalciferol, 2,000 Units, Oral, BID  docusate sodium, 100 mg, Oral, BID  enoxaparin, 40 mg, Subcutaneous, Daily  folic acid, 477 mcg, Oral, Daily  methocarbamol, 250 mg, Oral, Q6H JEANINE  pantoprazole, 40 mg, Oral, Early Morning  pyridoxine, 100 mg, Oral, Daily  senna, 1 tablet, Oral, Daily  transdermal buprenorphine, 1 patch, Transdermal, Weekly      Continuous IV Infusions:  lactated ringers infusion  Rate: 60 mL/hr Dose: 60 mL/hr  Freq: Continuous Route: IV  Last Dose: 60 mL/hr (05/17/23 2140)  Start: 05/17/23 1100 End: 05/18/23 0824     PRN Meds:  HYDROmorphone, 0 2 mg, Intravenous, Q4H PRN  ondansetron, 4 mg, Intravenous, Q6H PRN  oxyCODONE, 2 5 mg, Oral, Q4H PRN   Or  oxyCODONE, 5 mg, Oral, Q4H PRN 05/18 x 1  polyethylene glycol, 17 g, Oral, Daily PRN        Network Utilization Review Department  ATTENTION: Please call with any questions or concerns to [phone] and carefully listen to the prompts so that you are directed to the right person  All voicemails are confidential   Liban Poll all requests for admission clinical reviews, approved or denied determinations and any other requests to dedicated fax number below belonging to the campus where the patient is receiving treatment   List of dedicated fax numbers for the Facilities:  FACILITY NAME UR FAX NUMBER   ADMISSION DENIALS (Administrative/Medical [facility]) [phone]   [address]  (Maternity/NICU/Pediatrics) Teodora Mckinney [address] Maryjane Bustamante  [phone]   [address] [place] [address] [facility] [address] [facility] [address] [phone]

## 2023-05-18 NOTE — PROGRESS NOTES
"Progress Note - Thoracic Surgery   Carmita Calderón 75 y o  male MRN: [id number]  Unit/Bed#: [facility] 427-01 Encounter: [id number]    Assessment:  66yo M with RUL nodule s/p R VATS, RUL wedge resection, MLND 5/17    AVSS on RA  R CT (-8, +AL up to 40) 55cc SS output    Plan:  - remove CT today  - post pull CXR pa/lat  - appreciate APS input for pain management  - encourage ambulation today, IS, OOB  - dispo planning    Subjective/Objective   Subjective:   No acute events overnight  Having significant pain, not motivated to cough or use IS due to pain  Objective:     Blood pressure 111/72, pulse 78, temperature 98 4 °F (36 9 °C), resp  rate 20, height 6' 3\" (1 905 m), weight 84 6 kg (186 lb 8 oz), SpO2 95 %  ,Body mass index is 23 31 kg/m²        Intake/Output Summary (Last 24 hours) at 5/18/2023 3818  Last data filed at 5/18/2023 0600  Gross per 24 hour   Intake 2050 ml   Output 2075 ml   Net -25 ml       Invasive Devices     Peripheral Intravenous Line  Duration           Peripheral IV 05/17/23 Right Hand 1 day    Peripheral IV 05/17/23 Left Hand <1 day          Drain  Duration           Chest Tube 1 Right Anterior 8 5 Fr  30 days    Chest Tube 1 Right Pleural 24 Fr  <1 day                Physical Exam:  General: No acute distress  Neuro: alert and oriented  HEENT: moist mucous membranes  Chest: right chest tube in place, incisions c/d/i  CV: Well perfused, regular rate and rhythm  Lungs: Normal work of breathing, no increased respiratory effort  Abdomen: Soft, non-tender, non-distended  Extremities: No edema, clubbing or cyanosis  Skin: Warm, dry          Leticia Zapata MD  General Surgery PGY1    "

## 2023-05-18 NOTE — CASE MANAGEMENT
Case Management Assessment & Discharge Planning Note    Patient name Juve Kumar  Location [facility] 427/[facility] 687-07 MRN [id number]  : 1955 Date 2023       Current Admission Date: 2023  Current Admission Diagnosis:Right upper lobe pulmonary nodule   Patient Active Problem List    Diagnosis Date Noted   • Right upper lobe pulmonary nodule 2023   • MTHFR mutation 2023   • History of DVT (deep vein thrombosis) 2023   • PAD (peripheral artery disease) ([facility] ) 2023   • Pneumothorax after biopsy 2023   • Embolism and thrombosis of arteries of the lower extremities ([facility] ) 2023   • Chronic obstructive pulmonary disease, unspecified COPD type ([facility] ) 2022   • Chronic right shoulder pain 2022   • Atherosclerosis of artery of left lower extremity ([facility] ) 2022   • S/P femoral-popliteal bypass surgery 2021   • Deep vein thrombosis (DVT) of femoral vein of left lower extremity ([facility] ) 2021   • 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[facility] ) 07/15/2021   • Multiple subsolid lung nodules greater than 6 mm in diameter 2020   • Mixed hyperlipidemia 2020   • Chronic pain of left ankle 2020   • Chronic pain of both knees 2019   • Chronic pain of both shoulders 2019   • Chronic pain syndrome    • Uncomplicated opioid dependence ([facility] ) 2018   • Vitamin D deficiency 2018   • Thoracic spine pain 2018   • Lumbar spondylosis - Bilateral 2018   • Right foot drop 2018   • Colon cancer screening 2018   • Tobacco abuse 2018   • Inflammatory polyarthropathy ([facility] )    • Lumbar radiculopathy - Right 2018   • Neuropathy 2017   • Bone lesion 10/19/2017      LOS (days): 1  Geometric Mean LOS (GMLOS) (days): 3 50  Days to GMLOS:2 5     OBJECTIVE:  PATIENT READMITTED TO HOSPITAL  Risk of Unplanned Readmission Score: 14 52         Current admission status: Inpatient       Preferred Pharmacy: [id number] Kevin Bautista [address]  [address]  [place] [address]  Phone: [phone] Fax: [address] [place], [place] - [address] [facility] [address] [address]  Phone: [phone] Fax: [phone]    Primary Care Provider: Natacha Pereira MD    Primary Insurance: [address] [facility] REP  Secondary Insurance:     ASSESSMENT:  [address] [facility] Representative - Friend   Primary Phone: [phone] (Home)               Advance Directives  Does patient have a [address]?: No  Does patient have Advance Directives?: No              Patient Information  Admitted from[de-identified] Home  Mental Status: Alert  During Assessment patient was accompanied by: Not accompanied during assessment  Assessment information provided by[de-identified] Patient  Primary Caregiver: Self  Support Systems: Friend, Family members  Home entry access options   Select all that apply : Stairs  Number of steps to enter home : 1  Type of Current Residence: [address] [place] home  Upon entering residence, is there a bedroom on the main floor (no further steps)?: No  A bedroom is located on the following floor levels of residence (select all that apply):: 2nd Floor (will stay on couch if needed)  Upon entering residence, is there a bathroom on the main floor (no further steps)?: No  Indicate which floors of current residence have a bathroom (select all the apply):: 2nd Floor  Number of steps to 2nd floor from main floor: One Flight  In the last 12 months, was there a time when you were not able to pay the mortgage or rent on time?: No  In the last 12 months, how many places have you lived?: 1  In the last 12 months, was there a time when you did not have a steady place to sleep or slept in a shelter (including now)?: No  Living Arrangements: Lives Alone    Activities of Daily Living Prior to Admission  Functional Status: Independent  Completes ADLs independently?: Yes  Ambulates independently?: Yes  Does patient use assisted devices?: Yes  Assisted Devices (DME) used: Madalyn Barbour  Does patient currently own DME?: Yes  What DME does the patient currently own?: Madalyn Barbour  Does patient have a history of Outpatient Therapy (PT/OT)?: No  Does the patient have a history of Short-Term Rehab?: Yes ([facility])  Does patient have a history of HHC?: No  Does patient currently have [facility]?: No         Patient Information Continued  Income Source: Pension/[facility]  Does patient have prescription coverage?: Yes  Within the past 12 months, you worried that your food would run out before you got the money to buy more : Never true  Within the past 12 months, the food you bought just didn't last and you didn't have money to get more : Never true  Does patient receive dialysis treatments?: No  Does patient have a history of substance abuse?: Yes  Historical substance use preference: Hydrocodone (states became addicted post back surgery,)  History of Withdrawal Symptoms: Denies past symptoms  Is patient currently in treatment for substance abuse?: N/A - sober  Does patient have a history of Mental Health Diagnosis?: No         Means of Transportation  Means of Transport to Appts[de-identified] Drives Self  In the past 12 months, has lack of transportation kept you from medical appointments or from getting medications?: No  In the past 12 months, has lack of transportation kept you from meetings, work, or from getting things needed for daily living?: No        DISCHARGE DETAILS:    Discharge planning discussed with[de-identified] pt  Freedom of Choice: Yes                   Contacts  Patient Contacts: Stewart Roger  Relationship to Patient[de-identified] Friend  Contact Method: Phone  Phone Number: [phone]  Reason/Outcome: Continuity of Care, Emergency Contact, Discharge Planning    [address]ed [address]         Is the patient interested in [facility] at discharge?: No              Would you like to participate in our [address] service program?  : No - Declined    Treatment Team Recommendation: Home     CM reviewed d/c planning process including the following: identifying help at home, patient preference for d/c planning needs, Discharge Lounge, [facility] Meds to Bed program, availability of treatment team to discuss questions or concerns patient and/or family may have regarding understanding medications and recognizing signs and symptoms once discharged  CM also encouraged patient to follow up with all recommended appointments after discharge  Patient advised of importance for patient and family to participate in managing patient’s medical well being  Patient/caregiver received discharge checklist   Content reviewed  Patient/caregiver encouraged to participate in discharge plan of care prior to discharge home

## 2023-05-18 NOTE — PHYSICAL THERAPY NOTE
"   Physical Therapy Evaluation     Patient's Name: Noreen Topete    Admitting Diagnosis  Right upper lobe pulmonary nodule [R91 1]    Problem List  Patient Active Problem List   Diagnosis    Lumbar radiculopathy - Right    Bone lesion    Neuropathy    Inflammatory polyarthropathy (HCC)    Tobacco abuse    Colon cancer screening    Right foot drop    Lumbar spondylosis - Bilateral    Vitamin D deficiency    Thoracic spine pain    Chronic pain syndrome    Uncomplicated opioid dependence (HCC)    Chronic pain of both knees    Chronic pain of both shoulders    Chronic pain of left ankle    Multiple subsolid lung nodules greater than 6 mm in diameter    Mixed hyperlipidemia    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HCC)    Deep vein thrombosis (DVT) of femoral vein of left lower extremity (HCC)    S/P femoral-popliteal bypass surgery    Atherosclerosis of artery of left lower extremity (HCC)    Chronic obstructive pulmonary disease, unspecified COPD type ([facility] )    Chronic right shoulder pain    Embolism and thrombosis of arteries of the lower extremities (HCC)    MTHFR mutation    History of DVT (deep vein thrombosis)    PAD (peripheral artery disease) (HCC)    Pneumothorax after biopsy    Right upper lobe pulmonary nodule       Past Medical History  Past Medical History:   Diagnosis Date    Arthritis feb 2016    osto back    Chronic back pain     Chronic narcotic dependence (HCC)     Chronic pain disorder     Sees pain management Dr Reena David    Cigarette smoker     Depression     Dry skin     Dyslexia     Full dentures     upper and no teeth lower/\"avoid raw vegetables and chewy food\"    GERD (gastroesophageal reflux disease)     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 related    History of blood clots     \"left leg\" takes Eliquis    History of bronchitis     History of pneumonia     childhood    Increased pressure in the eye, bilateral     L4-L5 disc bulge 06/16/2017    Low back pain     MTHFR mutation     Muscle weakness     " "Neuropathy     right foot and left foot    Osteoarthritis     Osteoarthritis     and\" if becomes cold joint pain worsens\"    Peripheral neuropathy 8/ 15    Pulmonary emphysema ([facility] )     \"mild\"    Right foot drop     Shortness of breath     \"only on humid days\"    Smokers' cough ([facility] )     Wears glasses     Work related injury 04/24/2018       Past Surgical History  Past Surgical History:   Procedure Laterality Date    BACK SURGERY  aug 2015- jun2017 2017 no affect    COLONOSCOPY      EGD AND COLONOSCOPY N/A 06/29/2018    Procedure: EGD AND COLONOSCOPY;  Surgeon: Toby Nunez MD;  Location: [facility] GI [facility]; Service: Gastroenterology    EPIDURAL BLOCK INJECTION      needle    HIP ARTHROSCOPY W/ LABRAL DEBRIDEMENT Left     \"hip surgery\"    IR BIOPSY LUNG  4/17/2023    IR CHEST TUBE PLACEMENT  4/17/2023    IR LOWER EXTREMITY ANGIOGRAM  12/08/2021    ORTHOPEDIC SURGERY      MO ARTHRS HIP DEBRIDEMENT/SHAVING ARTICULAR CRTLG Left 04/30/2018    Procedure: LEFT HIP ARTHROSCOPIC LABRAL DEBRIDEMENT;  Surgeon: Eunice Camacho MD;  Location: [facility];  Service: Orthopedics    MO IN-SITU VEIN BYPASS FEMORAL-POPLITEAL Right 12/08/2021    Procedure: R SFA to BK Pop Bypass using insitu GSV,  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;  Surgeon: Nuris Lopez MD;  Location: [facility];  Service: Vascular    MO [address] [facility] Right 12/08/2021    Procedure: COMPLETION ARTERIOGRAM WITH 5 MM ANGIOPLASTY OF MID SUPERFICIAL FEMORAL ARTERY;  Surgeon: Nuris Lopez MD;  [facility];  Service: Vascular    TONSILLECTOMY      WISDOM TOOTH EXTRACTION            05/18/23 0909   PT Last Visit   PT Visit Date 05/18/23   Note Type   Note type Evaluation   Pain Assessment   Pain Assessment Tool 0-10   Pain Score 8   Pain Location/Orientation Orientation: Right;Location: Chest   Pain Onset/Description Onset: Ongoing;Frequency: Intermittent   Hospital Pain Intervention(s) Repositioned; Ambulation/increased " "activity; Emotional support   Restrictions/Precautions   Weight Bearing Precautions Per Order No   Other Precautions Cardiac/sternal;Telemetry;Multiple lines; Fall Risk  (chest tube)   Home Living   Type of [address] Two level;Stairs to enter with rails  (2nd floor bedroom/bathroom ; 1 JUDY with full flight to second floor)   Bathroom Shower/Tub Tub/shower unit   Bathroom Toilet Standard   Bathroom Accessibility Accessible   Home Equipment Walker;Cane  (walker/cane used as needed)   Prior Function   Level of [place] Independent with ADLs; Independent with functional mobility; Independent with IADLS   Lives With (S)  Alone   Receives Help From Family  (sister can assist as able)   IADLs Independent with driving; Independent with meal prep; Independent with medication management   Falls in the last 6 months 0   Vocational Retired   General   Family/Caregiver Present No   Cognition   Overall Cognitive Status WFL   Arousal/Participation Alert   Orientation Level Oriented X4   Memory Within functional limits   Following Commands Follows one step commands without difficulty   Comments Patient pleasant and cooperative   Subjective   Subjective \"I have pain with deep breathing\"   RUE Assessment   RUE Assessment WFL   LUE Assessment   LUE Assessment WFL   RLE Assessment   RLE Assessment WFL   LLE Assessment   LLE Assessment WFL   Vision-Basic Assessment   Current Vision Wears glasses all the time   Light Touch   RLE Light Touch Grossly intact   LLE Light Touch Grossly intact   Bed Mobility   Supine to Sit Unable to assess   Sit to Supine Unable to assess   Additional Comments Patient in bedside chair on arrival   Transfers   Sit to Stand 4  Minimal assistance   Additional items Assist x 1; Increased time required;Verbal cues   Stand to Sit 4  Minimal assistance   Additional items Assist x 1; Increased time required;Verbal cues   Additional Comments transfers at [facility]   Ambulation/Elevation   Gait pattern Forward " Flexion; Short stride; Excessively slow   Gait Assistance 4  Minimal assist   Additional items Assist x 1;Verbal cues   Assistive Device Rolling walker   Distance 15'   Balance   Static Sitting Fair +   Dynamic Sitting Fair   Static Standing Fair -   Dynamic Standing Poor +   Ambulatory Poor   Endurance Deficit   Endurance Deficit Yes   Activity Tolerance   Activity Tolerance Patient limited by fatigue   Medical Staff Made Aware OT Magdalene Wadsworth ; due to patient complexity and comorbidities   Nurse Made Aware RN cleared patient for therapy   Assessment   Prognosis Good   Problem List Decreased strength;Decreased endurance; Impaired balance;Decreased mobility; Decreased coordination;Pain   Assessment Pt is a 75 y o  male seen for a high complexity PT evaluation s/p [facility] for right upper lobe pulmonary nodule s/p right thoracoscopic upper lobe wedge resection  Patient  has a past medical history of Arthritis, Chronic back pain, Chronic narcotic dependence ([address] ), Chronic pain disorder, Cigarette smoker, Depression, Dry skin, Dyslexia, Full dentures, GERD (gastroesophageal reflux disease), Hearing loss, History of blood clots, History of bronchitis, History of pneumonia, Increased pressure in the eye, bilateral, L4-L5 disc bulge, Low back pain, MTHFR mutation, Muscle weakness, Neuropathy, Osteoarthritis, Osteoarthritis, Peripheral neuropathy, Pulmonary emphysema (HCC), Right foot drop, Shortness of breath, Smokers' cough ([address] ), Wears glasses, and Work related injury        PT now consulted to assess functional mobility and needs for safe d/c planning  Prior to admission, pt was independent with functional mobility, ADLs, and IADLs  Personal factors affecting status include 2 story home with 1 JUDY, home alone, limited support  Currently pt requires min A for functional transfers at [facility]; Min A for short distance ambulation with RW   Pt presents functioning below baseline and w/ overall mobility deficits 2* to: decreased strength, decreased endurance, decreased mobility, impaired balance  These impairments place pt at risk for falls  Pt will continue to benefit from skilled PT interventions to address stated impairments; to maximize functional potential; for ongoing pt/family education; and DME needs  The patient's AM-PAC Basic Mobility Inpatient Short Form Raw Score Is 18  A Raw score of greater than or equal to 16 suggests the patient may benefit from discharge to home  PT is currently recommending home with home health on d/c from hospital  Will continue to follow as able  Barriers to Discharge Inaccessible home environment;Decreased caregiver support   Goals   Patient Goals To go home   STG Expiration Date 06/01/23   Short Term Goal #1 In 14 days, patient will    1) increase strength in to at least 4+/5 in BUE/BLE for increased strength and stability needed for functional mobility 2) improve bed mobility to MI for improved mobility and decreased need for assist 3) increase functional transfers to MI for improved safety and functional mobility 4) ambulate 250ft with MI using RW for increased endurance and safety ambulating home and community environments 5) ascend/descend at least 1 stair using hand rail in order to safely enter home 6) improve balance grade by 1 point for improved safety and stability  PT Treatment Day 0   Plan   Treatment/Interventions ADL retraining;Functional transfer training;LE strengthening/ROM; Elevations; Endurance training; Therapeutic exercise;Patient/family training;Equipment eval/education; Bed mobility;Gait training;Spoke to nursing;Spoke to case management;OT   PT Frequency Other (Comment)  (3-6x)   Recommendation   PT Discharge Recommendation Home with home health rehabilitation   Equipment Recommended Michele Brito  (has equipment)   [address] walker   AM-PAC Basic Mobility Inpatient   Turning in Flat Bed Without Bedrails 3   Lying on Back to Sitting on Edge of Flat Bed Without Bedrails 3   Moving Bed to Chair 3   Standing Up From Chair Using Arms 3   Walk in Room 3   Climb 3-5 Stairs With Railing 3   Basic Mobility Inpatient Raw Score 18   Basic Mobility Standardized Score 41 05   Highest Level Of Mobility   JH-HLM Goal 6: Walk 10 steps or more   JH-HLM Achieved 6: Walk 10 steps or more   Modified Josephine Scale   Modified Lidia Scale 4   End of Consult   Patient Position at End of Consult Other (comment)  (seated in wheelchair with patient transport to be taken for imaging )       Antoni Laureano, PT, DPT

## 2023-05-18 NOTE — QUICK NOTE
Right chest tube pulled with no issues, patient tolerated well  Occlusive dressing applied, instructed to remove in 24 hours  Post pull CXR ordered, nurse informed   lavern with  placed back in soiled utility

## 2023-05-18 NOTE — PLAN OF CARE
Problem: OCCUPATIONAL THERAPY ADULT  Goal: Performs self-care activities at highest level of function for planned discharge setting  See evaluation for individualized goals  Note: Limitation: Decreased ADL status, Decreased endurance, Decreased self-care trans, Decreased high-level ADLs     Assessment: Pt is a 75 y o  male seen for OT evaluation s/p admit to [facility] on 5/17/2023 w/ <principal problem not specified>  Pt had a R VATS RUL wedge resection done on 5/17 due to RUL nodule  pt  has a past medical history of Arthritis (feb 2016), Chronic back pain, Chronic narcotic dependence ([facility] [address] ), Chronic pain disorder, Cigarette smoker, Depression, Dry skin, Dyslexia, Full dentures, GERD (gastroesophageal reflux disease), Hearing loss, History of blood clots, History of bronchitis, History of pneumonia, Increased pressure in the eye, bilateral, L4-L5 disc bulge (06/16/2017), Low back pain, MTHFR mutation, Muscle weakness, Neuropathy, Osteoarthritis, Osteoarthritis, Peripheral neuropathy (8/ 15), Pulmonary emphysema ([facility] [address] ), Right foot drop, Shortness of breath, Smokers' cough ([facility] [address] ), Wears glasses, and Work related injury (04/24/2018)  Personal factors affecting pt at time of IE include:steps to enter environment and limited home support  Prior to admission, pt was living alone in a 2 SH, reports being fully independent, drives  Upon evaluation: Pt requires min assist  2* the following deficits impacting occupational performance: weakness, decreased balance and decreased tolerance  Pt to benefit from continued skilled OT tx while in the hospital to address deficits as defined above and maximize level of functional independence w ADL's and functional mobility  Occupational Performance areas to address include: bathing/shower, toilet hygiene, dressing, functional mobility and clothing management    Based on findings from OT evaluation and functional performance deficits, pt has been identified as a  moderate complexity evaluation  The patient's raw score on the AM-PAC Daily Activity inpatient short form is 21, standardized score is 44 27, greater than 39 4  Patients at this level are likely to benefit from discharge to home       OT Discharge Recommendation: No rehabilitation needs

## 2023-05-18 NOTE — PLAN OF CARE
Problem: PHYSICAL THERAPY ADULT  Goal: Performs mobility at highest level of function for planned discharge setting  See evaluation for individualized goals  Description: Treatment/Interventions: ADL retraining, Functional transfer training, LE strengthening/ROM, Elevations, Endurance training, Therapeutic exercise, Patient/family training, Equipment eval/education, Bed mobility, Gait training, Spoke to nursing, Spoke to case management, OT  Equipment Recommended: Viktor Garcia (has equipment)       See flowsheet documentation for full assessment, interventions and recommendations  Note: Prognosis: Good  Problem List: Decreased strength, Decreased endurance, Impaired balance, Decreased mobility, Decreased coordination, Pain  Assessment: Pt is a 75 y o  male seen for a high complexity PT evaluation s/p admit to [facility] on 5/17/23 for right upper lobe pulmonary nodule s/p right thoracoscopic upper lobe wedge resection  Patient  has a past medical history of Arthritis, Chronic back pain, Chronic narcotic dependence ([address] ), Chronic pain disorder, Cigarette smoker, Depression, Dry skin, Dyslexia, Full dentures, GERD (gastroesophageal reflux disease), Hearing loss, History of blood clots, History of bronchitis, History of pneumonia, Increased pressure in the eye, bilateral, L4-L5 disc bulge, Low back pain, MTHFR mutation, Muscle weakness, Neuropathy, Osteoarthritis, Osteoarthritis, Peripheral neuropathy, Pulmonary emphysema (HCC), Right foot drop, Shortness of breath, Smokers' cough ([address] ), Wears glasses, and Work related injury        PT now consulted to assess functional mobility and needs for safe d/c planning  Prior to admission, pt was independent with functional mobility, ADLs, and IADLs  Personal factors affecting status include 2 story home with 1 JUDY, home alone, limited support  Currently pt requires min A for functional transfers at [facility]; Min A for short distance ambulation with RW   Pt presents functioning below baseline and w/ overall mobility deficits 2* to: decreased strength, decreased endurance, decreased mobility, impaired balance  These impairments place pt at risk for falls  Pt will continue to benefit from skilled PT interventions to address stated impairments; to maximize functional potential; for ongoing pt/family education; and DME needs  The patient's AM-PAC Basic Mobility Inpatient Short Form Raw Score Is 18  A Raw score of greater than or equal to 16 suggests the patient may benefit from discharge to home  PT is currently recommending home with home health on d/c from hospital  Will continue to follow as able  Barriers to Discharge: Inaccessible home environment, Decreased caregiver support     PT Discharge Recommendation: Home with home health rehabilitation    See flowsheet documentation for full assessment

## 2023-05-18 NOTE — PROGRESS NOTES
Progress Note - Acute Pain Service    Francesco Matias 75 y o  male MRN: [id number]  Unit/Bed#: [facility] 427-01 Encounter: [id number]      Assessment:  Francesco Matias is a 75 y o  male with a past medical history significant for COPD, osteoarthritis, MTHFR gene mutation, DVT, inflammatory polyarthropathy, peripheral arterial disease status post femoral popliteal bypass and chronic opioid dependence with transdermal buprenorphine in the setting of chronic lumbar back pain secondary to disc herniation status post surgical repair who follows with  SPA for chronic pain  Patient presents with right upper lung nodule concerning for malignancy on PET CT associated with bilateral hilar and mediastinal nodes with mild FDG uptake, hypermetabolic liver lesion  Acute pain service was consulted for postoperative pain management in the setting of chronic opioid dependence      On evaluation, patient has increased pain today as intercostal blocks began to wear off  He found the 5 mg dose of oxycodone only moderately effective for treating his pain  He feels as if he cannot take in full breaths because of his pain  His incentive spirometry this morning is 600 cc      Plan:   Continue acetaminophen 975 mg p o  every 6 hours scheduled  Continue  buprenorphine transdermal patch at home dose weekly  Increase oxycodone to 5 mg p o  every 4 hours as needed for moderate pain  Increase oxycodone to 7 5 mg p o  every 4 hours as needed for severe pain  Continue hydromorphone to 0 2 mg IV every 4 hours as needed for breakthrough pain  Continue methocarbamol 250 mg p o  every 6 hours scheduled  Start lidocaine patch to chest wall 12 hours on/12 hours off  Bowel regimen per primary team to avoid opioid-induced constipation  Encourage incentive spirometry  Patient is not a candidate at this time for further blocks at this time as he received liposomal bupivacaine on 5/17/2023     APS will continue to follow   Please contact Acute Pain "Service - [facility] via [facility] from [phone] with additional questions or concerns  See [place] or David for additional contacts and after hours information  Pain History  Current pain location(s): R upper chest wall, worse with inspiration  Pain Scale:   0-8  Quality: sharp  24 hour history: Multimodal regimen adjusted  Patient went underwent procedures as above  Butrans patch applied  Chest tube has been pulled  Patient has remained hemodynamically stable and afebrile  Patient is not requiring supplemental oxygen  Labs from today shows worsening of his leukocytosis to WBC 24 95  Chest x-ray shows surgical changes in right lung with small right pneumothorax  Opioid requirement previous 24 hours:   Butrans patch applied 5 mcg/h  Oxycodone 5 mg p o  Fentanyl 125 mcg IV      Meds/Allergies   all current active meds have been reviewed    Allergies   Allergen Reactions   • Cyanocobalamin [Vitamin B12] GI Intolerance   • Gabapentin Other (See Comments) and GI Intolerance     Difficulty walking   • Morphine Other (See Comments)     \"acute constipation\"   • Tramadol Confusion   • Nsaids Other (See Comments)     Pt reports avoids as is on Eliquis       Objective     Temp:  [97 °F (36 1 °C)-99 °F (37 2 °C)] 98 4 °F (36 9 °C)  HR:  [68-87] 78  Resp:  [14-23] 20  BP: ()/(64-77) 111/72    Physical Exam  Vitals and nursing note reviewed  Constitutional:       General: He is not in acute distress  Appearance: Normal appearance  He is not ill-appearing or toxic-appearing  HENT:      Head: Normocephalic and atraumatic  Eyes:      General: No scleral icterus  Conjunctiva/sclera: Conjunctivae normal    Cardiovascular:      Rate and Rhythm: Normal rate  Pulmonary:      Effort: Pulmonary effort is normal  No respiratory distress  Comments:  cc  RA  Chest:      Chest wall: Tenderness present  Skin:     General: Skin is warm and dry  Neurological:      Mental Status: He is alert        " Comments: Awake, alert and oriented to person place time situation  POSS 1   Psychiatric:         Thought Content: Thought content normal          Lab Results:   Results from last 7 days   Lab Units 05/18/23  0459   WBC Thousand/uL 24 95*   HEMOGLOBIN g/dL 14 6   HEMATOCRIT % 43 1   PLATELETS Thousands/uL 337      Results from last 7 days   Lab Units 05/18/23  0459   POTASSIUM mmol/L 3 9   CHLORIDE mmol/L 110*   CO2 mmol/L 25   BUN mg/dL 11   CREATININE mg/dL 0 71   CALCIUM mg/dL 9 1       Imaging Studies: I have personally reviewed pertinent reports  EKG, Pathology, and Other Studies: I have personally reviewed pertinent reports  Please note that the APS provides consultative services regarding pain management only  With the exception of ketamine and epidural infusions and except when indicated, final decisions regarding starting or changing doses of analgesic medications are at the discretion of the consulting service  Off hours consultation and/or medication management is generally not available      Ricki Rosa MD  Acute Pain Service

## 2023-05-19 VITALS
BODY MASS INDEX: 23.38 KG/M2 | WEIGHT: 188.05 LBS | TEMPERATURE: 98.5 F | SYSTOLIC BLOOD PRESSURE: 103 MMHG | HEIGHT: 75 IN | DIASTOLIC BLOOD PRESSURE: 67 MMHG | RESPIRATION RATE: 20 BRPM | HEART RATE: 70 BPM | OXYGEN SATURATION: 93 %

## 2023-05-19 LAB
BASOPHILS # BLD AUTO: 0.05 THOUSANDS/ÂΜL (ref 0–0.1)
BASOPHILS NFR BLD AUTO: 0 % (ref 0–1)
EOSINOPHIL # BLD AUTO: 0.07 THOUSAND/ÂΜL (ref 0–0.61)
EOSINOPHIL NFR BLD AUTO: 0 % (ref 0–6)
ERYTHROCYTE [DISTWIDTH] IN BLOOD BY AUTOMATED COUNT: 14.4 % (ref 11.6–15.1)
HCT VFR BLD AUTO: 44.6 % (ref 36.5–49.3)
HGB BLD-MCNC: 14.9 G/DL (ref 12–17)
IMM GRANULOCYTES # BLD AUTO: 0.08 THOUSAND/UL (ref 0–0.2)
IMM GRANULOCYTES NFR BLD AUTO: 1 % (ref 0–2)
LYMPHOCYTES # BLD AUTO: 2.44 THOUSANDS/ÂΜL (ref 0.6–4.47)
LYMPHOCYTES NFR BLD AUTO: 14 % (ref 14–44)
MCH RBC QN AUTO: 32.3 PG (ref 26.8–34.3)
MCHC RBC AUTO-ENTMCNC: 33.4 G/DL (ref 31.4–37.4)
MCV RBC AUTO: 97 FL (ref 82–98)
MONOCYTES # BLD AUTO: 1.72 THOUSAND/ÂΜL (ref 0.17–1.22)
MONOCYTES NFR BLD AUTO: 10 % (ref 4–12)
NEUTROPHILS # BLD AUTO: 12.82 THOUSANDS/ÂΜL (ref 1.85–7.62)
NEUTS SEG NFR BLD AUTO: 75 % (ref 43–75)
NRBC BLD AUTO-RTO: 0 /100 WBCS
PLATELET # BLD AUTO: 299 THOUSANDS/UL (ref 149–390)
PMV BLD AUTO: 10 FL (ref 8.9–12.7)
RBC # BLD AUTO: 4.61 MILLION/UL (ref 3.88–5.62)
WBC # BLD AUTO: 17.18 THOUSAND/UL (ref 4.31–10.16)

## 2023-05-19 RX ORDER — OXYCODONE HYDROCHLORIDE 5 MG/1
5 TABLET ORAL EVERY 6 HOURS PRN
Qty: 30 TABLET | Refills: 0 | Status: SHIPPED | OUTPATIENT
Start: 2023-05-19 | End: 2023-05-29

## 2023-05-19 RX ORDER — OXYCODONE HYDROCHLORIDE 5 MG/1
5 TABLET ORAL EVERY 4 HOURS PRN
Status: DISCONTINUED | OUTPATIENT
Start: 2023-05-19 | End: 2023-05-19 | Stop reason: HOSPADM

## 2023-05-19 RX ORDER — ACETAMINOPHEN 325 MG/1
975 TABLET ORAL EVERY 6 HOURS
Qty: 84 TABLET | Refills: 0 | Status: SHIPPED | OUTPATIENT
Start: 2023-05-19 | End: 2023-05-26

## 2023-05-19 RX ORDER — DOCUSATE SODIUM 100 MG/1
100 CAPSULE, LIQUID FILLED ORAL 2 TIMES DAILY
Qty: 30 CAPSULE | Refills: 0 | Status: SHIPPED | OUTPATIENT
Start: 2023-05-19

## 2023-05-19 RX ORDER — METHOCARBAMOL 500 MG/1
250 TABLET, FILM COATED ORAL EVERY 6 HOURS SCHEDULED
Qty: 28 TABLET | Refills: 0 | Status: SHIPPED | OUTPATIENT
Start: 2023-05-19 | End: 2023-06-02

## 2023-05-19 RX ADMIN — Medication 100 MG: at 08:48

## 2023-05-19 RX ADMIN — Medication 400 MCG: at 08:48

## 2023-05-19 RX ADMIN — ACETAMINOPHEN 975 MG: 325 TABLET ORAL at 08:48

## 2023-05-19 RX ADMIN — PANTOPRAZOLE SODIUM 40 MG: 40 TABLET, DELAYED RELEASE ORAL at 05:41

## 2023-05-19 RX ADMIN — DOCUSATE SODIUM 100 MG: 100 CAPSULE, LIQUID FILLED ORAL at 08:47

## 2023-05-19 RX ADMIN — METHOCARBAMOL 250 MG: 500 TABLET ORAL at 11:20

## 2023-05-19 RX ADMIN — Medication 2000 UNITS: at 08:47

## 2023-05-19 RX ADMIN — ENOXAPARIN SODIUM 40 MG: 40 INJECTION SUBCUTANEOUS at 08:47

## 2023-05-19 RX ADMIN — METHOCARBAMOL 250 MG: 500 TABLET ORAL at 02:00

## 2023-05-19 RX ADMIN — METHOCARBAMOL 250 MG: 500 TABLET ORAL at 05:41

## 2023-05-19 RX ADMIN — SENNOSIDES 8.6 MG: 8.6 TABLET, FILM COATED ORAL at 08:48

## 2023-05-19 RX ADMIN — ACETAMINOPHEN 975 MG: 325 TABLET ORAL at 02:00

## 2023-05-19 NOTE — PHYSICAL THERAPY NOTE
PHYSICAL THERAPY NOTE          Patient Name: Joey Gardiner  NDIHG'W Date: 5/19/2023 05/19/23 1039   PT Last Visit   PT Visit Date 05/19/23   Note Type   Note Type Treatment   Restrictions/Precautions   Weight Bearing Precautions Per Order No   Other Precautions Telemetry; Fall Risk;Cardiac/sternal   General   Chart Reviewed Yes   Family/Caregiver Present No   Cognition   Overall Cognitive Status WFL   Arousal/Participation Alert; Cooperative   Attention Within functional limits   Orientation Level Oriented X4   Memory Within functional limits   Following Commands Follows all commands and directions without difficulty   Comments Patient pleasant and cooperative   Subjective   Subjective Patient agreeable to mobilize   Bed Mobility   Supine to Sit Unable to assess   Sit to Supine Unable to assess   Additional Comments Patient in bedside chair on arrival   Transfers   Sit to Stand 5  Supervision   Additional items Assist x 1   Stand to Sit 5  Supervision   Additional items Assist x 1   Additional Comments with RW   Ambulation/Elevation   Gait pattern Step through pattern   Gait Assistance 5  Supervision   Additional items Assist x 1   Assistive Device Rolling walker   Distance 200'x2  (seated rest and stair training between gait trials)   Stair Management Assistance 5  Supervision   Additional items Assist x 1;Verbal cues   Stair Management Technique One rail L;Step to pattern   Number of Stairs 15  (1 step x15)   Ambulation/Elevation Additional Comments improved gait mechanics, increased fluidity of movement   Notable SOB following stair training requiring seated rest period   Balance   Static Sitting Good   Dynamic Sitting Fair +   Static Standing Fair   Dynamic Standing Fair -   Ambulatory Poor +   Endurance Deficit   Endurance Deficit Yes   Activity Tolerance   Activity Tolerance Patient limited by fatigue   Nurse Made Aware RN cleared patient for therapy   Exercises   Glute Sets Sitting;20 reps   Hip Flexion Sitting;20 reps;AROM; Bilateral   Hip Abduction Sitting;20 reps;AROM; Bilateral   Hip Adduction Sitting;20 reps;AROM; Bilateral   Knee AROM Long Arc Quad Sitting;20 reps;AROM; Bilateral   Ankle Pumps Sitting;20 reps;AROM; Bilateral   Squat Standing;15 reps;AROM  (minisquat)   Marching Standing;20 reps;AROM; Bilateral   Assessment   Prognosis Good   Problem List Decreased strength;Decreased endurance; Impaired balance;Decreased mobility; Decreased coordination;Pain   Assessment Patient received in bedside chair  Patient agreeable to therapy  Patient performs functional transfers with Supervision  Patient performs ambulation with Supervision 200'x2 using RW  Patient also performs 15 steps using LHR (1 stepx15 nonreciprocal)  Patient experiences SOB following stair training, requiring seated rest period  Patient also performs standing/seated LE strengthening with education for HEP  Patient left in bedside chair with all needs met and call bell/personal items within reach  The patient's AM-PAC Basic Mobility Inpatient Short Form Raw Score is 18 showing further need for skilled PT services in order to improve functional mobility, decrease need for assistance, and return to PLOF  A Raw score of greater than or equal to 16 suggests the patient may benefit from discharge to home  PT continues to recommend home with home health on d/c  Will continue to follow as able  Barriers to Discharge Inaccessible home environment;Decreased caregiver support   Goals   Patient Goals to go home   PT Treatment Day 1   Plan   Treatment/Interventions ADL retraining;Functional transfer training;LE strengthening/ROM; Elevations; Endurance training; Therapeutic exercise;Patient/family training;Equipment eval/education; Bed mobility;Gait training;Spoke to nursing;Spoke to case management;OT   Progress Progressing toward goals   PT Frequency   (3-6x)   Recommendation   PT Discharge Recommendation Home with home health rehabilitation   [place] walker   [address] [place] [address] Mobility Inpatient   Turning in Flat Bed Without Bedrails 3   Lying on Back to Sitting on Edge of Flat Bed Without Bedrails 3   Moving Bed to Chair 3   Standing Up From Chair Using Arms 3   Walk in Room 3   Climb 3-5 Stairs With Railing 3   Basic Mobility Inpatient Raw Score 18   Basic Mobility Standardized Score 41 05   Highest Level Of Mobility   JH-HLM Goal 6: Walk 10 steps or more   JH-HLM Achieved 7: Walk 25 feet or more   Education   Education Provided Home exercise program   Patient Demonstrates acceptance/verbal understanding   End of Consult   Patient Position at End of Consult Bedside chair; All needs within reach       Tamia Elder, PT, DPT

## 2023-05-19 NOTE — PROGRESS NOTES
Progress Note - Acute Pain Service    Juve Kumar 75 y o  male MRN: [id number]  Unit/Bed#: [facility] 427-01 Encounter: [id number]      Assessment:   Josselyn Cm a 75 y  o  male with a past medical history significant for COPD, osteoarthritis, MTHFR gene mutation, DVT, inflammatory polyarthropathy, peripheral arterial disease status post femoral popliteal bypass and chronic opioid dependence with transdermal buprenorphine in the setting of chronic lumbar back pain secondary to disc herniation status post surgical repair who follows with  SPA for chronic pain   Patient presents with right upper lung nodule concerning for malignancy on PET CT associated with bilateral hilar and mediastinal nodes with mild FDG uptake, hypermetabolic liver lesion   Acute pain service was consulted for postoperative pain management in the setting of chronic opioid dependence      On evaluation,  patient is resting comfortably in chair on room air  Patient refers significant improvement in pain over the past 24 hours  He states he feels he is ready to go home      Plan:   Continue acetaminophen 975 mg p o  every 6 hours scheduled  Continue  buprenorphine transdermal patch at home dose weekly  Decrease oxycodone to 2 5 mg p o  every 4 hours as needed for moderate pain  Decrease oxycodone to 5 mg p o  every 4 hours as needed for severe pain  Discontinue intravenous hydromorphone  Continue methocarbamol 250 mg p o  every 6 hours scheduled  Continue lidocaine patch to chest wall 12 hours on/12 hours off  Bowel regimen per primary team to avoid opioid-induced constipation  Encourage incentive spirometry    On discharge, recommend continuing transdermal buprenorphine, acetaminophen, methocarbamol and lidocaine as above  Would recommend oxycodone 5 mg every 6 hours as needed for moderate or severe pain for short course with close follow-up  Patient was instructed to not drink alcohol or drive while on oxycodone    He was recommended "to continue bowel regimen while on opioid analgesics  APS will sign off at this time  Thank you for the consult  All opioids and other analgesics to be written at discretion of primary team  Please contact Acute Pain Service - [facility] via [employer] from [phone] with additional questions or concerns  See Susana or David for additional contacts and after hours information  Pain History  Current pain location(s): Right chest wall  Pain Scale:   0-1  Quality: Sore, sharp  24 hour history: Opioid regimen was increased slightly yesterday  Patient is remained hemodynamically stable and afebrile  Patient is not requiring supplemental oxygen  Labs from today significant for WBC 17 18 with absolute neutrophilia and absolute monocytosis  Chest x-ray from yesterday with small right hydropneumothorax and trace left effusion  Patient has had 1 bowel movement over the past 24 hours  Opioid requirement previous 24 hours:  Oxycodone 15 mg p o  Meds/Allergies   all current active meds have been reviewed    Allergies   Allergen Reactions   • Cyanocobalamin [Vitamin B12] GI Intolerance   • Gabapentin Other (See Comments) and GI Intolerance     Difficulty walking   • Morphine Other (See Comments)     \"acute constipation\"   • Tramadol Confusion   • Nsaids Other (See Comments)     Pt reports avoids as is on Eliquis       Objective     Temp:  [98 2 °F (36 8 °C)-98 7 °F (37 1 °C)] 98 5 °F (36 9 °C)  HR:  [69-77] 70  Resp:  [16-20] 20  BP: (101-108)/(64-69) 103/67    Physical Exam  Vitals and nursing note reviewed  Constitutional:       General: He is not in acute distress  Appearance: Normal appearance  He is not ill-appearing or toxic-appearing  HENT:      Head: Normocephalic and atraumatic  Eyes:      General: No scleral icterus  Conjunctiva/sclera: Conjunctivae normal    Cardiovascular:      Rate and Rhythm: Normal rate  Pulmonary:      Effort: Pulmonary effort is normal  No respiratory distress        " Comments: RA  IS 1000 cc  Chest:      Chest wall: Tenderness present  Skin:     General: Skin is warm and dry  Neurological:      Mental Status: He is alert  Comments: Awake, alert and oriented to person place time situation  POSS 1   Psychiatric:         Thought Content: Thought content normal          Lab Results:   Results from last 7 days   Lab Units 05/19/23  0533   WBC Thousand/uL 17 18*   HEMOGLOBIN g/dL 14 9   HEMATOCRIT % 44 6   PLATELETS Thousands/uL 299      Results from last 7 days   Lab Units 05/18/23  0459   POTASSIUM mmol/L 3 9   CHLORIDE mmol/L 110*   CO2 mmol/L 25   BUN mg/dL 11   CREATININE mg/dL 0 71   CALCIUM mg/dL 9 1       Imaging Studies: I have personally reviewed pertinent reports  EKG, Pathology, and Other Studies: I have personally reviewed pertinent reports  Please note that the APS provides consultative services regarding pain management only  With the exception of ketamine and epidural infusions and except when indicated, final decisions regarding starting or changing doses of analgesic medications are at the discretion of the consulting service  Off hours consultation and/or medication management is generally not available      Beverley Cutter, MD  Acute Pain Service

## 2023-05-19 NOTE — PLAN OF CARE
Problem: PHYSICAL THERAPY ADULT  Goal: Performs mobility at highest level of function for planned discharge setting  See evaluation for individualized goals  Description: Treatment/Interventions: ADL retraining, Functional transfer training, LE strengthening/ROM, Elevations, Endurance training, Therapeutic exercise, Patient/family training, Equipment eval/education, Bed mobility, Gait training, Spoke to nursing, Spoke to case management, OT  Equipment Recommended: Teo Grant       See flowsheet documentation for full assessment, interventions and recommendations  Outcome: Progressing  Note: Prognosis: Good  Problem List: Decreased strength, Decreased endurance, Impaired balance, Decreased mobility, Decreased coordination, Pain  Assessment: Patient received in bedside chair  Patient agreeable to therapy  Patient performs functional transfers with Supervision  Patient performs ambulation with Supervision 200'x2 using RW  Patient also performs 15 steps using LHR (1 stepx15 nonreciprocal)  Patient experiences SOB following stair training, requiring seated rest period  Patient also performs standing/seated LE strengthening with education for HEP  Patient left in bedside chair with all needs met and call bell/personal items within reach  The patient's AM-PAC Basic Mobility Inpatient Short Form Raw Score is 18 showing further need for skilled PT services in order to improve functional mobility, decrease need for assistance, and return to PLOF  A Raw score of greater than or equal to 16 suggests the patient may benefit from discharge to home  PT continues to recommend home with home health on d/c  Will continue to follow as able  Barriers to Discharge: Inaccessible home environment, Decreased caregiver support     PT Discharge Recommendation: Home with home health rehabilitation    See flowsheet documentation for full assessment

## 2023-05-19 NOTE — DISCHARGE SUMMARY
Discharge Summary - Thoracic Surgery   Khang Otoole 75 y o  male MRN: [id number]  Unit/Bed#: [facility] 427-01 Encounter: [id number]    Admission Date:   Admission Orders (From admission, onward)     Ordered        05/17/23 1053  Inpatient Admission  Once                         Discharge Date: 5/19/2023    Admitting Diagnosis: Right upper lobe pulmonary nodule [R91 1]    Discharge Diagnosis: Right upper lobe pulmonary nodule    Medical Problems     Resolved Problems  Date Reviewed: 5/17/2023   None         Attending: Dr Paris Ma Physician(s): JONATHAN    Procedures Performed: No orders of the defined types were placed in this encounter  Pathology: Intra-operative, Right upper lobe wedge resection, Atypical growth along alveolar spaces, atypical adenomatous hyperplasia vs  Adenocarcinoma in situ, No definite invasive adenocarcinoma on representative sections, Additional levels examined     Hospital Course:  Mr Mercedes Mcnulty was admitted electively on 5/17/2023 for a thoracoscopic right upper lobe wedge resection  Intra-operative pathology showed atypical adenomatous hyperplasia vs adenocarcinoma in situ  He had an uncomplicated hospital course  Chest tube was removed on POD1 without complication  He opted to stay hospitalized due to pain control and was seen by APS  His pain was well controlled on POD2 and he was discharged to home with outpatient follow-up  Condition at Discharge: good     Discharge instructions/Information to patient and family:   See after visit summary for information provided to patient and family  Provisions for Follow-Up Care:  See after visit summary for information related to follow-up care and any pertinent home health orders  Disposition: Home          Planned Readmission: No    Discharge Statement   I spent 10 minutes discharging the patient  This time was spent on the day of discharge  I had direct contact with the patient on the day of discharge   Additional documentation is required if more than 30 minutes were spent on discharge  Discharge Medications:  See after visit summary for reconciled discharge medications provided to patient and family

## 2023-05-19 NOTE — DISCHARGE INSTR - AVS FIRST PAGE
Gently wash your incisions daily with soap and water, do not soak in a tub  Do not apply any lotions, creams, or ointments to incisions  No lifting over 10 lbs or strenuous exercise  No driving until seen at your post operative visit  The blue stitch will be removed at your post operative visit  Please obtain a pa/lat chest xray at a [facility] within 3 days of your follow up visit  Please call the office first if you have any questions or concerns during your post op period, prior to going to the emergency room or urgent care       Pain medications:  Acetaminophen 975mg every 6 hours for one week  Continue your home buprenorphine transdermal patch   Methocarbamol 250mg every 6 hours   Lidocaine patch to chest wall 12 hours on/12 hours off

## 2023-05-19 NOTE — PROGRESS NOTES
Progress Note - Thoracic Surgery  : ESPERANZA Thoracic Surgery Resident on [address] 75 y o  male MRN: [id number]  Unit/Bed#: [facility] 427-01 Encounter: [id number]    IS: 1L    Assessment:  75 y o  male POD 2 s/p RUL wedge resection, CT removed yesterday, patient stayed for pain control      Plan: Will discuss analgesic de-escalation and discharge analgesia plan with APS team  Hopefully home today  Regular diet, off IVF  OOB, ambulate, PT/OT, IS  Lovenox dvt ppx        Subjective: Endorses ongoing post-surgical pain      Objective:     Physical Exam:  GEN: NAD   Ab: Soft, NT/ND  Lung: Normal effort on RA, incisions CDI  CV: RRR   Extrem: No CCE   Neuro: A+Ox3       I/O       05/17 0701 05/18 0700 05/18 0701 05/19 0700    I V  (mL/kg) 2000 (23 6)     IV Piggyback 50     Total Intake(mL/kg) 2050 (24 2)     Urine (mL/kg/hr) 1975 (1) 776 (0 4)    Stool  0    Blood 50     Chest Tube 50     Total Output 2075 776    Net -25 -776          Unmeasured Stool Occurrence  1 x          Lab, Imaging and other studies: I have personally reviewed pertinent reports    , CBC with diff:   Lab Results   Component Value Date    WBC 24 95 (H) 05/18/2023    HGB 14 6 05/18/2023    HCT 43 1 05/18/2023    MCV 96 05/18/2023     05/18/2023    MCH 32 5 05/18/2023    MCHC 33 9 05/18/2023    RDW 14 1 05/18/2023    MPV 9 8 05/18/2023   , BMP/CMP:   Lab Results   Component Value Date    SODIUM 137 05/18/2023    K 3 9 05/18/2023     (H) 05/18/2023    CO2 25 05/18/2023    BUN 11 05/18/2023    CREATININE 0 71 05/18/2023    CALCIUM 9 1 05/18/2023    EGFR 96 05/18/2023         VTE Pharmacologic Prophylaxis: Enoxaparin (Lovenox)        April Duran MD  5/19/2023 1:33 AM

## 2023-05-22 ENCOUNTER — OFFICE VISIT (OUTPATIENT)
Dept: INTERNAL MEDICINE CLINIC | Facility: OTHER | Age: 68
End: 2023-05-22

## 2023-05-22 ENCOUNTER — TELEPHONE (OUTPATIENT)
Dept: INTERNAL MEDICINE CLINIC | Facility: OTHER | Age: 68
End: 2023-05-22

## 2023-05-22 ENCOUNTER — TRANSITIONAL CARE MANAGEMENT (OUTPATIENT)
Dept: INTERNAL MEDICINE CLINIC | Facility: OTHER | Age: 68
End: 2023-05-22

## 2023-05-22 VITALS — WEIGHT: 188 LBS | BODY MASS INDEX: 23.38 KG/M2 | HEIGHT: 75 IN

## 2023-05-22 DIAGNOSIS — R91.8 MULTIPLE SUBSOLID LUNG NODULES GREATER THAN 6 MM IN DIAMETER: ICD-10-CM

## 2023-05-22 DIAGNOSIS — F11.20 UNCOMPLICATED OPIOID DEPENDENCE (HCC): ICD-10-CM

## 2023-05-22 DIAGNOSIS — R91.1 RIGHT UPPER LOBE PULMONARY NODULE: Primary | ICD-10-CM

## 2023-05-22 DIAGNOSIS — J95.811 PNEUMOTHORAX AFTER BIOPSY: ICD-10-CM

## 2023-05-22 NOTE — PROGRESS NOTES
Virtual TCM Visit:    Verification of patient location:    Patient is located at Home in the following state in which I hold an active license [place]    Assessment/Plan:        Problem List Items Addressed This Visit        Respiratory    Multiple subsolid lung nodules greater than 6 mm in diameter    Right upper lobe pulmonary nodule - Primary     Status post right upper lobe pulmonary nodule wedge resection  Tolerated procedure well  Pain is currently under control on the current pain regimen  He has follow-up scheduled with cardiothoracic surgery  RESOLVED: Pneumothorax after biopsy       Other    Uncomplicated opioid dependence ([facility] [address] )        Reason for visit is transition of care    Encounter provider Vida Monge MD     Provider located at [address] [place] [address]    Recent Visits  No visits were found meeting these conditions  Showing recent visits within past 7 days and meeting all other requirements  Today's Visits  Date Type Provider Dept   05/22/23 Office Visit Vida Monge MD [facility]   05/22/23 Telephone Satira Back Christoff [facility]   Showing today's visits and meeting all other requirements  Future Appointments  No visits were found meeting these conditions  Showing future appointments within next 150 days and meeting all other requirements       After connecting through [employer], the patient was identified by name and date of birth  Jesika Ibarra was informed that this is a telemedicine visit and that the visit is being conducted through Telephone  My office door was closed  No one else was in the room  He acknowledged consent and understanding of privacy and security of the video platform  The patient has agreed to participate and understands they can discontinue the visit at any time      Patient is aware this is a billable service  Transitional Care Management Review:  Shabnam Hillman is a 75 y o  male here for TCM follow up  During the TCM phone call patient stated:    TCM Call     Date and time call was made  5/22/2023  9:19 AM    Hospital care reviewed  Records reviewed    Patient was hospitialized at  [facility]    Date of Admission  05/17/23    Date of discharge  05/19/23    Diagnosis  RUL pulmonary nodule    Disposition  Home    Current Symptoms  Headache; Cough  cough from PND      TCM Call     Post hospital issues  Reduced activity    Scheduled for follow up? Yes    Did you obtain your prescribed medications  Yes    Do you need help managing your prescriptions or medications  No    Is transportation to your appointment needed  No    I have advised the patient to call PCP with any new or worsening symptoms  Monroe King CMA        Subjective:     Patient ID: Shabnam Hillman is a 75 y o  male  59-year-old male is seen today for transition of care to which he was hospitalized at [place] [address] from 5/17 through 5/19/2023  Hospitalization and discharge summary reviewed today  He was admitted for elective thorascopic right upper lung wedge resection  Intraoperative pathology showed atypical adenomatous hyperplasia versus adenocarcinoma in situ  Hospitalization was uncomplicated  A chest tube was placed intraoperatively however removed on postoperative day 1 without complication  He opted to stay an additional hospitalization today due to pain  Acute pain services was consulted during hospitalization  He remained hemodynamically stable and was discharged on postoperative day 2  Review of Systems   Constitutional: Negative for activity change, appetite change, chills, diaphoresis, fatigue and fever  HENT: Negative for congestion, postnasal drip, rhinorrhea, sinus pressure, sinus pain, sneezing and sore throat  Eyes: Negative for visual disturbance  "  Respiratory: Negative for apnea, cough, choking, chest tightness, shortness of breath and wheezing  Cardiovascular: Negative for chest pain, palpitations and leg swelling  Gastrointestinal: Negative for abdominal distention, abdominal pain, anal bleeding, blood in stool, constipation, diarrhea, nausea and vomiting  Endocrine: Negative for cold intolerance and heat intolerance  Genitourinary: Negative for difficulty urinating, dysuria and hematuria  Musculoskeletal: Negative  Skin: Negative  Neurological: Negative for dizziness, weakness, light-headedness, numbness and headaches  Hematological: Negative for adenopathy  Psychiatric/Behavioral: Negative for agitation, sleep disturbance and suicidal ideas  All other systems reviewed and are negative  Objective:    Vitals:    05/22/23 1523   Weight: 85 3 kg (188 lb)   Height: 6' 3\" (1 905 m)       Physical Exam  Vitals and nursing note reviewed  It was my intent to perform this visit via video technology but the patient was not able to do a video connection so the visit was completed via audio telephone only  Medications have been reviewed by provider in current encounter    I spent 25 minutes with the patient during this visit  Gisella Kaiser MD    VIRTUAL VISIT [id number] verbally agrees to participate in [facility]  Pt is aware that [facility] could be limited without vital signs or the ability to perform a full hands-on physical Elvan Barefoot understands he or the provider may request at any time to terminate the video visit and request the patient to seek care or treatment in person        "

## 2023-05-22 NOTE — TELEPHONE ENCOUNTER
LMOM for pt to call me at [facility] office    Please schedule TCM on or before 6/2

## 2023-05-22 NOTE — ASSESSMENT & PLAN NOTE
Status post right upper lobe pulmonary nodule wedge resection  Tolerated procedure well  Pain is currently under control on the current pain regimen  He has follow-up scheduled with cardiothoracic surgery

## 2023-05-23 ENCOUNTER — TELEPHONE (OUTPATIENT)
Dept: INTERNAL MEDICINE CLINIC | Facility: OTHER | Age: 68
End: 2023-05-23

## 2023-05-23 DIAGNOSIS — K76.9 LIVER LESION: Primary | ICD-10-CM

## 2023-05-23 NOTE — TELEPHONE ENCOUNTER
Patient is calling in because he forgot to mention yesterday during his appointment that the surgeon mentioned something about a cyst on his liver    He asked if you could look into that and give him a call

## 2023-05-24 ENCOUNTER — TELEPHONE (OUTPATIENT)
Dept: HEMATOLOGY ONCOLOGY | Facility: CLINIC | Age: 68
End: 2023-05-24

## 2023-05-24 NOTE — TELEPHONE ENCOUNTER
Spoke with patient  He has an abdominal MRI scheduled in July, we will try to help move this up  He is otherwise doing pretty well after surgery  He took oxycodone at night to help him sleep but is no longer taking it  He has mild soreness

## 2023-05-24 NOTE — TELEPHONE ENCOUNTER
Patient Call    Who are you speaking with? Patient    If it is not the patient, are they listed on an active communication consent form? N/A   What is the reason for this call? Patient calling in stating that [facility] topic they discussed at his last office visit  The patient states that he is also off of the pain medication as he does not need it  Does this require a call back? Yes   If a call back is required, please list best call back number [phone]   If a call back is required, advise that a message will be forwarded to their care team and someone will return their call as soon as possible  Did you relay this information to the patient?  Yes

## 2023-05-31 ENCOUNTER — HOSPITAL ENCOUNTER (OUTPATIENT)
Dept: RADIOLOGY | Facility: IMAGING CENTER | Age: 68
Discharge: HOME | End: 2023-05-31
Payer: COMMERCIAL

## 2023-05-31 ENCOUNTER — TELEPHONE (OUTPATIENT)
Dept: CARDIAC SURGERY | Facility: CLINIC | Age: 68
End: 2023-05-31

## 2023-05-31 DIAGNOSIS — R91.1 RIGHT UPPER LOBE PULMONARY NODULE: ICD-10-CM

## 2023-05-31 PROCEDURE — 71046 X-RAY EXAM CHEST 2 VIEWS: CPT

## 2023-05-31 NOTE — TELEPHONE ENCOUNTER
Spoke to pt in regards to appointment with Dr Wilner Renee on 06/01/23  I informed pt that he will need to get a CXR done before his appointment  He states he forgot about that and he will run and get it done today  Pt was appreciative of the reminder call

## 2023-06-01 ENCOUNTER — OFFICE VISIT (OUTPATIENT)
Dept: CARDIAC SURGERY | Facility: CLINIC | Age: 68
End: 2023-06-01

## 2023-06-01 ENCOUNTER — DOCUMENTATION (OUTPATIENT)
Dept: CARDIAC SURGERY | Facility: CLINIC | Age: 68
End: 2023-06-01

## 2023-06-01 VITALS
HEIGHT: 75 IN | RESPIRATION RATE: 17 BRPM | BODY MASS INDEX: 23.27 KG/M2 | OXYGEN SATURATION: 95 % | TEMPERATURE: 98.5 F | SYSTOLIC BLOOD PRESSURE: 117 MMHG | WEIGHT: 187.17 LBS | DIASTOLIC BLOOD PRESSURE: 76 MMHG | HEART RATE: 83 BPM

## 2023-06-01 DIAGNOSIS — K76.89 LIVER CYST: ICD-10-CM

## 2023-06-01 DIAGNOSIS — C34.91 ADENOCARCINOMA OF RIGHT LUNG (HCC): Primary | ICD-10-CM

## 2023-06-01 NOTE — PROGRESS NOTES
I met with Raj Ahmadi at his visit with Dr Dulce Sketlon today  I'm familiar with him from prior visits   Questions answered,

## 2023-06-01 NOTE — ASSESSMENT & PLAN NOTE
Sylvain Cedeno is progressing from a thoracic surgery standpoint  His incisions are healing well  His chest xray reveals resolution of previous pneumothorax, but no significant effusion  His pathology was consistent with stage IA1, and therefore does not need any further treatment or surgery  We will see him back in 1 month for his 2nd post op visit  He is in agreement with the plan   We will also schedule him for an appointment with Dr Josselyn German for his liver cyst

## 2023-06-01 NOTE — PROGRESS NOTES
Assessment/Plan:    Adenocarcinoma of right lung ([facility] [address] )  Sasha Lott is progressing from a thoracic surgery standpoint  His incisions are healing well  His chest xray reveals resolution of previous pneumothorax, but no significant effusion  His pathology was consistent with stage IA1, and therefore does not need any further treatment or surgery  We will see him back in 1 month for his 2nd post op visit  He is in agreement with the plan  We will also schedule him for an appointment with Dr Steven Bansal for his liver cyst         Diagnoses and all orders for this visit:    Adenocarcinoma of right lung [facility])    Liver cyst  -     Ambulatory Referral to Surgical Oncology; Future      Thoracic History      Diagnosis: Right upper lobe adenocarcinoma stage IA  Procedure: Flexible bronchoscopy, right thoracoscopic upper lobe therapeutic wedge resection 5/17/23  Pathology: right upper lobe revealed non mucinous minimally invasive adenocarcinoma, G1 well differentiated, measuring 1 x 1 x 0 8 cm  No visceral pleural invasion  No LV invasion  All margins were negative  All 7 LN - for tumor  (T1mi, pN0, M0, G1)  Additional findings were atypical adenomatous hyperplasia  Cancer Staging   Adenocarcinoma of right lung [facility])  Staging form: Lung, AJCC 8th Edition  - Clinical stage from 5/17/2023: Stage IA1 (cT1mi, cN0, cM0) - Signed by Amanda Francis PA-C on 6/1/2023  Histopathologic type: Adenocarcinoma, NOS  Stage prefix: Initial diagnosis  Histologic grade (G): G1  Histologic grading system: 4 grade system  Laterality: Right  Tumor size (mm): 10  Lymph-vascular invasion (LVI): LVI not present (absent)/not identified  Specimen type: Excision    No matching staging information was found for the patient    Oncology History   Adenocarcinoma of right lung ([facility] [address] )   5/17/2023 Surgery    Flexible bronchoscopy, right thoracoscopic therapeutic upper lobe wedge resection, MLND      5/17/2023 -  Cancer Staged    Staging form: Lung, AJCC 8th Edition  - Clinical stage from 5/17/2023: Stage IA1 (cT1mi, cN0, cM0) - Signed by Idania Wiggins PA-C on 6/1/2023  Histopathologic type: Adenocarcinoma, NOS  Stage prefix: Initial diagnosis  Histologic grade (G): G1  Histologic grading system: 4 grade system  Laterality: Right  Tumor size (mm): 10  Lymph-vascular invasion (LVI): LVI not present (absent)/not identified  Specimen type: Excision       6/1/2023 Initial Diagnosis    Adenocarcinoma of right lung [facility])             Patient ID: Socorro Childs is a 75 y o  male  ECOG 1   HPI     Hannah Stark is a 76 yo gentleman who underwent a right thoracoscopic upper lobe wedge resection on 5/17/23 , in which intra op path revealed hyperplasia vs adenocarcinoma in situ so a completion lobectomy was not performed  His hospital stay was uneventful, besides consulting APS  He was discharged on 5/19/23  His pathology was consistent with stage IA1 adenocarcinoma with an invasive component of only 5 mm  He returns today for his first follow up visit with a cxr from 5/31/23, which revealed resolution of the previous pneumothorax, but interval development of pleural thickening / loculated fluid in the right upper hemithorax and a trace pleural effusion  On discussion, he is having some pain and only taking Tylenol 1-2x/ day  He had chronic PND, which causes a clear mucous producing cough  He denies fever, chills, chest pain  The following portions of the patient's history were reviewed and updated as appropriate: allergies, current medications, past family history, past medical history, past social history, past surgical history and problem list     Review of Systems      Objective:   Physical Exam  Vitals reviewed  Constitutional:       General: He is not in acute distress  Appearance: Normal appearance  He is well-developed  He is not ill-appearing or diaphoretic  HENT:      Head: Normocephalic and atraumatic        Mouth/Throat:      Comments: Masked   Eyes: " General: No scleral icterus  Extraocular Movements: Extraocular movements intact  Comments: Glasses     Neck:      Trachea: No tracheal deviation  Cardiovascular:      Rate and Rhythm: Normal rate and regular rhythm  Heart sounds: Normal heart sounds  No murmur heard  Pulmonary:      Effort: Pulmonary effort is normal  No respiratory distress  Breath sounds: Normal breath sounds  No wheezing  Abdominal:      Palpations: Abdomen is soft  Musculoskeletal:         General: Normal range of motion  Cervical back: Normal range of motion and neck supple  Right lower leg: No edema  Left lower leg: No edema  Comments: Uses a cane for back/hip/knee pain  Lymphadenopathy:      Cervical: No cervical adenopathy  Skin:     General: Skin is warm and dry  Comments: Right thoracoscopic incisions healing well  1 prolene suture removed  Neurological:      Mental Status: He is alert and oriented to person, place, and time  Cranial Nerves: No cranial nerve deficit  Gait: Gait abnormal    Psychiatric:         Mood and Affect: Mood normal          Behavior: Behavior normal          Thought Content: Thought content normal      /76 (BP Location: Left arm, Patient Position: Sitting, Cuff Size: Standard)   Pulse 83   Temp 98 5 °F (36 9 °C) (Temporal)   Resp 17   Ht 6' 3\" (1 905 m)   Wt 84 9 kg (187 lb 2 7 oz)   SpO2 95%   BMI 23 39 kg/m²     XR chest pa & lateral    Result Date: 5/31/2023  Impression Interval resolution of previous pneumothorax  Interval development of pleural thickening/loculated fluid in the lateral right upper hemithorax  Trace right pleural effusion   Workstation performed: [id number]       "

## 2023-06-01 NOTE — LETTER
June 1, 2023     [place], [address] [facility]    Patient: Porfirio Tran   YOB: 1955   Date of Visit: 6/1/2023       Dear Dr Naveen Freeman:    Thank you for referring Ethyl Rudyard to me for evaluation  Below are my notes for this consultation  If you have questions, please do not hesitate to call me  I look forward to following your patient along with you  Sincerely,        Getachew Gordillo MD        CC: No Recipients    Nneka Carty PA-C  6/1/2023 10:03 AM  Attested  Assessment/Plan:    Adenocarcinoma of right lung ([address] )  Kal Peter is progressing from a thoracic surgery standpoint  His incisions are healing well  His chest xray reveals resolution of previous pneumothorax, but no significant effusion  His pathology was consistent with stage IA1, and therefore does not need any further treatment or surgery  We will see him back in 1 month for his 2nd post op visit  He is in agreement with the plan  We will also schedule him for an appointment with Dr Machelle James for his liver cyst        Diagnoses and all orders for this visit:    Adenocarcinoma of right lung [facility])    Liver cyst  -     Ambulatory Referral to Surgical Oncology; Future     Thoracic History      Diagnosis: Right upper lobe adenocarcinoma stage IA  Procedure: Flexible bronchoscopy, right thoracoscopic upper lobe therapeutic wedge resection 5/17/23  Pathology: right upper lobe revealed non mucinous minimally invasive adenocarcinoma, G1 well differentiated, measuring 1 x 1 x 0 8 cm  No visceral pleural invasion  No LV invasion  All margins were negative  All 7 LN - for tumor  (T1mi, pN0, M0, G1)  Additional findings were atypical adenomatous hyperplasia  Cancer Staging   Adenocarcinoma of right lung [facility])  Staging form: Lung, AJCC 8th Edition  - Clinical stage from 5/17/2023: Stage IA1 (cT1mi, cN0, cM0) - Signed by Nneka Carty PA-C on 6/1/2023  Histopathologic type:  Adenocarcinoma, NOS  Stage prefix: Initial diagnosis  Histologic grade (G): G1  Histologic grading system: 4 grade system  Laterality: Right  Tumor size (mm): 10  Lymph-vascular invasion (LVI): LVI not present (absent)/not identified  Specimen type: Excision    No matching staging information was found for the patient  Oncology History   Adenocarcinoma of right lung ([facility] [address] )   5/17/2023 Surgery    Flexible bronchoscopy, right thoracoscopic therapeutic upper lobe wedge resection, MLND      5/17/2023 -  Cancer Staged    Staging form: Lung, AJCC 8th Edition  - Clinical stage from 5/17/2023: Stage IA1 (cT1mi, cN0, cM0) - Signed by Kareen Perdomo PA-C on 6/1/2023  Histopathologic type: Adenocarcinoma, NOS  Stage prefix: Initial diagnosis  Histologic grade (G): G1  Histologic grading system: 4 grade system  Laterality: Right  Tumor size (mm): 10  Lymph-vascular invasion (LVI): LVI not present (absent)/not identified  Specimen type: Excision       6/1/2023 Initial Diagnosis    Adenocarcinoma of right lung [facility])           Patient ID: Triny Landeros is a 75 y o  male  ECOG 1   HPI     Albino Moore is a 76 yo gentleman who underwent a right thoracoscopic upper lobe wedge resection on 5/17/23 , in which intra op path revealed hyperplasia vs adenocarcinoma in situ so a completion lobectomy was not performed  His hospital stay was uneventful, besides consulting APS  He was discharged on 5/19/23  His pathology was consistent with stage IA1 adenocarcinoma with an invasive component of only 5 mm  He returns today for his first follow up visit with a cxr from 5/31/23, which revealed resolution of the previous pneumothorax, but interval development of pleural thickening / loculated fluid in the right upper hemithorax and a trace pleural effusion  On discussion, he is having some pain and only taking Tylenol 1-2x/ day  He had chronic PND, which causes a clear mucous producing cough  He denies fever, chills, chest pain         The following portions of the "patient's history were reviewed and updated as appropriate: allergies, current medications, past family history, past medical history, past social history, past surgical history and problem list     Review of Systems     Objective:  Physical Exam  Vitals reviewed  Constitutional:       General: He is not in acute distress  Appearance: Normal appearance  He is well-developed  He is not ill-appearing or diaphoretic  HENT:      Head: Normocephalic and atraumatic  Mouth/Throat:      Comments: Masked   Eyes:      General: No scleral icterus  Extraocular Movements: Extraocular movements intact  Comments: Glasses     Neck:      Trachea: No tracheal deviation  Cardiovascular:      Rate and Rhythm: Normal rate and regular rhythm  Heart sounds: Normal heart sounds  No murmur heard  Pulmonary:      Effort: Pulmonary effort is normal  No respiratory distress  Breath sounds: Normal breath sounds  No wheezing  Abdominal:      Palpations: Abdomen is soft  Musculoskeletal:         General: Normal range of motion  Cervical back: Normal range of motion and neck supple  Right lower leg: No edema  Left lower leg: No edema  Comments: Uses a cane for back/hip/knee pain  Lymphadenopathy:      Cervical: No cervical adenopathy  Skin:     General: Skin is warm and dry  Comments: Right thoracoscopic incisions healing well  1 prolene suture removed  Neurological:      Mental Status: He is alert and oriented to person, place, and time  Cranial Nerves: No cranial nerve deficit  Gait: Gait abnormal    Psychiatric:         Mood and Affect: Mood normal          Behavior: Behavior normal          Thought Content:  Thought content normal      /76 (BP Location: Left arm, Patient Position: Sitting, Cuff Size: Standard)   Pulse 83   Temp 98 5 °F (36 9 °C) (Temporal)   Resp 17   Ht 6' 3\" (1 905 m)   Wt 84 9 kg (187 lb 2 7 oz)   SpO2 95%   BMI 23 39 kg/m²    XR " chest pa & lateral    Result Date: 5/31/2023  Impression Interval resolution of previous pneumothorax  Interval development of pleural thickening/loculated fluid in the lateral right upper hemithorax  Trace right pleural effusion  Workstation performed: [id number]       Attestation signed by Kisha Reed MD at 6/1/2023 10:41 AM:  I supervised the Advanced Practitioner  I discussed the case with the Advanced Practitioner, reviewed the note and agree  Mr Anurag Gupta returns to my office today for his first postoperative visit  Today in the office, the patient is doing well  He is taking occasional Tylenol for pain  I have personally reviewed his chest x-ray which demonstrates excellent expansion and no signs of any pleural effusion  Today in the office, we reviewed his pathology in detail  On his frozen specimen, this was believed to be AAH and possibly adenocarcinoma in situ  With that information, a wedge resection is the indicated procedure  On final pathology, a very small, 5 mm focus of invasive adenocarcinoma was identified  I discussed with the patient the risks and benefits of watching this after a wedge resection versus going back in and doing a completion lobectomy  I personally believe that this is a more than appropriate surgery for this patient and that there is no need for a completion lobectomy  I did explain to the patient that we will keep the patient on a surveillance schedule  At this time, the patient will come back and see me in another 4 weeks to ensure continued progress  He does not need a chest x-ray at that time  After that he will enter into surveillance      Martinez Magaña MD  Thoracic Surgery  (Available on [employer] Text)  Office: [phone]      Kisha Reed MD 06/01/23

## 2023-06-02 ENCOUNTER — DOCUMENTATION (OUTPATIENT)
Dept: HEMATOLOGY ONCOLOGY | Facility: CLINIC | Age: 68
End: 2023-06-02

## 2023-06-02 NOTE — PROGRESS NOTES
Intake received/ Chart reviewed for services completed outside of [facility]    Pathology completed: lung biopsy 5/17/2023    Imaging completed: PET 5/10/2023 MRI scheduled 7/13    All records needed are in patients chart  No records retrieval needed at this time

## 2023-06-05 ENCOUNTER — DOCUMENTATION (OUTPATIENT)
Dept: HEMATOLOGY ONCOLOGY | Facility: CLINIC | Age: 68
End: 2023-06-05

## 2023-06-05 NOTE — PROGRESS NOTES
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

## 2023-06-08 ENCOUNTER — OFFICE VISIT (OUTPATIENT)
Dept: PAIN MEDICINE | Facility: MEDICAL CENTER | Age: 68
End: 2023-06-08
Payer: COMMERCIAL

## 2023-06-08 VITALS
WEIGHT: 187 LBS | DIASTOLIC BLOOD PRESSURE: 70 MMHG | HEIGHT: 75 IN | HEART RATE: 83 BPM | SYSTOLIC BLOOD PRESSURE: 120 MMHG | BODY MASS INDEX: 23.25 KG/M2

## 2023-06-08 DIAGNOSIS — M25.511 CHRONIC PAIN OF BOTH SHOULDERS: ICD-10-CM

## 2023-06-08 DIAGNOSIS — G62.9 NEUROPATHY: ICD-10-CM

## 2023-06-08 DIAGNOSIS — G89.29 CHRONIC PAIN OF BOTH SHOULDERS: ICD-10-CM

## 2023-06-08 DIAGNOSIS — M54.16 LUMBAR RADICULOPATHY: ICD-10-CM

## 2023-06-08 DIAGNOSIS — G89.29 CHRONIC BILATERAL LOW BACK PAIN WITHOUT SCIATICA: ICD-10-CM

## 2023-06-08 DIAGNOSIS — F11.20 UNCOMPLICATED OPIOID DEPENDENCE (HCC): ICD-10-CM

## 2023-06-08 DIAGNOSIS — M54.50 CHRONIC BILATERAL LOW BACK PAIN WITHOUT SCIATICA: ICD-10-CM

## 2023-06-08 DIAGNOSIS — M25.512 CHRONIC PAIN OF BOTH SHOULDERS: ICD-10-CM

## 2023-06-08 DIAGNOSIS — G89.4 CHRONIC PAIN SYNDROME: Primary | ICD-10-CM

## 2023-06-08 PROCEDURE — 99214 OFFICE O/P EST MOD 30 MIN: CPT | Performed by: PHYSICIAN ASSISTANT

## 2023-06-08 RX ORDER — BUPRENORPHINE 5 UG/H
1 PATCH TRANSDERMAL WEEKLY
Qty: 4 PATCH | Refills: 2 | Status: SHIPPED | OUTPATIENT
Start: 2023-06-08

## 2023-06-08 NOTE — PROGRESS NOTES
Assessment:  1  Chronic pain syndrome    2  Uncomplicated opioid dependence ([address] )    3  Lumbar radiculopathy - Right    4  Chronic bilateral low back pain without sciatica    5  Chronic pain of both shoulders    6  Neuropathy        Plan:  While the patient was in the office today, I did have a thorough conversation regarding their chronic pain syndrome, medication management, and treatment plan options  The patient remains clinically stable and well-controlled with the Butrans 5 mcg patch every 7 days  Patient has no new symptoms or acute issues on today's visit  He was advised to call us if his pain should change or worsen  Otherwise I have provided refills for the next 3 months  He will continue to follow-up with his team of physicians  There are risks associated with opioid medications, including dependence, addiction and tolerance  The patient understands and agrees to use these medications only as prescribed  Potential side effects of the medications include, but are not limited to, constipation, drowsiness, addiction, impaired judgment and risk of fatal overdose if not taken as prescribed  The patient was warned against driving while taking sedation medications  Sharing medications is a felony  At this point in time, the patient is showing no signs of addiction, abuse, diversion or suicidal ideation  [place] Prescription Drug Monitoring Program report was reviewed and was appropriate     My impressions and treatment recommendations were discussed in detail with the patient who verbalized understanding and had no further questions  Discharge instructions were provided  I personally saw and examined the patient and I agree with the above discussed plan of care  No orders of the defined types were placed in this encounter      New Medications Ordered This Visit   Medications   • transdermal buprenorphine (BUTRANS) 5 mcg/hr TD patch     Sig: Place 1 patch on the skin over 7 days once a week For ongoing therapy     Dispense:  4 patch     Refill:  2     Do not fill before 06/28/23       History of Present Illness:  Cynthia Bay is a 75 y o  male who presents for a follow up office visit in regards to Back Pain  The patient’s current symptoms include chronic low back pain that he presently rates a 5-6 out of 10 on the pain scale describes it as a constant pain primarily on the right side with intermittent radiation to the right lower extremity  He has been maintained on Butrans 5 mcg q  7 days which is providing moderate improvement and without side effects  Since we last saw him, he underwent a right upper lobe therapeutic wedge resection with a diagnosis of right upper lobe adenocarcinoma  He is healing well from that  He has follow-up visits with his oncology team as well as his primary care doctor  Opioid contract date 3/6/2023    Last UDS Date 3/6/2023    I have personally reviewed and/or updated the patient's past medical history, past surgical history, family history, social history, current medications, allergies, and vital signs today  Review of Systems   Constitutional: Negative for chills and fever  HENT: Negative for ear pain and sore throat  Eyes: Negative for pain and visual disturbance  Respiratory: Negative for cough and shortness of breath  Cardiovascular: Positive for chest pain  Negative for palpitations  Gastrointestinal: Positive for constipation  Negative for abdominal pain and vomiting  Genitourinary: Negative for dysuria and hematuria  Musculoskeletal: Positive for arthralgias, back pain, gait problem and myalgias  Skin: Negative for color change and rash  Neurological: Positive for weakness and numbness  Negative for seizures and syncope  All other systems reviewed and are negative        Patient Active Problem List   Diagnosis   • Lumbar radiculopathy - Right   • Bone lesion   • Neuropathy   • Inflammatory polyarthropathy (HCC)   • "Tobacco abuse   • Colon cancer screening   • Right foot drop   • Lumbar spondylosis - Bilateral   • Vitamin D deficiency   • Thoracic spine pain   • Chronic pain syndrome   • Uncomplicated opioid dependence (HCC)   • Chronic pain of both knees   • Chronic pain of both shoulders   • Chronic pain of left ankle   • Multiple subsolid lung nodules greater than 6 mm in diameter   • Mixed hyperlipidemia   • 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HCC)   • Deep vein thrombosis (DVT) of femoral vein of left lower extremity (HCC)   • S/P femoral-popliteal bypass surgery   • Atherosclerosis of artery of left lower extremity (HCC)   • Chronic obstructive pulmonary disease, unspecified COPD type (HCC)   • Chronic right shoulder pain   • Embolism and thrombosis of arteries of the lower extremities (HCC)   • MTHFR mutation   • History of DVT (deep vein thrombosis)   • PAD (peripheral artery disease) (HCC)   • Right upper lobe pulmonary nodule   • Adenocarcinoma of right lung (HCC)       Past Medical History:   Diagnosis Date   • Arthritis feb 2016    osto back   • Chronic back pain    • Chronic narcotic dependence (HCC)    • Chronic pain disorder     Sees pain management Dr Nani Ibarra SL   • Cigarette smoker    • Depression    • Dry skin    • Dyslexia    • Full dentures     upper and no teeth lower/\"avoid raw vegetables and chewy food\"   • GERD (gastroesophageal reflux disease)    • 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 related   • History of blood clots     \"left leg\" takes Eliquis   • History of bronchitis    • History of pneumonia     childhood   • Increased pressure in the eye, bilateral    • L4-L5 disc bulge 06/16/2017   • Low back pain    • MTHFR mutation    • Muscle weakness    • Neuropathy     right foot and left foot   • Osteoarthritis    • Osteoarthritis     and\" if becomes cold joint pain worsens\"   • Peripheral neuropathy 8/ 15   • Pulmonary emphysema ([facility] [address] )     \"mild\"   • Right foot drop    • Shortness of breath     \"only on humid days\"   • " "Smokers' cough [facility])    • Wears glasses    • Work related injury 04/24/2018       Past Surgical History:   Procedure Laterality Date   • BACK SURGERY  aug 2015- jun2017 2017 no affect   • COLONOSCOPY     • EGD AND COLONOSCOPY N/A 06/29/2018    Procedure: EGD AND COLONOSCOPY;  Surgeon: Joanie Smith MD;  Location: [facility] GI LAB; Service: Gastroenterology   • EPIDURAL BLOCK INJECTION      needle   • HIP ARTHROSCOPY W/ LABRAL DEBRIDEMENT Left     \"hip surgery\"   • IR BIOPSY LUNG  4/17/2023   • IR CHEST TUBE PLACEMENT  4/17/2023   • IR LOWER EXTREMITY ANGIOGRAM  12/08/2021   • LUNG LOBECTOMY Right 5/17/2023    Procedure: lymph node dissection; PLEURAL LYSIS;  Surgeon: Tia Forbes MD;  [facility];  Service: Thoracic   • LUNG SEGMENTECTOMY Right 5/17/2023    Procedure: Right thoracoscopic upper lobe wedge resection;  Surgeon: Tia Forbes MD;  [facility];  Service: Thoracic   • ORTHOPEDIC SURGERY     • VA ARTHRS HIP DEBRIDEMENT/SHAVING ARTICULAR CRTLG Left 04/30/2018    Procedure: LEFT HIP ARTHROSCOPIC LABRAL DEBRIDEMENT;  Surgeon: Marlene Reed MD;  Location[facility];  Service: Orthopedics   • VA [address] INCL FLUOR GDNCE DX W/CELL WASHG [address] [facility] N/A 5/17/2023    Procedure: BRONCHOSCOPY FLEXIBLE;  Surgeon: Tia Forbes MD;  [facility];  Service: Thoracic   • VA IN-SITU VEIN BYPASS FEMORAL-POPLITEAL Right 12/08/2021    Procedure: R SFA to BK Pop Bypass using insitu GSV,  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;  Surgeon: Myra Martin MD;  [facility];  Service: Vascular   • VA Adilia Saint 3RD+ ORD SLCTV ABDL PEL/LXTR [facility] Right 12/08/2021    Procedure: COMPLETION ARTERIOGRAM WITH 5 MM ANGIOPLASTY OF MID SUPERFICIAL FEMORAL ARTERY;  Surgeon: Myra Martin MD;  [facility];  Service: Vascular   • VA THORACOSCOPY W/THERA WEDGE RESEXN INITIAL UNILAT Right 5/17/2023    Procedure: THORACOSCOPY VIDEO ASSISTED SURGERY (VATS);   Surgeon: Tia Forbes, " MD;  [facility];  Service: Thoracic   • TONSILLECTOMY     • WISDOM TOOTH EXTRACTION         Family History   Problem Relation Age of Onset   • Dementia Mother    • Glaucoma Mother    • Arthritis Mother         nursing home   • Cancer Father         dead   • Osteoarthritis Family        Social History     Occupational History   • Not on file   Tobacco Use   • Smoking status: Every Day     Packs/day: 0 25     Years: 15 00     Total pack years: 3 75     Types: Cigarettes     Start date: 2008     Passive exposure: Yes   • Smokeless tobacco: Never   • Tobacco comments:     stress caused by workers comp   not covering meds      Vaping Use   • Vaping Use: Never used   Substance and Sexual Activity   • Alcohol use: Never   • Drug use: Not Currently     Comment: drug free since 1987   • Sexual activity: Not on file     Comment: defer       Current Outpatient Medications on File Prior to Visit   Medication Sig   • apixaban (ELIQUIS) 5 mg Take 1 tablet (5 mg total) by mouth 2 (two) times a day   • ascorbic Acid (VITAMIN C) 500 MG CPCR Take 500 mg by mouth 2 (two) times a day   • aspirin (ECOTRIN LOW STRENGTH) 81 mg EC tablet Take 1 tablet (81 mg total) by mouth daily   • atorvastatin (LIPITOR) 10 mg tablet Take 0 5 tablets (5 mg total) by mouth daily at bedtime   • cholecalciferol (VITAMIN D3) 1,000 units tablet Take 2,000 Units by mouth 2 (two) times a day    • docusate sodium (COLACE) 100 mg capsule Take 1 capsule (100 mg total) by mouth 2 (two) times a day   • Flaxseed, Linseed, (FLAX SEED OIL PO) Take 1,200 capsules by mouth 2 (two) times a day     • folic acid (FOLVITE) 469 mcg tablet Take 400 mcg by mouth daily   • METHYLCOBALAMIN SL Place 1,000 mcg under the tongue 2 (two) times a day    • Misc Natural Products (Osteo Bi-Flex Triple Strength) TABS Take by mouth 2 (two) times a day     • pyridoxine (B-6) 100 MG tablet Take 100 mg by mouth daily   • vitamin A 2400 MCG (8000 UT) capsule Take 8,000 Units by mouth "daily   • [DISCONTINUED] transdermal buprenorphine (BUTRANS) 5 mcg/hr TD patch Place 1 patch on the skin over 7 days once a week For ongoing therapy   • methocarbamol (ROBAXIN) 500 mg tablet Take 0 5 tablets (250 mg total) by mouth every 6 (six) hours for 14 days (Patient not taking: Reported on 6/1/2023)     No current facility-administered medications on file prior to visit  Allergies   Allergen Reactions   • Cyanocobalamin [Vitamin B12] GI Intolerance   • Gabapentin Other (See Comments) and GI Intolerance     Difficulty walking   • Morphine Other (See Comments)     \"acute constipation\"   • Tramadol Confusion   • Nsaids Other (See Comments)     Pt reports avoids as is on Eliquis       Physical Exam:    /70   Pulse 83   Ht 6' 3\" (1 905 m)   Wt 84 8 kg (187 lb)   BMI 23 37 kg/m²     Constitutional:normal, well developed, well nourished, alert, in no distress and non-toxic and no overt pain behavior  Eyes:anicteric  HEENT:grossly intact  Neck:supple, symmetric, trachea midline and no masses   Pulmonary:even and unlabored  Cardiovascular:No edema or pitting edema present  Skin:Normal without rashes or lesions and well hydrated  Psychiatric:Mood and affect appropriate  Neurologic:Cranial Nerves II-XII grossly intact  Musculoskeletal: Well-healed incisions from prior recent lung surgery      Imaging    "

## 2023-06-26 ENCOUNTER — RA CDI HCC (OUTPATIENT)
Dept: OTHER | Facility: HOSPITAL | Age: 68
End: 2023-06-26

## 2023-06-26 NOTE — PROGRESS NOTES
[id number]  [facility]  coding opportunities          Chart Reviewed number of suggestions sent to Provider: 1     Patients Insurance     Medicare Insurance: [employer]

## 2023-06-27 ENCOUNTER — TELEMEDICINE (OUTPATIENT)
Dept: PULMONOLOGY | Facility: HOSPITAL | Age: 68
End: 2023-06-27
Payer: COMMERCIAL

## 2023-06-27 VITALS — BODY MASS INDEX: 23.25 KG/M2 | WEIGHT: 187 LBS | HEIGHT: 75 IN

## 2023-06-27 DIAGNOSIS — T65.294A TOXIC EFFECT OF TOBACCO, UNDETERMINED INTENT, INITIAL ENCOUNTER: Primary | ICD-10-CM

## 2023-06-27 PROCEDURE — 99407 BEHAV CHNG SMOKING > 10 MIN: CPT | Performed by: PHYSICIAN ASSISTANT

## 2023-06-27 RX ORDER — NICOTINE 21 MG/24HR
1 PATCH, TRANSDERMAL 24 HOURS TRANSDERMAL EVERY 24 HOURS
Qty: 28 PATCH | Refills: 3 | Status: SHIPPED | OUTPATIENT
Start: 2023-06-27

## 2023-06-27 RX ORDER — ACETAMINOPHEN 500 MG
500 TABLET ORAL EVERY 6 HOURS PRN
COMMUNITY

## 2023-06-27 NOTE — PROGRESS NOTES
Virtual Visit with Tobacco Cessation    Verification of patient location:    Patient is located at Home in the following state in which I hold an active license [place]    Problem List Items Addressed This Visit    None  Visit Diagnoses     Toxic effect of tobacco, undetermined intent, initial encounter    -  Primary    Relevant Medications    nicotine (NICODERM CQ) 21 mg/24 hr TD 24 hr patch          Multiple different options for smoking cessation discussed  Patient has opted for nicotine patches  We discussed appropriate use and potential side effects  Provided support and encouragement  Follow-up in 4 weeks or sooner if needed    Reason for visit is smoking cessation    Encounter provider Bailee Biggs PA-C    Provider located at [address] [id number]      Recent Visits  Date Type Provider Dept   06/27/23 Telemedicine Bailee Biggs PA-C [facility]   Showing recent visits within past 7 days and meeting all other requirements  Future Appointments  No visits were found meeting these conditions  Showing future appointments within next 150 days and meeting all other requirements       The patient was identified by name and date of birth   Fawad was informed that this is a telemedicine visit and that the [facility]  He agrees to proceed     My office door was closed  No one else was in the room  He acknowledged consent and understanding of privacy and security of the platform  Wing Celestin verbally agrees to participate in [employer]   Pt is aware that [employer] could be limited without vital signs or the ability to perform a full hands-on physical Iva Mariscal understands he or the provider may request at any time to terminate the video visit and request the patient to seek care or "treatment in person  Patient is aware this is a billable service  Subjective  HPI  Jamaica Luis is a 75 y o  male presenting for initial smoking cessation visit  He was referred by his thoracic surgeon  He was recently treated for lung cancer  He has cut back to half pack per day and is interested in quitting entirely  Biggest trigger for him has been stress  Smoking history:  What type of tobacco product used:  Cigarettes     How many Packs of Cigarettes per day on average:  1/2 PPD     Largest amount of tobacco EVER used regularly: 1 PPD     How old when started using Tobacco regularly:  19     How many years using tobacco regularly:  40+     How soon after waking up do you smoke:  within the hour     Past Attempts to go Tobacco free:              Intentionally Cut Down or limit use:  YES/NO:yes                How many serious attempt to go tobacco free:  multiple                            Last Serious attempt: current                 Which symptoms experienced when tried to go Tobacco Free:  stress                 Methods to used on prior attempt:  willpower                 Desire to Quit:  yes     Motivations to Quit:  health     Has a Healthcare Professional recommend smoking cessation?   Yes     What Obstacles do you face for quitting:  stress      Past Medical History:   Diagnosis Date   • Arthritis feb 2016    osto back   • Chronic back pain    • Chronic narcotic dependence (HCC)    • Chronic pain disorder     Sees pain management Dr Kendrick Devries SL   • Cigarette smoker    • Depression    • Dry skin    • Dyslexia    • Full dentures     upper and no teeth lower/\"avoid raw vegetables and chewy food\"   • GERD (gastroesophageal reflux disease)    • 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 related   • History of blood clots     \"left leg\" takes Eliquis   • History of bronchitis    • History of pneumonia     childhood   • Increased pressure in the eye, bilateral    • L4-L5 disc bulge 06/16/2017   • Low back pain    • " "MTHFR mutation    • Muscle weakness    • Neuropathy     right foot and left foot   • Osteoarthritis    • Osteoarthritis     and\" if becomes cold joint pain worsens\"   • Peripheral neuropathy 8/ 15   • Pulmonary emphysema ([facility] [address] )     \"mild\"   • Right foot drop    • Shortness of breath     \"only on humid days\"   • Smokers' cough ([facility] [address] )    • Wears glasses    • Work related injury 04/24/2018       Past Surgical History:   Procedure Laterality Date   • BACK SURGERY  aug 2015- jun2017 2017 no affect   • COLONOSCOPY     • EGD AND COLONOSCOPY N/A 06/29/2018    Procedure: EGD AND COLONOSCOPY;  Surgeon: Kristel Hunt MD;  Location: [facility];   Service: Gastroenterology   • EPIDURAL BLOCK INJECTION      needle   • HIP ARTHROSCOPY W/ LABRAL DEBRIDEMENT Left     \"hip surgery\"   • IR BIOPSY LUNG  4/17/2023   • IR CHEST TUBE PLACEMENT  4/17/2023   • IR LOWER EXTREMITY ANGIOGRAM  12/08/2021   • LUNG LOBECTOMY Right 5/17/2023    Procedure: lymph node dissection; PLEURAL LYSIS;  Surgeon: Ermelinda Jenkins MD;  [facility];  Service: Thoracic   • LUNG SEGMENTECTOMY Right 5/17/2023    Procedure: Right thoracoscopic upper lobe wedge resection;  Surgeon: Ermelinda Jenkins MD;  [facility];  Service: Thoracic   • ORTHOPEDIC SURGERY     • MN ARTHRS HIP DEBRIDEMENT/SHAVING ARTICULAR CRTLG Left 04/30/2018    Procedure: LEFT HIP ARTHROSCOPIC LABRAL DEBRIDEMENT;  Surgeon: Hamilton Burnette MD;  [facility];  Service: Orthopedics   • MN [address] INCL FLUOR GDNCE DX W/CELL WASHG [facility] N/A 5/17/2023    Procedure: BRONCHOSCOPY FLEXIBLE;  Surgeon: Ermelinda Jenkins MD;  [facility];  Service: Thoracic   • MN IN-SITU VEIN BYPASS FEMORAL-POPLITEAL Right 12/08/2021    Procedure: R SFA to BK Pop Bypass using insitu GSV,  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;  Surgeon: Anna Worrell MD;  [facility];  Service: Vascular   • MN Irma Monique 3RD+ ORD [address] PEL/LXTR [facility] Right 12/08/2021    Procedure: " COMPLETION ARTERIOGRAM WITH 5 MM ANGIOPLASTY OF MID SUPERFICIAL FEMORAL ARTERY;  Surgeon: Linn Bill MD;  [facility];  Service: Vascular   • NH THORACOSCOPY W/THERA WEDGE RESEXN INITIAL UNILAT Right 5/17/2023    Procedure: THORACOSCOPY VIDEO ASSISTED SURGERY (VATS); Surgeon: Johnny Tate MD;  [facility];  Service: Thoracic   • TONSILLECTOMY     • WISDOM TOOTH EXTRACTION         Social History     Tobacco Use   Smoking Status Every Day   • Packs/day: 0 25   • Years: 15 00   • Total pack years: 3 75   • Types: Cigarettes   • Start date: 2008   • Passive exposure: Yes   Smokeless Tobacco Never   Tobacco Comments    stress caused by workers comp   not covering meds   Less than half a pack of cigs        E-Cigarette/Vaping   • E-Cigarette Use Never User      E-Cigarette/Vaping Substances   • Nicotine No    • Flavoring No        Current Outpatient Medications   Medication Sig Dispense Refill   • acetaminophen (TYLENOL) 500 mg tablet Take 500 mg by mouth every 6 (six) hours as needed for mild pain     • apixaban (ELIQUIS) 5 mg Take 1 tablet (5 mg total) by mouth 2 (two) times a day 180 tablet 3   • ascorbic Acid (VITAMIN C) 500 MG CPCR Take 500 mg by mouth 2 (two) times a day     • aspirin (ECOTRIN LOW STRENGTH) 81 mg EC tablet Take 1 tablet (81 mg total) by mouth daily 90 tablet 0   • atorvastatin (LIPITOR) 10 mg tablet Take 0 5 tablets (5 mg total) by mouth daily at bedtime 45 tablet 1   • cholecalciferol (VITAMIN D3) 1,000 units tablet Take 2,000 Units by mouth 2 (two) times a day      • docusate sodium (COLACE) 100 mg capsule Take 1 capsule (100 mg total) by mouth 2 (two) times a day 30 capsule 0   • Flaxseed, Linseed, (FLAX SEED OIL PO) Take 1,200 capsules by mouth 2 (two) times a day       • folic acid (FOLVITE) 845 mcg tablet Take 400 mcg by mouth daily     • METHYLCOBALAMIN SL Place 1,000 mcg under the tongue 2 (two) times a day      • Misc Natural Products (Osteo Bi-Flex Triple "Strength) TABS Take by mouth 2 (two) times a day       • nicotine (NICODERM CQ) 21 mg/24 hr TD 24 hr patch Place 1 patch on the skin over 24 hours every 24 hours 28 patch 3   • pyridoxine (B-6) 100 MG tablet Take 100 mg by mouth daily     • transdermal buprenorphine (BUTRANS) 5 mcg/hr TD patch Place 1 patch on the skin over 7 days once a week For ongoing therapy 4 patch 2   • vitamin A 2400 MCG (8000 UT) capsule Take 8,000 Units by mouth daily     • methocarbamol (ROBAXIN) 500 mg tablet Take 0 5 tablets (250 mg total) by mouth every 6 (six) hours for 14 days (Patient not taking: Reported on 6/1/2023) 28 tablet 0     No current facility-administered medications for this visit  Allergies   Allergen Reactions   • Cyanocobalamin [Vitamin B12] GI Intolerance   • Gabapentin Other (See Comments) and GI Intolerance     Difficulty walking   • Morphine Other (See Comments)     \"acute constipation\"   • Tramadol Confusion   • Nsaids Other (See Comments)     Pt reports avoids as is on Eliquis       Review of Systems   Psychiatric/Behavioral: The patient is nervous/anxious  All other systems reviewed and are negative  Video Exam    Vitals:    06/27/23 1434   Weight: 84 8 kg (187 lb)   Height: 6' 3\" (1 905 m)       Physical Exam     General:  Awake, alert, and oriented  No acute distress  HEENT: Normocephalic, without obvious abnormality, atraumatic  Sclera non-icteric  Hearing intact  Pulm:  Effort normal  No respiratory distress  Musculoskeletal:  No gross deformity noted  Neuro:  No aphasia  No facial asymmetry  No acute focal neurologic deficit noted  Skin:  No jaundice, erythema, or cyanosis appreciated  Psych:  Cooperative  Normal mood and affect  Behavior is normal     Tobacco Use/Cessation  I assessed Wing Celestin to be in an action stage with respect to tobacco use  I advised patient to quit, and offered support    Nicotine patches beginning at 21 mg     Wing Celestin is a 75 y o  " male here for a discussion regarding smoking cessation  He began smoking several years ago  He currently smokes 1/2 packs per day  He has attempted to quit smoking in the past, most recently a few months ago  Best success quitting using willpower  Barriers to quitting include: under a lot of stress now  As a result of this visit, I have referred the patient for further respiratory evaluation   No    I spent 23 minutes with patient today in which >10 minutes of the time was spent in counseling/coordination of care regarding tobacco cessation

## 2023-06-28 ENCOUNTER — HOSPITAL ENCOUNTER (OUTPATIENT)
Dept: NON INVASIVE DIAGNOSTICS | Facility: CLINIC | Age: 68
Discharge: HOME | End: 2023-06-28
Payer: COMMERCIAL

## 2023-06-28 DIAGNOSIS — I70.202 ATHEROSCLEROSIS OF ARTERY OF LEFT LOWER EXTREMITY (HCC): Chronic | ICD-10-CM

## 2023-06-28 DIAGNOSIS — I72.4 ANEURYSM OF RIGHT POPLITEAL ARTERY (HCC): Chronic | ICD-10-CM

## 2023-06-28 PROCEDURE — 93925 LOWER EXTREMITY STUDY: CPT

## 2023-06-28 PROCEDURE — 93923 UPR/LXTR ART STDY 3+ LVLS: CPT

## 2023-06-29 ENCOUNTER — APPOINTMENT (OUTPATIENT)
Dept: LAB | Facility: IMAGING CENTER | Age: 68
End: 2023-06-29
Payer: COMMERCIAL

## 2023-06-29 ENCOUNTER — TRANSCRIBE ORDERS (OUTPATIENT)
Dept: VASCULAR SURGERY | Facility: CLINIC | Age: 68
End: 2023-06-29

## 2023-06-29 DIAGNOSIS — Z12.5 PROSTATE CANCER SCREENING: ICD-10-CM

## 2023-06-29 DIAGNOSIS — E78.2 MIXED HYPERLIPIDEMIA: ICD-10-CM

## 2023-06-29 DIAGNOSIS — I70.202 ATHEROSCLEROSIS OF ARTERY OF LEFT LOWER EXTREMITY (HCC): Primary | ICD-10-CM

## 2023-06-29 LAB
ALBUMIN SERPL BCP-MCNC: 3.4 G/DL (ref 3.5–5)
ALP SERPL-CCNC: 74 U/L (ref 46–116)
ALT SERPL W P-5'-P-CCNC: 24 U/L (ref 12–78)
ANION GAP SERPL CALCULATED.3IONS-SCNC: 4 MMOL/L
AST SERPL W P-5'-P-CCNC: 16 U/L (ref 5–45)
BILIRUB SERPL-MCNC: 0.88 MG/DL (ref 0.2–1)
BUN SERPL-MCNC: 12 MG/DL (ref 5–25)
CALCIUM ALBUM COR SERPL-MCNC: 10 MG/DL (ref 8.3–10.1)
CALCIUM SERPL-MCNC: 9.5 MG/DL (ref 8.3–10.1)
CHLORIDE SERPL-SCNC: 112 MMOL/L (ref 96–108)
CHOLEST SERPL-MCNC: 166 MG/DL
CO2 SERPL-SCNC: 26 MMOL/L (ref 21–32)
CREAT SERPL-MCNC: 0.85 MG/DL (ref 0.6–1.3)
ERYTHROCYTE [DISTWIDTH] IN BLOOD BY AUTOMATED COUNT: 14.1 % (ref 11.6–15.1)
GFR SERPL CREATININE-BSD FRML MDRD: 89 ML/MIN/1.73SQ M
GLUCOSE P FAST SERPL-MCNC: 97 MG/DL (ref 65–99)
HCT VFR BLD AUTO: 45.4 % (ref 36.5–49.3)
HDLC SERPL-MCNC: 53 MG/DL
HGB BLD-MCNC: 15.4 G/DL (ref 12–17)
LDLC SERPL CALC-MCNC: 89 MG/DL (ref 0–100)
MCH RBC QN AUTO: 32.6 PG (ref 26.8–34.3)
MCHC RBC AUTO-ENTMCNC: 33.9 G/DL (ref 31.4–37.4)
MCV RBC AUTO: 96 FL (ref 82–98)
NONHDLC SERPL-MCNC: 113 MG/DL
PLATELET # BLD AUTO: 369 THOUSANDS/UL (ref 149–390)
PMV BLD AUTO: 10.1 FL (ref 8.9–12.7)
POTASSIUM SERPL-SCNC: 4.1 MMOL/L (ref 3.5–5.3)
PROT SERPL-MCNC: 6.8 G/DL (ref 6.4–8.4)
PSA SERPL-MCNC: 2.57 NG/ML (ref 0–4)
RBC # BLD AUTO: 4.73 MILLION/UL (ref 3.88–5.62)
SODIUM SERPL-SCNC: 142 MMOL/L (ref 135–147)
TRIGL SERPL-MCNC: 120 MG/DL
WBC # BLD AUTO: 10.68 THOUSAND/UL (ref 4.31–10.16)

## 2023-06-29 PROCEDURE — 80061 LIPID PANEL: CPT

## 2023-06-29 PROCEDURE — 80053 COMPREHEN METABOLIC PANEL: CPT

## 2023-06-29 PROCEDURE — 85027 COMPLETE CBC AUTOMATED: CPT

## 2023-06-29 PROCEDURE — G0103 PSA SCREENING: HCPCS

## 2023-06-29 PROCEDURE — 36415 COLL VENOUS BLD VENIPUNCTURE: CPT

## 2023-07-05 ENCOUNTER — OFFICE VISIT (OUTPATIENT)
Dept: INTERNAL MEDICINE CLINIC | Facility: OTHER | Age: 68
End: 2023-07-05
Payer: COMMERCIAL

## 2023-07-05 VITALS
OXYGEN SATURATION: 98 % | BODY MASS INDEX: 23.13 KG/M2 | HEART RATE: 97 BPM | DIASTOLIC BLOOD PRESSURE: 70 MMHG | WEIGHT: 186 LBS | HEIGHT: 75 IN | SYSTOLIC BLOOD PRESSURE: 120 MMHG | TEMPERATURE: 98.4 F

## 2023-07-05 DIAGNOSIS — Z86.718 HISTORY OF DVT (DEEP VEIN THROMBOSIS): ICD-10-CM

## 2023-07-05 DIAGNOSIS — Z71.89 ADVANCED CARE PLANNING/COUNSELING DISCUSSION: ICD-10-CM

## 2023-07-05 DIAGNOSIS — I82.512 CHRONIC DEEP VEIN THROMBOSIS (DVT) OF FEMORAL VEIN OF LEFT LOWER EXTREMITY (HCC): ICD-10-CM

## 2023-07-05 DIAGNOSIS — Z12.11 SCREENING FOR COLORECTAL CANCER: ICD-10-CM

## 2023-07-05 DIAGNOSIS — Z13.6 SCREENING FOR CARDIOVASCULAR CONDITION: ICD-10-CM

## 2023-07-05 DIAGNOSIS — I73.9 PAD (PERIPHERAL ARTERY DISEASE) (HCC): ICD-10-CM

## 2023-07-05 DIAGNOSIS — G62.9 NEUROPATHY: ICD-10-CM

## 2023-07-05 DIAGNOSIS — Z13.1 SCREENING FOR DIABETES MELLITUS: ICD-10-CM

## 2023-07-05 DIAGNOSIS — E78.2 MIXED HYPERLIPIDEMIA: Primary | ICD-10-CM

## 2023-07-05 DIAGNOSIS — Z00.00 MEDICARE ANNUAL WELLNESS VISIT, SUBSEQUENT: ICD-10-CM

## 2023-07-05 DIAGNOSIS — G89.4 CHRONIC PAIN SYNDROME: ICD-10-CM

## 2023-07-05 DIAGNOSIS — Z12.5 SCREENING FOR PROSTATE CANCER: ICD-10-CM

## 2023-07-05 DIAGNOSIS — Z12.12 SCREENING FOR COLORECTAL CANCER: ICD-10-CM

## 2023-07-05 PROBLEM — F11.20 UNCOMPLICATED OPIOID DEPENDENCE (HCC): Status: RESOLVED | Noted: 2018-11-16 | Resolved: 2023-07-05

## 2023-07-05 PROBLEM — I82.412 DEEP VEIN THROMBOSIS (DVT) OF FEMORAL VEIN OF LEFT LOWER EXTREMITY (HCC): Status: RESOLVED | Noted: 2021-07-29 | Resolved: 2023-07-05

## 2023-07-05 PROBLEM — I74.3 EMBOLISM AND THROMBOSIS OF ARTERIES OF THE LOWER EXTREMITIES (HCC): Status: RESOLVED | Noted: 2023-01-04 | Resolved: 2023-07-05

## 2023-07-05 PROBLEM — Z87.891 FORMER TOBACCO USE: Status: ACTIVE | Noted: 2018-03-08

## 2023-07-05 PROCEDURE — 3725F SCREEN DEPRESSION PERFORMED: CPT | Performed by: INTERNAL MEDICINE

## 2023-07-05 PROCEDURE — 3288F FALL RISK ASSESSMENT DOCD: CPT | Performed by: INTERNAL MEDICINE

## 2023-07-05 PROCEDURE — 99497 ADVNCD CARE PLAN 30 MIN: CPT | Performed by: INTERNAL MEDICINE

## 2023-07-05 PROCEDURE — 1160F RVW MEDS BY RX/DR IN RCRD: CPT | Performed by: INTERNAL MEDICINE

## 2023-07-05 PROCEDURE — 1170F FXNL STATUS ASSESSED: CPT | Performed by: INTERNAL MEDICINE

## 2023-07-05 PROCEDURE — G0439 PPPS, SUBSEQ VISIT: HCPCS | Performed by: INTERNAL MEDICINE

## 2023-07-05 PROCEDURE — 1159F MED LIST DOCD IN RCRD: CPT | Performed by: INTERNAL MEDICINE

## 2023-07-05 PROCEDURE — 99214 OFFICE O/P EST MOD 30 MIN: CPT | Performed by: INTERNAL MEDICINE

## 2023-07-05 PROCEDURE — 1125F AMNT PAIN NOTED PAIN PRSNT: CPT | Performed by: INTERNAL MEDICINE

## 2023-07-05 NOTE — PATIENT INSTRUCTIONS
Medicare Preventive Visit Patient Instructions  Thank you for completing your Welcome to Medicare Visit or Medicare Annual Wellness Visit today. Your next wellness visit will be due in one year (7/5/2024). The screening/preventive services that you may require over the next 5-10 years are detailed below. Some tests may not apply to you based off risk factors and/or age. Screening tests ordered at today's visit but not completed yet may show as past due. Also, please note that scanned in results may not display below. Preventive Screenings:  Service Recommendations Previous Testing/Comments   Colorectal Cancer Screening  · Colonoscopy    · Fecal Occult Blood Test (FOBT)/Fecal Immunochemical Test (FIT)  · Fecal DNA/Cologuard Test  · Flexible Sigmoidoscopy Age: 43-73 years old   Colonoscopy: every 10 years (May be performed more frequently if at higher risk)  OR  FOBT/FIT: every 1 year  OR  Cologuard: every 3 years  OR  Sigmoidoscopy: every 5 years  Screening may be recommended earlier than age 39 if at higher risk for colorectal cancer. Also, an individualized decision between you and your healthcare provider will decide whether screening between the ages of 77-80 would be appropriate.  Colonoscopy: 06/29/2018  FOBT/FIT: Not on file  Cologuard: Not on file  Sigmoidoscopy: Not on file    Screening Current     Prostate Cancer Screening Individualized decision between patient and health care provider in men between ages of 53-66   Medicare will cover every 12 months beginning on the day after your 50th birthday PSA: 2.57 ng/mL     Screening Current  Risks and Benefits Discussed     Hepatitis C Screening Once for adults born between 1945 and 1965  More frequently in patients at high risk for Hepatitis C Hep C Antibody: 09/10/2018    Screening Current  Risks and Benefits Discussed   Diabetes Screening 1-2 times per year if you're at risk for diabetes or have pre-diabetes Fasting glucose: 97 mg/dL (6/29/2023)  A1C: No results in last 5 years (No results in last 5 years)  Screening Current  Risks and Benefits Discussed   Cholesterol Screening Once every 5 years if you don't have a lipid disorder. May order more often based on risk factors. Lipid panel: 06/29/2023  Screening Not Indicated  History Lipid Disorder  Risks and Benefits Discussed  Screening Current      Other Preventive Screenings Covered by Medicare:  1. Abdominal Aortic Aneurysm (AAA) Screening: covered once if your at risk. You're considered to be at risk if you have a family history of AAA or a male between the age of 70-76 who smoking at least 100 cigarettes in your lifetime. 2. Lung Cancer Screening: covers low dose CT scan once per year if you meet all of the following conditions: (1) Age 48-67; (2) No signs or symptoms of lung cancer; (3) Current smoker or have quit smoking within the last 15 years; (4) You have a tobacco smoking history of at least 20 pack years (packs per day x number of years you smoked); (5) You get a written order from a healthcare provider. 3. Glaucoma Screening: covered annually if you're considered high risk: (1) You have diabetes OR (2) Family history of glaucoma OR (3)  aged 48 and older OR (3)  American aged 72 and older  3. Osteoporosis Screening: covered every 2 years if you meet one of the following conditions: (1) Have a vertebral abnormality; (2) On glucocorticoid therapy for more than 3 months; (3) Have primary hyperparathyroidism; (4) On osteoporosis medications and need to assess response to drug therapy. 5. HIV Screening: covered annually if you're between the age of 14-79. Also covered annually if you are younger than 13 and older than 72 with risk factors for HIV infection. For pregnant patients, it is covered up to 3 times per pregnancy.     Immunizations:  Immunization Recommendations   Influenza Vaccine Annual influenza vaccination during flu season is recommended for all persons aged >= 6 months who do not have contraindications   Pneumococcal Vaccine   * Pneumococcal conjugate vaccine = PCV13 (Prevnar 13), PCV15 (Vaxneuvance), PCV20 (Prevnar 20)  * Pneumococcal polysaccharide vaccine = PPSV23 (Pneumovax) Adults 2364 years old: 1-3 doses may be recommended based on certain risk factors  Adults 72 years old: 1-2 doses may be recommended based off what pneumonia vaccine you previously received   Hepatitis B Vaccine 3 dose series if at intermediate or high risk (ex: diabetes, end stage renal disease, liver disease)   Tetanus (Td) Vaccine - COST NOT COVERED BY MEDICARE PART B Following completion of primary series, a booster dose should be given every 10 years to maintain immunity against tetanus. Td may also be given as tetanus wound prophylaxis. Tdap Vaccine - COST NOT COVERED BY MEDICARE PART B Recommended at least once for all adults. For pregnant patients, recommended with each pregnancy. Shingles Vaccine (Shingrix) - COST NOT COVERED BY MEDICARE PART B  2 shot series recommended in those aged 48 and above     Health Maintenance Due:      Topic Date Due   • Colorectal Cancer Screening  06/29/2028   • Hepatitis C Screening  Completed     Immunizations Due:      Topic Date Due   • COVID-19 Vaccine (4 - Moderna series) 12/30/2021   • Influenza Vaccine (1) 09/01/2023     Advance Directives   What are advance directives? Advance directives are legal documents that state your wishes and plans for medical care. These plans are made ahead of time in case you lose your ability to make decisions for yourself. Advance directives can apply to any medical decision, such as the treatments you want, and if you want to donate organs. What are the types of advance directives? There are many types of advance directives, and each state has rules about how to use them. You may choose a combination of any of the following:  · Living will: This is a written record of the treatment you want.  You can also choose which treatments you do not want, which to limit, and which to stop at a certain time. This includes surgery, medicine, IV fluid, and tube feedings. · Durable power of  for healthcare [facility]): This is a written record that states who you want to make healthcare choices for you when you are unable to make them for yourself. This person, called a proxy, is usually a family member or a friend. You may choose more than 1 proxy. · Do not resuscitate (DNR) order:  A DNR order is used in case your heart stops beating or you stop breathing. It is a request not to have certain forms of treatment, such as CPR. A DNR order may be included in other types of advance directives. · Medical directive: This covers the care that you want if you are in a coma, near death, or unable to make decisions for yourself. You can list the treatments you want for each condition. Treatment may include pain medicine, surgery, blood transfusions, dialysis, IV or tube feedings, and a ventilator (breathing machine). · Values history: This document has questions about your views, beliefs, and how you feel and think about life. This information can help others choose the care that you would choose. Why are advance directives important? An advance directive helps you control your care. Although spoken wishes may be used, it is better to have your wishes written down. Spoken wishes can be misunderstood, or not followed. Treatments may be given even if you do not want them. An advance directive may make it easier for your family to make difficult choices about your care. Cigarette Smoking and Your Health   Risks to your health if you smoke:  Nicotine and other chemicals found in tobacco damage every cell in your body. Even if you are a light smoker, you have an increased risk for cancer, heart disease, and lung disease. If you are pregnant or have diabetes, smoking increases your risk for complications.    Benefits to your health if you stop smoking:   · You decrease respiratory symptoms such as coughing, wheezing, and shortness of breath. · You reduce your risk for cancers of the lung, mouth, throat, kidney, bladder, pancreas, stomach, and cervix. If you already have cancer, you increase the benefits of chemotherapy. You also reduce your risk for cancer returning or a second cancer from developing. · You reduce your risk for heart disease, blood clots, heart attack, and stroke. · You reduce your risk for lung infections, and diseases such as pneumonia, asthma, chronic bronchitis, and emphysema. · Your circulation improves. More oxygen can be delivered to your body. If you have diabetes, you lower your risk for complications, such as kidney, artery, and eye diseases. You also lower your risk for nerve damage. Nerve damage can lead to amputations, poor vision, and blindness. · You improve your body's ability to heal and to fight infections. For more information and support to stop smoking:   · [employer]  Phone: 6- 902 - 348-4063  Web Address: www.[employer]  Narcotic (Opioid) Safety    Use narcotics safely:  · Take prescribed narcotics exactly as directed  · Do not give narcotics to others or take narcotics that belong to someone else  · Do not mix narcotics without medicines or alcohol  · Do not drive or operate heavy machinery after you take the narcotic  · Monitor for side effects and notify your healthcare provider if you experienced side effects such as nausea, sleepiness, itching, or trouble thinking clearly. Manage constipation:    Constipation is the most common side effect of narcotic medicine. Constipation is when you have hard, dry bowel movements, or you go longer than usual between bowel movements. Tell your healthcare provider about all changes in your bowel movements while you are taking narcotics. He or she may recommend laxative medicine to help you have a bowel movement.  He or she may also change the kind of narcotic you are taking, or change when you take it. The following are more ways you can prevent or relieve constipation:    · Drink liquids as directed. You may need to drink extra liquids to help soften and move your bowels. Ask how much liquid to drink each day and which liquids are best for you. · Eat high-fiber foods. This may help decrease constipation by adding bulk to your bowel movements. High-fiber foods include fruits, vegetables, whole-grain breads and cereals, and beans. Your healthcare provider or dietitian can help you create a high-fiber meal plan. Your provider may also recommend a fiber supplement if you cannot get enough fiber from food. · Exercise regularly. Regular physical activity can help stimulate your intestines. Walking is a good exercise to prevent or relieve constipation. Ask which exercises are best for you. · Schedule a time each day to have a bowel movement. This may help train your body to have regular bowel movements. Bend forward while you are on the toilet to help move the bowel movement out. Sit on the toilet for at least 10 minutes, even if you do not have a bowel movement. Store narcotics safely:   · Store narcotics where others cannot easily get them. Keep them in a locked cabinet or secure area. Do not  keep them in a purse or other bag you carry with you. A person may be looking for something else and find the narcotics. · Make sure narcotics are stored out of the reach of children. A child can easily overdose on narcotics. Narcotics may look like candy to a small child. The best way to dispose of narcotics: The laws vary by country and area. In the [facility], the best way is to return the narcotics through a take-back program. This program is offered by the [employer]). The following are options for using the program:  · Take the narcotics to a [employer] collection site. The site is often a law enforcement center.  Call your local law enforcement center for scheduled take-back days in your area. You will be given information on where to go if the collection site is in a different location. · Take the narcotics to an approved pharmacy or hospital.  A pharmacy or hospital may be set up as a collection site. You will need to ask if it is a [employer] collection site if you were not directed there. A pharmacy or doctor's office may not be able to take back narcotics unless it is a [employer] site. · Use a mail-back system. This means you are given containers to put the narcotics into. You will then mail them in the containers. · Use a take-back drop box. This is a place to leave the narcotics at any time. People and animals will not be able to get into the box. Your local law enforcement agency can tell you where to find a drop box in your area. Other ways to manage pain:   · Ask your healthcare provider about non-narcotic medicines to control pain. Nonprescription medicines include NSAIDs (such as ibuprofen) and acetaminophen. Prescription medicines include muscle relaxers, antidepressants, and steroids. · Pain may be managed without any medicines. Some ways to relieve pain include massage, aromatherapy, or meditation. Physical or occupational therapy may also help. For more information:   · Drug Enforcement Administration  [address] [facility] , [address] [facility]  Phone: 7- 799 - 638-6897  Web Address: [employer] [facility].. [employer].gov/drug_disposal/    · [address] [facility] [address] and Drug Administration  [address] [facility] , [address]  Phone: 8- 781 - 679-5303  Web Address: http://[employer]/     © Copyright [employer] 2018 Information is for End User's use only and may not be sold, redistributed or otherwise used for commercial purposes.  All illustrations and images included in CareNotes® are the copyrighted property of A.D.A.M., Inc. or  [employer]

## 2023-07-05 NOTE — PROGRESS NOTES
Assessment and Plan:     Problem List Items Addressed This Visit        Cardiovascular and Mediastinum    PAD (peripheral artery disease) (HCC)     Continue atorvastatin 5 mg daily. RESOLVED: Deep vein thrombosis (DVT) of femoral vein of left lower extremity (HCC)     Continue Eliquis anticoagulation. Nervous and Auditory    Neuropathy      stable, controlled. Other    Chronic pain syndrome     Continue follow-up with pain management and continue current pain regimen. Mixed hyperlipidemia - Primary    History of DVT (deep vein thrombosis)     Continue Eliquis anticoagulation. Advanced care planning/counseling discussion     Refer to ACP note. Other Visit Diagnoses     Medicare annual wellness visit, subsequent        Screening for diabetes mellitus        Screening for cardiovascular condition        Screening for colorectal cancer        Screening for prostate cancer            Serious Illness Conversation    1. What is your understanding now of where you are with your illness? Prognostic Understanding: appropriate understanding of prognosis     2. How much information about what is likely to be ahead with your illness would you like to have? Information: patient wants to be fully informed     3. What did you (clinician) communicate to the patient? Prognostic Communication: Uncertain - It can be difficult to predict what will happen with your illness. I hope you will continue to live well for a long time but I’m worried that you could get sick quickly, and I think it is important to prepare for that possibility. 4. If your health situation worsens, what are your most important goals? Goals: have my medical decisions respected, be spiritually and emotionally at peace, be physically comfortable, not be a burden, be mentally aware     5. What are the biggest fears and worries about the future and your health? Fears/Worries: loss of control     6.  What abilities are so critical to your life that you cannot imagine living without them? Unacceptable Function: not being myself, being unconscious, being unable to interact with others, being in pain or very uncomfortable, being chronically confused or not being myself, being in chronic severe pain, being unable to talk, not being able to care for myself, including toileting and feeding, being unable to communicate effectively     7. What gives you strength as you think about the future with your illness? 8. If you become sicker, how much are you willing to go through for the possibility of gaining more time? Be in the hospital: Yes Have a feeding tube: Yes   Be in the ICU: Yes Live in a nursing home: Yes   Be on a ventilator: Yes Be uncomfortable: Yes   Be on dialysis: Yes Undergo aggressive test and/or procedures: Yes   9. How much does your proxy and family know about your priorities and wishes? Discussion Discussion: extensive discussion with family about goals and wishes        How does this plan sound to you? I will do everything I can to help you through this. Patient wants to finish this discussion at a later time     I have spent 15 minutes speaking with my patient on advanced care planning today or during this visit     Advanced directives  Five Wishes: Patient does not have Five Wishes- would not like information           Depression Screening and Follow-up Plan: Patient was screened for depression during today's encounter. They screened negative with a PHQ-2 score of 0. Preventive health issues were discussed with patient, and age appropriate screening tests were ordered as noted in patient's After Visit Summary. Personalized health advice and appropriate referrals for health education or preventive services given if needed, as noted in patient's After Visit Summary.      History of Present Illness:     Patient presents for a Medicare Wellness Visit    75year old male is seen today for follow up of chronic conditions. Lab studies reviewed today: CBC, CMP, PSA, lipid panel are within acceptable range. He has been compliant with his medication regimen. He has no active complaints or concerns at this time. Hyperlipidemia  This is a chronic problem. The current episode started more than 1 year ago. The problem is controlled. Recent lipid tests were reviewed and are normal. Pertinent negatives include no chest pain or shortness of breath. Current antihyperlipidemic treatment includes statins. The current treatment provides moderate improvement of lipids. There are no compliance problems. Patient Care Team:  David Connelly MD as PCP - General  Jose Bhakta DO as PCP - [facility] (RTE)  DO Heather Duckworth III, DO (Pain Medicine)  Phong Mary MD as Endoscopist  HOLLIS Sow (Pain Medicine)     Review of Systems:     Review of Systems   Constitutional: Negative for activity change, appetite change, chills, diaphoresis, fatigue and fever. HENT: Negative for congestion, postnasal drip, rhinorrhea, sinus pressure, sinus pain, sneezing and sore throat. Eyes: Negative for visual disturbance. Respiratory: Negative for apnea, cough, choking, chest tightness, shortness of breath and wheezing. Cardiovascular: Negative for chest pain, palpitations and leg swelling. Gastrointestinal: Negative for abdominal distention, abdominal pain, anal bleeding, blood in stool, constipation, diarrhea, nausea and vomiting. Endocrine: Negative for cold intolerance and heat intolerance. Genitourinary: Negative for difficulty urinating, dysuria and hematuria. Musculoskeletal: Negative. Skin: Negative. Neurological: Negative for dizziness, weakness, light-headedness, numbness and headaches. Hematological: Negative for adenopathy. Psychiatric/Behavioral: Negative for agitation, sleep disturbance and suicidal ideas.    All other systems reviewed and are negative.        Problem List:     Patient Active Problem List   Diagnosis   • Lumbar radiculopathy - Right   • Bone lesion   • Neuropathy   • Inflammatory polyarthropathy (HCC)   • Former tobacco use   • Colon cancer screening   • Right foot drop   • Lumbar spondylosis - Bilateral   • Vitamin D deficiency   • Thoracic spine pain   • Chronic pain syndrome   • Chronic pain of both knees   • Chronic pain of both shoulders   • Chronic pain of left ankle   • Multiple subsolid lung nodules greater than 6 mm in diameter   • Mixed hyperlipidemia   • 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HCC)   • S/P femoral-popliteal bypass surgery   • Atherosclerosis of artery of left lower extremity ([facility])   • Chronic obstructive pulmonary disease, unspecified COPD type ([address])   • Chronic right shoulder pain   • MTHFR mutation   • History of DVT (deep vein thrombosis)   • PAD (peripheral artery disease) ([facility])   • Right upper lobe pulmonary nodule   • Adenocarcinoma of right lung ([address])   • Advanced care planning/counseling discussion      Past Medical and Surgical History:     Past Medical History:   Diagnosis Date   • Arthritis feb 2016    osto back   • Chronic back pain    • Chronic narcotic dependence ([address])    • Chronic pain disorder     Sees pain management Dr Lazaro Long SL   • Cigarette smoker    • Deep vein thrombosis (DVT) of femoral vein of left lower extremity ([address]) 7/29/2021   • Depression    • Dry skin    • Dyslexia    • Full dentures     upper and no teeth lower/"avoid raw vegetables and chewy food"   • GERD (gastroesophageal reflux disease)    • 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 related   • History of blood clots     "left leg" takes Eliquis   • History of bronchitis    • History of pneumonia     childhood   • Increased pressure in the eye, bilateral    • L4-L5 disc bulge 06/16/2017   • Low back pain    • MTHFR mutation    • Muscle weakness    • Neuropathy     right foot and left foot   • Osteoarthritis    • Osteoarthritis     and" if becomes cold joint pain worsens"   • Peripheral neuropathy 8/ 15   • Pulmonary emphysema ([address])     "mild"   • Right foot drop    • Shortness of breath     "only on humid days"   • Smokers' cough [facility])    • Wears glasses    • Work related injury 04/24/2018     Past Surgical History:   Procedure Laterality Date   • BACK SURGERY  aug 2015- jun2017 2017 no affect   • COLONOSCOPY     • EGD AND COLONOSCOPY N/A 06/29/2018    Procedure: EGD AND COLONOSCOPY;  Surgeon: Andrea Keys MD;  Location: [facility] GI LAB;   Service: Gastroenterology   • EPIDURAL BLOCK INJECTION      needle   • HIP ARTHROSCOPY W/ LABRAL DEBRIDEMENT Left     "hip surgery"   • IR BIOPSY LUNG  4/17/2023   • IR CHEST TUBE PLACEMENT  4/17/2023   • IR LOWER EXTREMITY ANGIOGRAM  12/08/2021   • LUNG LOBECTOMY Right 5/17/2023    Procedure: lymph node dissection; PLEURAL LYSIS;  Surgeon: Jacquelyn Medina MD;  [facility];  Service: Thoracic   • LUNG SEGMENTECTOMY Right 5/17/2023    Procedure: Right thoracoscopic upper lobe wedge resection;  Surgeon: Jacquelyn Medina MD;  [facility];  Service: Thoracic   • ORTHOPEDIC SURGERY     • LA ARTHRS HIP DEBRIDEMENT/SHAVING ARTICULAR CRTLG Left 04/30/2018    Procedure: LEFT HIP ARTHROSCOPIC LABRAL DEBRIDEMENT;  Surgeon: Jorge Jackman MD;  Location[facility];  Service: Orthopedics   •  [address] INCL FLUOR GDNCE DX W/CELL WASHG [address] [facility] N/A 5/17/2023    Procedure: BRONCHOSCOPY FLEXIBLE;  Surgeon: Jacquelyn Medina MD;  [facility];  Service: Thoracic   • LA IN-SITU VEIN BYPASS FEMORAL-POPLITEAL Right 12/08/2021    Procedure: R SFA to BK Pop Bypass using insitu GSV,  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;  Surgeon: Ten Cummings MD;  [facility];  Service: Vascular   • LA Geradine Journey 3RD+ ORD [id number] [facility] Right 12/08/2021    Procedure: COMPLETION ARTERIOGRAM WITH 5 MM ANGIOPLASTY OF MID SUPERFICIAL FEMORAL ARTERY;  Surgeon: Ten Cummings MD;  [facility];  Service: Vascular   • SC THORACOSCOPY W/THERA WEDGE RESEXN INITIAL UNILAT Right 5/17/2023    Procedure: THORACOSCOPY VIDEO ASSISTED SURGERY (VATS); Surgeon: Valarie Rivera MD;  [facility];  Service: Thoracic   • TONSILLECTOMY     • WISDOM TOOTH EXTRACTION        Family History:     Family History   Problem Relation Age of Onset   • Dementia Mother    • Glaucoma Mother    • Arthritis Mother         nursing home   • Cancer Father         dead   • Osteoarthritis Family       Social History:     Social History     Socioeconomic History   • Marital status:      Spouse name: None   • Number of children: None   • Years of education: None   • Highest education level: None   Occupational History   • None   Tobacco Use   • Smoking status: Every Day     Packs/day: 0.25     Years: 15.00     Total pack years: 3.75     Types: Cigarettes     Start date: 2008     Passive exposure: Yes   • Smokeless tobacco: Never   • Tobacco comments:     stress caused by workers comp.  not covering meds. Less than half a pack of cigs    Vaping Use   • Vaping Use: Never used   Substance and Sexual Activity   • Alcohol use: Never   • Drug use: Not Currently     Comment: drug free since 1987   • Sexual activity: None     Comment: defer   Other Topics Concern   • None   Social History Narrative   • None     Social Determinants of Health     Financial Resource Strain: Not on file   Food Insecurity: No Food Insecurity (5/18/2023)    Hunger Vital Sign    • Worried About Running Out of Food in the Last Year: Never true    • Ran Out of Food in the Last Year: Never true   Transportation Needs: No Transportation Needs (5/18/2023)    PRAPARE - Transportation    • Lack of Transportation (Medical): No    • Lack of Transportation (Non-Medical):  No   Physical Activity: Not on file   Stress: Not on file   Social Connections: Not on file   Intimate Partner Violence: Not on file   Housing Stability: Low Risk  (5/18/2023)    Housing Stability Vital Sign    • Unable to Pay for Housing in the Last Year: No    • Number of Places Lived in the Last Year: 1    • Unstable Housing in the Last Year: No      Medications and Allergies:     Current Outpatient Medications   Medication Sig Dispense Refill   • acetaminophen (TYLENOL) 500 mg tablet Take 500 mg by mouth every 6 (six) hours as needed for mild pain     • apixaban (ELIQUIS) 5 mg Take 1 tablet (5 mg total) by mouth 2 (two) times a day 180 tablet 3   • ascorbic Acid (VITAMIN C) 500 MG CPCR Take 500 mg by mouth 2 (two) times a day     • aspirin (ECOTRIN LOW STRENGTH) 81 mg EC tablet Take 1 tablet (81 mg total) by mouth daily 90 tablet 0   • atorvastatin (LIPITOR) 10 mg tablet Take 0.5 tablets (5 mg total) by mouth daily at bedtime 45 tablet 1   • cholecalciferol (VITAMIN D3) 1,000 units tablet Take 2,000 Units by mouth 2 (two) times a day      • docusate sodium (COLACE) 100 mg capsule Take 1 capsule (100 mg total) by mouth 2 (two) times a day 30 capsule 0   • Flaxseed, Linseed, (FLAX SEED OIL PO) Take 1,200 capsules by mouth 2 (two) times a day       • folic acid (FOLVITE) 364 mcg tablet Take 400 mcg by mouth daily     • METHYLCOBALAMIN SL Place 1,000 mcg under the tongue 2 (two) times a day      • Misc Natural Products (Osteo Bi-Flex Triple Strength) TABS Take by mouth 2 (two) times a day       • nicotine (NICODERM CQ) 21 mg/24 hr TD 24 hr patch Place 1 patch on the skin over 24 hours every 24 hours 28 patch 3   • pyridoxine (B-6) 100 MG tablet Take 100 mg by mouth daily     • transdermal buprenorphine (BUTRANS) 5 mcg/hr TD patch Place 1 patch on the skin over 7 days once a week For ongoing therapy 4 patch 2   • vitamin A 2400 MCG (8000 UT) capsule Take 8,000 Units by mouth daily       No current facility-administered medications for this visit.      Allergies   Allergen Reactions   • Cyanocobalamin [Vitamin B12] GI Intolerance   • Gabapentin Other (See Comments) and GI Intolerance     Difficulty walking   • Morphine Other (See Comments)     "acute constipation"   • Tramadol Confusion   • Nsaids Other (See Comments)     Pt reports avoids as is on Eliquis      Immunizations:     Immunization History   Administered Date(s) Administered   • COVID-19 MODERNA VACC 0.5 ML IM 03/23/2021, 04/23/2021, 11/04/2021   • INFLUENZA 10/21/2021   • Influenza, high dose seasonal 0.7 mL 11/25/2020   • Influenza, recombinant, quadrivalent,injectable, preservative free 01/04/2023   • Pneumococcal Conjugate 13-Valent 11/21/2019   • Pneumococcal Polysaccharide PPV23 11/25/2020      Health Maintenance:         Topic Date Due   • Colorectal Cancer Screening  06/29/2028   • Hepatitis C Screening  Completed         Topic Date Due   • COVID-19 Vaccine (4 - Moderna series) 12/30/2021   • Influenza Vaccine (1) 09/01/2023      Medicare Screening Tests and Risk Assessments:     Amanda Goyal is here for his Subsequent Wellness visit. Last Medicare Wellness visit information reviewed, patient interviewed and updates made to the record today. Health Risk Assessment:   Patient rates overall health as good. Patient feels that their physical health rating is slightly worse. Patient is satisfied with their life. Eyesight was rated as slightly worse. Hearing was rated as same. Patient feels that their emotional and mental health rating is slightly better. Patients states they are sometimes angry. Patient states they are sometimes unusually tired/fatigued. Pain experienced in the last 7 days has been some. Patient's pain rating has been 5/10. Patient states that he has experienced no weight loss or gain in last 6 months. Depression Screening:   PHQ-2 Score: 0      Fall Risk Screening: In the past year, patient has experienced: no history of falling in past year      Home Safety:  Patient does not have trouble with stairs inside or outside of their home. Patient has working smoke alarms and has working carbon monoxide detector.  Home safety hazards include: none.     Nutrition:   Current diet is Regular. Medications:   Patient is currently taking over-the-counter supplements. OTC medications include: see medication list. Patient is able to manage medications. Activities of Daily Living (ADLs)/Instrumental Activities of Daily Living (IADLs):   Walk and transfer into and out of bed and chair?: Yes  Dress and groom yourself?: Yes    Bathe or shower yourself?: Yes    Feed yourself? Yes  Do your laundry/housekeeping?: Yes  Manage your money, pay your bills and track your expenses?: Yes  Make your own meals?: Yes    Do your own shopping?: Yes    ADL comments: Patient states he has some trouble with laundry     Previous Hospitalizations:   Any hospitalizations or ED visits within the last 12 months?: Yes    How many hospitalizations have you had in the last year?: 1-2    Advance Care Planning:   Living will: Yes    Durable POA for healthcare: Yes    Advanced directive: Yes    Advanced directive counseling given: Yes    End of Life Decisions reviewed with patient: Yes    Provider agrees with end of life decisions: Yes      Comments: Refer to ACP note.        Cognitive Screening:   Provider or family/friend/caregiver concerned regarding cognition?: No    PREVENTIVE SCREENINGS      Cardiovascular Screening:    General: Screening Not Indicated, History Lipid Disorder, Risks and Benefits Discussed and Screening Current      Diabetes Screening:     General: Screening Current and Risks and Benefits Discussed      Colorectal Cancer Screening:     General: Screening Current      Prostate Cancer Screening:    General: Screening Current and Risks and Benefits Discussed      Osteoporosis Screening:    General: Screening Not Indicated      Abdominal Aortic Aneurysm (AAA) Screening:    Risk factors include: age between 70-77 yo and tobacco use        General: Risks and Benefits Discussed and Screening Current      Lung Cancer Screening:     General: Screening Not Indicated, History Lung Cancer, Risks and Benefits Discussed and Screening Current      Hepatitis C Screening:    General: Screening Current and Risks and Benefits Discussed    Screening, Brief Intervention, and Referral to Treatment (SBIRT)      Brief Intervention  Alcohol & drug use screenings were reviewed. No concerns regarding substance use disorder identified. Annual Depression Screening  Time spent screening and evaluating the patient for depression during today's encounter was 5 minutes. Review of Current Opioid Use    Opioid Risk Tool (ORT) Interpretation: Complete Opioid Risk Tool (ORT)    [place] PDMP or [place]  reviewed. No red flags were identified    Other Counseling Topics:   Car/seat belt/driving safety, skin self-exam, sunscreen and calcium and vitamin D intake and regular weightbearing exercise. No results found. Physical Exam:     /70 (BP Location: Left arm, Patient Position: Sitting, Cuff Size: Standard)   Pulse 97   Temp 98.4 °F (36.9 °C) (Temporal)   Ht 6' 3" (1.905 m)   Wt 84.4 kg (186 lb)   SpO2 98%   BMI 23.25 kg/m²     Physical Exam  Vitals and nursing note reviewed. Constitutional:       General: He is not in acute distress. Appearance: Normal appearance. He is normal weight. He is not ill-appearing, toxic-appearing or diaphoretic. HENT:      Head: Normocephalic and atraumatic. Right Ear: Tympanic membrane, ear canal and external ear normal. There is no impacted cerumen. Left Ear: Tympanic membrane, ear canal and external ear normal. There is no impacted cerumen. Nose: Nose normal. No congestion or rhinorrhea. Mouth/Throat:      Mouth: Mucous membranes are moist.      Pharynx: Oropharynx is clear. No oropharyngeal exudate or posterior oropharyngeal erythema. Eyes:      General: No scleral icterus. Right eye: No discharge. Left eye: No discharge. Extraocular Movements: Extraocular movements intact.       Conjunctiva/sclera: Conjunctivae normal.      Pupils: Pupils are equal, round, and reactive to light. Neck:      Vascular: No carotid bruit. Cardiovascular:      Rate and Rhythm: Normal rate and regular rhythm. Pulses: Normal pulses. Heart sounds: Normal heart sounds. No murmur heard. No friction rub. No gallop. Pulmonary:      Effort: Pulmonary effort is normal. No respiratory distress. Breath sounds: Normal breath sounds. No wheezing or rales. Abdominal:      General: Abdomen is flat. Bowel sounds are normal. There is no distension. Palpations: Abdomen is soft. There is no mass. Tenderness: There is no abdominal tenderness. There is no guarding. Hernia: No hernia is present. Musculoskeletal:         General: No swelling, tenderness, deformity or signs of injury. Normal range of motion. Cervical back: Normal range of motion and neck supple. No rigidity. No muscular tenderness. Right lower leg: No edema. Left lower leg: No edema. Lymphadenopathy:      Cervical: No cervical adenopathy. Skin:     General: Skin is warm and dry. Capillary Refill: Capillary refill takes less than 2 seconds. Coloration: Skin is not jaundiced or pale. Findings: No bruising, erythema, lesion or rash. Neurological:      General: No focal deficit present. Mental Status: He is alert and oriented to person, place, and time. Mental status is at baseline. Cranial Nerves: No cranial nerve deficit. Sensory: No sensory deficit. Motor: No weakness. Coordination: Coordination normal.      Gait: Gait normal.      Deep Tendon Reflexes: Reflexes normal.   Psychiatric:         Mood and Affect: Mood normal.         Behavior: Behavior normal.         Thought Content:  Thought content normal.         Judgment: Judgment normal.          Elly Smart MD gradual onset

## 2023-07-13 ENCOUNTER — HOSPITAL ENCOUNTER (OUTPATIENT)
Dept: MRI IMAGING | Facility: HOSPITAL | Age: 68
Discharge: HOME | End: 2023-07-13
Attending: INTERNAL MEDICINE
Payer: COMMERCIAL

## 2023-07-13 DIAGNOSIS — K76.9 LIVER LESION: ICD-10-CM

## 2023-07-13 PROCEDURE — A9585 GADOBUTROL INJECTION: HCPCS | Performed by: INTERNAL MEDICINE

## 2023-07-13 PROCEDURE — 74183 MRI ABD W/O CNTR FLWD CNTR: CPT

## 2023-07-13 PROCEDURE — G1004 CDSM NDSC: HCPCS

## 2023-07-13 RX ADMIN — GADOBUTROL 8 ML: 604.72 INJECTION INTRAVENOUS at 12:11

## 2023-07-17 ENCOUNTER — TELEMEDICINE (OUTPATIENT)
Age: 68
End: 2023-07-17
Payer: COMMERCIAL

## 2023-07-17 VITALS — BODY MASS INDEX: 23.25 KG/M2 | WEIGHT: 186 LBS

## 2023-07-17 DIAGNOSIS — T65.291D TOXIC EFFECT OF TOBACCO, ACCIDENTAL OR UNINTENTIONAL, SUBSEQUENT ENCOUNTER: Primary | ICD-10-CM

## 2023-07-17 PROCEDURE — 99407 BEHAV CHNG SMOKING > 10 MIN: CPT | Performed by: PHYSICIAN ASSISTANT

## 2023-07-17 NOTE — PROGRESS NOTES
Virtual Visit with Tobacco Cessation    Verification of patient location:    Patient is located at Home in the following state in which I hold an active license [place]    Problem List Items Addressed This Visit    None  Visit Diagnoses     Toxic effect of tobacco, accidental or unintentional, subsequent encounter    -  Primary          Congratulated him on quitting other than mild setback last evening  Patient committed to remaining tobacco free  Encouraged him to use patches if needed   Provided support  He may follow-up 4 weeks or PRN    Reason for visit is smoking cessation    Encounter provider Dequan Brice PA-C    Provider located at [address] [place] [address] Road  [address]  [phone]      Recent Visits  No visits were found meeting these conditions. Showing recent visits within past 7 days and meeting all other requirements  Today's Visits  Date Type Provider Dept   07/17/23 Telemedicine Dequan Brice PA-C [facility]   Showing today's visits and meeting all other requirements  Future Appointments  No visits were found meeting these conditions. Showing future appointments within next 150 days and meeting all other requirements       The patient was identified by name and date of birth. Nadia Ellison was informed that this is a telemedicine visit and that the visit is being conducted throughTelephone. My office door was closed. No one else was in the room. He acknowledged consent and understanding of privacy and security of the platform. Nadia Ellison verbally agrees to participate in [employer].  Pt is aware that [employer] could be limited without vital signs or the ability to perform a full hands-on physical Iván Queen understands he or the provider may request at any time to terminate the video visit and request the patient to seek care or treatment in person. Patient is aware this is a billable service. Subjective  Mira Oropeza is a 75 y.o. male presenting for follow-up. He is doing well. He went 6 days without smoking. Last night was stressed out and smoked 2 cigarettes but that made him nauseous and dizzy. He hasn't smoked today and has no interest in smoking anymore. He hasn't used any patches. HPI from initial visit 6/27/23  Mira Oropeza is a 75 y.o. male presenting for initial smoking cessation visit. He was referred by his thoracic surgeon. He was recently treated for lung cancer. He has cut back to half pack per day and is interested in quitting entirely.   Biggest trigger for him has been stress.     Smoking history:  What type of tobacco product used:  Cigarettes     How many Packs of Cigarettes per day on average:  1/2 PPD     Largest amount of tobacco EVER used regularly: 1 PPD     How old when started using Tobacco regularly:  19     How many years using tobacco regularly:  40+     How soon after waking up do you smoke:  within the hour     Past Attempts to go Tobacco free:              Intentionally Cut Down or limit use:  YES/NO:yes                How many serious attempt to go tobacco free:  multiple                            Last Serious attempt: current                 Which symptoms experienced when tried to go Tobacco Free:  stress                 Methods to used on prior attempt: Mehran Burgos                 Desire to Quit:  yes     Motivations to [address]  Has a Healthcare Professional recommend smoking cessation?  Yes     What Obstacles do you face for quitting:  stress       Past Medical History:   Diagnosis Date   • Arthritis feb 2016    osto back   • Chronic back pain    • Chronic narcotic dependence ([address])    • Chronic pain disorder     Sees pain management Dr Diya Flood SL   • Cigarette smoker    • Deep vein thrombosis (DVT) of femoral vein of left lower extremity ([address]) 7/29/2021   • Depression • Dry skin    • Dyslexia    • Full dentures     upper and no teeth lower/"avoid raw vegetables and chewy food"   • GERD (gastroesophageal reflux disease)    • 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 related   • History of blood clots     "left leg" takes Eliquis   • History of bronchitis    • History of pneumonia     childhood   • Increased pressure in the eye, bilateral    • L4-L5 disc bulge 06/16/2017   • Low back pain    • MTHFR mutation    • Muscle weakness    • Neuropathy     right foot and left foot   • Osteoarthritis    • Osteoarthritis     and" if becomes cold joint pain worsens"   • Peripheral neuropathy 8/ 15   • Pulmonary emphysema ([address])     "mild"   • Right foot drop    • Shortness of breath     "only on humid days"   • Smokers' cough [facility])    • Wears glasses    • Work related injury 04/24/2018       Past Surgical History:   Procedure Laterality Date   • BACK SURGERY  aug 2015- jun2017 2017 no affect   • COLONOSCOPY     • EGD AND COLONOSCOPY N/A 06/29/2018    Procedure: EGD AND COLONOSCOPY;  Surgeon: Toi Hoffman MD;  Location: [facility];   Service: Gastroenterology   • EPIDURAL BLOCK INJECTION      needle   • HIP ARTHROSCOPY W/ LABRAL DEBRIDEMENT Left     "hip surgery"   • IR BIOPSY LUNG  4/17/2023   • IR CHEST TUBE PLACEMENT  4/17/2023   • IR LOWER EXTREMITY ANGIOGRAM  12/08/2021   • LUNG LOBECTOMY Right 5/17/2023    Procedure: lymph node dissection; PLEURAL LYSIS;  Surgeon: Ochoa Skinner MD;  [facility];  Service: Thoracic   • LUNG SEGMENTECTOMY Right 5/17/2023    Procedure: Right thoracoscopic upper lobe wedge resection;  Surgeon: Ochoa Skinner MD;  [facility];  Service: Thoracic   • ORTHOPEDIC SURGERY     • IN ARTHRS HIP DEBRIDEMENT/SHAVING ARTICULAR CRTLG Left 04/30/2018    Procedure: LEFT HIP ARTHROSCOPIC LABRAL DEBRIDEMENT;  Surgeon: Anthony Cooper MD;  [facility];  Service: Orthopedics   •  [address] INCL FLUOR GDNCE DX W/CELL [facility] N/A 5/17/2023    Procedure: BRONCHOSCOPY FLEXIBLE;  Surgeon: Onelia Albrecht MD;  [facility];  Service: Thoracic   • GA IN-SITU VEIN BYPASS FEMORAL-POPLITEAL Right 12/08/2021    Procedure: R SFA to BK Pop Bypass using insitu GSV,  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;  Surgeon: Stoney Cintron MD;  [facility];  Service: Vascular   • GA Iban Melendez 3RD+ ORD SLCTV ABDL PEL/LXTR [facility] Right 12/08/2021    Procedure: COMPLETION ARTERIOGRAM WITH 5 MM ANGIOPLASTY OF MID SUPERFICIAL FEMORAL ARTERY;  Surgeon: Stoney Cintron MD;  [facility];  Service: Vascular   • GA THORACOSCOPY W/THERA WEDGE RESEXN INITIAL UNILAT Right 5/17/2023    Procedure: THORACOSCOPY VIDEO ASSISTED SURGERY (VATS); Surgeon: Onelia Albrecht MD;  [facility];  Service: Thoracic   • TONSILLECTOMY     • WISDOM TOOTH EXTRACTION         Social History     Tobacco Use   Smoking Status Every Day   • Packs/day: 0.25   • Years: 15.00   • Total pack years: 3.75   • Types: Cigarettes   • Start date: 2008   • Passive exposure: Yes   Smokeless Tobacco Never   Tobacco Comments    stress caused by workers comp.  not covering meds.  Less than half a pack of cigs        E-Cigarette/Vaping   • E-Cigarette Use Never User      E-Cigarette/Vaping Substances   • Nicotine No    • Flavoring No        Current Outpatient Medications   Medication Sig Dispense Refill   • acetaminophen (TYLENOL) 500 mg tablet Take 500 mg by mouth every 6 (six) hours as needed for mild pain     • apixaban (ELIQUIS) 5 mg Take 1 tablet (5 mg total) by mouth 2 (two) times a day 180 tablet 3   • ascorbic Acid (VITAMIN C) 500 MG CPCR Take 500 mg by mouth 2 (two) times a day     • aspirin (ECOTRIN LOW STRENGTH) 81 mg EC tablet Take 1 tablet (81 mg total) by mouth daily 90 tablet 0   • atorvastatin (LIPITOR) 10 mg tablet Take 0.5 tablets (5 mg total) by mouth daily at bedtime 45 tablet 1   • cholecalciferol (VITAMIN D3) 1,000 units tablet Take 2,000 Units by mouth 2 (two) times a day • Flaxseed, Linseed, (FLAX SEED OIL PO) Take 1,200 capsules by mouth 2 (two) times a day       • folic acid (FOLVITE) 478 mcg tablet Take 400 mcg by mouth daily     • METHYLCOBALAMIN SL Place 1,000 mcg under the tongue 2 (two) times a day      • Misc Natural Products (Osteo Bi-Flex Triple Strength) TABS Take by mouth 2 (two) times a day       • pyridoxine (B-6) 100 MG tablet Take 100 mg by mouth daily     • transdermal buprenorphine (BUTRANS) 5 mcg/hr TD patch Place 1 patch on the skin over 7 days once a week For ongoing therapy 4 patch 2   • vitamin A 2400 MCG (8000 UT) capsule Take 8,000 Units by mouth 2 (two) times a day     • docusate sodium (COLACE) 100 mg capsule Take 1 capsule (100 mg total) by mouth 2 (two) times a day (Patient not taking: Reported on 7/17/2023) 30 capsule 0   • nicotine (NICODERM CQ) 21 mg/24 hr TD 24 hr patch Place 1 patch on the skin over 24 hours every 24 hours (Patient not taking: Reported on 7/17/2023) 28 patch 3     No current facility-administered medications for this visit. Allergies   Allergen Reactions   • Cyanocobalamin [Vitamin B12] GI Intolerance   • Gabapentin Other (See Comments) and GI Intolerance     Difficulty walking   • Morphine Other (See Comments)     "acute constipation"   • Tramadol Confusion   • Nsaids Other (See Comments)     Pt reports avoids as is on Eliquis       Review of Systems   Gastrointestinal: Positive for nausea. Neurological: Positive for dizziness. All other systems reviewed and are negative. Video Exam    Vitals:    07/17/23 1548   Weight: 84.4 kg (186 lb)       Physical Exam     It was my intent to perform this visit via video technology but the patient was not able to do a video connection so the visit was completed via audio telephone only.     Patient is speaking in full sentences  Mood and behavior are appropriate to context, appropriately conversant  No signs of distress appreciable  No audible respiratory symptoms appreciable such as wheezing, coughing, or congestion      Tobacco Use/Cessation. I assessed Cindy Tovar to be in an action stage with respect to tobacco use. I advised patient to quit, and offered support. Cindy Tovar is a 75 y.o. male here for a discussion regarding smoking cessation. He began smoking several years ago. He currently smokes 0 packs per day. He has attempted to quit smoking in the past, most recently a few weeks ago. Best success quitting using willpower. Barriers to quitting include: under a lot of stress now. He denies shortness of breath. As a result of this visit, I have referred the patient for further respiratory evaluation.  No    I spent 20 minutes with patient today in which >10 minutes of the time was spent in counseling/coordination of care regarding tobacco cessation

## 2023-07-19 ENCOUNTER — TELEPHONE (OUTPATIENT)
Dept: INTERNAL MEDICINE CLINIC | Facility: OTHER | Age: 68
End: 2023-07-19

## 2023-07-20 ENCOUNTER — TELEPHONE (OUTPATIENT)
Dept: HEMATOLOGY ONCOLOGY | Facility: CLINIC | Age: 68
End: 2023-07-20

## 2023-07-20 NOTE — TELEPHONE ENCOUNTER
Call Transfer   Who are you speaking with? Patient   If it is not the patient, are they listed on an active communication consent form? N/A   Who is the patients HemOnc/SurgOnc provider? Dr. Js Shannon   What is the reason for this call? Patient calling in stating he would like Dr Js Shannon to look at his results as his PCP informed him it was a cyst and nothing to worry about. Person/Department that the call was transferred to? Time that call was transferred? Baljit Guadalupe @ 12:47PM   Your call will be transferred now. If you receive a voicemail, please leave a detailed message and a member of the team will return your call as soon as possible. Did you relay this information to the caller?   Yes

## 2023-07-21 NOTE — TELEPHONE ENCOUNTER
LM for patient letting him know that Dr. Tina Watts looked at his results and agrees with his PCP that he does not need to be seen. Appt was cancelled.

## 2023-07-24 ENCOUNTER — OFFICE VISIT (OUTPATIENT)
Dept: CARDIAC SURGERY | Facility: CLINIC | Age: 68
End: 2023-07-24

## 2023-07-24 VITALS
OXYGEN SATURATION: 96 % | BODY MASS INDEX: 23.22 KG/M2 | DIASTOLIC BLOOD PRESSURE: 79 MMHG | SYSTOLIC BLOOD PRESSURE: 119 MMHG | HEART RATE: 93 BPM | HEIGHT: 75 IN | RESPIRATION RATE: 16 BRPM | WEIGHT: 186.73 LBS | TEMPERATURE: 98.7 F

## 2023-07-24 DIAGNOSIS — C34.91 ADENOCARCINOMA OF RIGHT LUNG (HCC): Primary | ICD-10-CM

## 2023-07-24 PROCEDURE — 99024 POSTOP FOLLOW-UP VISIT: CPT | Performed by: PHYSICIAN ASSISTANT

## 2023-07-24 NOTE — ASSESSMENT & PLAN NOTE
Mr. Latricia Patel is a little over 2 months out from a right thoracoscopic upper lobe wedge resection for Stage IA adenocarcinoma. He is recovering well from a thoracic surgery standpoint. We will start routine surveillance per NCCN guidelines with a CT chest every 6 months post-operatively x 2 years and then annually from there onward. We will see him back November for his first routine surveillance. All questions were answered and she is in agreement with this plan.

## 2023-07-24 NOTE — PROGRESS NOTES
Thoracic Follow-Up  Assessment/Plan:    Adenocarcinoma of right lung [facility])  Mr. Dale Wells is a little over 2 months out from a right thoracoscopic upper lobe wedge resection for Stage IA adenocarcinoma. He is recovering well from a thoracic surgery standpoint. We will start routine surveillance per NCCN guidelines with a CT chest every 6 months post-operatively x 2 years and then annually from there onward. We will see him back November for his first routine surveillance. All questions were answered and she is in agreement with this plan. Diagnoses and all orders for this visit:    Adenocarcinoma of right lung [facility])  -     CT chest wo contrast; Future          Thoracic History     Diagnosis: Right upper lobe adenocarcinoma stage IA  Procedure: Flexible bronchoscopy, right thoracoscopic upper lobe therapeutic wedge resection 5/17/23. Pathology: right upper lobe revealed non mucinous minimally invasive adenocarcinoma, G1 well differentiated, measuring 1 x 1 x 0.8 cm. No visceral pleural invasion. No LV invasion. All margins were negative. All 7 LN - for tumor. (T1mi, pN0, M0, G1). Additional findings were atypical adenomatous hyperplasia. Cancer Staging   Adenocarcinoma of right lung [facility])  Staging form: Lung, AJCC 8th Edition  - Clinical stage from 5/17/2023: Stage IA1 (cT1mi, cN0, cM0) - Signed by Teddi Olszewski, PA-C on 6/1/2023  Histopathologic type:  Adenocarcinoma, NOS  Stage prefix: Initial diagnosis  Histologic grade (G): G1  Histologic grading system: 4 grade system  Laterality: Right  Tumor size (mm): 10  Lymph-vascular invasion (LVI): LVI not present (absent)/not identified  Specimen type: Excision    Oncology History   Adenocarcinoma of right lung ([address])   5/17/2023 Surgery    Flexible bronchoscopy, right thoracoscopic therapeutic upper lobe wedge resection, MLND      5/17/2023 -  Cancer Staged    Staging form: Lung, AJCC 8th Edition  - Clinical stage from 5/17/2023: Stage IA1 (cT1mi, cN0, cM0) - Signed by Hola Goyal PA-C on 6/1/2023  Histopathologic type: Adenocarcinoma, NOS  Stage prefix: Initial diagnosis  Histologic grade (G): G1  Histologic grading system: 4 grade system  Laterality: Right  Tumor size (mm): 10  Lymph-vascular invasion (LVI): LVI not present (absent)/not identified  Specimen type: Excision       6/1/2023 Initial Diagnosis    Adenocarcinoma of right lung (HCC)            Subjective:    Patient ID: Gama Heath is a 75 y.o. male. ECOG 1    HPI   Mr. Sowmya Ahmadi is a 75year old male who is s/p a right thoracoscopic upper lobe wedge resection on 5/17/2023 for stage IA1 adenocarcinoma with an invasive component of only 5mm. He returns today for a [facility] post-operative appointment. He was last seen in our office on 5/19/2023 at which point he was recovering well. On discussion today he is feeling well. His activity is somewhat limited due to arthralgias. He is walking about 15 minutes a day with his cane. He is using IS up to about 1500-1750mL. He denies any pain. The following portions of the patient's history were reviewed and updated as appropriate: allergies, current medications, past family history, past medical history, past social history, past surgical history and problem list.    Review of Systems   Constitutional: Negative for chills, diaphoresis and unexpected weight change. HENT: Negative. Eyes: Negative. Respiratory: Negative for cough, shortness of breath and wheezing. Cardiovascular: Negative for chest pain and leg swelling. Gastrointestinal: Negative for abdominal pain, diarrhea and nausea. Endocrine: Negative. Genitourinary: Negative. Musculoskeletal: Negative. Skin: Negative. Allergic/Immunologic: Negative. Neurological: Negative. Hematological: Negative for adenopathy. Does not bruise/bleed easily. Psychiatric/Behavioral: Negative. All other systems reviewed and are negative.         Objective:   Physical Exam  Vitals reviewed. Constitutional:       General: He is not in acute distress. Appearance: Normal appearance. He is not ill-appearing. HENT:      Head: Normocephalic. Nose: Nose normal.      Mouth/Throat:      Mouth: Mucous membranes are moist.   Eyes:      Pupils: Pupils are equal, round, and reactive to light. Cardiovascular:      Rate and Rhythm: Normal rate and regular rhythm. Heart sounds: Normal heart sounds. Pulmonary:      Effort: Pulmonary effort is normal.      Breath sounds: Normal breath sounds. No wheezing, rhonchi or rales. Comments: Surgical incisions well healed  Abdominal:      Palpations: Abdomen is soft. Musculoskeletal:         General: No swelling. Normal range of motion. Lymphadenopathy:      Cervical: No cervical adenopathy. Skin:     General: Skin is warm. Neurological:      General: No focal deficit present. Mental Status: He is alert and oriented to person, place, and time. Psychiatric:         Mood and Affect: Mood normal.     /79 (BP Location: Left arm, Patient Position: Sitting, Cuff Size: Standard)   Pulse 93   Temp 98.7 °F (37.1 °C) (Temporal)   Resp 16   Ht 6' 3" (1.905 m)   Wt 84.7 kg (186 lb 11.7 oz)   SpO2 96%   BMI 23.34 kg/m²     XR chest pa & lateral    Result Date: 5/31/2023  Impression Interval resolution of previous pneumothorax. Interval development of pleural thickening/loculated fluid in the lateral right upper hemithorax. Trace right pleural effusion. Workstation performed: [id number]     XR chest pa & lateral    Result Date: 5/18/2023  Impression Right chest tube removed with minimal decrease in small right hydropneumothorax. Trace left effusion. Workstation performed: [id number]      CT chest wo contrast    Result Date: 3/28/2023  Narrative CT CHEST WITHOUT IV CONTRAST INDICATION:   R91.8: Other nonspecific abnormal finding of lung field. COMPARISON: Multiple prior comparison studies the most recent: PET CT 9/19/2022.   CT lung 7/18/2022. TECHNIQUE: CT examination of the chest was performed without intravenous contrast. Multiplanar 2D reformatted images were created from the source data. Radiation dose length product (DLP) for this visit:  242 mGy-cm . This examination, like all CT scans performed in the [facility], was performed utilizing techniques to minimize radiation dose exposure, including the use of iterative reconstruction and automated exposure control. FINDINGS: LUNGS:  Increase size of a spiculated FDG avid subpleural right upper lobe nodule now measuring 9 x 4 mm, #5/79, measured 7 x 4 mm on the 7/18/2022 study and 4 mm in maximum diameter on the 12/3/2019 study. An adjacent slightly more superior subpleural opacity measuring 9 x 6 mm #5/67 is unchanged. Posterior right upper lobe 10 x 7 mm spiculated nodule is unchanged #5/132.2 No new pulmonary nodules. No endotracheal or endobronchial lesion. Unchanged mild upper lobe predominant centrilobular and paraseptal emphysema. PLEURA:  Unremarkable. HEART/GREAT VESSELS: Heart is unremarkable for patient's age. No thoracic aortic aneurysm. MEDIASTINUM AND ALEJANDRA:  No adenopathy. A hypermetabolic node seen on the PET/CT have benign morphology with a short axis dimension of less than 1 cm. Hilar nodes are not apparent on this noncontrast study. CHEST WALL AND LOWER NECK:  Unremarkable. VISUALIZED STRUCTURES IN THE UPPER ABDOMEN:  Unchanged vague 8 mm hepatic segment 7 hypodensity OSSEOUS STRUCTURES:  No acute fracture or destructive osseous lesion. Impression Increasing size of a spiculated FDG avid right upper lobe subpleural nodule now measuring 9 x 4 mm measured 7 x 4 mm on the 7/18/2022 study and 4 mm in maximum diameter on the 12/3/2019 study. Management as per cardiothoracic surgery. Other right lung nodular opacities are unchanged in size and appearance. The study was marked in EPIC for significant notification.  Workstation performed: [id number] No CT Chest,Abdomen,Pelvis results available for this patient. NM PET CT skull base to mid thigh    Result Date: 5/10/2023  Narrative PET/CT SCAN INDICATION: R91.1: Solitary pulmonary nodule MODIFIER: PI COMPARISON: CT 3/22/2023 and priors, including PET CT 9/19/2022 CELL TYPE: Atypical adenomatous hyperplasia, right upper lung biopsy 4/17/2023 TECHNIQUE:   8.8 mCi F-18-FDG administered IV. Multiplanar attenuation corrected and non attenuation corrected PET images are available for interpretation, and contiguous, low dose, axial CT sections were obtained from the skull base through the femurs following the administration of oral contrast material (7.5 cc Omnipaque-240 in 300 cc water). Intravenous contrast material was not utilized. This examination, like all CT scans performed in the [facility], was performed utilizing techniques to minimize radiation dose exposure, including the use of iterative reconstruction and automated exposure control. Fasting serum glucose: 107 mg/dl FINDINGS: VISUALIZED BRAIN: No acute abnormalities are seen. HEAD/NECK: Nonspecific mildly increased bilateral thyroid activity, SUV measuring up to 3.9, prior CT 3.1. This may be associated with an underlying thyroiditis. No FDG avid cervical adenopathy is seen. CT images: Stable. CHEST: On image 4/79, 9 x 4 mm right upper lung nodule demonstrates mild FDG activity, SUV 1.5. Prior PET/CT measurement 7 mm, SUV 1.2. Malignancy should be considered (otherwise. Somewhat linear FDG activity along the right upper lateral pleural, SUV 2.4, may be inflammatory. Additional right lung nodular densities appear similar without new hypermetabolism. Persistent shotty hilar and mediastinal nodes but with mildly diminished FDG activity since the prior PET/CT. Right hilar SUV measures 2.9, prior to be 3.9. Left hilar SUV 3.2, prior SUV 3.6. Example AP window nodes have an SUV of 3.5, prior to be 4.2.  Right paratracheal node SUV 3.2, prior CT 4.3. CT images: Otherwise stable. ABDOMEN: There is a new small hypermetabolic liver lesion posterior to the gallbladder, SUV 5.3. This measures approximately 1 x 1 cm based on the PET images. Etiology is uncertain. No discrete lesion seen on limited noncontrast CT images. CT images: Chronic gallbladder wall thickening with faint calcifications in the fundus. Colon diverticula. Stable hepatic dome hypodensity. PELVIS: No FDG avid soft tissue lesions are seen. CT images: Stable. OSSEOUS STRUCTURES: No FDG avid lesions are seen. CT images: Stable. Impression 1. Increasing size and FDG activity of right upper lung nodule, most concerning for malignancy. 2. Mild FDG uptake in bilateral hilar and mediastinal nodes, though appearing less intense than on the prior PET/CT. This would favor a benign reactive etiology. Continued PET/CT follow-up recommended to exclude coexistent metastasis. 3. Right upper lateral linear pleural activity, favoring an inflammatory etiology. Attention on follow-up recommended. 4. New hypermetabolic liver lesion as above, of uncertain etiology. Neoplasm/metastasis is not excluded. Recommend further evaluation with liver MRI with and without contrast. 5. Mild thyroid activity is nonspecific but may be associated with an underlying thyroiditis. This may be correlated clinically. The study was marked in EPIC for significant notification. Workstation performed: [id number]     NM PET CT skull base to mid thigh    Result Date: 9/19/2022  Narrative PET/CT SCAN INDICATION:  D38.1: Neoplasm of uncertain behavior of trachea, bronchus and lung   , increasing in size lung nodules MODIFIER: PS COMPARISON: PET/CT dated 6/22/2021 and CT of chest dated 7/18/2022 CELL TYPE:  Not applicable TECHNIQUE:   8.8 mCi F-18-FDG administered IV. Multiplanar attenuation corrected and non attenuation corrected PET images were acquired 60 minutes post injection.  Contiguous, low dose, axial CT sections were obtained from the skull base through the femurs . Intravenous contrast material was not utilized. This examination, like all CT scans performed in the [facility], was performed utilizing techniques to minimize radiation dose exposure, including the use of iterative reconstruction and automated exposure control. Fasting serum glucose: 105 mg/dl FINDINGS: VISUALIZED BRAIN:   No acute abnormalities are seen. HEAD/NECK:   There is a physiologic distribution of FDG. No FDG avid cervical adenopathy is seen. CT images: Unremarkable. CHEST:   There are stable hypermetabolic mediastinal lymph nodes. For example, stable in size mildly prominent clustered AP window lymph nodes on images 78 and 79 of series 4 have a max SUV of 4.2, previously 4.7. Stable precarinal/ right paratracheal lymph node  on image 83 of series 4 measures 7 mm in short axis with max SUV of 4.4, previously 4.1. There is stable mild activity in the hilar regions with max SUV of 3.9 on the right and 3.6 on the left, previously 3.6 on the right and 3.6 on the left. The interval stability favors a benign reactive/inflammatory process with underlying sumit malignancy considered less likely but not excluded. Attention to these regions on follow-up imaging is recommended. On image 73 there is minimal FDG activity associated with patient's known 7 mm spiculated right upper lobe lung nodule with max SUV of 1.2 (for reference max SUV of the mediastinal blood pool is 2.6). On prior PET/CT, this nodule was similar in size with max SUV of 0.8. There is no significant FDG activity associated with patient's remaining known lung nodules. As noted on recent dedicated CT of chest dated 7/18/2022 there is a 9 mm subpleural right upper lobe lung nodule on image 70 and an 8 mm right upper lobe lung nodule on image 84. Accounting for differences in imaging technique, these nodules appear essentially stable since 7/18/2022.  CT images: Atherosclerotic aortic calcifications are noted. Emphysematous changes. ABDOMEN:   No FDG avid soft tissue lesions are seen. CT images: Probable cholelithiasis. Probable tiny nonobstructing left renal calculi versus vascular calcification. Stable lower pole hypodensity involving the right kidney. Stable irregular appearance to the left lower renal pole. Diverticulosis coli. Nonspecific mural thickening of the proximal to mid gastric wall which could be related to underdistention with underlying gastric mucosal process not excluded. Atherosclerotic vascular calcifications are noted. PELVIS: There is mild nonspecific patchy FDG activity associated with a mildly enlarged prostate gland without definite focal FDG activity. CT images: Unremarkable. OSSEOUS STRUCTURES: No FDG avid lesions are seen. CT images: Degenerative changes are noted involving the spine. Possible osteopenia. Stable tiny sclerotic densities most probably involving the proximal left femur and pelvic bones. Impression 1. Minimal FDG activity associated with a 7 mm right upper lobe lung nodule which appears stable in size since prior PET/CT but was described to be increased in size on most recent dedicated CT of chest.  Findings are nonspecific and differential diagnosis includes benign nodule versus developing malignancy of low metabolic activity. Remaining lung nodules do not exhibit significant FDG activity. Short interval follow-up with CT of chest in 3 months is recommended to ensure stability if tissue sampling is not performed. 2.  Stable nonspecific FDG avid mediastinal lymph nodes of uncertain clinical significance but favored to be reactive/inflammatory in etiology. Attention to to these regions on short interval follow-up with contrast-enhanced imaging is recommended. Please see above for details and additional findings. The study was marked in EPIC for significant notification. The study was marked in Epic for follow-up.  Workstation performed: [id number]      No Barium Swallow results available for this patient.

## 2023-07-25 ENCOUNTER — TELEPHONE (OUTPATIENT)
Dept: CARDIAC SURGERY | Facility: CLINIC | Age: 68
End: 2023-07-25

## 2023-07-25 NOTE — TELEPHONE ENCOUNTER
Patient had no 30 day hospital readmission from discharge following right upper lobe wedge resection on 5/16/2023.

## 2023-08-09 ENCOUNTER — OFFICE VISIT (OUTPATIENT)
Dept: OBGYN CLINIC | Facility: MEDICAL CENTER | Age: 68
End: 2023-08-09
Payer: COMMERCIAL

## 2023-08-09 VITALS
HEART RATE: 96 BPM | SYSTOLIC BLOOD PRESSURE: 101 MMHG | WEIGHT: 186 LBS | BODY MASS INDEX: 23.13 KG/M2 | HEIGHT: 75 IN | DIASTOLIC BLOOD PRESSURE: 67 MMHG

## 2023-08-09 DIAGNOSIS — M19.011 PRIMARY OSTEOARTHRITIS OF RIGHT SHOULDER: ICD-10-CM

## 2023-08-09 DIAGNOSIS — M75.81 TENDINITIS OF RIGHT ROTATOR CUFF: Primary | ICD-10-CM

## 2023-08-09 PROCEDURE — 99213 OFFICE O/P EST LOW 20 MIN: CPT | Performed by: ORTHOPAEDIC SURGERY

## 2023-08-09 PROCEDURE — 20610 DRAIN/INJ JOINT/BURSA W/O US: CPT | Performed by: ORTHOPAEDIC SURGERY

## 2023-08-09 RX ORDER — BUPIVACAINE HYDROCHLORIDE 5 MG/ML
2 INJECTION, SOLUTION EPIDURAL; INTRACAUDAL
Status: COMPLETED | OUTPATIENT
Start: 2023-08-09 | End: 2023-08-09

## 2023-08-09 RX ORDER — TRIAMCINOLONE ACETONIDE 40 MG/ML
40 INJECTION, SUSPENSION INTRA-ARTICULAR; INTRAMUSCULAR
Status: COMPLETED | OUTPATIENT
Start: 2023-08-09 | End: 2023-08-09

## 2023-08-09 RX ADMIN — TRIAMCINOLONE ACETONIDE 40 MG: 40 INJECTION, SUSPENSION INTRA-ARTICULAR; INTRAMUSCULAR at 13:15

## 2023-08-09 RX ADMIN — BUPIVACAINE HYDROCHLORIDE 2 ML: 5 INJECTION, SOLUTION EPIDURAL; INTRACAUDAL at 13:15

## 2023-08-09 NOTE — PROGRESS NOTES
Ortho Sports Medicine Shoulder Follow Up Visit     Assesment:   75 y.o. male right shoulder rotator cuff tendonitis, mild osteoarthritis of     Plan:    Conservative treatment:    · Ice to shoulder 1-2 times daily, for 20 minutes at a time. · Home exercise program for shoulder, including ROM and strenthening. Instructions given to patient of what exercises to perform. · Let pain guide return to activities. Imaging:    No imaging was available for review today. Injection:    The risks and benefits of the injection (which include but are not limited to: infection, bleeding,damage to nerve/artery, need for further intervention), as well as the risks and benefits of all alternative treatments were explained and understood. The patient elected to proceed with injection. The procedure was done with aseptic technique, and the patient tolerated the procedure well with no complications. A corticosteroid injection of the subacromial space was performed. The patient should take 1-2 days off of activity, with gradual return to activity as tolerated. Ice to the shoulder 1-2 times daily for 20 minutes, for next 24-48 hrs. Large joint arthrocentesis: R subacromial bursa  Universal Protocol:  Consent: Verbal consent obtained. Consent given by: patient  Timeout called at: 8/9/2023 1:16 PM.  Patient identity confirmed: verbally with patient    Supporting Documentation  Indications: pain   Procedure Details  Location: shoulder - R subacromial bursa  Needle size: 22 G  Ultrasound guidance: no  Approach: lateral  Medications administered: 2 mL bupivacaine (PF) 0.5 %; 40 mg triamcinolone acetonide 40 mg/mL              Surgery:     No surgery is recommended at this point, continue with conservative treatment plan as noted. Follow up:    No follow-ups on file. Chief Complaint   Patient presents with   • Right Shoulder - Follow-up         History of Present Illness:     The patient is returns for follow up of right shoulder rotator cuff tendonitis and mild osteoarthritis of shoulder. Since the prior visit, He reports significant improvement with physical therapy and CSI injection. He reports his CSI on 05/08/2023 lasted until 2 weeks ago. His main complaint is pain when driving but reaching overhead to grab items has significantly improved. His pain is located along the distal supraspinatus tendon. Overall, he is happy with his outcomes from PT and CSI. He hopes to see improvement when using his osmany saw. He denies any distal numbness nor tingling.     Shoulder Surgical History:  None    Past Medical, Social and Family History:  Past Medical History:   Diagnosis Date   • Arthritis feb 2016    osto back   • Chronic back pain    • Chronic narcotic dependence ([address])    • Chronic pain disorder     Sees pain management Dr Kayleen Ag SL   • Cigarette smoker    • Deep vein thrombosis (DVT) of femoral vein of left lower extremity ([address]) 7/29/2021   • Depression    • Dry skin    • Dyslexia    • Full dentures     upper and no teeth lower/"avoid raw vegetables and chewy food"   • GERD (gastroesophageal reflux disease)    • 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 related   • History of blood clots     "left leg" takes Eliquis   • History of bronchitis    • History of pneumonia     childhood   • Increased pressure in the eye, bilateral    • L4-L5 disc bulge 06/16/2017   • Low back pain    • MTHFR mutation    • Muscle weakness    • Neuropathy     right foot and left foot   • Osteoarthritis    • Osteoarthritis     and" if becomes cold joint pain worsens"   • Peripheral neuropathy 8/ 15   • Pulmonary emphysema ([address])     "mild"   • Right foot drop    • Shortness of breath     "only on humid days"   • Smokers' cough [facility])    • Wears glasses    • Work related injury 04/24/2018     Past Surgical History:   Procedure Laterality Date   • BACK SURGERY  aug 2015- jun2017 2017 no affect   • COLONOSCOPY     • EGD AND COLONOSCOPY N/A 06/29/2018    Procedure: EGD AND COLONOSCOPY;  Surgeon: Delroy Fletcher MD;  Location: [facility]; Service: Gastroenterology   • EPIDURAL BLOCK INJECTION      needle   • HIP ARTHROSCOPY W/ LABRAL DEBRIDEMENT Left     "hip surgery"   • IR BIOPSY LUNG  4/17/2023   • IR CHEST TUBE PLACEMENT  4/17/2023   • IR LOWER EXTREMITY ANGIOGRAM  12/08/2021   • LUNG LOBECTOMY Right 5/17/2023    Procedure: lymph node dissection; PLEURAL LYSIS;  Surgeon: Perri Cash MD;  [facility];  Service: Thoracic   • LUNG SEGMENTECTOMY Right 5/17/2023    Procedure: Right thoracoscopic upper lobe wedge resection;  Surgeon: Perri Cash MD;  [facility];  Service: Thoracic   • ORTHOPEDIC SURGERY     • SC ARTHRS HIP DEBRIDEMENT/SHAVING ARTICULAR CRTLG Left 04/30/2018    Procedure: LEFT HIP ARTHROSCOPIC LABRAL DEBRIDEMENT;  Surgeon: Marlo Gorman MD;  [facility];  Service: Orthopedics   •  [address] INCL FLUOR GDNCE DX W/CELL WASHG [address] [facility] N/A 5/17/2023    Procedure: BRONCHOSCOPY FLEXIBLE;  Surgeon: Perri Cash MD;  [facility];  Service: Thoracic   • SC IN-SITU VEIN BYPASS FEMORAL-POPLITEAL Right 12/08/2021    Procedure: R SFA to BK Pop Bypass using insitu GSV,  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;  Surgeon: Vera Roach MD;  [facility];  Service: Vascular   • SC Mini Padronjeyson 3RD+ ORD [id number] [facility] Right 12/08/2021    Procedure: COMPLETION ARTERIOGRAM WITH 5 MM ANGIOPLASTY OF MID SUPERFICIAL FEMORAL ARTERY;  Surgeon: Vera Roach MD;  [facility];  Service: Vascular   • SC THORACOSCOPY W/THERA WEDGE RESEXN INITIAL UNILAT Right 5/17/2023    Procedure: THORACOSCOPY VIDEO ASSISTED SURGERY (VATS);   Surgeon: Perri Cash MD;  [facility];  Service: Thoracic   • TONSILLECTOMY     • WISDOM TOOTH EXTRACTION       Allergies   Allergen Reactions   • Cyanocobalamin [Vitamin B12] GI Intolerance   • Gabapentin Other (See Comments) and GI Intolerance     Difficulty walking   • Morphine Other (See Comments)     "acute constipation"   • Tramadol Confusion   • Nsaids Other (See Comments)     Pt reports avoids as is on Eliquis     Current Outpatient Medications on File Prior to Visit   Medication Sig Dispense Refill   • acetaminophen (TYLENOL) 500 mg tablet Take 500 mg by mouth every 6 (six) hours as needed for mild pain     • apixaban (ELIQUIS) 5 mg Take 1 tablet (5 mg total) by mouth 2 (two) times a day 180 tablet 3   • ascorbic Acid (VITAMIN C) 500 MG CPCR Take 500 mg by mouth 2 (two) times a day     • aspirin (ECOTRIN LOW STRENGTH) 81 mg EC tablet Take 1 tablet (81 mg total) by mouth daily 90 tablet 0   • atorvastatin (LIPITOR) 10 mg tablet Take 0.5 tablets (5 mg total) by mouth daily at bedtime 45 tablet 1   • cholecalciferol (VITAMIN D3) 1,000 units tablet Take 2,000 Units by mouth 2 (two) times a day      • Flaxseed, Linseed, (FLAX SEED OIL PO) Take 1,200 capsules by mouth 2 (two) times a day       • folic acid (FOLVITE) 096 mcg tablet Take 400 mcg by mouth daily     • METHYLCOBALAMIN SL Place 1,000 mcg under the tongue 2 (two) times a day      • Misc Natural Products (Osteo Bi-Flex Triple Strength) TABS Take by mouth 2 (two) times a day       • pyridoxine (B-6) 100 MG tablet Take 100 mg by mouth daily     • transdermal buprenorphine (BUTRANS) 5 mcg/hr TD patch Place 1 patch on the skin over 7 days once a week For ongoing therapy 4 patch 2   • vitamin A 2400 MCG (8000 UT) capsule Take 8,000 Units by mouth 2 (two) times a day     • docusate sodium (COLACE) 100 mg capsule Take 1 capsule (100 mg total) by mouth 2 (two) times a day (Patient not taking: Reported on 7/17/2023) 30 capsule 0   • nicotine (NICODERM CQ) 21 mg/24 hr TD 24 hr patch Place 1 patch on the skin over 24 hours every 24 hours (Patient not taking: Reported on 7/17/2023) 28 patch 3     No current facility-administered medications on file prior to visit.      Social History     Socioeconomic History   • Marital status:      Spouse name: Not on file   • Number of children: Not on file   • Years of education: Not on file   • Highest education level: Not on file   Occupational History   • Not on file   Tobacco Use   • Smoking status: Every Day     Packs/day: 0.25     Years: 15.00     Total pack years: 3.75     Types: Cigarettes     Start date: 2008     Passive exposure: Yes   • Smokeless tobacco: Never   • Tobacco comments:     stress caused by workers comp.  not covering meds. Less than half a pack of cigs    Vaping Use   • Vaping Use: Never used   Substance and Sexual Activity   • Alcohol use: Never   • Drug use: Not Currently     Comment: drug free since 1987   • Sexual activity: Not on file     Comment: defer   Other Topics Concern   • Not on file   Social History Narrative   • Not on file     Social Determinants of Health     Financial Resource Strain: Not on file   Food Insecurity: No Food Insecurity (5/18/2023)    Hunger Vital Sign    • Worried About Running Out of Food in the Last Year: Never true    • Ran Out of Food in the Last Year: Never true   Transportation Needs: No Transportation Needs (5/18/2023)    PRAPARE - Transportation    • Lack of Transportation (Medical): No    • Lack of Transportation (Non-Medical): No   Physical Activity: Not on file   Stress: Not on file   Social Connections: Not on file   Intimate Partner Violence: Not on file   Housing Stability: Low Risk  (5/18/2023)    Housing Stability Vital Sign    • Unable to Pay for Housing in the Last Year: No    • Number of Places Lived in the Last Year: 1    • Unstable Housing in the Last Year: No       I have reviewed the past medical, surgical, social and family history, medications and allergies as documented in the EMR. Review of systems: ROS is negative other than that noted in the HPI. Constitutional: Negative for fatigue and fever.       Physical Exam:    Blood pressure 101/67, pulse 96, height 6' 3" (1.905 m), weight 84.4 kg (186 lb).    General/Constitutional: NAD, well developed, well nourished  HENT: Normocephalic, atraumatic  CV: Intact distal pulses, regular rate  Resp: No respiratory distress or labored breathing  GI: Soft and non-tender   Lymphatic: No lymphadenopathy palpated  Neuro: Alert and Oriented x 3, no focal deficits  Psych: Normal mood, normal affect, normal judgement, normal behavior  Skin: Warm, dry, no rashes, no erythema      Shoulder focused exam:       RIGHT LEFT    Scapula Atrophy Negative Negative     Winging Negative Negative     Protraction Negative Negative    Rotator cuff SS 5/5 5/5     IS 5/5 5/5     SubS 5/5 5/5    ROM     170     ER0 60 60     ER90 90    90     IR90 T6    T6     IRb T6    T6    TTP: AC Negative Negative     Biceps Negative Negative     Coracoid Negative Negative    Special Tests: [place]ri[place] Negative Negative     [place]-shear Negative Negative     Cross body Adduction Negative Negative     Speeds  Negative Negative     Mac's Negative Negative     Whipple Negative Negative       Neer Negative Negative     Young Negative Negative    Instability: Apprehension & relocation not tested not tested     Load & shift not tested not tested    Other: Crank Negative Negative               UE NV Exam: +2 Radial pulses bilaterally  Sensation intact to light touch C5-T1 bilaterally, Radial/median/ulnar nerve motor intact    Cervical ROM is full without pain, numbness or tingling      Shoulder Imaging    No imaging was performed today      Scribe Attestation    I,:  Ajay Mancini am acting as a scribe while in the presence of the attending physician.:       I,:  Zheng Winkler DO personally performed the services described in this documentation    as scribed in my presence.:

## 2023-08-15 ENCOUNTER — TELEPHONE (OUTPATIENT)
Dept: INTERNAL MEDICINE CLINIC | Facility: OTHER | Age: 68
End: 2023-08-15

## 2023-08-15 ENCOUNTER — HOSPITAL ENCOUNTER (EMERGENCY)
Facility: HOSPITAL | Age: 68
Discharge: HOME | End: 2023-08-15
Attending: EMERGENCY MEDICINE
Payer: COMMERCIAL

## 2023-08-15 VITALS
HEART RATE: 85 BPM | BODY MASS INDEX: 23.12 KG/M2 | TEMPERATURE: 98.3 F | DIASTOLIC BLOOD PRESSURE: 85 MMHG | SYSTOLIC BLOOD PRESSURE: 128 MMHG | OXYGEN SATURATION: 98 % | RESPIRATION RATE: 18 BRPM | WEIGHT: 185 LBS

## 2023-08-15 DIAGNOSIS — R31.9 PAINLESS HEMATURIA: Primary | ICD-10-CM

## 2023-08-15 LAB
BACTERIA UR QL AUTO: ABNORMAL /HPF
BILIRUB UR QL STRIP: NEGATIVE
CLARITY UR: ABNORMAL
COLOR UR: YELLOW
GLUCOSE UR STRIP-MCNC: NEGATIVE MG/DL
HGB UR QL STRIP.AUTO: 250
KETONES UR STRIP-MCNC: NEGATIVE MG/DL
LEUKOCYTE ESTERASE UR QL STRIP: NEGATIVE
NITRITE UR QL STRIP: NEGATIVE
NON-SQ EPI CELLS URNS QL MICRO: ABNORMAL /HPF
PH UR STRIP.AUTO: 7 [PH]
PROT UR STRIP-MCNC: ABNORMAL MG/DL
RBC #/AREA URNS AUTO: ABNORMAL /HPF
SP GR UR STRIP.AUTO: 1.01 (ref 1–1.04)
UROBILINOGEN UA: NEGATIVE MG/DL
WBC #/AREA URNS AUTO: ABNORMAL /HPF

## 2023-08-15 PROCEDURE — 81001 URINALYSIS AUTO W/SCOPE: CPT

## 2023-08-15 PROCEDURE — 99284 EMERGENCY DEPT VISIT MOD MDM: CPT

## 2023-08-15 PROCEDURE — 81003 URINALYSIS AUTO W/O SCOPE: CPT

## 2023-08-15 NOTE — ED PROVIDER NOTES
History  Chief Complaint   Patient presents with   • Blood in Urine     States cloudy/bloody urine for several days, no pain or dysuria     79-year-old male presenting today with concerns of 5 or 6 days of painless hematuria. Denies any flank pain or suprapubic pain. Does have a history of lung cancer, adenocarcinoma. Recent lung resection. Denies any nausea, vomiting, diarrhea, constipation, abdominal pain, chest pain or shortness of breath. No dizziness. States that he has some low back pain but he always has this and is unchanged from symptom onset. Prior to Admission Medications   Prescriptions Last Dose Informant Patient Reported? Taking?    Flaxseed, Linseed, (FLAX SEED OIL PO)  Self Yes No   Sig: Take 1,200 capsules by mouth 2 (two) times a day     METHYLCOBALAMIN SL  Self Yes No   Sig: Place 1,000 mcg under the tongue 2 (two) times a day    Misc Natural Products (Osteo Bi-Flex Triple Strength) TABS  Self Yes No   Sig: Take by mouth 2 (two) times a day     acetaminophen (TYLENOL) 500 mg tablet  Self Yes No   Sig: Take 500 mg by mouth every 6 (six) hours as needed for mild pain   apixaban (ELIQUIS) 5 mg  Self No No   Sig: Take 1 tablet (5 mg total) by mouth 2 (two) times a day   ascorbic Acid (VITAMIN C) 500 MG CPCR  Self Yes No   Sig: Take 500 mg by mouth 2 (two) times a day   aspirin (ECOTRIN LOW STRENGTH) 81 mg EC tablet  Self No No   Sig: Take 1 tablet (81 mg total) by mouth daily   atorvastatin (LIPITOR) 10 mg tablet  Self No No   Sig: Take 0.5 tablets (5 mg total) by mouth daily at bedtime   cholecalciferol (VITAMIN D3) 1,000 units tablet  Self Yes No   Sig: Take 2,000 Units by mouth 2 (two) times a day    docusate sodium (COLACE) 100 mg capsule  Self No No   Sig: Take 1 capsule (100 mg total) by mouth 2 (two) times a day   Patient not taking: Reported on 5/11/2002   folic acid (FOLVITE) 229 mcg tablet  Self Yes No   Sig: Take 400 mcg by mouth daily   nicotine (NICODERM CQ) 21 mg/24 hr TD 24 hr patch  Self No No   Sig: Place 1 patch on the skin over 24 hours every 24 hours   Patient not taking: Reported on 7/17/2023   pyridoxine (B-6) 100 MG tablet  Self Yes No   Sig: Take 100 mg by mouth daily   transdermal buprenorphine (BUTRANS) 5 mcg/hr TD patch  Self No No   Sig: Place 1 patch on the skin over 7 days once a week For ongoing therapy   vitamin A 2400 MCG (8000 UT) capsule  Self Yes No   Sig: Take 8,000 Units by mouth 2 (two) times a day      Facility-Administered Medications: None       Past Medical History:   Diagnosis Date   • Arthritis feb 2016    osto back   • Chronic back pain    • Chronic narcotic dependence (HCC)    • Chronic pain disorder     Sees pain management Dr Kayleen Ag SL   • Cigarette smoker    • Deep vein thrombosis (DVT) of femoral vein of left lower extremity ([address]) 7/29/2021   • Depression    • Dry skin    • Dyslexia    • Full dentures     upper and no teeth lower/"avoid raw vegetables and chewy food"   • GERD (gastroesophageal reflux disease)    • 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 related   • History of blood clots     "left leg" takes Eliquis   • History of bronchitis    • History of pneumonia     childhood   • Increased pressure in the eye, bilateral    • L4-L5 disc bulge 06/16/2017   • Low back pain    • MTHFR mutation    • Muscle weakness    • Neuropathy     right foot and left foot   • Osteoarthritis    • Osteoarthritis     and" if becomes cold joint pain worsens"   • Peripheral neuropathy 8/ 15   • Pulmonary emphysema ([address])     "mild"   • Right foot drop    • Shortness of breath     "only on humid days"   • Smokers' cough [facility])    • Wears glasses    • Work related injury 04/24/2018       Past Surgical History:   Procedure Laterality Date   • BACK SURGERY  aug 2015- jun2017 2017 no affect   • COLONOSCOPY     • EGD AND COLONOSCOPY N/A 06/29/2018    Procedure: EGD AND COLONOSCOPY;  Surgeon: Griselda Bradley MD;  Location: [facility] GI LAB;   Service: Gastroenterology   • EPIDURAL BLOCK INJECTION      needle   • HIP ARTHROSCOPY W/ LABRAL DEBRIDEMENT Left     "hip surgery"   • IR BIOPSY LUNG  4/17/2023   • IR CHEST TUBE PLACEMENT  4/17/2023   • IR LOWER EXTREMITY ANGIOGRAM  12/08/2021   • LUNG LOBECTOMY Right 5/17/2023    Procedure: lymph node dissection; PLEURAL LYSIS;  Surgeon: Perri Cash MD;  [facility];  Service: Thoracic   • LUNG SEGMENTECTOMY Right 5/17/2023    Procedure: Right thoracoscopic upper lobe wedge resection;  Surgeon: Perri Cash MD;  [facility];  Service: Thoracic   • ORTHOPEDIC SURGERY     • MS ARTHRS HIP DEBRIDEMENT/SHAVING ARTICULAR CRTLG Left 04/30/2018    Procedure: LEFT HIP ARTHROSCOPIC LABRAL DEBRIDEMENT;  Surgeon: Marlo Gorman MD;  [facility];  Service: Orthopedics   •  [address] INCL FLUOR GDNCE DX W/CELL WASHG [facility] ER N/A 5/17/2023    Procedure: BRONCHOSCOPY FLEXIBLE;  Surgeon: Perri Cash MD;  [facility];  Service: Thoracic   • MS IN-SITU VEIN BYPASS FEMORAL-POPLITEAL Right 12/08/2021    Procedure: R SFA to BK Pop Bypass using insitu GSV,  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;  Surgeon: Vera Roach MD;  [facility];  Service: Vascular   • MS Mini Saavedra 3RD+ ORD [id number] [facility] Right 12/08/2021    Procedure: COMPLETION ARTERIOGRAM WITH 5 MM ANGIOPLASTY OF MID SUPERFICIAL FEMORAL ARTERY;  Surgeon: Vera Roach MD;  [facility];  Service: Vascular   • MS THORACOSCOPY W/THERA WEDGE RESEXN INITIAL UNILAT Right 5/17/2023    Procedure: THORACOSCOPY VIDEO ASSISTED SURGERY (VATS); Surgeon: Perri Cash MD;  [facility];  Service: Thoracic   • TONSILLECTOMY     • WISDOM TOOTH EXTRACTION         Family History   Problem Relation Age of Onset   • Dementia Mother    • Glaucoma Mother    • Arthritis Mother         nursing home   • Cancer Father         dead   • Osteoarthritis Family      I have reviewed and agree with the history as documented.     E-Cigarette/Vaping • E-Cigarette Use Never User      E-Cigarette/Vaping Substances   • Nicotine No    • Flavoring No      Social History     Tobacco Use   • Smoking status: Every Day     Packs/day: 0.25     Years: 15.00     Total pack years: 3.75     Types: Cigarettes     Start date: 2008     Passive exposure: Yes   • Smokeless tobacco: Never   • Tobacco comments:     stress caused by workers comp.  not covering meds. Less than half a pack of cigs    Vaping Use   • Vaping Use: Never used   Substance Use Topics   • Alcohol use: Not Currently   • Drug use: Not Currently     Comment: drug free since 1987       Review of Systems   Constitutional: Negative for chills and fever. HENT: Negative for ear pain and sore throat. Eyes: Negative for pain and visual disturbance. Respiratory: Negative for cough and shortness of breath. Cardiovascular: Negative for chest pain and palpitations. Gastrointestinal: Negative for abdominal pain and vomiting. Genitourinary: Positive for hematuria. Negative for dysuria. Musculoskeletal: Negative for arthralgias and back pain. Skin: Negative for color change and rash. Neurological: Negative for seizures and syncope. All other systems reviewed and are negative. Physical Exam  Physical Exam  Vitals and nursing note reviewed. Constitutional:       General: He is not in acute distress. Appearance: He is well-developed. He is not ill-appearing. HENT:      Head: Normocephalic and atraumatic. Eyes:      Conjunctiva/sclera: Conjunctivae normal.   Cardiovascular:      Rate and Rhythm: Normal rate and regular rhythm. Pulses: Normal pulses. Heart sounds: Normal heart sounds. No murmur heard. No friction rub. No gallop. Pulmonary:      Effort: Pulmonary effort is normal. No respiratory distress. Breath sounds: Normal breath sounds. No stridor. No wheezing, rhonchi or rales. Chest:      Chest wall: No tenderness. Abdominal:      Palpations: Abdomen is soft. Tenderness: There is no abdominal tenderness. There is no right CVA tenderness or left CVA tenderness. Musculoskeletal:         General: No swelling. Cervical back: Neck supple. Right lower leg: No edema. Left lower leg: No edema. Skin:     General: Skin is warm and dry. Capillary Refill: Capillary refill takes less than 2 seconds. Coloration: Skin is not jaundiced or pale. Findings: No bruising, erythema, lesion or rash. Neurological:      Mental Status: He is alert.    Psychiatric:         Mood and Affect: Mood normal.         Vital Signs  ED Triage Vitals [08/15/23 1207]   Temperature Pulse Respirations Blood Pressure SpO2   98.3 °F (36.8 °C) 85 18 128/85 98 %      Temp Source Heart Rate Source Patient Position - Orthostatic VS BP Location FiO2 (%)   Oral Monitor Sitting Left arm --      Pain Score       --           Vitals:    08/15/23 1207   BP: 128/85   Pulse: 85   Patient Position - Orthostatic VS: Sitting         Visual Acuity      ED Medications  Medications - No data to display    Diagnostic Studies  Results Reviewed     Procedure Component Value Units Date/Time    Urine Microscopic [[id number]]  (Abnormal) Collected: 08/15/23 1226    Lab Status: Final result Specimen: Urine, Clean Catch Updated: 08/15/23 1329     RBC, UA Innumerable /hpf      WBC, UA 0-1 /hpf      Epithelial Cells Occasional /hpf      Bacteria, UA Occasional /hpf     UA w Reflex to Microscopic w Reflex to Culture [[id number]]  (Abnormal) Collected: 08/15/23 1226    Lab Status: Final result Specimen: Urine, Clean Catch Updated: 08/15/23 1305     Color, UA Yellow     Clarity, UA Cloudy     Specific Gravity, UA 1.015     pH, UA 7.0     Leukocytes, UA Negative     Nitrite, UA Negative     Protein, UA 15 (Trace) mg/dl      Glucose, UA Negative mg/dl      Ketones, UA Negative mg/dl      Bilirubin, UA Negative     Occult Blood, .0     UROBILINOGEN UA Negative mg/dL                  No orders to display Procedures  Procedures         ED Course  ED Course as of 08/15/23 1334   Tue Aug 15, 2023   1307 Blood, UA(!): 250.0   1329 RBC, UA(!): Innumerable                                             Medical Decision Making  75year old male presenting today with concerns of bloody urine. Denies any other symptoms. UA showing only blood, no bacteria, no nitrites or white blood cells. Suspicious for possible cancer or hemorrhagic cystitis. Will send to urology for further evaluation. Patient agreeable to plan. Referral sent. Patient at time of discharge well-appearing in no acute distress, questions answered. Patient's vitals, lab/imaging results, diagnosis, and treatment plan were discussed with the patient. All new/changed medications were discussed with patient, specifically, route of administration, how often and when to take, and where they can be picked up. Strict return precautions as well as close follow up with PCP was discussed with the patient and the patient was agreeable to my recommendations. Patient verbally acknowledged understanding of the above communications. All labs reviewed and utilized in the medical decision making process (if labs were ordered). Portions of the record may have been created with voice recognition software.  Occasional wrong word or "sound a like" substitutions may have occurred due to the inherent limitations of voice recognition software.  Read the chart carefully and recognize, using context, where substitutions have occurred. Amount and/or Complexity of Data Reviewed  Labs:  Decision-making details documented in ED Course.           Disposition  Final diagnoses:   Painless hematuria     Time reflects when diagnosis was documented in both MDM as applicable and the Disposition within this note     Time User Action Codes Description Comment    8/15/2023  1:07 PM Marco Payment Add [R31.9] Painless hematuria       ED Disposition     ED Disposition   Discharge Condition   Stable    Date/Time   Tue Aug 15, 2023  1:08 PM    Comment   [facility] discharge to home/self care.                Follow-up Information     Follow up With Specialties Details Why Contact Info Additional [address] Emergency Department Emergency Medicine Go to  If symptoms worsen [address] [id number] [address] [facility] Emergency Department    [facility] \[facility] [place] [facility]\"" Urology Schedule an appointment as soon as possible for a visit   [address] [address]  [address] [place] [address] For Urology \[facility] [place] [facility]\"", [address] [facility], \[facility] [place] [facility]\"", [place], [address] [phone]          Patient's Medications   Discharge Prescriptions    No medications on file           PDMP Review       Value Time User    PDMP Reviewed  Yes 6/8/2023  1:41 PM Vidhya Alejandro PA-C          ED Provider  Electronically Signed by           Marquis Bermudez PA-C  08/15/23 9303

## 2023-08-15 NOTE — TELEPHONE ENCOUNTER
Patient called the office stating that this morning he woke up and has been urinating blood. Patient was instructed he needs to be evaluated in the emergency room.  Pt is on [employer]

## 2023-08-16 ENCOUNTER — TELEPHONE (OUTPATIENT)
Dept: UROLOGY | Facility: AMBULATORY SURGERY CENTER | Age: 68
End: 2023-08-16

## 2023-08-16 NOTE — TELEPHONE ENCOUNTER
Complaint/Diagnosis:Blood in urine     Insurance:[employer]    History of cancer:no    Previous urologist:    Outside Testing/where:[facility]    If yes,what kind:epic    Records requested/where:epic    Preferred location:[facility]

## 2023-08-16 NOTE — TELEPHONE ENCOUNTER
Called and spoke to pt and confirmed time date and location. Pt also stated he is having concerns about a lump in his testicle.

## 2023-08-16 NOTE — TELEPHONE ENCOUNTER
ASAP Npt Referral:Blood in urine:spoke with pt states he's still having dark urine assumes it blood please review records/results if time sensitive/scheduling protocol.

## 2023-08-17 ENCOUNTER — OFFICE VISIT (OUTPATIENT)
Dept: UROLOGY | Facility: CLINIC | Age: 68
End: 2023-08-17
Payer: COMMERCIAL

## 2023-08-17 VITALS
WEIGHT: 183 LBS | DIASTOLIC BLOOD PRESSURE: 78 MMHG | SYSTOLIC BLOOD PRESSURE: 132 MMHG | BODY MASS INDEX: 22.75 KG/M2 | HEIGHT: 75 IN | OXYGEN SATURATION: 96 % | HEART RATE: 90 BPM

## 2023-08-17 DIAGNOSIS — R31.0 GROSS HEMATURIA: ICD-10-CM

## 2023-08-17 DIAGNOSIS — Z87.448 HISTORY OF BLOOD IN URINE: Primary | ICD-10-CM

## 2023-08-17 LAB
SL AMB  POCT GLUCOSE, UA: NORMAL
SL AMB LEUKOCYTE ESTERASE,UA: NORMAL
SL AMB POCT BILIRUBIN,UA: NORMAL
SL AMB POCT BLOOD,UA: NORMAL
SL AMB POCT CLARITY,UA: CLEAR
SL AMB POCT COLOR,UA: NORMAL
SL AMB POCT KETONES,UA: NORMAL
SL AMB POCT NITRITE,UA: NORMAL
SL AMB POCT PH,UA: 7
SL AMB POCT SPECIFIC GRAVITY,UA: 1.01
SL AMB POCT URINE PROTEIN: NORMAL
SL AMB POCT UROBILINOGEN: 0.2

## 2023-08-17 PROCEDURE — 81002 URINALYSIS NONAUTO W/O SCOPE: CPT | Performed by: PHYSICIAN ASSISTANT

## 2023-08-17 PROCEDURE — 99213 OFFICE O/P EST LOW 20 MIN: CPT | Performed by: PHYSICIAN ASSISTANT

## 2023-08-17 NOTE — H&P (VIEW-ONLY)
8/17/2023      Chief Complaint   Patient presents with   • New Patient Visit     Blood urine         Assessment and Plan    75 y.o. male managed by NEW PATIENT    1. Gross hematuria  2. Left epididymal head cyst    Reassured him on the scrotal exam.  Needs CT urogram and cystoscopy to work-up the hematuria. Currently urine is cleared up. Return for procedure date. History of Present Illness  Jose Montiel is a 75 y.o. male here for evaluation of ER follow-up painless hematuria occurred yesterday. Urine has cleared up still appears a little bit darker brown. No dysuria. Previously saw Dr. Jose Kwon for prostate cancer screening with no issues. Has not been seen in a couple years. He is recently treated with wedge resection and LND for lung adenocarcinoma. He takes eliquis. DISCUSSION  Today we discussed both microscopic and gross hematuria and its possible meanings/sources within the genitourinary tract such as infection, urolithiasis, trauma, intrinsic medical renal disease, recent procedure/surgery or malignancy involving the kidneys, ureters, bladder, or prostate. Discussed risk factors for urothelial carcinoma including current and past tobacco use, occupational exposures, and chemical and dye exposures. The workup of hematuria including cystoscopy and CT urogram with a urinalysis, urine culture, and possibility of urine cytology were then discussed with the patient. I educated them that the finding of urologic malignancy occurs in around 20-25% of gross hematuria workups versus roughly only 5% of microscopic hematuria workups. The benefits of increased diagnostic information were shared with the patient as were the potential risks of infection, bleeding, pain, possible contrast allergy, possible acute kidney injury from intravenous contrast, and the risk of diagnosis of urothelial, bladder, or renal carcinoma.     Alternatives including cystoscopy with retrograde pyelography and renal ultrasound were also mentioned to the patient although these do not represent the gold standard for the workup of microscopic hematuria but may be appropriate for younger or lower risk populations. The importance of close follow-up was also stressed to the patient. Review of Systems   Constitutional: Negative. Respiratory: Negative. Cardiovascular: Negative. Genitourinary: Positive for hematuria. Negative for decreased urine volume, difficulty urinating, dysuria, flank pain, frequency, testicular pain and urgency. Musculoskeletal: Negative. Vitals  Vitals:    08/17/23 1041   BP: 132/78   BP Location: Left arm   Patient Position: Sitting   Cuff Size: Adult   Pulse: 90   SpO2: 96%   Weight: 83 kg (183 lb)   Height: 6' 3" (1.905 m)       Physical Exam  Vitals and nursing note reviewed. Constitutional:       General: He is not in acute distress. Appearance: He is well-developed. He is not diaphoretic. HENT:      Head: Normocephalic and atraumatic. Pulmonary:      Effort: Pulmonary effort is normal.   Genitourinary:     Comments: Circumcised penis, normal phallus, orthotopic patent meatus. Testes smooth descended bilaterally into the scrotum nontender with left epididymal head cyst nontender. Digital rectal exam smooth prostate, without appreciable nodule, induration or asymmetry  Skin:     General: Skin is warm and dry. Neurological:      Mental Status: He is alert and oriented to person, place, and time.       Gait: Gait normal.   Psychiatric:         Speech: Speech normal.         Behavior: Behavior normal.           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 related   • History of blood clots     "left leg" takes Eliquis   • History of bronchitis    • History of pneumonia     childhood   • Increased pressure in the eye, bilateral    • L4-L5 disc bulge 06/16/2017   • Low back pain    • MTHFR mutation    • Muscle weakness    • Neuropathy     right foot and left foot   • Osteoarthritis    • Osteoarthritis     and" if becomes cold joint pain worsens"   • Peripheral neuropathy 8/ 15   • Pulmonary emphysema ([address])     "mild"   • Right foot drop    • Shortness of breath     "only on humid days"   • Smokers' cough ([address])    • Wears glasses    • Work related injury 04/24/2018     Social History     Socioeconomic History   • Marital status:      Spouse name: None   • Number of children: None   • Years of education: None   • Highest education level: None   Occupational History   • None   Tobacco Use   • Smoking status: Every Day     Packs/day: 0.25     Years: 15.00     Total pack years: 3.75     Types: Cigarettes     Start date: 2008     Passive exposure: Yes   • Smokeless tobacco: Never   • Tobacco comments:     stress caused by workers comp.  not covering meds.  Less than half a pack of cigs    Vaping Use   • Vaping Use: Never used   Substance and Sexual Activity   • Alcohol use: Not Currently   • Drug use: Not Currently     Comment: drug free since 1987   • Sexual activity: None     Comment: defer   Other Topics Concern   • None   Social History Narrative   • None     Social Determinants of Health     Financial Resource Strain: Not on file   Food Insecurity: No Food Insecurity (5/18/2023)    Hunger Vital Sign    • Worried About Running Out of Food in the Last Year: Never true    • Ran Out of Food in the Last Year: Never true   Transportation Needs: No Transportation Needs (5/18/2023)    PRAPARE - Transportation • Lack of Transportation (Medical): No    • Lack of Transportation (Non-Medical): No   Physical Activity: Not on file   Stress: Not on file   Social Connections: Not on file   Intimate Partner Violence: Not on file   Housing Stability: Low Risk  (5/18/2023)    Housing Stability Vital Sign    • Unable to Pay for Housing in the Last Year: No    • Number of Places Lived in the Last Year: 1    • Unstable Housing in the Last Year: No     Social History     Tobacco Use   Smoking Status Every Day   • Packs/day: 0.25   • Years: 15.00   • Total pack years: 3.75   • Types: Cigarettes   • Start date: 2008   • Passive exposure: Yes   Smokeless Tobacco Never   Tobacco Comments    stress caused by workers comp.  not covering meds.  Less than half a pack of cigs      Family History   Problem Relation Age of Onset   • Dementia Mother    • Glaucoma Mother    • Arthritis Mother         nursing home   • Cancer Father         dead   • Osteoarthritis Family        The following portions of the patient's history were reviewed and updated as appropriate: allergies, current medications, past medical history, past social history, past surgical history and problem list.    Results  No results found for this or any previous visit (from the past 1 hour(s)).]  Lab Results   Component Value Date    PSA 2.57 06/29/2023    PSA 2.2 11/04/2021    PSA 1.8 11/17/2020     Lab Results   Component Value Date    CALCIUM 9.3 08/18/2023    K 3.9 08/18/2023    CO2 28 08/18/2023     (H) 08/18/2023    BUN 16 08/18/2023    CREATININE 0.86 08/18/2023     Lab Results   Component Value Date    WBC 10.68 (H) 06/29/2023    HGB 15.4 06/29/2023    HCT 45.4 06/29/2023    MCV 96 06/29/2023     06/29/2023

## 2023-08-17 NOTE — PROGRESS NOTES
8/17/2023      Chief Complaint   Patient presents with   • New Patient Visit     Blood urine         Assessment and Plan    75 y.o. male managed by NEW PATIENT    1. Gross hematuria  2. Left epididymal head cyst    Reassured him on the scrotal exam.  Needs CT urogram and cystoscopy to work-up the hematuria. Currently urine is cleared up. Return for procedure date. History of Present Illness  Jose Ross is a 75 y.o. male here for evaluation of ER follow-up painless hematuria occurred yesterday. Urine has cleared up still appears a little bit darker brown. No dysuria. Previously saw Dr. Paco Ballesteros for prostate cancer screening with no issues. Has not been seen in a couple years. He is recently treated with wedge resection and LND for lung adenocarcinoma. He takes eliquis. DISCUSSION  Today we discussed both microscopic and gross hematuria and its possible meanings/sources within the genitourinary tract such as infection, urolithiasis, trauma, intrinsic medical renal disease, recent procedure/surgery or malignancy involving the kidneys, ureters, bladder, or prostate. Discussed risk factors for urothelial carcinoma including current and past tobacco use, occupational exposures, and chemical and dye exposures. The workup of hematuria including cystoscopy and CT urogram with a urinalysis, urine culture, and possibility of urine cytology were then discussed with the patient. I educated them that the finding of urologic malignancy occurs in around 20-25% of gross hematuria workups versus roughly only 5% of microscopic hematuria workups. The benefits of increased diagnostic information were shared with the patient as were the potential risks of infection, bleeding, pain, possible contrast allergy, possible acute kidney injury from intravenous contrast, and the risk of diagnosis of urothelial, bladder, or renal carcinoma.     Alternatives including cystoscopy with retrograde pyelography and renal ultrasound were also mentioned to the patient although these do not represent the gold standard for the workup of microscopic hematuria but may be appropriate for younger or lower risk populations. The importance of close follow-up was also stressed to the patient. Review of Systems   Constitutional: Negative. Respiratory: Negative. Cardiovascular: Negative. Genitourinary: Positive for hematuria. Negative for decreased urine volume, difficulty urinating, dysuria, flank pain, frequency, testicular pain and urgency. Musculoskeletal: Negative. Vitals  Vitals:    08/17/23 1041   BP: 132/78   BP Location: Left arm   Patient Position: Sitting   Cuff Size: Adult   Pulse: 90   SpO2: 96%   Weight: 83 kg (183 lb)   Height: 6' 3" (1.905 m)       Physical Exam  Vitals and nursing note reviewed. Constitutional:       General: He is not in acute distress. Appearance: He is well-developed. He is not diaphoretic. HENT:      Head: Normocephalic and atraumatic. Pulmonary:      Effort: Pulmonary effort is normal.   Genitourinary:     Comments: Circumcised penis, normal phallus, orthotopic patent meatus. Testes smooth descended bilaterally into the scrotum nontender with left epididymal head cyst nontender. Digital rectal exam smooth prostate, without appreciable nodule, induration or asymmetry  Skin:     General: Skin is warm and dry. Neurological:      Mental Status: He is alert and oriented to person, place, and time.       Gait: Gait normal.   Psychiatric:         Speech: Speech normal.         Behavior: Behavior normal.           Past History  Past Medical History:   Diagnosis Date   • Arthritis feb 2016    osto back   • Chronic back pain    • Chronic narcotic dependence (HCC)    • Chronic pain disorder     Sees pain management Dr Gabi Gramajo SL   • Cigarette smoker    • Deep vein thrombosis (DVT) of femoral vein of left lower extremity ([address]) 7/29/2021   • Depression    • Dry skin    • Dyslexia    • Full dentures     upper and no teeth lower/"avoid raw vegetables and chewy food"   • GERD (gastroesophageal reflux disease)    • 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 related   • History of blood clots     "left leg" takes Eliquis   • History of bronchitis    • History of pneumonia     childhood   • Increased pressure in the eye, bilateral    • L4-L5 disc bulge 06/16/2017   • Low back pain    • MTHFR mutation    • Muscle weakness    • Neuropathy     right foot and left foot   • Osteoarthritis    • Osteoarthritis     and" if becomes cold joint pain worsens"   • Peripheral neuropathy 8/ 15   • Pulmonary emphysema ([address])     "mild"   • Right foot drop    • Shortness of breath     "only on humid days"   • Smokers' cough ([address])    • Wears glasses    • Work related injury 04/24/2018     Social History     Socioeconomic History   • Marital status:      Spouse name: None   • Number of children: None   • Years of education: None   • Highest education level: None   Occupational History   • None   Tobacco Use   • Smoking status: Every Day     Packs/day: 0.25     Years: 15.00     Total pack years: 3.75     Types: Cigarettes     Start date: 2008     Passive exposure: Yes   • Smokeless tobacco: Never   • Tobacco comments:     stress caused by workers comp.  not covering meds.  Less than half a pack of cigs    Vaping Use   • Vaping Use: Never used   Substance and Sexual Activity   • Alcohol use: Not Currently   • Drug use: Not Currently     Comment: drug free since 1987   • Sexual activity: None     Comment: defer   Other Topics Concern   • None   Social History Narrative   • None     Social Determinants of Health     Financial Resource Strain: Not on file   Food Insecurity: No Food Insecurity (5/18/2023)    Hunger Vital Sign    • Worried About Running Out of Food in the Last Year: Never true    • Ran Out of Food in the Last Year: Never true   Transportation Needs: No Transportation Needs (5/18/2023)    PRAPARE - Transportation • Lack of Transportation (Medical): No    • Lack of Transportation (Non-Medical): No   Physical Activity: Not on file   Stress: Not on file   Social Connections: Not on file   Intimate Partner Violence: Not on file   Housing Stability: Low Risk  (5/18/2023)    Housing Stability Vital Sign    • Unable to Pay for Housing in the Last Year: No    • Number of Places Lived in the Last Year: 1    • Unstable Housing in the Last Year: No     Social History     Tobacco Use   Smoking Status Every Day   • Packs/day: 0.25   • Years: 15.00   • Total pack years: 3.75   • Types: Cigarettes   • Start date: 2008   • Passive exposure: Yes   Smokeless Tobacco Never   Tobacco Comments    stress caused by workers comp.  not covering meds.  Less than half a pack of cigs      Family History   Problem Relation Age of Onset   • Dementia Mother    • Glaucoma Mother    • Arthritis Mother         nursing home   • Cancer Father         dead   • Osteoarthritis Family        The following portions of the patient's history were reviewed and updated as appropriate: allergies, current medications, past medical history, past social history, past surgical history and problem list.    Results  No results found for this or any previous visit (from the past 1 hour(s)).]  Lab Results   Component Value Date    PSA 2.57 06/29/2023    PSA 2.2 11/04/2021    PSA 1.8 11/17/2020     Lab Results   Component Value Date    CALCIUM 9.3 08/18/2023    K 3.9 08/18/2023    CO2 28 08/18/2023     (H) 08/18/2023    BUN 16 08/18/2023    CREATININE 0.86 08/18/2023     Lab Results   Component Value Date    WBC 10.68 (H) 06/29/2023    HGB 15.4 06/29/2023    HCT 45.4 06/29/2023    MCV 96 06/29/2023     06/29/2023

## 2023-08-18 ENCOUNTER — APPOINTMENT (OUTPATIENT)
Dept: LAB | Facility: IMAGING CENTER | Age: 68
End: 2023-08-18
Payer: COMMERCIAL

## 2023-08-18 DIAGNOSIS — R31.0 GROSS HEMATURIA: ICD-10-CM

## 2023-08-18 LAB
ANION GAP SERPL CALCULATED.3IONS-SCNC: 3 MMOL/L
BUN SERPL-MCNC: 16 MG/DL (ref 5–25)
CALCIUM SERPL-MCNC: 9.3 MG/DL (ref 8.3–10.1)
CHLORIDE SERPL-SCNC: 111 MMOL/L (ref 96–108)
CO2 SERPL-SCNC: 28 MMOL/L (ref 21–32)
CREAT SERPL-MCNC: 0.86 MG/DL (ref 0.6–1.3)
GFR SERPL CREATININE-BSD FRML MDRD: 89 ML/MIN/1.73SQ M
GLUCOSE P FAST SERPL-MCNC: 100 MG/DL (ref 65–99)
POTASSIUM SERPL-SCNC: 3.9 MMOL/L (ref 3.5–5.3)
SODIUM SERPL-SCNC: 142 MMOL/L (ref 135–147)

## 2023-08-18 PROCEDURE — 36415 COLL VENOUS BLD VENIPUNCTURE: CPT

## 2023-08-18 PROCEDURE — 80048 BASIC METABOLIC PNL TOTAL CA: CPT

## 2023-08-23 DIAGNOSIS — E78.2 MIXED HYPERLIPIDEMIA: ICD-10-CM

## 2023-08-23 RX ORDER — ATORVASTATIN CALCIUM 10 MG/1
TABLET, FILM COATED ORAL
Qty: 45 TABLET | Refills: 1 | OUTPATIENT
Start: 2023-08-23

## 2023-08-24 ENCOUNTER — HOSPITAL ENCOUNTER (OUTPATIENT)
Dept: RADIOLOGY | Facility: IMAGING CENTER | Age: 68
End: 2023-08-24
Payer: COMMERCIAL

## 2023-08-24 DIAGNOSIS — R31.0 GROSS HEMATURIA: ICD-10-CM

## 2023-08-24 PROCEDURE — 74178 CT ABD&PLV WO CNTR FLWD CNTR: CPT

## 2023-08-24 PROCEDURE — G1004 CDSM NDSC: HCPCS

## 2023-08-24 RX ADMIN — IOHEXOL 100 ML: 350 INJECTION, SOLUTION INTRAVENOUS at 13:31

## 2023-08-25 ENCOUNTER — TELEPHONE (OUTPATIENT)
Dept: VASCULAR SURGERY | Facility: CLINIC | Age: 68
End: 2023-08-25

## 2023-08-25 DIAGNOSIS — E78.2 MIXED HYPERLIPIDEMIA: ICD-10-CM

## 2023-08-25 RX ORDER — ATORVASTATIN CALCIUM 10 MG/1
5 TABLET, FILM COATED ORAL
Qty: 45 TABLET | Refills: 1 | Status: SHIPPED | OUTPATIENT
Start: 2023-08-25

## 2023-08-25 NOTE — TELEPHONE ENCOUNTER
Denise Wong let's see what urology has to say and would continue to monitor for hematuria episodes.  Patient can let us know if has additional episodes of gross hematuria

## 2023-08-25 NOTE — TELEPHONE ENCOUNTER
Patient last seen by Dr. Eusebio Leonard 1/6/2023. Has history of right lower extremity DVT on long-term anticoagulation. Is he still having hematuria? Will he be following up with urology?

## 2023-08-25 NOTE — TELEPHONE ENCOUNTER
Pt called to make provider aware that he has been having episodes of hematuria. Went to the ED on 8/15/23. Pt does take Eliquis. Had OV with urology on 8/17, and CT renal protocol was done yesterday. No results yet. Routed to provider as a FYI.

## 2023-08-25 NOTE — TELEPHONE ENCOUNTER
Pt called and stated he urinated last night and blood in urine that was bright red.  Pt stated this morning his urine is cloudy and asking if this is from the contrast from CT that was completed yesterday or if it is something else     Pt call back-[phone]

## 2023-08-25 NOTE — TELEPHONE ENCOUNTER
Spoke with pt. He stated that this morning his urine was dark yellow in color with a "slight haze". Last time he noted hematuria was yesterday. Pt states that the hematuria comes and goes for the past week and a half. Pt will be following up with urology after the results of the CT are received.

## 2023-08-25 NOTE — TELEPHONE ENCOUNTER
Patient states he will await CT results to find out the next steps, he just wanted to make provider aware of the following, but will await our call with CT results and the next steps. Thank you. When did this start? States he saved urine in a jar overnight and noticed this morning there was a little bit of a cloud of blood at the bottom. Do you have dysuria? No  Do you have lower back, flank, or lower abdominal/bladder pain? No  Do you have urinary frequency, urgency, or changes in your urination? No  Do you have any blood clots? Not that he has noticed, but   Have you become unable to urinate? Is the hematuria continuous or intermittent? Intermittent   Can you describe the color of the blood in the urine in relation to a beverage: Bright red-cool aid red prior to going to the ER on 8/17, then when he urinated yesterday after about an hour walk he urinated into a jar and it was his usual cloudy urine, but when he looked at this morning it was a reddish brown color. He is concerned he is unsure what event caused it and if he can prevent it from coming. Do you take any blood thinners? Yes, eliquis  Have you had recent urologic surgery or procedure? CT urogram   Have you had any recent urine testing or imaging? (UA with micro, urine culture, kidney ultrasound, CT scan) CT urogram   Do you have a history of bladder cancer? No   Have you ever had a workup for blood in the urine? yes  Did you recently take rifampin or pyridium? no    Drinks 2 glasses of water per day. Will try to increase water intake to 64oz.     Went over:  ED qualifiers-  Severe, uncontrolled pain  Fever of 101 or higher  Persistent vomiting  Inability to urinate due to clot retention and offices are closing/closed/after hours

## 2023-09-01 ENCOUNTER — TELEPHONE (OUTPATIENT)
Age: 68
End: 2023-09-01

## 2023-09-01 NOTE — TELEPHONE ENCOUNTER
Radiology Test Results - Radiology Calling with report update    Pt under care of:  Kalpesh SWEET    Imaging Completed: 8/24/23    Significant Findings - Please review

## 2023-09-08 ENCOUNTER — OFFICE VISIT (OUTPATIENT)
Dept: PAIN MEDICINE | Facility: MEDICAL CENTER | Age: 68
End: 2023-09-08
Payer: COMMERCIAL

## 2023-09-08 VITALS
BODY MASS INDEX: 22.5 KG/M2 | OXYGEN SATURATION: 95 % | SYSTOLIC BLOOD PRESSURE: 105 MMHG | DIASTOLIC BLOOD PRESSURE: 66 MMHG | HEIGHT: 75 IN | WEIGHT: 181 LBS | HEART RATE: 76 BPM

## 2023-09-08 DIAGNOSIS — M54.16 LUMBAR RADICULOPATHY: ICD-10-CM

## 2023-09-08 DIAGNOSIS — G89.29 CHRONIC PAIN OF BOTH SHOULDERS: ICD-10-CM

## 2023-09-08 DIAGNOSIS — D49.59 URETERAL TUMOR: Primary | ICD-10-CM

## 2023-09-08 DIAGNOSIS — F11.20 UNCOMPLICATED OPIOID DEPENDENCE (HCC): ICD-10-CM

## 2023-09-08 DIAGNOSIS — G62.9 NEUROPATHY: ICD-10-CM

## 2023-09-08 DIAGNOSIS — M25.512 CHRONIC PAIN OF BOTH SHOULDERS: ICD-10-CM

## 2023-09-08 DIAGNOSIS — M25.511 CHRONIC PAIN OF BOTH SHOULDERS: ICD-10-CM

## 2023-09-08 DIAGNOSIS — G89.4 CHRONIC PAIN SYNDROME: Primary | ICD-10-CM

## 2023-09-08 DIAGNOSIS — Z79.891 LONG-TERM CURRENT USE OF OPIATE ANALGESIC: ICD-10-CM

## 2023-09-08 PROCEDURE — 99214 OFFICE O/P EST MOD 30 MIN: CPT | Performed by: PHYSICIAN ASSISTANT

## 2023-09-08 RX ORDER — BUPRENORPHINE 5 UG/H
1 PATCH TRANSDERMAL WEEKLY
Qty: 4 PATCH | Refills: 2 | Status: SHIPPED | OUTPATIENT
Start: 2023-09-08

## 2023-09-08 NOTE — TELEPHONE ENCOUNTER
I called to Jt Hood reviewed his concerning CT scan done for gross hematuria. There is a right renal pelvis/proximal ureter filling defect concerning for tumor. I recommend cystoscopy, right ureteroscopy laser ablation, brushing/washing, ureteral stent insertion. Reviewed images with FT today. In agreement. Flora/Dr Chelsey Tang (on call) can we please look at adding Jt Hood on for the OR 1 day next week at [facility]? He is retired, his sister will drive him.  I'll place case request

## 2023-09-08 NOTE — PROGRESS NOTES
Assessment:  1. Chronic pain syndrome    2. Uncomplicated opioid dependence ([address])    3. Long-term current use of opiate analgesic    4. Lumbar radiculopathy - Right    5. Neuropathy    6. Chronic pain of both shoulders        Plan:  While the patient was in the office today, I did have a thorough conversation regarding their chronic pain syndrome, medication management, and treatment plan options. The patient remains clinically stable and very well controlled with the Butrans 5 mcg transdermal patch q. 7 days. I have electronically sent this prescription to his pharmacy with a do not fill date of 10/2/2023 and 2 additional refills. Follow-up in 3 months or sooner if needed. There are risks associated with opioid medications, including dependence, addiction and tolerance. The patient understands and agrees to use these medications only as prescribed. Potential side effects of the medications include, but are not limited to, constipation, drowsiness, addiction, impaired judgment and risk of fatal overdose if not taken as prescribed. The patient was warned against driving while taking sedation medications. Sharing medications is a felony. At this point in time, the patient is showing no signs of addiction, abuse, diversion or suicidal ideation. A urine drug screen was collected at today's office visit as part of our medication management protocol. The point of care testing results were appropriate for what was being prescribed. The specimen will be sent for confirmatory testing. The drug screen is medically necessary because the patient is either dependent on opioid medication or is being considered for opioid medication therapy and the results could impact ongoing or future treatment. The drug screen is to evaluate for the presences or absence of prescribed, non-prescribed, and/or illicit drugs/substances.     [place] Prescription Drug Monitoring Program report was reviewed and was appropriate     My impressions and treatment recommendations were discussed in detail with the patient who verbalized understanding and had no further questions. Discharge instructions were provided. I personally saw and examined the patient and I agree with the above discussed plan of care. Orders Placed This Encounter   Procedures   • MM ALL_Prescribed Meds and Special Instructions     Order Specific Question:   Millennium Is ATORVASTATIN prescribed? Answer:   Yes     Order Specific Question:   [place] Is BUPRENORPHINE Prescribed? Answer:    Yes   • MM DT_Alprazolam Definitive Test   • MM DT_Amitriptyline Definitive Test   • MM DT_Amphetamine Definitive Test   • MM DT_Buprenorphine Definitive Test   • MM DT_Carisoprodol Definitive Test   • MM DT_Clonazepam Definitive Test   • MM DT_Cocaine Definitive Test   • MM DT_Codeine Definitive Test   • MM Diazepam Definitive Test   • MM DT_Ethyl Glucuronide/Ethyl Sulfate Definitive Test   • MM DT_Fentanyl Definitive Test   • MM DT_Heroin Definitive Test   • MM DT_Hydrocodone Definitive Test   • MM DT_Hydromorphone Definitive Test   • MM DT_Kratom Definitive Test   • MM Lorazepam Definitive Test   • MM DT_MDMA Definitive Test   • MM DT_Methadone Definitive Test   • MM DT_Methamphetaine-d/l Isomers Definitive   • MM DT_Methamphetamine Definitive Test   • MM DT_Morphine Definitive Test   • MM DT_Oxazepam Definitive Test   • MM DT_Oxycodone Definitive Test   • MM DT_Oxymorphone Definitive Test   • MM DT_Phentermine Definitive Test   • MM DT_Secobarbital Definitive Test   • MM DT_Tapentadol Definitive Test   • MM DT_Temazapam Definitive Test   • MM DT_THC Definitive Test   • MM DT_Tramadol Definitive Test   • MM DT_Validity Creatinine   • MM DT_Validity Oxidant   • MM DT_Validity pH   • MM DT_Validity Specific     New Medications Ordered This Visit   Medications   • transdermal buprenorphine (BUTRANS) 5 mcg/hr TD patch     Sig: Place 1 patch on the skin over 7 days once a week For ongoing therapy Do not fill before 10/02/23     Dispense:  4 patch     Refill:  2     Do not fill before 06/28/23       History of Present Illness:  Jalyn Luis is a 75 y.o. male who presents for a follow up office visit in regards to chronic pain. The patient’s current symptoms include chronic back pain and neuropathic pain of the right lower extremity that he presently rates at 3 out of 10 on the scale describes it as an intermittent dull, aching, pressure-like pain. He has numbness and paresthesias of the lower leg as well. Overall he feels that the current treatment regimen of Butrans 5 mcg transdermal patch q. 7 days provides him with 80% reduction in pain and without side effects. He has no new symptoms or acute issues on today's visit. Opioid contract date 03/06/23    Last UDS Date 03/06/23                  butrans patch on day 6      I have personally reviewed and/or updated the patient's past medical history, past surgical history, family history, social history, current medications, allergies, and vital signs today. Review of Systems   Respiratory: Negative for shortness of breath. Cardiovascular: Negative for chest pain. Gastrointestinal: Negative for constipation, diarrhea, nausea and vomiting. Musculoskeletal: Positive for back pain, gait problem, joint swelling and myalgias. Negative for arthralgias. Skin: Negative for rash. Neurological: Negative for dizziness, seizures and weakness. All other systems reviewed and are negative.       Patient Active Problem List   Diagnosis   • Lumbar radiculopathy - Right   • Bone lesion   • Neuropathy   • Inflammatory polyarthropathy (HCC)   • Former tobacco use   • Colon cancer screening   • Right foot drop   • Lumbar spondylosis - Bilateral   • Vitamin D deficiency   • Thoracic spine pain   • Chronic pain syndrome   • Chronic pain of both knees   • Chronic pain of both shoulders   • Chronic pain of left ankle   • Multiple subsolid lung nodules greater than 6 mm in diameter   • Mixed hyperlipidemia   • 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HCC)   • S/P femoral-popliteal bypass surgery   • Atherosclerosis of artery of left lower extremity (HCC)   • Chronic obstructive pulmonary disease, unspecified COPD type (HCC)   • Chronic right shoulder pain   • MTHFR mutation   • History of DVT (deep vein thrombosis)   • PAD (peripheral artery disease) (HCC)   • Right upper lobe pulmonary nodule   • Adenocarcinoma of right lung (HCC)   • Advanced care planning/counseling discussion       Past Medical History:   Diagnosis Date   • Arthritis feb 2016    osto back   • Chronic back pain    • Chronic narcotic dependence (HCC)    • Chronic pain disorder     Sees pain management Dr Caitlyn Venegas SL   • Cigarette smoker    • Deep vein thrombosis (DVT) of femoral vein of left lower extremity ([address]) 7/29/2021   • Depression    • Dry skin    • Dyslexia    • Full dentures     upper and no teeth lower/"avoid raw vegetables and chewy food"   • GERD (gastroesophageal reflux disease)    • 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 related   • History of blood clots     "left leg" takes Eliquis   • History of bronchitis    • History of pneumonia     childhood   • Increased pressure in the eye, bilateral    • L4-L5 disc bulge 06/16/2017   • Low back pain    • MTHFR mutation    • Muscle weakness    • Neuropathy     right foot and left foot   • Osteoarthritis    • Osteoarthritis     and" if becomes cold joint pain worsens"   • Peripheral neuropathy 8/ 15   • Pulmonary emphysema ([address])     "mild"   • Right foot drop    • Shortness of breath     "only on humid days"   • Smokers' cough [facility])    • Wears glasses    • Work related injury 04/24/2018       Past Surgical History:   Procedure Laterality Date   • BACK SURGERY  aug 2015- jun2017 2017 no affect   • COLONOSCOPY     • EGD AND COLONOSCOPY N/A 06/29/2018    Procedure: EGD AND COLONOSCOPY;  Surgeon: Frank Solis MD;  Location: [facility] GI LAB;   Service: Gastroenterology   • EPIDURAL BLOCK INJECTION      needle   • HIP ARTHROSCOPY W/ LABRAL DEBRIDEMENT Left     "hip surgery"   • IR BIOPSY LUNG  4/17/2023   • IR CHEST TUBE PLACEMENT  4/17/2023   • IR LOWER EXTREMITY ANGIOGRAM  12/08/2021   • LUNG LOBECTOMY Right 5/17/2023    Procedure: lymph node dissection; PLEURAL LYSIS;  Surgeon: Farhan Lott MD;  [facility];  Service: Thoracic   • LUNG SEGMENTECTOMY Right 5/17/2023    Procedure: Right thoracoscopic upper lobe wedge resection;  Surgeon: Farhan Lott MD;  [facility];  Service: Thoracic   • ORTHOPEDIC SURGERY     • CT ARTHRS HIP DEBRIDEMENT/SHAVING ARTICULAR CRTLG Left 04/30/2018    Procedure: LEFT HIP ARTHROSCOPIC LABRAL DEBRIDEMENT;  Surgeon: Steve Davidson MD;  Location[facility];  Service: Orthopedics   •  [address] INCL FLUOR GDNCE DX W/CELL WASHG [facility] N/A 5/17/2023    Procedure: BRONCHOSCOPY FLEXIBLE;  Surgeon: Farhan Lott MD;  [facility];  Service: Thoracic   • CT IN-SITU VEIN BYPASS FEMORAL-POPLITEAL Right 12/08/2021    Procedure: R SFA to BK Pop Bypass using insitu GSV,  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;  Surgeon: Nai Mckeon MD;  Location: [facility];  Service: Vascular   • CT Larey Cassette 3RD+ ORD [id number] [facility] within [facility] Right 12/08/2021    Procedure: COMPLETION ARTERIOGRAM WITH 5 MM ANGIOPLASTY OF MID SUPERFICIAL FEMORAL ARTERY;  Surgeon: Nai Mckeon MD;  [facility];  Service: Vascular   • CT THORACOSCOPY W/THERA WEDGE RESEXN INITIAL UNILAT Right 5/17/2023    Procedure: THORACOSCOPY VIDEO ASSISTED SURGERY (VATS);   Surgeon: Farhan Lott MD;  [facility];  Service: Thoracic   • TONSILLECTOMY     • WISDOM TOOTH EXTRACTION         Family History   Problem Relation Age of Onset   • Dementia Mother    • Glaucoma Mother    • Arthritis Mother         nursing home   • Cancer Father         dead   • Osteoarthritis Family        Social History     Occupational History   • Not on file   Tobacco Use   • Smoking status: Every Day     Packs/day: 0.25     Years: 15.00     Total pack years: 3.75     Types: Cigarettes     Start date: 2008     Passive exposure: Yes   • Smokeless tobacco: Never   • Tobacco comments:     stress caused by workers comp.  not covering meds.  Less than half a pack of cigs    Vaping Use   • Vaping Use: Never used   Substance and Sexual Activity   • Alcohol use: Not Currently   • Drug use: Not Currently     Comment: drug free since 1987   • Sexual activity: Not on file     Comment: defer       Current Outpatient Medications on File Prior to Visit   Medication Sig   • acetaminophen (TYLENOL) 500 mg tablet Take 500 mg by mouth every 6 (six) hours as needed for mild pain   • apixaban (ELIQUIS) 5 mg Take 1 tablet (5 mg total) by mouth 2 (two) times a day   • ascorbic Acid (VITAMIN C) 500 MG CPCR Take 500 mg by mouth 2 (two) times a day   • aspirin (ECOTRIN LOW STRENGTH) 81 mg EC tablet Take 1 tablet (81 mg total) by mouth daily   • atorvastatin (LIPITOR) 10 mg tablet Take 0.5 tablets (5 mg total) by mouth daily at bedtime   • cholecalciferol (VITAMIN D3) 1,000 units tablet Take 2,000 Units by mouth 2 (two) times a day    • Flaxseed, Linseed, (FLAX SEED OIL PO) Take 1,200 capsules by mouth 2 (two) times a day     • folic acid (FOLVITE) 550 mcg tablet Take 400 mcg by mouth daily   • METHYLCOBALAMIN SL Place 1,000 mcg under the tongue 2 (two) times a day    • Misc Natural Products (Osteo Bi-Flex Triple Strength) TABS Take by mouth 2 (two) times a day     • pyridoxine (B-6) 100 MG tablet Take 100 mg by mouth daily   • vitamin A 2400 MCG (8000 UT) capsule Take 8,000 Units by mouth 2 (two) times a day   • [DISCONTINUED] transdermal buprenorphine (BUTRANS) 5 mcg/hr TD patch Place 1 patch on the skin over 7 days once a week For ongoing therapy   • docusate sodium (COLACE) 100 mg capsule Take 1 capsule (100 mg total) by mouth 2 (two) times a day (Patient not taking: Reported on 7/17/2023)   • nicotine (NICODERM CQ) 21 mg/24 hr TD 24 hr patch Place 1 patch on the skin over 24 hours every 24 hours (Patient not taking: Reported on 7/17/2023)     No current facility-administered medications on file prior to visit. Allergies   Allergen Reactions   • Cyanocobalamin [Vitamin B12] GI Intolerance   • Gabapentin Other (See Comments) and GI Intolerance     Difficulty walking   • Morphine Other (See Comments)     "acute constipation"   • Tramadol Confusion   • Nsaids Other (See Comments)     Pt reports avoids as is on Eliquis       Physical Exam:    /66   Pulse 76   Ht 6' 3" (1.905 m)   Wt 82.1 kg (181 lb)   SpO2 95%   BMI 22.62 kg/m²     Constitutional:normal, well developed, well nourished, alert, in no distress and non-toxic and no overt pain behavior.   Eyes:anicteric  HEENT:grossly intact  Neck:supple, symmetric, trachea midline and no masses   Pulmonary:even and unlabored  Cardiovascular:No edema or pitting edema present  Skin:Normal without rashes or lesions and well hydrated  Psychiatric:Mood and affect appropriate  Neurologic:Cranial Nerves II-XII grossly intact  Musculoskeletal:normal    Imaging

## 2023-09-11 ENCOUNTER — ANESTHESIA EVENT (OUTPATIENT)
Dept: PERIOP | Facility: HOSPITAL | Age: 68
End: 2023-09-11
Payer: COMMERCIAL

## 2023-09-11 ENCOUNTER — PREP FOR PROCEDURE (OUTPATIENT)
Dept: UROLOGY | Facility: MEDICAL CENTER | Age: 68
End: 2023-09-11

## 2023-09-12 ENCOUNTER — ANESTHESIA (OUTPATIENT)
Dept: PERIOP | Facility: HOSPITAL | Age: 68
End: 2023-09-12
Payer: COMMERCIAL

## 2023-09-12 ENCOUNTER — HOSPITAL ENCOUNTER (OUTPATIENT)
Facility: HOSPITAL | Age: 68
Setting detail: OUTPATIENT SURGERY
Discharge: HOME | End: 2023-09-12
Attending: UROLOGY | Admitting: UROLOGY
Payer: COMMERCIAL

## 2023-09-12 ENCOUNTER — APPOINTMENT (OUTPATIENT)
Dept: RADIOLOGY | Facility: HOSPITAL | Age: 68
End: 2023-09-12
Payer: COMMERCIAL

## 2023-09-12 ENCOUNTER — TELEPHONE (OUTPATIENT)
Dept: UROLOGY | Facility: AMBULATORY SURGERY CENTER | Age: 68
End: 2023-09-12

## 2023-09-12 VITALS
TEMPERATURE: 96.4 F | WEIGHT: 183 LBS | OXYGEN SATURATION: 95 % | HEART RATE: 59 BPM | RESPIRATION RATE: 16 BRPM | SYSTOLIC BLOOD PRESSURE: 142 MMHG | DIASTOLIC BLOOD PRESSURE: 85 MMHG | BODY MASS INDEX: 22.75 KG/M2 | HEIGHT: 75 IN

## 2023-09-12 DIAGNOSIS — D49.59 URETERAL TUMOR: ICD-10-CM

## 2023-09-12 DIAGNOSIS — D49.59 URETERAL TUMOR: Primary | ICD-10-CM

## 2023-09-12 PROBLEM — R31.0 GROSS HEMATURIA: Status: ACTIVE | Noted: 2023-09-12

## 2023-09-12 LAB
6MAM UR QL CFM: NEGATIVE NG/ML
7AMINOCLONAZEPAM UR QL CFM: NEGATIVE NG/ML
A-OH ALPRAZ UR QL CFM: NEGATIVE NG/ML
ACCEPTABLE CREAT UR QL: NORMAL MG/DL
ACCEPTIBLE SP GR UR QL: NORMAL
AMITRIP UR QL CFM: NEGATIVE NG/ML
AMPHET UR QL CFM: NEGATIVE NG/ML
AMPHET UR QL CFM: NEGATIVE NG/ML
BUPRENORPHINE UR QL CFM: NORMAL NG/ML
BZE UR QL CFM: NEGATIVE NG/ML
CARISOPRODOL UR QL CFM: NEGATIVE NG/ML
CODEINE UR QL CFM: NEGATIVE NG/ML
EDDP UR QL CFM: NEGATIVE NG/ML
ETHYL GLUCURONIDE UR QL CFM: NEGATIVE NG/ML
ETHYL SULFATE UR QL SCN: NEGATIVE NG/ML
FENTANYL UR QL CFM: NEGATIVE NG/ML
GLIADIN IGG SER IA-ACNC: NEGATIVE NG/ML
HYDROCODONE UR QL CFM: NEGATIVE NG/ML
HYDROCODONE UR QL CFM: NEGATIVE NG/ML
HYDROMORPHONE UR QL CFM: NEGATIVE NG/ML
LORAZEPAM UR QL CFM: NEGATIVE NG/ML
MDMA UR QL CFM: NEGATIVE NG/ML
MEPROBAMATE UR QL CFM: NEGATIVE NG/ML
METHADONE UR QL CFM: NEGATIVE NG/ML
METHAMPHET UR QL CFM: NEGATIVE NG/ML
MORPHINE UR QL CFM: NEGATIVE NG/ML
MORPHINE UR QL CFM: NEGATIVE NG/ML
NITRITE UR QL: NORMAL UG/ML
NORBUPRENORPHINE UR QL CFM: NEGATIVE NG/ML
NORDIAZEPAM UR QL CFM: NEGATIVE NG/ML
NORFENTANYL UR QL CFM: NEGATIVE NG/ML
NORHYDROCODONE UR QL CFM: NEGATIVE NG/ML
NORHYDROCODONE UR QL CFM: NEGATIVE NG/ML
NOROXYCODONE UR QL CFM: NEGATIVE NG/ML
NORTRIP UR QL CFM: NEGATIVE NG/ML
OXAZEPAM UR QL CFM: NEGATIVE NG/ML
OXYCODONE UR QL CFM: NEGATIVE NG/ML
OXYMORPHONE UR QL CFM: NEGATIVE NG/ML
OXYMORPHONE UR QL CFM: NEGATIVE NG/ML
PARA-FLUOROFENTANYL QUANTIFICATION: NORMAL NG/ML
RESULT ALL_PRESCRIBED MEDS AND SPECIAL INSTRUCTIONS: NORMAL
SECOBARBITAL UR QL CFM: NEGATIVE NG/ML
SL AMB 4-ANPP QUANTIFICATION: NORMAL NG/ML
SL AMB 7-OH-MITRAGYNINE (KRATOM ALKALOID) QUANTIFICATION: NEGATIVE NG/ML
SL AMB ACETYL FENTANYL QUANTIFICATION: NORMAL NG/ML
SL AMB ACETYL NORFENTANYL QUANTIFICATION: NORMAL NG/ML
SL AMB ACRYL FENTANYL QUANTIFICATION: NORMAL NG/ML
SL AMB CARFENTANIL QUANTIFICATION: NORMAL NG/ML
SL AMB CTHC (MARIJUANA METABOLITE) QUANTIFICATION: NEGATIVE NG/ML
SL AMB N-DESMETHYL-TRAMADOL QUANTIFICATION: NEGATIVE NG/ML
SL AMB PHENTERMINE QUANTIFICATION: NEGATIVE NG/ML
SPECIMEN PH ACCEPTABLE UR: NORMAL
TAPENTADOL UR QL CFM: NEGATIVE NG/ML
TEMAZEPAM UR QL CFM: NEGATIVE NG/ML
TEMAZEPAM UR QL CFM: NEGATIVE NG/ML
TRAMADOL UR QL CFM: NEGATIVE NG/ML
URATE/CREAT 24H UR: NEGATIVE NG/ML

## 2023-09-12 PROCEDURE — C1747 URETEROSCOPE DIGITAL FLEX SNGL USE RVS DEFLECTION APTRA: HCPCS | Performed by: UROLOGY

## 2023-09-12 PROCEDURE — 52332 CYSTOSCOPY AND TREATMENT: CPT | Performed by: UROLOGY

## 2023-09-12 PROCEDURE — 52354 CYSTOURETERO W/BIOPSY: CPT | Performed by: UROLOGY

## 2023-09-12 PROCEDURE — 88305 TISSUE EXAM BY PATHOLOGIST: CPT | Performed by: STUDENT IN AN ORGANIZED HEALTH CARE EDUCATION/TRAINING PROGRAM

## 2023-09-12 PROCEDURE — C1894 INTRO/SHEATH, NON-LASER: HCPCS | Performed by: UROLOGY

## 2023-09-12 PROCEDURE — 88112 CYTOPATH CELL ENHANCE TECH: CPT | Performed by: PATHOLOGY

## 2023-09-12 PROCEDURE — 74420 UROGRAPHY RTRGR +-KUB: CPT

## 2023-09-12 PROCEDURE — 87086 URINE CULTURE/COLONY COUNT: CPT | Performed by: UROLOGY

## 2023-09-12 PROCEDURE — C1758 CATHETER, URETERAL: HCPCS | Performed by: UROLOGY

## 2023-09-12 PROCEDURE — C2617 STENT, NON-COR, TEM W/O DEL: HCPCS | Performed by: UROLOGY

## 2023-09-12 PROCEDURE — C1769 GUIDE WIRE: HCPCS | Performed by: UROLOGY

## 2023-09-12 DEVICE — INLAY OPTIMA URETERAL STENT W/O GUIDEWIRE
Type: IMPLANTABLE DEVICE | Status: FUNCTIONAL
Brand: BARD® INLAY OPTIMA® URETERAL STENT

## 2023-09-12 RX ORDER — CEFAZOLIN SODIUM 1 G/50ML
SOLUTION INTRAVENOUS AS NEEDED
Status: DISCONTINUED | OUTPATIENT
Start: 2023-09-12 | End: 2023-09-12

## 2023-09-12 RX ORDER — FENTANYL CITRATE 50 UG/ML
INJECTION, SOLUTION INTRAMUSCULAR; INTRAVENOUS AS NEEDED
Status: DISCONTINUED | OUTPATIENT
Start: 2023-09-12 | End: 2023-09-12

## 2023-09-12 RX ORDER — FENTANYL CITRATE/PF 50 MCG/ML
50 SYRINGE (ML) INJECTION
Status: DISCONTINUED | OUTPATIENT
Start: 2023-09-12 | End: 2023-09-12 | Stop reason: HOSPADM

## 2023-09-12 RX ORDER — DEXAMETHASONE SODIUM PHOSPHATE 10 MG/ML
INJECTION, SOLUTION INTRAMUSCULAR; INTRAVENOUS AS NEEDED
Status: DISCONTINUED | OUTPATIENT
Start: 2023-09-12 | End: 2023-09-12

## 2023-09-12 RX ORDER — MAGNESIUM HYDROXIDE 1200 MG/15ML
LIQUID ORAL AS NEEDED
Status: DISCONTINUED | OUTPATIENT
Start: 2023-09-12 | End: 2023-09-12 | Stop reason: HOSPADM

## 2023-09-12 RX ORDER — PROPOFOL 10 MG/ML
INJECTION, EMULSION INTRAVENOUS AS NEEDED
Status: DISCONTINUED | OUTPATIENT
Start: 2023-09-12 | End: 2023-09-12

## 2023-09-12 RX ORDER — HYDROCODONE BITARTRATE AND ACETAMINOPHEN 5; 325 MG/1; MG/1
1 TABLET ORAL EVERY 6 HOURS PRN
Qty: 6 TABLET | Refills: 0 | Status: SHIPPED | OUTPATIENT
Start: 2023-09-12 | End: 2023-09-22

## 2023-09-12 RX ORDER — HYDROCODONE BITARTRATE AND ACETAMINOPHEN 5; 325 MG/1; MG/1
1 TABLET ORAL EVERY 4 HOURS PRN
Status: DISCONTINUED | OUTPATIENT
Start: 2023-09-12 | End: 2023-09-12 | Stop reason: HOSPADM

## 2023-09-12 RX ORDER — LIDOCAINE HYDROCHLORIDE 10 MG/ML
INJECTION, SOLUTION EPIDURAL; INFILTRATION; INTRACAUDAL; PERINEURAL AS NEEDED
Status: DISCONTINUED | OUTPATIENT
Start: 2023-09-12 | End: 2023-09-12

## 2023-09-12 RX ORDER — SODIUM CHLORIDE, SODIUM LACTATE, POTASSIUM CHLORIDE, CALCIUM CHLORIDE 600; 310; 30; 20 MG/100ML; MG/100ML; MG/100ML; MG/100ML
INJECTION, SOLUTION INTRAVENOUS CONTINUOUS PRN
Status: DISCONTINUED | OUTPATIENT
Start: 2023-09-12 | End: 2023-09-12

## 2023-09-12 RX ORDER — ONDANSETRON 2 MG/ML
INJECTION INTRAMUSCULAR; INTRAVENOUS AS NEEDED
Status: DISCONTINUED | OUTPATIENT
Start: 2023-09-12 | End: 2023-09-12

## 2023-09-12 RX ORDER — CEPHALEXIN 500 MG/1
500 CAPSULE ORAL 3 TIMES DAILY
Qty: 9 CAPSULE | Refills: 0 | Status: SHIPPED | OUTPATIENT
Start: 2023-09-12 | End: 2023-09-15

## 2023-09-12 RX ADMIN — FENTANYL CITRATE 50 MCG: 50 INJECTION, SOLUTION INTRAMUSCULAR; INTRAVENOUS at 07:42

## 2023-09-12 RX ADMIN — PROPOFOL 150 MG: 10 INJECTION, EMULSION INTRAVENOUS at 07:45

## 2023-09-12 RX ADMIN — HYDROCODONE BITARTRATE AND ACETAMINOPHEN 1 TABLET: 5; 325 TABLET ORAL at 10:31

## 2023-09-12 RX ADMIN — ONDANSETRON 4 MG: 2 INJECTION INTRAMUSCULAR; INTRAVENOUS at 09:11

## 2023-09-12 RX ADMIN — FENTANYL CITRATE 50 MCG: 50 INJECTION, SOLUTION INTRAMUSCULAR; INTRAVENOUS at 08:32

## 2023-09-12 RX ADMIN — LIDOCAINE HYDROCHLORIDE 100 MG: 10 INJECTION, SOLUTION EPIDURAL; INFILTRATION; INTRACAUDAL; PERINEURAL at 07:43

## 2023-09-12 RX ADMIN — DEXAMETHASONE SODIUM PHOSPHATE 10 MG: 10 INJECTION, SOLUTION INTRAMUSCULAR; INTRAVENOUS at 08:01

## 2023-09-12 RX ADMIN — CEFAZOLIN SODIUM 1000 MG: 1 SOLUTION INTRAVENOUS at 08:06

## 2023-09-12 RX ADMIN — SODIUM CHLORIDE, SODIUM LACTATE, POTASSIUM CHLORIDE, AND CALCIUM CHLORIDE: .6; .31; .03; .02 INJECTION, SOLUTION INTRAVENOUS at 07:38

## 2023-09-12 RX ADMIN — FENTANYL CITRATE 50 MCG: 50 INJECTION, SOLUTION INTRAMUSCULAR; INTRAVENOUS at 08:35

## 2023-09-12 RX ADMIN — FENTANYL CITRATE 50 MCG: 50 INJECTION, SOLUTION INTRAMUSCULAR; INTRAVENOUS at 07:32

## 2023-09-12 NOTE — TELEPHONE ENCOUNTER
----- Message from Melissa Araujo MD sent at 9/12/2023  9:53 AM EDT -----  Today Boone Purcell underwent bilateral ureteroscopy. I performed laser ablation of the tumor in the left ureter as well as a laser ablation of the tumor in the right renal pelvis. He needs follow-up with me in the next 3 to 4 weeks to review pathology. Thank you.   FT

## 2023-09-12 NOTE — ANESTHESIA PREPROCEDURE EVALUATION
Procedure:  cystoscopy ureteroscopy laser ablation tumor brushings/washings retrograde pyelogram ureteral stent insertion (Right: Bladder)    Relevant Problems   CARDIO   (+) Mixed hyperlipidemia   (+) Thoracic spine pain      MUSCULOSKELETAL   (+) Inflammatory polyarthropathy (HCC)   (+) Lumbar spondylosis - Bilateral   (+) Thoracic spine pain      NEURO/PSYCH   (+) Chronic pain of both shoulders   (+) Chronic pain of left ankle   (+) Chronic pain syndrome   (+) Chronic right shoulder pain      PULMONARY   (+) Chronic obstructive pulmonary disease, unspecified COPD type (HCC)      High risk post-op acute pain. Last took eliquis last night  Physical Exam    Airway    Mallampati score: II  TM Distance: >3 FB  Neck ROM: full     Dental   upper dentures,     Cardiovascular  Cardiovascular exam normal    Pulmonary  Pulmonary exam normal     Other Findings      Oct 2021:  LEFT VENTRICLE:  Systolic function was normal. Ejection fraction was estimated to be 60 %. Although no diagnostic regional wall motion abnormality was identified, this possibility cannot be completely excluded on the basis of this study.     RIGHT VENTRICLE:  The size was normal.  Systolic function was normal.     TRICUSPID VALVE:  There was trace regurgitation    Normal sinus rhythm  Normal ECG  No previous ECGs available  Confirmed by Bhavik Rodriguez ([id number]) on 5/10/2023 3:20:55 PM      Specimen Collected: 05/10/23 11:43        Anesthesia Plan  ASA Score- 3     Anesthesia Type- general with ASA Monitors. Additional Monitors:   Airway Plan: LMA. Plan Factors-Exercise tolerance (METS): >4 METS. Chart reviewed. EKG reviewed. Existing labs reviewed. Patient summary reviewed. Patient is not a current smoker. Induction- intravenous. Postoperative Plan- Plan for postoperative opioid use. Planned trial extubation    Informed Consent- Anesthetic plan and risks discussed with patient and sibling.

## 2023-09-12 NOTE — OP NOTE
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]   Medial right renal pelvis tumor  Distal left ureteral tumor    Procedure(s):  Cystoscopy, bilateral ureteral washings, bilateral retrograde pyelograms, bilateral ureteroscopy, bilateral ablation of ureteral/renal pelvis tumors, bilateral ureteral/renal pelvis biopsy, bilateral double-J ureteral stent insertion    Specimen(s):  ID Type Source Tests Collected by Time Destination   1 : urine cytology  Urine Urine, Cystoscopic CYTOLOGY, URINE Rubi Landry MD 2023 [id number]    2 : left renal pelvic washings  Washing Renal Washing, Left NON-GYNECOLOGIC CYTOLOGY Rubi Landry MD 2023 0813    3 : left ureteral tumor  Tissue Ureter, Left TISSUE EXAM Rubi Landry MD 2023 2604    4 : RIGHT RENAL PELVIC WASHINGS  Washing Renal Washing, Right NON-GYNECOLOGIC CYTOLOGY Rubi Landry MD 2023 0848    5 : Right renal pelvis tumor Tissue Soft Tissue, Other TISSUE EXAM Rubi Landry MD 2023 7199    A : urine culture  Urine Urine, Cystoscopic URINE CULTURE Rubi Landry MD 2023 0809        Estimated Blood Loss:   Minimal    Drains:  Chest Tube 1 Right Anterior 8.5 Fr. (Active)   Number of days: 148       Anesthesia Type:   General    Operative Indications:  Ureteral tumor [D49.59]      Operative Findings:  Subcentimeter urothelial tumor of the distal left ureter approximately 7 cm from the left UVJ. See retrograde pyelogram image 2 demonstrating the location of the tumor with the scope as a placeholder. Left ureteral tumor biopsied and removed with a dimension ZeroTip basket. Base of the tumor ablated.   Subcentimeter right medial renal pelvis tumor biopsied with dimension basket and ablated with high-powered holmium laser. Bilateral double-J ureteral stents placed without strings. Complications:   None    Procedure and Technique:  Philippe Li is a 79-year-old male with a smoking history. He has history of lung cancer which was resected. He is no evidence of disease of his lung cancer at this time. Most recently he had gross hematuria. A CT scan showed a filling defect in the right renal pelvis highly suspicious for a urothelial carcinoma. He presents to the operating room today to undergo cystoscopy with bilateral retrograde pyelograms, bilateral ureteral washings, and right ureteroscopy with biopsy/ablation of the tumor. Risk of the procedure including, but not limited to bleeding, infection, sepsis, ureteral injury, disease recurrence, need for additional surgery were discussed and reviewed. Informed consent was obtained. The patient is brought to the operating room on September 12, 2023. After the smooth induction of general LMA anesthesia, the patient was placed in dorsolithotomy position. His genitalia prepped and draped in sterile fashion. Intravenous Ancef was administered. A timeout was performed with all members the operative team confirming the patient's identity and procedure to be performed. 25 [place] rigid cystoscope with 30 degree lens was inserted. The bladder was thoroughly inspected and was normal.  There were no mucosal abnormalities or lesions. Bladder washing for cytology was obtained. Attention was focused on the left ureteral orifice. A 5 [place] open-ended catheter was inserted and left renal pelvis washing was performed under fluoroscopic guidance. The E flux was sent for cytology. Retrograde pyelogram was then performed and was normal of the collecting system, however, approximately 6 to 7 cm proximal to the UVJ round smooth filling defect was appreciated. This was not an air bubble.   A wire was inserted and a long semirigid ureteroscope was passed adjacent to the wire. The filling defect in the distal ureter was consistent with a urothelial tumor. It appeared well pedunculated on a stalk. I was able to laser at the base of the stalk until I was able to pluck the tumor from the wall of the ureter using a dimension ZeroTip basket. The tumor was sent as specimen. I then repassed the ureteroscope and ablated the base of the tumor with a 272 µm holmium laser fiber. Contrast was then injected. No further filling defects were identified. The scope had been passed all the way into the proximal ureter and this was normal.  The wire was backloaded through the cystoscope and a left 28-6 [place] double-J ureteral stent was placed in standard fashion. Proximal coil was appreciated within the left renal pelvis and the distal coil was visualized within the bladder. There was no string left in place. Next I focused my attention on the right side. Again a 5 [place] open-ended catheter was inserted into the right renal pelvis and washings were obtained for cytology. Retrograde pyelogram was performed confirming the filling defect seen on the CT scan on the medial aspect of the right renal pelvis. I passed the long semirigid ureteroscope after placing a safety wire. The ureter was normal from UVJ to UPJ. I then placed a second wire followed by ureteral access sheath followed by an aptra single use Bard flexible ureteroscope. I inspected the renal pelvis. A subcentimeter tumor was appreciated on the medial aspect of the right renal pelvis. This did not appear to be readily amenable to a biopsy, however, I was able to laser ablate the base of the tumor until I was able to pluck the tumor from the wall of the renal pelvis with a dimension ZeroTip basket. I then repassed the scope and ablated the base of the tumor until no grossly macroscopic visible tumor was identified.   All calyces were then thoroughly inspected and there was no evidence of additional urothelial carcinoma. The scope and the sheath were pulled slowly through the ureter for second look of the ureter which showed no evidence of urothelial carcinoma on the right side. The wire was backloaded through the cystoscope and a right 20-6 [place] double-J ureteral stent was placed in standard fashion. Proximal coil was appreciated within the right renal pelvis and the distal coil was visualized within the bladder. There was no string left as well on the right side. The bladder was emptied and the cystoscope was removed. Overall the patient tolerated the procedure well and no complications. The patient was extubated in the operating room and transferred the PACU in stable condition at the conclusion of the case.     Patient Disposition:  PACU stable and extubated        SIGNATURE: Nitesh Saenz MD  DATE: September 12, 2023  TIME: 9:23 AM

## 2023-09-12 NOTE — INTERVAL H&P NOTE
H&P reviewed. After examining the patient I find no changes in the patients condition since the H&P had been written. Vitals:    09/12/23 0557   BP: 100/69   Pulse: 72   Resp: 18   Temp: 98.1 °F (36.7 °C)   SpO2: 94%   On examination he is in no acute distress. Cardiac is regular. Respiratory no distress. Abdomen soft nontender nondistended. Skin is warm. Extremities without edema. Neurologic is grossly intact and nonfocal.  HEENT normocephalic/atraumatic, sclera anicteric. Shivam Morley is a 54-year-old male with a smoking history and a history of lung cancer which was surgically removed and treated. He is in remission. He takes Eliquis secondary to a history of lower extremity DVT. He did not stop his Eliquis prior to the procedure today. He recently reported gross hematuria. As part of his initial work-up he underwent a CT renal protocol. This revealed a filling defect in the right renal pelvis/proximal ureter. I reviewed the imaging with the advanced practitioner in the office. He now presents for cystoscopy with bilateral retrograde pyelograms, bilateral ureteral washings, possible bladder biopsies, right ureteroscopy, right ureteral/renal pelvis biopsy, and laser ablation of right renal pelvis tumor if possible. Risk of the procedure including, but not limited to bleeding, ureteral injury, sepsis, inability to identify the lesion requiring stenting and a return trip to the operating room, infection, need for additional treatment/surgery, disease recurrence. Informed consent obtained. We discussed that I can perform this procedure on Eliquis and ultimately it may be more diagnostic than therapeutic depending on the size of the lesion in the right ureter. The patient wishes to proceed at this time knowing that he may need additional procedures under anesthesia in the future.

## 2023-09-12 NOTE — ANESTHESIA POSTPROCEDURE EVALUATION
Post-Op Assessment Note    CV Status:  Stable  Pain Score: 2    Pain management: adequate     Mental Status:  Alert   Hydration Status:  Stable   PONV Controlled:  None   Airway Patency:  Patent      Post Op Vitals Reviewed: Yes      Staff: Anesthesiologist         There were no known notable events for this encounter.     /97 (09/12/23 0935)    Temp 97.8 °F (36.6 °C) (09/12/23 0935)    Pulse 98 (09/12/23 0935)   Resp 13 (09/12/23 0935)    SpO2 98 % (09/12/23 0935)

## 2023-09-13 ENCOUNTER — NURSE TRIAGE (OUTPATIENT)
Age: 68
End: 2023-09-13

## 2023-09-13 DIAGNOSIS — D49.59 URETERAL TUMOR: Primary | ICD-10-CM

## 2023-09-13 DIAGNOSIS — T83.84XA PAIN DUE TO URETERAL STENT, INITIAL ENCOUNTER (HCC): ICD-10-CM

## 2023-09-13 PROCEDURE — 88112 CYTOPATH CELL ENHANCE TECH: CPT | Performed by: PATHOLOGY

## 2023-09-13 RX ORDER — PHENAZOPYRIDINE HYDROCHLORIDE 200 MG/1
200 TABLET, FILM COATED ORAL 3 TIMES DAILY PRN
Qty: 30 TABLET | Refills: 0 | Status: SHIPPED | OUTPATIENT
Start: 2023-09-13

## 2023-09-13 NOTE — TELEPHONE ENCOUNTER
Spoke to patient and he states he did not start taking Hydrocodone until 2 p.m. today and that he has excruciating pain when passing urine. He has not voided yet since taking the Hydrocodone to see if that helps. Unsure if he has fever, but he is chilly, and sweaty. He has not moved his bowels and his afraid to pass them due to L4 surgery and pressure with it. He was given care advice to hydrate with at least 64 oz. Of water, try Miralax OTC , rest and utilized heating pad for stent status. ER precautions reviewed. I mentioned I would reach the doctor to get further recommendations regarding pain and/or initiate pyridium for painful voiding. Please advise. Answer Assessment - Initial Assessment Questions  1. SYMPTOM: "What's the main symptom you're concerned about?" (e.g., pain, fever, vomiting)      Pain with passing urine   2. ONSET: "When did symptoms  start?"      As soon as he got home  3. SURGERY: "What surgery was performed?"      Cystoscopy   4. DATE of SURGERY: "When was surgery performed?"       9/12/2023  5. ANESTHESIA: " What type of anesthesia did you have?" (e.g., general, spinal, epidural, local)      General anesthesia   6. PAIN: "Is there any pain?" If Yes, ask: "How bad is it?"  (Scale 1-10; or mild, moderate, severe)      10  7. FEVER: "Do you have a fever?" If Yes, ask: "What is your temperature, how was it measured, and when did it start?"      dont know  8. VOMITING: "Is there any vomiting?" If yes, ask: "How many times?"      No   9. BLEEDING: "Is there any bleeding?" If Yes, ask: "How much?" and "Where?"      Reddish yellow   10.  OTHER SYMPTOMS: "Do you have any other symptoms?" (e.g., drainage from wound, painful urination, constipation)        Constipation    Protocols used: POST-OP SYMPTOMS AND QUESTIONS-ADULT-[place]

## 2023-09-13 NOTE — TELEPHONE ENCOUNTER
Pt is post op everytime pt urinates pt stated that he has a level 10 pain after he urinates. he is having hot and cold flashes.  having palpitation and nerves stabs on the right side. Pt has not have a BM either stated that he is afraid to have a BM. Pt also having shakes. Pt stated that he has to be kept warm.   He cannot take he temperature      Please advise  Pt can be reached at [phone]

## 2023-09-13 NOTE — TELEPHONE ENCOUNTER
Yes OK to use the norco for pain (previously was witholding because he is on butrans patch baseline)  Keflex for infection prevention  Can add pyridium for dysuria

## 2023-09-14 LAB — BACTERIA UR CULT: NORMAL

## 2023-09-14 RX ORDER — OXYCODONE HYDROCHLORIDE 5 MG/1
5 TABLET ORAL EVERY 8 HOURS PRN
Qty: 12 TABLET | Refills: 0 | Status: SHIPPED | OUTPATIENT
Start: 2023-09-14

## 2023-09-14 NOTE — TELEPHONE ENCOUNTER
Called and spoke with patient to review Bj De Los Santos PA-C's note. Patient reports feeling a little bit better this morning, but still having severe burning with urination. He is taking Tylenol prn along with Norco and was advised to add pyridium up to 3 times a day. Patient aware not to exceed daily recommended Tylenol dose. He is also taking Keflex as ordered. Patient has not yet had a bowel movement since prior to surgery. He was advised to increase water intake and take prn Colace/Miralax which he states his sister is picking up for him today. Patient requesting refill of Lonepine to the pharmacy as he is unable to take Ibuprofen and regular Tylenol is not effective for his pain. Patient has 2 tablets of Norco left at this time.

## 2023-09-18 NOTE — TELEPHONE ENCOUNTER
I spoke to patient states currently, pain is at 3-4. States after urination he gets pain, intermittently. Urine is Orange. Explained this is caused by the Pyridium. Denies hematuria,, reports able to urinate, stream is a little weaker than normal but feels they are emptying bladder. States sometime sit is difficult to make it to the bathroom, but they have urinal to use for this situation. Reports drinking about 2-3, 16oz bottles of water. Advised to increase to 64oz if possible if no fluid restrictions. Reports tring to avoid taking the oxycodone and tried OTC first and only takes it if absolutely necessary. Has 3 tablets left. Advised to take prescribed and OTC Medication as prescribed to help with pain. Advised to avoid bladder irritants and to call us if symptoms worsen or if pain is uncontrolled even with taking OTC/ prescribed medications. Client  verbalized understanding without further questions.

## 2023-09-18 NOTE — TELEPHONE ENCOUNTER
Patient had surgery on 09/12/23 . Patient called stating he still having some pain and wants to know how to proceed. He is taking the tylenol. He is trying not to take the oxycodone . He only has 3 pills left. His level of pain is about an 8.        Patient can be reached at [phone]

## 2023-09-19 PROCEDURE — 88305 TISSUE EXAM BY PATHOLOGIST: CPT | Performed by: STUDENT IN AN ORGANIZED HEALTH CARE EDUCATION/TRAINING PROGRAM

## 2023-09-22 ENCOUNTER — TELEPHONE (OUTPATIENT)
Dept: INTERNAL MEDICINE CLINIC | Facility: OTHER | Age: 68
End: 2023-09-22

## 2023-09-22 RX ORDER — PHENAZOPYRIDINE HYDROCHLORIDE 200 MG/1
200 TABLET, FILM COATED ORAL 3 TIMES DAILY PRN
Qty: 30 TABLET | Refills: 0 | Status: SHIPPED | OUTPATIENT
Start: 2023-09-22

## 2023-09-22 NOTE — TELEPHONE ENCOUNTER
I recommend adequate oral hydration and starting a probiotic twice a day. I also recommend starting fiber supplementation.

## 2023-09-22 NOTE — TELEPHONE ENCOUNTER
Patient calling because he had surgery last week and he states that the antibiotics messed up his digestive system. He wants to know if there is anything that you can recommend to help with this issue?

## 2023-09-22 NOTE — TELEPHONE ENCOUNTER
Post Op Note    Rima Townsend is a 75 y.o. male s/p Cystoscopy, bilateral ureteral washings, bilateral retrograde pyelograms, bilateral ureteroscopy, bilateral ablation of ureteral/renal pelvis tumors, bilateral ureteral/renal pelvis biopsy, bilateral double-J ureteral stent insertion performed 9/12/2023. Rima Townsend is a patient of Dr. Dr. Mychal Brown and is seen at the [facility]. How would you rate your pain on a scale from 1 to 10, 10 being the worst pain ever? PRN, pain when urinating. Have you had a fever? No  Have your bowel movements been regular? Yes  Do you have any difficulty urinating? No  Do you have any other questions or concerns that I can address at this time? Patient needs refill on Pyridium. Patient is doing well overall. He only has pain when he urinates. Informed this is normal and reviewed stent symptoms. He also states that his bladder burns and he has been taking the Pyridium and needs a refill on it as he only has 9 pills left. Informed I will message the provider about it. Pharmacy is [facility] in [place]. Informed on follow up appt for path review and that Dr. Mychal Brown is opening up a few days in October and that we will be in touch with a date.

## 2023-10-06 ENCOUNTER — NURSE TRIAGE (OUTPATIENT)
Age: 68
End: 2023-10-06

## 2023-10-06 NOTE — TELEPHONE ENCOUNTER
Patient had surgery with Dr. Renu Cohen on 9/12/2023 and is now experiencing excessive diarrhea and increased flatulence. He started a probiotic around 9/17/2023 as recommended by a provider. No recent diet changes. Voiding well and no urinary issues or complaints. He did state he feels so weak he doesn't want to be alone to shower. I stated he should go to ER if he is that weak. He is hydrating with water and gatorade. I explained I would reach our provider but he should put a call into his PCP to get recommendations and feedback from them regarding some of his concerns. He will call them. I explained we will reach back to him after our clinical team reviews his information. Again, due to his verbalized concerns, ER precautions were reviewed. Please advise. Answer Assessment - Initial Assessment Questions  1. SYMPTOM: "What's the main symptom you're concerned about?" (e.g., pain, fever, vomiting)      diarrhea  2. ONSET: "When did diarrhea start?"     5 days ago  3. SURGERY: "What surgery was performed?"     Cysto/ureteroscopy ablation of tumor and stent insertion   4. DATE of SURGERY: "When was surgery performed?"       9/12/2023  5. ANESTHESIA: " What type of anesthesia did you have?" (e.g., general, spinal, epidural, local)     General   6. PAIN: "Is there any pain?" If Yes, ask: "How bad is it?"  (Scale 1-10; or mild, moderate, severe)      Abdominal discomfort  7. FEVER: "Do you have a fever?" If Yes, ask: "What is your temperature, how was it measured, and when did it start?"      No   8. VOMITING: "Is there any vomiting?" If yes, ask: "How many times?"      No   9. BLEEDING: "Is there any bleeding?" If Yes, ask: "How much?" and "Where?"      No   10.  OTHER SYMPTOMS: "Do you have any other symptoms?" (e.g., drainage from wound, painful urination, constipation)        No just gas    Protocols used: POST-OP SYMPTOMS AND QUESTIONS-ADULT-[place]

## 2023-10-10 ENCOUNTER — OFFICE VISIT (OUTPATIENT)
Dept: UROLOGY | Facility: CLINIC | Age: 68
End: 2023-10-10
Payer: COMMERCIAL

## 2023-10-10 VITALS
BODY MASS INDEX: 21.76 KG/M2 | HEIGHT: 75 IN | HEART RATE: 60 BPM | DIASTOLIC BLOOD PRESSURE: 72 MMHG | SYSTOLIC BLOOD PRESSURE: 140 MMHG | WEIGHT: 175 LBS

## 2023-10-10 DIAGNOSIS — G95.0 SYRINGOMYELIA (HCC): ICD-10-CM

## 2023-10-10 DIAGNOSIS — C66.2 UROTHELIAL CARCINOMA OF LEFT DISTAL URETER (HCC): Primary | ICD-10-CM

## 2023-10-10 PROCEDURE — 99214 OFFICE O/P EST MOD 30 MIN: CPT | Performed by: UROLOGY

## 2023-10-10 RX ORDER — SODIUM CHLORIDE 9 MG/ML
125 INJECTION, SOLUTION INTRAVENOUS CONTINUOUS
OUTPATIENT
Start: 2023-10-10

## 2023-10-10 NOTE — PROGRESS NOTES
10/10/2023    [place] HARRELL [facility]  1955  [id number]        Assessment  Bilateral upper tract tumors      Discussion  We discussed his bilateral upper tract tumors including a right renal pelvis tumor as well as a left ureteral tumor. I removed all of the visualized lesions in the left ureter as well as the right renal pelvis and utilized holmium laser to ablate the base. For surveillance I recommend that he return to the operating room in February 2024 for second look ureteroscopy. I will perform ureteroscopy with laser ablation and possible biopsy along with reinsertion of stents. In the interim I recommend cystoscopy with stent removal in the office. Risk of the ureteroscopy were discussed and reviewed informed consent was obtained in the office today. History of Present Illness  75 y.o. male with a history of hematuria and heavy smoking history. CT scan was performed with a concern for a right renal pelvis filling defect. I recently took him to the operating room and started with bilateral retrograde pyelograms. I was concerned about a filling defect first in the left ureter. In the distal left ureter 7 cm in the left UVJ small bladder tumor was removed in its entirety and the base was fulgurated with the holmium laser. I also removed a subcentimeter right renal pelvis tumor which I was also able to biopsy. He has bilateral double-J ureteral stents in place without strings. He returns for follow-up today. Pathology reveals Peola Rings is negative for malignant cells. 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 The right renal pelvis pathology is the same. AUA Symptom Score      Review of Systems  Review of Systems   Constitutional: Negative. HENT: Negative. Eyes: Negative. Respiratory: Negative. Cardiovascular: Negative. Gastrointestinal: Negative. Endocrine: Negative. Genitourinary:        Per HPI   Musculoskeletal: Negative. Skin: Negative. Allergic/Immunologic: Negative. Neurological: Negative. Hematological: Negative. Psychiatric/Behavioral: Negative. Past Medical History  Past Medical History:   Diagnosis Date   • Arthritis feb 2016    osto back   • Chronic back pain    • Chronic narcotic dependence ([address])    • Chronic pain disorder     Sees pain management Dr Gabi Gramajo SL   • Cigarette smoker    • Deep vein thrombosis (DVT) of femoral vein of left lower extremity ([address]) 7/29/2021   • Depression    • Dry skin    • Dyslexia    • Full dentures     upper and no teeth lower/"avoid raw vegetables and chewy food"   • GERD (gastroesophageal reflux disease)    • 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 related   • History of blood clots     "left leg" takes Eliquis   • History of bronchitis    • History of pneumonia     childhood   • Increased pressure in the eye, bilateral    • L4-L5 disc bulge 06/16/2017   • Low back pain    • MTHFR mutation    • Muscle weakness    • Neuropathy     right foot and left foot   • Osteoarthritis    • Osteoarthritis     and" if becomes cold joint pain worsens"   • Peripheral neuropathy 8/ 15   • Pulmonary emphysema ([address])     "mild"   • Right foot drop    • Shortness of breath     "only on humid days"   • Smokers' cough [facility])    • Wears glasses    • Work related injury 04/24/2018       Past Social History  Past Surgical History:   Procedure Laterality Date   • BACK SURGERY  aug 2015- jun2017 2017 no affect   • COLONOSCOPY     • EGD AND COLONOSCOPY N/A 06/29/2018    Procedure: EGD AND COLONOSCOPY;  Surgeon: Karen Chatterjee MD;  Location: [facility] GI LAB;   Service: Gastroenterology   • EPIDURAL BLOCK INJECTION      needle   • FL RETROGRADE PYELOGRAM  9/12/2023   • HIP ARTHROSCOPY W/ LABRAL DEBRIDEMENT Left     "hip surgery"   • IR BIOPSY LUNG  4/17/2023   • IR CHEST TUBE PLACEMENT  4/17/2023   • IR LOWER EXTREMITY ANGIOGRAM  12/08/2021   • LUNG LOBECTOMY Right 5/17/2023    Procedure: lymph node dissection; PLEURAL LYSIS;  Surgeon: Alyson Britton MD;  [facility];  Service: Thoracic   • LUNG SEGMENTECTOMY Right 5/17/2023    Procedure: Right thoracoscopic upper lobe wedge resection;  Surgeon: Alyson Britton MD;  [facility];  Service: Thoracic   • ORTHOPEDIC SURGERY     • NC ARTHRS HIP DEBRIDEMENT/SHAVING ARTICULAR CRTLG Left 04/30/2018    Procedure: LEFT HIP ARTHROSCOPIC LABRAL DEBRIDEMENT;  Surgeon: Teri Swift MD;  Location[facility];  Service: Orthopedics   •  [address] INCL FLUOR GDNCE DX W/CELL WASHG [address] N/A 5/17/2023    Procedure: BRONCHOSCOPY FLEXIBLE;  Surgeon: Alyson Britton MD;  [facility];  Service: Thoracic   • NC CYSTO/PYELOSCOPY BX&/FULGURATION PELIVC LESION Bilateral 9/12/2023    Procedure: bilateral  ureteroscopy,bilateral  laser ablation tumor, bilateral brushings/washings,bilateral  retrograde pyelogram,bilateral  ureteral stent insertion;  Surgeon: Melissa Araujo MD;  [facility];  Service: Urology   • NC IN-SITU VEIN BYPASS FEMORAL-POPLITEAL Right 12/08/2021    Procedure: R SFA to BK Pop Bypass using insitu GSV,  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;  Surgeon: Noé Moore MD;  [facility];  Service: Vascular   • NC Evangelist Santoyo 3RD+ ORD SLCTV ABDL PEL/[facility] Right 12/08/2021    Procedure: COMPLETION ARTERIOGRAM WITH 5 MM ANGIOPLASTY OF MID SUPERFICIAL FEMORAL ARTERY;  Surgeon: Noé Moore MD;  [facility];  Service: Vascular   • NC THORACOSCOPY W/THERA WEDGE RESEXN INITIAL UNILAT Right 5/17/2023    Procedure: THORACOSCOPY VIDEO ASSISTED SURGERY (VATS);   Surgeon: Alyson Britton MD;  [facility];  Service: Thoracic   • TONSILLECTOMY     • WISDOM TOOTH EXTRACTION         Past Family History  Family History   Problem Relation Age of Onset   • Dementia Mother    • Glaucoma Mother    • Arthritis Mother         nursing home   • Cancer Father         dead   • Osteoarthritis Family        Past Social history  Social History     Socioeconomic History   • Marital status:      Spouse name: Not on file   • Number of children: Not on file   • Years of education: Not on file   • Highest education level: Not on file   Occupational History   • Not on file   Tobacco Use   • Smoking status: Some Days     Packs/day: 0.25     Years: 15.00     Total pack years: 3.75     Types: Cigarettes     Start date: 2008     Passive exposure: Yes   • Smokeless tobacco: Never   • Tobacco comments:     stress caused by workers comp.  not covering meds. Less than half a pack of cigs    Vaping Use   • Vaping Use: Never used   Substance and Sexual Activity   • Alcohol use: Not Currently   • Drug use: Not Currently     Comment: drug free since 1987   • Sexual activity: Not on file     Comment: defer   Other Topics Concern   • Not on file   Social History Narrative   • Not on file     Social Determinants of Health     Financial Resource Strain: Not on file   Food Insecurity: No Food Insecurity (5/18/2023)    Hunger Vital Sign    • Worried About Running Out of Food in the Last Year: Never true    • Ran Out of Food in the Last Year: Never true   Transportation Needs: No Transportation Needs (5/18/2023)    PRAPARE - Transportation    • Lack of Transportation (Medical): No    • Lack of Transportation (Non-Medical):  No   Physical Activity: Not on file   Stress: Not on file   Social Connections: Not on file   Intimate Partner Violence: Not on file   Housing Stability: Low Risk  (5/18/2023)    Housing Stability Vital Sign    • Unable to Pay for Housing in the Last Year: No    • Number of Places Lived in the Last Year: 1    • Unstable Housing in the Last Year: No       Current Medications  Current Outpatient Medications   Medication Sig Dispense Refill   • acetaminophen (TYLENOL) 500 mg tablet Take 500 mg by mouth every 6 (six) hours as needed for mild pain     • apixaban (ELIQUIS) 5 mg Take 1 tablet (5 mg total) by mouth 2 (two) times a day 180 tablet 3   • ascorbic Acid (VITAMIN C) 500 MG CPCR Take 500 mg by mouth 2 (two) times a day     • aspirin (ECOTRIN LOW STRENGTH) 81 mg EC tablet Take 1 tablet (81 mg total) by mouth daily 90 tablet 0   • atorvastatin (LIPITOR) 10 mg tablet Take 0.5 tablets (5 mg total) by mouth daily at bedtime 45 tablet 1   • cholecalciferol (VITAMIN D3) 1,000 units tablet Take 2,000 Units by mouth 2 (two) times a day      • Flaxseed, Linseed, (FLAX SEED OIL PO) Take 1,200 capsules by mouth 2 (two) times a day       • folic acid (FOLVITE) 316 mcg tablet Take 400 mcg by mouth daily     • METHYLCOBALAMIN SL Place 1,000 mcg under the tongue 2 (two) times a day      • Misc Natural Products (Osteo Bi-Flex Triple Strength) TABS Take by mouth 2 (two) times a day       • phenazopyridine (PYRIDIUM) 200 mg tablet Take 1 tablet (200 mg total) by mouth 3 (three) times a day as needed for bladder spasms (burning) 30 tablet 0   • phenazopyridine (PYRIDIUM) 200 mg tablet Take 1 tablet (200 mg total) by mouth 3 (three) times a day as needed for bladder spasms 30 tablet 0   • pyridoxine (B-6) 100 MG tablet Take 100 mg by mouth daily     • transdermal buprenorphine (BUTRANS) 5 mcg/hr TD patch Place 1 patch on the skin over 7 days once a week For ongoing therapy Do not fill before 10/02/23 4 patch 2   • vitamin A 2400 MCG (8000 UT) capsule Take 8,000 Units by mouth 2 (two) times a day     • docusate sodium (COLACE) 100 mg capsule Take 1 capsule (100 mg total) by mouth 2 (two) times a day (Patient not taking: Reported on 10/10/2023) 30 capsule 0   • nicotine (NICODERM CQ) 21 mg/24 hr TD 24 hr patch Place 1 patch on the skin over 24 hours every 24 hours (Patient not taking: Reported on 7/17/2023) 28 patch 3   • oxyCODONE (ROXICODONE) 5 immediate release tablet Take 1 tablet (5 mg total) by mouth every 8 (eight) hours as needed for severe pain Max Daily Amount: 15 mg (Patient not taking: Reported on 10/10/2023) 12 tablet 0     No current facility-administered medications for this visit. Allergies  Allergies   Allergen Reactions   • Cyanocobalamin [Vitamin B12] GI Intolerance   • Gabapentin Other (See Comments) and GI Intolerance     Difficulty walking   • Morphine Other (See Comments)     "acute constipation"   • Tramadol Confusion   • Nsaids Other (See Comments)     Pt reports avoids as is on Eliquis       Past Medical History, Social History, Family History, medications and allergies were reviewed. Vitals  Vitals:    10/10/23 1531   BP: 140/72   BP Location: Left arm   Patient Position: Sitting   Cuff Size: Adult   Pulse: 60   Weight: 79.4 kg (175 lb)   Height: 6' 3" (1.905 m)       Physical Exam  Physical Exam    On examination he is in no acute distress. Gait is slow with the assistance of a cane.   Affect normal.    Results  Lab Results   Component Value Date    PSA 2.57 06/29/2023    PSA 2.2 11/04/2021    PSA 1.8 11/17/2020     Lab Results   Component Value Date    CALCIUM 9.3 08/18/2023    K 3.9 08/18/2023    CO2 28 08/18/2023     (H) 08/18/2023    BUN 16 08/18/2023    CREATININE 0.86 08/18/2023     Lab Results   Component Value Date    WBC 10.68 (H) 06/29/2023    HGB 15.4 06/29/2023    HCT 45.4 06/29/2023    MCV 96 06/29/2023     06/29/2023         Office Urine Dip  No results found for this or any previous visit (from the past 1 hour(s)).]

## 2023-10-10 NOTE — TELEPHONE ENCOUNTER
not sure, not on any current  medications for side effects  talk with pcp/general prescriber about the diarrhea as well  can see how he feels tomorrow in office when he sees FT generally

## 2023-10-25 ENCOUNTER — TELEPHONE (OUTPATIENT)
Dept: UROLOGY | Facility: AMBULATORY SURGERY CENTER | Age: 68
End: 2023-10-25

## 2023-10-25 NOTE — TELEPHONE ENCOUNTER
I called pt this afternoon to discuss scheduling his procedure with Dr. Martinez Stephens in February 2024. I was able to speak with pt but he stated that he did not want to schedule until he has his stents removed at his appt with Orquidea Gillespie on 11/9/2023. Pt is aware that I am holding 2/6/24 for his next procedure at the [facility]. Pt will call me back to discuss scheduling when he is ready. Before hanging up pt stated that he has been having pain from his stent and thinks that it is causing issues with his voiding and wanted to know if this was normal. I informed him that I could not advise on the the urinary symptoms but would have Dr. Sherren Monarch nurse call him tomorrow to discuss.

## 2023-11-02 NOTE — TELEPHONE ENCOUNTER
"Per chart, "Per , "Ms. Draper is an 86yo lady with a past medical history of dementia, TIA, AS, Carotid disease, depression, and anemia.   She was sent to the ED by her PCP after finding a Hg of 5.8 on labs.  Her son provides all the history due to her dementia.  She is DNR.     At home she is on Lopressor, Aggrenox, and Protonix.  In the ED she was heme positive on hemmocult, but she has had no BRBPR or any melena.      BP (!) 98/53   Pulse 79   Temp 98.2 °F (36.8 °C) (Axillary)   Resp 18   Wt 41.7 kg (92 lb)   LMP  (LMP Unknown)   SpO2 100%   Breastfeeding No   BMI 15.79 kg/m².  Labs showed Hg 6.2, , Na 120, Cr 1, BUN 26.     No radiographs were done in the ED.  EKG showed Normal sinus rhythm 80, Left axis deviation, Incomplete right bundle branch block, Nonspecific T wave abnormality now evident in Inferior leads, Confirmed by Osiel REA MD ([id number]) on 9/2/2022 5:45:43 PM     In the ED she was treated with NS 75 cc/he 2015, Protonix 40mg iv then 8mg/hr at 1945.  She was ordered one Unit PRBC to be transufsed to keep Hg >7g."     Seen at the bedside with daughter, reports a few days of abdominal discomfort. She is DNR."  " Called and LM for patient seeing how he is doing. Informed that with a  stent in place, he may be going more frequently in smaller amounts and that is normal, however if he feels like he is not fully empty, we can bring him in for a PVR. He is to call back if he feels he is not emptying.

## 2023-11-09 ENCOUNTER — PROCEDURE VISIT (OUTPATIENT)
Dept: UROLOGY | Facility: CLINIC | Age: 68
End: 2023-11-09
Payer: COMMERCIAL

## 2023-11-09 VITALS
DIASTOLIC BLOOD PRESSURE: 80 MMHG | SYSTOLIC BLOOD PRESSURE: 120 MMHG | OXYGEN SATURATION: 95 % | HEART RATE: 112 BPM | HEIGHT: 75 IN | BODY MASS INDEX: 21.87 KG/M2

## 2023-11-09 DIAGNOSIS — C66.2 UROTHELIAL CARCINOMA OF LEFT DISTAL URETER (HCC): Primary | ICD-10-CM

## 2023-11-09 DIAGNOSIS — Z46.6 ENCOUNTER FOR REMOVAL OF URETERAL STENT: ICD-10-CM

## 2023-11-09 DIAGNOSIS — D49.59 URETERAL TUMOR: ICD-10-CM

## 2023-11-09 PROCEDURE — 52310 CYSTOSCOPY AND TREATMENT: CPT | Performed by: PHYSICIAN ASSISTANT

## 2023-11-09 RX ORDER — CEFUROXIME AXETIL 500 MG/1
500 TABLET ORAL EVERY 12 HOURS SCHEDULED
Qty: 6 TABLET | Refills: 0 | Status: SHIPPED | OUTPATIENT
Start: 2023-11-09 | End: 2023-11-12

## 2023-11-09 NOTE — PROGRESS NOTES
Cystoscopy     Date/Time  11/9/2023 11:40 AM     Performed by  Chris Petit PA-C   Authorized by  Chris Petit PA-C     [facility] Protocol:  Procedure performed by: lexi Hebert MA, PA-c)  Consent: Verbal consent obtained. Written consent obtained. Risks and benefits: risks, benefits and alternatives were discussed  Consent given by: patient  Patient understanding: patient states understanding of the procedure being performed  Patient consent: the patient's understanding of the procedure matches consent given      Procedure Details:  Procedure type: simple removal of a foreign body, stone, or stent    Additional Procedure Details: The patient returns to the office today to undergo cystoscopy with bilateral ureteral stent removal. Risk and benefits of the procedure were discussed and informed consent was obtained. The patient was placed in the modified supine position. The genitalia were prepped and draped in a sterile fashion. Viscous lidocaine was used for local anesthesia. The flexible cystoscope was passed. The bladder was inspected. The stent was identified coming from the left and right ureteral orifices. The stent was grasped with a flexible grasper and was then removed in its entirety without complications. Overall the patient tolerated the procedure. The patient was provided with a 3 day prescription of ceftin for infection prophylaxis following the procedure. They were made aware to advise our office of any fevers greater than 101 degrees Fahrenheit, malaise, or chills. They were advised that it is normal to have cramping pain on the ipsilateral side for a day or so after ureteral stent removal.  They are to remain well hydrated in the coming days. He will followup for return to OR in February 2024 as planned for cystoscopy bilateral ureteroscopy ureteral tumor ablation and rpg stent insertions.  Is possible either no stents, or stent on strings will be used that time.

## 2023-11-13 ENCOUNTER — TELEPHONE (OUTPATIENT)
Dept: HEMATOLOGY ONCOLOGY | Facility: CLINIC | Age: 68
End: 2023-11-13

## 2023-11-13 NOTE — TELEPHONE ENCOUNTER
Patient Call    Who are you speaking with? Patient    If it is not the patient, are they listed on an active communication consent form? N/A   What is the reason for this call? Patient calling to speak with Cornelio Kennedy. He is requesting a ct scan for various parts of body -liver lung kidney and testicle sac. He states it cost $190 for a single scan. Does this require a call back? yes   If a call back is required, please list best call back number [phone]   If a call back is required, advise that a message will be forwarded to their care team and someone will return their call as soon as possible. Did you relay this information to the patient?  yes

## 2023-11-14 NOTE — TELEPHONE ENCOUNTER
Spoke with patient, explained that we don't evaluate kidneys/ testicles. He could discuss this with Dr. Tibucrio Lewis and he could add on a ct scan of the abdomen / pelvis. If he would like.

## 2023-11-24 ENCOUNTER — HOSPITAL ENCOUNTER (OUTPATIENT)
Dept: RADIOLOGY | Facility: IMAGING CENTER | Age: 68
End: 2023-11-24
Payer: COMMERCIAL

## 2023-11-24 DIAGNOSIS — C34.91 ADENOCARCINOMA OF RIGHT LUNG (HCC): ICD-10-CM

## 2023-11-24 PROCEDURE — G1004 CDSM NDSC: HCPCS

## 2023-11-24 PROCEDURE — 71250 CT THORAX DX C-: CPT

## 2023-12-04 ENCOUNTER — OFFICE VISIT (OUTPATIENT)
Dept: CARDIAC SURGERY | Facility: CLINIC | Age: 68
End: 2023-12-04
Payer: COMMERCIAL

## 2023-12-04 VITALS
TEMPERATURE: 99.5 F | OXYGEN SATURATION: 96 % | HEIGHT: 75 IN | DIASTOLIC BLOOD PRESSURE: 82 MMHG | SYSTOLIC BLOOD PRESSURE: 138 MMHG | BODY MASS INDEX: 21.11 KG/M2 | WEIGHT: 169.75 LBS | RESPIRATION RATE: 15 BRPM | HEART RATE: 115 BPM

## 2023-12-04 DIAGNOSIS — C34.91 ADENOCARCINOMA OF RIGHT LUNG (HCC): Primary | ICD-10-CM

## 2023-12-04 PROCEDURE — 99213 OFFICE O/P EST LOW 20 MIN: CPT | Performed by: PHYSICIAN ASSISTANT

## 2023-12-04 NOTE — ASSESSMENT & PLAN NOTE
MR Russell is about 5 months out from a right thoracoscopic upper lobe therapeutic wedge resection for Stage IA1 adenocarcinoma. His most recent CT chest shows no evidence of recurrent disease. He is following with urology for low grade urotherlial malignancy. We will continue with routine surveillance imaging with a repeat CT chest in 6 months and follow-up at that time. He would like this grouped with surveillance for his urothelial cancer. Right now he is scheduled for a repeat ureteroscopy in February. Should he need surveillance imaging around the time of our CT chest we can adjust to include all orders, but at this time he has no other imaging ordered. All questions were answered and he is in agreement with this plan.

## 2023-12-04 NOTE — PROGRESS NOTES
Thoracic Follow-Up  Assessment/Plan:    Adenocarcinoma of right lung ([address])  MR Russell is about 5 months out from a right thoracoscopic upper lobe therapeutic wedge resection for Stage IA1 adenocarcinoma. His most recent CT chest shows no evidence of recurrent disease. He is following with urology for low grade urotherlial malignancy. We will continue with routine surveillance imaging with a repeat CT chest in 6 months and follow-up at that time. He would like this grouped with surveillance for his urothelial cancer. Right now he is scheduled for a repeat ureteroscopy in February. Should he need surveillance imaging around the time of our CT chest we can adjust to include all orders, but at this time he has no other imaging ordered. All questions were answered and he is in agreement with this plan. Diagnoses and all orders for this visit:    Adenocarcinoma of right lung [facility])  -     CT chest wo contrast; Future          Thoracic History         Cancer Staging   Adenocarcinoma of right lung [facility])  Staging form: Lung, AJCC 8th Edition  - Clinical stage from 5/17/2023: Stage IA1 (cT1mi, cN0, cM0) - Signed by Oscar Madsen PA-C on 6/1/2023  Histopathologic type: Adenocarcinoma, NOS  Stage prefix: Initial diagnosis  Histologic grade (G): G1  Histologic grading system: 4 grade system  Laterality: Right  Tumor size (mm): 10  Lymph-vascular invasion (LVI): LVI not present (absent)/not identified  Specimen type: Excision  Oncology History   Adenocarcinoma of right lung ([address])   5/17/2023 Surgery    Flexible bronchoscopy, right thoracoscopic therapeutic upper lobe wedge resection, MLND      5/17/2023 -  Cancer Staged    Staging form: Lung, AJCC 8th Edition  - Clinical stage from 5/17/2023: Stage IA1 (cT1mi, cN0, cM0) - Signed by Oscar Madsen PA-C on 6/1/2023  Histopathologic type:  Adenocarcinoma, NOS  Stage prefix: Initial diagnosis  Histologic grade (G): G1  Histologic grading system: 4 grade system  Laterality: Right  Tumor size (mm): 10  Lymph-vascular invasion (LVI): LVI not present (absent)/not identified  Specimen type: Excision       6/1/2023 Initial Diagnosis    Adenocarcinoma of right lung ([facility])         Subjective:    Patient ID: Gissel Iqbal is a 70 y.o. male. ECOG 0    HPI  Mr Jose Lai is a 70year old male who is about 6 months out from a right VATS therapeutic upper lobe wedge resection for stage IA1 adenocarcinoma who presents for routine surveillance. CT chest on 11/24/2023 was personally reviewed by myself in PACS and shows focally dilated bronchus in TUL with wall thickening and mucoid impaction, unchanged. No suspicious nodules. On discussion he is feeling over all. 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 His SOB is much improved and back to baseline. He has occasional cough when he is around dust. He denies fevers/chills or recent illness. Denies hemoptysis. The following portions of the patient's history were reviewed and updated as appropriate: allergies, current medications, past family history, past medical history, past social history, past surgical history, and problem list.    Review of Systems   Constitutional:  Negative for chills, diaphoresis and unexpected weight change. HENT: Negative. Eyes: Negative. Respiratory:  Negative for cough, shortness of breath and wheezing. Cardiovascular:  Negative for chest pain and leg swelling. Gastrointestinal:  Negative for abdominal pain, diarrhea and nausea. Endocrine: Negative. Genitourinary: Negative. Musculoskeletal: Negative. Skin: Negative. Allergic/Immunologic: Negative. Neurological: Negative. Hematological:  Negative for adenopathy. Does not bruise/bleed easily.    Psychiatric/Behavioral: Negative. All other systems reviewed and are negative. Objective:   Physical Exam  Vitals reviewed. Constitutional:       General: He is not in acute distress. Appearance: Normal appearance. He is not ill-appearing. HENT:      Head: Normocephalic. Nose: Nose normal.      Mouth/Throat:      Mouth: Mucous membranes are moist.   Eyes:      Pupils: Pupils are equal, round, and reactive to light. Cardiovascular:      Rate and Rhythm: Normal rate and regular rhythm. Heart sounds: Normal heart sounds. Pulmonary:      Effort: Pulmonary effort is normal.      Breath sounds: Normal breath sounds. No wheezing, rhonchi or rales. Abdominal:      Palpations: Abdomen is soft. Musculoskeletal:         General: No swelling. Normal range of motion. Lymphadenopathy:      Cervical: No cervical adenopathy. Skin:     General: Skin is warm. Neurological:      General: No focal deficit present. Mental Status: He is alert and oriented to person, place, and time. Psychiatric:         Mood and Affect: Mood normal.     /82 (BP Location: Left arm, Patient Position: Sitting, Cuff Size: Standard)   Pulse (!) 115   Temp 99.5 °F (37.5 °C) (Temporal)   Resp 15   Ht 6' 3" (1.905 m)   Wt 77 kg (169 lb 12.1 oz)   SpO2 96%   BMI 21.22 kg/m²     XR chest pa & lateral    Result Date: 5/31/2023  Impression Interval resolution of previous pneumothorax. Interval development of pleural thickening/loculated fluid in the lateral right upper hemithorax. Trace right pleural effusion. Workstation performed: [id number]     XR chest pa & lateral    Result Date: 5/18/2023  Impression Right chest tube removed with minimal decrease in small right hydropneumothorax. Trace left effusion. Workstation performed: [id number]      CT chest wo contrast    Result Date: 11/29/2023  Narrative CT CHEST WITHOUT IV CONTRAST INDICATION:   C34.91: Malignant neoplasm of unspecified part of right bronchus or lung. COMPARISON: CT dated 3/22/2023 TECHNIQUE: CT examination of the chest was performed without intravenous contrast. Multiplanar 2D reformatted images were created from the source data. This examination, like all CT scans performed in the [facility], was performed utilizing techniques to minimize radiation dose exposure, including the use of iterative reconstruction and automated exposure control. Radiation dose length product (DLP) for this visit:  230 mGy-cm FINDINGS: LUNGS: Patent central airways. Moderate emphysema. Interval wedge resection of the right upper lobe. Again seen is focally dilated bronchus in the right upper lobe with wall thickening and mucoid impaction (series 5, image #108), unchanged compared to the prior. Recommend continued attention on subsequent follow-up. Scattered calcified granulomata. No suspicious nodules. PLEURA:  Unremarkable. HEART/GREAT VESSELS: Heart is unremarkable for patient's age. No thoracic aortic aneurysm. MEDIASTINUM AND ALEJANDRA:  Unremarkable. CHEST WALL AND LOWER NECK:  Unremarkable. VISUALIZED STRUCTURES IN THE UPPER ABDOMEN:  Unremarkable. OSSEOUS STRUCTURES:  Spinal degenerative changes are noted. No acute fracture or destructive osseous lesion. Impression Interval right upper lobe wedge resection. No new or suspicious nodules identified. Workstation performed: [id number]     CT chest wo contrast    Result Date: 3/28/2023  Narrative CT CHEST WITHOUT IV CONTRAST INDICATION:   R91.8: Other nonspecific abnormal finding of lung field. COMPARISON: Multiple prior comparison studies the most recent: PET CT 9/19/2022. CT lung 7/18/2022. TECHNIQUE: CT examination of the chest was performed without intravenous contrast. Multiplanar 2D reformatted images were created from the source data. Radiation dose length product (DLP) for this visit:  242 mGy-cm .   This examination, like all CT scans performed in the [facility], was performed utilizing techniques to minimize radiation dose exposure, including the use of iterative reconstruction and automated exposure control. FINDINGS: LUNGS:  Increase size of a spiculated FDG avid subpleural right upper lobe nodule now measuring 9 x 4 mm, #5/79, measured 7 x 4 mm on the 7/18/2022 study and 4 mm in maximum diameter on the 12/3/2019 study. An adjacent slightly more superior subpleural opacity measuring 9 x 6 mm #5/67 is unchanged. Posterior right upper lobe 10 x 7 mm spiculated nodule is unchanged #5/132.2 No new pulmonary nodules. No endotracheal or endobronchial lesion. Unchanged mild upper lobe predominant centrilobular and paraseptal emphysema. PLEURA:  Unremarkable. HEART/GREAT VESSELS: Heart is unremarkable for patient's age. No thoracic aortic aneurysm. MEDIASTINUM AND ALEJANDRA:  No adenopathy. A hypermetabolic node seen on the PET/CT have benign morphology with a short axis dimension of less than 1 cm. Hilar nodes are not apparent on this noncontrast study. CHEST WALL AND LOWER NECK:  Unremarkable. VISUALIZED STRUCTURES IN THE UPPER ABDOMEN:  Unchanged vague 8 mm hepatic segment 7 hypodensity OSSEOUS STRUCTURES:  No acute fracture or destructive osseous lesion. Impression Increasing size of a spiculated FDG avid right upper lobe subpleural nodule now measuring 9 x 4 mm measured 7 x 4 mm on the 7/18/2022 study and 4 mm in maximum diameter on the 12/3/2019 study. Management as per cardiothoracic surgery. Other right lung nodular opacities are unchanged in size and appearance. The study was marked in EPIC for significant notification. Workstation performed: [id number]     No CT Chest,Abdomen,Pelvis results available for this patient.    NM PET CT skull base to mid thigh    Result Date: 5/10/2023  Narrative PET/CT SCAN INDICATION: R91.1: Solitary pulmonary nodule MODIFIER: PI COMPARISON: CT 3/22/2023 and priors, including PET CT 9/19/2022 CELL TYPE: Atypical adenomatous hyperplasia, right upper lung biopsy 4/17/2023 TECHNIQUE:   8.8 mCi F-18-FDG administered IV. Multiplanar attenuation corrected and non attenuation corrected PET images are available for interpretation, and contiguous, low dose, axial CT sections were obtained from the skull base through the femurs following the administration of oral contrast material (7.5 cc Omnipaque-240 in 300 cc water). Intravenous contrast material was not utilized. This examination, like all CT scans performed in the [facility], was performed utilizing techniques to minimize radiation dose exposure, including the use of iterative reconstruction and automated exposure control. Fasting serum glucose: 107 mg/dl FINDINGS: VISUALIZED BRAIN: No acute abnormalities are seen. HEAD/NECK: Nonspecific mildly increased bilateral thyroid activity, SUV measuring up to 3.9, prior CT 3.1. This may be associated with an underlying thyroiditis. No FDG avid cervical adenopathy is seen. CT images: Stable. CHEST: On image 4/79, 9 x 4 mm right upper lung nodule demonstrates mild FDG activity, SUV 1.5. Prior PET/CT measurement 7 mm, SUV 1.2. Malignancy should be considered (otherwise. Somewhat linear FDG activity along the right upper lateral pleural, SUV 2.4, may be inflammatory. Additional right lung nodular densities appear similar without new hypermetabolism. Persistent shotty hilar and mediastinal nodes but with mildly diminished FDG activity since the prior PET/CT. Right hilar SUV measures 2.9, prior to be 3.9. Left hilar SUV 3.2, prior SUV 3.6. Example AP window nodes have an SUV of 3.5, prior to be 4.2. Right paratracheal node SUV 3.2, prior CT 4.3. CT images: Otherwise stable. ABDOMEN: There is a new small hypermetabolic liver lesion posterior to the gallbladder, SUV 5.3. This measures approximately 1 x 1 cm based on the PET images. Etiology is uncertain. No discrete lesion seen on limited noncontrast CT images.  CT images: Chronic gallbladder wall thickening with faint calcifications in the fundus. Colon diverticula. Stable hepatic dome hypodensity. PELVIS: No FDG avid soft tissue lesions are seen. CT images: Stable. OSSEOUS STRUCTURES: No FDG avid lesions are seen. CT images: Stable. Impression 1. Increasing size and FDG activity of right upper lung nodule, most concerning for malignancy. 2. Mild FDG uptake in bilateral hilar and mediastinal nodes, though appearing less intense than on the prior PET/CT. This would favor a benign reactive etiology. Continued PET/CT follow-up recommended to exclude coexistent metastasis. 3. Right upper lateral linear pleural activity, favoring an inflammatory etiology. Attention on follow-up recommended. 4. New hypermetabolic liver lesion as above, of uncertain etiology. Neoplasm/metastasis is not excluded. Recommend further evaluation with liver MRI with and without contrast. 5. Mild thyroid activity is nonspecific but may be associated with an underlying thyroiditis. This may be correlated clinically. The study was marked in EPIC for significant notification. Workstation performed: [id number]      No Barium Swallow results available for this patient.

## 2023-12-06 ENCOUNTER — TELEPHONE (OUTPATIENT)
Dept: PAIN MEDICINE | Facility: CLINIC | Age: 68
End: 2023-12-06

## 2023-12-06 NOTE — TELEPHONE ENCOUNTER
Caller: Shira Hammonds    Doctor: Panda Prakash    Reason for call: patient requesting [employer] on 12/8 at 1 pm please advise this appt is very important to him     Call back#: [phone]

## 2023-12-08 ENCOUNTER — OFFICE VISIT (OUTPATIENT)
Dept: PAIN MEDICINE | Facility: MEDICAL CENTER | Age: 68
End: 2023-12-08
Payer: COMMERCIAL

## 2023-12-08 VITALS
SYSTOLIC BLOOD PRESSURE: 112 MMHG | OXYGEN SATURATION: 97 % | HEART RATE: 104 BPM | HEIGHT: 75 IN | BODY MASS INDEX: 20.89 KG/M2 | DIASTOLIC BLOOD PRESSURE: 73 MMHG | WEIGHT: 168 LBS

## 2023-12-08 DIAGNOSIS — M25.511 CHRONIC PAIN OF BOTH SHOULDERS: ICD-10-CM

## 2023-12-08 DIAGNOSIS — M47.816 LUMBAR SPONDYLOSIS: Primary | ICD-10-CM

## 2023-12-08 DIAGNOSIS — G89.29 CHRONIC PAIN OF BOTH SHOULDERS: ICD-10-CM

## 2023-12-08 DIAGNOSIS — M54.50 CHRONIC BILATERAL LOW BACK PAIN WITHOUT SCIATICA: ICD-10-CM

## 2023-12-08 DIAGNOSIS — M25.512 CHRONIC PAIN OF BOTH SHOULDERS: ICD-10-CM

## 2023-12-08 DIAGNOSIS — G89.29 CHRONIC BILATERAL LOW BACK PAIN WITHOUT SCIATICA: ICD-10-CM

## 2023-12-08 DIAGNOSIS — G89.4 CHRONIC PAIN SYNDROME: ICD-10-CM

## 2023-12-08 DIAGNOSIS — M54.16 LUMBAR RADICULOPATHY: ICD-10-CM

## 2023-12-08 DIAGNOSIS — G62.9 NEUROPATHY: ICD-10-CM

## 2023-12-08 DIAGNOSIS — M48.061 SPINAL STENOSIS OF LUMBAR REGION WITHOUT NEUROGENIC CLAUDICATION: ICD-10-CM

## 2023-12-08 PROCEDURE — 99214 OFFICE O/P EST MOD 30 MIN: CPT | Performed by: PHYSICIAN ASSISTANT

## 2023-12-08 RX ORDER — BUPRENORPHINE 5 UG/H
1 PATCH TRANSDERMAL WEEKLY
Qty: 4 PATCH | Refills: 2 | Status: SHIPPED | OUTPATIENT
Start: 2023-12-08

## 2023-12-08 NOTE — PROGRESS NOTES
Assessment:  1. Lumbar spondylosis    2. Spinal stenosis of lumbar region without neurogenic claudication    3. Chronic bilateral low back pain without sciatica    4. Lumbar radiculopathy - Right    5. Chronic pain of both shoulders    6. Neuropathy    7. Chronic pain syndrome        Plan:  While the patient was in the office today, I did have a thorough conversation regarding their chronic pain syndrome, medication management, and treatment plan options.    The patient remains clinically stable and well-controlled with the transdermal Butrans patch at 5 mcg weekly.  On today's visit I have electronically sent the next 3 months of refills to his pharmacy.    There are risks associated with opioid medications, including dependence, addiction and tolerance. The patient understands and agrees to use these medications only as prescribed. Potential side effects of the medications include, but are not limited to, constipation, drowsiness, addiction, impaired judgment and risk of fatal overdose if not taken as prescribed. The patient was warned against driving while taking sedation medications.  Sharing medications is a felony. At this point in time, the patient is showing no signs of addiction, abuse, diversion or suicidal ideation.    [place] Prescription Drug Monitoring Program report was reviewed and was appropriate     My impressions and treatment recommendations were discussed in detail with the patient who verbalized understanding and had no further questions.  Discharge instructions were provided. I personally saw and examined the patient and I agree with the above discussed plan of care.    Orders Placed This Encounter   Procedures   • Ambulatory referral to Physical Therapy     Standing Status:   Future     Number of Occurrences:   1     Standing Expiration Date:   12/8/2024     Referral Priority:   Routine     Referral Type:   Physical Therapy     Referral Reason:   Specialty Services Required     Requested  Specialty:   Physical Therapy     Number of Visits Requested:   1     Expiration Date:   12/8/2024     New Medications Ordered This Visit   Medications   • transdermal buprenorphine (BUTRANS) 5 mcg/hr TD patch     Sig: Place 1 patch on the skin over 7 days once a week For ongoing therapy     Dispense:  4 patch     Refill:  2       History of Present Illness:  Luis Russell is a 68 y.o. male who presents for a follow up office visit in regards to back pain.   The patient’s current symptoms include chronic low back pain that he presently rates a 4-5 out of 10 and describes it as an intermittent dull, aching, sharp and pressure-like pain.  Overall he has no new symptoms or acute issues on today's visit however he has been dealing with pain related to ureteral stent placement.    Current pain medications includes:  Butrans 5 mcg patch.  The patient reports that this regimen is providing 70% pain relief.  The patient is reporting no side effects from this pain medication regimen.    Opioid contract date  03/06/23    Last UDS Date 09/08/23      Pt. Is on day 3 of Butrans patch               I have personally reviewed and/or updated the patient's past medical history, past surgical history, family history, social history, current medications, allergies, and vital signs today.     Review of Systems   Respiratory:  Negative for shortness of breath.    Cardiovascular:  Negative for chest pain.   Gastrointestinal:  Positive for constipation. Negative for diarrhea, nausea and vomiting.   Musculoskeletal:  Positive for back pain, gait problem, joint swelling and myalgias. Negative for arthralgias.   Skin:  Negative for rash.   Neurological:  Negative for dizziness, seizures and weakness.   All other systems reviewed and are negative.      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[facility])   • S/P femoral-popliteal bypass surgery   • Atherosclerosis of artery of left lower extremity ([facility])   • Chronic obstructive pulmonary disease, unspecified COPD type ([facility])   • Chronic right shoulder pain   • MTHFR mutation   • History of DVT (deep vein thrombosis)   • PAD (peripheral artery disease) ([facility])   • Right upper lobe pulmonary nodule   • Adenocarcinoma of right lung ([facility])   • Advanced care planning/counseling discussion   • Gross hematuria   • Syringomyelia ([facility])       Past Medical History:   Diagnosis Date   • Arthritis feb 2016    osto back   • Chronic back pain    • Chronic narcotic dependence ([facility])    • Chronic pain disorder     Sees pain management Dr Reyes SL   • Cigarette smoker    • Deep vein thrombosis (DVT) of femoral vein of left lower extremity ([facility]) 7/29/2021   • Depression    • Dry skin    • Dyslexia    • Full dentures     upper and no teeth lower/\"avoid raw vegetables and chewy food\"   • GERD (gastroesophageal reflux disease)    • 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 related   • History of blood clots     \"left leg\" takes Eliquis   • History of bronchitis    • History of pneumonia     childhood   • Increased pressure in the eye, bilateral    • L4-L5 disc bulge 06/16/2017   • Low back pain    • MTHFR mutation    • Muscle weakness    • Neuropathy     right foot and left foot   • Osteoarthritis    • Osteoarthritis     and\" if becomes cold joint pain worsens\"   • Peripheral neuropathy 8/ 15   • Pulmonary emphysema ([facility])     \"mild\"   • Right foot drop    • Shortness of breath     \"only on humid days\"   • Smokers' cough ([facility])    • Wears glasses  " "  • Work related injury 04/24/2018       Past Surgical History:   Procedure Laterality Date   • BACK SURGERY  aug 2015- jun2017 2017 no affect   • COLONOSCOPY     • EGD AND COLONOSCOPY N/A 06/29/2018    Procedure: EGD AND COLONOSCOPY;  Surgeon: Neal Kwok MD;  Location: [facility];  Service: Gastroenterology   • EPIDURAL BLOCK INJECTION      needle   • FL RETROGRADE PYELOGRAM  9/12/2023   • HIP ARTHROSCOPY W/ LABRAL DEBRIDEMENT Left     \"hip surgery\"   • IR BIOPSY LUNG  4/17/2023   • IR CHEST TUBE PLACEMENT  4/17/2023   • IR LOWER EXTREMITY ANGIOGRAM  12/08/2021   • LUNG LOBECTOMY Right 5/17/2023    Procedure: lymph node dissection; PLEURAL LYSIS;  Surgeon: Blanquita Chinchilla MD;  [facility]: [facility];  Service: Thoracic   • LUNG SEGMENTECTOMY Right 5/17/2023    Procedure: Right thoracoscopic upper lobe wedge resection;  Surgeon: Blanquita Chinchilla MD;  [facility]: [facility];  Service: Thoracic   • ORTHOPEDIC SURGERY     • AZ ARTHRS HIP DEBRIDEMENT/SHAVING ARTICULAR CRTLG Left 04/30/2018    Procedure: LEFT HIP ARTHROSCOPIC LABRAL DEBRIDEMENT;  Surgeon: Rainer Booth MD;  [facility];  Service: Orthopedics   • AZ BRNCHSC INCL FLUOR GDNCE DX W/CELL WASHG SPX N/A 5/17/2023    Procedure: BRONCHOSCOPY FLEXIBLE;  Surgeon: Blanquita Chinchilla MD;  [facility];  Service: Thoracic   • AZ CYSTO/PYELOSCOPY BX&/FULGURATION PELIVC LESION Bilateral 9/12/2023    Procedure: bilateral  ureteroscopy,bilateral  laser ablation tumor, bilateral brushings/washings,bilateral  retrograde pyelogram,bilateral  ureteral stent insertion;  Surgeon: Carlito Hernandez MD;  [facility];  Service: Urology   • AZ IN-SITU VEIN BYPASS FEMORAL-POPLITEAL Right 12/08/2021    Procedure: R SFA to BK Pop Bypass using insitu GSV,  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;  Surgeon: Valeriy Scott MD;  [facility];  Service: Vascular   • AZ SLCTV CATHJ 3RD+ ORD SLCTV ABDL PEL/LXTR BRNCH Right 12/08/2021    " Procedure: COMPLETION ARTERIOGRAM WITH 5 MM ANGIOPLASTY OF MID SUPERFICIAL FEMORAL ARTERY;  Surgeon: Valeriy Scott MD;  [facility];  Service: Vascular   • MS THORACOSCOPY W/THERA WEDGE RESEXN INITIAL UNILAT Right 5/17/2023    Procedure: THORACOSCOPY VIDEO ASSISTED SURGERY (VATS);  Surgeon: Blanquita Chinchilla MD;  [facility];  Service: Thoracic   • TONSILLECTOMY     • WISDOM TOOTH EXTRACTION         Family History   Problem Relation Age of Onset   • Dementia Mother    • Glaucoma Mother    • Arthritis Mother         nursing home   • Cancer Father         dead   • Osteoarthritis Family        Social History     Occupational History   • Not on file   Tobacco Use   • Smoking status: Some Days     Current packs/day: 0.25     Average packs/day: 0.3 packs/day for 16.0 years (4.0 ttl pk-yrs)     Types: Cigarettes     Start date: 2008     Passive exposure: Yes   • Smokeless tobacco: Never   • Tobacco comments:     stress caused by workers comp.  not covering meds. Less than half a pack of cigs    Vaping Use   • Vaping status: Never Used   Substance and Sexual Activity   • Alcohol use: Not Currently   • Drug use: Not Currently     Comment: drug free since 1987   • Sexual activity: Not on file     Comment: defer       Current Outpatient Medications on File Prior to Visit   Medication Sig   • acetaminophen (TYLENOL) 500 mg tablet Take 500 mg by mouth every 6 (six) hours as needed for mild pain   • apixaban (ELIQUIS) 5 mg Take 1 tablet (5 mg total) by mouth 2 (two) times a day   • ascorbic Acid (VITAMIN C) 500 MG CPCR Take 500 mg by mouth 2 (two) times a day   • aspirin (ECOTRIN LOW STRENGTH) 81 mg EC tablet Take 1 tablet (81 mg total) by mouth daily   • atorvastatin (LIPITOR) 10 mg tablet Take 0.5 tablets (5 mg total) by mouth daily at bedtime   • cholecalciferol (VITAMIN D3) 1,000 units tablet Take 2,000 Units by mouth 2 (two) times a day    • Flaxseed, Linseed, (FLAX SEED OIL PO) Take 1,200 capsules by  "mouth 2 (two) times a day     • folic acid (FOLVITE) 400 mcg tablet Take 400 mcg by mouth daily   • METHYLCOBALAMIN SL Place 1,000 mcg under the tongue 2 (two) times a day    • Misc Natural Products (Osteo Bi-Flex Triple Strength) TABS Take by mouth 2 (two) times a day     • pyridoxine (B-6) 100 MG tablet Take 100 mg by mouth daily   • vitamin A 2400 MCG (8000 UT) capsule Take 8,000 Units by mouth 2 (two) times a day   • docusate sodium (COLACE) 100 mg capsule Take 1 capsule (100 mg total) by mouth 2 (two) times a day (Patient not taking: Reported on 10/10/2023)   • nicotine (NICODERM CQ) 21 mg/24 hr TD 24 hr patch Place 1 patch on the skin over 24 hours every 24 hours (Patient not taking: Reported on 7/17/2023)   • oxyCODONE (ROXICODONE) 5 immediate release tablet Take 1 tablet (5 mg total) by mouth every 8 (eight) hours as needed for severe pain Max Daily Amount: 15 mg (Patient not taking: Reported on 10/10/2023)   • phenazopyridine (PYRIDIUM) 200 mg tablet Take 1 tablet (200 mg total) by mouth 3 (three) times a day as needed for bladder spasms (burning) (Patient not taking: Reported on 12/8/2023)   • phenazopyridine (PYRIDIUM) 200 mg tablet Take 1 tablet (200 mg total) by mouth 3 (three) times a day as needed for bladder spasms (Patient not taking: Reported on 12/8/2023)     No current facility-administered medications on file prior to visit.       Allergies   Allergen Reactions   • Cyanocobalamin [Vitamin B12] GI Intolerance   • Gabapentin Other (See Comments) and GI Intolerance     Difficulty walking   • Morphine Other (See Comments)     \"acute constipation\"   • Tramadol Confusion   • Nsaids Other (See Comments)     Pt reports avoids as is on Eliquis       Physical Exam:    /73   Pulse 104   Ht 6' 3\" (1.905 m)   Wt 76.2 kg (168 lb)   SpO2 97%   BMI 21.00 kg/m²     Constitutional:normal, well developed, well nourished, alert, in no distress and non-toxic and no overt pain " behavior.  Eyes:anicteric  HEENT:grossly intact  Neck:supple, symmetric, trachea midline and no masses   Pulmonary:even and unlabored  Cardiovascular:No edema or pitting edema present  Skin:Normal without rashes or lesions and well hydrated  Psychiatric:Mood and affect appropriate  Neurologic:Cranial Nerves II-XII grossly intact  Musculoskeletal: Gait is slow but stable    Imaging

## 2023-12-14 ENCOUNTER — EVALUATION (OUTPATIENT)
Dept: PHYSICAL THERAPY | Facility: REHABILITATION | Age: 68
End: 2023-12-14
Payer: COMMERCIAL

## 2023-12-14 DIAGNOSIS — M47.816 LUMBAR SPONDYLOSIS: ICD-10-CM

## 2023-12-14 DIAGNOSIS — M48.061 SPINAL STENOSIS OF LUMBAR REGION WITHOUT NEUROGENIC CLAUDICATION: ICD-10-CM

## 2023-12-14 PROCEDURE — 97112 NEUROMUSCULAR REEDUCATION: CPT | Performed by: PHYSICAL THERAPIST

## 2023-12-14 PROCEDURE — 97110 THERAPEUTIC EXERCISES: CPT | Performed by: PHYSICAL THERAPIST

## 2023-12-14 PROCEDURE — 97162 PT EVAL MOD COMPLEX 30 MIN: CPT | Performed by: PHYSICAL THERAPIST

## 2023-12-14 NOTE — PROGRESS NOTES
PT Evaluation     Today's date: 2023  Patient name: Viral Zavala  : 1955  MRN: [id number]  Referring provider: Freddy Paiz, *  Dx:   Encounter Diagnosis     ICD-10-CM    1. Lumbar spondylosis  M47.816 Ambulatory referral to Physical Therapy      2. Spinal stenosis of lumbar region without neurogenic claudication  M48.061 Ambulatory referral to Physical Therapy                     Assessment  Assessment details: Viral Zavala is a pleasant 70 y.o. male who presents with chronic LBP. Primary movement impairment diagnosis of lumbar derangement. Pt demonstrated a favorable response to lumbar biased extension movements as shown by a decrease in sx's, centralization of sx's, and decreased pain with comparable signs. Pt was instructed to perform exercises every 2-3 hours or sooner if sx's return and postural correction. Patient was instructed to stop performing exercises if pain started to peripheralize and contact their PT. His impairments have lead to participation restrictions in walking, driving, work, and sitting. He would benefit from working with a skilled PT in order to increase participation in aforementioned participation restrictions. No further referral appears necessary at this time based upon examination results. Pt. will benefit from skilled PT services that includes manual therapy techniques to enhance tissue extensibility, neuromuscular re-education to facilitate motor control, therapeutic exercise to increase functional mobility, and modalities prn to reduce pain and inflammation.             Impairments: abnormal or restricted ROM, abnormal movement, activity intolerance, impaired physical strength, lacks appropriate home exercise program, pain with function and poor posture     Symptom irritability: moderateUnderstanding of Dx/Px/POC: good   Prognosis: fair  Prognosis details: Positive prognostic indicators include good understanding of diagnosis and treatment plan options. Negative prognostic indicators include chronicity of symptoms, hypertension, high symptom irritability, multiple concurrent orthopedic problems, and dependency on medications. Goals  ST. Independent with HEP in 2 weeks  2. Pt will have verbal report of improvement in symptoms by >/=25% in 2 weeks     To be achieved by D/C   LT. Pt will be able to return to work  2. Pt will be able to be able to independently correct posture for self management of sx's   3. Pt will be able to household chores with little to no difficulty         Plan  Patient would benefit from: skilled physical therapy  Planned therapy interventions: activity modification, manual therapy, motor coordination training, neuromuscular re-education, patient education, self care, therapeutic activities, therapeutic exercise, graded activity, home exercise program, graded exercise, functional ROM exercises and strengthening  Frequency: 2x week  Duration in weeks: 8  Plan of Care beginning date: 2023  Plan of Care expiration date: 2024  Treatment plan discussed with: patient      Subjective Evaluation    History of Present Illness  Mechanism of injury: Pt reports a long standing history of LBP he has had foot drop since then. He does have a MAFO for it but he tries to avoid having to wear it. The more he walks he will have more foot drop. His most recent episode of LBP started after his last surgery.    Patient Goals  Patient goals for therapy: decreased pain  Patient goal: be able to do household chores, be able to perform dishes,  Pain  Current pain ratin  At best pain ratin  At worst pain ratin  Location: across the LB  Exacerbated by: bending and twisting,standing,  Progression: worsening        Objective     Concurrent Complaints  Negative for night pain, disturbed sleep, bladder dysfunction, bowel dysfunction and saddle (S4) numbness    Postural Observations  Seated posture: poor  Standing posture: poor  Correction of posture: makes symptoms better      Neurological Testing     Sensation     Lumbar   Left   Intact: light touch    Right   Diminished: light touch    Comments   Right light touch: below knee (has neuropathy)    Active Range of Motion     Lumbar   Flexion:  with pain Restriction level: moderate  Extension:  Restriction level: moderate  Left lateral flexion:  with pain Restriction level: moderate  Right lateral flexion:  with pain Restriction level: moderate  Left rotation:  with pain Restriction level: moderate  Right rotation:  with pain Restriction level: moderate  Mechanical Assessment    Cervical      Thoracic      Lumbar    Standing extension: repeated movements  Pain location: centralized  Pain intensity: better  Pain level: decreased    Strength/Myotome Testing     Left Hip   Planes of Motion   Flexion: 4    Right Hip   Planes of Motion   Flexion: 5    Left Knee   Flexion: 5  Extension: 5    Right Knee   Flexion: 4-  Extension: 4    Left Ankle/Foot   Dorsiflexion: 5  Plantar flexion: 5  Great toe extension: 5    Right Ankle/Foot   Dorsiflexion: 4-  Plantar flexion: 5  Great toe extension: 4             Precautions: COPD, chronic narcotic dependence, hx of DVT, depression, foot drop R, pulmonary emphysema       Manuals 12/14            Prone PA lumbar mobs prn                                                     Neuro Re-Ed             Posture education  8 min             [place] anti rotation              [place] rows c holds for posture             [place] ext c holds for posture             [place] side steps              [place] c holds                                       Ther Ex             Pt education on HEP, POC 8 min            Standing lumbar extension 10             Standing lumbar ext against table  10                                                                             Ther Activity                                       Gait Training Modalities

## 2023-12-19 ENCOUNTER — OFFICE VISIT (OUTPATIENT)
Dept: PHYSICAL THERAPY | Facility: REHABILITATION | Age: 68
End: 2023-12-19
Payer: COMMERCIAL

## 2023-12-19 DIAGNOSIS — M48.061 SPINAL STENOSIS OF LUMBAR REGION WITHOUT NEUROGENIC CLAUDICATION: ICD-10-CM

## 2023-12-19 DIAGNOSIS — M47.816 LUMBAR SPONDYLOSIS: Primary | ICD-10-CM

## 2023-12-19 PROCEDURE — 97112 NEUROMUSCULAR REEDUCATION: CPT

## 2023-12-19 NOTE — PROGRESS NOTES
"Daily Note     Today's date: 2023  Patient name: Luis Russell  : 1955  MRN: [id number]  Referring provider: Zara Jensen, *  Dx:   Encounter Diagnosis     ICD-10-CM    1. Lumbar spondylosis  M47.816       2. Spinal stenosis of lumbar region without neurogenic claudication  M48.061                      Subjective: Pt reports that he has been doing his one exercise at home and he gets a good stretch across the front of his stomach when he does that and it feels good.       Objective: See treatment diary below      Assessment: Tolerated treatment well. Pt requested to complete all exercises for 10 seconds holds since that is what he has always done and wants to continue to do. Pt was able to complete all exercises to his tolerance and without increased pain in his back but with noted challenge throughout. Pt was educated on the potential for post-treatment soreness. Patient demonstrated fatigue post treatment, exhibited good technique with therapeutic exercises, and would benefit from continued PT.       Plan: Continue per plan of care.  Progress treatment as tolerated.       Precautions: COPD, chronic narcotic dependence, hx of DVT, depression, foot drop R, pulmonary emphysema                 FOTO NP            IE/RE IE               Manuals             Prone PA lumbar mobs prn                                                     Neuro Re-Ed             Posture education  8 min             [place] anti rotation   6.5# 10\"x5 ea           [place] rows c holds for posture  13.0# 10\"x10           [place] ext c holds for posture  11.0# 10\"x10           [place] side steps              [place]ridges c holds  10\" x5                                      Ther Ex             Pt education on HEP, POC 8 min            Standing lumbar extension 10             Standing lumbar ext against table  10 X5              LTR 10\"x5                                                                Ther Activity    "                                    Gait Training                                       Modalities

## 2023-12-21 ENCOUNTER — OFFICE VISIT (OUTPATIENT)
Dept: PHYSICAL THERAPY | Facility: REHABILITATION | Age: 68
End: 2023-12-21
Payer: COMMERCIAL

## 2023-12-21 DIAGNOSIS — M47.816 LUMBAR SPONDYLOSIS: Primary | ICD-10-CM

## 2023-12-21 DIAGNOSIS — M48.061 SPINAL STENOSIS OF LUMBAR REGION WITHOUT NEUROGENIC CLAUDICATION: ICD-10-CM

## 2023-12-21 PROCEDURE — 97530 THERAPEUTIC ACTIVITIES: CPT

## 2023-12-21 PROCEDURE — 97112 NEUROMUSCULAR REEDUCATION: CPT

## 2023-12-21 PROCEDURE — 97110 THERAPEUTIC EXERCISES: CPT

## 2023-12-21 NOTE — PROGRESS NOTES
"Daily Note     Today's date: 2023  Patient name: Luis Russell  : 1955  MRN: [id number]  Referring provider: Zara Jensen, *  Dx:   Encounter Diagnosis     ICD-10-CM    1. Lumbar spondylosis  M47.816       2. Spinal stenosis of lumbar region without neurogenic claudication  M48.061                      Subjective: pt reports his back is feeling ok. He reports not doing much of anything today because he had therapy today. He noted experiencing some muscle soreness following last visit.      Objective: See treatment diary below      Assessment: Tolerated treatment well and without complaints. Good tolerance with increased dosage with exercises. Patient demonstrated fatigue post treatment, exhibited good technique with therapeutic exercises, and would benefit from continued PT      Plan: Continue per plan of care.  Progress treatment as tolerated.       Precautions: COPD, chronic narcotic dependence, hx of DVT, depression, foot drop R, pulmonary emphysema                FOTO NP            IE/RE IE               Manuals             Prone PA lumbar mobs prn                                                     Neuro Re-Ed             Posture education  8 min             [place] anti rotation   6.5# 10\"x5 ea 6.5# 5\"x10          [place] rows c holds for posture  13.0# 10\"x10 13.5# 5\"x10          [place] ext c holds for posture  11.0# 10\"x10 11.0# 5\"x10          [place] side steps              [place] c holds  10\" x5  10\"x10                                    Ther Ex             Bike warmup   L1x5'          Pt education on HEP, POC 8 min            Standing lumbar extension 10   10\"x5          Standing lumbar ext against table  10 X5  10\"x5            LTR 10\"x5  LTR 10\"x5                                                               Ther Activity                                       Gait Training                                       Modalities                                     "

## 2023-12-26 ENCOUNTER — OFFICE VISIT (OUTPATIENT)
Dept: PHYSICAL THERAPY | Facility: REHABILITATION | Age: 68
End: 2023-12-26
Payer: COMMERCIAL

## 2023-12-26 DIAGNOSIS — M47.816 LUMBAR SPONDYLOSIS: Primary | ICD-10-CM

## 2023-12-26 DIAGNOSIS — M48.061 SPINAL STENOSIS OF LUMBAR REGION WITHOUT NEUROGENIC CLAUDICATION: ICD-10-CM

## 2023-12-26 PROCEDURE — 97112 NEUROMUSCULAR REEDUCATION: CPT

## 2023-12-26 PROCEDURE — 97110 THERAPEUTIC EXERCISES: CPT

## 2023-12-26 PROCEDURE — 97530 THERAPEUTIC ACTIVITIES: CPT

## 2023-12-26 NOTE — PROGRESS NOTES
"Daily Note     Today's date: 2023  Patient name: Luis Russell  : 1955  MRN: [id number]  Referring provider: Zara Jensen, *  Dx:   Encounter Diagnosis     ICD-10-CM    1. Lumbar spondylosis  M47.816       2. Spinal stenosis of lumbar region without neurogenic claudication  M48.061                      Subjective:  Patient reports that his back is bothering him more because he tried sitting upright in the car yesterday verses reclining and his back is now aggravated.        Objective: See treatment diary below      Assessment: Patient tolerated treatment well. Patient performed ex as noted with direct supervision throughout the session.  Provided cues as needed to ensure good ex technique and benefit.   Patient performed ex without an increase in his entry symptoms.  Patient would benefit from continued PT intervention to address deficits and attain set goals.     Plan: Continue per plan of care.      Precautions: COPD, chronic narcotic dependence, hx of DVT, depression, foot drop R, pulmonary emphysema               FOTO NP            IE/RE IE               Manuals             Prone PA lumbar mobs prn                                                     Neuro Re-Ed             Posture education  8 min             [place] anti rotation   6.5# 10\"x5 ea 6.5# 5\"x10 6.5#  5\"x10         [place] rows c holds for posture  13.0# 10\"x10 13.5# 5\"x10 13.5#  5\"  x10         [place] ext c holds for posture  11.0# 10\"x10 11.0# 5\"x10 11.0#  5\"x10         [place] side steps              Bridges c holds  10\" x5  10\"x10 10\"  x10                                   Ther Ex             Bike warmup   L1x5' L1x5'         Pt education on HEP, POC 8 min            Standing lumbar extension 10   10\"x5 10\"x5         Standing lumbar ext against table  10 X5  10\"x5 10\"x5           LTR 10\"x5  LTR 10\"x5  LTR  10\"x5B                                                             Ther Activity           "                             Gait Training                                       Modalities

## 2023-12-28 ENCOUNTER — OFFICE VISIT (OUTPATIENT)
Dept: PHYSICAL THERAPY | Facility: REHABILITATION | Age: 68
End: 2023-12-28
Payer: COMMERCIAL

## 2023-12-28 DIAGNOSIS — M47.816 LUMBAR SPONDYLOSIS: Primary | ICD-10-CM

## 2023-12-28 DIAGNOSIS — M48.061 SPINAL STENOSIS OF LUMBAR REGION WITHOUT NEUROGENIC CLAUDICATION: ICD-10-CM

## 2023-12-28 PROCEDURE — 97110 THERAPEUTIC EXERCISES: CPT

## 2023-12-28 PROCEDURE — 97530 THERAPEUTIC ACTIVITIES: CPT

## 2023-12-28 NOTE — PROGRESS NOTES
"Daily Note     Today's date: 2023  Patient name: Luis Russell  : 1955  MRN: [id number]  Referring provider: Zara Jensen, *  Dx:   Encounter Diagnosis     ICD-10-CM    1. Lumbar spondylosis  M47.816       2. Spinal stenosis of lumbar region without neurogenic claudication  M48.061                      Subjective: Pt reports that he is very weather sensitive today so he is very sore in all of his joints. He reports that after last visit he was workout sore.       Objective: See treatment diary below      Assessment: Pt tolerated treatment well. Pt reported that he likes doing the lower trunk rotations first because they \"got the vertebra to crack and get in alignment.\" Pt did well with addition of supine piriformis stretch and was educated to add it into his HEP. Pt completed stretching and mobility based exercises only on this date and resisted exercises were held due to pt report of overall soreness and discomfort, pt would benefit from reincorporation of exercises at next visit if able. Patient demonstrated fatigue post treatment, exhibited good technique with therapeutic exercises, and would benefit from continued PT.       Plan: Continue per plan of care.  Progress treatment as tolerated.       Precautions: COPD, chronic narcotic dependence, hx of DVT, depression, foot drop R, pulmonary emphysema              FOTO NP            IE/RE IE               Manuals             Prone PA lumbar mobs prn                                                     Neuro Re-Ed             Posture education  8 min             [place] anti rotation   6.5# 10\"x5 ea 6.5# 5\"x10 6.5#  5\"x10 NV        [place] rows c holds for posture  13.0# 10\"x10 13.5# 5\"x10 13.5#  5\"  x10 NV        [place] ext c holds for posture  11.0# 10\"x10 11.0# 5\"x10 11.0#  5\"x10 NV        [place] side steps              Bridges c holds  10\" x5  10\"x10 10\"  x10 10\"x10                                  Ther Ex  " "           Bike warmup   L1x5' L1x5' L1x5'         Pt education on HEP, POC 8 min            Standing lumbar extension 10   10\"x5 10\"x5 10\"x5        Standing lumbar ext against table  10 X5  10\"x5 10\"x5 10\"x5          LTR 10\"x5  LTR 10\"x5  LTR  10\"x5B LTR 10\"x5 B/L              Supine piriformis stretch 10\"x10 ea                                                Ther Activity                                       Gait Training                                       Modalities                                                  "

## 2024-01-02 ENCOUNTER — HOSPITAL ENCOUNTER (OUTPATIENT)
Dept: NON INVASIVE DIAGNOSTICS | Facility: CLINIC | Age: 69
Discharge: HOME | End: 2024-01-02
Payer: COMMERCIAL

## 2024-01-02 DIAGNOSIS — I70.202 ATHEROSCLEROSIS OF ARTERY OF LEFT LOWER EXTREMITY (HCC): ICD-10-CM

## 2024-01-02 PROCEDURE — 93923 UPR/LXTR ART STDY 3+ LVLS: CPT

## 2024-01-02 PROCEDURE — 93925 LOWER EXTREMITY STUDY: CPT

## 2024-01-03 PROCEDURE — 93925 LOWER EXTREMITY STUDY: CPT | Performed by: SURGERY

## 2024-01-03 PROCEDURE — 93922 UPR/L XTREMITY ART 2 LEVELS: CPT | Performed by: SURGERY

## 2024-01-04 ENCOUNTER — OFFICE VISIT (OUTPATIENT)
Dept: PHYSICAL THERAPY | Facility: REHABILITATION | Age: 69
End: 2024-01-04
Payer: COMMERCIAL

## 2024-01-04 DIAGNOSIS — M48.061 SPINAL STENOSIS OF LUMBAR REGION WITHOUT NEUROGENIC CLAUDICATION: ICD-10-CM

## 2024-01-04 DIAGNOSIS — M47.816 LUMBAR SPONDYLOSIS: Primary | ICD-10-CM

## 2024-01-04 PROCEDURE — 97530 THERAPEUTIC ACTIVITIES: CPT

## 2024-01-04 PROCEDURE — 97110 THERAPEUTIC EXERCISES: CPT

## 2024-01-04 NOTE — PROGRESS NOTES
"Daily Note     Today's date: 2024  Patient name: Luis Russell  : 1955  MRN: [id number]  Referring provider: Zara Jensen, *  Dx:   Encounter Diagnosis     ICD-10-CM    1. Lumbar spondylosis  M47.816       2. Spinal stenosis of lumbar region without neurogenic claudication  M48.061                      Subjective: Pt reports that he is feeling a little better than he was last week. He reports that he had to do some driving over the weekend and it was his usual low back pain but he knows that'll take time to change.       Objective: See treatment diary below      Assessment: Pt tolerated treatment well. Pt did well with reincorporation of resisted exercises on this date and was able to complete to his tolerance. Pt was challenged with addition of new exercises on this date as well. Patient demonstrated fatigue post treatment, exhibited good technique with therapeutic exercises, and would benefit from continued PT.       Plan: Continue per plan of care.  Progress treatment as tolerated.   RE to be completed NV.      Precautions: COPD, chronic narcotic dependence, hx of DVT, depression, foot drop R, pulmonary emphysema             FOTO NP            IE/RE IE               Manuals             Prone PA lumbar mobs prn                                                     Neuro Re-Ed             Posture education  8 min             [place] anti rotation   6.5# 10\"x5 ea 6.5# 5\"x10 6.5#  5\"x10 NV 6.5# 5\" x10        [place] rows c holds for posture  13.0# 10\"x10 13.5# 5\"x10 13.5#  5\"  x10 NV        [place] ext c holds for posture  11.0# 10\"x10 11.0# 5\"x10 11.0#  5\"x10 NV        [place] side steps       Supine ball crush 10\"x       Bridges c holds  10\" x5  10\"x10 10\"  x10 10\"x10 10\"x7             [place] walk out 9.0 x5 laps                     Ther Ex             Bike warmup   L1x5' L1x5' L1x5'  L1x5'        Pt education on HEP, POC 8 min            Standing lumbar " "extension 10   10\"x5 10\"x5 10\"x5 10\"x10        Standing lumbar ext against table  10 X5  10\"x5 10\"x5 10\"x5 10\"x10         LTR 10\"x5  LTR 10\"x5  LTR  10\"x5B LTR 10\"x5 B/L  LTR 10\"X5 B/L             Supine piriformis stretch 10\"x10 ea  Supine piriformis stretch 10\"x6 ea                                               Ther Activity                                       Gait Training                                       Modalities                                                    "

## 2024-01-08 ENCOUNTER — OFFICE VISIT (OUTPATIENT)
Dept: INTERNAL MEDICINE CLINIC | Facility: OTHER | Age: 69
End: 2024-01-08
Payer: COMMERCIAL

## 2024-01-08 VITALS
OXYGEN SATURATION: 98 % | WEIGHT: 174.6 LBS | RESPIRATION RATE: 20 BRPM | DIASTOLIC BLOOD PRESSURE: 68 MMHG | BODY MASS INDEX: 21.71 KG/M2 | HEART RATE: 110 BPM | SYSTOLIC BLOOD PRESSURE: 114 MMHG | HEIGHT: 75 IN | TEMPERATURE: 97.2 F

## 2024-01-08 DIAGNOSIS — G62.9 NEUROPATHY: ICD-10-CM

## 2024-01-08 DIAGNOSIS — E55.9 VITAMIN D DEFICIENCY: ICD-10-CM

## 2024-01-08 DIAGNOSIS — Z12.5 PROSTATE CANCER SCREENING: ICD-10-CM

## 2024-01-08 DIAGNOSIS — F11.20 UNCOMPLICATED OPIOID DEPENDENCE (HCC): ICD-10-CM

## 2024-01-08 DIAGNOSIS — Z23 ENCOUNTER FOR IMMUNIZATION: ICD-10-CM

## 2024-01-08 DIAGNOSIS — I73.9 PAD (PERIPHERAL ARTERY DISEASE) (HCC): ICD-10-CM

## 2024-01-08 DIAGNOSIS — M54.16 LUMBAR RADICULOPATHY: ICD-10-CM

## 2024-01-08 DIAGNOSIS — J44.9 CHRONIC OBSTRUCTIVE PULMONARY DISEASE, UNSPECIFIED COPD TYPE (HCC): ICD-10-CM

## 2024-01-08 DIAGNOSIS — E78.2 MIXED HYPERLIPIDEMIA: Primary | ICD-10-CM

## 2024-01-08 DIAGNOSIS — G89.4 CHRONIC PAIN SYNDROME: ICD-10-CM

## 2024-01-08 DIAGNOSIS — M25.511 CHRONIC PAIN OF BOTH SHOULDERS: ICD-10-CM

## 2024-01-08 DIAGNOSIS — E44.1 MILD PROTEIN-CALORIE MALNUTRITION (HCC): ICD-10-CM

## 2024-01-08 DIAGNOSIS — M06.4 INFLAMMATORY POLYARTHROPATHY (HCC): ICD-10-CM

## 2024-01-08 DIAGNOSIS — G95.0 SYRINGOMYELIA (HCC): ICD-10-CM

## 2024-01-08 DIAGNOSIS — Z86.718 HISTORY OF DVT (DEEP VEIN THROMBOSIS): ICD-10-CM

## 2024-01-08 DIAGNOSIS — C34.91 ADENOCARCINOMA OF RIGHT LUNG (HCC): ICD-10-CM

## 2024-01-08 DIAGNOSIS — M25.512 CHRONIC PAIN OF BOTH SHOULDERS: ICD-10-CM

## 2024-01-08 DIAGNOSIS — I82.412 DEEP VEIN THROMBOSIS (DVT) OF FEMORAL VEIN OF LEFT LOWER EXTREMITY, UNSPECIFIED CHRONICITY (HCC): ICD-10-CM

## 2024-01-08 DIAGNOSIS — G89.29 CHRONIC PAIN OF BOTH SHOULDERS: ICD-10-CM

## 2024-01-08 PROBLEM — R91.8: Status: RESOLVED | Noted: 2020-11-25 | Resolved: 2024-01-08

## 2024-01-08 PROBLEM — R31.0 GROSS HEMATURIA: Status: RESOLVED | Noted: 2023-09-12 | Resolved: 2024-01-08

## 2024-01-08 PROBLEM — R91.1 RIGHT UPPER LOBE PULMONARY NODULE: Status: RESOLVED | Noted: 2023-04-27 | Resolved: 2024-01-08

## 2024-01-08 PROCEDURE — 90662 IIV NO PRSV INCREASED AG IM: CPT

## 2024-01-08 PROCEDURE — G0008 ADMIN INFLUENZA VIRUS VAC: HCPCS

## 2024-01-08 PROCEDURE — 99214 OFFICE O/P EST MOD 30 MIN: CPT | Performed by: INTERNAL MEDICINE

## 2024-01-08 RX ORDER — ATORVASTATIN CALCIUM 10 MG/1
5 TABLET, FILM COATED ORAL
Qty: 45 TABLET | Refills: 1 | Status: SHIPPED | OUTPATIENT
Start: 2024-01-08

## 2024-01-08 RX ORDER — IBUPROFEN 200 MG
200 TABLET ORAL EVERY 6 HOURS PRN
COMMUNITY

## 2024-01-08 RX ORDER — BUPRENORPHINE 5 UG/H
1 PATCH TRANSDERMAL WEEKLY
Qty: 4 PATCH | Refills: 2 | Status: SHIPPED | OUTPATIENT
Start: 2024-01-08

## 2024-01-08 NOTE — ASSESSMENT & PLAN NOTE
Currently taking ibuprofen daily to BID for his back pain. He is also on Butrans patch. Continue follow up with pain mgmt.

## 2024-01-08 NOTE — PROGRESS NOTES
Assessment/Plan:    Chronic obstructive pulmonary disease, unspecified COPD type (HCC)  Stable without recent exacerbation.     PAD (peripheral artery disease) (HCC)  He has follow up scheduled with vascular surgery. Recommended he restart statin therapy. Continue aspirin.     Lumbar radiculopathy - Right  Currently taking ibuprofen daily to BID for his back pain. He is also on Butrans patch. Continue follow up with pain mgmt.      Vitamin D deficiency  Continue vitamin supplementation.     Mixed hyperlipidemia  He will restart atorvastatin.     History of DVT (deep vein thrombosis)  He would like to defer on restarting eliquis until discussing with his vascular doctor.        Diagnoses and all orders for this visit:    Mixed hyperlipidemia  -     atorvastatin (LIPITOR) 10 mg tablet; Take 0.5 tablets (5 mg total) by mouth daily at bedtime  -     CBC; Future  -     Comprehensive metabolic panel; Future  -     Lipid panel; Future    Mild protein-calorie malnutrition (HCC)    Adenocarcinoma of right lung (HCC)    Syringomyelia (HCC)    Inflammatory polyarthropathy (HCC)    Chronic obstructive pulmonary disease, unspecified COPD type (HCC)    Uncomplicated opioid dependence (HCC)    Deep vein thrombosis (DVT) of femoral vein of left lower extremity, unspecified chronicity (HCC)  -     apixaban (ELIQUIS) 5 mg; Take 1 tablet (5 mg total) by mouth 2 (two) times a day    Lumbar radiculopathy - Right  -     transdermal buprenorphine (BUTRANS) 5 mcg/hr TD patch; Place 1 patch on the skin over 7 days once a week For ongoing therapy  -     CBC; Future  -     Comprehensive metabolic panel; Future  -     Lipid panel; Future    Neuropathy  -     transdermal buprenorphine (BUTRANS) 5 mcg/hr TD patch; Place 1 patch on the skin over 7 days once a week For ongoing therapy    Chronic pain syndrome  -     transdermal buprenorphine (BUTRANS) 5 mcg/hr TD patch; Place 1 patch on the skin over 7 days once a week For ongoing  therapy    Chronic pain of both shoulders  -     transdermal buprenorphine (BUTRANS) 5 mcg/hr TD patch; Place 1 patch on the skin over 7 days once a week For ongoing therapy  -     CBC; Future  -     Comprehensive metabolic panel; Future    PAD (peripheral artery disease) (HCC)    Vitamin D deficiency    History of DVT (deep vein thrombosis)    Prostate cancer screening  -     PSA, Total Screen; Future    Other orders  -     Discontinue: ibuprofen (ADVIL,MOTRIN) 100 MG tablet; Take 100 mg by mouth every 6 (six) hours as needed for mild pain  -     Misc Natural Products (Osteo Bi-Flex Triple Strength) TABS; Take 1 tablet by mouth 2 (two) times a day  -     ibuprofen (MOTRIN) 200 mg tablet; Take 200 mg by mouth every 6 (six) hours as needed for mild pain            Tobacco Cessation Counseling: Tobacco cessation counseling was provided. The patient is sincerely urged to quit consumption of tobacco. He is not ready to quit tobacco. Medication options and side effects of medication discussed. Patient refused medication.            Subjective:      Patient ID: Luis Russell is a 68 y.o. male.    Chief Complaint   Patient presents with   • Follow-up     6 month - no labs due -    • hm Medicare Wellness Visit and DEPRESSION SCREEN due after 7/5/24   • Flu Vaccine     given       Luis Russell is seen today for follow up of chronic conditions.   Recent laboratory studies reviewed today with the patient.   He stopped all of his medications other than aspirin and ibuprofen.   He has been following his spine and pain management provider. He has been partaking in PT. He stopped taking eliquis on account of needing ibuprofen for his back pain.   He has an upcoming surgery for evaluation of left urothelial carcinoma.   he has no complaints or concerns at this time.           The following portions of the patient's history were reviewed and updated as appropriate: allergies, current medications, past family history,  "past medical history, past social history, past surgical history, and problem list.    Review of Systems   Constitutional:  Negative for activity change, appetite change, chills, diaphoresis, fatigue and fever.   HENT:  Negative for congestion, postnasal drip, rhinorrhea, sinus pressure, sinus pain, sneezing and sore throat.    Eyes:  Negative for visual disturbance.   Respiratory:  Negative for apnea, cough, choking, chest tightness, shortness of breath and wheezing.    Cardiovascular:  Negative for chest pain, palpitations and leg swelling.   Gastrointestinal:  Negative for abdominal distention, abdominal pain, anal bleeding, blood in stool, constipation, diarrhea, nausea and vomiting.   Endocrine: Negative for cold intolerance and heat intolerance.   Genitourinary:  Negative for difficulty urinating, dysuria and hematuria.   Musculoskeletal: Negative.    Skin: Negative.    Neurological:  Negative for dizziness, weakness, light-headedness, numbness and headaches.   Hematological:  Negative for adenopathy.   Psychiatric/Behavioral:  Negative for agitation, sleep disturbance and suicidal ideas.    All other systems reviewed and are negative.        Past Medical History:   Diagnosis Date   • Arthritis 02/2016    osto back   • Chronic back pain    • Chronic narcotic dependence (HCC)    • Chronic pain disorder     Sees pain management Dr Reyes SL   • Cigarette smoker    • Deep vein thrombosis (DVT) of femoral vein of left lower extremity (HCC) 07/29/2021   • Depression    • Dry skin    • Dyslexia    • Full dentures     upper and no teeth lower/\"avoid raw vegetables and chewy food\"   • GERD (gastroesophageal reflux disease)    • 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 related   • History of blood clots     \"left leg\" takes Eliquis   • History of bronchitis    • History of pneumonia     childhood   • Increased pressure in the eye, bilateral    • L4-L5 disc bulge 06/16/2017   • Low back pain    • MTHFR mutation    • Multiple subsolid " "lung nodules greater than 6 mm in diameter    • Muscle weakness    • Neuropathy     right foot and left foot   • Osteoarthritis    • Osteoarthritis     and\" if becomes cold joint pain worsens\"   • Peripheral neuropathy 08/2015   • Pulmonary emphysema (HCC)     \"mild\"   • Right foot drop    • Right upper lobe pulmonary nodule    • Shortness of breath     \"only on humid days\"   • Smokers' cough (HCC)    • Uncomplicated opioid dependence (HCC)    • Wears glasses    • Work related injury 04/24/2018         Current Outpatient Medications:   •  apixaban (ELIQUIS) 5 mg, Take 1 tablet (5 mg total) by mouth 2 (two) times a day, Disp: , Rfl:   •  aspirin (ECOTRIN LOW STRENGTH) 81 mg EC tablet, Take 1 tablet (81 mg total) by mouth daily, Disp: 90 tablet, Rfl: 0  •  atorvastatin (LIPITOR) 10 mg tablet, Take 0.5 tablets (5 mg total) by mouth daily at bedtime, Disp: 45 tablet, Rfl: 1  •  ibuprofen (MOTRIN) 200 mg tablet, Take 200 mg by mouth every 6 (six) hours as needed for mild pain, Disp: , Rfl:   •  Misc Natural Products (Osteo Bi-Flex Triple Strength) TABS, Take 1 tablet by mouth 2 (two) times a day, Disp: , Rfl:   •  transdermal buprenorphine (BUTRANS) 5 mcg/hr TD patch, Place 1 patch on the skin over 7 days once a week For ongoing therapy, Disp: 4 patch, Rfl: 2    Allergies   Allergen Reactions   • Cyanocobalamin [Vitamin B12] GI Intolerance   • Gabapentin Other (See Comments) and GI Intolerance     Difficulty walking   • Morphine Other (See Comments)     \"acute constipation\"   • Tramadol Confusion   • Nsaids Other (See Comments)     Pt reports avoids as is on Eliquis       Social History   Past Surgical History:   Procedure Laterality Date   • BACK SURGERY  aug 2015- jun2017 2017 no affect   • COLONOSCOPY     • EGD AND COLONOSCOPY N/A 06/29/2018    Procedure: EGD AND COLONOSCOPY;  Surgeon: Neal Kwok MD;  Location: [facility];  Service: Gastroenterology   • EPIDURAL BLOCK INJECTION      needle   • FL RETROGRADE " "PYELOGRAM  9/12/2023   • HIP ARTHROSCOPY W/ LABRAL DEBRIDEMENT Left     \"hip surgery\"   • IR BIOPSY LUNG  4/17/2023   • IR CHEST TUBE PLACEMENT  4/17/2023   • IR LOWER EXTREMITY ANGIOGRAM  12/08/2021   • LUNG LOBECTOMY Right 5/17/2023    Procedure: lymph node dissection; PLEURAL LYSIS;  Surgeon: Blanquita Chinchilla MD;  Location: [facility];  Service: Thoracic   • LUNG SEGMENTECTOMY Right 5/17/2023    Procedure: Right thoracoscopic upper lobe wedge resection;  Surgeon: Blanquita Chinchilla MD;  Location: [facility];  Service: Thoracic   • ORTHOPEDIC SURGERY     • MI ARTHRS HIP DEBRIDEMENT/SHAVING ARTICULAR CRTLG Left 04/30/2018    Procedure: LEFT HIP ARTHROSCOPIC LABRAL DEBRIDEMENT;  Surgeon: Rainer Booth MD;  Location[facility];  Service: Orthopedics   • MI BRNCHSC INCL FLUOR GDNCE DX W/CELL WASHG SPX N/A 5/17/2023    Procedure: BRONCHOSCOPY FLEXIBLE;  Surgeon: Blanquita Chinchilla MD;  [facility];  Service: Thoracic   • MI CYSTO/PYELOSCOPY BX&/FULGURATION PELIVC LESION Bilateral 9/12/2023    Procedure: bilateral  ureteroscopy,bilateral  laser ablation tumor, bilateral brushings/washings,bilateral  retrograde pyelogram,bilateral  ureteral stent insertion;  Surgeon: Carlito Hernandez MD;  [facility];  Service: Urology   • MI IN-SITU VEIN BYPASS FEMORAL-POPLITEAL Right 12/08/2021    Procedure: R SFA to BK Pop Bypass using insitu GSV,  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;  Surgeon: Valeriy Scott MD;  [facility];  Service: Vascular   • MI SLCTV CATHJ 3RD+ ORD SLCTV ABDL PEL/LXTR BRNCH Right 12/08/2021    Procedure: COMPLETION ARTERIOGRAM WITH 5 MM ANGIOPLASTY OF MID SUPERFICIAL FEMORAL ARTERY;  Surgeon: Valeriy Scott MD;  [facility];  Service: Vascular   • MI THORACOSCOPY W/THERA WEDGE RESEXN INITIAL UNILAT Right 5/17/2023    Procedure: THORACOSCOPY VIDEO ASSISTED SURGERY (VATS);  Surgeon: Blanquita Chinchilla MD;  [facility];  Service: Thoracic   • " "TONSILLECTOMY     • WISDOM TOOTH EXTRACTION       Family History   Problem Relation Age of Onset   • Dementia Mother    • Glaucoma Mother    • Arthritis Mother         nursing home   • Cancer Father         dead   • Osteoarthritis Family        Objective:  /68 (BP Location: Left arm, Patient Position: Sitting, Cuff Size: Standard)   Pulse (!) 110   Temp (!) 97.2 °F (36.2 °C) (Temporal)   Resp 20   Ht 6' 3\" (1.905 m)   Wt 79.2 kg (174 lb 9.6 oz)   SpO2 98%   BMI 21.82 kg/m²     No results found for this or any previous visit (from the past 1344 hour(s)).         Physical Exam  Vitals and nursing note reviewed.   Constitutional:       General: He is not in acute distress.     Appearance: He is well-developed. He is not diaphoretic.   HENT:      Head: Normocephalic and atraumatic.   Eyes:      General: No scleral icterus.        Right eye: No discharge.         Left eye: No discharge.      Conjunctiva/sclera: Conjunctivae normal.      Pupils: Pupils are equal, round, and reactive to light.   Neck:      Thyroid: No thyromegaly.      Vascular: No JVD.   Cardiovascular:      Rate and Rhythm: Normal rate and regular rhythm.      Heart sounds: Normal heart sounds. No murmur heard.     No friction rub. No gallop.   Pulmonary:      Effort: Pulmonary effort is normal. No respiratory distress.      Breath sounds: Normal breath sounds. No wheezing or rales.   Chest:      Chest wall: No tenderness.   Abdominal:      General: Bowel sounds are normal. There is no distension.      Palpations: Abdomen is soft. There is no mass.      Tenderness: There is no abdominal tenderness. There is no guarding or rebound.   Musculoskeletal:         General: No tenderness or deformity. Normal range of motion.      Cervical back: Normal range of motion and neck supple.   Lymphadenopathy:      Cervical: No cervical adenopathy.   Skin:     General: Skin is warm and dry.      Coloration: Skin is not pale.      Findings: No erythema or " rash.   Neurological:      Mental Status: He is alert and oriented to person, place, and time.      Cranial Nerves: No cranial nerve deficit.      Coordination: Coordination normal.      Deep Tendon Reflexes: Reflexes are normal and symmetric.   Psychiatric:         Behavior: Behavior normal.         Thought Content: Thought content normal.         Judgment: Judgment normal.

## 2024-01-08 NOTE — ASSESSMENT & PLAN NOTE
He has follow up scheduled with vascular surgery. Recommended he restart statin therapy. Continue aspirin.

## 2024-01-09 ENCOUNTER — EVALUATION (OUTPATIENT)
Dept: PHYSICAL THERAPY | Facility: REHABILITATION | Age: 69
End: 2024-01-09
Payer: COMMERCIAL

## 2024-01-09 DIAGNOSIS — M47.816 LUMBAR SPONDYLOSIS: Primary | ICD-10-CM

## 2024-01-09 DIAGNOSIS — M48.061 SPINAL STENOSIS OF LUMBAR REGION WITHOUT NEUROGENIC CLAUDICATION: ICD-10-CM

## 2024-01-09 PROCEDURE — 97164 PT RE-EVAL EST PLAN CARE: CPT

## 2024-01-09 PROCEDURE — 97530 THERAPEUTIC ACTIVITIES: CPT

## 2024-01-09 PROCEDURE — 97110 THERAPEUTIC EXERCISES: CPT

## 2024-01-09 NOTE — PROGRESS NOTES
PT Re-evaluation    Today's date: 2024  Patient name: Luis Russell  : 1955  MRN: [id number]  Referring provider: Zara Jensen, *  Dx:   Encounter Diagnosis     ICD-10-CM    1. Lumbar spondylosis  M47.816       2. Spinal stenosis of lumbar region without neurogenic claudication  M48.061                    Assessment  Assessment details:   Upon RE on this date, pt presents with improvements with ROM and strength when compared to his IE however overall still limited which further limits his ability to complete ADLs without pain, discomfort, and compensation patterns. Pt has been compliant with PT thus far as well as with his HEP. At this time, pt would benefit from continued OP PT to continue improving ROM, strength, and tolerance to functional activities while decreasing reports of pain and compensation patterns while also decreasing risk for future injury. POC was discussed with patient, pt verbalized understanding and is in agreement.      Impairments: abnormal or restricted ROM, abnormal movement, activity intolerance, impaired physical strength, lacks appropriate home exercise program, pain with function and poor posture      Symptom irritability: moderateUnderstanding of Dx/Px/POC: good   Prognosis: fair  Prognosis details: Positive prognostic indicators include good understanding of diagnosis and treatment plan options.  Negative prognostic indicators include chronicity of symptoms, hypertension, high symptom irritability, multiple concurrent orthopedic problems, and dependency on medications.       Goals  ST. Independent with HEP in 2 weeks MET   2. Pt will have verbal report of improvement in symptoms by >/=25% in 2 weeks MET     New STG   Pt will report being able to sit for 10 mins without back pain      To be achieved by D/C   LTG: CONTINUE ALL   1. Pt will be able to return to work  2. Pt will be able to be able to independently correct posture for self management of sx's   3.  Pt will be able to household chores with little to no difficulty            Plan  Patient would benefit from: skilled physical therapy  Planned therapy interventions: activity modification, manual therapy, motor coordination training, neuromuscular re-education, patient education, self care, therapeutic activities, therapeutic exercise, graded activity, home exercise program, graded exercise, functional ROM exercises and strengthening  Frequency: 1-2x week  Duration in weeks: 8  Treatment plan discussed with: patient     Subjective Evaluation     History of Present Illness  Mechanism of injury: Pt reports a long standing history of LBP he has had foot drop since then. He does have a MAFO for it but he tries to avoid having to wear it. The more he walks he will have more foot drop.      His most recent episode of LBP started after his last surgery.     24  Pt reports that his back is okay today, but this past weekend he tried to shovel snow and had to do it in multiple attempts because his back was bothering him. He reports that he also tried to sit up straight at the doctors office yesterday and it didn't go well for him. He reports that he feels like his core isn't as strong as it used to be and that bothers him. He reports that since starting PT he has had fewer instances of the stabbing pain and he feels like he had a little bit more range of motion. He reports that he feels like he can lift about 20-25 lbs which was more than before as well. He reports that he feels like he needs to tone up his abs and lower back muscles.       Patient Goals  Patient goals for therapy: decreased pain  Patient goal: be able to do household chores, be able to perform dishes,  Pain  Current pain ratin  At best pain ratin  At worst pain ratin  Location: across the LB  Exacerbated by: bending and twisting,standing,  Progression: worsening    24  Current pain ratin  At best pain ratin  At worst pain rating:  7  Location: across the LB  Exacerbated by: bending and twisting,standing  Improved by: laying down on back or side            Objective      Concurrent Complaints  Negative for night pain, disturbed sleep, bladder dysfunction, bowel dysfunction and saddle (S4) numbness     Postural Observations  Seated posture: poor  Standing posture: poor  Correction of posture: makes symptoms better        Neurological Testing      Sensation      Lumbar   Left   Intact: light touch     Right   Diminished: light touch     Comments   Right light touch: below knee (has neuropathy)     Active Range of Motion      Lumbar   Flexion:  with pain Restriction level: moderate  Extension:  Restriction level: moderate  Left lateral flexion:  with pain Restriction level: moderate  Right lateral flexion:  with pain Restriction level: moderate  Left rotation:  with pain Restriction level: moderate  Right rotation:  with pain Restriction level: moderate    1/9/24  Lumbar   Flexion:  Restriction level: moderate, pt reported pain and instability in his R knee   Extension:  Restriction level: moderate  Left lateral flexion:  Restriction level: moderate   Right lateral flexion: Restriction level: mod/max, pt reported pulling pain   Left rotation: Restriction level: moderate with discomfort   Right rotation:  Restriction level: moderate with discomfort     Mechanical Assessment       Lumbar     Standing extension: repeated movements  Pain location: centralized  Pain intensity: better  Pain level: decreased    1/9/24  Extension continues to improve symptoms      Strength/Myotome Testing      Left Hip   Planes of Motion   Flexion: 4     Right Hip   Planes of Motion   Flexion: 5     Left Knee   Flexion: 5  Extension: 5     Right Knee   Flexion: 4-  Extension: 4     Left Ankle/Foot   Dorsiflexion: 5  Plantar flexion: 5  Great toe extension: 5     Right Ankle/Foot   Dorsiflexion: 4-  Plantar flexion: 5  Great toe extension: 4    1/9/24  Left Hip   Planes  "of Motion   Flexion: 4+     Right Hip   Planes of Motion   Flexion: 5     Left Knee   Flexion: 5  Extension: 5     Right Knee   Flexion: 4  Extension: 4     Left Ankle/Foot   Dorsiflexion: 5  Plantar flexion: 5  Great toe extension: 5     Right Ankle/Foot   Dorsiflexion: 4  Plantar flexion: 5  Great toe extension: 4       Precautions: COPD, chronic narcotic dependence, hx of DVT, depression, foot drop R, pulmonary emphysema      12/14 12/19 12/21 12/26 12/28 1/4 1/9      FOTO NP            IE/RE IE      RE          Manuals 12/14            Prone PA lumbar mobs prn                                                     Neuro Re-Ed             Posture education  8 min             [place] anti rotation   6.5# 10\"x5 ea 6.5# 5\"x10 6.5#  5\"x10 NV 6.5# 5\" x10        [place] rows c holds for posture  13.0# 10\"x10 13.5# 5\"x10 13.5#  5\"  x10 NV        [place] ext c holds for posture  11.0# 10\"x10 11.0# 5\"x10 11.0#  5\"x10 NV  Stand ball crush NV       [place] side steps       Supine ball crush 10\"x NV       [place] c holds  10\" x5  10\"x10 10\"  x10 10\"x10 10\"x7 10\"x5             [place] walk out 9.0 x5 laps                     Ther Ex             Bike warmup   L1x5' L1x5' L1x5'  L1x5'  L1x5'       Pt education on HEP, POC 8 min            Standing lumbar extension 10   10\"x5 10\"x5 10\"x5 10\"x10        Standing lumbar ext against table  10 X5  10\"x5 10\"x5 10\"x5 10\"x10         LTR 10\"x5  LTR 10\"x5  LTR  10\"x5B LTR 10\"x5 B/L  LTR 10\"X5 B/L  LTR 10\"x10 B/L           Supine piriformis stretch 10\"x10 ea  Supine piriformis stretch 10\"x6 ea  Supine piriformis stretch 10\"x6 ea              Leg Press NV              STS NV                    Ther Activity                                       Gait Training                                       Modalities                                                      "

## 2024-01-11 ENCOUNTER — APPOINTMENT (OUTPATIENT)
Dept: PHYSICAL THERAPY | Facility: REHABILITATION | Age: 69
End: 2024-01-11
Payer: COMMERCIAL

## 2024-01-16 ENCOUNTER — OFFICE VISIT (OUTPATIENT)
Dept: PHYSICAL THERAPY | Facility: REHABILITATION | Age: 69
End: 2024-01-16
Payer: COMMERCIAL

## 2024-01-16 DIAGNOSIS — M48.061 SPINAL STENOSIS OF LUMBAR REGION WITHOUT NEUROGENIC CLAUDICATION: ICD-10-CM

## 2024-01-16 DIAGNOSIS — M47.816 LUMBAR SPONDYLOSIS: Primary | ICD-10-CM

## 2024-01-16 PROCEDURE — 97112 NEUROMUSCULAR REEDUCATION: CPT

## 2024-01-16 PROCEDURE — 97110 THERAPEUTIC EXERCISES: CPT

## 2024-01-16 NOTE — PROGRESS NOTES
"Daily Note     Today's date: 2024  Patient name: Luis Russell  : 1955  MRN: [id number]  Referring provider: Zara Jensen, *  Dx:   Encounter Diagnosis     ICD-10-CM    1. Lumbar spondylosis  M47.816       2. Spinal stenosis of lumbar region without neurogenic claudication  M48.061                      Subjective: Pt reports that his back feels the usual.       Objective: See treatment diary below      Assessment: Pt tolerated treatment well. Pt did well with progressions of exercises and noted more challenge in his core and lower back with exercises compared to previous sessions. Pt was educated on the potential for post-treatment soreness due to completing new exercises, pt verbalized understanding. Patient demonstrated fatigue post treatment, exhibited good technique with therapeutic exercises, and would benefit from continued PT.       Plan: Continue per plan of care.  Progress treatment as tolerated.       Precautions: COPD, chronic narcotic dependence, hx of DVT, depression, foot drop R, pulmonary emphysema           FOTO NP            IE/RE IE      RE          Manuals             Prone PA lumbar mobs prn                                                     Neuro Re-Ed             Posture education  8 min             [place] anti rotation   6.5# 10\"x5 ea 6.5# 5\"x10 6.5#  5\"x10 NV 6.5# 5\" x10        [place] rows c holds for posture  13.0# 10\"x10 13.5# 5\"x10 13.5#  5\"  x10 NV        [place] ext c holds for posture  11.0# 10\"x10 11.0# 5\"x10 11.0#  5\"x10 NV  Stand ball crush NV  Stand ball crush 5\" x10      [place] side steps       Supine ball crush 10\"x NV  Supine ball crush opp hand/knee 5\" x7 ea      [place] c holds  10\" x5  10\"x10 10\"  x10 10\"x10 10\"x7 10\"x5  10\"x5            [place] walk out 9.0 x5 laps                     Ther Ex             Bike warmup   L1x5' L1x5' L1x5'  L1x5'  L1x5'  L1x5'      Pt education on HEP, POC 8 min          " "  Standing lumbar extension 10   10\"x5 10\"x5 10\"x5 10\"x10   [place]  row on PB 5\" x10      Standing lumbar ext against table  10 X5  10\"x5 10\"x5 10\"x5 10\"x10         LTR 10\"x5  LTR 10\"x5  LTR  10\"x5B LTR 10\"x5 B/L  LTR 10\"X5 B/L  LTR 10\"x10 B/L LTR 10\"x10 B/L          Supine piriformis stretch 10\"x10 ea  Supine piriformis stretch 10\"x6 ea  Supine piriformis stretch 10\"x6 ea  Supine piriformis stretch 10\"x6 ea             Leg Press NV              STS NV                    Ther Activity                                       Gait Training                                       Modalities                                                        "

## 2024-01-18 ENCOUNTER — OFFICE VISIT (OUTPATIENT)
Dept: PHYSICAL THERAPY | Facility: REHABILITATION | Age: 69
End: 2024-01-18
Payer: COMMERCIAL

## 2024-01-18 DIAGNOSIS — M48.061 SPINAL STENOSIS OF LUMBAR REGION WITHOUT NEUROGENIC CLAUDICATION: ICD-10-CM

## 2024-01-18 DIAGNOSIS — M47.816 LUMBAR SPONDYLOSIS: Primary | ICD-10-CM

## 2024-01-18 PROCEDURE — 97110 THERAPEUTIC EXERCISES: CPT

## 2024-01-18 PROCEDURE — 97112 NEUROMUSCULAR REEDUCATION: CPT

## 2024-01-18 NOTE — PROGRESS NOTES
"Daily Note     Today's date: 2024  Patient name: Luis Russell  : 1955  MRN: [id number]  Referring provider: Zara Jensen, *  Dx:   Encounter Diagnosis     ICD-10-CM    1. Lumbar spondylosis  M47.816       2. Spinal stenosis of lumbar region without neurogenic claudication  M48.061                      Subjective: Pt reports that he was a little sore after last session but it was good. He reports that he has been doing his exercises at home.       Objective: See treatment diary below      Assessment: Pt tolerated treatment well. Pt did well with exercises with continued report of challenge throughout all exercises on this date especially with core stabilization exercises.  Patient demonstrated fatigue post treatment, exhibited good technique with therapeutic exercises, and would benefit from continued PT.       Plan: Continue per plan of care.  Progress treatment as tolerated.       Precautions: COPD, chronic narcotic dependence, hx of DVT, depression, foot drop R, pulmonary emphysema          FOTO NP            IE/RE IE      RE          Manuals             Prone PA lumbar mobs prn                                                     Neuro Re-Ed             Posture education  8 min             [place] anti rotation   6.5# 10\"x5 ea 6.5# 5\"x10 6.5#  5\"x10 NV 6.5# 5\" x10        [place] rows c holds for posture  13.0# 10\"x10 13.5# 5\"x10 13.5#  5\"  x10 NV        [place] ext c holds for posture  11.0# 10\"x10 11.0# 5\"x10 11.0#  5\"x10 NV  Stand ball crush NV  Stand ball crush 5\" x10  Stand ball crush 5\" x10     [place] side steps       Supine ball crush 10\"x NV  Supine ball crush opp hand/knee 5\" x7 ea  Supine ball crush 10\"x8     [place] c holds  10\" x5  10\"x10 10\"  x10 10\"x10 10\"x7 10\"x5  10\"x5  10\"x6          [place] walk out 9.0 x5 laps                     Ther Ex             Bike warmup   L1x5' L1x5' L1x5'  L1x5'  L1x5'  L1x5'  L1x5'     Pt " "education on HEP, POC 8 min            Standing lumbar extension 10   10\"x5 10\"x5 10\"x5 10\"x10   [place]  row on PB 5\" x10  [place]  row on PB 10.0# 10\" x10     Standing lumbar ext against table  10 X5  10\"x5 10\"x5 10\"x5 10\"x10         LTR 10\"x5  LTR 10\"x5  LTR  10\"x5B LTR 10\"x5 B/L  LTR 10\"X5 B/L  LTR 10\"x10 B/L LTR 10\"x10 B/L LTR 10\"x10 B/L         Supine piriformis stretch 10\"x10 ea  Supine piriformis stretch 10\"x6 ea  Supine piriformis stretch 10\"x6 ea  Supine piriformis stretch 10\"x6 ea  Supine piriformis stretch 10\"x8 R   10\"x7 L            Leg Press NV              STS NV                    Ther Activity                                       Gait Training                                       Modalities                                                          "

## 2024-01-23 ENCOUNTER — OFFICE VISIT (OUTPATIENT)
Dept: PHYSICAL THERAPY | Facility: REHABILITATION | Age: 69
End: 2024-01-23
Payer: COMMERCIAL

## 2024-01-23 DIAGNOSIS — M47.816 LUMBAR SPONDYLOSIS: Primary | ICD-10-CM

## 2024-01-23 DIAGNOSIS — M48.061 SPINAL STENOSIS OF LUMBAR REGION WITHOUT NEUROGENIC CLAUDICATION: ICD-10-CM

## 2024-01-23 PROCEDURE — 97110 THERAPEUTIC EXERCISES: CPT

## 2024-01-23 PROCEDURE — 97112 NEUROMUSCULAR REEDUCATION: CPT

## 2024-01-23 NOTE — PROGRESS NOTES
"Daily Note     Today's date: 2024  Patient name: Luis Russell  : 1955  MRN: [id number]  Referring provider: Zara Jensen, *  Dx:   Encounter Diagnosis     ICD-10-CM    1. Lumbar spondylosis  M47.816       2. Spinal stenosis of lumbar region without neurogenic claudication  M48.061                      Subjective: Pt reports that his ribs are sore today from sleeping wrong and he reports that he has to turn his foam pad on his bed since it is a little worn down. He reports that he did not have any exceptional soreness after last visit.       Objective: See treatment diary below      Assessment: Pt tolerated treatment well. Pt demonstrated challenge throughout exercises on this date with core stability based exercises but was able to tolerate all without increased pain or discomfort just noted muscle work throughout. Pt reported that after the last standing exercise which was opposite hand/knee press with TA contraction that his right knee was \"floating.\" He was educated to inform therapist at next session how he felt after today's session so that plan of care can be adjusted as needed, pt verbalized understanding. Patient demonstrated fatigue post treatment, exhibited good technique with therapeutic exercises, and would benefit from continued PT.       Plan: Continue per plan of care.  Progress treatment as tolerated.       Precautions: COPD, chronic narcotic dependence, hx of DVT, depression, foot drop R, pulmonary emphysema           FOTO             IE/RE       RE          Manuals             Prone PA lumbar mobs prn                                                     Neuro Re-Ed             Posture education              [place] anti rotation    6.5# 5\"x10 6.5#  5\"x10 NV 6.5# 5\" x10     Stand opp hand/knee press back @ wall 10\"x5    [place] rows c holds for posture   13.5# 5\"x10 13.5#  5\"  x10 NV     Opp hand/knee ball crush 10\"x5 ea    [place] ext c " "holds for posture   11.0# 5\"x10 11.0#  5\"x10 NV  Stand ball crush NV  Stand ball crush 5\" x10  Stand ball crush 5\" x10     [place] side steps       Supine ball crush 10\"x NV  Supine ball crush opp hand/knee 5\" x7 ea  Supine ball crush 10\"x8  Supine ball crush 10\"x10    [place]ridges c holds   10\"x10 10\"  x10 10\"x10 10\"x7 10\"x5  10\"x5  10\"x6 10\"x10         [place] walk out 9.0 x5 laps                     Ther Ex             Bike warmup   L1x5' L1x5' L1x5'  L1x5'  L1x5'  L1x5'  L1x5'  L1 x5'    Pt education on HEP, POC             Standing lumbar extension   10\"x5 10\"x5 10\"x5 10\"x10   [place]  row on PB 5\" x10  [place]  row on PB 10.0# 10\" x10     Standing lumbar ext against table    10\"x5 10\"x5 10\"x5 10\"x10          LTR 10\"x5  LTR  10\"x5B LTR 10\"x5 B/L  LTR 10\"X5 B/L  LTR 10\"x10 B/L LTR 10\"x10 B/L LTR 10\"x10 B/L LTR 10\"X10 B/L         Supine piriformis stretch 10\"x10 ea  Supine piriformis stretch 10\"x6 ea  Supine piriformis stretch 10\"x6 ea  Supine piriformis stretch 10\"x6 ea  Supine piriformis stretch 10\"x8 R   10\"x7 L  Supine piriformis stretch 10\"x10 R   10\"x10 L           Leg Press NV              STS NV                    Ther Activity                                       Gait Training                                       Modalities                                                            "

## 2024-01-25 ENCOUNTER — OFFICE VISIT (OUTPATIENT)
Dept: PHYSICAL THERAPY | Facility: REHABILITATION | Age: 69
End: 2024-01-25
Payer: COMMERCIAL

## 2024-01-25 DIAGNOSIS — M48.061 SPINAL STENOSIS OF LUMBAR REGION WITHOUT NEUROGENIC CLAUDICATION: ICD-10-CM

## 2024-01-25 DIAGNOSIS — M47.816 LUMBAR SPONDYLOSIS: Primary | ICD-10-CM

## 2024-01-25 PROCEDURE — 97110 THERAPEUTIC EXERCISES: CPT

## 2024-01-25 PROCEDURE — 97112 NEUROMUSCULAR REEDUCATION: CPT

## 2024-01-25 NOTE — PROGRESS NOTES
"Daily Note     Today's date: 2024  Patient name: Luis Russell  : 1955  MRN: [id number]  Referring provider: Zara Jensen, *  Dx:   Encounter Diagnosis     ICD-10-CM    1. Lumbar spondylosis  M47.816       2. Spinal stenosis of lumbar region without neurogenic claudication  M48.061                      Subjective: Pt reports that he was very sore after last visit and that the last exercise we did by the wall gave him a lot of soreness in his leg and his foot. He requests to discontinue this exercise in therapy.       Objective: See treatment diary below      Assessment: Pt tolerated treatment well. Stand opp hand/knee press back at wall was discontinued on today's date per pt request. Pt was able to do remainder of exercises well with appropriate challenge throughout. Patient demonstrated fatigue post treatment, exhibited good technique with therapeutic exercises, and would benefit from continued PT.       Plan: Continue per plan of care.  Progress treatment as tolerated.       Precautions: COPD, chronic narcotic dependence, hx of DVT, depression, foot drop R, pulmonary emphysema         FOTO             IE/RE       RE          Manuals             Prone PA lumbar mobs prn                                                     Neuro Re-Ed             Posture education              [place] anti rotation  Supine hip abd with GTB And TA 10\"x6    6.5# 5\"x10 6.5#  5\"x10 NV 6.5# 5\" x10     Stand opp hand/knee press back @ wall 10\"x5    [place] rows c holds for posture   13.5# 5\"x10 13.5#  5\"  x10 NV     Opp hand/knee ball crush 10\"x5 ea    [place] ext c holds for posture   11.0# 5\"x10 11.0#  5\"x10 NV  Stand ball crush NV  Stand ball crush 5\" x10  Stand ball crush 5\" x10     [place] side steps  Supine ball crush 10\"x10     Supine ball crush 10\"x NV  Supine ball crush opp hand/knee 5\" x7 ea  Supine ball crush 10\"x8  Supine ball crush 10\"x10    [place] c holds " "10\"x10  10\"x10 10\"  x10 10\"x10 10\"x7 10\"x5  10\"x5  10\"x6 10\"x10         [place] walk out 9.0 x5 laps                     Ther Ex             Bike warmup L1x5'   L1x5' L1x5' L1x5'  L1x5'  L1x5'  L1x5'  L1x5'  L1 x5'    Pt education on HEP, POC             Standing lumbar extension   10\"x5 10\"x5 10\"x5 10\"x10   [place]  row on PB 5\" x10  [place]  row on PB 10.0# 10\" x10     Standing lumbar ext against table    10\"x5 10\"x5 10\"x5 10\"x10        LTR 10\"X10 B/L   LTR 10\"x5  LTR  10\"x5B LTR 10\"x5 B/L  LTR 10\"X5 B/L  LTR 10\"x10 B/L LTR 10\"x10 B/L LTR 10\"x10 B/L LTR 10\"X10 B/L     Supine piriformis stretch 10\"x10 R   10\"x10 L     Supine piriformis stretch 10\"x10 ea  Supine piriformis stretch 10\"x6 ea  Supine piriformis stretch 10\"x6 ea  Supine piriformis stretch 10\"x6 ea  Supine piriformis stretch 10\"x8 R   10\"x7 L  Supine piriformis stretch 10\"x10 R   10\"x10 L           Leg Press NV              STS NV                    Ther Activity                                       Gait Training                                       Modalities                                                              "

## 2024-01-30 ENCOUNTER — OFFICE VISIT (OUTPATIENT)
Dept: PHYSICAL THERAPY | Facility: REHABILITATION | Age: 69
End: 2024-01-30
Payer: COMMERCIAL

## 2024-01-30 DIAGNOSIS — M48.061 SPINAL STENOSIS OF LUMBAR REGION WITHOUT NEUROGENIC CLAUDICATION: ICD-10-CM

## 2024-01-30 DIAGNOSIS — M47.816 LUMBAR SPONDYLOSIS: Primary | ICD-10-CM

## 2024-01-30 PROCEDURE — 97110 THERAPEUTIC EXERCISES: CPT

## 2024-01-30 PROCEDURE — 97112 NEUROMUSCULAR REEDUCATION: CPT

## 2024-01-30 NOTE — PROGRESS NOTES
"Daily Note     Today's date: 2024  Patient name: Luis Russell  : 1955  MRN: [id number]  Referring provider: Zara Jensen, *  Dx:   Encounter Diagnosis     ICD-10-CM    1. Lumbar spondylosis  M47.816       2. Spinal stenosis of lumbar region without neurogenic claudication  M48.061                      Subjective: Pt reports that he didn't feel too bad after last session but he is still struggling with house hold chores such as cleaning, dishes, and cleaning the toilet. He reports that he has to take frequent breaks and use tylenol as well to help control his pain. Pt reported that he was told in the past by doctors that he is not allowed to bend forward and rotate at the same time because that would rerupture his disc.       Objective: See treatment diary below      Assessment: Pt tolerated treatment well. Pt was challenged with change of exercises to promote improved return to functional activities and ADLs. Pt was able to tolerate all to his tolerance with noted challenge and fatigue throughout. Pt was educated on the potential for post-treatment soreness due to completing new exercises on this date. Patient demonstrated fatigue post treatment, exhibited good technique with therapeutic exercises, and would benefit from continued PT.       Plan: Continue per plan of care.  Progress treatment as tolerated.       Precautions: COPD, chronic narcotic dependence, hx of DVT, depression, foot drop R, pulmonary emphysema         FOTO             IE/RE       RE          Manuals             Prone PA lumbar mobs prn                                                     Neuro Re-Ed             Posture education   Supine TA with march NV            [place] anti rotation  Supine hip abd with GTB And TA 10\"x6      NV 6.5# 5\" x10     Stand opp hand/knee press back @ wall 10\"x5    [place] rows c holds for posture  GTB paloff press with small rotation x10 ea    NV     Opp " "hand/knee ball crush 10\"x5 ea    [place] ext c holds for posture  STS 5# KB 3 foam on chair x6   NV  Stand ball crush NV  Stand ball crush 5\" x10  Stand ball crush 5\" x10     [place] side steps  Supine ball crush 10\"x10 Stand ball crush 5\" x10    Supine ball crush 10\"x NV  Supine ball crush opp hand/knee 5\" x7 ea  Supine ball crush 10\"x8  Supine ball crush 10\"x10    [place] c holds 10\"x10    10\"x10 10\"x7 10\"x5  10\"x5  10\"x6 10\"x10     Seated KB 5# rotate R/L x10 ea     [place] walk out 9.0 x5 laps                     Ther Ex             Bike warmup L1x5'  L1 x5'    L1x5'  L1x5'  L1x5'  L1x5'  L1x5'  L1 x5'    Pt education on HEP, POC             Standing lumbar extension  Suitcase carry 5# KB X2 laps ea    10\"x5 10\"x10   [place]  row on PB 5\" x10  [place]  row on PB 10.0# 10\" x10     Standing lumbar ext against table      10\"x5 10\"x10        LTR 10\"X10 B/L  HEP    LTR 10\"x5 B/L  LTR 10\"X5 B/L  LTR 10\"x10 B/L LTR 10\"x10 B/L LTR 10\"x10 B/L LTR 10\"X10 B/L     Supine piriformis stretch 10\"x10 R   10\"x10 L  HEP    Supine piriformis stretch 10\"x10 ea  Supine piriformis stretch 10\"x6 ea  Supine piriformis stretch 10\"x6 ea  Supine piriformis stretch 10\"x6 ea  Supine piriformis stretch 10\"x8 R   10\"x7 L  Supine piriformis stretch 10\"x10 R   10\"x10 L           Leg Press NV              STS NV                    Ther Activity                                       Gait Training                                       Modalities                                                                "

## 2024-02-01 ENCOUNTER — OFFICE VISIT (OUTPATIENT)
Dept: PHYSICAL THERAPY | Facility: REHABILITATION | Age: 69
End: 2024-02-01
Payer: COMMERCIAL

## 2024-02-01 DIAGNOSIS — M48.061 SPINAL STENOSIS OF LUMBAR REGION WITHOUT NEUROGENIC CLAUDICATION: ICD-10-CM

## 2024-02-01 DIAGNOSIS — M47.816 LUMBAR SPONDYLOSIS: Primary | ICD-10-CM

## 2024-02-01 PROCEDURE — 97110 THERAPEUTIC EXERCISES: CPT

## 2024-02-01 PROCEDURE — 97530 THERAPEUTIC ACTIVITIES: CPT

## 2024-02-01 PROCEDURE — 97112 NEUROMUSCULAR REEDUCATION: CPT

## 2024-02-01 NOTE — PROGRESS NOTES
"Daily Note     Today's date: 2024  Patient name: Luis Russell  : 1955  MRN: [id number]  Referring provider: Zara Jensen, *  Dx: No diagnosis found.               Subjective: Patient reports no new complaints.       Objective: See treatment diary below      Assessment: Pt tolerated treatment well.  Pt was able to tolerate all exercises to his tolerance with mild complaints of increased neuropathy with supine marches.  Patient demonstrated fatigue post treatment, exhibited good technique with therapeutic exercises, and would benefit from continued PT.  No increased pain reported post treatment.       Plan: Continue per plan of care.      Precautions: COPD, chronic narcotic dependence, hx of DVT, depression, foot drop R, pulmonary emphysema         FOTO             IE/RE       RE          Manuals             Prone PA lumbar mobs prn                                                     Neuro Re-Ed             Posture education   Supine TA with march NV  Supine TA with march x10    Inc neuropathy in RLE           [place] anti rotation  Supine hip abd with GTB And TA 10\"x6      NV 6.5# 5\" x10     Stand opp hand/knee press back @ wall 10\"x5    Carson rows c holds for posture  GTB paloff press with small rotation x10 ea  GTB paloff press with small rotation x10 ea   NV     Opp hand/knee ball crush 10\"x5 ea    [place]arson ext c holds for posture  STS 5# KB 3 foam on chair x6 STS 5# KB 2 foam on chair x6  NV  Stand ball crush NV  Stand ball crush 5\" x10  Stand ball crush 5\" x10     [place] side steps  Supine ball crush 10\"x10 Stand ball crush 5\" x10 Stand ball crush 5\" x10   Supine ball crush 10\"x NV  Supine ball crush opp hand/knee 5\" x7 ea  Supine ball crush 10\"x8  Supine ball crush 10\"x10    Bridges c holds 10\"x10    10\"x10 10\"x7 10\"x5  10\"x5  10\"x6 10\"x10     Seated KB 5# rotate R/L x10 ea  Seated KB 5# rotate R/L x10 ea    [place] walk out 9.0 x5 laps            " "         Ther Ex             Bike warmup L1x5'  L1 x5'  L1 5'  L1x5'  L1x5'  L1x5'  L1x5'  L1x5'  L1 x5'    Pt education on HEP, POC             Standing lumbar extension  Suitcase carry 5# KB X2 laps ea  Suitcase carry 5# KB X2 laps ea   10\"x5 10\"x10   [place]  row on PB 5\" x10  [place]arson  row on PB 10.0# 10\" x10     Standing lumbar ext against table      10\"x5 10\"x10        LTR 10\"X10 B/L  HEP    LTR 10\"x5 B/L  LTR 10\"X5 B/L  LTR 10\"x10 B/L LTR 10\"x10 B/L LTR 10\"x10 B/L LTR 10\"X10 B/L     Supine piriformis stretch 10\"x10 R   10\"x10 L  HEP    Supine piriformis stretch 10\"x10 ea  Supine piriformis stretch 10\"x6 ea  Supine piriformis stretch 10\"x6 ea  Supine piriformis stretch 10\"x6 ea  Supine piriformis stretch 10\"x8 R   10\"x7 L  Supine piriformis stretch 10\"x10 R   10\"x10 L           Leg Press NV              STS NV                    Ther Activity                                       Gait Training                                       Modalities                                                                  "

## 2024-02-06 ENCOUNTER — OFFICE VISIT (OUTPATIENT)
Dept: PHYSICAL THERAPY | Facility: REHABILITATION | Age: 69
End: 2024-02-06
Payer: COMMERCIAL

## 2024-02-06 DIAGNOSIS — M48.061 SPINAL STENOSIS OF LUMBAR REGION WITHOUT NEUROGENIC CLAUDICATION: ICD-10-CM

## 2024-02-06 DIAGNOSIS — M47.816 LUMBAR SPONDYLOSIS: Primary | ICD-10-CM

## 2024-02-06 PROCEDURE — 97110 THERAPEUTIC EXERCISES: CPT

## 2024-02-06 PROCEDURE — 97112 NEUROMUSCULAR REEDUCATION: CPT

## 2024-02-06 PROCEDURE — 97530 THERAPEUTIC ACTIVITIES: CPT

## 2024-02-06 NOTE — PROGRESS NOTES
"Daily Note     Today's date: 2024  Patient name: Luis Russell  : 1955  MRN: [id number]  Referring provider: Zara Jensen, *  Dx:   Encounter Diagnosis     ICD-10-CM    1. Lumbar spondylosis  M47.816       2. 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       Objective: See treatment diary below      Assessment: Pt tolerated treatment well. Pt was able to tolerate all exercises on this date with reported fatigue and challenge throughout especially with discomfort across his lower back which was slightly decreased by completing standing extension. Patient demonstrated fatigue post treatment, exhibited good technique with therapeutic exercises, and would benefit from continued PT.       Plan: Continue per plan of care.  Progress treatment as tolerated.       Precautions: COPD, chronic narcotic dependence, hx of DVT, depression, foot drop R, pulmonary emphysema         FOTO             IE/RE       RE          Manuals             Prone PA lumbar mobs prn                                                     Neuro Re-Ed             Posture education   Supine TA with march NV  Supine TA with march x10    Inc neuropathy in RLE           [place] anti rotation  Supine hip abd with GTB And TA 10\"x6           Stand opp hand/knee press back @ wall 10\"x5    [place] rows c holds for posture  GTB paloff press with small rotation x10 ea  GTB paloff press with small rotation x10 ea  GTB paloff press with small rotation x10 ea       Opp hand/knee ball crush 10\"x5 ea    [place] ext c holds for posture  STS 5# KB 3 foam on chair x6 STS 5# KB 2 foam on chair x6 STS 5# KB 2 foam on chair x8   Stand ball crush NV  Stand ball crush 5\" x10  Stand " "ball crush 5\" x10     [place] side steps  Supine ball crush 10\"x10 Stand ball crush 5\" x10 Stand ball crush 5\" x10 Stand ball crush 5\" x10   NV  Supine ball crush opp hand/knee 5\" x7 ea  Supine ball crush 10\"x8  Supine ball crush 10\"x10    Bridges c holds 10\"x10      10\"x5  10\"x5  10\"x6 10\"x10     Seated KB 5# rotate R/L x10 ea  Seated KB 5# rotate R/L x10 ea                        Ther Ex             Bike warmup L1x5'  L1 x5'  L1 5' L1 x5'    L1x5'  L1x5'  L1x5'  L1 x5'    Pt education on HEP, POC             Standing lumbar extension  Suitcase carry 5# KB X2 laps ea  Suitcase carry 5# KB X2 laps ea  Suitcase carry 5# KB X3 laps ea     [place]arson  row on PB 5\" x10  Carson  row on PB 10.0# 10\" x10     Standing lumbar ext against table     Stand bird dog @ counter x5 ea           LTR 10\"X10 B/L  HEP      LTR 10\"x10 B/L LTR 10\"x10 B/L LTR 10\"x10 B/L LTR 10\"X10 B/L     Supine piriformis stretch 10\"x10 R   10\"x10 L  HEP      Supine piriformis stretch 10\"x6 ea  Supine piriformis stretch 10\"x6 ea  Supine piriformis stretch 10\"x8 R   10\"x7 L  Supine piriformis stretch 10\"x10 R   10\"x10 L           Leg Press NV              STS NV                    Ther Activity                                       Gait Training                                       Modalities                                                                    "

## 2024-02-08 ENCOUNTER — EVALUATION (OUTPATIENT)
Dept: PHYSICAL THERAPY | Facility: REHABILITATION | Age: 69
End: 2024-02-08
Payer: COMMERCIAL

## 2024-02-08 DIAGNOSIS — M47.816 LUMBAR SPONDYLOSIS: Primary | ICD-10-CM

## 2024-02-08 DIAGNOSIS — M48.061 SPINAL STENOSIS OF LUMBAR REGION WITHOUT NEUROGENIC CLAUDICATION: ICD-10-CM

## 2024-02-08 PROCEDURE — 97164 PT RE-EVAL EST PLAN CARE: CPT

## 2024-02-08 PROCEDURE — 97112 NEUROMUSCULAR REEDUCATION: CPT

## 2024-02-08 PROCEDURE — 97110 THERAPEUTIC EXERCISES: CPT

## 2024-02-08 NOTE — PROGRESS NOTES
Daily Note     Today's date: 2024  Patient name: Luis Russell  : 1955  MRN: [id number]  Referring provider: Zara Jensen, *  Dx:   Encounter Diagnosis     ICD-10-CM    1. Lumbar spondylosis  M47.816       2. Spinal stenosis of lumbar region without neurogenic claudication  M48.061                    Assessment  Assessment details:   Upon RE on this date, pt has maintained ROM as measured in last RE however with less pain reported throughout. Pt is also progressing with LE strength and his tolerance to ADLs and functional mobilities. Despite improvements pt continues to be limited with his overall tolerance to functional activities due to continued pain and decreased tolerance to sitting from continued decreased trunk and core strength. Pt has done well working on ROM and strength in therapy and would benefit from continued OP PT to continue improving his strength and his ROM with less pain and compensation patterns for increased ease and safety with ADLs, ambulation, and overall functional mobility. POC was discussed with patient, pt verbalized understanding and is in agreement.      Impairments: abnormal or restricted ROM, abnormal movement, activity intolerance, impaired physical strength, lacks appropriate home exercise program, pain with function and poor posture      Symptom irritability: moderateUnderstanding of Dx/Px/POC: good   Prognosis: fair  Prognosis details: Positive prognostic indicators include good understanding of diagnosis and treatment plan options.  Negative prognostic indicators include chronicity of symptoms, hypertension, high symptom irritability, multiple concurrent orthopedic problems, and dependency on medications.       Goals  ST. Independent with HEP in 2 weeks MET   2. Pt will have verbal report of improvement in symptoms by >/=25% in 2 weeks MET      New STG   Pt will report being able to sit for 10 mins without back pain CONTINUE      To be achieved by  D/C   LTG: CONTINUE ALL   1. Pt will be able to return to work  2. Pt will be able to be able to independently correct posture for self management of sx's   3. Pt will be able to household chores with little to no difficulty            Plan  Patient would benefit from: skilled physical therapy  Planned therapy interventions: activity modification, manual therapy, motor coordination training, neuromuscular re-education, patient education, self care, therapeutic activities, therapeutic exercise, graded activity, home exercise program, graded exercise, functional ROM exercises and strengthening  Frequency: 1-2x week  Visits: 8  Duration in weeks: 8  Treatment plan discussed with: patient       Subjective Evaluation     History of Present Illness  Mechanism of injury: Pt reports a long standing history of LBP he has had foot drop since then. He does have a MAFO for it but he tries to avoid having to wear it. The more he walks he will have more foot drop.      His most recent episode of LBP started after his last surgery.      1/9/24  Pt reports that his back is okay today, but this past weekend he tried to shovel snow and had to do it in multiple attempts because his back was bothering him. He reports that he also tried to sit up straight at the doctors office yesterday and it didn't go well for him. He reports that he feels like his core isn't as strong as it used to be and that bothers him. He reports that since starting PT he has had fewer instances of the stabbing pain and he feels like he had a little bit more range of motion. He reports that he feels like he can lift about 20-25 lbs which was more than before as well. He reports that he feels like he needs to tone up his abs and lower back muscles.     2/8/24  Pt reports that he is sore because he did a lot of walking and movement. He reports that he is also sore from using his rock saw the other day from the positioning. He reports that he wasn't too sore after  his appointment the other day but he didn't do too much the rest of the day to aggravated it. He reports that since his last RE he is better since he has been able to try more things without being as fearful. He reports that getting up out of a chair is also easier and he feels like his back and his core are improving. He reports that twisting is still the most limiting motion for him. Pt reports that he also thinks his lifting is also getting better and he was able to list 7 quart bucket to his osmany saw. He reports that his twisting and turning motion as well as his endurance still needs to be worked on. Luis reports that he is taking ibuprofen as needed.         Patient Goals  Patient goals for therapy: decreased pain  Patient goal: be able to do household chores, be able to perform dishes,  Pain  Current pain ratin  At best pain ratin  At worst pain ratin  Location: across the LB  Exacerbated by: bending and twisting,standing,  Progression: worsening     24  Current pain ratin  At best pain ratin  At worst pain ratin  Location: across the LB  Exacerbated by: bending and twisting,standing  Improved by: laying down on back or side      24  Current pain rating: 3  At best pain ratin  At worst pain ratin-7  Location: across the LB  Exacerbated by: twisting, bending, lifting   Improved by: laying down on back or side      Objective      Concurrent Complaints  Negative for night pain, disturbed sleep, bladder dysfunction, bowel dysfunction and saddle (S4) numbness     Postural Observations  Seated posture: poor  Standing posture: poor  Correction of posture: makes symptoms better        Neurological Testing      Sensation      Lumbar   Left   Intact: light touch     Right   Diminished: light touch     Comments   Right light touch: below knee (has neuropathy)     Active Range of Motion      Lumbar   Flexion:  with pain Restriction level: moderate  Extension:  Restriction  level: moderate  Left lateral flexion:  with pain Restriction level: moderate  Right lateral flexion:  with pain Restriction level: moderate  Left rotation:  with pain Restriction level: moderate  Right rotation:  with pain Restriction level: moderate     1/9/24  Lumbar   Flexion:  Restriction level: moderate, pt reported pain and instability in his R knee   Extension:  Restriction level: moderate  Left lateral flexion:  Restriction level: moderate   Right lateral flexion: Restriction level: mod/max, pt reported pulling pain   Left rotation: Restriction level: moderate with discomfort   Right rotation:  Restriction level: moderate with discomfort     2/8/24  Flexion:  Restriction level: moderate, pt reported pain and discomfort   Extension:  Restriction level: minimal, pt reported feeling good   Left lateral flexion:  Restriction level: moderate  Right lateral flexion: Restriction level: moderate   Left rotation: Restriction level: moderate   Right rotation:  Restriction level: moderate     Pt reported discomfort throughout movements, no pinching, throughout ROM      Mechanical Assessment        Lumbar     Standing extension: repeated movements  Pain location: centralized  Pain intensity: better  Pain level: decreased     1/9/24  Extension continues to improve symptoms      Strength/Myotome Testing      Left Hip   Planes of Motion   Flexion: 4     Right Hip   Planes of Motion   Flexion: 5     Left Knee   Flexion: 5  Extension: 5     Right Knee   Flexion: 4-  Extension: 4     Left Ankle/Foot   Dorsiflexion: 5  Plantar flexion: 5  Great toe extension: 5     Right Ankle/Foot   Dorsiflexion: 4-  Plantar flexion: 5  Great toe extension: 4     1/9/24  Left Hip   Planes of Motion   Flexion: 4+     Right Hip   Planes of Motion   Flexion: 5     Left Knee   Flexion: 5  Extension: 5     Right Knee   Flexion: 4  Extension: 4     Left Ankle/Foot   Dorsiflexion: 5  Plantar flexion: 5  Great toe extension: 5     Right Ankle/Foot  "  Dorsiflexion: 4  Plantar flexion: 5  Great toe extension: 4    2/8/24  Left Hip   Planes of Motion   Flexion: 4+  Abduction: 4  Extension: 3+     Right Hip   Planes of Motion   Flexion: 5  Abduction: 3+  Extension: 3+     Left Knee   Flexion: 5  Extension: 5     Right Knee   Flexion: 4  Extension: 4     Left Ankle/Foot   Dorsiflexion: 5  Plantar flexion: 5  Great toe extension: 5     Right Ankle/Foot   Dorsiflexion: 4  Plantar flexion: 5  Great toe extension: 4       Precautions: COPD, chronic narcotic dependence, hx of DVT, depression, foot drop R, pulmonary emphysema      1/25 1/30 2/1 2/6 2/8 1/9 1/16 1/18 1/23   FOTO             IE/RE     RE   RE          Manuals             Prone PA lumbar mobs prn                                                     Neuro Re-Ed             Posture education   Supine TA with march NV  Supine TA with march x10    Inc neuropathy in RLE           [place] anti rotation  Supine hip abd with GTB And TA 10\"x6           Stand opp hand/knee press back @ wall 10\"x5    [place] rows c holds for posture  GTB paloff press with small rotation x10 ea  GTB paloff press with small rotation x10 ea  GTB paloff press with small rotation x10 ea       Opp hand/knee ball crush 10\"x5 ea    [place] ext c holds for posture  STS 5# KB 3 foam on chair x6 STS 5# KB 2 foam on chair x6 STS 5# KB 2 foam on chair x8   Stand ball crush NV  Stand ball crush 5\" x10  Stand ball crush 5\" x10     [place] side steps  Supine ball crush 10\"x10 Stand ball crush 5\" x10 Stand ball crush 5\" x10 Stand ball crush 5\" x10 Stand ball crush with rotation 5\" x5 ea   NV  Supine ball crush opp hand/knee 5\" x7 ea  Supine ball crush 10\"x8  Supine ball crush 10\"x10    Bridges c holds 10\"x10    Supine TA 5\" x10   10\"x5  10\"x5  10\"x6 10\"x10     Seated KB 5# rotate R/L x10 ea  Seated KB 5# rotate R/L x10 ea   Stand 5# KB turns x5 ea                      Ther Ex             Bike warmup L1x5'  L1 x5'  L1 5' L1 x5'  L1 x5'   L1x5'  L1x5'  " "L1x5'  L1 x5'    Pt education on HEP, POC             Standing lumbar extension  Suitcase carry 5# KB X2 laps ea  Suitcase carry 5# KB X2 laps ea  Suitcase carry 5# KB X3 laps ea     [place]  row on PB 5\" x10  [place]  row on PB 10.0# 10\" x10     Standing lumbar ext against table     Stand bird dog @ counter x5 ea           LTR 10\"X10 B/L  HEP    LTR 10\"X10 B/L   LTR 10\"x10 B/L LTR 10\"x10 B/L LTR 10\"x10 B/L LTR 10\"X10 B/L     Supine piriformis stretch 10\"x10 R   10\"x10 L  HEP    Supine piriformis stretch 10\"x10 R   10\"x10 L   Supine piriformis stretch 10\"x6 ea  Supine piriformis stretch 10\"x6 ea  Supine piriformis stretch 10\"x8 R   10\"x7 L  Supine piriformis stretch 10\"x10 R   10\"x10 L           Leg Press NV              STS NV                    Ther Activity                                       Gait Training                                       Modalities                                                                      "

## 2024-02-09 ENCOUNTER — TELEPHONE (OUTPATIENT)
Dept: VASCULAR SURGERY | Facility: CLINIC | Age: 69
End: 2024-02-09

## 2024-02-09 NOTE — TELEPHONE ENCOUNTER
Pt called the office today stating that he wanted to let us know that he stopped taking his eliquis in December. Pt went on a rant stating that he is down to 162 pounds and lost muscle mass and now has issues w/ his back since his bypass surgery approximately 1 yr ago. He states that he goes to PT now for his back. Asked pt why he stopped his eliquis and he states because he needs to take ibuprofen because of his back pain, which is his priority.    Pt was supposed to have an OV today but his lyft ride did not show up. He is now scheduled for OV 4/2. Informed him I would let our triage provider know to see if they have any recommendations.

## 2024-02-09 NOTE — TELEPHONE ENCOUNTER
Patient has a remote history or RLE DVT on long term anticoagulation. I do not know the details of his DVT just that he was recommended for long-term anticoagulation.  If patient is not taking Eliquis he is at risk to develop a recurrent blood clot which can be fatal.  He should resume Eliquis and use Tylenol for back pain.  As for his weight loss he should follow-up with his PCP for workup.  Keep office appointment 4/2

## 2024-02-13 ENCOUNTER — APPOINTMENT (OUTPATIENT)
Dept: PHYSICAL THERAPY | Facility: REHABILITATION | Age: 69
End: 2024-02-13
Payer: COMMERCIAL

## 2024-02-15 ENCOUNTER — OFFICE VISIT (OUTPATIENT)
Dept: PHYSICAL THERAPY | Facility: REHABILITATION | Age: 69
End: 2024-02-15
Payer: COMMERCIAL

## 2024-02-15 DIAGNOSIS — M48.061 SPINAL STENOSIS OF LUMBAR REGION WITHOUT NEUROGENIC CLAUDICATION: ICD-10-CM

## 2024-02-15 DIAGNOSIS — M47.816 LUMBAR SPONDYLOSIS: Primary | ICD-10-CM

## 2024-02-15 PROCEDURE — 97112 NEUROMUSCULAR REEDUCATION: CPT

## 2024-02-15 PROCEDURE — 97110 THERAPEUTIC EXERCISES: CPT

## 2024-02-15 NOTE — PROGRESS NOTES
"Daily Note     Today's date: 2/15/2024  Patient name: Luis Russell  : 1955  MRN: [id number]  Referring provider: Zara Jensen, *  Dx:   Encounter Diagnosis     ICD-10-CM    1. Lumbar spondylosis  M47.816       2. Spinal stenosis of lumbar region without neurogenic claudication  M48.061                      Subjective: Pt reports that he tried to move his  the other day but it did not go well for him and it was a 3 ibuprofen and ice hot paradise day. He reports that today he feels sore.       Objective: See treatment diary below      Assessment: Pt tolerated treatment well. A side lying open book stretch was trialed on today's date to improve trunk rotation, however pt reported feeling no stretch in right side lying but he did report a stretch in left sidelying. Pt was unable to complete paloff press with his left side closest to the wall on this date due to increased pain in his right lower back. Pt was educated to continue with stretching and exercises to his tolerance over the weekend until his next appointment at which time exercises would be progressed if he was able to tolerate, pt verbalized understanding. Patient demonstrated fatigue post treatment, exhibited good technique with therapeutic exercises, and would benefit from continued PT.       Plan: Continue per plan of care.  Progress treatment as tolerated.       Precautions: COPD, chronic narcotic dependence, hx of DVT, depression, foot drop R, pulmonary emphysema       2 2/  2/8 2/15   1/18 1/23   FOTO             IE/RE     RE            Manuals             Prone PA lumbar mobs prn                                                     Neuro Re-Ed             Posture education   Supine TA with march NV  Supine TA with march x10    Inc neuropathy in RLE           [place] anti rotation  Supine hip abd with GTB And TA 10\"x6           Stand opp hand/knee press back @ wall 10\"x5    [place] rows c holds for posture  GTB " "paloff press with small rotation x10 ea  GTB paloff press with small rotation x10 ea  GTB paloff press with small rotation x10 ea   Blue TB paloff press R towards wall only x10 L P!     Opp hand/knee ball crush 10\"x5 ea    [place] ext c holds for posture  STS 5# KB 3 foam on chair x6 STS 5# KB 2 foam on chair x6 STS 5# KB 2 foam on chair x8  Bridge 10\"x10    Stand ball crush 10\"x7    Stand ball crush 5\" x10     [place] side steps  Supine ball crush 10\"x10 Stand ball crush 5\" x10 Stand ball crush 5\" x10 Stand ball crush 5\" x10 Stand ball crush with rotation 5\" x5 ea  Stand ball crush with rotation 5\" x5 ea    Supine ball crush 10\"x8  Supine ball crush 10\"x10    Bridges c holds 10\"x10    Supine TA 5\" x10     10\"x6 10\"x10     Seated KB 5# rotate R/L x10 ea  Seated KB 5# rotate R/L x10 ea   Stand 5# KB turns x5 ea  Seated KB 5# turns 2x5 ea                     Ther Ex             Bike warmup L1x5'  L1 x5'  L1 5' L1 x5'  L1 x5'  L1x5'    L1x5'  L1 x5'    Pt education on HEP, POC      Left side lying open book elbow bent 10\"X10       Standing lumbar extension  Suitcase carry 5# KB X2 laps ea  Suitcase carry 5# KB X2 laps ea  Suitcase carry 5# KB X3 laps ea      [place]  row on PB 10.0# 10\" x10     Standing lumbar ext against table     Stand bird dog @ counter x5 ea           LTR 10\"X10 B/L  HEP    LTR 10\"X10 B/L     LTR 10\"x10 B/L LTR 10\"X10 B/L     Supine piriformis stretch 10\"x10 R   10\"x10 L  HEP    Supine piriformis stretch 10\"x10 R   10\"x10 L  Supine piriformis stretch 10\"x10 R   10\"x10 L    Supine piriformis stretch 10\"x8 R   10\"x7 L  Supine piriformis stretch 10\"x10 R   10\"x10 L                                           Ther Activity                                       Gait Training                                       Modalities                                                                        "

## 2024-02-20 ENCOUNTER — OFFICE VISIT (OUTPATIENT)
Dept: PHYSICAL THERAPY | Facility: REHABILITATION | Age: 69
End: 2024-02-20
Payer: COMMERCIAL

## 2024-02-20 DIAGNOSIS — M48.061 SPINAL STENOSIS OF LUMBAR REGION WITHOUT NEUROGENIC CLAUDICATION: ICD-10-CM

## 2024-02-20 DIAGNOSIS — M47.816 LUMBAR SPONDYLOSIS: Primary | ICD-10-CM

## 2024-02-20 PROCEDURE — 97530 THERAPEUTIC ACTIVITIES: CPT

## 2024-02-20 PROCEDURE — 97110 THERAPEUTIC EXERCISES: CPT

## 2024-02-20 PROCEDURE — 97112 NEUROMUSCULAR REEDUCATION: CPT

## 2024-02-20 NOTE — PROGRESS NOTES
"Daily Note     Today's date: 2024  Patient name: Luis Russell  : 1955  MRN: [id number]  Referring provider: Zara Jensen, *  Dx:   Encounter Diagnosis     ICD-10-CM    1. Lumbar spondylosis  M47.816       2. Spinal stenosis of lumbar region without neurogenic claudication  M48.061                      Subjective: Pt reports that he has changed the angle of his chairs at home and it has helped his back a lot. He reports that he did not have any significant soreness after last visit.       Objective: See treatment diary below      Assessment: Pt tolerated treatment well. Pt self progressed weight for standing and turning exercises on this date and was able to complete without increased pain. He also was challenged with addition of U2 D2 flexion movement pattern with no increased pain but noted challenge and fatigue throughout.  Patient demonstrated fatigue post treatment, exhibited good technique with therapeutic exercises, and would benefit from continued PT.       Plan: Continue per plan of care.  Progress treatment as tolerated.       Precautions: COPD, chronic narcotic dependence, hx of DVT, depression, foot drop R, pulmonary emphysema      1/25 1/30 2/1 2/6  2/8 2/15 2/20  1/18 1/23   FOTO             IE/RE     RE            Manuals             Prone PA lumbar mobs prn                                                     Neuro Re-Ed             Posture education   Supine TA with march NV  Supine TA with march x10    Inc neuropathy in RLE           [place] anti rotation  Supine hip abd with GTB And TA 10\"x6        Stand ext x10    Stand opp hand/knee press back @ wall 10\"x5    [place] rows c holds for posture  GTB paloff press with small rotation x10 ea  GTB paloff press with small rotation x10 ea  GTB paloff press with small rotation x10 ea   Blue TB paloff press R towards wall only x10 L P!     Opp hand/knee ball crush 10\"x5 ea    [place] ext c holds for posture  STS 5# KB 3 foam on chair " "x6 STS 5# KB 2 foam on chair x6 STS 5# KB 2 foam on chair x8  Bridge 10\"x10    Stand ball crush 10\"x7  Bridge 10\"x10    Stand ball crush 10\"x5  Stand ball crush 5\" x10     [place] side steps  Supine ball crush 10\"x10 Stand ball crush 5\" x10 Stand ball crush 5\" x10 Stand ball crush 5\" x10 Stand ball crush with rotation 5\" x5 ea  Stand ball crush with rotation 5\" x5 ea  Stand ball crush with rotation 5\" x5 ea   Supine ball crush 10\"x8  Supine ball crush 10\"x10    Bridges c holds 10\"x10    Supine TA 5\" x10     10\"x6 10\"x10     Seated KB 5# rotate R/L x10 ea  Seated KB 5# rotate R/L x10 ea   Stand 5# KB turns x5 ea  Seated KB 5# turns 2x5 ea  Seated KB 10# turns x4  ea side                   Ther Ex             Bike warmup L1x5'  L1 x5'  L1 5' L1 x5'  L1 x5'  L1x5'  L1x5'   L1x5'  L1 x5'    Pt education on HEP, POC      Left side lying open book elbow bent 10\"X10       Standing lumbar extension  Suitcase carry 5# KB X2 laps ea  Suitcase carry 5# KB X2 laps ea  Suitcase carry 5# KB X3 laps ea    UE D2 flex PTB x5   [place]  row on PB 10.0# 10\" x10     Standing lumbar ext against table     Stand bird dog @ counter x5 ea    Stand bird dog @ counter x3 ea        LTR 10\"X10 B/L  HEP    LTR 10\"X10 B/L   LTR 10\"X10 B/L   LTR 10\"x10 B/L LTR 10\"X10 B/L     Supine piriformis stretch 10\"x10 R   10\"x10 L  HEP    Supine piriformis stretch 10\"x10 R   10\"x10 L  Supine piriformis stretch 10\"x10 R   10\"x10 L    Supine piriformis stretch 10\"x8 R   10\"x7 L  Supine piriformis stretch 10\"x10 R   10\"x10 L                                           Ther Activity                                       Gait Training                                       Modalities                                                                          "

## 2024-02-21 PROBLEM — Z12.11 COLON CANCER SCREENING: Status: RESOLVED | Noted: 2018-05-29 | Resolved: 2024-02-21

## 2024-02-22 ENCOUNTER — OFFICE VISIT (OUTPATIENT)
Dept: PHYSICAL THERAPY | Facility: REHABILITATION | Age: 69
End: 2024-02-22
Payer: COMMERCIAL

## 2024-02-22 DIAGNOSIS — M47.816 LUMBAR SPONDYLOSIS: Primary | ICD-10-CM

## 2024-02-22 DIAGNOSIS — M48.061 SPINAL STENOSIS OF LUMBAR REGION WITHOUT NEUROGENIC CLAUDICATION: ICD-10-CM

## 2024-02-22 PROCEDURE — 97112 NEUROMUSCULAR REEDUCATION: CPT

## 2024-02-22 PROCEDURE — 97530 THERAPEUTIC ACTIVITIES: CPT

## 2024-02-22 PROCEDURE — 97110 THERAPEUTIC EXERCISES: CPT

## 2024-02-22 NOTE — PROGRESS NOTES
"Daily Note     Today's date: 2024  Patient name: Luis Russell  : 1955  MRN: [id number]  Referring provider: Zara Jensen, *  Dx:   Encounter Diagnosis     ICD-10-CM    1. Lumbar spondylosis  M47.816       2. Spinal stenosis of lumbar region without neurogenic claudication  M48.061           Start Time: 1432  Stop Time: 1515  Total time in clinic (min): 43 minutes    Subjective: Patient reports he started with increased soreness yesterday. Feels it is due to inclining his seats at home and in the car.       Objective: See treatment diary below      Assessment: Patient tolerated treatment well. Increased symptoms reported with bird dogs on counter, but was able to complete 5 repetitions. Pt will benefit from continued skilled PT intervention in order to address remaining limitations and to restore maximal function. No increased pain reported post treatment.         Plan: Continue per plan of care.      Precautions: COPD, chronic narcotic dependence, hx of DVT, depression, foot drop R, pulmonary emphysema      1/25 1/30 2/1 2/6  2/8 2/15 2/20 2/22  1/23   FOTO             IE/RE     RE            Manuals             Prone PA lumbar mobs prn                                                     Neuro Re-Ed             Posture education   Supine TA with march NV  Supine TA with march x10    Inc neuropathy in RLE           [place] anti rotation  Supine hip abd with GTB And TA 10\"x6        Stand ext x10  Stand ext x10   Stand opp hand/knee press back @ wall 10\"x5    [place] rows c holds for posture  GTB paloff press with small rotation x10 ea  GTB paloff press with small rotation x10 ea  GTB paloff press with small rotation x10 ea   Blue TB paloff press R towards wall only x10 L P!     Opp hand/knee ball crush 10\"x5 ea    F[place] ext c holds for posture  STS 5# KB 3 foam on chair x6 STS 5# KB 2 foam on chair x6 STS 5# KB 2 foam on chair x8  Bridge 10\"x10    Stand ball crush 10\"x7  Bridge 10\"x10    Stand " "ball crush 10\"x5 Bridge 10\"x10    Stand ball crush 10\"x5     [place] side steps  Supine ball crush 10\"x10 Stand ball crush 5\" x10 Stand ball crush 5\" x10 Stand ball crush 5\" x10 Stand ball crush with rotation 5\" x5 ea  Stand ball crush with rotation 5\" x5 ea  Stand ball crush with rotation 5\" x5 ea  Stand ball crush with rotation 5\" x5 ea   Supine ball crush 10\"x10    Bridges c holds 10\"x10    Supine TA 5\" x10      10\"x10     Seated KB 5# rotate R/L x10 ea  Seated KB 5# rotate R/L x10 ea   Stand 5# KB turns x5 ea  Seated KB 5# turns 2x5 ea  Seated KB 10# turns x4  ea side Seated KB 10# turns x4  ea side                  Ther Ex             Bike warmup L1x5'  L1 x5'  L1 5' L1 x5'  L1 x5'  L1x5'  L1x5'  L5 x 5' Pt. Self progressed resistance    L1 x5'    Pt education on HEP, POC      Left side lying open book elbow bent 10\"X10       Standing lumbar extension  Suitcase carry 5# KB X2 laps ea  Suitcase carry 5# KB X2 laps ea  Suitcase carry 5# KB X3 laps ea    UE D2 flex PTB x5  UE D2 flex PTB x5      Standing lumbar ext against table     Stand bird dog @ counter x5 ea    Stand bird dog @ counter x3 ea  Stand bird dog @ counter x5 ea       LTR 10\"X10 B/L  HEP    LTR 10\"X10 B/L   LTR 10\"X10 B/L  LTR 10\"X10 B/L   LTR 10\"X10 B/L     Supine piriformis stretch 10\"x10 R   10\"x10 L  HEP    Supine piriformis stretch 10\"x10 R   10\"x10 L  Supine piriformis stretch 10\"x10 R   10\"x10 L     Supine piriformis stretch 10\"x10 R   10\"x10 L                                           Ther Activity                                       Gait Training                                       Modalities                                                                            "

## 2024-02-27 ENCOUNTER — OFFICE VISIT (OUTPATIENT)
Dept: PHYSICAL THERAPY | Facility: REHABILITATION | Age: 69
End: 2024-02-27
Payer: COMMERCIAL

## 2024-02-27 DIAGNOSIS — M47.816 LUMBAR SPONDYLOSIS: Primary | ICD-10-CM

## 2024-02-27 DIAGNOSIS — M48.061 SPINAL STENOSIS OF LUMBAR REGION WITHOUT NEUROGENIC CLAUDICATION: ICD-10-CM

## 2024-02-27 PROCEDURE — 97112 NEUROMUSCULAR REEDUCATION: CPT

## 2024-02-27 PROCEDURE — 97530 THERAPEUTIC ACTIVITIES: CPT

## 2024-02-27 PROCEDURE — 97110 THERAPEUTIC EXERCISES: CPT

## 2024-02-27 NOTE — PROGRESS NOTES
"Daily Note     Today's date: 2024  Patient name: Luis Russell  : 1955  MRN: [id number]  Referring provider: Zara Jensen, *  Dx:   Encounter Diagnosis     ICD-10-CM    1. Lumbar spondylosis  M47.816       2. Spinal stenosis of lumbar region without neurogenic claudication  M48.061                      Subjective: Pt reports that about 20 mins ago he was standing to turn and he almost \"went down\" because his neuropathy is acting up. He reports that his sometimes happens to him and it is not anything new.       Objective: See treatment diary below      Assessment: Pt tolerated treatment well. Pt continues to be challenged with core and stability based exercises with fatigue demonstrated throughout exercises. Pt reported benefit from bike today regarding his neuropathy symptoms, which is why it was also completed for 2.5 mins post treatment on today's date. Patient demonstrated fatigue post treatment, exhibited good technique with therapeutic exercises, and would benefit from continued PT.       Plan: Continue per plan of care.  Progress treatment as tolerated.       Precautions: COPD, chronic narcotic dependence, hx of DVT, depression, foot drop R, pulmonary emphysema      1/25 1/30 2/1 2/6  2/8 2/15 2/20 2/22 2/27    FOTO             IE/RE     RE            Manuals             Prone PA lumbar mobs prn                                                     Neuro Re-Ed             Posture education   Supine TA with march NV  Supine TA with march x10    Inc neuropathy in RLE           [place] anti rotation  Supine hip abd with GTB And TA 10\"x6        Stand ext x10  Stand ext x10      [place] rows c holds for posture  GTB paloff press with small rotation x10 ea  GTB paloff press with small rotation x10 ea  GTB paloff press with small rotation x10 ea   Blue TB paloff press R towards wall only x10 L P!        [place] ext c holds for posture  STS 5# KB 3 foam on chair x6 STS 5# KB 2 foam on chair x6 STS " "5# KB 2 foam on chair x8  Bridge 10\"x10    Stand ball crush 10\"x7  Bridge 10\"x10    Stand ball crush 10\"x5 Bridge 10\"x10    Stand ball crush 10\"x5 Bridge 10\"x7       Stand ball crush 5\" x5     [place] side steps  Supine ball crush 10\"x10 Stand ball crush 5\" x10 Stand ball crush 5\" x10 Stand ball crush 5\" x10 Stand ball crush with rotation 5\" x5 ea  Stand ball crush with rotation 5\" x5 ea  Stand ball crush with rotation 5\" x5 ea  Stand ball crush with rotation 5\" x5 ea  Stand ball crush with rotation 5\" x5 ea     Bridges c holds 10\"x10    Supine TA 5\" x10           Seated KB 5# rotate R/L x10 ea  Seated KB 5# rotate R/L x10 ea   Stand 5# KB turns x5 ea  Seated KB 5# turns 2x5 ea  Seated KB 10# turns x4  ea side Seated KB 10# turns x4  ea side Seated KB 10# turns x5  ea side                 Ther Ex             Bike warmup L1x5'  L1 x5'  L1 5' L1 x5'  L1 x5'  L1x5'  L1x5'  L5 x 5' Pt. Self progressed resistance Pre- L1x5'     Post 2.5 mins       Pt education on HEP, POC      Left side lying open book elbow bent 10\"X10       Standing lumbar extension  Suitcase carry 5# KB X2 laps ea  Suitcase carry 5# KB X2 laps ea  Suitcase carry 5# KB X3 laps ea    UE D2 flex PTB x5  UE D2 flex PTB x5  NV     Standing lumbar ext against table     Stand bird dog @ counter x5 ea    Stand bird dog @ counter x3 ea  Stand bird dog @ counter x5 ea  Stand bird dog at table hands on ball x5 ea      LTR 10\"X10 B/L  HEP    LTR 10\"X10 B/L   LTR 10\"X10 B/L  LTR 10\"X10 B/L  LTR 10\"X8  B/L     Supine piriformis stretch 10\"x10 R   10\"x10 L  HEP    Supine piriformis stretch 10\"x10 R   10\"x10 L  Supine piriformis stretch 10\"x10 R   10\"x10 L                                               Ther Activity                                       Gait Training                                       Modalities                                                                              "

## 2024-02-29 ENCOUNTER — OFFICE VISIT (OUTPATIENT)
Dept: PHYSICAL THERAPY | Facility: REHABILITATION | Age: 69
End: 2024-02-29
Payer: COMMERCIAL

## 2024-02-29 ENCOUNTER — TELEPHONE (OUTPATIENT)
Age: 69
End: 2024-02-29

## 2024-02-29 DIAGNOSIS — M47.816 LUMBAR SPONDYLOSIS: Primary | ICD-10-CM

## 2024-02-29 DIAGNOSIS — M48.061 SPINAL STENOSIS OF LUMBAR REGION WITHOUT NEUROGENIC CLAUDICATION: ICD-10-CM

## 2024-02-29 PROCEDURE — 97535 SELF CARE MNGMENT TRAINING: CPT

## 2024-02-29 NOTE — TELEPHONE ENCOUNTER
S/w pt. Pt scheduled for a follow up ov on 3/5 to re-eval his lower back pain and discuss medication options.    CLERICAL TEAM- Pt is requesting LYFT for transport- Please assist- Thank you

## 2024-02-29 NOTE — TELEPHONE ENCOUNTER
Caller: Luis     Doctor: Dr Reyes     Reason for call: Patient calling stating pain level 7/10 un expectable  and PT notes will be sent. Patient calling asking if he can get something for the pain. Patient states patch is working and does not need increase please advise     Call back#: [phone]

## 2024-02-29 NOTE — PROGRESS NOTES
"Daily Note     Today's date: 2024  Patient name: Luis Russell  : 1955  MRN: [id number]  Referring provider: Zara Jensen, *  Dx:   Encounter Diagnosis     ICD-10-CM    1. Lumbar spondylosis  M47.816       2. Spinal stenosis of lumbar region without neurogenic claudication  M48.061                      Subjective: Pt arrived to therapy on today's date stating that his pain level has increased significantly to the point where 2 tylenol is no longer helping him today. He reports \"melanie is screaming for something to take the edge off.\" He reports that at this time he plans to contact his spine and pain doctor and will contact therapy after. Luis reports that his pain is 7/10 and has been something that has been building for quite some time and is no fault of physical therapy or any exercises he has completed up until this point. Luis requests that the treating physical therapist contact his spine and pain doctor to inform them of where he is at pain wise and that he will be contacting them later this afternoon.       Objective: See treatment diary below      Assessment: No treatment completed today. Pt will be placed on hold with formal OP PT until he contact his spine and pain doctor as he reports that 2 tylenol is no longer helping his pain. Pt reported that he will continue with his HEP exercises as they have been beneficial to him up to this point and have not caused any further harm. HEP exercises were reviewed with patient and all questions were answered. Pt was educated to contact the PT office with any questions or concerns in the meantime, pt verbalized understanding and stated he would like to return to PT in the future if able once his pain is more under control. Pt stated he was fine to drive and was able to leave under his own power without assistance.       Plan: PT will be placed on hold at this time per patient request.      Precautions: COPD, chronic narcotic dependence, hx " "of DVT, depression, foot drop R, pulmonary emphysema      1/25 1/30 2/1 2/6  2/8 2/15 2/20 2/22 2/27 2/29   FOTO             IE/RE     RE      NO TREAT      Manuals             Prone PA lumbar mobs prn                                                     Neuro Re-Ed             Posture education   Supine TA with march NV  Supine TA with march x10    Inc neuropathy in RLE           [place] anti rotation  Supine hip abd with GTB And TA 10\"x6        Stand ext x10  Stand ext x10      [place] rows c holds for posture  GTB paloff press with small rotation x10 ea  GTB paloff press with small rotation x10 ea  GTB paloff press with small rotation x10 ea   Blue TB paloff press R towards wall only x10 L P!        [place] ext c holds for posture  STS 5# KB 3 foam on chair x6 STS 5# KB 2 foam on chair x6 STS 5# KB 2 foam on chair x8  Bridge 10\"x10    Stand ball crush 10\"x7  Bridge 10\"x10    Stand ball crush 10\"x5 Bridge 10\"x10    Stand ball crush 10\"x5 Bridge 10\"x7       Stand ball crush 5\" x5     [place] side steps  Supine ball crush 10\"x10 Stand ball crush 5\" x10 Stand ball crush 5\" x10 Stand ball crush 5\" x10 Stand ball crush with rotation 5\" x5 ea  Stand ball crush with rotation 5\" x5 ea  Stand ball crush with rotation 5\" x5 ea  Stand ball crush with rotation 5\" x5 ea  Stand ball crush with rotation 5\" x5 ea     Bridges c holds 10\"x10    Supine TA 5\" x10           Seated KB 5# rotate R/L x10 ea  Seated KB 5# rotate R/L x10 ea   Stand 5# KB turns x5 ea  Seated KB 5# turns 2x5 ea  Seated KB 10# turns x4  ea side Seated KB 10# turns x4  ea side Seated KB 10# turns x5  ea side                 Ther Ex             Bike warmup L1x5'  L1 x5'  L1 5' L1 x5'  L1 x5'  L1x5'  L1x5'  L5 x 5' Pt. Self progressed resistance Pre- L1x5'     Post 2.5 mins       Pt education on HEP, POC      Left side lying open book elbow bent 10\"X10       Standing lumbar extension  Suitcase carry 5# KB X2 laps ea  Suitcase carry 5# KB X2 laps ea  Suitcase carry 5# " "KB X3 laps ea    UE D2 flex PTB x5  UE D2 flex PTB x5  NV     Standing lumbar ext against table     Stand bird dog @ counter x5 ea    Stand bird dog @ counter x3 ea  Stand bird dog @ counter x5 ea  Stand bird dog at table hands on ball x5 ea      LTR 10\"X10 B/L  HEP    LTR 10\"X10 B/L   LTR 10\"X10 B/L  LTR 10\"X10 B/L  LTR 10\"X8  B/L     Supine piriformis stretch 10\"x10 R   10\"x10 L  HEP    Supine piriformis stretch 10\"x10 R   10\"x10 L  Supine piriformis stretch 10\"x10 R   10\"x10 L                                               Ther Activity                                       Gait Training                                       Modalities                                                                                "

## 2024-03-05 ENCOUNTER — OFFICE VISIT (OUTPATIENT)
Dept: PAIN MEDICINE | Facility: MEDICAL CENTER | Age: 69
End: 2024-03-05
Payer: COMMERCIAL

## 2024-03-05 ENCOUNTER — TELEPHONE (OUTPATIENT)
Age: 69
End: 2024-03-05

## 2024-03-05 VITALS
DIASTOLIC BLOOD PRESSURE: 72 MMHG | HEART RATE: 101 BPM | HEIGHT: 75 IN | WEIGHT: 172 LBS | BODY MASS INDEX: 21.39 KG/M2 | SYSTOLIC BLOOD PRESSURE: 108 MMHG

## 2024-03-05 DIAGNOSIS — M47.816 LUMBAR SPONDYLOSIS: Primary | ICD-10-CM

## 2024-03-05 DIAGNOSIS — G89.4 CHRONIC PAIN SYNDROME: ICD-10-CM

## 2024-03-05 DIAGNOSIS — F11.20 UNCOMPLICATED OPIOID DEPENDENCE (HCC): ICD-10-CM

## 2024-03-05 DIAGNOSIS — M54.16 LUMBAR RADICULOPATHY: ICD-10-CM

## 2024-03-05 DIAGNOSIS — G89.29 CHRONIC PAIN OF BOTH SHOULDERS: ICD-10-CM

## 2024-03-05 DIAGNOSIS — M25.512 CHRONIC PAIN OF BOTH SHOULDERS: ICD-10-CM

## 2024-03-05 DIAGNOSIS — Z79.891 LONG-TERM CURRENT USE OF OPIATE ANALGESIC: ICD-10-CM

## 2024-03-05 DIAGNOSIS — G62.9 NEUROPATHY: ICD-10-CM

## 2024-03-05 DIAGNOSIS — M25.511 CHRONIC PAIN OF BOTH SHOULDERS: ICD-10-CM

## 2024-03-05 PROCEDURE — 99214 OFFICE O/P EST MOD 30 MIN: CPT

## 2024-03-05 RX ORDER — BUPRENORPHINE 5 UG/H
1 PATCH TRANSDERMAL WEEKLY
Qty: 4 PATCH | Refills: 2 | Status: SHIPPED | OUTPATIENT
Start: 2024-03-05

## 2024-03-05 NOTE — TELEPHONE ENCOUNTER
PA for [employer]    Submitted via    [x]CMM-KEY [id number]       []Surescripts-Case ID #   []Faxed to plan   []Other website   []Phone call Case ID #     Office notes sent, clinical questions answered. Awaiting determination    Turnaround time for your insurance to make a decision on your Prior Authorization can take 7-21 business days.

## 2024-03-05 NOTE — PROGRESS NOTES
Assessment:  1. Lumbar spondylosis    2. Uncomplicated opioid dependence (HCC)    3. Chronic pain syndrome    4. Long-term current use of opiate analgesic    5. Lumbar radiculopathy    6. Neuropathy    7. Chronic pain of both shoulders        Plan:  The patient has been experiencing moderate to severe axial lumbar spine pain that is causing functional deficit. The pain has been present for at least 3 months and is not improving with conservative care.  Currently the patient is not experiencing any radicular features nor neurogenic claudication.  Non-facet pathology has been ruled out on clinical evaluation.  The patients pain appears facet mediated on examination today. To help with the low back pain, occurring from facet arthropathy, we discussed medial branch block/radiofrequency ablation. This is a 2 step process, where the patient would first undergo a diagnostic test called a medial branch block injection. If successful, the  medial branch block injection  would provide % pain relief for a few hours  after the procedure. The next step would be a radiofrequency ablation. Radiofrequency ablation decreases pain by creating a nerve lesion to interrupt the pain signals, and weaken the pain perceived by the brain. This procedure typically provides 6-18  months of pain relief. The risks, including bleeding, infection, and tissue reaction were reviewed with the patient, and was scheduled for bilateral L3-L5 medial branch block injections.  Patient underwent right and left L3-L5 radiofrequency ablation in 2018 and reported almost complete resolution of low back pain for several years before his pain gradually returned to baseline.  Patient states that his pain has been acutely worsening over the past several weeks.  Update x-ray of the lumbar spine.  The patient remains stable and his pain is typically manageable on Butrans 5 mcg patches 1 patch weekly. Refills of this medication were provided at today's  visit.  The patient has notified me that last week when his pain was extremely severe, he took 1 oxycodone 5 mg tablet that was previously prescribed to him postoperatively and reports that this may be present in his urine drug screen. Upon review of the PDMP, this medication has previously been prescribed to him. Patient stated that he thought if this information was disclosed prior to his drug screen, he was not in violation of his contract. We discussed that this is a violation of the opioid agreement that he has signed and if another incident should occur, we will no longer be able to prescribe for him. Upon review of his chart, the patient has never violated his contract and his urine drug screens have been normal consistently.  Follow up in 3 months for medication refill and reevaluation/after RFA.    There are risks associated with opioid medications, including dependence, addiction and tolerance. The patient understands and agrees to use these medications only as prescribed. Potential side effects of the medications include, but are not limited to, constipation, drowsiness, addiction, impaired judgment and risk of fatal overdose if not taken as prescribed. The patient was warned against driving while taking sedation medications.  Sharing medications is a felony. At this point in time, the patient is showing no signs of addiction, abuse, diversion or suicidal ideation.    A urine drug screen was collected at today's office visit as part of our medication management protocol. The point of care testing results were appropriate for what was being prescribed. The specimen will be sent for confirmatory testing. The drug screen is medically necessary because the patient is either dependent on opioid medication or is being considered for opioid medication therapy and the results could impact ongoing or future treatment. The drug screen is to evaluate for the presences or absence of prescribed, non-prescribed, and/or  illicit drugs/substances.    [place] Prescription Drug Monitoring Program report was reviewed and was appropriate     My impressions and treatment recommendations were discussed in detail with the patient who verbalized understanding and had no further questions.  Discharge instructions were provided. I personally saw and examined the patient and I agree with the above discussed plan of care.    Orders Placed This Encounter   Procedures    FL spine and pain procedure     Patient had B/L L3-L5 RFA in 2018     Standing Status:   Future     Standing Expiration Date:   3/5/2028     Order Specific Question:   Reason for Exam:     Answer:   Bilateral L3-L5 MBB#1     Order Specific Question:   Anticoagulant hold needed?     Answer:   No    X-ray lumbar spine complete with bending     Standing Status:   Future     Standing Expiration Date:   3/5/2028     Scheduling Instructions:      Bring along any outside films relating to this procedure.           Order Specific Question:   When should the test be performed?     Answer:   Elective- non urgent    MM ALL_Prescribed Meds and Special Instructions     Order Specific Question:   [facility] Is ATORVASTATIN prescribed?     Answer:   Yes     Order Specific Question:   [facility] Is BUPRENORPHINE Prescribed?     Answer:   Yes    MM DT_Alprazolam Definitive Test    MM DT_Amitriptyline Definitive Test    MM DT_Amphetamine Definitive Test    MM DT_Buprenorphine Definitive Test    MM DT_Carisoprodol Definitive Test    MM DT_Clonazepam Definitive Test    MM DT_Cocaine Definitive Test    MM DT_Codeine Definitive Test    MM Diazepam Definitive Test    MM DT_Ethyl Glucuronide/Ethyl Sulfate Definitive Test    MM DT_Fentanyl Definitive Test    MM DT_Heroin Definitive Test    MM DT_Hydrocodone Definitive Test    MM DT_Hydromorphone Definitive Test    MM DT_Kratom Definitive Test    MM Lorazepam Definitive Test    MM DT_MDMA Definitive Test    MM DT_Methadone Definitive Test    MM  DT_Methamphetaine-d/l Isomers Definitive    MM DT_Methamphetamine Definitive Test    MM DT_Morphine Definitive Test    MM DT_Oxazepam Definitive Test    MM DT_Oxycodone Definitive Test    MM DT_Oxymorphone Definitive Test    MM DT_Phentermine Definitive Test    MM DT_Secobarbital Definitive Test    MM DT_Tapentadol Definitive Test    MM DT_Temazapam Definitive Test    MM DT_THC Definitive Test    MM DT_Tramadol Definitive Test    MM DT_Validity Creatinine    MM DT_Validity Oxidant    MM DT_Validity pH    MM DT_Validity Specific     New Medications Ordered This Visit   Medications    transdermal buprenorphine (BUTRANS) 5 mcg/hr TD patch     Sig: Place 1 patch on the skin over 7 days once a week For ongoing therapy. DO NOT FILL BEFORE: 03/25/2024     Dispense:  4 patch     Refill:  2       History of Present Illness:  Luis Russell is a 68 y.o. male with a history of chronic bilateral low back pain, lumbar spondylosis, uncomplicated opioid dependence, and chronic pain syndrome who presents for a follow up office visit in regards to return of very severe axial bilateral low back pain R>L.  Patient reports that his pain is localized to the low back and does not radiate.  He reports that his pain is intermittent, and typically worse in the evening.  He is currently rating his pain a 5/10 in severity, but states that it can increase to a 10/10 with activity.  The quality of his pain is described as dull/aching, sharp, pressure-like, sore, and swollen.  He denies any lower extremity weakness or numbness associated with this pain complaint.    The patient has been participating in physical therapy, which usually provides him with some relief.  I did receive a message from the patient's physical therapist last week that he was unable to complete physical therapy sessions due to intense pain.  Patient reports that he had right and left L3-L5 radiofrequency ablation in 2018, which provided him with dramatic relief of his  low back pain for several years.  Over the past couple of months, the patient has started to notice return of this same pain.  This is acutely worsening over the past several weeks.    Current pain medication regimen consists of Butrans 5 mcg/h patches.  The patient tolerates this medication well without side effects and reports that it reduces his pain by about 20%.    Opioid contract date 03/05/24  Last UDS Date 03/05/24  Patient is on day 7 of butrans patch    I have personally reviewed and/or updated the patient's past medical history, past surgical history, family history, social history, current medications, allergies, and vital signs today.     Review of Systems   Respiratory:  Negative for shortness of breath.    Cardiovascular:  Negative for chest pain.   Gastrointestinal:  Positive for constipation. Negative for diarrhea, nausea and vomiting.   Musculoskeletal:  Positive for back pain, gait problem, joint swelling and myalgias. Negative for arthralgias.   Skin:  Negative for rash.   Neurological:  Negative for dizziness, seizures and weakness.   All other systems reviewed and are negative.      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HCC)    S/P femoral-popliteal bypass surgery    Atherosclerosis of artery of left lower extremity ([facility])    Chronic obstructive pulmonary disease, unspecified COPD type (HCC)    Chronic right shoulder pain    MTHFR mutation    History of DVT (deep vein thrombosis)    PAD (peripheral artery disease) (HCC)    Adenocarcinoma of right lung (HCC)    Advanced care planning/counseling  "discussion    Syringomyelia (HCC)       Past Medical History:   Diagnosis Date    Arthritis 02/2016    osto back    Chronic back pain     Chronic narcotic dependence (HCC)     Chronic pain disorder     Sees pain management Dr Eric MARTINEZ    Cigarette smoker     Deep vein thrombosis (DVT) of femoral vein of left lower extremity (HCC) 07/29/2021    Depression     Dry skin     Dyslexia     Full dentures     upper and no teeth lower/\"avoid raw vegetables and chewy food\"    GERD (gastroesophageal reflux disease)     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 related    History of blood clots     \"left leg\" takes Eliquis    History of bronchitis     History of pneumonia     childhood    Increased pressure in the eye, bilateral     L4-L5 disc bulge 06/16/2017    Low back pain     MTHFR mutation     Multiple subsolid lung nodules greater than 6 mm in diameter     Muscle weakness     Neuropathy     right foot and left foot    Osteoarthritis     Osteoarthritis     and\" if becomes cold joint pain worsens\"    Peripheral neuropathy 08/2015    Pulmonary emphysema (HCC)     \"mild\"    Right foot drop     Right upper lobe pulmonary nodule     Shortness of breath     \"only on humid days\"    Smokers' cough (HCC)     Uncomplicated opioid dependence (HCC)     Wears glasses     Work related injury 04/24/2018       Past Surgical History:   Procedure Laterality Date    BACK SURGERY  aug 2015- jun2017 2017 no affect    COLONOSCOPY      EGD AND COLONOSCOPY N/A 06/29/2018    Procedure: EGD AND COLONOSCOPY;  Surgeon: Neal Kwok MD;  Location: [facility];  Service: Gastroenterology    EPIDURAL BLOCK INJECTION      needle    FL RETROGRADE PYELOGRAM  9/12/2023    HIP ARTHROSCOPY W/ LABRAL DEBRIDEMENT Left     \"hip surgery\"    IR BIOPSY LUNG  4/17/2023    IR CHEST TUBE PLACEMENT  4/17/2023    IR LOWER EXTREMITY ANGIOGRAM  12/08/2021    LUNG LOBECTOMY Right 5/17/2023    Procedure: lymph node dissection; PLEURAL LYSIS;  Surgeon: Blanquita Chinchilla MD;  [facility]: " [facility];  Service: Thoracic    LUNG SEGMENTECTOMY Right 5/17/2023    Procedure: Right thoracoscopic upper lobe wedge resection;  Surgeon: Blanquita Chinchilla MD;  [facility];  Service: Thoracic    ORTHOPEDIC SURGERY      NM ARTHRS HIP DEBRIDEMENT/SHAVING ARTICULAR CRTLG Left 04/30/2018    Procedure: LEFT HIP ARTHROSCOPIC LABRAL DEBRIDEMENT;  Surgeon: Rainer Booth MD;  [facility];  Service: Orthopedics    NM BRNCC INCL FLUOR GDNCE DX W/CELL WASHG SPX N/A 5/17/2023    Procedure: BRONCHOSCOPY FLEXIBLE;  Surgeon: Blanquita Chinchilla MD;  [facility];  Service: Thoracic    NM CYSTO/PYELOSCOPY BX&/FULGURATION PELIVC LESION Bilateral 9/12/2023    Procedure: bilateral  ureteroscopy,bilateral  laser ablation tumor, bilateral brushings/washings,bilateral  retrograde pyelogram,bilateral  ureteral stent insertion;  Surgeon: Carlito Hernandez MD;  [facility];  Service: Urology    NM IN-SITU VEIN BYPASS FEMORAL-POPLITEAL Right 12/08/2021    Procedure: R SFA to BK Pop Bypass using insitu GSV,  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;  Surgeon: Valeriy Scott MD;  [facility];  Service: Vascular    NM SLCTV CATHJ 3[facility]+ [facility]    Procedure: COMPLETION ARTERIOGRAM WITH 5 MM ANGIOPLASTY OF MID SUPERFICIAL FEMORAL ARTERY;  Surgeon: Valeriy Scott MD;  [facility];  Service: Vascular    NM THORACOSCOPY W/THERA WEDGE RESEXN INITIAL UNILAT Right 5/17/2023    Procedure: THORACOSCOPY VIDEO ASSISTED SURGERY (VATS);  Surgeon: Blanquita Chinchilla MD;  [facility];  Service: Thoracic    TONSILLECTOMY      WISDOM TOOTH EXTRACTION         Family History   Problem Relation Age of Onset    Dementia Mother     Glaucoma Mother     Arthritis Mother         nursing home    Cancer Father         dead    Osteoarthritis Family        Social History     Occupational History    Not on file   Tobacco Use    Smoking status: Some Days      "Current packs/day: 0.25     Average packs/day: 0.3 packs/day for 16.2 years (4.0 ttl pk-yrs)     Types: Cigarettes     Start date: 2008     Passive exposure: Yes    Smokeless tobacco: Never    Tobacco comments:     stress caused by workers comp.  not covering meds. Less than half a pack of cigs    Vaping Use    Vaping status: Never Used   Substance and Sexual Activity    Alcohol use: Not Currently    Drug use: Not Currently     Comment: drug free since 1987    Sexual activity: Not on file     Comment: defer       Current Outpatient Medications on File Prior to Visit   Medication Sig    aspirin (ECOTRIN LOW STRENGTH) 81 mg EC tablet Take 1 tablet (81 mg total) by mouth daily    atorvastatin (LIPITOR) 10 mg tablet Take 0.5 tablets (5 mg total) by mouth daily at bedtime    ibuprofen (MOTRIN) 200 mg tablet Take 200 mg by mouth every 6 (six) hours as needed for mild pain    Misc Natural Products (Osteo Bi-Flex Triple Strength) TABS Take 1 tablet by mouth 2 (two) times a day    [DISCONTINUED] transdermal buprenorphine (BUTRANS) 5 mcg/hr TD patch Place 1 patch on the skin over 7 days once a week For ongoing therapy    apixaban (ELIQUIS) 5 mg Take 1 tablet (5 mg total) by mouth 2 (two) times a day (Patient not taking: Reported on 3/5/2024)     No current facility-administered medications on file prior to visit.       Allergies   Allergen Reactions    Cyanocobalamin [Vitamin B12] GI Intolerance    Gabapentin Other (See Comments) and GI Intolerance     Difficulty walking    Morphine Other (See Comments)     \"acute constipation\"    Tramadol Confusion    Nsaids Other (See Comments)     Pt reports avoids as is on Eliquis       Physical Exam:    /72   Pulse 101   Ht 6' 3\" (1.905 m)   Wt 78 kg (172 lb)   BMI 21.50 kg/m²     Constitutional:normal, well developed, well nourished, alert, in no distress and non-toxic and no overt pain behavior.  Eyes:anicteric  HEENT:grossly intact  Neck:symmetric, trachea midline and no " masses   Pulmonary:even and unlabored  Cardiovascular:No edema or pitting edema present  Skin:Normal without rashes or lesions and well hydrated  Psychiatric:Mood and affect appropriate  Neurologic:Cranial Nerves II-XII grossly intact  Musculoskeletal:normal, except for tenderness to palpation over the lumbar paraspinal musculature bilaterally and lumbar facet joints.  Pain is reproduced with lumbar extension and rotation.

## 2024-03-21 ENCOUNTER — TELEPHONE (OUTPATIENT)
Age: 69
End: 2024-03-21

## 2024-03-21 ENCOUNTER — APPOINTMENT (OUTPATIENT)
Dept: RADIOLOGY | Age: 69
End: 2024-03-21
Payer: COMMERCIAL

## 2024-03-21 DIAGNOSIS — M47.816 LUMBAR SPONDYLOSIS: ICD-10-CM

## 2024-03-21 PROCEDURE — 72114 X-RAY EXAM L-S SPINE BENDING: CPT

## 2024-03-21 NOTE — TELEPHONE ENCOUNTER
Patient called asking to speak to [facility].  Provided number and cold transfer to [facility].

## 2024-03-21 NOTE — TELEPHONE ENCOUNTER
Caller: Luis    Doctor: Eric    Reason for call: Pt is calling to see if we can provide him with transportation for his appt for tomorrow being that the two people that would have been able to bring him are experiencing Medical issues and are unable to bring pt. Email sent to [facility].     Please advise     Call back#: [phone]

## 2024-03-22 ENCOUNTER — HOSPITAL ENCOUNTER (OUTPATIENT)
Dept: RADIOLOGY | Facility: MEDICAL CENTER | Age: 69
Discharge: HOME | End: 2024-03-22
Admitting: PHYSICAL MEDICINE & REHABILITATION
Payer: COMMERCIAL

## 2024-03-22 VITALS
HEART RATE: 84 BPM | TEMPERATURE: 98.2 F | RESPIRATION RATE: 16 BRPM | SYSTOLIC BLOOD PRESSURE: 113 MMHG | DIASTOLIC BLOOD PRESSURE: 77 MMHG | OXYGEN SATURATION: 96 %

## 2024-03-22 DIAGNOSIS — M47.816 LUMBAR SPONDYLOSIS: ICD-10-CM

## 2024-03-22 PROCEDURE — 64494 INJ PARAVERT F JNT L/S 2 LEV: CPT | Performed by: PHYSICAL MEDICINE & REHABILITATION

## 2024-03-22 PROCEDURE — 64493 INJ PARAVERT F JNT L/S 1 LEV: CPT | Performed by: PHYSICAL MEDICINE & REHABILITATION

## 2024-03-22 RX ADMIN — Medication 3 ML: at 11:12

## 2024-03-22 NOTE — DISCHARGE INSTRUCTIONS

## 2024-03-22 NOTE — H&P
"History of Present Illness: The patient is a 68 y.o. male who presents with complaints of bilateral lower back pain    Past Medical History:   Diagnosis Date    Arthritis 02/2016    osto back    Chronic back pain     Chronic narcotic dependence (HCC)     Chronic pain disorder     Sees pain management Dr Eric MARTINEZ    Cigarette smoker     Deep vein thrombosis (DVT) of femoral vein of left lower extremity (HCC) 07/29/2021    Depression     Dry skin     Dyslexia     Full dentures     upper and no teeth lower/\"avoid raw vegetables and chewy food\"    GERD (gastroesophageal reflux disease)     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 related    History of blood clots     \"left leg\" takes Eliquis    History of bronchitis     History of pneumonia     childhood    Increased pressure in the eye, bilateral     L4-L5 disc bulge 06/16/2017    Low back pain     MTHFR mutation     Multiple subsolid lung nodules greater than 6 mm in diameter     Muscle weakness     Neuropathy     right foot and left foot    Osteoarthritis     Osteoarthritis     and\" if becomes cold joint pain worsens\"    Peripheral neuropathy 08/2015    Pulmonary emphysema (HCC)     \"mild\"    Right foot drop     Right upper lobe pulmonary nodule     Shortness of breath     \"only on humid days\"    Smokers' cough (HCC)     Uncomplicated opioid dependence (HCC)     Wears glasses     Work related injury 04/24/2018       Past Surgical History:   Procedure Laterality Date    BACK SURGERY  aug 2015- jun2017 2017 no affect    COLONOSCOPY      EGD AND COLONOSCOPY N/A 06/29/2018    Procedure: EGD AND COLONOSCOPY;  Surgeon: Neal Kwok MD;  Location: [facility];  Service: Gastroenterology    EPIDURAL BLOCK INJECTION      needle    FL RETROGRADE PYELOGRAM  9/12/2023    HIP ARTHROSCOPY W/ LABRAL DEBRIDEMENT Left     \"hip surgery\"    IR BIOPSY LUNG  4/17/2023    IR CHEST TUBE PLACEMENT  4/17/2023    IR LOWER EXTREMITY ANGIOGRAM  12/08/2021    LUNG LOBECTOMY Right 5/17/2023    Procedure: " lymph node dissection; PLEURAL LYSIS;  Surgeon: Blanquita Chinchilla MD;  [facility];  Service: Thoracic    LUNG SEGMENTECTOMY Right 5/17/2023    Procedure: Right thoracoscopic upper lobe wedge resection;  Surgeon: Blanquita Chinchilla MD;  [facility];  Service: Thoracic    ORTHOPEDIC SURGERY      MS ARTHRS HIP DEBRIDEMENT/SHAVING ARTICULAR CRTLG Left 04/30/2018    Procedure: LEFT HIP ARTHROSCOPIC LABRAL DEBRIDEMENT;  Surgeon: Rainer Booth MD;  [facility];  Service: Orthopedics    MS BRNCHSC INCL FLUOR GDNCE DX W/CELL WASHG SPX N/A 5/17/2023    Procedure: BRONCHOSCOPY FLEXIBLE;  Surgeon: Blanquita Chinchilla MD;  [facility];  Service: Thoracic    MS CYSTO/PYELOSCOPY BX&/FULGURATION PELIVC LESION Bilateral 9/12/2023    Procedure: bilateral  ureteroscopy,bilateral  laser ablation tumor, bilateral brushings/washings,bilateral  retrograde pyelogram,bilateral  ureteral stent insertion;  Surgeon: Carlito Hernandez MD;  [facility];  Service: Urology    MS IN-SITU VEIN BYPASS FEMORAL-POPLITEAL Right 12/08/2021    Procedure: R SFA to BK Pop Bypass using insitu GSV,  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;  Surgeon: Valeriy Scott MD;  [facility];  Service: Vascular    MS SLCTV CATHJ 3RD+ ORD SLCTV ABDL PEL/LXTR BRNCH Right 12/08/2021    Procedure: COMPLETION ARTERIOGRAM WITH 5 MM ANGIOPLASTY OF MID SUPERFICIAL FEMORAL ARTERY;  Surgeon: Valeriy Scott MD;  [facility];  Service: Vascular    MS THORACOSCOPY W/THERA WEDGE RESEXN INITIAL UNILAT Right 5/17/2023    Procedure: THORACOSCOPY VIDEO ASSISTED SURGERY (VATS);  Surgeon: Blanquita Chinchilla MD;  [facility];  Service: Thoracic    TONSILLECTOMY      WISDOM TOOTH EXTRACTION           Current Outpatient Medications:     apixaban (ELIQUIS) 5 mg, Take 1 tablet (5 mg total) by mouth 2 (two) times a day (Patient not taking: Reported on 3/5/2024), Disp: , Rfl:     aspirin (ECOTRIN LOW STRENGTH) 81  "mg EC tablet, Take 1 tablet (81 mg total) by mouth daily, Disp: 90 tablet, Rfl: 0    atorvastatin (LIPITOR) 10 mg tablet, Take 0.5 tablets (5 mg total) by mouth daily at bedtime, Disp: 45 tablet, Rfl: 1    ibuprofen (MOTRIN) 200 mg tablet, Take 200 mg by mouth every 6 (six) hours as needed for mild pain, Disp: , Rfl:     Misc Natural Products (Osteo Bi-Flex Triple Strength) TABS, Take 1 tablet by mouth 2 (two) times a day, Disp: , Rfl:     transdermal buprenorphine (BUTRANS) 5 mcg/hr TD patch, Place 1 patch on the skin over 7 days once a week For ongoing therapy. DO NOT FILL BEFORE: 03/25/2024, Disp: 4 patch, Rfl: 2  No current facility-administered medications for this encounter.    Allergies   Allergen Reactions    Cyanocobalamin [Vitamin B12] GI Intolerance    Gabapentin Other (See Comments) and GI Intolerance     Difficulty walking    Morphine Other (See Comments)     \"acute constipation\"    Tramadol Confusion    Nsaids Other (See Comments)     Pt reports avoids as is on Eliquis       Physical Exam:   Vitals:    03/22/24 1102   BP: 106/75   Pulse: 87   Resp: 18   Temp: 98.2 °F (36.8 °C)   SpO2: 98%     General: Awake, Alert, Oriented x 3, Mood and affect appropriate  Respiratory: Respirations even and unlabored  Cardiovascular: Peripheral pulses intact; no edema  Musculoskeletal Exam: bilateral lower back pain    ASA Score: 3    Patient/Chart Verification  Patient ID Verified: Verbal  Consents Confirmed: Procedural, To be obtained in the Pre-Procedure area  H&P( within 30 days) Verified: To be obtained in the Pre-Procedure area  Allergies Reviewed: Yes  Anticoag/NSAID held?: NA  Currently on antibiotics?: No  Pregnancy denied?: NA    Assessment:   1. Lumbar spondylosis        Plan: Bilateral L3-L5 MBB#1    "

## 2024-03-27 NOTE — TELEPHONE ENCOUNTER
NIRALIOM for pt to C/B, C/B # provided  Likely AOCD iso chronic kidney disease. No s/s of active bleeding. Per old lab review, Hgb was 9 in 1/2024.   Hgb currently 7.5     - F/u iron studies  - maintain active T&S  - transfuse for hgb < 7

## 2024-03-28 ENCOUNTER — TELEPHONE (OUTPATIENT)
Dept: PAIN MEDICINE | Facility: CLINIC | Age: 69
End: 2024-03-28

## 2024-03-28 NOTE — TELEPHONE ENCOUNTER
----- Message from Natividad Brewer PA-C sent at 3/28/2024 10:11 AM EDT -----  X-ray of the lumbar spine reveals multilevel degenerative changes with mild dextroscoliosis.  Proceed with current plan per note.  
S/w pt and advised of the same, pt reports his pain diary is in mail and waiting to be picked up to be delivered.  Pt reports he did respond well to MBB#1. Nurse advised pt once MBB is received it will be reviewed and SPA to call pt on how to proceed, pt verbalized understanding and appreciative of call.   
Self

## 2024-04-03 ENCOUNTER — TELEPHONE (OUTPATIENT)
Dept: RADIOLOGY | Facility: MEDICAL CENTER | Age: 69
End: 2024-04-03

## 2024-04-04 NOTE — TELEPHONE ENCOUNTER
Procedure scheduled 4/19/24.    Reviewed instructions: , NPO 1 hour prior, loose-fitting/comfortable clothes, if ill/fever/infx/abx to call and reschedule.  Also pain level at leat 5/10 and refrain from PRN, as-needed pain meds 6h prior.  Patient stated verbal understanding.

## 2024-04-19 ENCOUNTER — HOSPITAL ENCOUNTER (OUTPATIENT)
Dept: RADIOLOGY | Facility: MEDICAL CENTER | Age: 69
Discharge: HOME | End: 2024-04-19
Payer: COMMERCIAL

## 2024-04-19 VITALS
SYSTOLIC BLOOD PRESSURE: 100 MMHG | DIASTOLIC BLOOD PRESSURE: 68 MMHG | RESPIRATION RATE: 16 BRPM | TEMPERATURE: 98.2 F | OXYGEN SATURATION: 94 % | HEART RATE: 84 BPM

## 2024-04-19 DIAGNOSIS — M47.816 LUMBAR SPONDYLOSIS: ICD-10-CM

## 2024-04-19 PROCEDURE — 64494 INJ PARAVERT F JNT L/S 2 LEV: CPT | Performed by: PHYSICAL MEDICINE & REHABILITATION

## 2024-04-19 PROCEDURE — 64493 INJ PARAVERT F JNT L/S 1 LEV: CPT | Performed by: PHYSICAL MEDICINE & REHABILITATION

## 2024-04-19 RX ORDER — BUPIVACAINE HYDROCHLORIDE 5 MG/ML
3 INJECTION, SOLUTION EPIDURAL; INTRACAUDAL ONCE
Status: COMPLETED | OUTPATIENT
Start: 2024-04-19 | End: 2024-04-19

## 2024-04-19 RX ADMIN — BUPIVACAINE HYDROCHLORIDE 3 ML: 5 INJECTION, SOLUTION EPIDURAL; INTRACAUDAL at 13:08

## 2024-04-19 NOTE — DISCHARGE INSTRUCTIONS

## 2024-04-19 NOTE — H&P
"History of Present Illness: The patient is a 68 y.o. male who presents with complaints of bilateral lower back pain    Past Medical History:   Diagnosis Date    Arthritis 02/2016    osto back    Chronic back pain     Chronic narcotic dependence (HCC)     Chronic pain disorder     Sees pain management Dr Eric MARTINEZ    Cigarette smoker     Deep vein thrombosis (DVT) of femoral vein of left lower extremity (HCC) 07/29/2021    Depression     Dry skin     Dyslexia     Full dentures     upper and no teeth lower/\"avoid raw vegetables and chewy food\"    GERD (gastroesophageal reflux disease)     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 related    History of blood clots     \"left leg\" takes Eliquis    History of bronchitis     History of pneumonia     childhood    Increased pressure in the eye, bilateral     L4-L5 disc bulge 06/16/2017    Low back pain     MTHFR mutation     Multiple subsolid lung nodules greater than 6 mm in diameter     Muscle weakness     Neuropathy     right foot and left foot    Osteoarthritis     Osteoarthritis     and\" if becomes cold joint pain worsens\"    Peripheral neuropathy 08/2015    Pulmonary emphysema (HCC)     \"mild\"    Right foot drop     Right upper lobe pulmonary nodule     Shortness of breath     \"only on humid days\"    Smokers' cough (HCC)     Uncomplicated opioid dependence (HCC)     Wears glasses     Work related injury 04/24/2018       Past Surgical History:   Procedure Laterality Date    BACK SURGERY  aug 2015- jun2017 2017 no affect    COLONOSCOPY      EGD AND COLONOSCOPY N/A 06/29/2018    Procedure: EGD AND COLONOSCOPY;  Surgeon: Neal Kwok MD;  Location: [facility];  Service: Gastroenterology    EPIDURAL BLOCK INJECTION      needle    FL RETROGRADE PYELOGRAM  9/12/2023    HIP ARTHROSCOPY W/ LABRAL DEBRIDEMENT Left     \"hip surgery\"    IR BIOPSY LUNG  4/17/2023    IR CHEST TUBE PLACEMENT  4/17/2023    IR LOWER EXTREMITY ANGIOGRAM  12/08/2021    LUNG LOBECTOMY Right 5/17/2023    Procedure: " lymph node dissection; PLEURAL LYSIS;  Surgeon: Blanquita Chnichilla MD;  [facility];  Service: Thoracic    LUNG SEGMENTECTOMY Right 5/17/2023    Procedure: Right thoracoscopic upper lobe wedge resection;  Surgeon: Blanquita Chinchilla MD;  [facility];  Service: Thoracic    ORTHOPEDIC SURGERY      GA ARTHRS HIP DEBRIDEMENT/SHAVING ARTICULAR CRTLG Left 04/30/2018    Procedure: LEFT HIP ARTHROSCOPIC LABRAL DEBRIDEMENT;  Surgeon: Rainer Booth MD;  [facility];  Service: Orthopedics    GA BRNCHSC INCL FLUOR GDNCE DX W/CELL WASHG SPX N/A 5/17/2023    Procedure: BRONCHOSCOPY FLEXIBLE;  Surgeon: Blanquita Chinchilla MD;  [facility];  Service: Thoracic    GA CYSTO/PYELOSCOPY BX&/FULGURATION PELIVC LESION Bilateral 9/12/2023    Procedure: bilateral  ureteroscopy,bilateral  laser ablation tumor, bilateral brushings/washings,bilateral  retrograde pyelogram,bilateral  ureteral stent insertion;  Surgeon: Carlito Hernandez MD;  [facility];  Service: Urology    GA IN-SITU VEIN BYPASS FEMORAL-POPLITEAL Right 12/08/2021    Procedure: R SFA to BK Pop Bypass using insitu GSV,  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;  Surgeon: Valeriy Scott MD;  [facility];  Service: Vascular    GA SLCTV CATHJ 3RD+ ORD SLCTV ABDL PEL/LXTR BRNCH Right 12/08/2021    Procedure: COMPLETION ARTERIOGRAM WITH 5 MM ANGIOPLASTY OF MID SUPERFICIAL FEMORAL ARTERY;  Surgeon: Valeriy Scott MD;  [facility];  Service: Vascular    GA THORACOSCOPY W/THERA WEDGE RESEXN INITIAL UNILAT Right 5/17/2023    Procedure: THORACOSCOPY VIDEO ASSISTED SURGERY (VATS);  Surgeon: Blanquita Chinchilla MD;  [facility];  Service: Thoracic    TONSILLECTOMY      WISDOM TOOTH EXTRACTION           Current Outpatient Medications:     apixaban (ELIQUIS) 5 mg, Take 1 tablet (5 mg total) by mouth 2 (two) times a day (Patient not taking: Reported on 3/5/2024), Disp: , Rfl:     aspirin (ECOTRIN LOW STRENGTH) 81  "mg EC tablet, Take 1 tablet (81 mg total) by mouth daily, Disp: 90 tablet, Rfl: 0    atorvastatin (LIPITOR) 10 mg tablet, Take 0.5 tablets (5 mg total) by mouth daily at bedtime, Disp: 45 tablet, Rfl: 1    ibuprofen (MOTRIN) 200 mg tablet, Take 200 mg by mouth every 6 (six) hours as needed for mild pain, Disp: , Rfl:     Misc Natural Products (Osteo Bi-Flex Triple Strength) TABS, Take 1 tablet by mouth 2 (two) times a day, Disp: , Rfl:     transdermal buprenorphine (BUTRANS) 5 mcg/hr TD patch, Place 1 patch on the skin over 7 days once a week For ongoing therapy. DO NOT FILL BEFORE: 03/25/2024, Disp: 4 patch, Rfl: 2    Allergies   Allergen Reactions    Cyanocobalamin [Vitamin B12] GI Intolerance    Gabapentin Other (See Comments) and GI Intolerance     Difficulty walking    Morphine Other (See Comments)     \"acute constipation\"    Tramadol Confusion    Nsaids Other (See Comments)     Pt reports avoids as is on Eliquis       Physical Exam:   Vitals:    04/19/24 1239   BP: 115/78   Pulse: 92   Resp: 18   Temp: 98.2 °F (36.8 °C)   SpO2: 92%     General: Awake, Alert, Oriented x 3, Mood and affect appropriate  Respiratory: Respirations even and unlabored  Cardiovascular: Peripheral pulses intact; no edema  Musculoskeletal Exam: bilateral lower back pain    ASA Score: 3    Patient/Chart Verification  Patient ID Verified: Verbal  Consents Confirmed: Procedural, To be obtained in the Pre-Procedure area  H&P( within 30 days) Verified: To be obtained in the Pre-Procedure area  Allergies Reviewed: Yes  Anticoag/NSAID held?: NA  Currently on antibiotics?: No  Pregnancy denied?: NA    Assessment:   1. Lumbar spondylosis        Plan: B/L L3-5 MBB #2 (26965, [id number])    "

## 2024-04-30 ENCOUNTER — TELEPHONE (OUTPATIENT)
Dept: RADIOLOGY | Facility: MEDICAL CENTER | Age: 69
End: 2024-04-30

## 2024-04-30 NOTE — TELEPHONE ENCOUNTER
Pain diary reviewed by Dr. Reyes; proceed with 30 minute follow up visit with physician assistant to review lumbar MBB Results. Please call to coordinate.

## 2024-05-06 ENCOUNTER — OFFICE VISIT (OUTPATIENT)
Dept: PAIN MEDICINE | Facility: MEDICAL CENTER | Age: 69
End: 2024-05-06
Payer: COMMERCIAL

## 2024-05-06 VITALS
HEIGHT: 75 IN | HEART RATE: 87 BPM | BODY MASS INDEX: 21.24 KG/M2 | WEIGHT: 170.8 LBS | DIASTOLIC BLOOD PRESSURE: 80 MMHG | SYSTOLIC BLOOD PRESSURE: 124 MMHG

## 2024-05-06 DIAGNOSIS — G89.4 CHRONIC PAIN SYNDROME: ICD-10-CM

## 2024-05-06 DIAGNOSIS — M25.512 CHRONIC PAIN OF BOTH SHOULDERS: ICD-10-CM

## 2024-05-06 DIAGNOSIS — G62.9 NEUROPATHY: ICD-10-CM

## 2024-05-06 DIAGNOSIS — M54.16 LUMBAR RADICULOPATHY: ICD-10-CM

## 2024-05-06 DIAGNOSIS — G89.29 CHRONIC PAIN OF BOTH SHOULDERS: ICD-10-CM

## 2024-05-06 DIAGNOSIS — M25.511 CHRONIC PAIN OF BOTH SHOULDERS: ICD-10-CM

## 2024-05-06 DIAGNOSIS — M47.816 LUMBAR SPONDYLOSIS: Primary | ICD-10-CM

## 2024-05-06 PROCEDURE — G2211 COMPLEX E/M VISIT ADD ON: HCPCS

## 2024-05-06 PROCEDURE — 99214 OFFICE O/P EST MOD 30 MIN: CPT

## 2024-05-06 RX ORDER — BUPRENORPHINE 5 UG/H
1 PATCH TRANSDERMAL WEEKLY
Qty: 4 PATCH | Refills: 0 | Status: SHIPPED | OUTPATIENT
Start: 2024-05-06

## 2024-05-06 NOTE — PROGRESS NOTES
Assessment:  1. Lumbar spondylosis    2. Chronic pain syndrome    3. Lumbar radiculopathy    4. Neuropathy    5. Chronic pain of both shoulders        Plan:  After discussion of response to bilateral L3-L5 MBB #2, the patient has elected to proceed with left and right L3-L5 radiofrequency ablations.  Previous ablation at this level provided complete resolution of low back pain for multiple years before pain gradually returned to baseline.  Continue Butrans 5 mcg.  Patient remains stable on this medication.  He has not filled his prescription recently due to recent urologic surgery that required postoperative pain management.  Patient reports he has 2 months of medication left.  I have provided him with an additional 30-day supply and he will follow-up in 3 months for reevaluation.     There are risks associated with opioid medications, including dependence, addiction and tolerance. The patient understands and agrees to use these medications only as prescribed. Potential side effects of the medications include, but are not limited to, constipation, drowsiness, addiction, impaired judgment and risk of fatal overdose if not taken as prescribed. The patient was warned against driving while taking sedation medications.  Sharing medications is a felony. At this point in time, the patient is showing no signs of addiction, abuse, diversion or suicidal ideation.    [place] Prescription Drug Monitoring Program report was reviewed and was appropriate.    This patient is being managed for a complex and serious condition that requires ongoing, intensive medical management. The nature of this condition demands constant vigilance and a nuanced approach to treatment. The condition necessitates an in-depth and focused approach to management, including regular monitoring and potential coordination with other healthcare professionals.    Detailed Description of Visit Complexity: This visit involved an intricate evaluation and  See other message    management of the patient's condition. The complexity of the visit was due to the need for a detailed assessment of the current state, consideration of potential complications, and a careful balancing of treatment options to manage the condition effectively.     Patient's Health Status and History: This patient has a significant pain history which requires regular and detailed management. The condition's impact on their life and health is substantial, necessitating a comprehensive and tailored approach to chronic ongoing care.     My impressions and treatment recommendations were discussed in detail with the patient who verbalized understanding and had no further questions.  Discharge instructions were provided. I personally saw and examined the patient and I agree with the above discussed plan of care.    Orders Placed This Encounter   Procedures    FL spine and pain procedure     Standing Status:   Future     Standing Expiration Date:   5/6/2028     Order Specific Question:   Reason for Exam:     Answer:   Right L3-L5 RFA     Order Specific Question:   Anticoagulant hold needed?     Answer:   No    FL spine and pain procedure     Standing Status:   Future     Standing Expiration Date:   5/6/2028     Order Specific Question:   Reason for Exam:     Answer:   Left L3-L5 RFA     Order Specific Question:   Anticoagulant hold needed?     Answer:   No     New Medications Ordered This Visit   Medications    transdermal buprenorphine (BUTRANS) 5 mcg/hr TD patch     Sig: Place 1 patch on the skin over 7 days once a week For ongoing therapy. DO NOT FILL BEFORE: 06/06/2024     Dispense:  4 patch     Refill:  0       History of Present Illness:  Luis Russell is a 68 y.o. male with a history of chronic bilateral low back pain, lumbar spondylosis, and uncomplicated opioid dependence who presents for a follow up office visit to discuss results of MBB #2.  Patient reports that following the procedure, he did achieve about 80%  relief of low back pain for several hours following the procedure.  He has had success with previous ablations at this level, which provided him with multiple years of 100% pain relief.    Pain remains localized to the lumbar region of the back bilaterally and does not radiate.  He reports that his pain is typically worse at night and is currently rating it a 4/10 in intensity.  He describes the quality of his pain as dull/aching.    He is currently using Butrans for pain relief, which he reports reduces his pain by about percent.  He tolerates this well without side effects.    I have personally reviewed and/or updated the patient's past medical history, past surgical history, family history, social history, current medications, allergies, and vital signs today.     Review of Systems   Constitutional:  Positive for activity change. Negative for unexpected weight change.   HENT:  Negative for hearing loss and sinus pain.    Eyes:  Negative for visual disturbance.   Respiratory:  Negative for shortness of breath.    Cardiovascular:  Negative for palpitations.   Gastrointestinal:  Positive for constipation. Negative for abdominal pain.   Genitourinary:  Negative for difficulty urinating.   Musculoskeletal:  Positive for arthralgias, back pain and joint swelling. Negative for gait problem and myalgias.   Neurological:  Negative for weakness and numbness.   Psychiatric/Behavioral:  Negative for decreased concentration.        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HCC)    S/P femoral-popliteal bypass surgery     "Atherosclerosis of artery of left lower extremity (HCC)    Chronic obstructive pulmonary disease, unspecified COPD type (HCC)    Chronic right shoulder pain    MTHFR mutation    History of DVT (deep vein thrombosis)    PAD (peripheral artery disease) (HCC)    Adenocarcinoma of right lung (HCC)    Advanced care planning/counseling discussion    Syringomyelia (HCC)       Past Medical History:   Diagnosis Date    Arthritis 02/2016    osto back    Chronic back pain     Chronic narcotic dependence (HCC)     Chronic pain disorder     Sees pain management Dr Eric MARTINEZ    Cigarette smoker     Deep vein thrombosis (DVT) of femoral vein of left lower extremity (HCC) 07/29/2021    Depression     Dry skin     Dyslexia     Full dentures     upper and no teeth lower/\"avoid raw vegetables and chewy food\"    GERD (gastroesophageal reflux disease)     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 related    History of blood clots     \"left leg\" takes Eliquis    History of bronchitis     History of pneumonia     childhood    Increased pressure in the eye, bilateral     L4-L5 disc bulge 06/16/2017    Low back pain     MTHFR mutation     Multiple subsolid lung nodules greater than 6 mm in diameter     Muscle weakness     Neuropathy     right foot and left foot    Osteoarthritis     Osteoarthritis     and\" if becomes cold joint pain worsens\"    Peripheral neuropathy 08/2015    Pulmonary emphysema (HCC)     \"mild\"    Right foot drop     Right upper lobe pulmonary nodule     Shortness of breath     \"only on humid days\"    Smokers' cough (HCC)     Uncomplicated opioid dependence (HCC)     Wears glasses     Work related injury 04/24/2018       Past Surgical History:   Procedure Laterality Date    BACK SURGERY  aug 2015- jun2017 2017 no affect    COLONOSCOPY      EGD AND COLONOSCOPY N/A 06/29/2018    Procedure: EGD AND COLONOSCOPY;  Surgeon: Neal Kwok MD;  Location: [facility];  Service: Gastroenterology    EPIDURAL BLOCK INJECTION      needle    FL " "RETROGRADE PYELOGRAM  9/12/2023    HIP ARTHROSCOPY W/ LABRAL DEBRIDEMENT Left     \"hip surgery\"    IR BIOPSY LUNG  4/17/2023    IR CHEST TUBE PLACEMENT  4/17/2023    IR LOWER EXTREMITY ANGIOGRAM  12/08/2021    LUNG LOBECTOMY Right 5/17/2023    Procedure: lymph node dissection; PLEURAL LYSIS;  Surgeon: Blanquita Chinchilla MD;  [facility];  Service: Thoracic    LUNG SEGMENTECTOMY Right 5/17/2023    Procedure: Right thoracoscopic upper lobe wedge resection;  Surgeon: Blanquita Chinchilla MD;  [facility];  Service: Thoracic    ORTHOPEDIC SURGERY      RI ARTHRS HIP DEBRIDEMENT/SHAVING ARTICULAR CRTLG Left 04/30/2018    Procedure: LEFT HIP ARTHROSCOPIC LABRAL DEBRIDEMENT;  Surgeon: Rainer Booth MD;  [facility];  Service: Orthopedics    RI BRNCHSC INCL FLUOR GDNCE DX W/CELL WASHG SPX N/A 5/17/2023    Procedure: BRONCHOSCOPY FLEXIBLE;  Surgeon: Blanquita Chinchilla MD;  [facility];  Service: Thoracic    RI CYSTO/PYELOSCOPY BX&/FULGURATION PELIVC LESION Bilateral 9/12/2023    Procedure: bilateral  ureteroscopy,bilateral  laser ablation tumor, bilateral brushings/washings,bilateral  retrograde pyelogram,bilateral  ureteral stent insertion;  Surgeon: Carlito Hernandez MD;  [facility];  Service: Urology    RI IN-SITU VEIN BYPASS FEMORAL-POPLITEAL Right 12/08/2021    Procedure: R SFA to BK Pop Bypass using insitu GSV,  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;  Surgeon: Valeriy Scott MD;  [facility];  Service: Vascular    RI SLCTV CATHJ 3RD+ ORD SLCTV ABDL PEL/LXTR BRNCH Right 12/08/2021    Procedure: COMPLETION ARTERIOGRAM WITH 5 MM ANGIOPLASTY OF MID SUPERFICIAL FEMORAL ARTERY;  Surgeon: Valeriy Scott MD;  [facility];  Service: Vascular    RI THORACOSCOPY W/THERA WEDGE RESEXN INITIAL UNILAT Right 5/17/2023    Procedure: THORACOSCOPY VIDEO ASSISTED SURGERY (VATS);  Surgeon: Blanquita Chinchilla MD;  [facility];  Service: Thoracic    " "TONSILLECTOMY      WISDOM TOOTH EXTRACTION         Family History   Problem Relation Age of Onset    Dementia Mother     Glaucoma Mother     Arthritis Mother         nursing home    Cancer Father         dead    Osteoarthritis Family        Social History     Occupational History    Not on file   Tobacco Use    Smoking status: Some Days     Current packs/day: 0.25     Average packs/day: 0.3 packs/day for 16.3 years (4.1 ttl pk-yrs)     Types: Cigarettes     Start date: 2008     Passive exposure: Yes    Smokeless tobacco: Never    Tobacco comments:     stress caused by workers comp.  not covering meds. Less than half a pack of cigs    Vaping Use    Vaping status: Never Used   Substance and Sexual Activity    Alcohol use: Not Currently    Drug use: Not Currently     Comment: drug free since 1987    Sexual activity: Not on file     Comment: defer       Current Outpatient Medications on File Prior to Visit   Medication Sig    aspirin (ECOTRIN LOW STRENGTH) 81 mg EC tablet Take 1 tablet (81 mg total) by mouth daily    atorvastatin (LIPITOR) 10 mg tablet Take 0.5 tablets (5 mg total) by mouth daily at bedtime    ibuprofen (MOTRIN) 200 mg tablet Take 200 mg by mouth every 6 (six) hours as needed for mild pain prn    Misc Natural Products (Osteo Bi-Flex Triple Strength) TABS Take 1 tablet by mouth 2 (two) times a day    [DISCONTINUED] transdermal buprenorphine (BUTRANS) 5 mcg/hr TD patch Place 1 patch on the skin over 7 days once a week For ongoing therapy. DO NOT FILL BEFORE: 03/25/2024    apixaban (ELIQUIS) 5 mg Take 1 tablet (5 mg total) by mouth 2 (two) times a day (Patient not taking: Reported on 3/5/2024)     No current facility-administered medications on file prior to visit.       Allergies   Allergen Reactions    Cyanocobalamin [Vitamin B12] GI Intolerance    Gabapentin Other (See Comments) and GI Intolerance     Difficulty walking    Morphine Other (See Comments)     \"acute constipation\"    Tramadol Confusion    " "Nsaids Other (See Comments)     Pt reports avoids as is on Eliquis       Physical Exam:    /80   Pulse 87   Ht 6' 3\" (1.905 m)   Wt 77.5 kg (170 lb 12.8 oz)   BMI 21.35 kg/m²     Constitutional:normal, well developed, well nourished, alert, in no distress and non-toxic and no overt pain behavior.  Eyes:anicteric  HEENT:grossly intact  Neck:supple, symmetric, trachea midline and no masses   Pulmonary:even and unlabored  Cardiovascular:No edema or pitting edema present  Skin:Normal without rashes or lesions and well hydrated  Psychiatric:Mood and affect appropriate  Neurologic:Cranial Nerves II-XII grossly intact  Musculoskeletal:normal, except for tenderness palpation of the bilateral lumbar paraspinal musculature and lower lumbar facet joints.  Pain with lumbar extension and rotation.    "

## 2024-05-29 ENCOUNTER — HOSPITAL ENCOUNTER (OUTPATIENT)
Dept: RADIOLOGY | Facility: MEDICAL CENTER | Age: 69
Discharge: HOME | End: 2024-05-29
Payer: COMMERCIAL

## 2024-05-29 ENCOUNTER — TELEPHONE (OUTPATIENT)
Dept: PAIN MEDICINE | Facility: MEDICAL CENTER | Age: 69
End: 2024-05-29

## 2024-05-29 VITALS
OXYGEN SATURATION: 95 % | SYSTOLIC BLOOD PRESSURE: 113 MMHG | TEMPERATURE: 97.6 F | HEART RATE: 95 BPM | DIASTOLIC BLOOD PRESSURE: 72 MMHG | RESPIRATION RATE: 20 BRPM

## 2024-05-29 DIAGNOSIS — M47.816 LUMBAR SPONDYLOSIS: ICD-10-CM

## 2024-05-29 PROCEDURE — 64635 DESTROY LUMB/SAC FACET JNT: CPT | Performed by: PHYSICAL MEDICINE & REHABILITATION

## 2024-05-29 PROCEDURE — 64636 DESTROY L/S FACET JNT ADDL: CPT | Performed by: PHYSICAL MEDICINE & REHABILITATION

## 2024-05-29 RX ADMIN — Medication 5 ML: at 09:59

## 2024-05-29 NOTE — DISCHARGE INSTRUCTIONS

## 2024-05-29 NOTE — H&P
"History of Present Illness: The patient is a 68 y.o. male who presents with complaints of right low back pain    Past Medical History:   Diagnosis Date    Arthritis 02/2016    osto back    Chronic back pain     Chronic narcotic dependence (HCC)     Chronic pain disorder     Sees pain management Dr Eric MARTINEZ    Cigarette smoker     Deep vein thrombosis (DVT) of femoral vein of left lower extremity (HCC) 07/29/2021    Depression     Dry skin     Dyslexia     Full dentures     upper and no teeth lower/\"avoid raw vegetables and chewy food\"    GERD (gastroesophageal reflux disease)     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 related    History of blood clots     \"left leg\" takes Eliquis    History of bronchitis     History of pneumonia     childhood    Increased pressure in the eye, bilateral     L4-L5 disc bulge 06/16/2017    Low back pain     MTHFR mutation     Multiple subsolid lung nodules greater than 6 mm in diameter     Muscle weakness     Neuropathy     right foot and left foot    Osteoarthritis     Osteoarthritis     and\" if becomes cold joint pain worsens\"    Peripheral neuropathy 08/2015    Pulmonary emphysema (HCC)     \"mild\"    Right foot drop     Right upper lobe pulmonary nodule     Shortness of breath     \"only on humid days\"    Smokers' cough (HCC)     Uncomplicated opioid dependence (HCC)     Wears glasses     Work related injury 04/24/2018       Past Surgical History:   Procedure Laterality Date    BACK SURGERY  aug 2015- jun2017 2017 no affect    COLONOSCOPY      EGD AND COLONOSCOPY N/A 06/29/2018    Procedure: EGD AND COLONOSCOPY;  Surgeon: Neal Kwok MD;  Location: [facility];  Service: Gastroenterology    EPIDURAL BLOCK INJECTION      needle    FL RETROGRADE PYELOGRAM  9/12/2023    HIP ARTHROSCOPY W/ LABRAL DEBRIDEMENT Left     \"hip surgery\"    IR BIOPSY LUNG  4/17/2023    IR CHEST TUBE PLACEMENT  4/17/2023    IR LOWER EXTREMITY ANGIOGRAM  12/08/2021    LUNG LOBECTOMY Right 5/17/2023    Procedure: lymph node " dissection; PLEURAL LYSIS;  Surgeon: Blanquita Chinchilla MD;  [facility];  Service: Thoracic    LUNG SEGMENTECTOMY Right 5/17/2023    Procedure: Right thoracoscopic upper lobe wedge resection;  Surgeon: Blanquita Chinchilla MD;  [facility];  Service: Thoracic    ORTHOPEDIC SURGERY      MO ARTHRS HIP DEBRIDEMENT/SHAVING ARTICULAR CRTLG Left 04/30/2018    Procedure: LEFT HIP ARTHROSCOPIC LABRAL DEBRIDEMENT;  Surgeon: Rainer Booth MD;  [facility];  Service: Orthopedics    MO [facility] INCL FLUOR GDNCE DX W/CELL WASHG SPX N/A 5/17/2023    Procedure: BRONCHOSCOPY FLEXIBLE;  Surgeon: Blanquita Chinchilla MD;  [facility];  Service: Thoracic    MO CYSTO/PYELOSCOPY BX&/FULGURATION PELIVC LESION Bilateral 9/12/2023    Procedure: bilateral  ureteroscopy,bilateral  laser ablation tumor, bilateral brushings/washings,bilateral  retrograde pyelogram,bilateral  ureteral stent insertion;  Surgeon: Carlito Hernandez MD;  [facility];  Service: Urology    MO IN-SITU VEIN BYPASS FEMORAL-POPLITEAL Right 12/08/2021    Procedure: R SFA to BK Pop Bypass using insitu GSV,  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;  Surgeon: Valeriy Scott MD;  [facility];  Service: Vascular    MO SLCTV CATHJ 3RD+ ORD SLCTV ABDL PEL/LXTR BRNC Right 12/08/2021    Procedure: COMPLETION ARTERIOGRAM WITH 5 MM ANGIOPLASTY OF MID SUPERFICIAL FEMORAL ARTERY;  Surgeon: Valeriy Scott MD;  [facility];  Service: Vascular    MO THORACOSCOPY W/THERA WEDGE RESEXN INITIAL UNILAT Right 5/17/2023    Procedure: THORACOSCOPY VIDEO ASSISTED SURGERY (VATS);  Surgeon: Blanquita Chinchilla MD;  [facility];  Service: Thoracic    TONSILLECTOMY      WISDOM TOOTH EXTRACTION           Current Outpatient Medications:     apixaban (ELIQUIS) 5 mg, Take 1 tablet (5 mg total) by mouth 2 (two) times a day (Patient not taking: Reported on 3/5/2024), Disp: , Rfl:     aspirin (ECOTRIN LOW STRENGTH) 81 mg EC  "tablet, Take 1 tablet (81 mg total) by mouth daily, Disp: 90 tablet, Rfl: 0    atorvastatin (LIPITOR) 10 mg tablet, Take 0.5 tablets (5 mg total) by mouth daily at bedtime, Disp: 45 tablet, Rfl: 1    ibuprofen (MOTRIN) 200 mg tablet, Take 200 mg by mouth every 6 (six) hours as needed for mild pain prn, Disp: , Rfl:     Misc Natural Products (Osteo Bi-Flex Triple Strength) TABS, Take 1 tablet by mouth 2 (two) times a day, Disp: , Rfl:     transdermal buprenorphine (BUTRANS) 5 mcg/hr TD patch, Place 1 patch on the skin over 7 days once a week For ongoing therapy. DO NOT FILL BEFORE: 06/06/2024, Disp: 4 patch, Rfl: 0    Allergies   Allergen Reactions    Cyanocobalamin [Vitamin B12] GI Intolerance    Gabapentin Other (See Comments) and GI Intolerance     Difficulty walking    Morphine Other (See Comments)     \"acute constipation\"    Tramadol Confusion    Nsaids Other (See Comments)     Pt reports avoids as is on Eliquis       Physical Exam:   Vitals:    05/29/24 0939   BP: 126/75   Pulse: 97   Resp: 20   Temp: 97.6 °F (36.4 °C)   SpO2: 95%     General: Awake, Alert, Oriented x 3, Mood and affect appropriate  Respiratory: Respirations even and unlabored  Cardiovascular: Peripheral pulses intact; no edema  Musculoskeletal Exam: right low back pain    ASA Score: 3    Patient/Chart Verification  Patient ID Verified: Verbal  ID Band Applied: No  Consents Confirmed: Procedural, To be obtained in the Pre-Procedure area  H&P( within 30 days) Verified: To be obtained in the Pre-Procedure area  Interval H&P(within 24 hr) Complete (required for Outpatients and Surgery Admit only): To be obtained in the Pre-Procedure area  Allergies Reviewed: Yes  Anticoag/NSAID held?: NA  Currently on antibiotics?: No    Assessment:   1. Lumbar spondylosis        Plan: Right L3-L5 RFA    "

## 2024-05-30 NOTE — TELEPHONE ENCOUNTER
Caller: Luis MILLAN    Doctor/Office: Dr Erickson     Call regarding :  F/U 3-5 FFA     Call was transferred to: Nurse

## 2024-06-01 ENCOUNTER — TELEPHONE (OUTPATIENT)
Dept: OTHER | Facility: OTHER | Age: 69
End: 2024-06-01

## 2024-06-01 DIAGNOSIS — M25.511 CHRONIC PAIN OF BOTH SHOULDERS: ICD-10-CM

## 2024-06-01 DIAGNOSIS — M25.512 CHRONIC PAIN OF BOTH SHOULDERS: ICD-10-CM

## 2024-06-01 DIAGNOSIS — G89.29 CHRONIC PAIN OF BOTH SHOULDERS: ICD-10-CM

## 2024-06-01 DIAGNOSIS — G62.9 NEUROPATHY: ICD-10-CM

## 2024-06-01 DIAGNOSIS — M54.16 LUMBAR RADICULOPATHY: ICD-10-CM

## 2024-06-01 DIAGNOSIS — G89.4 CHRONIC PAIN SYNDROME: ICD-10-CM

## 2024-06-01 NOTE — TELEPHONE ENCOUNTER
Patient called in with concerns with medication not being able to be filled by pharmacy     transdermal buprenorphine (BUTRANS) 5 mcg/hr TD patch

## 2024-06-03 RX ORDER — BUPRENORPHINE 5 UG/H
1 PATCH TRANSDERMAL WEEKLY
Qty: 4 PATCH | Refills: 0 | Status: SHIPPED | OUTPATIENT
Start: 2024-06-03

## 2024-06-03 NOTE — TELEPHONE ENCOUNTER
RN s/w pt and then Demetrice at the pharmacy.  Per Demterice when CG ordered his script for Butrans 5mcg to not be filled until 6/6, that cx out his script that was for May.  Pt went to fill on Saturday when it was due to be changed and they said there was no script avail.    Per Demetrice the pt had Butrans ordered per CG on 3/5 that he picked up on 4/5, and then the may script was cx per provider.    CG can you resend the script for June so that the pt can  today?  He has no more patches left and per pharmacist the may script was cx.

## 2024-06-04 ENCOUNTER — TELEPHONE (OUTPATIENT)
Dept: HEMATOLOGY ONCOLOGY | Facility: CLINIC | Age: 69
End: 2024-06-04

## 2024-06-04 NOTE — TELEPHONE ENCOUNTER
Called patient in regards to an upcoming appointment. Patient wasn't available so I left a voicemail. Good Morning there has been a change in your appointment with Dr. Chinchilla the new date and time is going to be June 20, 2024 @ 1 pm if this date and time doesn't work please call our office back at [phone].    Thank you and take care

## 2024-06-05 ENCOUNTER — HOSPITAL ENCOUNTER (OUTPATIENT)
Dept: RADIOLOGY | Facility: IMAGING CENTER | Age: 69
Discharge: HOME | End: 2024-06-05
Payer: COMMERCIAL

## 2024-06-05 DIAGNOSIS — C34.91 ADENOCARCINOMA OF RIGHT LUNG (HCC): ICD-10-CM

## 2024-06-05 PROCEDURE — G1004 CDSM NDSC: HCPCS

## 2024-06-05 PROCEDURE — 71250 CT THORAX DX C-: CPT

## 2024-06-12 ENCOUNTER — HOSPITAL ENCOUNTER (OUTPATIENT)
Dept: RADIOLOGY | Facility: MEDICAL CENTER | Age: 69
Discharge: HOME | End: 2024-06-12
Payer: COMMERCIAL

## 2024-06-12 ENCOUNTER — TELEPHONE (OUTPATIENT)
Dept: PAIN MEDICINE | Facility: MEDICAL CENTER | Age: 69
End: 2024-06-12

## 2024-06-12 VITALS
OXYGEN SATURATION: 96 % | TEMPERATURE: 97.6 F | SYSTOLIC BLOOD PRESSURE: 122 MMHG | HEART RATE: 82 BPM | RESPIRATION RATE: 20 BRPM | DIASTOLIC BLOOD PRESSURE: 80 MMHG

## 2024-06-12 DIAGNOSIS — M47.816 LUMBAR SPONDYLOSIS: ICD-10-CM

## 2024-06-12 PROCEDURE — 64636 DESTROY L/S FACET JNT ADDL: CPT | Performed by: PHYSICAL MEDICINE & REHABILITATION

## 2024-06-12 PROCEDURE — 64635 DESTROY LUMB/SAC FACET JNT: CPT | Performed by: PHYSICAL MEDICINE & REHABILITATION

## 2024-06-12 RX ADMIN — Medication 5 ML: at 10:12

## 2024-06-12 NOTE — H&P
"History of Present Illness: The patient is a 68 y.o. 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 related    History of blood clots     \"left leg\" takes Eliquis    History of bronchitis     History of pneumonia     childhood    Increased pressure in the eye, bilateral     L4-L5 disc bulge 06/16/2017    Low back pain     MTHFR mutation     Multiple subsolid lung nodules greater than 6 mm in diameter     Muscle weakness     Neuropathy     right foot and left foot    Osteoarthritis     Osteoarthritis     and\" if becomes cold joint pain worsens\"    Peripheral neuropathy 08/2015    Pulmonary emphysema (HCC)     \"mild\"    Right foot drop     Right upper lobe pulmonary nodule     Shortness of breath     \"only on humid days\"    Smokers' cough (HCC)     Uncomplicated opioid dependence (HCC)     Wears glasses     Work related injury 04/24/2018       Past Surgical History:   Procedure Laterality Date    BACK SURGERY  aug 2015- jun2017 2017 no affect    COLONOSCOPY      EGD AND COLONOSCOPY N/A 06/29/2018    Procedure: EGD AND COLONOSCOPY;  Surgeon: Neal Kwok MD;  Location: [facility];  Service: Gastroenterology    EPIDURAL BLOCK INJECTION      needle    FL RETROGRADE PYELOGRAM  9/12/2023    HIP ARTHROSCOPY W/ LABRAL DEBRIDEMENT Left     \"hip surgery\"    IR BIOPSY LUNG  4/17/2023    IR CHEST TUBE PLACEMENT  4/17/2023    IR LOWER EXTREMITY ANGIOGRAM  12/08/2021    LUNG LOBECTOMY Right 5/17/2023    Procedure: lymph node " dissection; PLEURAL LYSIS;  Surgeon: Blanquita Chinchilla MD;  [facility];  Service: Thoracic    LUNG SEGMENTECTOMY Right 5/17/2023    Procedure: Right thoracoscopic upper lobe wedge resection;  Surgeon: Blanquita Chinchilla MD;  [facility];  Service: Thoracic    ORTHOPEDIC SURGERY      TN ARTHRS HIP DEBRIDEMENT/SHAVING ARTICULAR CRTLG Left 04/30/2018    Procedure: LEFT HIP ARTHROSCOPIC LABRAL DEBRIDEMENT;  Surgeon: Rainer Booth MD;  [facility];  Service: Orthopedics    TN [facility] INCL FLUOR GDNCE DX W/CELL WASHG SPX N/A 5/17/2023    Procedure: BRONCHOSCOPY FLEXIBLE;  Surgeon: Blanquita Chinchilla MD;  [facility];  Service: Thoracic    TN CYSTO/PYELOSCOPY BX&/FULGURATION PELIVC LESION Bilateral 9/12/2023    Procedure: bilateral  ureteroscopy,bilateral  laser ablation tumor, bilateral brushings/washings,bilateral  retrograde pyelogram,bilateral  ureteral stent insertion;  Surgeon: Carlito Hernandez MD;  [facility];  Service: Urology    TN IN-SITU VEIN BYPASS FEMORAL-POPLITEAL Right 12/08/2021    Procedure: R SFA to BK Pop Bypass using insitu GSV,  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;  Surgeon: Valeriy Scott MD;  [facility];  Service: Vascular    TN SLCTV CATHJ 3RD+ ORD SLCTV ABDL PEL/LXTR BRNC Right 12/08/2021    Procedure: COMPLETION ARTERIOGRAM WITH 5 MM ANGIOPLASTY OF MID SUPERFICIAL FEMORAL ARTERY;  Surgeon: Valeriy Scott MD;  [facility];  Service: Vascular    TN THORACOSCOPY W/THERA WEDGE RESEXN INITIAL UNILAT Right 5/17/2023    Procedure: THORACOSCOPY VIDEO ASSISTED SURGERY (VATS);  Surgeon: Blanquita Chinchilla MD;  [facility];  Service: Thoracic    TONSILLECTOMY      WISDOM TOOTH EXTRACTION           Current Outpatient Medications:     apixaban (ELIQUIS) 5 mg, Take 1 tablet (5 mg total) by mouth 2 (two) times a day (Patient not taking: Reported on 3/5/2024), Disp: , Rfl:     aspirin (ECOTRIN LOW STRENGTH) 81 mg EC  "tablet, Take 1 tablet (81 mg total) by mouth daily, Disp: 90 tablet, Rfl: 0    atorvastatin (LIPITOR) 10 mg tablet, Take 0.5 tablets (5 mg total) by mouth daily at bedtime, Disp: 45 tablet, Rfl: 1    ibuprofen (MOTRIN) 200 mg tablet, Take 200 mg by mouth every 6 (six) hours as needed for mild pain prn, Disp: , Rfl:     Misc Natural Products (Osteo Bi-Flex Triple Strength) TABS, Take 1 tablet by mouth 2 (two) times a day, Disp: , Rfl:     transdermal buprenorphine (BUTRANS) 5 mcg/hr TD patch, Place 1 patch on the skin over 7 days once a week For ongoing therapy., Disp: 4 patch, Rfl: 0    Allergies   Allergen Reactions    Cyanocobalamin [Vitamin B12] GI Intolerance    Gabapentin Other (See Comments) and GI Intolerance     Difficulty walking    Morphine Other (See Comments)     \"acute constipation\"    Tramadol Confusion    Nsaids Other (See Comments)     Pt reports avoids as is on Eliquis       Physical Exam:   Vitals:    06/12/24 0943   BP: 122/80   Pulse: 65   Resp: 20   Temp: 97.6 °F (36.4 °C)   SpO2: 99%     General: Awake, Alert, Oriented x 3, Mood and affect appropriate  Respiratory: Respirations even and unlabored  Cardiovascular: Peripheral pulses intact; no edema  Musculoskeletal Exam: left low back pain    ASA Score: 3    Patient/Chart Verification  Patient ID Verified: Verbal  ID Band Applied: No  Consents Confirmed: Procedural, To be obtained in the Pre-Procedure area  H&P( within 30 days) Verified: To be obtained in the Pre-Procedure area  Interval H&P(within 24 hr) Complete (required for Outpatients and Surgery Admit only): To be obtained in the Pre-Procedure area  Allergies Reviewed: Yes  Anticoag/NSAID held?: NA  Currently on antibiotics?: No    Assessment:   1. Lumbar spondylosis        Plan: Left L3-L5 RFA    "

## 2024-06-12 NOTE — TELEPHONE ENCOUNTER
CG ok to cx upcoming sovs and just keep f/u for July 24th for f/u s/p RFA's?  He will need a refill of his Butrans by the 24th of this month and that would get him to the f/u's of July    Please advise

## 2024-06-12 NOTE — DISCHARGE INSTRUCTIONS

## 2024-06-13 DIAGNOSIS — G89.29 CHRONIC PAIN OF BOTH SHOULDERS: ICD-10-CM

## 2024-06-13 DIAGNOSIS — G89.4 CHRONIC PAIN SYNDROME: ICD-10-CM

## 2024-06-13 DIAGNOSIS — M25.512 CHRONIC PAIN OF BOTH SHOULDERS: ICD-10-CM

## 2024-06-13 DIAGNOSIS — M25.511 CHRONIC PAIN OF BOTH SHOULDERS: ICD-10-CM

## 2024-06-13 DIAGNOSIS — G62.9 NEUROPATHY: ICD-10-CM

## 2024-06-13 DIAGNOSIS — M54.16 LUMBAR RADICULOPATHY: ICD-10-CM

## 2024-06-13 RX ORDER — BUPRENORPHINE 5 UG/H
1 PATCH TRANSDERMAL WEEKLY
Qty: 4 PATCH | Refills: 0 | Status: SHIPPED | OUTPATIENT
Start: 2024-06-13

## 2024-06-13 NOTE — TELEPHONE ENCOUNTER
S/W pt. Pt stated current pain level is 1-2/10. Pt stated needle sites look good, denies S&S of infection, denies fevers, had soreness yesterday and denies sun burn like sensation.  Advised pt if he does get pain to take his prescribed or OTC pain medications and/or use ice/heat and that it takes 4 to 6 weeks to see the full effect.  Confirmed next appt w/ pt.  Pt verbalized understanding.

## 2024-06-13 NOTE — TELEPHONE ENCOUNTER
Okay to keep OV for July. I will send the Butrans script over to get him through to next appointment.

## 2024-06-13 NOTE — TELEPHONE ENCOUNTER
Caller: Patient     Doctor:      Reason for call: Returning call about procedure     Call back#: [phone]

## 2024-06-20 ENCOUNTER — OFFICE VISIT (OUTPATIENT)
Dept: CARDIAC SURGERY | Facility: CLINIC | Age: 69
End: 2024-06-20
Payer: COMMERCIAL

## 2024-06-20 VITALS
SYSTOLIC BLOOD PRESSURE: 119 MMHG | BODY MASS INDEX: 21.27 KG/M2 | OXYGEN SATURATION: 98 % | HEIGHT: 75 IN | DIASTOLIC BLOOD PRESSURE: 80 MMHG | WEIGHT: 171.08 LBS | TEMPERATURE: 98.4 F | HEART RATE: 104 BPM | RESPIRATION RATE: 14 BRPM

## 2024-06-20 DIAGNOSIS — C34.91 ADENOCARCINOMA OF RIGHT LUNG (HCC): Primary | ICD-10-CM

## 2024-06-20 PROCEDURE — 99213 OFFICE O/P EST LOW 20 MIN: CPT | Performed by: THORACIC SURGERY (CARDIOTHORACIC VASCULAR SURGERY)

## 2024-06-20 NOTE — PROGRESS NOTES
Assessment/Plan:    Adenocarcinoma of right lung (HCC)  Luis Russell is a little over 1 year out from right upper lobe therapeutic wedge resection performed in the setting of Stage IA1 adenocarcinoma. He is doing well from a thoracic surgery standpoint. Recent CT scan of the chest notes no new or suspicious pulmonary nodules. No evidence of lung cancer recurrence or metastasis. Patient will return in 6 months with a new chest CT scan for continued lung cancer surveillance and return for an office visit after CT scan is completed. Patient will continue with chest CT scan every 6 months for a total of 2 years post operatively and then with have annual surveillance in accordance with NCCN guidelines.  Counseled the patient on smoking cessation.  Offered referral to smoking cessation program. Declined at this time but he does have the phone number for the smoking cessation counselors when he feels ready for more support in his effort towards total smoking cessation.       Diagnoses and all orders for this visit:    Adenocarcinoma of right lung (HCC)  -     CT chest wo contrast; Future          Thoracic History    Cancer Staging   Adenocarcinoma of right lung (HCC)  Staging form: Lung, AJCC 8th Edition  - Clinical stage from 5/17/2023: Stage IA1 (cT1mi, cN0, cM0) - Signed by Tiana Mares PA-C on 6/1/2023  Histopathologic type: Adenocarcinoma, NOS  Stage prefix: Initial diagnosis  Histologic grade (G): G1  Histologic grading system: 4 grade system  Laterality: Right  Tumor size (mm): 10  Lymph-vascular invasion (LVI): LVI not present (absent)/not identified  Specimen type: Excision  - Pathologic stage from 5/17/2023: Stage IA1 (pT1mi, pN0, cM0) - Signed by Meseret Schofield PA-C on 6/20/2024  Histopathologic type: Adenocarcinoma, NOS  Stage prefix: Initial diagnosis  Histologic grade (G): G1  Histologic grading system: 4 grade system  Residual tumor (R): R0 - None  Laterality: Right  Tumor size (mm):  10  Lymph-vascular invasion (LVI): LVI not present (absent)/not identified  National guidelines used in treatment planning: Yes  Type of national guideline used in treatment planning: NCCN      Oncology History   Adenocarcinoma of right lung (HCC)   5/17/2023 Surgery    Flexible bronchoscopy, right thoracoscopic therapeutic upper lobe wedge resection, MLND      5/17/2023 -  Cancer Staged    Staging form: Lung, AJCC 8th Edition  - Clinical stage from 5/17/2023: Stage IA1 (cT1mi, cN0, cM0) - Signed by Tiana Mares PA-C on 6/1/2023  Histopathologic type: Adenocarcinoma, NOS  Stage prefix: Initial diagnosis  Histologic grade (G): G1  Histologic grading system: 4 grade system  Laterality: Right  Tumor size (mm): 10  Lymph-vascular invasion (LVI): LVI not present (absent)/not identified  Specimen type: Excision       5/17/2023 -  Cancer Staged    Staging form: Lung, AJCC 8th Edition  - Pathologic stage from 5/17/2023: Stage IA1 (pT1mi, pN0, cM0) - Signed by Meseret Schofield PA-C on 6/20/2024  Histopathologic type: Adenocarcinoma, NOS  Stage prefix: Initial diagnosis  Histologic grade (G): G1  Histologic grading system: 4 grade system  Residual tumor (R): R0 - None  Laterality: Right  Tumor size (mm): 10  Lymph-vascular invasion (LVI): LVI not present (absent)/not identified  National guidelines used in treatment planning: Yes  Type of national guideline used in treatment planning: NCCN       6/1/2023 Initial Diagnosis    Adenocarcinoma of right lung (HCC)              Subjective:    Patient ID: Luis Russell is a 68 y.o. male.    HPI    Luis Russell is a 68 y.o. male who presents today for lung cancer surveillance. Patient is a little over 1 year out from right upper lobe therapeutic wedge resection performed on 5/17/2023 for Stage IA1 adenocarcinoma.  At time of last office visit on 12/4/2023, his CT scan did not demonstrate any evidence of lung cancer recurrence or metastasis and he was scheduled for  repeat CT scan in 6 months for continued surveillance.     CT scan of the chest completed on 6/5/24 was personally reviewed by myself and in PACS demonstrating right upper wedge resection with adjacent scarring. Benign calcified granulomas.    On discussion today, patient reports his breathing is affected by humidity.  When the humidity is under control, he performs activities without effectiveness of his breathing.  He says his lower back pain limits him more than his breathing to do activities.  He does follow with pain management with respect to his lower back pain.  Reports a morning cough which he brings up phlegm.  Denies hemoptysis, fevers, chills, recent lung infection, chest pain, new headaches, acute onset blurred vision, new onset bone or joint pain, unexplained weight loss. Patient has had ongoing neuropathy of his right lower extremity and right-sided dropfoot.  Patient reports he remains a current smoker and has cut down from 1 pack/day to about 3 cigarettes daily as part of his morning routine.  We talked about his history of smoking.  He is pleased with himself with cutting back this far and reports he is not ready for a complete sensation at this time.    The following portions of the patient's history were reviewed and updated as appropriate: allergies, current medications, past family history, past medical history, past social history, past surgical history and problem list.    Review of Systems   Constitutional:  Negative for chills, fever and unexpected weight change.   Respiratory:  Positive for cough (in the mornings) and shortness of breath (mild, if in a humid environment).    Cardiovascular:  Negative for chest pain.   Musculoskeletal:  Positive for back pain (chronic).   Neurological:  Positive for numbness (chronic neuropathy in RLE). Negative for headaches.         Objective:   Physical Exam  Constitutional:       General: He is not in acute distress.  HENT:      Head: Normocephalic and  "atraumatic.      Nose: Nose normal.   Eyes:      Extraocular Movements: Extraocular movements intact.      Comments: Wears glasses   Cardiovascular:      Rate and Rhythm: Normal rate and regular rhythm.   Pulmonary:      Effort: Pulmonary effort is normal.      Breath sounds: Normal breath sounds.   Abdominal:      Palpations: Abdomen is soft.      Tenderness: There is no abdominal tenderness.   Musculoskeletal:      Right lower leg: No edema.      Left lower leg: No edema.   Skin:     General: Skin is warm and dry.     /80 (BP Location: Left arm, Patient Position: Sitting, Cuff Size: Standard)   Pulse 104   Temp 98.4 °F (36.9 °C) (Temporal)   Resp 14   Ht 6' 3\" (1.905 m)   Wt 77.6 kg (171 lb 1.2 oz)   SpO2 98%   BMI 21.38 kg/m²     No Chest XR results available for this patient.   CT chest wo contrast    Result Date: 6/13/2024  Narrative CT CHEST WITHOUT IV CONTRAST INDICATION: C34.91: Malignant neoplasm of unspecified part of right bronchus or lung. COMPARISON: Many priors, recently a CT chest from 11/24/2023. TECHNIQUE: CT examination of the chest was performed without intravenous contrast. Multiplanar 2D reformatted images were created from the source data. This examination, like all CT scans performed in the [facility], was performed utilizing techniques to minimize radiation dose exposure, including the use of iterative reconstruction and automated exposure control. Radiation dose length product (DLP) for this visit: 271 mGy-cm FINDINGS: LUNGS: Moderate emphysema. Right upper wedge resection with adjacent scarring. Stable focal bronchiolectasis in the periphery of the right upper lobe with focal area of mucoid impaction (series 5, images 110-118). Benign calcified granulomas. Biapical scarring. PLEURA: Unremarkable. HEART/GREAT VESSELS: Normal heart size with mild coronary artery calcification indicating atherosclerotic heart disease. No thoracic aortic aneurysm. MEDIASTINUM AND " ALEJANDRA: Unremarkable. CHEST WALL AND LOWER NECK: Unremarkable. VISUALIZED STRUCTURES IN THE UPPER ABDOMEN: Stable subcentimeter low-attenuation lesion in the posterior right hepatic lobe (series 3, image 98), too small to characterize but likely a cyst. 1 cm right upper pole renal cyst. OSSEOUS STRUCTURES: No acute fracture or destructive osseous lesion. Multilevel degenerative changes of the imaged spine. Hemangioma at T7.     Impression 1. Right upper lobe wedge resection. No recurrence or metastatic disease in the chest. 2. Moderate emphysema. Workstation performed: [id number]     CT chest wo contrast    Result Date: 11/29/2023  Narrative CT CHEST WITHOUT IV CONTRAST INDICATION:   C34.91: Malignant neoplasm of unspecified part of right bronchus or lung. COMPARISON: CT dated 3/22/2023 TECHNIQUE: CT examination of the chest was performed without intravenous contrast. Multiplanar 2D reformatted images were created from the source data. This examination, like all CT scans performed in the [facility], was performed utilizing techniques to minimize radiation dose exposure, including the use of iterative reconstruction and automated exposure control. Radiation dose length product (DLP) for this visit:  230 mGy-cm FINDINGS: LUNGS: Patent central airways. Moderate emphysema. Interval wedge resection of the right upper lobe. Again seen is focally dilated bronchus in the right upper lobe with wall thickening and mucoid impaction (series 5, image #108), unchanged compared to the prior. Recommend continued attention on subsequent follow-up. Scattered calcified granulomata. No suspicious nodules. PLEURA:  Unremarkable. HEART/GREAT VESSELS: Heart is unremarkable for patient's age.  No thoracic aortic aneurysm. MEDIASTINUM AND ALEJANDRA:  Unremarkable. CHEST WALL AND LOWER NECK:  Unremarkable. VISUALIZED STRUCTURES IN THE UPPER ABDOMEN:  Unremarkable. OSSEOUS STRUCTURES:  Spinal degenerative changes are noted.  No acute  fracture or destructive osseous lesion.     Impression Interval right upper lobe wedge resection. No new or suspicious nodules identified. Workstation performed: [id number]     No CT Chest,Abdomen,Pelvis results available for this patient.   No NM PET CT results available for this patient.   No Barium Swallow results available for this patient.

## 2024-06-20 NOTE — ASSESSMENT & PLAN NOTE
Luis Russell is a little over 1 year out from right upper lobe therapeutic wedge resection performed in the setting of Stage IA1 adenocarcinoma. He is doing well from a thoracic surgery standpoint. Recent CT scan of the chest notes no new or suspicious pulmonary nodules. No evidence of lung cancer recurrence or metastasis. Patient will return in 6 months with a new chest CT scan for continued lung cancer surveillance and return for an office visit after CT scan is completed. Patient will continue with chest CT scan every 6 months for a total of 2 years post operatively and then with have annual surveillance in accordance with NCCN guidelines.  Counseled the patient on smoking cessation.  Offered referral to smoking cessation program. Declined at this time but he does have the phone number for the smoking cessation counselors when he feels ready for more support in his effort towards total smoking cessation.

## 2024-07-11 ENCOUNTER — RA CDI HCC (OUTPATIENT)
Dept: OTHER | Facility: HOSPITAL | Age: 69
End: 2024-07-11

## 2024-07-16 ENCOUNTER — APPOINTMENT (OUTPATIENT)
Dept: LAB | Facility: IMAGING CENTER | Age: 69
End: 2024-07-16
Payer: COMMERCIAL

## 2024-07-16 DIAGNOSIS — Z12.5 PROSTATE CANCER SCREENING: ICD-10-CM

## 2024-07-16 DIAGNOSIS — M25.511 CHRONIC PAIN OF BOTH SHOULDERS: ICD-10-CM

## 2024-07-16 DIAGNOSIS — G89.29 CHRONIC PAIN OF BOTH SHOULDERS: ICD-10-CM

## 2024-07-16 DIAGNOSIS — M54.16 LUMBAR RADICULOPATHY: ICD-10-CM

## 2024-07-16 DIAGNOSIS — E78.2 MIXED HYPERLIPIDEMIA: ICD-10-CM

## 2024-07-16 DIAGNOSIS — M25.512 CHRONIC PAIN OF BOTH SHOULDERS: ICD-10-CM

## 2024-07-16 LAB
ALBUMIN SERPL BCG-MCNC: 4.1 G/DL (ref 3.5–5)
ALP SERPL-CCNC: 70 U/L (ref 34–104)
ALT SERPL W P-5'-P-CCNC: 17 U/L (ref 7–52)
ANION GAP SERPL CALCULATED.3IONS-SCNC: 10 MMOL/L (ref 4–13)
AST SERPL W P-5'-P-CCNC: 19 U/L (ref 13–39)
BILIRUB SERPL-MCNC: 0.82 MG/DL (ref 0.2–1)
BUN SERPL-MCNC: 12 MG/DL (ref 5–25)
CALCIUM SERPL-MCNC: 9.2 MG/DL (ref 8.4–10.2)
CHLORIDE SERPL-SCNC: 108 MMOL/L (ref 96–108)
CHOLEST SERPL-MCNC: 163 MG/DL
CO2 SERPL-SCNC: 23 MMOL/L (ref 21–32)
CREAT SERPL-MCNC: 0.88 MG/DL (ref 0.6–1.3)
ERYTHROCYTE [DISTWIDTH] IN BLOOD BY AUTOMATED COUNT: 15.3 % (ref 11.6–15.1)
GFR SERPL CREATININE-BSD FRML MDRD: 87 ML/MIN/1.73SQ M
GLUCOSE P FAST SERPL-MCNC: 99 MG/DL (ref 65–99)
HCT VFR BLD AUTO: 47 % (ref 36.5–49.3)
HDLC SERPL-MCNC: 59 MG/DL
HGB BLD-MCNC: 16 G/DL (ref 12–17)
LDLC SERPL CALC-MCNC: 88 MG/DL (ref 0–100)
MCH RBC QN AUTO: 31.2 PG (ref 26.8–34.3)
MCHC RBC AUTO-ENTMCNC: 34 G/DL (ref 31.4–37.4)
MCV RBC AUTO: 92 FL (ref 82–98)
NONHDLC SERPL-MCNC: 104 MG/DL
PLATELET # BLD AUTO: 413 THOUSANDS/UL (ref 149–390)
PMV BLD AUTO: 10.4 FL (ref 8.9–12.7)
POTASSIUM SERPL-SCNC: 3.8 MMOL/L (ref 3.5–5.3)
PROT SERPL-MCNC: 6.4 G/DL (ref 6.4–8.4)
PSA SERPL-MCNC: 3.66 NG/ML (ref 0–4)
RBC # BLD AUTO: 5.13 MILLION/UL (ref 3.88–5.62)
SODIUM SERPL-SCNC: 141 MMOL/L (ref 135–147)
TRIGL SERPL-MCNC: 81 MG/DL
WBC # BLD AUTO: 12.24 THOUSAND/UL (ref 4.31–10.16)

## 2024-07-16 PROCEDURE — 85027 COMPLETE CBC AUTOMATED: CPT

## 2024-07-16 PROCEDURE — 80053 COMPREHEN METABOLIC PANEL: CPT

## 2024-07-16 PROCEDURE — 80061 LIPID PANEL: CPT

## 2024-07-16 PROCEDURE — G0103 PSA SCREENING: HCPCS

## 2024-07-16 PROCEDURE — 36415 COLL VENOUS BLD VENIPUNCTURE: CPT

## 2024-07-17 ENCOUNTER — OFFICE VISIT (OUTPATIENT)
Dept: INTERNAL MEDICINE CLINIC | Facility: OTHER | Age: 69
End: 2024-07-17
Payer: COMMERCIAL

## 2024-07-17 VITALS
HEIGHT: 75 IN | RESPIRATION RATE: 20 BRPM | BODY MASS INDEX: 21.36 KG/M2 | HEART RATE: 92 BPM | WEIGHT: 171.8 LBS | TEMPERATURE: 98.4 F | OXYGEN SATURATION: 96 % | DIASTOLIC BLOOD PRESSURE: 84 MMHG | SYSTOLIC BLOOD PRESSURE: 102 MMHG

## 2024-07-17 DIAGNOSIS — Z13.1 SCREENING FOR DIABETES MELLITUS: ICD-10-CM

## 2024-07-17 DIAGNOSIS — Z86.718 HISTORY OF DVT (DEEP VEIN THROMBOSIS): ICD-10-CM

## 2024-07-17 DIAGNOSIS — Z13.6 SCREENING FOR CARDIOVASCULAR CONDITION: ICD-10-CM

## 2024-07-17 DIAGNOSIS — Z00.00 MEDICARE ANNUAL WELLNESS VISIT, SUBSEQUENT: ICD-10-CM

## 2024-07-17 DIAGNOSIS — Z12.5 SCREENING FOR PROSTATE CANCER: ICD-10-CM

## 2024-07-17 DIAGNOSIS — C34.91 ADENOCARCINOMA OF RIGHT LUNG (HCC): ICD-10-CM

## 2024-07-17 DIAGNOSIS — Z12.11 SCREENING FOR COLORECTAL CANCER: ICD-10-CM

## 2024-07-17 DIAGNOSIS — Z71.89 ADVANCED CARE PLANNING/COUNSELING DISCUSSION: ICD-10-CM

## 2024-07-17 DIAGNOSIS — I73.9 PAD (PERIPHERAL ARTERY DISEASE) (HCC): ICD-10-CM

## 2024-07-17 DIAGNOSIS — J44.9 CHRONIC OBSTRUCTIVE PULMONARY DISEASE, UNSPECIFIED COPD TYPE (HCC): ICD-10-CM

## 2024-07-17 DIAGNOSIS — Z12.2 SCREENING FOR LUNG CANCER: ICD-10-CM

## 2024-07-17 DIAGNOSIS — Z12.12 SCREENING FOR COLORECTAL CANCER: ICD-10-CM

## 2024-07-17 DIAGNOSIS — E55.9 VITAMIN D DEFICIENCY: ICD-10-CM

## 2024-07-17 DIAGNOSIS — E78.2 MIXED HYPERLIPIDEMIA: Primary | ICD-10-CM

## 2024-07-17 PROCEDURE — 99214 OFFICE O/P EST MOD 30 MIN: CPT | Performed by: INTERNAL MEDICINE

## 2024-07-17 PROCEDURE — G0439 PPPS, SUBSEQ VISIT: HCPCS | Performed by: INTERNAL MEDICINE

## 2024-07-17 PROCEDURE — G0444 DEPRESSION SCREEN ANNUAL: HCPCS | Performed by: INTERNAL MEDICINE

## 2024-07-17 PROCEDURE — 99497 ADVNCD CARE PLAN 30 MIN: CPT | Performed by: INTERNAL MEDICINE

## 2024-07-17 RX ORDER — FOLIC ACID 1 MG/1
1 TABLET ORAL DAILY
COMMUNITY

## 2024-07-17 RX ORDER — ATORVASTATIN CALCIUM 10 MG/1
5 TABLET, FILM COATED ORAL
Qty: 50 TABLET | Refills: 1 | Status: SHIPPED | OUTPATIENT
Start: 2024-07-17

## 2024-07-17 RX ORDER — MAGNESIUM 200 MG
1000 TABLET ORAL 2 TIMES DAILY
COMMUNITY

## 2024-07-17 RX ORDER — VITAMIN B COMPLEX
1000 TABLET ORAL 2 TIMES DAILY
COMMUNITY

## 2024-07-17 RX ORDER — VITAMIN A 3000 MCG
25000 CAPSULE ORAL DAILY
COMMUNITY

## 2024-07-17 NOTE — PROGRESS NOTES
Ambulatory Visit  Name: Luis Russell      : 1955      MRN: [id number]  Encounter Provider: Kvng Ledezma MD  Encounter Date: 2024   Encounter department: [facility]    Assessment & Plan   1. Mixed hyperlipidemia  Assessment & Plan:  Controlled. Continue atorvastatin.   Orders:  -     atorvastatin (LIPITOR) 10 mg tablet; Take 0.5 tablets (5 mg total) by mouth daily at bedtime  2. Screening for lung cancer  3. Medicare annual wellness visit, subsequent  4. PAD (peripheral artery disease) (HCC)  Assessment & Plan:  Stable, asymptomatic. Continue aspirin and statin therapy.   5. Chronic obstructive pulmonary disease, unspecified COPD type (HCC)  Assessment & Plan:  Stable without recent exacerbation.     6. Adenocarcinoma of right lung (HCC)  Assessment & Plan:  Continue CT chest for surveillance. Continue follow up with cardiothoracic surgery.   7. Vitamin D deficiency  Assessment & Plan:  Continue vitamin supplementation.   8. History of DVT (deep vein thrombosis)  Assessment & Plan:  Continue eliquis anticoagulation.   9. Screening for diabetes mellitus  10. Screening for cardiovascular condition  11. Screening for colorectal cancer  12. Screening for prostate cancer  13. Advanced care planning/counseling discussion  Assessment & Plan:  Luis Russell and I had a thorough discussion regarding advance care planning.  he  has a Living Will at home to which I recommended he provide a copy to be scanned for his medical records.  ACP documentation provided to patient today.  he  understands that today's visit is a billable service.  Refer to ACP note.      Serious Illness Conversation    1. What is your understanding now of where you are with your illness?  Prognostic Understanding: appropriate understanding of prognosis     2. How much information about what is likely to be ahead with your illness would you like to have?  Information: patient wants to be fully  informed     3. What did you (clinician) communicate to the patient?  Prognostic Communication: Uncertain - It can be difficult to predict what will happen with your illness. I hope you will continue to live well for a long time but I’m worried that you could get sick quickly, and I think it is important to prepare for that possibility.     4. If your health situation worsens, what are your most important goals?  Goals: have my medical decisions respected, be mentally aware, be at home, be emotionally at peace, be physically comfortable, be spiritually and emotionally at peace, not be a burden     5. What are the biggest fears and worries about the future and your health?  Fears/Worries: loss of control, being a financial burden, being a physical burden, burdening others, being dependent     6. What abilities are so critical to your life that you cannot imagine living without them?  Unacceptable Function: being unconscious     7. What gives you strength as you think about the future with your illness?     8. If you become sicker, how much are you willing to go through for the possibility of gaining more time?  Be in the hospital: Yes Have a feeding tube: Yes   Be in the ICU: Yes Live in a nursing home: Yes   Be on a ventilator: Yes Be uncomfortable: Yes   Be on dialysis: Yes Undergo aggressive test and/or procedures: Yes   9. How much does your proxy and family know about your priorities and wishes?  Discussion Discussion: extensive discussion with family about goals and wishes        How does this plan sound to you? I will do everything I can to help you through this.  Patient verbalized understanding of the plan     I have spent 16 minutes speaking with my patient on advanced care planning today or during this visit     Advanced directives  Five Wishes: Patient does not have Five Wishes- would not like information            Preventive health issues were discussed with patient, and age appropriate screening tests  were ordered as noted in patient's After Visit Summary. Personalized health advice and appropriate referrals for health education or preventive services given if needed, as noted in patient's After Visit Summary.    History of Present Illness     Luis Russell is seen today for follow up of chronic conditions.   Recent laboratory studies reviewed today with the patient.   he has been compliant with his medication regimen.     he has no complaints or concerns at this time.       Hyperlipidemia  This is a chronic problem. The current episode started more than 1 year ago. The problem is controlled. Recent lipid tests were reviewed and are normal. Pertinent negatives include no chest pain or shortness of breath. Current antihyperlipidemic treatment includes statins. The current treatment provides moderate improvement of lipids. There are no compliance problems.       Patient Care Team:  Kvng Ledezma MD as PCP - General  Cj Kincaid DO as PCP - PCP-[facility]  DO Cyril Holder III, DO (Pain Medicine)  Neal Kwok MD as Endoscopist  Tobi Leung MD (Oncology)    Review of Systems   Constitutional:  Negative for activity change, appetite change, chills, diaphoresis, fatigue and fever.   HENT:  Negative for congestion, postnasal drip, rhinorrhea, sinus pressure, sinus pain, sneezing and sore throat.    Eyes:  Negative for visual disturbance.   Respiratory:  Negative for apnea, cough, choking, chest tightness, shortness of breath and wheezing.    Cardiovascular:  Negative for chest pain, palpitations and leg swelling.   Gastrointestinal:  Negative for abdominal distention, abdominal pain, anal bleeding, blood in stool, constipation, diarrhea, nausea and vomiting.   Endocrine: Negative for cold intolerance and heat intolerance.   Genitourinary:  Negative for difficulty urinating, dysuria and hematuria.   Musculoskeletal: Negative.    Skin: Negative.    Neurological:  Negative for  dizziness, weakness, light-headedness, numbness and headaches.   Hematological:  Negative for adenopathy.   Psychiatric/Behavioral:  Negative for agitation, sleep disturbance and suicidal ideas.    All other systems reviewed and are negative.    Medical History Reviewed by provider this encounter:  Tobacco  Allergies  Meds  Problems  Med Hx  Surg Hx  Fam Hx       Annual Wellness Visit Questionnaire   Luis is here for his Subsequent Wellness visit. Last Medicare Wellness visit information reviewed, patient interviewed and updates made to the record today.      Health Risk Assessment:   Patient rates overall health as good. Patient feels that their physical health rating is slightly better. Patient is satisfied with their life. Eyesight was rated as same. Hearing was rated as same. Patient feels that their emotional and mental health rating is slightly better. Patients states they are sometimes angry. Patient states they are sometimes unusually tired/fatigued. Pain experienced in the last 7 days has been some. Patient's pain rating has been 4/10. Patient states that he has experienced no weight loss or gain in last 6 months.     Depression Screening:   PHQ-2 Score: 0      Fall Risk Screening:   In the past year, patient has experienced: no history of falling in past year      Home Safety:  Patient has trouble with stairs inside or outside of their home. Patient has working smoke alarms and has no working carbon monoxide detector. Home safety hazards include: none.     Nutrition:   Current diet is Regular.     Medications:   Patient is currently taking over-the-counter supplements. OTC medications include: see medication list. Patient is able to manage medications.     Activities of Daily Living (ADLs)/Instrumental Activities of Daily Living (IADLs):   Walk and transfer into and out of bed and chair?: Yes  Dress and groom yourself?: Yes    Bathe or shower yourself?: Yes    Feed yourself? Yes  Do your  laundry/housekeeping?: Yes  Manage your money, pay your bills and track your expenses?: Yes  Make your own meals?: Yes    Do your own shopping?: Yes    Previous Hospitalizations:   Any hospitalizations or ED visits within the last 12 months?: Yes    How many hospitalizations have you had in the last year?: 3-4    Advance Care Planning:   Living will: Yes    Durable POA for healthcare: Yes    Advanced directive: Yes    Advanced directive counseling given: Yes    End of Life Decisions reviewed with patient: Yes    Provider agrees with end of life decisions: Yes      Cognitive Screening:   Provider or family/friend/caregiver concerned regarding cognition?: No    PREVENTIVE SCREENINGS      Cardiovascular Screening:    General: Screening Not Indicated, History Lipid Disorder, Risks and Benefits Discussed and Screening Current      Diabetes Screening:     General: Screening Current and Risks and Benefits Discussed      Colorectal Cancer Screening:     General: Screening Current      Prostate Cancer Screening:    General: Screening Current and Risks and Benefits Discussed      Osteoporosis Screening:    General: Screening Not Indicated      Abdominal Aortic Aneurysm (AAA) Screening:    Risk factors include: age between 65-74 yo and tobacco use        General: Risks and Benefits Discussed      Lung Cancer Screening:     General: Screening Not Indicated, History Lung Cancer and Risks and Benefits Discussed      Hepatitis C Screening:    General: Screening Current and Risks and Benefits Discussed    Screening, Brief Intervention, and Referral to Treatment (SBIRT)    Screening  Typical number of drinks in a day: 0  Typical number of drinks in a week: 0  Interpretation: Low risk drinking behavior.    Single Item Drug Screening:  How often have you used an illegal drug (including marijuana) or a prescription medication for non-medical reasons in the past year? never    Single Item Drug Screen Score: 0  Interpretation: Negative  "screen for possible drug use disorder    Brief Intervention  Alcohol & drug use screenings were reviewed. No concerns regarding substance use disorder identified.     Annual Depression Screening  Time spent screening and evaluating the patient for depression during today's encounter was 5 minutes.    Review of Current Opioid Use    Opioid Risk Tool (ORT) Interpretation: Complete Opioid Risk Tool (ORT)    [place]MP or [place]  reviewed. No red flags were identified    Other Counseling Topics:   Car/seat belt/driving safety, skin self-exam, sunscreen and calcium and vitamin D intake and regular weightbearing exercise.     Social Determinants of Health     Food Insecurity: No Food Insecurity (7/17/2024)    Hunger Vital Sign    • Worried About Running Out of Food in the Last Year: Never true    • Ran Out of Food in the Last Year: Never true   Transportation Needs: No Transportation Needs (7/17/2024)    PRAPARE - Transportation    • Lack of Transportation (Medical): No    • Lack of Transportation (Non-Medical): No   Housing Stability: Low Risk  (7/17/2024)    Housing Stability Vital Sign    • Unable to Pay for Housing in the Last Year: No    • Number of Times Moved in the Last Year: 1    • Homeless in the Last Year: No   Utilities: Not At Risk (7/17/2024)    [facility] Utilities    • Threatened with loss of utilities: No     No results found.    Objective     /84 (BP Location: Right arm, Patient Position: Sitting, Cuff Size: Standard)   Pulse 92   Temp 98.4 °F (36.9 °C) (Temporal)   Resp 20   Ht 6' 3\" (1.905 m)   Wt 77.9 kg (171 lb 12.8 oz)   SpO2 96%   BMI 21.47 kg/m²     Physical Exam  Vitals and nursing note reviewed.   Constitutional:       General: He is not in acute distress.     Appearance: He is well-developed. He is not diaphoretic.   HENT:      Head: Normocephalic and atraumatic.   Eyes:      General: No scleral icterus.        Right eye: No discharge.         Left eye: No discharge.      Conjunctiva/sclera: " Conjunctivae normal.      Pupils: Pupils are equal, round, and reactive to light.   Neck:      Thyroid: No thyromegaly.      Vascular: No JVD.   Cardiovascular:      Rate and Rhythm: Normal rate and regular rhythm.      Heart sounds: Normal heart sounds. No murmur heard.     No friction rub. No gallop.   Pulmonary:      Effort: Pulmonary effort is normal. No respiratory distress.      Breath sounds: Normal breath sounds. No wheezing or rales.   Chest:      Chest wall: No tenderness.   Abdominal:      General: Bowel sounds are normal. There is no distension.      Palpations: Abdomen is soft. There is no mass.      Tenderness: There is no abdominal tenderness. There is no guarding or rebound.   Musculoskeletal:         General: No tenderness or deformity. Normal range of motion.      Cervical back: Normal range of motion and neck supple.   Lymphadenopathy:      Cervical: No cervical adenopathy.   Skin:     General: Skin is warm and dry.      Coloration: Skin is not pale.      Findings: No erythema or rash.   Neurological:      Mental Status: He is alert and oriented to person, place, and time.      Cranial Nerves: No cranial nerve deficit.      Coordination: Coordination normal.      Deep Tendon Reflexes: Reflexes are normal and symmetric.   Psychiatric:         Behavior: Behavior normal.         Thought Content: Thought content normal.         Judgment: Judgment normal.       Administrative Statements   I have spent a total time of 30 minutes in caring for this patient on the day of the visit/encounter including Diagnostic results, Prognosis, Risks and benefits of tx options, Instructions for management, Patient and family education, Importance of tx compliance, Risk factor reductions, Impressions, Counseling / Coordination of care, Documenting in the medical record, Reviewing / ordering tests, medicine, procedures  , and Obtaining or reviewing history  .  I discussed with him that he is a candidate for lung cancer  CT screening.     The following Shared Decision-Making points were covered:  Benefits of screening were discussed, including the rates of reduction in death from lung cancer and other causes.  Harms of screening were reviewed, including false positive tests, radiation exposure levels, risks of invasive procedures, risks of complications of screening, and risk of overdiagnosis.  I counseled on the importance of adherence to annual lung cancer LDCT screening, impact of co-morbidities, and ability or willingness to undergo diagnosis and treatment.  I counseled on the importance of maintaining abstinence as a former smoker or was counseled on the importance of smoking cessation if a current smoker    Review of Eligibility Criteria: He meets all of the criteria for Lung Cancer Screening.   He is 68 y.o.   He has 20 pack year tobacco history and is a current smoker or has quit within the past 15 years  He presents no signs or symptoms of lung cancer    After discussion, the patient decided to elect lung cancer screening.

## 2024-07-17 NOTE — ASSESSMENT & PLAN NOTE
Luis Russell and I had a thorough discussion regarding advance care planning.  he  has a Living Will at home to which I recommended he provide a copy to be scanned for his medical records.  ACP documentation provided to patient today.  he  understands that today's visit is a billable service.  Refer to ACP note.

## 2024-07-17 NOTE — PATIENT INSTRUCTIONS
Medicare Preventive Visit Patient Instructions  Thank you for completing your Welcome to Medicare Visit or Medicare Annual Wellness Visit today. Your next wellness visit will be due in one year (7/18/2025).  The screening/preventive services that you may require over the next 5-10 years are detailed below. Some tests may not apply to you based off risk factors and/or age. Screening tests ordered at today's visit but not completed yet may show as past due. Also, please note that scanned in results may not display below.  Preventive Screenings:  Service Recommendations Previous Testing/Comments   Colorectal Cancer Screening  Colonoscopy    Fecal Occult Blood Test (FOBT)/Fecal Immunochemical Test (FIT)  Fecal DNA/Cologuard Test  Flexible Sigmoidoscopy Age: 45-75 years old   Colonoscopy: every 10 years (May be performed more frequently if at higher risk)  OR  FOBT/FIT: every 1 year  OR  Cologuard: every 3 years  OR  Sigmoidoscopy: every 5 years  Screening may be recommended earlier than age 45 if at higher risk for colorectal cancer. Also, an individualized decision between you and your healthcare provider will decide whether screening between the ages of 76-85 would be appropriate. Colonoscopy: 06/29/2018  FOBT/FIT: Not on file  Cologuard: Not on file  Sigmoidoscopy: Not on file    Screening Current     Prostate Cancer Screening Individualized decision between patient and health care provider in men between ages of 55-69   Medicare will cover every 12 months beginning on the day after your 50th birthday PSA: 3.658 ng/mL     Screening Current     Hepatitis C Screening Once for adults born between 1945 and 1965  More frequently in patients at high risk for Hepatitis C Hep C Antibody: 09/10/2018    Screening Current   Diabetes Screening 1-2 times per year if you're at risk for diabetes or have pre-diabetes Fasting glucose: 99 mg/dL (7/16/2024)  A1C: No results in last 5 years (No results in last 5 years)  Screening  Current   Cholesterol Screening Once every 5 years if you don't have a lipid disorder. May order more often based on risk factors. Lipid panel: 07/16/2024  Screening Not Indicated  History Lipid Disorder      Other Preventive Screenings Covered by Medicare:  Abdominal Aortic Aneurysm (AAA) Screening: covered once if your at risk. You're considered to be at risk if you have a family history of AAA or a male between the age of 65-75 who smoking at least 100 cigarettes in your lifetime.  Lung Cancer Screening: covers low dose CT scan once per year if you meet all of the following conditions: (1) Age 55-77; (2) No signs or symptoms of lung cancer; (3) Current smoker or have quit smoking within the last 15 years; (4) You have a tobacco smoking history of at least 20 pack years (packs per day x number of years you smoked); (5) You get a written order from a healthcare provider.  Glaucoma Screening: covered annually if you're considered high risk: (1) You have diabetes OR (2) Family history of glaucoma OR (3)  aged 50 and older OR (4)  American aged 65 and older  Osteoporosis Screening: covered every 2 years if you meet one of the following conditions: (1) Have a vertebral abnormality; (2) On glucocorticoid therapy for more than 3 months; (3) Have primary hyperparathyroidism; (4) On osteoporosis medications and need to assess response to drug therapy.  HIV Screening: covered annually if you're between the age of 15-65. Also covered annually if you are younger than 15 and older than 65 with risk factors for HIV infection. For pregnant patients, it is covered up to 3 times per pregnancy.    Immunizations:  Immunization Recommendations   Influenza Vaccine Annual influenza vaccination during flu season is recommended for all persons aged >= 6 months who do not have contraindications   Pneumococcal Vaccine   * Pneumococcal conjugate vaccine = PCV13 (Prevnar 13), PCV15 (Vaxneuvance), PCV20 (Prevnar 20)  *  Pneumococcal polysaccharide vaccine = PPSV23 (Pneumovax) Adults 19-65 yo with certain risk factors or if 65+ yo  If never received any pneumonia vaccine: recommend Prevnar 20 (PCV20)  Give PCV20 if previously received 1 dose of PCV13 or PPSV23   Hepatitis B Vaccine 3 dose series if at intermediate or high risk (ex: diabetes, end stage renal disease, liver disease)   Respiratory syncytial virus (RSV) Vaccine - COVERED BY MEDICARE PART D  * RSVPreF3 (Arexvy) CDC recommends that adults 60 years of age and older may receive a single dose of RSV vaccine using shared clinical decision-making (SCDM)   Tetanus (Td) Vaccine - COST NOT COVERED BY MEDICARE PART B Following completion of primary series, a booster dose should be given every 10 years to maintain immunity against tetanus. Td may also be given as tetanus wound prophylaxis.   Tdap Vaccine - COST NOT COVERED BY MEDICARE PART B Recommended at least once for all adults. For pregnant patients, recommended with each pregnancy.   Shingles Vaccine (Shingrix) - COST NOT COVERED BY MEDICARE PART B  2 shot series recommended in those 19 years and older who have or will have weakened immune systems or those 50 years and older     Health Maintenance Due:      Topic Date Due   • Colorectal Cancer Screening  06/29/2028   • Hepatitis C Screening  Completed     Immunizations Due:      Topic Date Due   • COVID-19 Vaccine (5 - 2023-24 season) 09/01/2023   • Influenza Vaccine (1) 09/01/2024     Advance Directives   What are advance directives?  Advance directives are legal documents that state your wishes and plans for medical care. These plans are made ahead of time in case you lose your ability to make decisions for yourself. Advance directives can apply to any medical decision, such as the treatments you want, and if you want to donate organs.   What are the types of advance directives?  There are many types of advance directives, and each state has rules about how to use them. You  may choose a combination of any of the following:  Living will:  This is a written record of the treatment you want. You can also choose which treatments you do not want, which to limit, and which to stop at a certain time. This includes surgery, medicine, IV fluid, and tube feedings.   Durable power of  for healthcare (DPAHC):  This is a written record that states who you want to make healthcare choices for you when you are unable to make them for yourself. This person, called a proxy, is usually a family member or a friend. You may choose more than 1 proxy.  Do not resuscitate (DNR) order:  A DNR order is used in case your heart stops beating or you stop breathing. It is a request not to have certain forms of treatment, such as CPR. A DNR order may be included in other types of advance directives.  Medical directive:  This covers the care that you want if you are in a coma, near death, or unable to make decisions for yourself. You can list the treatments you want for each condition. Treatment may include pain medicine, surgery, blood transfusions, dialysis, IV or tube feedings, and a ventilator (breathing machine).  Values history:  This document has questions about your views, beliefs, and how you feel and think about life. This information can help others choose the care that you would choose.  Why are advance directives important?  An advance directive helps you control your care. Although spoken wishes may be used, it is better to have your wishes written down. Spoken wishes can be misunderstood, or not followed. Treatments may be given even if you do not want them. An advance directive may make it easier for your family to make difficult choices about your care.   Cigarette Smoking and Your Health   Risks to your health if you smoke:  Nicotine and other chemicals found in tobacco damage every cell in your body. Even if you are a light smoker, you have an increased risk for cancer, heart disease, and  lung disease. If you are pregnant or have diabetes, smoking increases your risk for complications.   Benefits to your health if you stop smoking:   You decrease respiratory symptoms such as coughing, wheezing, and shortness of breath.   You reduce your risk for cancers of the lung, mouth, throat, kidney, bladder, pancreas, stomach, and cervix. If you already have cancer, you increase the benefits of chemotherapy. You also reduce your risk for cancer returning or a second cancer from developing.   You reduce your risk for heart disease, blood clots, heart attack, and stroke.   You reduce your risk for lung infections, and diseases such as pneumonia, asthma, chronic bronchitis, and emphysema.  Your circulation improves. More oxygen can be delivered to your body. If you have diabetes, you lower your risk for complications, such as kidney, artery, and eye diseases. You also lower your risk for nerve damage. Nerve damage can lead to amputations, poor vision, and blindness.  You improve your body's ability to heal and to fight infections.  For more information and support to stop smoking:   [employer]  Phone: 8- 076 - 887-8738  Web Address: www.[employer]  Narcotic (Opioid) Safety    Use narcotics safely:  Take prescribed narcotics exactly as directed  Do not give narcotics to others or take narcotics that belong to someone else  Do not mix narcotics without medicines or alcohol  Do not drive or operate heavy machinery after you take the narcotic  Monitor for side effects and notify your healthcare provider if you experienced side effects such as nausea, sleepiness, itching, or trouble thinking clearly.    Manage constipation:    Constipation is the most common side effect of narcotic medicine. Constipation is when you have hard, dry bowel movements, or you go longer than usual between bowel movements. Tell your healthcare provider about all changes in your bowel movements while you are taking narcotics. He or she  may recommend laxative medicine to help you have a bowel movement. He or she may also change the kind of narcotic you are taking, or change when you take it. The following are more ways you can prevent or relieve constipation:    Drink liquids as directed.  You may need to drink extra liquids to help soften and move your bowels. Ask how much liquid to drink each day and which liquids are best for you.  Eat high-fiber foods.  This may help decrease constipation by adding bulk to your bowel movements. High-fiber foods include fruits, vegetables, whole-grain breads and cereals, and beans. Your healthcare provider or dietitian can help you create a high-fiber meal plan. Your provider may also recommend a fiber supplement if you cannot get enough fiber from food.  Exercise regularly.  Regular physical activity can help stimulate your intestines. Walking is a good exercise to prevent or relieve constipation. Ask which exercises are best for you.  Schedule a time each day to have a bowel movement.  This may help train your body to have regular bowel movements. Bend forward while you are on the toilet to help move the bowel movement out. Sit on the toilet for at least 10 minutes, even if you do not have a bowel movement.    Store narcotics safely:   Store narcotics where others cannot easily get them.  Keep them in a locked cabinet or secure area. Do not  keep them in a purse or other bag you carry with you. A person may be looking for something else and find the narcotics.  Make sure narcotics are stored out of the reach of children.  A child can easily overdose on narcotics. Narcotics may look like candy to a small child.    The best way to dispose of narcotics:      The laws vary by country and area. In the [place], the best way is to return the narcotics through a take-back program. This program is offered by the [place] Drug Enforcement Agency (SHERLY). The following are options for using the program:  Take the narcotics  to a [employer]LY collection site.  The site is often a law enforcement center. Call your local law enforcement center for scheduled take-back days in your area. You will be given information on where to go if the collection site is in a different location.  Take the narcotics to an approved pharmacy or hospital.  A pharmacy or hospital may be set up as a collection site. You will need to ask if it is a [employer] collection site if you were not directed there. A pharmacy or doctor's office may not be able to take back narcotics unless it is a [employer] site.  Use a mail-back system.  This means you are given containers to put the narcotics into. You will then mail them in the containers.  Use a take-back drop box.  This is a place to leave the narcotics at any time. People and animals will not be able to get into the box. Your local law enforcement agency can tell you where to find a drop box in your area.    Other ways to manage pain:   Ask your healthcare provider about non-narcotic medicines to control pain.  Nonprescription medicines include NSAIDs (such as ibuprofen) and acetaminophen. Prescription medicines include muscle relaxers, antidepressants, and steroids.  Pain may be managed without any medicines.  Some ways to relieve pain include massage, aromatherapy, or meditation. Physical or occupational therapy may also help.    For more information:   Drug Enforcement Administration  [address] [place] [address]  Phone: 0- 806 - 734-2511  Web Address: https://www.deadiversion.[facility].gov/drug_disposal/    [place] Food and Drug Administration  [address] [place] [address]  Phone: 8- 285 - 198-0654  Web Address: http://www.fda.gov     © Copyright [employer] 2018 Information is for End User's use only and may not be sold, redistributed or otherwise used for commercial purposes. All illustrations and images included in CareNotes® are the copyrighted property of [employer] Inc[employer] or [employer] Medicare Preventive Visit Patient Instructions  Thank you for completing your Welcome to Medicare Visit or Medicare Annual Wellness Visit today. Your next wellness visit will be due in one year (7/18/2025).  The screening/preventive services that you may require over the next 5-10 years are detailed below. Some tests may not apply to you based off risk factors and/or age. Screening tests ordered at today's visit but not completed yet may show as past due. Also, please note that scanned in results may not display below.  Preventive Screenings:  Service Recommendations Previous Testing/Comments   Colorectal Cancer Screening  Colonoscopy    Fecal Occult Blood Test (FOBT)/Fecal Immunochemical Test (FIT)  Fecal DNA/Cologuard Test  Flexible Sigmoidoscopy Age: 45-75 years old   Colonoscopy: every 10 years (May be performed more frequently if at higher risk)  OR  FOBT/FIT: every 1 year  OR  Cologuard: every 3 years  OR  Sigmoidoscopy: every 5 years  Screening may be recommended earlier than age 45 if at higher risk for colorectal cancer. Also, an individualized decision between you and your healthcare provider will decide whether screening between the ages of 76-85 would be appropriate. Colonoscopy: 06/29/2018  FOBT/FIT: Not on file  Cologuard: Not on file  Sigmoidoscopy: Not on file    Screening Current     Prostate Cancer Screening Individualized decision between patient and health care provider in men between ages of 55-69   Medicare will cover every 12 months beginning on the day after your 50th birthday PSA: 3.658 ng/mL     Screening Current     Hepatitis C Screening Once for adults born between 1945 and 1965  More frequently in patients at high risk for Hepatitis C Hep C Antibody: 09/10/2018    Screening Current   Diabetes Screening 1-2 times per year if you're at risk for diabetes or have pre-diabetes Fasting glucose: 99 mg/dL (7/16/2024)  A1C: No results in last 5 years (No results in last 5  years)  Screening Current   Cholesterol Screening Once every 5 years if you don't have a lipid disorder. May order more often based on risk factors. Lipid panel: 07/16/2024  Screening Not Indicated  History Lipid Disorder      Other Preventive Screenings Covered by Medicare:  Abdominal Aortic Aneurysm (AAA) Screening: covered once if your at risk. You're considered to be at risk if you have a family history of AAA or a male between the age of 65-75 who smoking at least 100 cigarettes in your lifetime.  Lung Cancer Screening: covers low dose CT scan once per year if you meet all of the following conditions: (1) Age 55-77; (2) No signs or symptoms of lung cancer; (3) Current smoker or have quit smoking within the last 15 years; (4) You have a tobacco smoking history of at least 20 pack years (packs per day x number of years you smoked); (5) You get a written order from a healthcare provider.  Glaucoma Screening: covered annually if you're considered high risk: (1) You have diabetes OR (2) Family history of glaucoma OR (3)  aged 50 and older OR (4)  American aged 65 and older  Osteoporosis Screening: covered every 2 years if you meet one of the following conditions: (1) Have a vertebral abnormality; (2) On glucocorticoid therapy for more than 3 months; (3) Have primary hyperparathyroidism; (4) On osteoporosis medications and need to assess response to drug therapy.  HIV Screening: covered annually if you're between the age of 15-65. Also covered annually if you are younger than 15 and older than 65 with risk factors for HIV infection. For pregnant patients, it is covered up to 3 times per pregnancy.    Immunizations:  Immunization Recommendations   Influenza Vaccine Annual influenza vaccination during flu season is recommended for all persons aged >= 6 months who do not have contraindications   Pneumococcal Vaccine   * Pneumococcal conjugate vaccine = PCV13 (Prevnar 13), PCV15 (Vaxneuvance),  PCV20 (Prevnar 20)  * Pneumococcal polysaccharide vaccine = PPSV23 (Pneumovax) Adults 19-65 yo with certain risk factors or if 65+ yo  If never received any pneumonia vaccine: recommend Prevnar 20 (PCV20)  Give PCV20 if previously received 1 dose of PCV13 or PPSV23   Hepatitis B Vaccine 3 dose series if at intermediate or high risk (ex: diabetes, end stage renal disease, liver disease)   Respiratory syncytial virus (RSV) Vaccine - COVERED BY MEDICARE PART D  * RSVPreF3 (Arexvy) CDC recommends that adults 60 years of age and older may receive a single dose of RSV vaccine using shared clinical decision-making (SCDM)   Tetanus (Td) Vaccine - COST NOT COVERED BY MEDICARE PART B Following completion of primary series, a booster dose should be given every 10 years to maintain immunity against tetanus. Td may also be given as tetanus wound prophylaxis.   Tdap Vaccine - COST NOT COVERED BY MEDICARE PART B Recommended at least once for all adults. For pregnant patients, recommended with each pregnancy.   Shingles Vaccine (Shingrix) - COST NOT COVERED BY MEDICARE PART B  2 shot series recommended in those 19 years and older who have or will have weakened immune systems or those 50 years and older     Health Maintenance Due:      Topic Date Due   • Colorectal Cancer Screening  06/29/2028   • Hepatitis C Screening  Completed     Immunizations Due:      Topic Date Due   • COVID-19 Vaccine (5 - 2023-24 season) 09/01/2023   • Influenza Vaccine (1) 09/01/2024     Advance Directives   What are advance directives?  Advance directives are legal documents that state your wishes and plans for medical care. These plans are made ahead of time in case you lose your ability to make decisions for yourself. Advance directives can apply to any medical decision, such as the treatments you want, and if you want to donate organs.   What are the types of advance directives?  There are many types of advance directives, and each state has rules  about how to use them. You may choose a combination of any of the following:  Living will:  This is a written record of the treatment you want. You can also choose which treatments you do not want, which to limit, and which to stop at a certain time. This includes surgery, medicine, IV fluid, and tube feedings.   Durable power of  for healthcare (DPAHC):  This is a written record that states who you want to make healthcare choices for you when you are unable to make them for yourself. This person, called a proxy, is usually a family member or a friend. You may choose more than 1 proxy.  Do not resuscitate (DNR) order:  A DNR order is used in case your heart stops beating or you stop breathing. It is a request not to have certain forms of treatment, such as CPR. A DNR order may be included in other types of advance directives.  Medical directive:  This covers the care that you want if you are in a coma, near death, or unable to make decisions for yourself. You can list the treatments you want for each condition. Treatment may include pain medicine, surgery, blood transfusions, dialysis, IV or tube feedings, and a ventilator (breathing machine).  Values history:  This document has questions about your views, beliefs, and how you feel and think about life. This information can help others choose the care that you would choose.  Why are advance directives important?  An advance directive helps you control your care. Although spoken wishes may be used, it is better to have your wishes written down. Spoken wishes can be misunderstood, or not followed. Treatments may be given even if you do not want them. An advance directive may make it easier for your family to make difficult choices about your care.   Cigarette Smoking and Your Health   Risks to your health if you smoke:  Nicotine and other chemicals found in tobacco damage every cell in your body. Even if you are a light smoker, you have an increased risk for  cancer, heart disease, and lung disease. If you are pregnant or have diabetes, smoking increases your risk for complications.   Benefits to your health if you stop smoking:   You decrease respiratory symptoms such as coughing, wheezing, and shortness of breath.   You reduce your risk for cancers of the lung, mouth, throat, kidney, bladder, pancreas, stomach, and cervix. If you already have cancer, you increase the benefits of chemotherapy. You also reduce your risk for cancer returning or a second cancer from developing.   You reduce your risk for heart disease, blood clots, heart attack, and stroke.   You reduce your risk for lung infections, and diseases such as pneumonia, asthma, chronic bronchitis, and emphysema.  Your circulation improves. More oxygen can be delivered to your body. If you have diabetes, you lower your risk for complications, such as kidney, artery, and eye diseases. You also lower your risk for nerve damage. Nerve damage can lead to amputations, poor vision, and blindness.  You improve your body's ability to heal and to fight infections.  For more information and support to stop smoking:   [employer]  Phone: 3- 621 - 944-8643  Web Address: www.[employer]  Narcotic (Opioid) Safety    Use narcotics safely:  Take prescribed narcotics exactly as directed  Do not give narcotics to others or take narcotics that belong to someone else  Do not mix narcotics without medicines or alcohol  Do not drive or operate heavy machinery after you take the narcotic  Monitor for side effects and notify your healthcare provider if you experienced side effects such as nausea, sleepiness, itching, or trouble thinking clearly.    Manage constipation:    Constipation is the most common side effect of narcotic medicine. Constipation is when you have hard, dry bowel movements, or you go longer than usual between bowel movements. Tell your healthcare provider about all changes in your bowel movements while you are  taking narcotics. He or she may recommend laxative medicine to help you have a bowel movement. He or she may also change the kind of narcotic you are taking, or change when you take it. The following are more ways you can prevent or relieve constipation:    Drink liquids as directed.  You may need to drink extra liquids to help soften and move your bowels. Ask how much liquid to drink each day and which liquids are best for you.  Eat high-fiber foods.  This may help decrease constipation by adding bulk to your bowel movements. High-fiber foods include fruits, vegetables, whole-grain breads and cereals, and beans. Your healthcare provider or dietitian can help you create a high-fiber meal plan. Your provider may also recommend a fiber supplement if you cannot get enough fiber from food.  Exercise regularly.  Regular physical activity can help stimulate your intestines. Walking is a good exercise to prevent or relieve constipation. Ask which exercises are best for you.  Schedule a time each day to have a bowel movement.  This may help train your body to have regular bowel movements. Bend forward while you are on the toilet to help move the bowel movement out. Sit on the toilet for at least 10 minutes, even if you do not have a bowel movement.    Store narcotics safely:   Store narcotics where others cannot easily get them.  Keep them in a locked cabinet or secure area. Do not  keep them in a purse or other bag you carry with you. A person may be looking for something else and find the narcotics.  Make sure narcotics are stored out of the reach of children.  A child can easily overdose on narcotics. Narcotics may look like candy to a small child.    The best way to dispose of narcotics:      The laws vary by country and area. In the [place], the best way is to return the narcotics through a take-back program. This program is offered by the [place] Drug Enforcement Agency (SHERLY). The following are options for using the  program:  Take the narcotics to a [employer] collection site.  The site is often a law enforcement center. Call your local law enforcement center for scheduled take-back days in your area. You will be given information on where to go if the collection site is in a different location.  Take the narcotics to an approved pharmacy or hospital.  A pharmacy or hospital may be set up as a collection site. You will need to ask if it is a [employer] collection site if you were not directed there. A pharmacy or doctor's office may not be able to take back narcotics unless it is a [employer] site.  Use a mail-back system.  This means you are given containers to put the narcotics into. You will then mail them in the containers.  Use a take-back drop box.  This is a place to leave the narcotics at any time. People and animals will not be able to get into the box. Your local law enforcement agency can tell you where to find a drop box in your area.    Other ways to manage pain:   Ask your healthcare provider about non-narcotic medicines to control pain.  Nonprescription medicines include NSAIDs (such as ibuprofen) and acetaminophen. Prescription medicines include muscle relaxers, antidepressants, and steroids.  Pain may be managed without any medicines.  Some ways to relieve pain include massage, aromatherapy, or meditation. Physical or occupational therapy may also help.    For more information:   Drug Enforcement Administration  [address] [place] [address]  Phone: 2- 425 - 203-2586  Web Address: https://www.deadiversion.[facility].gov/drug_disposal/    US Food and Drug Administration  [address] [place] [address]  Phone: 3- 730 - 767-6029  Web Address: http://www.fda.gov     © Copyright [employer] 2018 Information is for End User's use only and may not be sold, redistributed or otherwise used for commercial purposes. All illustrations and images included in CareNotes® are the copyrighted property of  Leana SEAMAN. or [employer]

## 2024-07-24 ENCOUNTER — OFFICE VISIT (OUTPATIENT)
Dept: PAIN MEDICINE | Facility: MEDICAL CENTER | Age: 69
End: 2024-07-24
Payer: COMMERCIAL

## 2024-07-24 VITALS
HEART RATE: 73 BPM | WEIGHT: 171 LBS | HEIGHT: 75 IN | DIASTOLIC BLOOD PRESSURE: 60 MMHG | BODY MASS INDEX: 21.26 KG/M2 | OXYGEN SATURATION: 97 % | SYSTOLIC BLOOD PRESSURE: 116 MMHG

## 2024-07-24 DIAGNOSIS — G89.29 CHRONIC PAIN OF BOTH SHOULDERS: ICD-10-CM

## 2024-07-24 DIAGNOSIS — M25.512 CHRONIC PAIN OF BOTH SHOULDERS: ICD-10-CM

## 2024-07-24 DIAGNOSIS — M25.511 CHRONIC PAIN OF BOTH SHOULDERS: ICD-10-CM

## 2024-07-24 DIAGNOSIS — G89.4 CHRONIC PAIN SYNDROME: ICD-10-CM

## 2024-07-24 DIAGNOSIS — G62.9 NEUROPATHY: ICD-10-CM

## 2024-07-24 DIAGNOSIS — M54.16 LUMBAR RADICULOPATHY: ICD-10-CM

## 2024-07-24 DIAGNOSIS — M47.816 LUMBAR SPONDYLOSIS: Primary | ICD-10-CM

## 2024-07-24 DIAGNOSIS — Z79.891 LONG-TERM CURRENT USE OF OPIATE ANALGESIC: ICD-10-CM

## 2024-07-24 DIAGNOSIS — F11.20 UNCOMPLICATED OPIOID DEPENDENCE (HCC): ICD-10-CM

## 2024-07-24 PROCEDURE — G2211 COMPLEX E/M VISIT ADD ON: HCPCS

## 2024-07-24 PROCEDURE — 99214 OFFICE O/P EST MOD 30 MIN: CPT

## 2024-07-24 RX ORDER — BUPRENORPHINE 5 UG/H
1 PATCH TRANSDERMAL WEEKLY
Qty: 4 PATCH | Refills: 2 | Status: SHIPPED | OUTPATIENT
Start: 2024-07-24

## 2024-07-24 NOTE — PROGRESS NOTES
Assessment:  1. Lumbar spondylosis    2. Uncomplicated opioid dependence (HCC)    3. Chronic pain syndrome    4. Long-term current use of opiate analgesic    5. Lumbar radiculopathy    6. Neuropathy    7. Chronic pain of both shoulders        Plan:  Patient reports overall 50-60% improvement in pain following recent bilateral L3-L5 radiofrequency ablations.  He is very happy with these results.  Patient's pain is well-controlled on Butrans 5 mcg/h patches.  He tolerates medication well without side effects.  Refills of this medication were sent to the patient's pharmacy today.  Follow-up in 3 months for medication refill and reevaluation    There are risks associated with opioid medications, including dependence, addiction and tolerance. The patient understands and agrees to use these medications only as prescribed. Potential side effects of the medications include, but are not limited to, constipation, drowsiness, addiction, impaired judgment and risk of fatal overdose if not taken as prescribed. The patient was warned against driving while taking sedation medications.  Sharing medications is a felony. At this point in time, the patient is showing no signs of addiction, abuse, diversion or suicidal ideation.    An oral drug screen was collected at today's office visit as part of our medication management protocol. The specimen will be sent for confirmatory testing. The drug screen is medically necessary because the patient is either dependent on opioid medication or is being considered for opioid medication therapy and the results could impact ongoing or future treatment. The drug screen is to evaluate for the presences or absence of prescribed, non-prescribed, and/or illicit drugs/substances.    [place] Prescription Drug Monitoring Program report was reviewed and was appropriate     This patient is being managed for a complex and serious condition that requires ongoing, intensive medical management. The  nature of this condition demands constant vigilance and a nuanced approach to treatment. The condition necessitates an in-depth and focused approach to management, including regular monitoring and potential coordination with other healthcare professionals.     Detailed Description of Visit Complexity: This visit involved an intricate evaluation and management of the patient's condition. The complexity of the visit was due to the need for a detailed assessment of the current state, consideration of potential complications, and a careful balancing of treatment options to manage the condition effectively.     Patient's Health Status and History: This patient has a significant pain history which requires regular and detailed management. The condition's impact on their life and health is substantial, necessitating a comprehensive and tailored approach to chronic ongoing care.     My impressions and treatment recommendations were discussed in detail with the patient who verbalized understanding and had no further questions.  Discharge instructions were provided. I personally saw and examined the patient and I agree with the above discussed plan of care.    Orders Placed This Encounter   Procedures    MM ALL_Prescribed Meds and Special Instructions     Order Specific Question:   [facility] Is ATORVASTATIN prescribed?     Answer:   Yes     Order Specific Question:   [facility] Is BUPRENORPHINE Prescribed?     Answer:   Yes    MM DT_Codeine Definitive Test    MM DT_Desipramine Definitive Test    MM DT_Dextromethorphan Definitive Test    MM Diazepam Definitive Test    MM DT_Ethyl Glucuronide/Ethyl Sulfate Definitive Test    MM DT_Fentanyl Definitive Test    MM DT_Heroin Definitive Test    MM DT_Hydrocodone Definitive Test    MM DT_Haloperidol Definitive Test    MM DT_Hydromorphone Definitive Test    MM DT_Alprazolam Definitive Test    MM DT_Kratom Definitive Test    MM DT_Levorphanol Definitive Test    MM Lorazepam Definitive  Test    MM DT_MDMA Definitive Test    MM DT_Meperidine Definitive Test    MM DT_Methadone Definitive Test    MM DT_Methamphetaine-d/l Isomers Definitive    MM DT_Methamphetamine Definitive Test    MM DT_Methylphenidate Definitive Test    MM DT_Morphine Definitive Test    MM DT_Amphetamine Definitive Test    MM DT_Olanzapine Definitive Test    MM DT_Oxazepam Definitive Test    MM DT_Oxycodone Definitive Test    MM DT_Oxymorphone Definitive Test    MM DT_Phenobarbital Definitive Test    MM DT_Phentermine Definitive Test    MM DT_Quetiapine Definitive Test    MM DT_Risperidone Definitive Test    MM DT_Secobarbital Definitive Test    MM DT_Tapentadol Definitive Test    MM DT_Aripiprazole Definitive Test    MM DT_Temazapam Definitive Test    MM DT_THC Definitive Test    MM DT_Tramadol Definitive Test    MM DT_Validity Creatinine    MM DT_Validity Oxidant    MM DT_Validity pH    MM DT_Validity Specific    MM DT_Buprenorphine Definitive Test    MM DT_Butalbital Definitive Test    MM DT_Clonazepam Definitive Test    MM DT_Clozapine Definitive Test    MM DT_Cocaine Definitive Test     No orders of the defined types were placed in this encounter.      History of Present Illness:  Luis Russell is a 68 y.o. male who presents for a follow up office visit after undergoing bilateral L3-L5 radiofrequency ablations.  Patient reports overall 50% reduction in pain following this procedure.  He reports significant improvement in sleeping habits as well as activity level.  Residual pain is localized to the low back bilaterally and is intermittent.  This is rated a 2/10 in intensity on average.  Pain is typically worse in the evening.  He describes the quality of his pain as dull/aching, pressure-like, numbness, and pins-and-needles.    He is currently using Butrans for pain relief, which he reports reduces his pain by about 20%.  He tolerates this well without side effects.    Opioid contract date 03/05/24  Last UDS Date  3/05/24  Pt is on day 2 of Butrans patch                I have personally reviewed and/or updated the patient's past medical history, past surgical history, family history, social history, current medications, allergies, and vital signs today.     Review of Systems   Respiratory:  Negative for shortness of breath.    Cardiovascular:  Negative for chest pain.   Gastrointestinal:  Positive for constipation. Negative for diarrhea, nausea and vomiting.   Musculoskeletal:  Positive for back pain, gait problem, joint swelling and myalgias. Negative for arthralgias.   Skin:  Negative for rash.   Neurological:  Negative for dizziness, seizures and weakness.   All other systems reviewed and are negative.      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[facility])    S/P femoral-popliteal bypass surgery    Atherosclerosis of artery of left lower extremity ([facility])    Chronic obstructive pulmonary disease, unspecified COPD type ([facility])    Chronic right shoulder pain    MTHFR mutation    History of DVT (deep vein thrombosis)    PAD (peripheral artery disease) ([facility])    Adenocarcinoma of right lung ([facility])    Advanced care planning/counseling discussion    Syringomyelia ([facility])       Past Medical History:   Diagnosis Date    Arthritis 02/2016    osto back    Chronic back pain     Chronic narcotic dependence ([facility])     Chronic pain disorder     Sees pain management Dr Reyes SL    Cigarette smoker     Deep vein thrombosis (DVT) of femoral vein of left lower extremity ([facility]) 07/29/2021    Depression     Dry skin     Dyslexia     Full dentures     upper  "and no teeth lower/\"avoid raw vegetables and chewy food\"    GERD (gastroesophageal reflux disease)     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 related    History of blood clots     \"left leg\" takes Eliquis    History of bronchitis     History of pneumonia     childhood    Increased pressure in the eye, bilateral     L4-L5 disc bulge 06/16/2017    Low back pain     MTHFR mutation     Multiple subsolid lung nodules greater than 6 mm in diameter     Muscle weakness     Neuropathy     right foot and left foot    Osteoarthritis     Osteoarthritis     and\" if becomes cold joint pain worsens\"    Peripheral neuropathy 08/2015    Pulmonary emphysema (HCC)     \"mild\"    Right foot drop     Right upper lobe pulmonary nodule     Shortness of breath     \"only on humid days\"    Smokers' cough (HCC)     Uncomplicated opioid dependence (HCC)     Wears glasses     Work related injury 04/24/2018       Past Surgical History:   Procedure Laterality Date    BACK SURGERY  aug 2015- jun2017 2017 no affect    COLONOSCOPY      EGD AND COLONOSCOPY N/A 06/29/2018    Procedure: EGD AND COLONOSCOPY;  Surgeon: Neal Kwok MD;  Location: [facility] GI [facility];  Service: Gastroenterology    EPIDURAL BLOCK INJECTION      needle    FL RETROGRADE PYELOGRAM  9/12/2023    HIP ARTHROSCOPY W/ LABRAL DEBRIDEMENT Left     \"hip surgery\"    IR BIOPSY LUNG  4/17/2023    IR CHEST TUBE PLACEMENT  4/17/2023    IR LOWER EXTREMITY ANGIOGRAM  12/08/2021    LUNG LOBECTOMY Right 5/17/2023    Procedure: lymph node dissection; PLEURAL LYSIS;  Surgeon: Blanquita Chinchilla MD;  [facility];  Service: Thoracic    LUNG SEGMENTECTOMY Right 5/17/2023    Procedure: Right thoracoscopic upper lobe wedge resection;  Surgeon: Blanquita Chinchilla MD;  [facility]: [facility];  Service: Thoracic    ORTHOPEDIC SURGERY      OH ARTHRS HIP DEBRIDEMENT/SHAVING ARTICULAR CRTLG Left 04/30/2018    Procedure: LEFT HIP ARTHROSCOPIC LABRAL DEBRIDEMENT;  Surgeon: Rainer Booth MD;  Location[facility];  " Service: Orthopedics    MI [facility] INCL FLUOR GDNCE DX W/CELL WASHG SPX N/A 5/17/2023    Procedure: BRONCHOSCOPY FLEXIBLE;  Surgeon: Blanquita Chinchilla MD;  [facility];  Service: Thoracic    MI CYSTO/PYELOSCOPY BX&/FULGURATION PELIVC LESION Bilateral 9/12/2023    Procedure: bilateral  ureteroscopy,bilateral  laser ablation tumor, bilateral brushings/washings,bilateral  retrograde pyelogram,bilateral  ureteral stent insertion;  Surgeon: Carlito Hernandez MD;  [facility];  Service: Urology    MI IN-SITU VEIN BYPASS FEMORAL-POPLITEAL Right 12/08/2021    Procedure: R SFA to BK Pop Bypass using insitu GSV,  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;  Surgeon: Valeriy Scott MD;  [facility];  Service: Vascular    MI SLCTV CATHJ 3RD+ ORD [facility]CT[facility] ABDL [facility] 12/08/2021    Procedure: COMPLETION ARTERIOGRAM WITH 5 MM ANGIOPLASTY OF MID SUPERFICIAL FEMORAL ARTERY;  Surgeon: Valeriy Scott MD;  [facility];  Service: Vascular    MI THORACOSCOPY W/THERA WEDGE RESEXN INITIAL UNILAT Right 5/17/2023    Procedure: THORACOSCOPY VIDEO ASSISTED SURGERY (VATS);  Surgeon: Blanquita Chinchilla MD;  [facility];  Service: Thoracic    TONSILLECTOMY      WISDOM TOOTH EXTRACTION         Family History   Problem Relation Age of Onset    Dementia Mother     Glaucoma Mother     Arthritis Mother         nursing home    Cancer Father         dead    Osteoarthritis Family        Social History     Occupational History    Not on file   Tobacco Use    Smoking status: Some Days     Current packs/day: 0.25     Average packs/day: 0.3 packs/day for 16.6 years (4.1 ttl pk-yrs)     Types: Cigarettes     Start date: 2008     Passive exposure: Yes    Smokeless tobacco: Never    Tobacco comments:     stress caused by workers comp.  not covering meds. Less than half a pack of cigs    Vaping Use    Vaping status: Never Used   Substance and Sexual Activity    Alcohol use: Not Currently    Drug use:  "Not Currently     Comment: drug free since 1987    Sexual activity: Not on file     Comment: defer       Current Outpatient Medications on File Prior to Visit   Medication Sig    apixaban (ELIQUIS) 5 mg Take 1 tablet (5 mg total) by mouth 2 (two) times a day    aspirin (ECOTRIN LOW STRENGTH) 81 mg EC tablet Take 1 tablet (81 mg total) by mouth daily    atorvastatin (LIPITOR) 10 mg tablet Take 0.5 tablets (5 mg total) by mouth daily at bedtime    cholecalciferol (VITAMIN D3) 25 mcg (1,000 units) tablet Take 1,000 Units by mouth 2 (two) times a day    Cyanocobalamin (Vitamin B-12) 1000 MCG SUBL Take 1,000 mcg by mouth 2 (two) times a day    Flaxseed, Linseed, (FLAX SEED OIL PO) 2 (two) times a day    folic acid (FOLVITE) 1 mg tablet Take 1 mg by mouth daily    ibuprofen (MOTRIN) 200 mg tablet Take 200 mg by mouth every 6 (six) hours as needed for mild pain prn    Misc Natural Products (Osteo Bi-Flex Triple Strength) TABS Take 1 tablet by mouth 2 (two) times a day    Pyridoxine HCl (VITAMIN B-6 PO) daily    transdermal buprenorphine (BUTRANS) 5 mcg/hr TD patch Place 1 patch on the skin over 7 days once a week For ongoing therapy.    vitamin A 7.5 MG (82329 UT) capsule Take 25,000 Units by mouth daily     No current facility-administered medications on file prior to visit.       Allergies   Allergen Reactions    Cyanocobalamin [Vitamin B12] GI Intolerance    Gabapentin Other (See Comments) and GI Intolerance     Difficulty walking    Morphine Other (See Comments)     \"acute constipation\"    Tramadol Confusion    Nsaids Other (See Comments)     Pt reports avoids as is on Eliquis       Physical Exam:    /60   Pulse 73   Ht 6' 3\" (1.905 m)   Wt 77.6 kg (171 lb)   SpO2 97%   BMI 21.37 kg/m²     Constitutional:normal, well developed, well nourished, alert, in no distress and non-toxic and no overt pain behavior.  Eyes:anicteric  HEENT:grossly intact  Neck:supple, symmetric, trachea midline and no masses "   Pulmonary:even and unlabored  Cardiovascular:No edema or pitting edema present  Skin:Normal without rashes or lesions and well hydrated  Psychiatric:Mood and affect appropriate  Neurologic:Cranial Nerves II-XII grossly intact  Musculoskeletal:normal, ambulates with a cane

## 2024-07-26 LAB
7AMINOCLONAZEPAM SAL QL CFM: NEGATIVE NG/ML
AMPHET SAL QL CFM: NEGATIVE NG/ML
BUPRENORPHINE SAL QL SCN: ABNORMAL NG/ML
CARBOXYTHC SAL QL CFM: NEGATIVE NG/ML
CCP IGG SERPL-ACNC: NEGATIVE NG/ML
COCAINE SAL QL CFM: NEGATIVE NG/ML
CODEINE SAL QL CFM: NEGATIVE NG/ML
D-METHORPHAN SAL QL CFM: NEGATIVE NG/ML
DXO+LEVORPHANOL SAL QL CFM: NEGATIVE NG/ML
DXO+LEVORPHANOL SAL QL CFM: NEGATIVE NG/ML
EDDP SAL QL CFM: NEGATIVE NG/ML
HYDROCODONE SAL QL CFM: NEGATIVE NG/ML
HYDROCODONE SAL QL CFM: NEGATIVE NG/ML
HYDROMORPHONE SAL QL CFM: NEGATIVE NG/ML
LEUKEMIA MARKERS BLD-IMP: NEGATIVE NG/ML
M PROTEIN 3 UR ELPH-MCNC: NEGATIVE NG/ML
M TB TUBERC IGNF/MITOGEN IGNF CONTROL: NEGATIVE NG/ML
METHADONE SAL QL CFM: NEGATIVE NG/ML
MORPHINE SAL QL CFM: NEGATIVE NG/ML
MORPHINE SAL QL CFM: NEGATIVE NG/ML
NALTREXOL SAL QL CFM: NEGATIVE NG/ML
NALTREXONE SAL QL CFM: NEGATIVE NG/ML
OXYMORPHONE SAL QL CFM: NEGATIVE NG/ML
OXYMORPHONE SAL QL CFM: NEGATIVE NG/ML
PCP SAL QL CFM: NEGATIVE NG/ML
RESULT ALL_PRESCRIBED MEDS AND SPECIAL INSTRUCTIONS: NORMAL
SL AMB 6-MAM (HEROIN METABOLITE) QUANTIFICATION: NEGATIVE NG/ML
SL AMB ALPRAZOLAM QUANTIFICATION: NEGATIVE NG/ML
SL AMB CARISOPRODOL QUANTIFICATION: NEGATIVE NG/ML
SL AMB CLONAZEPAM QUANTIFICATION: NEGATIVE NG/ML
SL AMB DIAZEPAM QUANTIFICATION: NEGATIVE NG/ML
SL AMB FENTANYL QUANTIFICATION: NEGATIVE NG/ML
SL AMB MDMA QUANTIFICATION: NEGATIVE NG/ML
SL AMB MEPROBAMATE QUANTIFICATION: NEGATIVE NG/ML
SL AMB N-DESMETHYL-TRAMADOL QUANTIFICATION SALIVA: NEGATIVE NG/ML
SL AMB NORBUPRENORPHINE QUANTIFICATION: ABNORMAL NG/ML
SL AMB NORDIAZEPAM QUANTIFICATION: NEGATIVE NG/ML
SL AMB NORFENTANYL QUANTIFICATION: NEGATIVE NG/ML
SL AMB NORHYDROCODONE QUANTIFICATION: NEGATIVE NG/ML
SL AMB NORHYDROCODONE QUANTIFICATION: NEGATIVE NG/ML
SL AMB NORMEPERIDINE QUANTIFICATION: NEGATIVE NG/ML
SL AMB NOROXYCODONE QUANTIFICATION: NEGATIVE NG/ML
SL AMB OXAZEPAM QUANTIFICATION: NEGATIVE NG/ML
SL AMB RITALINIC ACID QUANTIFICATION: NEGATIVE NG/ML
SL AMB TEMAZEPAM QUANTIFICATION: NEGATIVE NG/ML
SL AMB TEMAZEPAM QUANTIFICATION: NEGATIVE NG/ML
SL AMB TRAMADOL QUANTIFICATION: NEGATIVE NG/ML
SQUAMOUS #/AREA URNS HPF: NEGATIVE NG/ML
TAPENTADOL SAL QL CFM: NEGATIVE NG/ML

## 2024-09-06 DIAGNOSIS — I82.412 DEEP VEIN THROMBOSIS (DVT) OF FEMORAL VEIN OF LEFT LOWER EXTREMITY, UNSPECIFIED CHRONICITY (HCC): ICD-10-CM

## 2024-09-06 NOTE — TELEPHONE ENCOUNTER
Reason for call:   [x] Refill   [] Prior Auth  [] Other:     Office:   [] PCP/Provider -   [x] Specialty/Provider - Dr Arcos (pt stated he moved out of state)    Medication: eliquis    Dose/Frequency: 5 mg BID     Quantity: 90    Pharmacy: [facility]     Does the patient have enough for 3 days?   [x] Yes   [] No - Send as HP to POD

## 2024-10-14 ENCOUNTER — OFFICE VISIT (OUTPATIENT)
Dept: PAIN MEDICINE | Facility: MEDICAL CENTER | Age: 69
End: 2024-10-14
Payer: COMMERCIAL

## 2024-10-14 VITALS
DIASTOLIC BLOOD PRESSURE: 73 MMHG | HEIGHT: 75 IN | SYSTOLIC BLOOD PRESSURE: 110 MMHG | HEART RATE: 88 BPM | BODY MASS INDEX: 21.76 KG/M2 | WEIGHT: 175 LBS

## 2024-10-14 DIAGNOSIS — Z79.891 LONG-TERM CURRENT USE OF OPIATE ANALGESIC: Primary | ICD-10-CM

## 2024-10-14 DIAGNOSIS — F11.20 UNCOMPLICATED OPIOID DEPENDENCE (HCC): ICD-10-CM

## 2024-10-14 DIAGNOSIS — G89.4 CHRONIC PAIN SYNDROME: ICD-10-CM

## 2024-10-14 PROCEDURE — 99214 OFFICE O/P EST MOD 30 MIN: CPT

## 2024-10-14 PROCEDURE — G2211 COMPLEX E/M VISIT ADD ON: HCPCS

## 2024-10-14 RX ORDER — BUPRENORPHINE 5 UG/H
1 PATCH TRANSDERMAL WEEKLY
Qty: 4 PATCH | Refills: 2 | Status: SHIPPED | OUTPATIENT
Start: 2024-10-14

## 2024-10-14 NOTE — PROGRESS NOTES
Assessment:  1. Long-term current use of opiate analgesic    2. Uncomplicated opioid dependence (HCC)    3. Chronic pain syndrome        Plan:  Patient's pain is well-controlled on Butrans 5 mcg/h patches.  He tolerates this medication well without side effects.  Refills of this medication were sent to the patient's pharmacy today.  He reports ongoing relief following recent bilateral L3-L5 radiofrequency ablations  Follow-up in 3 months for medication refill and reevaluation    An oral drug screen swab was collected at today's office visit. The swab will be sent for confirmatory testing. The drug screen is medically necessary because the patient is either dependent on opioid medication or is being considered for opioid medication therapy and the results could impact ongoing or future treatment. The drug screen is to evaluate for the presences or absence of prescribed, non-prescribed, and/or illicit drugs/substances.     There are risks associated with opioid medications, including dependence, addiction and tolerance. The patient understands and agrees to use these medications only as prescribed. Potential side effects of the medications include, but are not limited to, constipation, drowsiness, addiction, impaired judgment and risk of fatal overdose if not taken as prescribed. The patient was warned against driving while taking sedation medications.  Sharing medications is a felony. At this point in time, the patient is showing no signs of addiction, abuse, diversion or suicidal ideation.    [place] Prescription Drug Monitoring Program report was reviewed and was appropriate     My impressions and treatment recommendations were discussed in detail with the patient who verbalized understanding and had no further questions.  Discharge instructions were provided. I personally saw and examined the patient and I agree with the above discussed plan of care.    Orders Placed This Encounter   Procedures    BEATRIZ  OF_Lorazepam Definitive    MM OF_MDMA Definitive Test    MM OF_Meperidine Definitive Test    MM OF_Methadone Definitive Test    MM OF_Methylphenidate Definitive Test    MM OF_Morphine Definitive    MM OF_Oxazepam Definitive    MM OF_Oxycodone Definitive Test - Oral Fluid    MM OF_Oxymorphone Definitive Test    MM OF_Phencyclidine Definitive Test    MM OF_Tapentadol Definitive Test    MM OF_Temazepam Definitive    MM OF_THC Definitive Test    MM OF_Tramadol Definitive Test    MM OF_Alprazolam Definitive    MM OF_Amphetamine Definitive Test    MM OF_Buprenorphine Definitive Test    MM OF_Clonazepam Definitive    MM OF_Carisoprodol Definitive Test    MM OF_Cocaine Definitive Test    MM OF_Codeine Definitive    MM OF_Diazepam Definitive    MM OF_Fentanyl Definitive Test    MM OF_Heroin Definitive Test    MM OF_Hydrocodone Definitive    MM OF_Hydromorphone Definitive    MM ALL_Prescribed Meds and Special Instructions     Order Specific Question:   [facility] Is ATORVASTATIN prescribed?     Answer:   Yes     Order Specific Question:   [facility] Is BUPRENORPHINE Prescribed?     Answer:   Yes    MM DT_Codeine Definitive Test    MM DT_Desipramine Definitive Test    MM DT_Dextromethorphan Definitive Test    MM Diazepam Definitive Test    MM DT_Ethyl Glucuronide/Ethyl Sulfate Definitive Test    MM DT_Heroin Definitive Test    MM DT_Fentanyl Definitive Test    MM DT_Hydrocodone Definitive Test    MM DT_Hydromorphone Definitive Test    MM DT_Kratom Definitive Test    MM DT_Alprazolam Definitive Test    MM DT_Levorphanol Definitive Test    MM Lorazepam Definitive Test    MM DT_MDMA Definitive Test    MM DT_Meperidine Definitive Test    MM DT_Methadone Definitive Test    MM DT_Methamphetamine Definitive Test    MM DT_Methylphenidate Definitive Test    MM DT_Morphine Definitive Test    MM DT_Olanzapine Definitive Test    MM DT_Oxazepam Definitive Test    MM DT_Amphetamine Definitive Test    MM DT_Oxycodone Definitive Test    MM  DT_Oxymorphone Definitive Test    MM DT_Phentermine Definitive Test    MM DT_Quetiapine Definitive Test    MM DT_Risperidone Definitive Test    MM DT_Spice Definitive Test    MM DT_Tapentadol Definitive Test    MM DT_Temazapam Definitive Test    MM DT_THC Definitive Test    MM DT_Tramadol Definitive Test    MM DT_Aripiprazole Definitive Test    MM DT_Validity Creatinine    MM DT_Validity Oxidant    MM DT_Validity pH    MM DT_Validity Specific    MM DT_Buprenorphine Definitive Test    MM DT_Butalbital Definitive Test    MM DT_Clonazepam Definitive Test    MM DT_Clozapine Definitive Test    MM DT_Cocaine Definitive Test    MM ALL_Prescribed Meds and Special Instructions     Order Specific Question:   [facility] Is ATORVASTATIN prescribed?     Answer:   Yes     Order Specific Question:   [facility] Is BUPRENORPHINE Prescribed?     Answer:   Yes     New Medications Ordered This Visit   Medications    transdermal buprenorphine (BUTRANS) 5 mcg/hr TD patch     Sig: Place 1 patch on the skin over 7 days once a week For ongoing therapy.     Dispense:  4 patch     Refill:  2       History of Present Illness:  Luis Russell is a 68 y.o. male who presents for a follow up office visit for medication refill and reevaluation.  Patient remains stable on Butrans 5 mcg/h patches.  He tolerates this medication well without side effects and reports that it reduces his overall pain by about 25%.  He reports ongoing relief of his chronic bilateral low back pain following recent bilateral L3-L5 radiofrequency ablations as well, which has provided overall 50% reduction in pain at this point.  His residual pain is localized to the low back in the lumbar region bilaterally and is intermittent.  This is a 3/10 in intensity on average.  He describes the quality of his pain as dull/aching, pressure-like, and numbness.    Opioid contract date 03/05/24  Last UDS Date 07/26/24  Butrans last changed on 10/10/24    I have personally reviewed  and/or updated the patient's past medical history, past surgical history, family history, social history, current medications, allergies, and vital signs today.     Review of Systems   Constitutional:  Negative for fever.   HENT:  Negative for hearing loss.    Eyes:  Negative for visual disturbance.   Respiratory:  Negative for cough.    Cardiovascular:  Negative for leg swelling.   Gastrointestinal:  Positive for constipation. Negative for abdominal pain and nausea.   Endocrine: Negative for polydipsia.   Genitourinary:  Negative for difficulty urinating.   Musculoskeletal:  Positive for back pain and gait problem. Negative for myalgias.   Skin:  Negative for rash.   Neurological:  Negative for numbness.   Hematological:  Does not bruise/bleed easily.   Psychiatric/Behavioral:  Negative for dysphoric mood and sleep disturbance.        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HCC)    S/P femoral-popliteal bypass surgery    Atherosclerosis of artery of left lower extremity (HCC)    Chronic obstructive pulmonary disease, unspecified COPD type (HCC)    Chronic right shoulder pain    MTHFR mutation    History of DVT (deep vein thrombosis)    PAD (peripheral artery disease) (HCC)    Adenocarcinoma of right lung (HCC)    Advanced care planning/counseling discussion    Syringomyelia (HCC)       Past Medical History:   Diagnosis Date    Arthritis 02/2016    osto back    Chronic back pain     Chronic narcotic dependence (HCC)     Chronic pain disorder     Sees pain management Dr Eric MARTINEZ    Cigarette smoker     Deep vein  "thrombosis (DVT) of femoral vein of left lower extremity (HCC) 07/29/2021    Depression     Dry skin     Dyslexia     Full dentures     upper and no teeth lower/\"avoid raw vegetables and chewy food\"    GERD (gastroesophageal reflux disease)     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 related    History of blood clots     \"left leg\" takes Eliquis    History of bronchitis     History of pneumonia     childhood    Increased pressure in the eye, bilateral     L4-L5 disc bulge 06/16/2017    Low back pain     MTHFR mutation     Multiple subsolid lung nodules greater than 6 mm in diameter     Muscle weakness     Neuropathy     right foot and left foot    Osteoarthritis     Osteoarthritis     and\" if becomes cold joint pain worsens\"    Peripheral neuropathy 08/2015    Pulmonary emphysema (HCC)     \"mild\"    Right foot drop     Right upper lobe pulmonary nodule     Shortness of breath     \"only on humid days\"    Smokers' cough (HCC)     Uncomplicated opioid dependence (HCC)     Wears glasses     Work related injury 04/24/2018       Past Surgical History:   Procedure Laterality Date    BACK SURGERY  aug 2015- jun2017 2017 no affect    COLONOSCOPY      EGD AND COLONOSCOPY N/A 06/29/2018    Procedure: EGD AND COLONOSCOPY;  Surgeon: Neal Kwok MD;  Location: [facility];  Service: Gastroenterology    EPIDURAL BLOCK INJECTION      needle    FL RETROGRADE PYELOGRAM  9/12/2023    HIP ARTHROSCOPY W/ LABRAL DEBRIDEMENT Left     \"hip surgery\"    IR BIOPSY LUNG  4/17/2023    IR CHEST TUBE PLACEMENT  4/17/2023    IR LOWER EXTREMITY ANGIOGRAM  12/08/2021    LUNG LOBECTOMY Right 5/17/2023    Procedure: lymph node dissection; PLEURAL LYSIS;  Surgeon: Blanquita Chinchilla MD;  [facility]: [facility];  Service: Thoracic    LUNG SEGMENTECTOMY Right 5/17/2023    Procedure: Right thoracoscopic upper lobe wedge resection;  Surgeon: Blanquita Chinchilla MD;  Location: [facility];  Service: Thoracic    ORTHOPEDIC SURGERY      CA ARTHRS HIP DEBRIDEMENT/SHAVING " ARTICULAR CRTLG Left 04/30/2018    Procedure: LEFT HIP ARTHROSCOPIC LABRAL DEBRIDEMENT;  Surgeon: Rainer Booth MD;  Location[facility];  Service: Orthopedics    VT BRNCHSC INCL FLUOR GDNCE DX W/CELL WASHG SPX N/A 5/17/2023    Procedure: BRONCHOSCOPY FLEXIBLE;  Surgeon: Blanquita Chinchilla MD;  [facility];  Service: Thoracic    VT CYSTO/PYELOSCOPY BX&/FULGURATION PELIVC LESION Bilateral 9/12/2023    Procedure: bilateral  ureteroscopy,bilateral  laser ablation tumor, bilateral brushings/washings,bilateral  retrograde pyelogram,bilateral  ureteral stent insertion;  Surgeon: Carlito Hernandez MD;  [facility];  Service: Urology    VT IN-SITU VEIN BYPASS FEMORAL-POPLITEAL Right 12/08/2021    Procedure: R SFA to BK Pop Bypass using insitu GSV,  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;  Surgeon: Valeriy Scott MD;  [facility];  Service: Vascular    VT SLCTV CATHJ 3RD+ ORD SLCTV ABDL PEL/LXTR BRNCH Right 12/08/2021    Procedure: COMPLETION ARTERIOGRAM WITH 5 MM ANGIOPLASTY OF MID SUPERFICIAL FEMORAL ARTERY;  Surgeon: Valeriy Scott MD;  [facility];  Service: Vascular    VT THORACOSCOPY W/THERA WEDGE RESEXN INITIAL UNILAT Right 5/17/2023    Procedure: THORACOSCOPY VIDEO ASSISTED SURGERY (VATS);  Surgeon: Blanquita Chinchilla MD;  [facility];  Service: Thoracic    TONSILLECTOMY      WISDOM TOOTH EXTRACTION         Family History   Problem Relation Age of Onset    Dementia Mother     Glaucoma Mother     Arthritis Mother         nursing home    Cancer Father         dead    Osteoarthritis Family        Social History     Occupational History    Not on file   Tobacco Use    Smoking status: Some Days     Current packs/day: 0.25     Average packs/day: 0.3 packs/day for 16.8 years (4.2 ttl pk-yrs)     Types: Cigarettes     Start date: 2008     Passive exposure: Yes    Smokeless tobacco: Never    Tobacco comments:     stress caused by workers comp.  not covering meds. Less than  "half a pack of cigs    Vaping Use    Vaping status: Never Used   Substance and Sexual Activity    Alcohol use: Not Currently    Drug use: Not Currently     Comment: drug free since 1987    Sexual activity: Not on file     Comment: defer       Current Outpatient Medications on File Prior to Visit   Medication Sig    apixaban (ELIQUIS) 5 mg Take 1 tablet (5 mg total) by mouth 2 (two) times a day    aspirin (ECOTRIN LOW STRENGTH) 81 mg EC tablet Take 1 tablet (81 mg total) by mouth daily    atorvastatin (LIPITOR) 10 mg tablet Take 0.5 tablets (5 mg total) by mouth daily at bedtime    cholecalciferol (VITAMIN D3) 25 mcg (1,000 units) tablet Take 1,000 Units by mouth 2 (two) times a day    Cyanocobalamin (Vitamin B-12) 1000 MCG SUBL Take 1,000 mcg by mouth 2 (two) times a day    Flaxseed, Linseed, (FLAX SEED OIL PO) 2 (two) times a day    folic acid (FOLVITE) 1 mg tablet Take 1 mg by mouth daily    ibuprofen (MOTRIN) 200 mg tablet Take 200 mg by mouth every 6 (six) hours as needed for mild pain prn    Misc Natural Products (Osteo Bi-Flex Triple Strength) TABS Take 1 tablet by mouth 2 (two) times a day    Pyridoxine HCl (VITAMIN B-6 PO) daily    vitamin A 7.5 MG (96187 UT) capsule Take 25,000 Units by mouth daily    [DISCONTINUED] transdermal buprenorphine (BUTRANS) 5 mcg/hr TD patch Place 1 patch on the skin over 7 days once a week For ongoing therapy.     No current facility-administered medications on file prior to visit.       Allergies   Allergen Reactions    Cyanocobalamin [Vitamin B12] GI Intolerance    Gabapentin Other (See Comments) and GI Intolerance     Difficulty walking    Morphine Other (See Comments)     \"acute constipation\"    Tramadol Confusion    Nsaids Other (See Comments)     Pt reports avoids as is on Eliquis       Physical Exam:    /73   Pulse 88   Ht 6' 3\" (1.905 m)   Wt 79.4 kg (175 lb)   BMI 21.87 kg/m²     Constitutional:normal, well developed, well nourished, alert, in no distress and " non-toxic and no overt pain behavior.  Eyes:anicteric  HEENT:grossly intact  Neck:supple, symmetric, trachea midline and no masses   Pulmonary:even and unlabored  Cardiovascular:No edema or pitting edema present  Skin:Normal without rashes or lesions and well hydrated  Psychiatric:Mood and affect appropriate  Neurologic:Cranial Nerves II-XII grossly intact  Musculoskeletal:normal, ambulates with a cane

## 2024-10-16 LAB
7AMINOCLONAZEPAM SAL QL CFM: NEGATIVE NG/ML
AMPHET SAL QL CFM: NEGATIVE NG/ML
BUPRENORPHINE SAL QL SCN: NEGATIVE NG/ML
CARBOXYTHC SAL QL CFM: NEGATIVE NG/ML
CCP IGG SERPL-ACNC: NEGATIVE NG/ML
COCAINE SAL QL CFM: NEGATIVE NG/ML
CODEINE SAL QL CFM: NEGATIVE NG/ML
EDDP SAL QL CFM: NEGATIVE NG/ML
HYDROCODONE SAL QL CFM: NEGATIVE NG/ML
HYDROCODONE SAL QL CFM: NEGATIVE NG/ML
HYDROMORPHONE SAL QL CFM: NEGATIVE NG/ML
LEUKEMIA MARKERS BLD-IMP: NEGATIVE NG/ML
M PROTEIN 3 UR ELPH-MCNC: NEGATIVE NG/ML
M TB TUBERC IGNF/MITOGEN IGNF CONTROL: NEGATIVE NG/ML
METHADONE SAL QL CFM: NEGATIVE NG/ML
MORPHINE SAL QL CFM: NEGATIVE NG/ML
MORPHINE SAL QL CFM: NEGATIVE NG/ML
OXYMORPHONE SAL QL CFM: NEGATIVE NG/ML
OXYMORPHONE SAL QL CFM: NEGATIVE NG/ML
PCP SAL QL CFM: NEGATIVE NG/ML
SL AMB 6-MAM (HEROIN METABOLITE) QUANTIFICATION: NEGATIVE NG/ML
SL AMB ALPRAZOLAM QUANTIFICATION: NEGATIVE NG/ML
SL AMB CARISOPRODOL QUANTIFICATION: NEGATIVE NG/ML
SL AMB CLONAZEPAM QUANTIFICATION: NEGATIVE NG/ML
SL AMB DIAZEPAM QUANTIFICATION: NEGATIVE NG/ML
SL AMB FENTANYL QUANTIFICATION: NEGATIVE NG/ML
SL AMB MDMA QUANTIFICATION: NEGATIVE NG/ML
SL AMB MEPROBAMATE QUANTIFICATION: NEGATIVE NG/ML
SL AMB N-DESMETHYL-TRAMADOL QUANTIFICATION SALIVA: NEGATIVE NG/ML
SL AMB NORBUPRENORPHINE QUANTIFICATION: NEGATIVE NG/ML
SL AMB NORDIAZEPAM QUANTIFICATION: NEGATIVE NG/ML
SL AMB NORFENTANYL QUANTIFICATION: NEGATIVE NG/ML
SL AMB NORHYDROCODONE QUANTIFICATION: NEGATIVE NG/ML
SL AMB NORHYDROCODONE QUANTIFICATION: NEGATIVE NG/ML
SL AMB NORMEPERIDINE QUANTIFICATION: NEGATIVE NG/ML
SL AMB NOROXYCODONE QUANTIFICATION: NEGATIVE NG/ML
SL AMB OXAZEPAM QUANTIFICATION: NEGATIVE NG/ML
SL AMB RITALINIC ACID QUANTIFICATION: NEGATIVE NG/ML
SL AMB TEMAZEPAM QUANTIFICATION: NEGATIVE NG/ML
SL AMB TEMAZEPAM QUANTIFICATION: NEGATIVE NG/ML
SL AMB TRAMADOL QUANTIFICATION: NEGATIVE NG/ML
SQUAMOUS #/AREA URNS HPF: NEGATIVE NG/ML
TAPENTADOL SAL QL CFM: NEGATIVE NG/ML

## 2024-12-11 ENCOUNTER — HOSPITAL ENCOUNTER (OUTPATIENT)
Dept: RADIOLOGY | Facility: IMAGING CENTER | Age: 69
Discharge: HOME | End: 2024-12-11
Payer: COMMERCIAL

## 2024-12-11 DIAGNOSIS — C34.91 ADENOCARCINOMA OF RIGHT LUNG (HCC): ICD-10-CM

## 2024-12-11 PROCEDURE — 71250 CT THORAX DX C-: CPT

## 2024-12-19 ENCOUNTER — OFFICE VISIT (OUTPATIENT)
Dept: CARDIAC SURGERY | Facility: CLINIC | Age: 69
End: 2024-12-19
Payer: COMMERCIAL

## 2024-12-19 VITALS
OXYGEN SATURATION: 96 % | DIASTOLIC BLOOD PRESSURE: 60 MMHG | WEIGHT: 177.25 LBS | TEMPERATURE: 97.5 F | SYSTOLIC BLOOD PRESSURE: 108 MMHG | HEART RATE: 105 BPM | BODY MASS INDEX: 22.15 KG/M2

## 2024-12-19 DIAGNOSIS — C34.91 ADENOCARCINOMA OF RIGHT LUNG (HCC): Primary | ICD-10-CM

## 2024-12-19 PROCEDURE — 99213 OFFICE O/P EST LOW 20 MIN: CPT | Performed by: THORACIC SURGERY (CARDIOTHORACIC VASCULAR SURGERY)

## 2024-12-19 NOTE — PROGRESS NOTES
Assessment/Plan:    Adenocarcinoma of right lung (HCC)  Stable at this time.  He will get a repeat CT scan in 6 months.  That will put him at 2 years out from his surgery.  At that time, he will be able to space out to annual surveillance       Diagnoses and all orders for this visit:    Adenocarcinoma of right lung (HCC)  -     CT chest wo contrast; Future          Thoracic History   Diagnosis: Right upper lobe adenocarcinoma stage IA  Procedure: Flexible bronchoscopy, right thoracoscopic upper lobe therapeutic wedge resection 5/17/23.   Pathology: right upper lobe revealed non mucinous minimally invasive adenocarcinoma, G1 well differentiated, measuring 1 x 1 x 0.8 cm. No visceral pleural invasion. No LV invasion. All margins were negative. All 7 LN - for tumor. (T1mi, pN0, M0, G1). Additional findings were atypical adenomatous hyperplasia.     Cancer Staging   Adenocarcinoma of right lung (HCC)  Staging form: Lung, AJCC 8th Edition  - Clinical stage from 5/17/2023: Stage IA1 (cT1mi, cN0, cM0) - Signed by Tiana Mares PA-C on 6/1/2023  Histopathologic type: Adenocarcinoma, NOS  Stage prefix: Initial diagnosis  Histologic grade (G): G1  Histologic grading system: 4 grade system  Laterality: Right  Tumor size (mm): 10  Lymph-vascular invasion (LVI): LVI not present (absent)/not identified  Specimen type: Excision  - Pathologic stage from 5/17/2023: Stage IA1 (pT1mi, pN0, cM0) - Signed by Meseret Schofield PA-C on 6/20/2024  Histopathologic type: Adenocarcinoma, NOS  Stage prefix: Initial diagnosis  Histologic grade (G): G1  Histologic grading system: 4 grade system  Residual tumor (R): R0 - None  Laterality: Right  Tumor size (mm): 10  Lymph-vascular invasion (LVI): LVI not present (absent)/not identified  National guidelines used in treatment planning: Yes  Type of national guideline used in treatment planning: NCCN    Oncology History   Adenocarcinoma of right lung (HCC)   5/17/2023 Surgery    Flexible  bronchoscopy, right thoracoscopic therapeutic upper lobe wedge resection, MLND      5/17/2023 -  Cancer Staged    Staging form: Lung, AJCC 8th Edition  - Clinical stage from 5/17/2023: Stage IA1 (cT1mi, cN0, cM0) - Signed by Tiana Mares PA-C on 6/1/2023  Histopathologic type: Adenocarcinoma, NOS  Stage prefix: Initial diagnosis  Histologic grade (G): G1  Histologic grading system: 4 grade system  Laterality: Right  Tumor size (mm): 10  Lymph-vascular invasion (LVI): LVI not present (absent)/not identified  Specimen type: Excision       5/17/2023 -  Cancer Staged    Staging form: Lung, AJCC 8th Edition  - Pathologic stage from 5/17/2023: Stage IA1 (pT1mi, pN0, cM0) - Signed by Meseret Schofield PA-C on 6/20/2024  Histopathologic type: Adenocarcinoma, NOS  Stage prefix: Initial diagnosis  Histologic grade (G): G1  Histologic grading system: 4 grade system  Residual tumor (R): R0 - None  Laterality: Right  Tumor size (mm): 10  Lymph-vascular invasion (LVI): LVI not present (absent)/not identified  National guidelines used in treatment planning: Yes  Type of national guideline used in treatment planning: NCCN       6/1/2023 Initial Diagnosis    Adenocarcinoma of right lung (HCC)              Subjective:    Patient ID: Luis Russell is a 69 y.o. male.    HPI  Mr. Russell is a 69-year-old male who is known to me.  He underwent a therapeutic right upper lobe wedge resection in May 2023.  He presents today for surveillance.  I personally reviewed his most recent CT scan in PACS.  This demonstrates stability no signs of any recurrence of his cancer    Today in the office, he reports that he is doing well.  He has no significant changes in his health since last visit.  He denies any recent upper respiratory infection or pneumonia.  He endorses a chronic cough associated with postnasal drip.  He denies any shortness of breath or chest pain    I have read all of the most recent notes in the chart including from his  PCP, Dr. Ledezma    The following portions of the patient's history were reviewed and updated as appropriate: allergies, current medications, past family history, past medical history, past social history, past surgical history and problem list.    Review of Systems   Constitutional: Negative.  Negative for activity change, chills, fatigue, fever and unexpected weight change.   HENT:  Positive for postnasal drip. Negative for sore throat, trouble swallowing and voice change.    Eyes: Negative.  Negative for visual disturbance.   Respiratory:  Positive for cough. Negative for chest tightness, shortness of breath, wheezing and stridor.    Cardiovascular:  Negative for chest pain and leg swelling.   Gastrointestinal: Negative.    Endocrine: Negative.    Genitourinary: Negative.    Musculoskeletal: Negative.    Skin: Negative.    Allergic/Immunologic: Negative.    Neurological:  Negative for seizures, syncope, speech difficulty, weakness, light-headedness and headaches.   Hematological:  Negative for adenopathy.   Psychiatric/Behavioral: Negative.           Objective:   Physical Exam  Vitals and nursing note reviewed.   Constitutional:       Appearance: He is well-developed. He is not diaphoretic.   HENT:      Head: Normocephalic and atraumatic.      Nose: Nose normal. No congestion.      Mouth/Throat:      Mouth: Mucous membranes are moist.      Pharynx: Oropharynx is clear.   Eyes:      Extraocular Movements: Extraocular movements intact.      Pupils: Pupils are equal, round, and reactive to light.   Neck:      Trachea: No tracheal deviation.   Cardiovascular:      Rate and Rhythm: Normal rate and regular rhythm.      Heart sounds: Normal heart sounds. No murmur heard.  Pulmonary:      Effort: Pulmonary effort is normal. No respiratory distress.      Breath sounds: Normal breath sounds. No stridor. No wheezing or rales.   Chest:      Chest wall: No tenderness.   Abdominal:      General: Bowel sounds are normal. There  is no distension.      Palpations: Abdomen is soft.      Tenderness: There is no abdominal tenderness.   Musculoskeletal:         General: Normal range of motion.      Cervical back: Normal range of motion.   Lymphadenopathy:      Cervical: No cervical adenopathy.   Skin:     General: Skin is warm and dry.      Coloration: Skin is not pale.      Findings: No erythema or rash.   Neurological:      Mental Status: He is alert and oriented to person, place, and time.   Psychiatric:         Behavior: Behavior normal.         Thought Content: Thought content normal.         Judgment: Judgment normal.     /60 (BP Location: Left arm, Patient Position: Sitting, Cuff Size: Standard)   Pulse 105   Temp 97.5 °F (36.4 °C) (Temporal)   Wt 80.4 kg (177 lb 4 oz)   SpO2 96%   BMI 22.15 kg/m²     No Chest XR results available for this patient.   CT chest wo contrast  Result Date: 12/17/2024  Narrative CT CHEST WITHOUT IV CONTRAST INDICATION: C34.91: Malignant neoplasm of unspecified part of right bronchus or lung. COMPARISON: 6/5/2024, 11/20/2023, 3/22/2023 and 6/18/2022 TECHNIQUE: CT examination of the chest was performed without intravenous contrast. Multiplanar 2D reformatted images were created from the source data. This examination, like all CT scans performed in the [facility], was performed utilizing techniques to minimize radiation dose exposure, including the use of iterative reconstruction and automated exposure control. Radiation dose length product (DLP) for this visit: 229.7 mGy-cm FINDINGS: LUNGS: Moderate emphysema. Postsurgical changes in the right upper lobe. Stable mucoid impaction on series 5 image #99. Scattered linear opacities may be due to scarring. Biapical pleural scarring. No suspicious pulmonary nodules are identified. Dependent mucosal debris in the right central airway. No filling defect in the trachea. PLEURA: Unremarkable. HEART/GREAT VESSELS: Mild coronary calcifications. No  thoracic aortic aneurysm. MEDIASTINUM AND ALEJANDRA: Several mediastinal lymph nodes are present measuring less than 1 cm short axis. CHEST WALL AND LOWER NECK: Unremarkable. VISUALIZED STRUCTURES IN THE UPPER ABDOMEN: Unremarkable. OSSEOUS STRUCTURES: Spinal degenerative changes are noted. No acute fracture or destructive osseous lesion.     Impression 1.  Moderate emphysema with postsurgical changes in the right upper lobe. 2.  No suspicious pulmonary nodules are identified. 3.  No mediastinal lymphadenopathy. Workstation performed: [id number]     CT chest wo contrast  Result Date: 6/13/2024  Narrative CT CHEST WITHOUT IV CONTRAST INDICATION: C34.91: Malignant neoplasm of unspecified part of right bronchus or lung. COMPARISON: Many priors, recently a CT chest from 11/24/2023. TECHNIQUE: CT examination of the chest was performed without intravenous contrast. Multiplanar 2D reformatted images were created from the source data. This examination, like all CT scans performed in the [facility], was performed utilizing techniques to minimize radiation dose exposure, including the use of iterative reconstruction and automated exposure control. Radiation dose length product (DLP) for this visit: 271 mGy-cm FINDINGS: LUNGS: Moderate emphysema. Right upper wedge resection with adjacent scarring. Stable focal bronchiolectasis in the periphery of the right upper lobe with focal area of mucoid impaction (series 5, images 110-118). Benign calcified granulomas. Biapical scarring. PLEURA: Unremarkable. HEART/GREAT VESSELS: Normal heart size with mild coronary artery calcification indicating atherosclerotic heart disease. No thoracic aortic aneurysm. MEDIASTINUM AND ALEJANDRA: Unremarkable. CHEST WALL AND LOWER NECK: Unremarkable. VISUALIZED STRUCTURES IN THE UPPER ABDOMEN: Stable subcentimeter low-attenuation lesion in the posterior right hepatic lobe (series 3, image 98), too small to characterize but likely a cyst. 1 cm  right upper pole renal cyst. OSSEOUS STRUCTURES: No acute fracture or destructive osseous lesion. Multilevel degenerative changes of the imaged spine. Hemangioma at T7.     Impression 1. Right upper lobe wedge resection. No recurrence or metastatic disease in the chest. 2. Moderate emphysema. Workstation performed: [id number]     No CT Chest,Abdomen,Pelvis results available for this patient.   No NM PET CT results available for this patient.   No Barium Swallow results available for this patient.

## 2024-12-19 NOTE — ASSESSMENT & PLAN NOTE
Stable at this time.  He will get a repeat CT scan in 6 months.  That will put him at 2 years out from his surgery.  At that time, he will be able to space out to annual surveillance

## 2025-01-07 ENCOUNTER — TELEPHONE (OUTPATIENT)
Dept: RADIOLOGY | Facility: MEDICAL CENTER | Age: 70
End: 2025-01-07

## 2025-01-07 DIAGNOSIS — F11.20 UNCOMPLICATED OPIOID DEPENDENCE (HCC): ICD-10-CM

## 2025-01-07 DIAGNOSIS — G89.4 CHRONIC PAIN SYNDROME: ICD-10-CM

## 2025-01-07 DIAGNOSIS — Z79.891 LONG-TERM CURRENT USE OF OPIATE ANALGESIC: ICD-10-CM

## 2025-01-07 RX ORDER — BUPRENORPHINE 5 UG/H
1 PATCH TRANSDERMAL WEEKLY
Qty: 4 PATCH | Refills: 0 | Status: SHIPPED | OUTPATIENT
Start: 2025-01-07

## 2025-01-07 NOTE — TELEPHONE ENCOUNTER
Called to reschedule appt for Feb and patient asked if he can get a refill of Butrans as he is running low.  Pt wishes you well also

## 2025-01-14 ENCOUNTER — RA CDI HCC (OUTPATIENT)
Dept: OTHER | Facility: HOSPITAL | Age: 70
End: 2025-01-14

## 2025-01-20 ENCOUNTER — TELEPHONE (OUTPATIENT)
Age: 70
End: 2025-01-20

## 2025-01-20 NOTE — TELEPHONE ENCOUNTER
Patient calling in to reschedule appt for tomorrow. He just wanted Dr. Ledezma to know that his neuropathy below knee in right leg is causing him to walk around the house using his cane. The cold went right to it.     He will see him tomorrow at 4:15pm.

## 2025-01-21 ENCOUNTER — OFFICE VISIT (OUTPATIENT)
Dept: INTERNAL MEDICINE CLINIC | Facility: OTHER | Age: 70
End: 2025-01-21
Payer: COMMERCIAL

## 2025-01-21 VITALS
DIASTOLIC BLOOD PRESSURE: 68 MMHG | OXYGEN SATURATION: 95 % | SYSTOLIC BLOOD PRESSURE: 98 MMHG | WEIGHT: 180.8 LBS | HEART RATE: 115 BPM | BODY MASS INDEX: 22.48 KG/M2 | RESPIRATION RATE: 18 BRPM | HEIGHT: 75 IN | TEMPERATURE: 97.3 F

## 2025-01-21 DIAGNOSIS — E55.9 VITAMIN D DEFICIENCY: ICD-10-CM

## 2025-01-21 DIAGNOSIS — G95.0 SYRINGOMYELIA (HCC): ICD-10-CM

## 2025-01-21 DIAGNOSIS — M54.16 LUMBAR RADICULOPATHY: ICD-10-CM

## 2025-01-21 DIAGNOSIS — C34.91 ADENOCARCINOMA OF RIGHT LUNG (HCC): ICD-10-CM

## 2025-01-21 DIAGNOSIS — F11.20 OPIOID DEPENDENCE, UNCOMPLICATED (HCC): ICD-10-CM

## 2025-01-21 DIAGNOSIS — Z86.718 HISTORY OF DVT (DEEP VEIN THROMBOSIS): ICD-10-CM

## 2025-01-21 DIAGNOSIS — E78.2 MIXED HYPERLIPIDEMIA: Primary | ICD-10-CM

## 2025-01-21 DIAGNOSIS — J44.9 CHRONIC OBSTRUCTIVE PULMONARY DISEASE, UNSPECIFIED COPD TYPE (HCC): ICD-10-CM

## 2025-01-21 DIAGNOSIS — I73.9 PAD (PERIPHERAL ARTERY DISEASE) (HCC): ICD-10-CM

## 2025-01-21 DIAGNOSIS — F11.20 UNCOMPLICATED OPIOID DEPENDENCE (HCC): ICD-10-CM

## 2025-01-21 DIAGNOSIS — M06.4 INFLAMMATORY POLYARTHROPATHY (HCC): ICD-10-CM

## 2025-01-21 DIAGNOSIS — M21.371 RIGHT FOOT DROP: ICD-10-CM

## 2025-01-21 DIAGNOSIS — G62.9 NEUROPATHY: ICD-10-CM

## 2025-01-21 DIAGNOSIS — Z12.5 SCREENING FOR PROSTATE CANCER: ICD-10-CM

## 2025-01-21 PROCEDURE — 99214 OFFICE O/P EST MOD 30 MIN: CPT | Performed by: INTERNAL MEDICINE

## 2025-01-21 PROCEDURE — G2211 COMPLEX E/M VISIT ADD ON: HCPCS | Performed by: INTERNAL MEDICINE

## 2025-01-21 RX ORDER — ATORVASTATIN CALCIUM 10 MG/1
5 TABLET, FILM COATED ORAL
Qty: 50 TABLET | Refills: 3 | Status: SHIPPED | OUTPATIENT
Start: 2025-01-21

## 2025-01-21 RX ORDER — ASCORBIC ACID 500 MG
500 TABLET ORAL 2 TIMES DAILY
COMMUNITY

## 2025-01-21 NOTE — PROGRESS NOTES
Name: Luis Russell      : 1955      MRN: [id number]  Encounter Provider: Kvng Ledezma MD  Encounter Date: 2025   Encounter department: [facility]  :  Assessment & Plan  Mixed hyperlipidemia  Controlled. Continue atorvastatin.   Orders:  •  atorvastatin (LIPITOR) 10 mg tablet; Take 0.5 tablets (5 mg total) by mouth daily at bedtime  •  CBC; Future  •  Comprehensive metabolic panel; Future  •  Lipid panel; Future    PAD (peripheral artery disease) (HCC)  Stable, asymptomatic. Continue aspirin and statin therapy.        Neuropathy   stable, controlled.       Lumbar radiculopathy - Right  Currently taking ibuprofen daily to BID for his back pain. He is also on Butrans patch. Continue follow up with pain mgmt.         Right foot drop  Continue use of cane for assistance with ambulation. Continue to monitor clinically.        Vitamin D deficiency  Continue vitamin supplementation.        History of DVT (deep vein thrombosis)  Continue eliquis anticoagulation.        Screening for prostate cancer    Orders:  •  PSA, Total Screen; Future    Uncomplicated opioid dependence (HCC)         Adenocarcinoma of right lung (HCC)         Syringomyelia (HCC)         Chronic obstructive pulmonary disease, unspecified COPD type (HCC)         Inflammatory polyarthropathy (HCC)         Opioid dependence, uncomplicated (HCC)                History of Present Illness   Luis Russell is seen today for follow up of chronic conditions.   Recent laboratory studies reviewed today with the patient.   he has been compliant with his medication regimen.   He developed neuropathy of the right lower leg since he had vascular bypass surgery approximately 2 years ago. He also has right foot drop.   he has no complaints or concerns at this time.       Hyperlipidemia  This is a chronic problem. The current episode started more than 1 year ago. The problem is controlled. Recent lipid tests  "were reviewed and are normal. Pertinent negatives include no chest pain or shortness of breath. Current antihyperlipidemic treatment includes statins. The current treatment provides moderate improvement of lipids. There are no compliance problems.      Review of Systems   Constitutional:  Negative for activity change, appetite change, chills, diaphoresis, fatigue and fever.   HENT:  Negative for congestion, postnasal drip, rhinorrhea, sinus pressure, sinus pain, sneezing and sore throat.    Eyes:  Negative for visual disturbance.   Respiratory:  Negative for apnea, cough, choking, chest tightness, shortness of breath and wheezing.    Cardiovascular:  Negative for chest pain, palpitations and leg swelling.   Gastrointestinal:  Negative for abdominal distention, abdominal pain, anal bleeding, blood in stool, constipation, diarrhea, nausea and vomiting.   Endocrine: Negative for cold intolerance and heat intolerance.   Genitourinary:  Negative for difficulty urinating, dysuria and hematuria.   Musculoskeletal: Negative.    Skin: Negative.    Neurological:  Negative for dizziness, weakness, light-headedness, numbness and headaches.   Hematological:  Negative for adenopathy.   Psychiatric/Behavioral:  Negative for agitation, sleep disturbance and suicidal ideas.    All other systems reviewed and are negative.      Objective   BP 98/68 (BP Location: Left arm, Patient Position: Sitting, Cuff Size: Standard)   Pulse (!) 115   Temp (!) 97.3 °F (36.3 °C) (Temporal)   Resp 18   Ht 6' 3\" (1.905 m)   Wt 82 kg (180 lb 12.8 oz) Comment: extra clothes  SpO2 95%   BMI 22.60 kg/m²      Physical Exam  Vitals and nursing note reviewed.   Constitutional:       General: He is not in acute distress.     Appearance: He is well-developed. He is not diaphoretic.   HENT:      Head: Normocephalic and atraumatic.   Eyes:      General: No scleral icterus.        Right eye: No discharge.         Left eye: No discharge.      " Conjunctiva/sclera: Conjunctivae normal.      Pupils: Pupils are equal, round, and reactive to light.   Neck:      Thyroid: No thyromegaly.      Vascular: No JVD.   Cardiovascular:      Rate and Rhythm: Normal rate and regular rhythm.      Heart sounds: Normal heart sounds. No murmur heard.     No friction rub. No gallop.   Pulmonary:      Effort: Pulmonary effort is normal. No respiratory distress.      Breath sounds: Normal breath sounds. No wheezing or rales.   Chest:      Chest wall: No tenderness.   Abdominal:      General: Bowel sounds are normal. There is no distension.      Palpations: Abdomen is soft. There is no mass.      Tenderness: There is no abdominal tenderness. There is no guarding or rebound.   Musculoskeletal:         General: No tenderness or deformity. Normal range of motion.      Cervical back: Normal range of motion and neck supple.   Lymphadenopathy:      Cervical: No cervical adenopathy.   Skin:     General: Skin is warm and dry.      Coloration: Skin is not pale.      Findings: No erythema or rash.   Neurological:      Mental Status: He is alert and oriented to person, place, and time.      Cranial Nerves: No cranial nerve deficit.      Coordination: Coordination normal.      Deep Tendon Reflexes: Reflexes are normal and symmetric.   Psychiatric:         Behavior: Behavior normal.         Thought Content: Thought content normal.         Judgment: Judgment normal.

## 2025-01-21 NOTE — ASSESSMENT & PLAN NOTE
Controlled. Continue atorvastatin.   Orders:  •  atorvastatin (LIPITOR) 10 mg tablet; Take 0.5 tablets (5 mg total) by mouth daily at bedtime  •  CBC; Future  •  Comprehensive metabolic panel; Future  •  Lipid panel; Future

## 2025-02-12 ENCOUNTER — OFFICE VISIT (OUTPATIENT)
Dept: PAIN MEDICINE | Facility: MEDICAL CENTER | Age: 70
End: 2025-02-12
Payer: COMMERCIAL

## 2025-02-12 VITALS — HEIGHT: 75 IN | BODY MASS INDEX: 22.88 KG/M2 | WEIGHT: 184 LBS

## 2025-02-12 DIAGNOSIS — F11.20 OPIOID DEPENDENCE, UNCOMPLICATED (HCC): ICD-10-CM

## 2025-02-12 DIAGNOSIS — M47.816 LUMBAR SPONDYLOSIS: Primary | ICD-10-CM

## 2025-02-12 DIAGNOSIS — Z79.891 LONG-TERM CURRENT USE OF OPIATE ANALGESIC: ICD-10-CM

## 2025-02-12 DIAGNOSIS — M21.371 RIGHT FOOT DROP: ICD-10-CM

## 2025-02-12 DIAGNOSIS — F11.20 UNCOMPLICATED OPIOID DEPENDENCE (HCC): ICD-10-CM

## 2025-02-12 DIAGNOSIS — G89.4 CHRONIC PAIN SYNDROME: ICD-10-CM

## 2025-02-12 PROCEDURE — G2211 COMPLEX E/M VISIT ADD ON: HCPCS | Performed by: PHYSICIAN ASSISTANT

## 2025-02-12 PROCEDURE — 99214 OFFICE O/P EST MOD 30 MIN: CPT | Performed by: PHYSICIAN ASSISTANT

## 2025-02-12 RX ORDER — BUPRENORPHINE 5 UG/H
1 PATCH TRANSDERMAL WEEKLY
Qty: 4 PATCH | Refills: 2 | Status: SHIPPED | OUTPATIENT
Start: 2025-02-12

## 2025-02-12 NOTE — PROGRESS NOTES
Assessment:  1. Lumbar spondylosis    2. Right foot drop    3. Chronic pain syndrome    4. Opioid dependence, uncomplicated (HCC)    5. Long-term current use of opiate analgesic    6. Uncomplicated opioid dependence (HCC)        Plan:  While the patient was in the office today, I did have a thorough conversation regarding their chronic pain syndrome, medication management, and treatment plan options.    Patient remains clinically stable and well-controlled on the current medication regimen of Butrans 5 mcg transdermal patch q. 7 days.  Patient is taking the medication as prescribed without side effects.  On today's visit I have electronically sent 3 months of refills to his pharmacy the appropriate fill date.    Repeat lumbar RFA in the future if indicated.  He is currently doing very well since the last procedure.    An oral drug screen swab was collected at today's office visit. The swab will be sent for confirmatory testing. The drug screen is medically necessary because the patient is either dependent on opioid medication or is being considered for opioid medication therapy and the results could impact ongoing or future treatment. The drug screen is to evaluate for the presences or absence of prescribed, non-prescribed, and/or illicit drugs/substances.     There are risks associated with opioid medications, including dependence, addiction and tolerance. The patient understands and agrees to use these medications only as prescribed. Potential side effects of the medications include, but are not limited to, constipation, drowsiness, addiction, impaired judgment and risk of fatal overdose if not taken as prescribed. The patient was warned against driving while taking sedation medications.  Sharing medications is a felony. At this point in time, the patient is showing no signs of addiction, abuse, diversion or suicidal ideation.    [place] Prescription Drug Monitoring Program report was reviewed and was  appropriate     My impressions and treatment recommendations were discussed in detail with the patient who verbalized understanding and had no further questions.  Discharge instructions were provided. I personally saw and examined the patient and I agree with the above discussed plan of care.    Orders Placed This Encounter   Procedures   • MM OF_Diazepam Definitive   • MM OF_Fentanyl Definitive Test   • MM OF_Heroin Definitive Test   • MM OF_Hydrocodone Definitive   • MM OF_Hydromorphone Definitive   • MM OF_Levorphanol Definitive Test   • MM OF_Lorazepam Definitive   • MM OF_MDMA Definitive Test   • MM OF_Meperidine Definitive Test   • MM OF_Methadone Definitive Test   • MM OF_Alprazolam Definitive   • MM OF_Methylphenidate Definitive Test   • MM OF_Morphine Definitive   • MM OF_Naltrexone Definitive Test   • MM OF_Oxazepam Definitive   • MM OF_Oxycodone Definitive Test - Oral Fluid   • MM OF_Oxymorphone Definitive Test   • MM OF_Phencyclidine Definitive Test   • MM OF_Tapentadol Definitive Test   • MM OF_Temazepam Definitive   • MM OF_THC Definitive Test   • MM OF_Amphetamine Definitive Test   • MM OF_Tramadol Definitive Test   • MM OF_Buprenorphine Definitive Test   • MM OF_Carisoprodol Definitive Test   • MM OF_Clonazepam Definitive   • MM OF_Cocaine Definitive Test   • MM OF_Codeine Definitive   • MM OF_Dextromethorphan Definitive Test   • MM ALL_Prescribed Meds and Special Instructions     [facility] Is ATORVASTATIN prescribed?:   Yes     [place] Is BUPRENORPHINE Prescribed?:   Yes     New Medications Ordered This Visit   Medications   • transdermal buprenorphine (BUTRANS) 5 mcg/hr TD patch     Sig: Place 1 patch on the skin over 7 days once a week For ongoing therapy DO NOT FILL BEFORE: 02/24/25     Dispense:  4 patch     Refill:  2       History of Present Illness:  Luis Russell is a 69 y.o. male who presents for a follow up office visit in regards to Back Pain (lumbar).   The patient’s current  symptoms include low back pain that he rates a 2 out of 10 today and describes as a constant dull, aching pain.  Patient has constant numbness in the right anterior shin area with foot drop.  He ambulates with a cane for safety/stability.  Patient denies any falls.  His low back pain has been very well-controlled since the lumbar RFA procedure done last year.  His residual pain is manageable with the Butrans 5 mcg transdermal patch q. 7 days.  Patient has no new symptoms or acute issues on today's visit.    Opioid contract date 02/12/25    Last UDS Date 10/24/24    Pt is on day 1 of Butrans patch    I have personally reviewed and/or updated the patient's past medical history, past surgical history, family history, social history, current medications, allergies, and vital signs today.     Review of Systems   Respiratory:  Negative for shortness of breath.    Cardiovascular:  Negative for chest pain.   Gastrointestinal:  Positive for constipation. Negative for diarrhea, nausea and vomiting.   Musculoskeletal:  Positive for arthralgias, back pain, gait problem and myalgias. Negative for joint swelling.   Skin:  Negative for rash.   Neurological:  Negative for dizziness, seizures and weakness.   All other systems reviewed and are negative.      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[facility])   • S/P femoral-popliteal bypass surgery   • Atherosclerosis of artery of left lower extremity ([facility])   • Chronic obstructive pulmonary disease, unspecified COPD type ([facility])   •  "Chronic right shoulder pain   • MTHFR mutation   • History of DVT (deep vein thrombosis)   • PAD (peripheral artery disease) ([facility])   • Adenocarcinoma of right lung ([facility])   • Advanced care planning/counseling discussion   • Syringomyelia ([facility])   • Uncomplicated opioid dependence ([facility])       Past Medical History:   Diagnosis Date   • Arthritis 02/2016    osto back   • Chronic back pain    • Chronic narcotic dependence ([facility])    • Chronic pain disorder     Sees pain management Dr Eric MARTINEZ   • Cigarette smoker    • Deep vein thrombosis (DVT) of femoral vein of left lower extremity ([facility]) 07/29/2021   • Depression    • Dry skin    • Dyslexia    • Full dentures     upper and no teeth lower/\"avoid raw vegetables and chewy food\"   • GERD (gastroesophageal reflux disease)    • 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 related   • History of blood clots     \"left leg\" takes Eliquis   • History of bronchitis    • History of pneumonia     childhood   • Increased pressure in the eye, bilateral    • L4-L5 disc bulge 06/16/2017   • Low back pain    • MTHFR mutation    • Multiple subsolid lung nodules greater than 6 mm in diameter 11/25/2020   • Muscle weakness    • Neuropathy     right foot and left foot   • Osteoarthritis    • Osteoarthritis     and\" if becomes cold joint pain worsens\"   • Peripheral neuropathy 08/2015   • Pulmonary emphysema ([facility])     \"mild\"   • Right foot drop    • Right upper lobe pulmonary nodule 4/27/2023   • Shortness of breath     \"only on humid days\"   • Smokers' cough ([facility])    • Uncomplicated opioid dependence ([facility]) 11/16/2018   • Wears glasses    • Work related injury 04/24/2018       Past Surgical History:   Procedure Laterality Date   • BACK SURGERY  aug 2015- jun2017 2017 no affect   • COLONOSCOPY     • EGD AND COLONOSCOPY N/A 06/29/2018    Procedure: EGD AND COLONOSCOPY;  Surgeon: Neal Kwok MD;  Location: [facility];  Service: Gastroenterology   • EPIDURAL BLOCK INJECTION      needle   • FL RETROGRADE PYELOGRAM " " 9/12/2023   • HIP ARTHROSCOPY W/ LABRAL DEBRIDEMENT Left     \"hip surgery\"   • IR BIOPSY LUNG  4/17/2023   • IR CHEST TUBE PLACEMENT  4/17/2023   • IR LOWER EXTREMITY ANGIOGRAM  12/08/2021   • LUNG LOBECTOMY Right 5/17/2023    Procedure: lymph node dissection; PLEURAL LYSIS;  Surgeon: Blanquita Chinchilla MD;  [facility];  Service: Thoracic   • LUNG SEGMENTECTOMY Right 5/17/2023    Procedure: Right thoracoscopic upper lobe wedge resection;  Surgeon: Blanquita Chinchilla MD;  [facility];  Service: Thoracic   • ORTHOPEDIC SURGERY     • MD ARTHRS HIP DEBRIDEMENT/SHAVING ARTICULAR CRTLG Left 04/30/2018    Procedure: LEFT HIP ARTHROSCOPIC LABRAL DEBRIDEMENT;  Surgeon: Rainer Booth MD;  [facility];  Service: Orthopedics   • MD BRNCHSC INCL FLUOR GDNCE DX W/CELL WASHG SPX N/A 5/17/2023    Procedure: BRONCHOSCOPY FLEXIBLE;  Surgeon: Blanquita Chinchilla MD;  [facility];  Service: Thoracic   • MD CYSTO/PYELOSCOPY BX&/FULGURATION PELIVC LESION Bilateral 9/12/2023    Procedure: bilateral  ureteroscopy,bilateral  laser ablation tumor, bilateral brushings/washings,bilateral  retrograde pyelogram,bilateral  ureteral stent insertion;  Surgeon: Carlito Hernandez MD;  [facility];  Service: Urology   • MD IN-SITU VEIN BYPASS FEMORAL-POPLITEAL Right 12/08/2021    Procedure: R SFA to BK Pop Bypass using insitu GSV,  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;  Surgeon: Valeriy Scott MD;  [facility];  Service: Vascular   • MD SLCTV CATHJ 3RD+ ORD SLCTV ABDL PEL/LXTR BRNCH Right 12/08/2021    Procedure: COMPLETION ARTERIOGRAM WITH 5 MM ANGIOPLASTY OF MID SUPERFICIAL FEMORAL ARTERY;  Surgeon: Valeriy Scott MD;  [facility];  Service: Vascular   • MD THORACOSCOPY W/THERA WEDGE RESEXN INITIAL UNILAT Right 5/17/2023    Procedure: THORACOSCOPY VIDEO ASSISTED SURGERY (VATS);  Surgeon: Blanquita Chinchilla MD;  [facility];  Service: Thoracic   • TONSILLECTOMY   "   • WISDOM TOOTH EXTRACTION         Family History   Problem Relation Age of Onset   • Dementia Mother    • Glaucoma Mother    • Arthritis Mother         nursing home   • Cancer Father         dead   • Osteoarthritis Family        Social History     Occupational History   • Not on file   Tobacco Use   • Smoking status: Some Days     Current packs/day: 0.25     Average packs/day: 0.3 packs/day for 17.1 years (4.3 ttl pk-yrs)     Types: Cigarettes     Start date: 2008     Passive exposure: Yes   • Smokeless tobacco: Never   • Tobacco comments:     stress caused by workers comp.  not covering meds. Less than half a pack of cigs    Vaping Use   • Vaping status: Never Used   Substance and Sexual Activity   • Alcohol use: Not Currently   • Drug use: Not Currently     Comment: drug free since 1987   • Sexual activity: Not on file     Comment: defer       Current Outpatient Medications on File Prior to Visit   Medication Sig   • apixaban (ELIQUIS) 5 mg Take 1 tablet (5 mg total) by mouth 2 (two) times a day   • ascorbic acid (VITAMIN C) 500 MG tablet Take 500 mg by mouth 2 (two) times a day   • aspirin (ECOTRIN LOW STRENGTH) 81 mg EC tablet Take 1 tablet (81 mg total) by mouth daily   • atorvastatin (LIPITOR) 10 mg tablet Take 0.5 tablets (5 mg total) by mouth daily at bedtime   • cholecalciferol (VITAMIN D3) 25 mcg (1,000 units) tablet Take 1,000 Units by mouth 2 (two) times a day   • Cyanocobalamin (Vitamin B-12) 1000 MCG SUBL Take 1,000 mcg by mouth 2 (two) times a day   • Flaxseed, Linseed, (FLAX SEED OIL PO) 2 (two) times a day   • folic acid (FOLVITE) 1 mg tablet Take 1 mg by mouth daily   • Misc Natural Products (Osteo Bi-Flex Triple Strength) TABS Take 1 tablet by mouth 2 (two) times a day   • Pyridoxine HCl (VITAMIN B-6 PO) daily   • vitamin A 7.5 MG (33679 UT) capsule Take 25,000 Units by mouth daily   • [DISCONTINUED] ibuprofen (MOTRIN) 200 mg tablet Take 200 mg by mouth every 6 (six) hours as needed for mild  "pain prn   • [DISCONTINUED] transdermal buprenorphine (BUTRANS) 5 mcg/hr TD patch Place 1 patch on the skin over 7 days once a week For ongoing therapy.     No current facility-administered medications on file prior to visit.       Allergies   Allergen Reactions   • Cyanocobalamin [Vitamin B12] GI Intolerance   • Gabapentin Other (See Comments) and GI Intolerance     Difficulty walking   • Morphine Other (See Comments)     \"acute constipation\"   • Tramadol Confusion   • Nsaids Other (See Comments)     Pt reports avoids as is on Eliquis       Physical Exam:    Ht 6' 3\" (1.905 m)   Wt 83.5 kg (184 lb)   BMI 23.00 kg/m²     Constitutional:normal, well developed, well nourished, alert, in no distress and non-toxic and no overt pain behavior.  Eyes:anicteric  HEENT:grossly intact  Neck:supple, symmetric, trachea midline and no masses   Pulmonary:even and unlabored  Cardiovascular:No edema or pitting edema present  Skin:Normal without rashes or lesions and well hydrated  Psychiatric:Mood and affect appropriate  Neurologic:Cranial Nerves II-XII grossly intact  Musculoskeletal:ambulates with cane    Imaging    "

## 2025-02-15 LAB
7AMINOCLONAZEPAM SAL QL CFM: NEGATIVE NG/ML
AMPHET SAL QL CFM: NEGATIVE NG/ML
BUPRENORPHINE SAL QL SCN: ABNORMAL NG/ML
CARBOXYTHC SAL QL CFM: NEGATIVE NG/ML
CCP IGG SERPL-ACNC: NEGATIVE NG/ML
COCAINE SAL QL CFM: NEGATIVE NG/ML
CODEINE SAL QL CFM: NEGATIVE NG/ML
D-METHORPHAN SAL QL CFM: ABNORMAL NG/ML
DXO+LEVORPHANOL SAL QL CFM: NEGATIVE NG/ML
DXO+LEVORPHANOL SAL QL CFM: NEGATIVE NG/ML
EDDP SAL QL CFM: NEGATIVE NG/ML
HYDROCODONE SAL QL CFM: NEGATIVE NG/ML
HYDROCODONE SAL QL CFM: NEGATIVE NG/ML
HYDROMORPHONE SAL QL CFM: NEGATIVE NG/ML
LEUKEMIA MARKERS BLD-IMP: NEGATIVE NG/ML
M PROTEIN 3 UR ELPH-MCNC: NEGATIVE NG/ML
M TB TUBERC IGNF/MITOGEN IGNF CONTROL: NEGATIVE NG/ML
METHADONE SAL QL CFM: NEGATIVE NG/ML
MORPHINE SAL QL CFM: NEGATIVE NG/ML
MORPHINE SAL QL CFM: NEGATIVE NG/ML
NALTREXOL SAL QL CFM: NEGATIVE NG/ML
NALTREXONE SAL QL CFM: NEGATIVE NG/ML
OXYMORPHONE SAL QL CFM: NEGATIVE NG/ML
OXYMORPHONE SAL QL CFM: NEGATIVE NG/ML
PCP SAL QL CFM: NEGATIVE NG/ML
RESULT ALL_PRESCRIBED MEDS AND SPECIAL INSTRUCTIONS: NORMAL
SL AMB 6-MAM (HEROIN METABOLITE) QUANTIFICATION: NEGATIVE NG/ML
SL AMB ALPRAZOLAM QUANTIFICATION: NEGATIVE NG/ML
SL AMB CARISOPRODOL QUANTIFICATION: NEGATIVE NG/ML
SL AMB CLONAZEPAM QUANTIFICATION: NEGATIVE NG/ML
SL AMB DIAZEPAM QUANTIFICATION: NEGATIVE NG/ML
SL AMB FENTANYL QUANTIFICATION: NEGATIVE NG/ML
SL AMB MDMA QUANTIFICATION: NEGATIVE NG/ML
SL AMB MEPROBAMATE QUANTIFICATION: NEGATIVE NG/ML
SL AMB N-DESMETHYL-TRAMADOL QUANTIFICATION SALIVA: NEGATIVE NG/ML
SL AMB NORBUPRENORPHINE QUANTIFICATION: ABNORMAL NG/ML
SL AMB NORDIAZEPAM QUANTIFICATION: NEGATIVE NG/ML
SL AMB NORFENTANYL QUANTIFICATION: NEGATIVE NG/ML
SL AMB NORHYDROCODONE QUANTIFICATION: NEGATIVE NG/ML
SL AMB NORHYDROCODONE QUANTIFICATION: NEGATIVE NG/ML
SL AMB NORMEPERIDINE QUANTIFICATION: NEGATIVE NG/ML
SL AMB NOROXYCODONE QUANTIFICATION: NEGATIVE NG/ML
SL AMB OXAZEPAM QUANTIFICATION: NEGATIVE NG/ML
SL AMB RITALINIC ACID QUANTIFICATION: NEGATIVE NG/ML
SL AMB TEMAZEPAM QUANTIFICATION: NEGATIVE NG/ML
SL AMB TEMAZEPAM QUANTIFICATION: NEGATIVE NG/ML
SL AMB TRAMADOL QUANTIFICATION: NEGATIVE NG/ML
SQUAMOUS #/AREA URNS HPF: NEGATIVE NG/ML
TAPENTADOL SAL QL CFM: NEGATIVE NG/ML

## 2025-06-19 ENCOUNTER — HOSPITAL ENCOUNTER (OUTPATIENT)
Dept: RADIOLOGY | Facility: IMAGING CENTER | Age: 70
End: 2025-06-19
Payer: COMMERCIAL

## 2025-06-19 DIAGNOSIS — C34.91 ADENOCARCINOMA OF RIGHT LUNG (HCC): ICD-10-CM

## 2025-06-19 PROCEDURE — 71250 CT THORAX DX C-: CPT

## 2025-06-25 ENCOUNTER — TELEPHONE (OUTPATIENT)
Age: 70
End: 2025-06-25

## 2025-06-25 NOTE — TELEPHONE ENCOUNTER
Scottie is calling from the reading room at [facility] to inform us of immediate findings on a CT scan.

## 2025-06-26 ENCOUNTER — OFFICE VISIT (OUTPATIENT)
Dept: CARDIAC SURGERY | Facility: CLINIC | Age: 70
End: 2025-06-26
Payer: COMMERCIAL

## 2025-06-26 VITALS
BODY MASS INDEX: 22.5 KG/M2 | OXYGEN SATURATION: 94 % | TEMPERATURE: 97.5 F | DIASTOLIC BLOOD PRESSURE: 76 MMHG | HEIGHT: 75 IN | SYSTOLIC BLOOD PRESSURE: 121 MMHG | HEART RATE: 83 BPM | WEIGHT: 181 LBS | RESPIRATION RATE: 15 BRPM

## 2025-06-26 DIAGNOSIS — C34.91 ADENOCARCINOMA OF RIGHT LUNG (HCC): Primary | ICD-10-CM

## 2025-06-26 DIAGNOSIS — J44.9 CHRONIC OBSTRUCTIVE PULMONARY DISEASE, UNSPECIFIED COPD TYPE (HCC): ICD-10-CM

## 2025-06-26 PROCEDURE — 99213 OFFICE O/P EST LOW 20 MIN: CPT

## 2025-06-26 NOTE — ASSESSMENT & PLAN NOTE
Luis Russell is approximately 2 years out from right upper lobe wedge resection performed in the setting of Stage IA1 adenocarcinoma. Recent CT scan of the chest on 6/19/25 notes new scattered small nodular opacities in the right lung and also on the left.  We discussed the appearance of these nodules is most likely consistent with an infectious or inflammatory process.  Patient also describes that he experiences small amounts of aspiration with food or medications with some degree of frequency that may have contributed to this.  There is no enlarged mediastinal or hilar adenopathy on patient's CT scan chest.  Discussed that given these new findings, would recommend patient have a repeat CT scan of the chest in 3 months to monitor the progression of nodules.  Patient will return for an office visit after CT scan has been completed.  Orders:    CT chest wo contrast; Future

## 2025-06-26 NOTE — PROGRESS NOTES
Name: Luis Russell      : 1955      MRN: [id number]  Encounter Provider: Thoracic [facility]  Encounter Date: 2025   Encounter department: [facility] [place]  :  Assessment & Plan  Adenocarcinoma of right lung (HCC)  Luis Russell is approximately 2 years out from right upper lobe wedge resection performed in the setting of Stage IA1 adenocarcinoma. Recent CT scan of the chest on 25 notes new scattered small nodular opacities in the right lung and also on the left.  We discussed the appearance of these nodules is most likely consistent with an infectious or inflammatory process.  Patient also describes that he experiences small amounts of aspiration with food or medications with some degree of frequency that may have contributed to this.  There is no enlarged mediastinal or hilar adenopathy on patient's CT scan chest.  Discussed that given these new findings, would recommend patient have a repeat CT scan of the chest in 3 months to monitor the progression of nodules.  Patient will return for an office visit after CT scan has been completed.  Orders:    CT chest wo contrast; Future    Chronic obstructive pulmonary disease, unspecified COPD type (HCC)  Patient reports breathing has been stable over the course of the last 6 months.  He is not on inhaler therapy at this time but continues to monitor his breathing.           Thoracic History     Oncology History   Adenocarcinoma of right lung (HCC)   2023 Surgery    Flexible bronchoscopy, right thoracoscopic therapeutic upper lobe wedge resection, MLND      2023 -  Cancer Staged    Staging form: Lung, AJCC 8th Edition  - Clinical stage from 2023: Stage IA1 (cT1mi, cN0, cM0) - Signed by Tiana Mares PA-C on 2023  Histopathologic type: Adenocarcinoma, NOS  Stage prefix: Initial diagnosis  Histologic grade (G): G1  Histologic grading system: 4 grade system  Laterality: Right  Tumor  size (mm): 10  Lymph-vascular invasion (LVI): LVI not present (absent)/not identified  Specimen type: Excision       5/17/2023 -  Cancer Staged    Staging form: Lung, AJCC 8th Edition  - Pathologic stage from 5/17/2023: Stage IA1 (pT1mi, pN0, cM0) - Signed by Meseret Schofield PA-C on 6/20/2024  Histopathologic type: Adenocarcinoma, NOS  Stage prefix: Initial diagnosis  Histologic grade (G): G1  Histologic grading system: 4 grade system  Residual tumor (R): R0 - None  Laterality: Right  Tumor size (mm): 10  Lymph-vascular invasion (LVI): LVI not present (absent)/not identified  National guidelines used in treatment planning: Yes  Type of national guideline used in treatment planning: NCCN       6/1/2023 Initial Diagnosis    Adenocarcinoma of right lung (HCC)          History of Present Illness     HPI    Luis Russell is a 69 y.o. male who presents today for lung cancer surveillance. Patient is a little over 2 years out from right upper lobe wedge resection performed on 5/17/2023 for Stage IA1 adenocarcinoma. At time of last office visit on 12/19/24, patient had a stable CT scan of the chest and he was recommended to have an updated CT scan in 6 months for continued lung cancer surveillance.    CT scan of the chest completed on 6/19/25 was personally reviewed by myself and in PACS demonstrating several new small nodular opacities demonstrated in the right lung, an example of which is seen on series 5 image 73 measuring up to 8 mm. Other smaller, similar-appearing lesions in the right lung are seen on series 5 image 61, 78, 129.  Similarly, new nodular opacities in the left lung are seen on series 5 image 84, 87, 165, 190. The lesion on series 190 demonstrates somewhat more solid appearance and measures up to 7 mm.  A focus of mucoid impaction on 5:104 is unchanged. No tracheal lesion.  Emphysematous changes.  Stable subcentimeter hypodense hepatic lesions that are too small to characterize on CT, statistically  represent cysts.     On discussion today, patient reports his health has been stable since last being seen in the office. Reports he has some difficulty with aspiration with meals and when taking pills. States this has been a known problem that has steadily increased in frequency.  With these aspiration events, he experienced a burning in his chest and coughing at time of aspiration.  Denies history of known aspiration pneumonia or related forms of hospitalization or need for increased medical attention.  Reports he continues to adjust as he is no longer on a buprenorphine patch.  Ambulates with a cane.  Denies chest pain, fevers, chills, recent respiratory illness, unintentional weight loss.  Reports that his breathing has been stable over the last 6 months.  States that he has more difficulty breathing on higher humidity days. Occasional cough.  Reports he has been walking more frequently for exercise.    Review of Systems   Constitutional:  Negative for chills, fever and unexpected weight change.   Respiratory:  Positive for cough (occasional) and shortness of breath (mild, stable).    Cardiovascular:  Negative for chest pain.      Medical History Reviewed by provider this encounter:  Tobacco  Allergies  Meds  Problems  Med Hx  Surg Hx  Fam Hx     .  Past Medical History   Past Medical History[1]  Past Surgical History[2]  Family History[3]   reports that he has been smoking cigarettes. He started smoking about 17 years ago. He has a 4.4 pack-year smoking history. He has been exposed to tobacco smoke. He has never used smokeless tobacco. He reports that he does not currently use alcohol. He reports that he does not currently use drugs.  Current Outpatient Medications   Medication Instructions    apixaban (ELIQUIS) 5 mg, Oral, 2 times daily    ascorbic acid (VITAMIN C) 500 mg, 2 times daily    aspirin (ECOTRIN LOW STRENGTH) 81 mg, Oral, Daily    atorvastatin (LIPITOR) 5 mg, Oral, Daily at bedtime     "cholecalciferol (VITAMIN D3) 1,000 Units, 2 times daily    Flaxseed, Linseed, (FLAX SEED OIL PO) 2 times daily    folic acid (FOLVITE) 1 mg, Daily    Misc Natural Products (Osteo Bi-Flex Triple Strength) TABS 1 tablet, Oral, 2 times daily    Pyridoxine HCl (VITAMIN B-6 PO) Daily    transdermal buprenorphine (BUTRANS) 5 mcg/hr TD patch 1 patch, Transdermal, Weekly, For ongoing therapy DO NOT FILL BEFORE: 02/24/25    vitamin A 25,000 Units, Daily    Vitamin B-12 1,000 mcg, 2 times daily   Allergies[4]   Medications Ordered Prior to Encounter[5]   Social History[6]     Objective   /76 (BP Location: Left arm, Patient Position: Sitting, Cuff Size: Standard)   Pulse 83   Temp 97.5 °F (36.4 °C) (Temporal)   Resp 15   Ht 6' 3\" (1.905 m)   Wt 82.1 kg (181 lb)   SpO2 94%   BMI 22.62 kg/m²     Pain Screening:  Pain Score: 0-No pain  ECOG    Physical Exam  Constitutional:       General: He is not in acute distress.  HENT:      Head: Normocephalic and atraumatic.      Nose: Nose normal.     Eyes:      Extraocular Movements: Extraocular movements intact.      Comments: Wearing glasses     Cardiovascular:      Rate and Rhythm: Normal rate and regular rhythm.   Pulmonary:      Effort: Pulmonary effort is normal.      Breath sounds: Normal breath sounds.   Abdominal:      Palpations: Abdomen is soft.      Tenderness: There is no abdominal tenderness.     Musculoskeletal:      Right lower leg: No edema.      Left lower leg: No edema.     Skin:     General: Skin is warm and dry.     Neurological:      Comments: Ambulates with a cane            [1]   Past Medical History:  Diagnosis Date    Arthritis 02/2016    osto back    Chronic back pain     Chronic narcotic dependence (HCC)     Chronic pain disorder     Sees pain management Dr Eric MARTINEZ    Cigarette smoker     Deep vein thrombosis (DVT) of femoral vein of left lower extremity (HCC) 07/29/2021    Depression     Dry skin     Dyslexia     Full dentures     upper and no " "teeth lower/\"avoid raw vegetables and chewy food\"    GERD (gastroesophageal reflux disease)     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 related    History of blood clots     \"left leg\" takes Eliquis    History of bronchitis     History of pneumonia     childhood    Increased pressure in the eye, bilateral     L4-L5 disc bulge 06/16/2017    Low back pain     MTHFR mutation     Multiple subsolid lung nodules greater than 6 mm in diameter 11/25/2020    Muscle weakness     Neuropathy     right foot and left foot    Osteoarthritis     Osteoarthritis     and\" if becomes cold joint pain worsens\"    Peripheral neuropathy 08/2015    Pulmonary emphysema (HCC)     \"mild\"    Right foot drop     Right upper lobe pulmonary nodule 4/27/2023    Shortness of breath     \"only on humid days\"    Smokers' cough (HCC)     Uncomplicated opioid dependence (HCC) 11/16/2018    Wears glasses     Work related injury 04/24/2018   [2]   Past Surgical History:  Procedure Laterality Date    BACK SURGERY  aug 2015- jun2017 2017 no affect    COLONOSCOPY      EGD AND COLONOSCOPY N/A 06/29/2018    Procedure: EGD AND COLONOSCOPY;  Surgeon: Neal Kwok MD;  Location: [facility];  Service: Gastroenterology    EPIDURAL BLOCK INJECTION      needle    FL RETROGRADE PYELOGRAM  9/12/2023    HIP ARTHROSCOPY W/ LABRAL DEBRIDEMENT Left     \"hip surgery\"    IR BIOPSY LUNG  4/17/2023    IR CHEST TUBE PLACEMENT  4/17/2023    IR LOWER EXTREMITY ANGIOGRAM  12/08/2021    LUNG LOBECTOMY Right 5/17/2023    Procedure: lymph node dissection; PLEURAL LYSIS;  Surgeon: Blanquita Chinchilla MD;  [facility];  Service: Thoracic    LUNG SEGMENTECTOMY Right 5/17/2023    Procedure: Right thoracoscopic upper lobe wedge resection;  Surgeon: Blanquita Chinchilla MD;  [facility];  Service: Thoracic    ORTHOPEDIC SURGERY      MO ARTHRS HIP DEBRIDEMENT/SHAVING ARTICULAR CRTLG Left 04/30/2018    Procedure: LEFT HIP ARTHROSCOPIC LABRAL DEBRIDEMENT;  Surgeon: Rainer Booth MD;  " "Location: [facility];  Service: Orthopedics    CO [facility] INCL FLUOR GDNCE DX W/CELL WASHG SPX N/A 5/17/2023    Procedure: BRONCHOSCOPY FLEXIBLE;  Surgeon: Blanquita Chinchilla MD;  [facility];  Service: Thoracic    CO CYSTO/PYELOSCOPY BX&/FULGURATION PELIVC LESION Bilateral 9/12/2023    Procedure: bilateral  ureteroscopy,bilateral  laser ablation tumor, bilateral brushings/washings,bilateral  retrograde pyelogram,bilateral  ureteral stent insertion;  Surgeon: Carlito Hernandez MD;  [facility];  Service: Urology    CO IN-SITU VEIN BYPASS FEMORAL-POPLITEAL Right 12/08/2021    Procedure: R SFA to BK Pop Bypass using insitu GSV,  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;  Surgeon: Valeriy Scott MD;  [facility];  Service: Vascular    CO SLCTV CATHJ 3RD+ ORD SLCTV ABDL PEL/LXTR [facility] Right 12/08/2021    Procedure: COMPLETION ARTERIOGRAM WITH 5 MM ANGIOPLASTY OF MID SUPERFICIAL FEMORAL ARTERY;  Surgeon: Valeriy Scott MD;  [facility];  Service: Vascular    CO THORACOSCOPY W/THERA WEDGE RESEXN INITIAL UNILAT Right 5/17/2023    Procedure: THORACOSCOPY VIDEO ASSISTED SURGERY (VATS);  Surgeon: Blanquita Chinchilla MD;  [facility];  Service: Thoracic    TONSILLECTOMY      WISDOM TOOTH EXTRACTION     [3]   Family History  Problem Relation Name Age of Onset    Dementia Mother pat     Glaucoma Mother pat     Arthritis Mother pat         nursing home    Cancer Father parth         dead    Osteoarthritis Family     [4]   Allergies  Allergen Reactions    Cyanocobalamin [Vitamin B12] GI Intolerance    Gabapentin Other (See Comments) and GI Intolerance     Difficulty walking    Morphine Other (See Comments)     \"acute constipation\"    Tramadol Confusion    Nsaids Other (See Comments)     Pt reports avoids as is on Eliquis   [5]   Current Outpatient Medications on File Prior to Visit   Medication Sig Dispense Refill    apixaban (ELIQUIS) 5 mg Take 1 tablet (5 mg total) by mouth 2 " (two) times a day 180 tablet 1    ascorbic acid (VITAMIN C) 500 MG tablet Take 500 mg by mouth in the morning and 500 mg in the evening.      aspirin (ECOTRIN LOW STRENGTH) 81 mg EC tablet Take 1 tablet (81 mg total) by mouth daily 90 tablet 0    atorvastatin (LIPITOR) 10 mg tablet Take 0.5 tablets (5 mg total) by mouth daily at bedtime 50 tablet 3    cholecalciferol (VITAMIN D3) 25 mcg (1,000 units) tablet Take 1,000 Units by mouth in the morning and 1,000 Units before bedtime.      Cyanocobalamin (Vitamin B-12) 1000 MCG SUBL Take 1,000 mcg by mouth in the morning and 1,000 mcg before bedtime.      Flaxseed, Linseed, (FLAX SEED OIL PO) in the morning and in the evening.      folic acid (FOLVITE) 1 mg tablet Take 1 mg by mouth in the morning.      Misc Natural Products (Osteo Bi-Flex Triple Strength) TABS Take 1 tablet by mouth 2 (two) times a day      Pyridoxine HCl (VITAMIN B-6 PO) in the morning.      vitamin A 7.5 MG (71740 UT) capsule Take 25,000 Units by mouth in the morning.      transdermal buprenorphine (BUTRANS) 5 mcg/hr TD patch Place 1 patch on the skin over 7 days once a week For ongoing therapy DO NOT FILL BEFORE: 02/24/25 (Patient not taking: Reported on 6/26/2025) 4 patch 2     No current facility-administered medications on file prior to visit.   [6]   Social History  Tobacco Use    Smoking status: Some Days     Current packs/day: 0.25     Average packs/day: 0.3 packs/day for 17.5 years (4.4 ttl pk-yrs)     Types: Cigarettes     Start date: 2008     Passive exposure: Yes    Smokeless tobacco: Never    Tobacco comments:     stress caused by workers comp.  not covering meds. Less than half a pack of cigs    Vaping Use    Vaping status: Never Used   Substance and Sexual Activity    Alcohol use: Not Currently    Drug use: Not Currently     Comment: drug free since 1987

## 2025-06-26 NOTE — ASSESSMENT & PLAN NOTE
Patient reports breathing has been stable over the course of the last 6 months.  He is not on inhaler therapy at this time but continues to monitor his breathing.

## 2025-07-21 ENCOUNTER — OFFICE VISIT (OUTPATIENT)
Dept: INTERNAL MEDICINE CLINIC | Facility: OTHER | Age: 70
End: 2025-07-21
Payer: COMMERCIAL

## 2025-07-21 VITALS
OXYGEN SATURATION: 95 % | HEIGHT: 75 IN | RESPIRATION RATE: 18 BRPM | BODY MASS INDEX: 22.58 KG/M2 | TEMPERATURE: 98.3 F | DIASTOLIC BLOOD PRESSURE: 66 MMHG | HEART RATE: 56 BPM | WEIGHT: 181.6 LBS | SYSTOLIC BLOOD PRESSURE: 112 MMHG

## 2025-07-21 DIAGNOSIS — Z12.11 SCREENING FOR COLORECTAL CANCER: ICD-10-CM

## 2025-07-21 DIAGNOSIS — Z71.89 ADVANCED CARE PLANNING/COUNSELING DISCUSSION: ICD-10-CM

## 2025-07-21 DIAGNOSIS — Z13.1 SCREENING FOR DIABETES MELLITUS: ICD-10-CM

## 2025-07-21 DIAGNOSIS — Z12.12 SCREENING FOR COLORECTAL CANCER: ICD-10-CM

## 2025-07-21 DIAGNOSIS — F11.20 OPIOID DEPENDENCE, UNCOMPLICATED (HCC): ICD-10-CM

## 2025-07-21 DIAGNOSIS — Z13.6 SCREENING FOR CARDIOVASCULAR CONDITION: ICD-10-CM

## 2025-07-21 DIAGNOSIS — C34.91 ADENOCARCINOMA OF RIGHT LUNG (HCC): ICD-10-CM

## 2025-07-21 DIAGNOSIS — G89.4 CHRONIC PAIN SYNDROME: ICD-10-CM

## 2025-07-21 DIAGNOSIS — F17.200 TOBACCO DEPENDENCE: ICD-10-CM

## 2025-07-21 DIAGNOSIS — Z12.5 SCREENING FOR PROSTATE CANCER: ICD-10-CM

## 2025-07-21 DIAGNOSIS — I70.202 ATHEROSCLEROSIS OF ARTERY OF LEFT LOWER EXTREMITY (HCC): Chronic | ICD-10-CM

## 2025-07-21 DIAGNOSIS — G95.0 SYRINGOMYELIA (HCC): ICD-10-CM

## 2025-07-21 DIAGNOSIS — I73.9 PAD (PERIPHERAL ARTERY DISEASE) (HCC): ICD-10-CM

## 2025-07-21 DIAGNOSIS — J44.9 CHRONIC OBSTRUCTIVE PULMONARY DISEASE, UNSPECIFIED COPD TYPE (HCC): ICD-10-CM

## 2025-07-21 DIAGNOSIS — E55.9 VITAMIN D DEFICIENCY: ICD-10-CM

## 2025-07-21 DIAGNOSIS — G62.9 NEUROPATHY: ICD-10-CM

## 2025-07-21 DIAGNOSIS — I82.412 DEEP VEIN THROMBOSIS (DVT) OF FEMORAL VEIN OF LEFT LOWER EXTREMITY, UNSPECIFIED CHRONICITY (HCC): ICD-10-CM

## 2025-07-21 DIAGNOSIS — Z00.00 MEDICARE ANNUAL WELLNESS VISIT, SUBSEQUENT: ICD-10-CM

## 2025-07-21 DIAGNOSIS — M54.6 THORACIC SPINE PAIN: ICD-10-CM

## 2025-07-21 DIAGNOSIS — Z86.718 HISTORY OF DVT (DEEP VEIN THROMBOSIS): ICD-10-CM

## 2025-07-21 DIAGNOSIS — E78.2 MIXED HYPERLIPIDEMIA: Primary | ICD-10-CM

## 2025-07-21 PROCEDURE — G2211 COMPLEX E/M VISIT ADD ON: HCPCS | Performed by: INTERNAL MEDICINE

## 2025-07-21 PROCEDURE — 99497 ADVNCD CARE PLAN 30 MIN: CPT | Performed by: INTERNAL MEDICINE

## 2025-07-21 PROCEDURE — 99214 OFFICE O/P EST MOD 30 MIN: CPT | Performed by: INTERNAL MEDICINE

## 2025-07-21 PROCEDURE — G0439 PPPS, SUBSEQ VISIT: HCPCS | Performed by: INTERNAL MEDICINE

## 2025-07-21 NOTE — PATIENT INSTRUCTIONS
Medicare Preventive Visit Patient Instructions  Thank you for completing your Welcome to Medicare Visit or Medicare Annual Wellness Visit today. Your next wellness visit will be due in one year (7/22/2026).  The screening/preventive services that you may require over the next 5-10 years are detailed below. Some tests may not apply to you based off risk factors and/or age. Screening tests ordered at today's visit but not completed yet may show as past due. Also, please note that scanned in results may not display below.  Preventive Screenings:  Service Recommendations Previous Testing/Comments   Colorectal Cancer Screening  Colonoscopy    Fecal Occult Blood Test (FOBT)/Fecal Immunochemical Test (FIT)  Fecal DNA/Cologuard Test  Flexible Sigmoidoscopy Age: 45-75 years old   Colonoscopy: every 10 years (May be performed more frequently if at higher risk)  OR  FOBT/FIT: every 1 year  OR  Cologuard: every 3 years  OR  Sigmoidoscopy: every 5 years  Screening may be recommended earlier than age 45 if at higher risk for colorectal cancer. Also, an individualized decision between you and your healthcare provider will decide whether screening between the ages of 76-85 would be appropriate. Colonoscopy: 06/29/2018  FOBT/FIT: Not on file  Cologuard: Not on file  Sigmoidoscopy: Not on file    Screening Current     Prostate Cancer Screening Individualized decision between patient and health care provider in men between ages of 55-69   Medicare will cover every 12 months beginning on the day after your 50th birthday PSA: 3.658 ng/mL           Hepatitis C Screening Once for adults born between 1945 and 1965  More frequently in patients at high risk for Hepatitis C Hep C Antibody: 09/10/2018    Screening Current   Diabetes Screening 1-2 times per year if you're at risk for diabetes or have pre-diabetes Fasting glucose: 99 mg/dL (7/16/2024)  A1C: No results in last 5 years (No results in last 5 years)      Cholesterol Screening Once  every 5 years if you don't have a lipid disorder. May order more often based on risk factors. Lipid panel: 07/16/2024  Screening Not Indicated  History Lipid Disorder      Other Preventive Screenings Covered by Medicare:  Abdominal Aortic Aneurysm (AAA) Screening: covered once if your at risk. You're considered to be at risk if you have a family history of AAA or a male between the age of 65-75 who smoking at least 100 cigarettes in your lifetime.  Lung Cancer Screening: covers low dose CT scan once per year if you meet all of the following conditions: (1) Age 55-77; (2) No signs or symptoms of lung cancer; (3) Current smoker or have quit smoking within the last 15 years; (4) You have a tobacco smoking history of at least 20 pack years (packs per day x number of years you smoked); (5) You get a written order from a healthcare provider.  Glaucoma Screening: covered annually if you're considered high risk: (1) You have diabetes OR (2) Family history of glaucoma OR (3)  aged 50 and older OR (4)  American aged 65 and older  Osteoporosis Screening: covered every 2 years if you meet one of the following conditions: (1) Have a vertebral abnormality; (2) On glucocorticoid therapy for more than 3 months; (3) Have primary hyperparathyroidism; (4) On osteoporosis medications and need to assess response to drug therapy.  HIV Screening: covered annually if you're between the age of 15-65. Also covered annually if you are younger than 15 and older than 65 with risk factors for HIV infection. For pregnant patients, it is covered up to 3 times per pregnancy.    Immunizations:  Immunization Recommendations   Influenza Vaccine Annual influenza vaccination during flu season is recommended for all persons aged >= 6 months who do not have contraindications   Pneumococcal Vaccine   * Pneumococcal conjugate vaccine = PCV13 (Prevnar 13), PCV15 (Vaxneuvance), PCV20 (Prevnar 20)  * Pneumococcal polysaccharide vaccine  = PPSV23 (Pneumovax) Adults 19-63 yo with certain risk factors or if 65+ yo  If never received any pneumonia vaccine: recommend Prevnar 20 (PCV20)  Give PCV20 if previously received 1 dose of PCV13 or PPSV23   Hepatitis B Vaccine 3 dose series if at intermediate or high risk (ex: diabetes, end stage renal disease, liver disease)   Respiratory syncytial virus (RSV) Vaccine - COVERED BY MEDICARE PART D  * RSVPreF3 (Arexvy) CDC recommends that adults 60 years of age and older may receive a single dose of RSV vaccine using shared clinical decision-making (SCDM)   Tetanus (Td) Vaccine - COST NOT COVERED BY MEDICARE PART B Following completion of primary series, a booster dose should be given every 10 years to maintain immunity against tetanus. Td may also be given as tetanus wound prophylaxis.   Tdap Vaccine - COST NOT COVERED BY MEDICARE PART B Recommended at least once for all adults. For pregnant patients, recommended with each pregnancy.   Shingles Vaccine (Shingrix) - COST NOT COVERED BY MEDICARE PART B  2 shot series recommended in those 19 years and older who have or will have weakened immune systems or those 50 years and older     Health Maintenance Due:      Topic Date Due   • Colorectal Cancer Screening  06/29/2028   • Hepatitis C Screening  Completed     Immunizations Due:      Topic Date Due   • COVID-19 Vaccine (5 - 2024-25 season) 09/01/2024   • Influenza Vaccine (1) 09/01/2025     Advance Directives   What are advance directives?  Advance directives are legal documents that state your wishes and plans for medical care. These plans are made ahead of time in case you lose your ability to make decisions for yourself. Advance directives can apply to any medical decision, such as the treatments you want, and if you want to donate organs.   What are the types of advance directives?  There are many types of advance directives, and each state has rules about how to use them. You may choose a combination of any of  the following:  Living will:  This is a written record of the treatment you want. You can also choose which treatments you do not want, which to limit, and which to stop at a certain time. This includes surgery, medicine, IV fluid, and tube feedings.   Durable power of  for healthcare (DPAHC):  This is a written record that states who you want to make healthcare choices for you when you are unable to make them for yourself. This person, called a proxy, is usually a family member or a friend. You may choose more than 1 proxy.  Do not resuscitate (DNR) order:  A DNR order is used in case your heart stops beating or you stop breathing. It is a request not to have certain forms of treatment, such as CPR. A DNR order may be included in other types of advance directives.  Medical directive:  This covers the care that you want if you are in a coma, near death, or unable to make decisions for yourself. You can list the treatments you want for each condition. Treatment may include pain medicine, surgery, blood transfusions, dialysis, IV or tube feedings, and a ventilator (breathing machine).  Values history:  This document has questions about your views, beliefs, and how you feel and think about life. This information can help others choose the care that you would choose.  Why are advance directives important?  An advance directive helps you control your care. Although spoken wishes may be used, it is better to have your wishes written down. Spoken wishes can be misunderstood, or not followed. Treatments may be given even if you do not want them. An advance directive may make it easier for your family to make difficult choices about your care.   Fall Prevention    Fall prevention  includes ways to make your home and other areas safer. It also includes ways you can move more carefully to prevent a fall. Health conditions that cause changes in your blood pressure, vision, or muscle strength and coordination may  increase your risk for falls. Medicines may also increase your risk for falls if they make you dizzy, weak, or sleepy.   Fall prevention tips:   Stand or sit up slowly.    Use assistive devices as directed.    Wear shoes that fit well and have soles that .    Wear a personal alarm.    Stay active.    Manage your medical conditions.    Home Safety Tips:  Add items to prevent falls in the bathroom.    Keep paths clear.    Install bright lights in your home.    Keep items you use often on shelves within reach.    Paint or place reflective tape on the edges of your stairs.    Cigarette Smoking and Your Health   Risks to your health if you smoke:  Nicotine and other chemicals found in tobacco damage every cell in your body. Even if you are a light smoker, you have an increased risk for cancer, heart disease, and lung disease. If you are pregnant or have diabetes, smoking increases your risk for complications.   Benefits to your health if you stop smoking:   You decrease respiratory symptoms such as coughing, wheezing, and shortness of breath.   You reduce your risk for cancers of the lung, mouth, throat, kidney, bladder, pancreas, stomach, and cervix. If you already have cancer, you increase the benefits of chemotherapy. You also reduce your risk for cancer returning or a second cancer from developing.   You reduce your risk for heart disease, blood clots, heart attack, and stroke.   You reduce your risk for lung infections, and diseases such as pneumonia, asthma, chronic bronchitis, and emphysema.  Your circulation improves. More oxygen can be delivered to your body. If you have diabetes, you lower your risk for complications, such as kidney, artery, and eye diseases. You also lower your risk for nerve damage. Nerve damage can lead to amputations, poor vision, and blindness.  You improve your body's ability to heal and to fight infections.  For more information and support to stop smoking:   Smokefree.gov  Phone: 1-  270 - 617-4244  Web Address: www.smokefree.gov   © Copyright [employer] 2018 Information is for End User's use only and may not be sold, redistributed or otherwise used for commercial purposes. All illustrations and images included in CareNotes® are the copyrighted property of [employer] or [employer]

## 2025-07-21 NOTE — PROGRESS NOTES
Name: Luis Russell      : 1955      MRN: [id number]  Encounter Provider: Kvng Ledezma MD  Encounter Date: 2025   Encounter department: [facility]  :  Assessment & Plan  Deep vein thrombosis (DVT) of femoral vein of left lower extremity, unspecified chronicity (HCC)    Orders:  •  apixaban (ELIQUIS) 5 mg; Take 1 tablet (5 mg total) by mouth 2 (two) times a day    PAD (peripheral artery disease) (HCC)  Stable, asymptomatic. Continue aspirin and statin therapy.        Atherosclerosis of artery of left lower extremity (HCC)  Asymptomatic atherosclerotic occlusive disease left lower extremity.  No further work-up or intervention is necessary other than continued antiplatelet and statin therapy.       Chronic obstructive pulmonary disease, unspecified COPD type (HCC)  Stable without recent exacerbation.  Continue to monitor clinically.       Neuropathy   Stable, controlled.  Continue to monitor clinically.       Syringomyelia (HCC)         Medicare annual wellness visit, subsequent         Screening for diabetes mellitus         Screening for cardiovascular condition         Screening for colorectal cancer         Screening for prostate cancer    Orders:  •  PSA, Total Screen; Future    Opioid dependence, uncomplicated (HCC)  He stopped taking the Buprenorphine patch. Continue follow up with spine and pain mgmt.        Tobacco dependence  Currently smoking 1/2 ppd. Discussed cessation.        Advanced care planning/counseling discussion  Luis Russell and I had a thorough discussion regarding advance care planning.  he  has a Living Will at home to which I recommended he provide a copy to be scanned for his medical records.  ACP documentation provided to patient today.  he  understands that today's visit is a billable service.  Refer to ACP note:    Serious Illness Conversation    1. What is your understanding now of where you are with your  illness?  Prognostic Understanding: appropriate understanding of prognosis     2. How much information about what is likely to be ahead with your illness would you like to have?  Information: patient wants to be fully informed     3. What did you (clinician) communicate to the patient?     4. If your health situation worsens, what are your most important goals?  Goals: have my medical decisions respected, be mentally aware, be at home, be physically comfortable, be emotionally at peace, be spiritually and emotionally at peace, not be a burden     5. What are the biggest fears and worries about the future and your health?  Fears/Worries: loss of control, being a financial burden, being dependent, being a physical burden, burdening others     6. What abilities are so critical to your life that you cannot imagine living without them?  Unacceptable Function: being unconscious     7. What gives you strength as you think about the future with your illness?     8. If you become sicker, how much are you willing to go through for the possibility of gaining more time?  Be in the hospital: Yes Have a feeding tube: Yes   Be in the ICU: Yes Live in a nursing home: Yes   Be on a ventilator: Yes Be uncomfortable: Yes   Be on dialysis: Yes Undergo aggressive test and/or procedures: Yes   9. How much does your proxy and family know about your priorities and wishes?  Discussion Discussion: extensive discussion with family about goals and wishes        How does this plan sound to you? I will do everything I can to help you through this.  Patient verbalized understanding of the plan     I have spent 16 minutes speaking with my patient on advanced care planning today or during this visit     Advanced directives  Five Wishes: Patient does not have Five Wishes- would not like information              Thoracic spine pain         Chronic pain syndrome  Continue follow up with spine and pain mgmt.        Vitamin D deficiency  Continue  vitamin supplementation.        Mixed hyperlipidemia  Controlled. Continue atorvastatin.   Orders:  •  CBC; Future  •  Comprehensive metabolic panel; Future  •  Lipid panel; Future    History of DVT (deep vein thrombosis)  Continue eliquis anticoagulation.        Adenocarcinoma of right lung (HCC)  He has repeat CT imaging scheduled regarding surveillance and nodular findings.   Orders:  •  CBC; Future  •  Comprehensive metabolic panel; Future       Preventive health issues were discussed with patient, and age appropriate screening tests were ordered as noted in patient's After Visit Summary. Personalized health advice and appropriate referrals for health education or preventive services given if needed, as noted in patient's After Visit Summary.    History of Present Illness     Luis Russell is seen today for follow up of chronic conditions.   Recent laboratory studies reviewed today with the patient.   he has been compliant with his medication regimen.   He stopped using Buprenophrine patch for pain control. He has follow up scheduled with spine and pain mgmt.   he has no complaints or concerns at this time.       Hyperlipidemia  This is a chronic problem. The current episode started more than 1 year ago. The problem is controlled. Recent lipid tests were reviewed and are normal. Pertinent negatives include no chest pain or shortness of breath. Current antihyperlipidemic treatment includes statins. The current treatment provides moderate improvement of lipids. There are no compliance problems.    Back Pain  This is a chronic problem. The current episode started more than 1 year ago. The problem occurs daily. The problem is unchanged. Pertinent negatives include no abdominal pain, chest pain, dysuria or fever. He has tried NSAIDs for the symptoms. The treatment provided moderate relief.      Patient Care Team:  Kvng Ledezma MD as PCP - General  Cj Kincaid DO as PCP - PCP-[facility] ([facility])  Dexter BEDOLLA  DO Cyril Israel III, DO (Pain Medicine)  Neal Kwok MD as Endoscopist  Tobi Leung MD (Oncology)    Review of Systems   Constitutional: Negative.  Negative for chills, fatigue and fever.   HENT:  Negative for congestion, ear pain, postnasal drip, rhinorrhea and sore throat.    Eyes: Negative.    Respiratory:  Negative for cough, chest tightness, shortness of breath and wheezing.    Cardiovascular:  Negative for chest pain and palpitations.   Gastrointestinal:  Negative for abdominal distention, abdominal pain, blood in stool, constipation, diarrhea and nausea.   Endocrine: Negative.    Genitourinary:  Negative for difficulty urinating, dysuria and hematuria.   Musculoskeletal:  Positive for back pain.   Skin: Negative.    Allergic/Immunologic: Negative for environmental allergies and food allergies.   Neurological: Negative.    Hematological:  Negative for adenopathy.   Psychiatric/Behavioral:  Negative for agitation, behavioral problems, confusion and sleep disturbance.    All other systems reviewed and are negative.    Medical History Reviewed by provider this encounter:  Tobacco  Allergies  Meds  Problems  Med Hx  Surg Hx  Fam Hx       Annual Wellness Visit Questionnaire   Luis is here for his Subsequent Wellness visit. Last Medicare Wellness visit information reviewed, patient interviewed and updates made to the record today.      Health Risk Assessment:   Patient rates overall health as good. Patient feels that their physical health rating is same. Patient is satisfied with their life. Eyesight was rated as slightly worse. Hearing was rated as same. Patient feels that their emotional and mental health rating is slightly better. Patients states they are sometimes angry. Patient states they are never, rarely unusually tired/fatigued. Pain experienced in the last 7 days has been some. Patient's pain rating has been 4/10. Patient states that he has experienced no weight loss or gain in  last 6 months.     Depression Screening:   PHQ-2 Score: 0      Fall Risk Screening:   In the past year, patient has experienced: history of falling in past year    Number of falls: 1  Injured during fall?: No    Feels unsteady when standing or walking?: Yes    Worried about falling?: No      Home Safety:  Patient does not have trouble with stairs inside or outside of their home. Patient has working smoke alarms and has no working carbon monoxide detector. Home safety hazards include: none.     Nutrition:   Processed oils like canola     Medications:   Patient is currently taking over-the-counter supplements. OTC medications include: see medication list. Patient is able to manage medications.     Activities of Daily Living (ADLs)/Instrumental Activities of Daily Living (IADLs):   Walk and transfer into and out of bed and chair?: Yes  Dress and groom yourself?: Yes    Bathe or shower yourself?: Yes    Feed yourself? Yes  Do your laundry/housekeeping?: Yes  Manage your money, pay your bills and track your expenses?: Yes  Make your own meals?: Yes    Do your own shopping?: No    Previous Hospitalizations:   Any hospitalizations or ED visits within the last 12 months?: No      Advance Care Planning:   Living will: Yes    Durable POA for healthcare: Yes    Advanced directive: Yes    Advanced directive counseling given: Yes    End of Life Decisions reviewed with patient: Yes    Provider agrees with end of life decisions: Yes      Cognitive Screening:   Provider or family/friend/caregiver concerned regarding cognition?: No    Preventive Screenings      Cardiovascular Screening:    General: Screening Not Indicated, History Lipid Disorder, Risks and Benefits Discussed and Screening Current    Due for: Lipid Panel      Diabetes Screening:     General: Risks and Benefits Discussed and Screening Current    Due for: Blood Glucose      Colorectal Cancer Screening:     General: Screening Current and Risks and Benefits Discussed       Prostate Cancer Screening:    General: Risks and Benefits Discussed and Screening Current    Due for: PSA      Osteoporosis Screening:    General: Screening Not Indicated      Abdominal Aortic Aneurysm (AAA) Screening:    Risk factors include: age between 65-74 yo and tobacco use        Lung Cancer Screening:     General: Screening Not Indicated, History Lung Cancer, Risks and Benefits Discussed and Screening Current      Hepatitis C Screening:    General: Screening Current and Risks and Benefits Discussed    Immunizations:    - Risks/benefits immunizations discussed      Screening, Brief Intervention, and Referral to Treatment (SBIRT)     Screening  Typical number of drinks in a day: 0  Typical number of drinks in a week: 0  Interpretation: Low risk drinking behavior.    Single Item Drug Screening:  How often have you used an illegal drug (including marijuana) or a prescription medication for non-medical reasons in the past year? never    Single Item Drug Screen Score: 0  Interpretation: Negative screen for possible drug use disorder    Brief Intervention  Alcohol & drug use screenings were reviewed. No concerns regarding substance use disorder identified.     Other Counseling Topics:   Car/seat belt/driving safety, skin self-exam, sunscreen and calcium and vitamin D intake and regular weightbearing exercise.     Social Drivers of Health     Food Insecurity: No Food Insecurity (7/21/2025)    Nursing - Inadequate Food Risk Classification    • Worried About Running Out of Food in the Last Year: Never true    • Ran Out of Food in the Last Year: Never true   Transportation Needs: No Transportation Needs (7/21/2025)    PRAPARE - Transportation    • Lack of Transportation (Medical): No    • Lack of Transportation (Non-Medical): No   Housing Stability: Low Risk  (7/21/2025)    Housing Stability Vital Sign    • Unable to Pay for Housing in the Last Year: No    • Number of Times Moved in the Last Year: 1    • Homeless in  "the Last Year: No   Utilities: Not At Risk (7/21/2025)    [facility] Utilities    • Threatened with loss of utilities: No     No results found.    Objective   /66 (BP Location: Left arm, Patient Position: Sitting, Cuff Size: Standard)   Pulse 56   Temp 98.3 °F (36.8 °C) (Temporal)   Resp 18   Ht 6' 3\" (1.905 m)   Wt 82.4 kg (181 lb 9.6 oz)   SpO2 95%   BMI 22.70 kg/m²     Physical Exam  Vitals and nursing note reviewed.   Constitutional:       General: He is not in acute distress.     Appearance: He is well-developed. He is not diaphoretic.   HENT:      Head: Normocephalic and atraumatic.     Eyes:      General: No scleral icterus.        Right eye: No discharge.         Left eye: No discharge.      Conjunctiva/sclera: Conjunctivae normal.      Pupils: Pupils are equal, round, and reactive to light.     Neck:      Thyroid: No thyromegaly.      Vascular: No JVD.     Cardiovascular:      Rate and Rhythm: Normal rate and regular rhythm.      Heart sounds: Normal heart sounds. No murmur heard.     No friction rub. No gallop.   Pulmonary:      Effort: Pulmonary effort is normal. No respiratory distress.      Breath sounds: Normal breath sounds. No wheezing or rales.   Chest:      Chest wall: No tenderness.   Abdominal:      General: Bowel sounds are normal. There is no distension.      Palpations: Abdomen is soft. There is no mass.      Tenderness: There is no abdominal tenderness. There is no guarding or rebound.     Musculoskeletal:         General: No tenderness or deformity. Normal range of motion.      Cervical back: Normal range of motion and neck supple.   Lymphadenopathy:      Cervical: No cervical adenopathy.     Skin:     General: Skin is warm and dry.      Coloration: Skin is not pale.      Findings: No erythema or rash.     Neurological:      Mental Status: He is alert and oriented to person, place, and time.      Cranial Nerves: No cranial nerve deficit.      Coordination: Coordination normal.      " Deep Tendon Reflexes: Reflexes are normal and symmetric.     Psychiatric:         Behavior: Behavior normal.         Thought Content: Thought content normal.         Judgment: Judgment normal.

## 2025-07-21 NOTE — ASSESSMENT & PLAN NOTE
Asymptomatic atherosclerotic occlusive disease left lower extremity.  No further work-up or intervention is necessary other than continued antiplatelet and statin therapy.

## 2025-07-21 NOTE — ASSESSMENT & PLAN NOTE
Luis Russell and I had a thorough discussion regarding advance care planning.  he  has a Living Will at home to which I recommended he provide a copy to be scanned for his medical records.  ACP documentation provided to patient today.  he  understands that today's visit is a billable service.  Refer to ACP note:    Serious Illness Conversation    1. What is your understanding now of where you are with your illness?  Prognostic Understanding: appropriate understanding of prognosis     2. How much information about what is likely to be ahead with your illness would you like to have?  Information: patient wants to be fully informed     3. What did you (clinician) communicate to the patient?     4. If your health situation worsens, what are your most important goals?  Goals: have my medical decisions respected, be mentally aware, be at home, be physically comfortable, be emotionally at peace, be spiritually and emotionally at peace, not be a burden     5. What are the biggest fears and worries about the future and your health?  Fears/Worries: loss of control, being a financial burden, being dependent, being a physical burden, burdening others     6. What abilities are so critical to your life that you cannot imagine living without them?  Unacceptable Function: being unconscious     7. What gives you strength as you think about the future with your illness?     8. If you become sicker, how much are you willing to go through for the possibility of gaining more time?  Be in the hospital: Yes Have a feeding tube: Yes   Be in the ICU: Yes Live in a nursing home: Yes   Be on a ventilator: Yes Be uncomfortable: Yes   Be on dialysis: Yes Undergo aggressive test and/or procedures: Yes   9. How much does your proxy and family know about your priorities and wishes?  Discussion Discussion: extensive discussion with family about goals and wishes        How does this plan sound to you? I will do everything I can to help you  through this.  Patient verbalized understanding of the plan     I have spent 16 minutes speaking with my patient on advanced care planning today or during this visit     Advanced directives  Five Wishes: Patient does not have Five Wishes- would not like information

## 2025-07-21 NOTE — ASSESSMENT & PLAN NOTE
Controlled. Continue atorvastatin.   Orders:  •  CBC; Future  •  Comprehensive metabolic panel; Future  •  Lipid panel; Future

## 2025-07-21 NOTE — ASSESSMENT & PLAN NOTE
He has repeat CT imaging scheduled regarding surveillance and nodular findings.   Orders:  •  CBC; Future  •  Comprehensive metabolic panel; Future

## (undated) DEVICE — VESSEL LOOPS X-RAY DETECTABLE: Brand: DEROYAL

## (undated) DEVICE — DRAPE C-ARM BAG/DOME XR ELSTIC OPEN LF

## (undated) DEVICE — STOPCOCK 4 WAY LL HIGH PRESSURE

## (undated) DEVICE — SEALING CAP FOR CLOSING OF THOPAZ TUBING: Brand: THOPAZ SEALING CAP

## (undated) DEVICE — SUT MONOCRYL 2-0 CT-1 27 IN Y339H

## (undated) DEVICE — THE ECHELON, ECHELON ENDOPATH™ AND ECHELON FLEX™ FAMILIES OF ENDOSCOPIC LINEAR CUTTERS AND RELOADS ARE STERILE, SINGLE PATIENT USE INSTRUMENTS THAT SIMULTANEOUSLY CUT AND STAPLE TISSUE. THERE ARE SIX STAGGERED ROWS OF STAPLES, THREE ON EITHER SIDE OF THE CUT LINE. THE 45 MM INSTRUMENTS HAVE A STAPLE LINE THATIS APPROXIMATELY 45 MM LONG AND A CUT LINE THAT IS APPROXIMATELY 42 MM LONG. THE SHAFT CAN ROTATE FREELY IN BOTH DIRECTIONS AND AN ARTICULATION MECHANISM ON ARTICULATING INSTRUMENTS ENABLES BENDING THE DISTAL PORTIONOF THE SHAFT TO FACILITATE LATERAL ACCESS OF THE OPERATIVE SITE.THE INSTRUMENTS ARE SHIPPED WITHOUT A RELOAD AND MUST BE LOADED PRIOR TO USE. A STAPLE RETAINING CAP ON THE RELOAD PROTECTS THE STAPLE LEG POINTS DURING SHIPPING AND TRANSPORTATION. THE INSTRUMENTS’ LOCK-OUT FEATURE IS DESIGNED TO PREVENT A USED RELOAD FROM BEING REFIRED.: Brand: ECHELON ENDOPATH

## (undated) DEVICE — SUT PROLENE 7-0 BV175-6 24 IN M8737

## (undated) DEVICE — VALVULOTOME EZ

## (undated) DEVICE — PETRI DISH STERILE

## (undated) DEVICE — GLOVE INDICATOR PI UNDERGLOVE SZ 6.5 BLUE

## (undated) DEVICE — UTILITY MARKER,BLACK WITH LABELS: Brand: DEVON

## (undated) DEVICE — SUT PROLENE 5-0 C-1 36 IN M8720

## (undated) DEVICE — TUBING SUCTION 5MM X 12 FT

## (undated) DEVICE — SUT PROLENE 5-0 C-1/C-1 24 IN 8725H

## (undated) DEVICE — SINGLE-USE DIGITAL FLEXIBLE URETEROSCOPE: Brand: APTRA

## (undated) DEVICE — PAD GROUNDING ADULT

## (undated) DEVICE — SUT MONOCRYL 3-0 SH 27 IN Y416H

## (undated) DEVICE — SUT SILK 2-0 18 IN A185H

## (undated) DEVICE — SURGICEL 2 X 3

## (undated) DEVICE — GAUZE SPONGES,16 PLY: Brand: CURITY

## (undated) DEVICE — Device

## (undated) DEVICE — ADHESIVE SKIN HIGH VISCOSITY EXOFIN 1ML

## (undated) DEVICE — GUIDEWIRE STRGHT TIP 0.035 IN  SOLO PLUS

## (undated) DEVICE — ADAPTOR TRACH SWIVEL

## (undated) DEVICE — INTENDED FOR TISSUE SEPARATION, AND OTHER PROCEDURES THAT REQUIRE A SHARP SURGICAL BLADE TO PUNCTURE OR CUT.: Brand: BARD-PARKER SAFETY BLADES SIZE 15, STERILE

## (undated) DEVICE — SUT VICRYL 0 UR-6 27 IN J603H

## (undated) DEVICE — DRAPE C-ARM X-RAY

## (undated) DEVICE — HEAVY DUTY TABLE COVER: Brand: CONVERTORS

## (undated) DEVICE — WOUND RETRACTOR AND PROTECTOR: Brand: ALEXIS WOUND PROTECTOR-RETRACTOR

## (undated) DEVICE — PENCIL ELECTROSURG E-Z CLEAN -0035H

## (undated) DEVICE — DOUBLE TUBING WITH LARGE CONNECTOR FOR THORACIC SUCTION SYSTEM PUMP: Brand: THOPAZ TUBING DOUBLE

## (undated) DEVICE — THE ECHELON FLEX POWERED PLUS ARTICULATING ENDOSCOPIC LINEAR CUTTERS ARE STERILE, SINGLE PATIENT USE INSTRUMENTS THAT SIMULTANEOUSLYCUT AND STAPLE TISSUE. THERE ARE SIX STAGGERED ROWS OF STAPLES, THREE ON EITHER SIDE OF THE CUT LINE. THE ECHELON FLEX 45 POWERED PLUSINSTRUMENTS HAVE A STAPLE LINE THAT IS APPROXIMATELY 45 MM LONG AND A CUT LINE THAT IS APPROXIMATELY 42 MM LONG. THE SHAFT CAN ROTATE FREELYIN BOTH DIRECTIONS AND AN ARTICULATION MECHANISM ENABLES THE DISTAL PORTION OF THE SHAFT TO PIVOT TO FACILITATE LATERAL ACCESS TO THE OPERATIVESITE.THE INSTRUMENTS ARE PACKAGED WITH A PRIMARY LITHIUM BATTERY PACK THAT MUST BE INSTALLED PRIOR TO USE. THERE ARE SPECIFIC REQUIREMENTS FORDISPOSING OF THE BATTERY PACK. REFER TO THE BATTERY PACK DISPOSAL SECTION.THE INSTRUMENTS ARE PACKAGED WITHOUT A RELOAD AND MUST BE LOADED PRIOR TO USE. A STAPLE RETAINING CAP ON THE RELOAD PROTECTS THE STAPLE LEGPOINTS DURING SHIPPING AND TRANSPORTATION. THE INSTRUMENTS’ LOCK-OUT FEATURE IS DESIGNED TO PREVENT A USED OR IMPROPERLY INSTALLED RELOADFROM BEING REFIRED OR AN INSTRUMENT FROM BEING FIRED WITHOUT A RELOAD.: Brand: ECHELON FLEX

## (undated) DEVICE — GLOVE SRG BIOGEL ECLIPSE 7.5

## (undated) DEVICE — SUT VICRYL 0 CT-1 27 IN J260H

## (undated) DEVICE — 3M™ STERI-STRIP™ REINFORCED ADHESIVE SKIN CLOSURES, R1547, 1/2 IN X 4 IN (12 MM X 100 MM), 6 STRIPS/ENVELOPE: Brand: 3M™ STERI-STRIP™

## (undated) DEVICE — FIRST STEP BEDSIDE KIT - STAND-UP POUCH, ENDOSCOPIC CLEANING PAD - 1 POUCH: Brand: FIRST STEP BEDSIDE KIT - STAND-UP POUCH, ENDOSCOPIC CLEANING PAD

## (undated) DEVICE — SUT PROLENE 0 CT-1 30 IN 8424H

## (undated) DEVICE — PUDDLE VAC

## (undated) DEVICE — BASKET STONE RTRVL ZERO TIP 2.4FR

## (undated) DEVICE — SPECIMEN CONTAINER STERILE PEEL PACK

## (undated) DEVICE — ELECTRODE BLADE MOD E-Z CLEAN 2.5IN 6.4CM -0012M

## (undated) DEVICE — SNAP KOVER: Brand: UNBRANDED

## (undated) DEVICE — 6617 IOBAN II PATIENT ISOLATION DRAPE 5/BX,4BX/CS: Brand: STERI-DRAPE™ IOBAN™ 2

## (undated) DEVICE — SUT PROLENE 6-0 BV-1/BV-1 24 IN 8805H

## (undated) DEVICE — SHEATH URETERAL ACCESS 12/14FR 45CM PROXIS

## (undated) DEVICE — SINGLE USE SUCTION VALVE MAJ-209: Brand: SINGLE USE SUCTION VALVE (STERILE)

## (undated) DEVICE — VAPR COOLPULSE 90 ELECTRODE 90 DEGREES SUCTION WITH INTEGRATED HANDPIECE: Brand: VAPR COOLPULSE

## (undated) DEVICE — PACK UNIVERSAL ARTHRSCOPY PBDS

## (undated) DEVICE — DISPOSABLE HIB PAC INCLUDES 3                                    GUIDEWIRES AND 2 ARTHROSCOPY NEEDLES

## (undated) DEVICE — STERILE EMESIS BASIN                 070: Brand: CARDINAL HEALTH

## (undated) DEVICE — SUT MONOCRYL 4-0 PS-2 18 IN Y496G

## (undated) DEVICE — CATH BAL CHARGER 5 X 40MM X 75CM

## (undated) DEVICE — SUT SILK 4-0 18 IN A183H

## (undated) DEVICE — SYRINGE 10ML SLIP TIP LF

## (undated) DEVICE — 3M™ IOBAN™ 2 ANTIMICROBIAL INCISE DRAPE 6640EZ: Brand: IOBAN™ 2

## (undated) DEVICE — GLOVE SRG BIOGEL ORTHOPEDIC 7.5

## (undated) DEVICE — PACK TUR

## (undated) DEVICE — SURGICEL FIBRILLAR 1 X 2

## (undated) DEVICE — 24 FR STRAIGHT – SOFT PVC CATHETER: Brand: PVC THORACIC CATHETERS

## (undated) DEVICE — 1/2 FORCE SURGICAL SPRING CLIP: Brand: STEALTH® SPRING CLIP

## (undated) DEVICE — PROXIMATE PLUS MD MULTI-DIRECTIONAL RELEASE SKIN STAPLERS CONTAINS 35 STAINLESS STEEL STAPLES APPROXIMATE CLOSED DIMENSIONS: 6.9MM X 3.9MM WIDE: Brand: PROXIMATE

## (undated) DEVICE — 3M™ MICROFOAM™ SURGICAL TAPE 4 ROLLS/CARTON 6 CARTONS/CASE 1528-3: Brand: 3M™ MICROFOAM™

## (undated) DEVICE — SUT VICRYL 3-0 SH 27 IN J416H

## (undated) DEVICE — ABDOMINAL PAD: Brand: DERMACEA

## (undated) DEVICE — MICROPUNCTURE 501

## (undated) DEVICE — PRESTO™ INFLATION DEVICE: Brand: PRESTO

## (undated) DEVICE — CHLORAPREP HI-LITE 26ML ORANGE

## (undated) DEVICE — BLADE 4MM BANANA S/SU

## (undated) DEVICE — STERILE THORACIC PACK: Brand: CARDINAL HEALTH

## (undated) DEVICE — INTENDED FOR TISSUE SEPARATION, AND OTHER PROCEDURES THAT REQUIRE A SHARP SURGICAL BLADE TO PUNCTURE OR CUT.: Brand: BARD-PARKER SAFETY BLADES SIZE 10, STERILE

## (undated) DEVICE — SUT MONOCRYL 3-0 PS-2 18 IN Y497G

## (undated) DEVICE — TRAY FOLEY 16FR URIMETER SURESTEP

## (undated) DEVICE — FIBER BALL TIP QUANTA 272 MICRON

## (undated) DEVICE — GLOVE INDICATOR PI UNDERGLOVE SZ 8 BLUE

## (undated) DEVICE — LIGACLIP MCA MULTIPLE CLIP APPLIERS, 20 SMALL CLIPS: Brand: LIGACLIP

## (undated) DEVICE — SURGICEL 4 X 8

## (undated) DEVICE — INTENDED FOR TISSUE SEPARATION, AND OTHER PROCEDURES THAT REQUIRE A SHARP SURGICAL BLADE TO PUNCTURE OR CUT.: Brand: BARD-PARKER ® CARBON RIB-BACK BLADES

## (undated) DEVICE — PAD CAST 6 IN COTTON NON STERILE

## (undated) DEVICE — CATH URETERAL 5FR X 70 CM FLEX TIP POLYUR BARD

## (undated) DEVICE — NEEDLE SPINAL 20G X 3.5 LF

## (undated) DEVICE — MICRO KITS 5FR W/10CM

## (undated) DEVICE — SYRINGE 10ML LL

## (undated) DEVICE — DRAPE SHEET THREE QUARTER

## (undated) DEVICE — SUT SILK 3-0 18 IN A184H

## (undated) DEVICE — GLOVE SRG BIOGEL ECLIPSE 6

## (undated) DEVICE — GLOVE INDICATOR PI UNDERGLOVE SZ 8.5 BLUE

## (undated) DEVICE — SYRINGE 20ML LL

## (undated) DEVICE — LIGHT HANDLE COVER SLEEVE DISP BLUE STELLAR

## (undated) DEVICE — ARTHROSCOPY FLOOR MAT

## (undated) DEVICE — SUT VICRYL 2-0 SH 27 IN UNDYED J417H

## (undated) DEVICE — BAG DECANTER

## (undated) DEVICE — PINNACLE R/O II INTRODUCER SHEATH WITH RADIOPAQUE MARKER: Brand: PINNACLE

## (undated) DEVICE — ELECTRODE LAP L WIRE E-Z CLEAN 33CM -0100

## (undated) DEVICE — LIGHT GLOVE GREEN

## (undated) DEVICE — GLOVE SRG BIOGEL 8.5

## (undated) DEVICE — TUBING ARTHROSCOPY REDUCE PATIENT

## (undated) DEVICE — STERILE BETHLEHEM FEM POP PACK: Brand: CARDINAL HEALTH

## (undated) DEVICE — SUT PROLENE 4-0 RB-1/RB-1 36 IN 8557H